# Patient Record
Sex: FEMALE | Race: BLACK OR AFRICAN AMERICAN | NOT HISPANIC OR LATINO | Employment: FULL TIME | URBAN - METROPOLITAN AREA
[De-identification: names, ages, dates, MRNs, and addresses within clinical notes are randomized per-mention and may not be internally consistent; named-entity substitution may affect disease eponyms.]

---

## 2023-11-17 ENCOUNTER — HOSPITAL ENCOUNTER (INPATIENT)
Facility: HOSPITAL | Age: 58
LOS: 38 days | Discharge: HOSPICE | DRG: 356 | End: 2023-12-25
Attending: INTERNAL MEDICINE | Admitting: INTERNAL MEDICINE
Payer: COMMERCIAL

## 2023-11-17 DIAGNOSIS — N18.6 ESRD (END STAGE RENAL DISEASE): ICD-10-CM

## 2023-11-17 DIAGNOSIS — R09.89 RESPIRATORY CRACKLES: ICD-10-CM

## 2023-11-17 DIAGNOSIS — I82.409 DVT (DEEP VENOUS THROMBOSIS): ICD-10-CM

## 2023-11-17 DIAGNOSIS — K66.8 BILOMA FOLLOWING SURGERY, INITIAL ENCOUNTER: ICD-10-CM

## 2023-11-17 DIAGNOSIS — E80.6 HYPERBILIRUBINEMIA: ICD-10-CM

## 2023-11-17 DIAGNOSIS — R00.0 TACHYCARDIA: ICD-10-CM

## 2023-11-17 DIAGNOSIS — N13.30 HYDRONEPHROSIS: ICD-10-CM

## 2023-11-17 DIAGNOSIS — T81.89XA BILOMA FOLLOWING SURGERY, INITIAL ENCOUNTER: ICD-10-CM

## 2023-11-17 DIAGNOSIS — I48.91 A-FIB: ICD-10-CM

## 2023-11-17 DIAGNOSIS — L89.150 PRESSURE ULCER OF SACRAL REGION, UNSTAGEABLE: ICD-10-CM

## 2023-11-17 DIAGNOSIS — R07.9 CHEST PAIN: Primary | ICD-10-CM

## 2023-11-17 DIAGNOSIS — B49 FUNGEMIA: ICD-10-CM

## 2023-11-17 DIAGNOSIS — C16.9 GASTRIC ADENOCARCINOMA: ICD-10-CM

## 2023-11-17 DIAGNOSIS — N13.1 HYDRONEPHROSIS CONCURRENT WITH AND DUE TO URETERAL STRICTURE: ICD-10-CM

## 2023-11-17 PROCEDURE — 11000001 HC ACUTE MED/SURG PRIVATE ROOM

## 2023-11-17 PROCEDURE — BT1F1ZZ FLUOROSCOPY OF LEFT KIDNEY, URETER AND BLADDER USING LOW OSMOLAR CONTRAST: ICD-10-PCS | Performed by: SPECIALIST

## 2023-11-17 PROCEDURE — 0T788DZ DILATION OF BILATERAL URETERS WITH INTRALUMINAL DEVICE, VIA NATURAL OR ARTIFICIAL OPENING ENDOSCOPIC: ICD-10-PCS | Performed by: SPECIALIST

## 2023-11-17 NOTE — Clinical Note
The dilator was inserted into the  right internal jugular vein. A series of dilations performed to upsize the sheath

## 2023-11-17 NOTE — Clinical Note
A catheter was placed Lesion documentation: Glidepath catheter was tunneled in the right chest wall up to the Right IJ sheath site

## 2023-11-17 NOTE — Clinical Note
The right chest and right neck was prepped. The site was prepped with ChloraPrep. The patient was draped.

## 2023-11-17 NOTE — Clinical Note
Catheter tip was inserted into the 15 FR peel away sheath and advanced into proper placement. Catheter tip placement was verified under fluoro. Image was saved.

## 2023-11-17 NOTE — Clinical Note
Exit site marked with skin marker. Lidocaine given along tunneled tract and exit site. Exit site created with small incision.

## 2023-11-17 NOTE — Clinical Note
10 ml of contrast were injected throughout the case. 0 mL of contrast was the total wasted during the case. 10 mL was the total amount used during the case.

## 2023-11-17 NOTE — Clinical Note
vein was performed. A percutaneous stick to the right neck was performed. Ultrasound guidance was used to obtain access.

## 2023-11-17 NOTE — Clinical Note
Dr. Luo prepped right neck and removed sutures from R IJ temporary catheter. Manual pressure held for 10 minutes to catheter site.

## 2023-11-17 NOTE — Clinical Note
Catheter tip was inserted into pull away sheath. Sheath was removed. Tip position was verified under fluoroscopy. Ports aspirated and flushed appropriately. Ports were packed with Heparin 3 ml each.

## 2023-11-18 ENCOUNTER — ANESTHESIA (OUTPATIENT)
Dept: SURGERY | Facility: HOSPITAL | Age: 58
DRG: 356 | End: 2023-11-18
Payer: COMMERCIAL

## 2023-11-18 ENCOUNTER — ANESTHESIA EVENT (OUTPATIENT)
Dept: SURGERY | Facility: HOSPITAL | Age: 58
DRG: 356 | End: 2023-11-18
Payer: COMMERCIAL

## 2023-11-18 LAB
ALBUMIN SERPL-MCNC: 3.1 G/DL (ref 3.4–4.8)
ALBUMIN/GLOB SERPL: 0.9 RATIO (ref 1.1–2)
ALP SERPL-CCNC: 526 UNIT/L (ref 40–150)
ALT SERPL-CCNC: 250 UNIT/L (ref 0–55)
APPEARANCE UR: CLEAR
AST SERPL-CCNC: 178 UNIT/L (ref 5–34)
BACTERIA #/AREA URNS AUTO: ABNORMAL /HPF
BASOPHILS # BLD AUTO: 0.05 X10(3)/MCL
BASOPHILS NFR BLD AUTO: 0.5 %
BILIRUB SERPL-MCNC: 10.8 MG/DL
BILIRUB UR QL STRIP.AUTO: ABNORMAL
BILIRUBIN DIRECT+TOT PNL SERPL-MCNC: 8.1 MG/DL (ref 0–?)
BUN SERPL-MCNC: 14.6 MG/DL (ref 9.8–20.1)
CALCIUM SERPL-MCNC: 9.8 MG/DL (ref 8.4–10.2)
CHLORIDE SERPL-SCNC: 106 MMOL/L (ref 98–107)
CO2 SERPL-SCNC: 27 MMOL/L (ref 23–31)
COLOR UR AUTO: ABNORMAL
CREAT SERPL-MCNC: 0.9 MG/DL (ref 0.55–1.02)
EOSINOPHIL # BLD AUTO: 0.09 X10(3)/MCL (ref 0–0.9)
EOSINOPHIL NFR BLD AUTO: 0.9 %
ERYTHROCYTE [DISTWIDTH] IN BLOOD BY AUTOMATED COUNT: 17.9 % (ref 11.5–17)
GFR SERPLBLD CREATININE-BSD FMLA CKD-EPI: >60 MLS/MIN/1.73/M2
GLOBULIN SER-MCNC: 3.5 GM/DL (ref 2.4–3.5)
GLUCOSE SERPL-MCNC: 79 MG/DL (ref 82–115)
GLUCOSE UR QL STRIP.AUTO: ABNORMAL
HCT VFR BLD AUTO: 38.3 % (ref 37–47)
HGB BLD-MCNC: 12.4 G/DL (ref 12–16)
IMM GRANULOCYTES # BLD AUTO: 0.05 X10(3)/MCL (ref 0–0.04)
IMM GRANULOCYTES NFR BLD AUTO: 0.5 %
INR PPP: 1.1
KETONES UR QL STRIP.AUTO: ABNORMAL
LEUKOCYTE ESTERASE UR QL STRIP.AUTO: NEGATIVE
LIPASE SERPL-CCNC: 15 U/L
LYMPHOCYTES # BLD AUTO: 0.58 X10(3)/MCL (ref 0.6–4.6)
LYMPHOCYTES NFR BLD AUTO: 5.7 %
MAGNESIUM SERPL-MCNC: 2.3 MG/DL (ref 1.6–2.6)
MCH RBC QN AUTO: 30.2 PG (ref 27–31)
MCHC RBC AUTO-ENTMCNC: 32.4 G/DL (ref 33–36)
MCV RBC AUTO: 93.2 FL (ref 80–94)
MONOCYTES # BLD AUTO: 1.06 X10(3)/MCL (ref 0.1–1.3)
MONOCYTES NFR BLD AUTO: 10.4 %
MUCOUS THREADS URNS QL MICRO: ABNORMAL /LPF
NEUTROPHILS # BLD AUTO: 8.37 X10(3)/MCL (ref 2.1–9.2)
NEUTROPHILS NFR BLD AUTO: 82 %
NITRITE UR QL STRIP.AUTO: NEGATIVE
NRBC BLD AUTO-RTO: 0 %
PH UR STRIP.AUTO: 6 [PH]
PHOSPHATE SERPL-MCNC: 3.8 MG/DL (ref 2.3–4.7)
PLATELET # BLD AUTO: 331 X10(3)/MCL (ref 130–400)
PMV BLD AUTO: 11.1 FL (ref 7.4–10.4)
POTASSIUM SERPL-SCNC: 4 MMOL/L (ref 3.5–5.1)
PROT SERPL-MCNC: 6.6 GM/DL (ref 5.8–7.6)
PROT UR QL STRIP.AUTO: ABNORMAL
PROTHROMBIN TIME: 14 SECONDS (ref 12.5–14.5)
RBC # BLD AUTO: 4.11 X10(6)/MCL (ref 4.2–5.4)
RBC #/AREA URNS AUTO: ABNORMAL /HPF
RBC UR QL AUTO: NEGATIVE
SODIUM SERPL-SCNC: 142 MMOL/L (ref 136–145)
SP GR UR STRIP.AUTO: >1.05 (ref 1–1.03)
SQUAMOUS #/AREA URNS LPF: ABNORMAL /HPF
UROBILINOGEN UR STRIP-ACNC: ABNORMAL
WBC # SPEC AUTO: 10.2 X10(3)/MCL (ref 4.5–11.5)
WBC #/AREA URNS AUTO: ABNORMAL /HPF

## 2023-11-18 PROCEDURE — C9113 INJ PANTOPRAZOLE SODIUM, VIA: HCPCS | Performed by: PHYSICIAN ASSISTANT

## 2023-11-18 PROCEDURE — 81001 URINALYSIS AUTO W/SCOPE: CPT | Performed by: INTERNAL MEDICINE

## 2023-11-18 PROCEDURE — 37000008 HC ANESTHESIA 1ST 15 MINUTES: Performed by: INTERNAL MEDICINE

## 2023-11-18 PROCEDURE — 0FJB8ZZ INSPECTION OF HEPATOBILIARY DUCT, VIA NATURAL OR ARTIFICIAL OPENING ENDOSCOPIC: ICD-10-PCS | Performed by: INTERNAL MEDICINE

## 2023-11-18 PROCEDURE — 63600175 PHARM REV CODE 636 W HCPCS: Performed by: INTERNAL MEDICINE

## 2023-11-18 PROCEDURE — 85025 COMPLETE CBC W/AUTO DIFF WBC: CPT | Performed by: INTERNAL MEDICINE

## 2023-11-18 PROCEDURE — C1769 GUIDE WIRE: HCPCS | Performed by: INTERNAL MEDICINE

## 2023-11-18 PROCEDURE — 63600175 PHARM REV CODE 636 W HCPCS: Performed by: PHYSICIAN ASSISTANT

## 2023-11-18 PROCEDURE — 37000009 HC ANESTHESIA EA ADD 15 MINS: Performed by: INTERNAL MEDICINE

## 2023-11-18 PROCEDURE — D9220A PRA ANESTHESIA: Mod: CRNA,,, | Performed by: NURSE ANESTHETIST, CERTIFIED REGISTERED

## 2023-11-18 PROCEDURE — 80074 ACUTE HEPATITIS PANEL: CPT | Performed by: INTERNAL MEDICINE

## 2023-11-18 PROCEDURE — 25000003 PHARM REV CODE 250: Performed by: INTERNAL MEDICINE

## 2023-11-18 PROCEDURE — 43235 EGD DIAGNOSTIC BRUSH WASH: CPT | Performed by: INTERNAL MEDICINE

## 2023-11-18 PROCEDURE — 84100 ASSAY OF PHOSPHORUS: CPT | Performed by: INTERNAL MEDICINE

## 2023-11-18 PROCEDURE — D9220A PRA ANESTHESIA: ICD-10-PCS | Mod: ANES,,, | Performed by: ANESTHESIOLOGY

## 2023-11-18 PROCEDURE — 27201423 OPTIME MED/SURG SUP & DEVICES STERILE SUPPLY: Performed by: INTERNAL MEDICINE

## 2023-11-18 PROCEDURE — 83735 ASSAY OF MAGNESIUM: CPT | Performed by: INTERNAL MEDICINE

## 2023-11-18 PROCEDURE — 85610 PROTHROMBIN TIME: CPT | Performed by: PHYSICIAN ASSISTANT

## 2023-11-18 PROCEDURE — 63600175 PHARM REV CODE 636 W HCPCS: Performed by: NURSE ANESTHETIST, CERTIFIED REGISTERED

## 2023-11-18 PROCEDURE — 25000003 PHARM REV CODE 250: Performed by: NURSE ANESTHETIST, CERTIFIED REGISTERED

## 2023-11-18 PROCEDURE — D9220A PRA ANESTHESIA: ICD-10-PCS | Mod: CRNA,,, | Performed by: NURSE ANESTHETIST, CERTIFIED REGISTERED

## 2023-11-18 PROCEDURE — 83690 ASSAY OF LIPASE: CPT | Performed by: INTERNAL MEDICINE

## 2023-11-18 PROCEDURE — 11000001 HC ACUTE MED/SURG PRIVATE ROOM

## 2023-11-18 PROCEDURE — D9220A PRA ANESTHESIA: Mod: ANES,,, | Performed by: ANESTHESIOLOGY

## 2023-11-18 PROCEDURE — 87040 BLOOD CULTURE FOR BACTERIA: CPT | Performed by: INTERNAL MEDICINE

## 2023-11-18 PROCEDURE — 80053 COMPREHEN METABOLIC PANEL: CPT | Performed by: INTERNAL MEDICINE

## 2023-11-18 PROCEDURE — 82248 BILIRUBIN DIRECT: CPT | Performed by: INTERNAL MEDICINE

## 2023-11-18 RX ORDER — SODIUM CHLORIDE 9 MG/ML
INJECTION, SOLUTION INTRAVENOUS
Status: DISCONTINUED | OUTPATIENT
Start: 2023-11-18 | End: 2023-12-25 | Stop reason: HOSPADM

## 2023-11-18 RX ORDER — OLMESARTAN MEDOXOMIL 20 MG/1
20 TABLET ORAL DAILY
COMMUNITY

## 2023-11-18 RX ORDER — LIDOCAINE HYDROCHLORIDE 10 MG/ML
1 INJECTION, SOLUTION EPIDURAL; INFILTRATION; INTRACAUDAL; PERINEURAL ONCE
Status: CANCELLED | OUTPATIENT
Start: 2023-11-18 | End: 2023-11-18

## 2023-11-18 RX ORDER — SODIUM CHLORIDE, SODIUM GLUCONATE, SODIUM ACETATE, POTASSIUM CHLORIDE AND MAGNESIUM CHLORIDE 30; 37; 368; 526; 502 MG/100ML; MG/100ML; MG/100ML; MG/100ML; MG/100ML
INJECTION, SOLUTION INTRAVENOUS CONTINUOUS
Status: DISCONTINUED | OUTPATIENT
Start: 2023-11-18 | End: 2023-11-22

## 2023-11-18 RX ORDER — SODIUM CITRATE AND CITRIC ACID MONOHYDRATE 334; 500 MG/5ML; MG/5ML
30 SOLUTION ORAL
Status: CANCELLED | OUTPATIENT
Start: 2023-11-18

## 2023-11-18 RX ORDER — DEXAMETHASONE SODIUM PHOSPHATE 4 MG/ML
INJECTION, SOLUTION INTRA-ARTICULAR; INTRALESIONAL; INTRAMUSCULAR; INTRAVENOUS; SOFT TISSUE
Status: DISCONTINUED | OUTPATIENT
Start: 2023-11-18 | End: 2023-11-18

## 2023-11-18 RX ORDER — SODIUM CHLORIDE 9 MG/ML
INJECTION, SOLUTION INTRAVENOUS CONTINUOUS
Status: DISCONTINUED | OUTPATIENT
Start: 2023-11-18 | End: 2023-11-20

## 2023-11-18 RX ORDER — ROCURONIUM BROMIDE 10 MG/ML
INJECTION, SOLUTION INTRAVENOUS
Status: DISCONTINUED | OUTPATIENT
Start: 2023-11-18 | End: 2023-11-18

## 2023-11-18 RX ORDER — OXYCODONE HYDROCHLORIDE 5 MG/1
5 TABLET ORAL EVERY 6 HOURS PRN
Status: DISCONTINUED | OUTPATIENT
Start: 2023-11-18 | End: 2023-12-25 | Stop reason: HOSPADM

## 2023-11-18 RX ORDER — PHENYLEPHRINE HCL IN 0.9% NACL 1 MG/10 ML
SYRINGE (ML) INTRAVENOUS
Status: DISCONTINUED | OUTPATIENT
Start: 2023-11-18 | End: 2023-11-18

## 2023-11-18 RX ORDER — ACETAMINOPHEN 325 MG/1
650 TABLET ORAL EVERY 4 HOURS PRN
Status: DISCONTINUED | OUTPATIENT
Start: 2023-11-18 | End: 2023-12-25 | Stop reason: HOSPADM

## 2023-11-18 RX ORDER — POLYETHYLENE GLYCOL 3350 17 G/17G
17 POWDER, FOR SOLUTION ORAL 2 TIMES DAILY PRN
Status: DISCONTINUED | OUTPATIENT
Start: 2023-11-18 | End: 2023-12-25 | Stop reason: HOSPADM

## 2023-11-18 RX ORDER — ALUMINUM HYDROXIDE, MAGNESIUM HYDROXIDE, AND SIMETHICONE 1200; 120; 1200 MG/30ML; MG/30ML; MG/30ML
30 SUSPENSION ORAL 4 TIMES DAILY PRN
Status: DISCONTINUED | OUTPATIENT
Start: 2023-11-18 | End: 2023-12-25 | Stop reason: HOSPADM

## 2023-11-18 RX ORDER — INDOMETHACIN 50 MG/1
100 SUPPOSITORY RECTAL ONCE
Status: DISCONTINUED | OUTPATIENT
Start: 2023-11-18 | End: 2023-11-18

## 2023-11-18 RX ORDER — PROCHLORPERAZINE EDISYLATE 5 MG/ML
5 INJECTION INTRAMUSCULAR; INTRAVENOUS EVERY 6 HOURS PRN
Status: DISCONTINUED | OUTPATIENT
Start: 2023-11-18 | End: 2023-12-25 | Stop reason: HOSPADM

## 2023-11-18 RX ORDER — SODIUM CHLORIDE 0.9 % (FLUSH) 0.9 %
10 SYRINGE (ML) INJECTION
Status: DISCONTINUED | OUTPATIENT
Start: 2023-11-18 | End: 2023-12-25 | Stop reason: HOSPADM

## 2023-11-18 RX ORDER — ONDANSETRON HYDROCHLORIDE 2 MG/ML
4 INJECTION, SOLUTION INTRAVENOUS ONCE
Status: COMPLETED | OUTPATIENT
Start: 2023-11-18 | End: 2023-11-18

## 2023-11-18 RX ORDER — ONDANSETRON HYDROCHLORIDE 2 MG/ML
4 INJECTION, SOLUTION INTRAVENOUS EVERY 4 HOURS PRN
Status: DISCONTINUED | OUTPATIENT
Start: 2023-11-18 | End: 2023-11-30

## 2023-11-18 RX ORDER — AMOXICILLIN 250 MG
2 CAPSULE ORAL 2 TIMES DAILY PRN
Status: DISCONTINUED | OUTPATIENT
Start: 2023-11-18 | End: 2023-12-25 | Stop reason: HOSPADM

## 2023-11-18 RX ORDER — ONDANSETRON HYDROCHLORIDE 2 MG/ML
INJECTION, SOLUTION INTRAMUSCULAR; INTRAVENOUS
Status: DISCONTINUED | OUTPATIENT
Start: 2023-11-18 | End: 2023-11-18

## 2023-11-18 RX ORDER — MORPHINE SULFATE 4 MG/ML
4 INJECTION, SOLUTION INTRAMUSCULAR; INTRAVENOUS EVERY 4 HOURS PRN
Status: DISCONTINUED | OUTPATIENT
Start: 2023-11-18 | End: 2023-12-25 | Stop reason: HOSPADM

## 2023-11-18 RX ORDER — PROPOFOL 10 MG/ML
VIAL (ML) INTRAVENOUS
Status: DISCONTINUED | OUTPATIENT
Start: 2023-11-18 | End: 2023-11-18

## 2023-11-18 RX ORDER — BISACODYL 10 MG/1
10 SUPPOSITORY RECTAL DAILY PRN
Status: DISCONTINUED | OUTPATIENT
Start: 2023-11-18 | End: 2023-12-25 | Stop reason: HOSPADM

## 2023-11-18 RX ORDER — PANTOPRAZOLE SODIUM 40 MG/10ML
40 INJECTION, POWDER, LYOPHILIZED, FOR SOLUTION INTRAVENOUS
Status: DISCONTINUED | OUTPATIENT
Start: 2023-11-18 | End: 2023-11-23

## 2023-11-18 RX ORDER — BISACODYL 10 MG/1
10 SUPPOSITORY RECTAL ONCE
Status: COMPLETED | OUTPATIENT
Start: 2023-11-18 | End: 2023-11-18

## 2023-11-18 RX ORDER — TALC
6 POWDER (GRAM) TOPICAL NIGHTLY PRN
Status: DISCONTINUED | OUTPATIENT
Start: 2023-11-18 | End: 2023-12-20

## 2023-11-18 RX ORDER — MORPHINE SULFATE 4 MG/ML
4 INJECTION, SOLUTION INTRAMUSCULAR; INTRAVENOUS ONCE
Status: COMPLETED | OUTPATIENT
Start: 2023-11-18 | End: 2023-11-18

## 2023-11-18 RX ORDER — INDOMETHACIN 50 MG/1
100 SUPPOSITORY RECTAL ONCE
Status: COMPLETED | OUTPATIENT
Start: 2023-11-18 | End: 2023-11-18

## 2023-11-18 RX ADMIN — DEXAMETHASONE SODIUM PHOSPHATE 4 MG: 4 INJECTION, SOLUTION INTRA-ARTICULAR; INTRALESIONAL; INTRAMUSCULAR; INTRAVENOUS; SOFT TISSUE at 01:11

## 2023-11-18 RX ADMIN — PROCHLORPERAZINE EDISYLATE 5 MG: 5 INJECTION INTRAMUSCULAR; INTRAVENOUS at 11:11

## 2023-11-18 RX ADMIN — SODIUM CHLORIDE: 9 INJECTION, SOLUTION INTRAVENOUS at 02:11

## 2023-11-18 RX ADMIN — Medication 100 MCG: at 01:11

## 2023-11-18 RX ADMIN — PIPERACILLIN SODIUM AND TAZOBACTAM SODIUM 4.5 G: 4; .5 INJECTION, POWDER, FOR SOLUTION INTRAVENOUS at 06:11

## 2023-11-18 RX ADMIN — PIPERACILLIN SODIUM AND TAZOBACTAM SODIUM 4.5 G: 4; .5 INJECTION, POWDER, FOR SOLUTION INTRAVENOUS at 02:11

## 2023-11-18 RX ADMIN — SODIUM CHLORIDE: 9 INJECTION, SOLUTION INTRAVENOUS at 09:11

## 2023-11-18 RX ADMIN — INDOMETHACIN 100 MG: 50 SUPPOSITORY RECTAL at 01:11

## 2023-11-18 RX ADMIN — SUGAMMADEX 200 MG: 100 INJECTION, SOLUTION INTRAVENOUS at 01:11

## 2023-11-18 RX ADMIN — ROCURONIUM BROMIDE 50 MG: 10 SOLUTION INTRAVENOUS at 01:11

## 2023-11-18 RX ADMIN — SODIUM CHLORIDE, SODIUM GLUCONATE, SODIUM ACETATE, POTASSIUM CHLORIDE AND MAGNESIUM CHLORIDE: 526; 502; 368; 37; 30 INJECTION, SOLUTION INTRAVENOUS at 01:11

## 2023-11-18 RX ADMIN — PANTOPRAZOLE SODIUM 40 MG: 40 INJECTION, POWDER, FOR SOLUTION INTRAVENOUS at 05:11

## 2023-11-18 RX ADMIN — ONDANSETRON 4 MG: 2 INJECTION INTRAMUSCULAR; INTRAVENOUS at 01:11

## 2023-11-18 RX ADMIN — PROPOFOL 150 MG: 10 INJECTION, EMULSION INTRAVENOUS at 01:11

## 2023-11-18 RX ADMIN — PIPERACILLIN SODIUM AND TAZOBACTAM SODIUM 4.5 G: 4; .5 INJECTION, POWDER, FOR SOLUTION INTRAVENOUS at 09:11

## 2023-11-18 RX ADMIN — BISACODYL 10 MG: 10 SUPPOSITORY RECTAL at 01:11

## 2023-11-18 RX ADMIN — MORPHINE SULFATE 4 MG: 4 INJECTION, SOLUTION INTRAMUSCULAR; INTRAVENOUS at 01:11

## 2023-11-18 RX ADMIN — SODIUM CHLORIDE: 9 INJECTION, SOLUTION INTRAVENOUS at 06:11

## 2023-11-18 NOTE — NURSING
Nurses Note -- 4 Eyes      11/18/2023   2:57 AM      Skin assessed during: Admit      [x] No Altered Skin Integrity Present    [x]Prevention Measures Documented      [] Yes- Altered Skin Integrity Present or Discovered   [] LDA Added if Not in Epic (Describe Wound)   [] New Altered Skin Integrity was Present on Admit and Documented in LDA   [] Wound Image Taken    Wound Care Consulted? No    Attending Nurse:  Anastasiya Sainz RN/Staff Member:   Abigail Burleson RN

## 2023-11-18 NOTE — ANESTHESIA PREPROCEDURE EVALUATION
11/18/2023  Karen Briggs is a 58 y.o., female with medical history of hypertension who recently underwent laparoscopic cholecystectomy on 11/10/2023, procedure was incomplete and was unable to completely resect the gallbladder.  Since surgery she continued to have right flank/back pain and followed up with her PCP and CT abdomen and pelvis on 11/17/2023 revealed left-sided hydronephrosis with suspected distal obstruction/no clear stone visualized, postoperative changes of cholecystectomy with fluid in the gallbladder fossa may reflect postoperative seroma but biloma can not be entirely excluded, also noted for marked thickening of the stomach wall diffusely may be related to mesenteric edema/reactive.  She [place] [facility] [place] the same day 11/17/2023 and her labs notable for WBC 9.0, hemoglobin 12.4, platelets 442, creatinine 0.86, total bilirubin 8.7 with direct fraction 6.7, alkaline phosphatase 491, , .  Patient's surgeon was consulted and recommended ERCP which was not available at [facility] for which she was transferred to [facility] and referred to hospital medicine service for further evaluation and management.       Pre-op Assessment    I have reviewed the Patient Summary Reports.     I have reviewed the Nursing Notes. I have reviewed the NPO Status.   I have reviewed the Medications.     Review of Systems  Cardiovascular:  Exercise tolerance: good                                               Physical Exam  General: Well nourished and Cooperative    Airway:  Mallampati: II   Mouth Opening: Normal  TM Distance: Normal  Tongue: Normal  Neck ROM: Normal ROM    Dental:  Dentures    Chest/Lungs:  Clear to auscultation    Heart:  Rhythm: Regular Rhythm        Anesthesia Plan  Type of Anesthesia, risks & benefits discussed:    Anesthesia Type: Gen ETT  Intra-op Monitoring Plan: Standard ASA  "Monitors  Post Op Pain Control Plan: multimodal analgesia  Induction:  IV  Airway Plan: Direct  Informed Consent: Informed consent signed with the Patient and all parties understand the risks and agree with anesthesia plan.  All questions answered.   ASA Score: 2  Day of Surgery Review of History & Physical: H&P Update referred to the surgeon/provider.    Ready For Surgery From Anesthesia Perspective.     .  I explained anesthesia plan to patient/responsbile party if available.  Anesthesia consent done going over the material facts, risks, complications & alternatives, obtained which includes the possibility of altering the anesthesia plan.  I reviewed problem list, prior to admission medication list, appropriate labs, any workup, Xray, EKG etc noted below.  Patients condition is satisfactory to proceed with anesthesia plan unless otherwise noted (see anesthesia chart for details of the anesthesia plan carried out).      Pre-operative evaluation for Procedure(s) (LRB):  ERCP (ENDOSCOPIC RETROGRADE CHOLANGIOPANCREATOGRAPHY) (N/A)    /83   Pulse 88   Temp 36.8 °C (98.2 °F) (Oral)   Resp 18   Ht 5' 6" (1.676 m)   Wt 82.2 kg (181 lb 4 oz)   SpO2 98%   Breastfeeding No   BMI 29.25 kg/m²     There is no problem list on file for this patient.      Review of patient's allergies indicates:  No Known Allergies    Current Outpatient Medications   Medication Instructions    olmesartan (BENICAR) 20 mg, Oral, Daily       Past Surgical History:   Procedure Laterality Date    CARPAL TUNNEL RELEASE Left     HEMORRHOID SURGERY         Social History     Socioeconomic History    Marital status:    Tobacco Use    Smoking status: Never    Smokeless tobacco: Never   Substance and Sexual Activity    Alcohol use: No    Drug use: No    Sexual activity: Never     Birth control/protection: Post-menopausal       Lab Results   Component Value Date    WBC 10.20 11/18/2023    HGB 12.4 11/18/2023    HCT 38.3 11/18/2023    MCV " "93.2 11/18/2023     11/18/2023          BMP  Lab Results   Component Value Date    HCT 38.3 11/18/2023     11/18/2023    K 4.0 11/18/2023    BUN 14.6 11/18/2023    CREATININE 0.90 11/18/2023    CALCIUM 9.8 11/18/2023        INR  No results for input(s): "PT", "INR", "PROTIME", "APTT" in the last 72 hours.        Diagnostic Studies:      EKG:           "

## 2023-11-18 NOTE — ANESTHESIA PROCEDURE NOTES
Intubation    Date/Time: 11/18/2023 1:01 PM    Performed by: Karel Gentile CRNA  Authorized by: Devan Sin MD    Intubation:     Induction:  Intravenous    Intubated:  Postinduction    Mask Ventilation:  Easy mask    Attempts:  1    Attempted By:  CRNA    Method of Intubation:  Direct    Blade:  Gibson 2    Laryngeal View Grade: Grade I - full view of cords      Difficult Airway Encountered?: No      Complications:  None    Airway Device:  Oral endotracheal tube    Airway Device Size:  7.5    Style/Cuff Inflation:  Cuffed    Tube secured:  21    Secured at:  The lips    Placement Verified By:  Capnometry    Complicating Factors:  None    Findings Post-Intubation:  BS equal bilateral

## 2023-11-18 NOTE — CONSULTS
Reason for Urology Consult     LEFT hydronephrosis.     HPI     Karen Briggs is a 58 y.o. female who recently underwent laparoscopic cholecystectomy on November 11th at [facility].  Incomplete resection due to inflammation.  Has had persistent right back pain prompting repeat CT November 17th which revealed left hydronephrosis without stone.  Noted postoperative fluid collection with mesenteric edema.  Creatinine 0.86.  She was transferred here for ERCP.    Patient seen and examined this morning.  Continues to have right back and flank pain.  She denies any left flank pain.  She denies stone history.  No dysuria or gross hematuria.  Urinalysis with trace bacteria.  Nitrite negative.      Past Medical History:   Diagnosis Date    Hypertension     Sciatica      Past Surgical History:   Procedure Laterality Date    CARPAL TUNNEL RELEASE Left     HEMORRHOID SURGERY       Family History   Problem Relation Age of Onset    Breast cancer Mother     Cancer Maternal Aunt         colon cancer     Social History     Tobacco Use    Smoking status: Never    Smokeless tobacco: Never   Substance Use Topics    Alcohol use: No       Review of patient's allergies indicates:  No Known Allergies      Current Facility-Administered Medications:     0.9%  NaCl infusion, , Intravenous, PRN, Hussein Merino MD, Last Rate: 10 mL/hr at 11/18/23 0625, New Bag at 11/18/23 0625    acetaminophen tablet 650 mg, 650 mg, Oral, Q4H PRN, Hussein Merino MD    aluminum-magnesium hydroxide-simethicone 200-200-20 mg/5 mL suspension 30 mL, 30 mL, Oral, QID PRN, Hussein Merino MD    bisacodyL suppository 10 mg, 10 mg, Rectal, Daily PRN, Hussein Merino MD    melatonin tablet 6 mg, 6 mg, Oral, Nightly PRN, Hussein Merino MD    morphine injection 4 mg, 4 mg, Intravenous, Q4H PRN, Hussein Merino MD    ondansetron injection 4 mg, 4 mg, Intravenous, Q4H PRN, Hussein Merino MD    oxyCODONE immediate release tablet 5 mg, 5 mg, Oral, Q6H PRN, Hussein Merino MD     piperacillin-tazobactam (ZOSYN) 4.5 g in dextrose 5 % in water (D5W) 100 mL IVPB (MB+), 4.5 g, Intravenous, Q8H, Hussein Merino MD, Stopped at 11/18/23 1026    polyethylene glycol packet 17 g, 17 g, Oral, BID PRN, Hussein Merino MD    prochlorperazine injection Soln 5 mg, 5 mg, Intravenous, Q6H PRN, Hussein Merino MD    senna-docusate 8.6-50 mg per tablet 2 tablet, 2 tablet, Oral, BID PRN, Hussein Merino MD    sodium chloride 0.9% flush 10 mL, 10 mL, Intravenous, PRN, Hussein Merino MD    Physical exam     Vitals:    11/18/23 0050 11/18/23 0117 11/18/23 0410 11/18/23 0700   BP: 131/80  128/86 (!) 115/97   Pulse: 84  88 85   Resp: 19 18 18   Temp: 98.2 °F (36.8 °C)  97.7 °F (36.5 °C) 98.3 °F (36.8 °C)   TempSrc: Oral  Oral Oral   SpO2: 100%  100% 99%   Weight:       Height:         Appears ill  AAOx3  Skin warm and dry  Affect appropriate  Grossly intact neurologically  NCAT  RRR  Respirations even/unlabored  Abdomen soft, tender in right and left lower quadrant.  No left CVA tenderness  Extremities no clubbing, cyanosis, edema        Lab     Lab Results   Component Value Date    WBC 10.20 11/18/2023    HGB 12.4 11/18/2023    HCT 38.3 11/18/2023     11/18/2023     (H) 11/18/2023     (H) 11/18/2023     11/18/2023    K 4.0 11/18/2023     04/24/2018    BUN 14.6 11/18/2023    CO2 27 11/18/2023    TSH 1.551 04/24/2018     (H) 04/24/2018       Lab Results   Component Value Date    PHUR 5.0 04/24/2018    SPECGRAV 1.015 04/24/2018    LEUKOCYTESUR Negative 11/18/2023    NITRITE Negative 04/24/2018    RBCUA 6-10 (A) 11/18/2023    WBCUA 6-10 (A) 11/18/2023         Assessment     58-year-old female status post complicated laparoscopic cholecystectomy with postoperative fluid collection and incidental left hydronephrosis.    Plan     Reviewed CT from outside facility on November 17th.  CT with delayed nephrogenic phase and hydro nephrosis concerning for obstruction.  No appreciable ureteral  stone.  May be resultant from inflammatory reaction.  She is not having left flank pain at this time.  Creatinine is 0.9.  Urinalysis without evidence of infection.  Discussed indications for stent placement including symptomatic renal colic, concern for UTI, and persistent hydro.  She was understanding.  Planning for ERCP today.  We will continue to monitor.  Recommend renal ultrasound 2-3 days.    AF  #[id number]

## 2023-11-18 NOTE — PROGRESS NOTES
Received from procedure room:    A-clear airway, able to speak in full sentences, able to expectorate secretions  B-Breathing spontaneously in regular intervals  C-Skin warm to touch, CRT less than 2 seconds    Pain-Denies pain, appears comfortable, no grimaced face noted    Vital signs taken and kept monitored  Seen by clinician before going back to [facility].  May have full liquid diet

## 2023-11-18 NOTE — TRANSFER OF CARE
"Anesthesia Transfer of Care Note    Patient: Karen Briggs    Procedure(s) Performed: Procedure(s) (LRB):  ERCP (ENDOSCOPIC RETROGRADE CHOLANGIOPANCREATOGRAPHY) (N/A)  EGD, WITH CLOSED BIOPSY    Patient location: GI    Anesthesia Type: general    Transport from OR: Transported from OR on room air with adequate spontaneous ventilation    Post pain: adequate analgesia    Post assessment: no apparent anesthetic complications    Post vital signs: stable    Level of consciousness: awake    Nausea/Vomiting: no nausea/vomiting    Complications: none    Transfer of care protocol was followed      Last vitals: Visit Vitals  /83   Pulse 88   Temp 36.8 °C (98.2 °F) (Oral)   Resp 18   Ht 5' 6" (1.676 m)   Wt 82.2 kg (181 lb 4 oz)   SpO2 98%   Breastfeeding No   BMI 29.25 kg/m²     "

## 2023-11-18 NOTE — CONSULTS
Consult Note    Reason for Consult:      We were consulted by Dr. Steiner to evaluate this patient for bile leak/obstruction.   As above. Attending is Dr. Steiner.    HPI:   This is a 58-year-old female unknown to our group with a past medical history of HTN.     S/p laparoscopic cholecystectomy 11/10/2023; procedure was incomplete and unable to completely resect the gallbladder.  Right flank/back pain persisted since surgery. Followed up with PCP and CT abdomen and pelvis on 11/17/2023 revealed left-sided hydronephrosis with suspected distal obstruction/no clear stone visualized, postoperative changes of cholecystectomy with fluid in the gallbladder fossa may reflect postoperative seroma but biloma can not be entirely excluded, also noted for marked thickening of the stomach wall diffusely may be related to mesenteric edema/reactive.  Presented to [facility] ED the same day 11/17/2023 and her labs notable for WBC 9.0k, hemoglobin 12.4g/dL, platelets 442, creatinine 0.86, total bilirubin 8.7 direct predominant 6.7, , , .  Surgeon was consulted with recommendations for ERCP (not available at [facility]). Thus, transferred to [facility]. Admitted to hospital medicine. GI consulted for biloma vs bile leak vs obstruction.    Currently, labs reveal normal CBC, , tbili 10.8, direct 8.1, , . Clinically hemodynamically stable and afebrile.     Reports generalized abdominal discomfort. Reports difficulty swallowing, defined as food getting stuck at the lower esophageal area and regurgitating back up. Going on prior to lap grace, now worse. Admits to nausea. Vomiting defined as regurgitation and the inability to keep anything down. Endorses 20lbs unintentional weight loss. Denies anorexia. Constipation predominant stooling currently; small BM today. Otherwise, nothing significant since cholecystectomy.    No history of EGD. Last colonoscopy sometime in September without any significant findings; done for  hemorrhoidal concerns. Denies use of tobacco, etoh, or illicit substances.    Previous records reviewed. Collateral information obtained from family member present at bedside.    PCP:  Roman Matamoros MD    Review of patient's allergies indicates:  No Known Allergies     Current Facility-Administered Medications   Medication Dose Route Frequency Provider Last Rate Last Admin    0.9%  NaCl infusion   Intravenous PRN Hussein Merino MD 10 mL/hr at 11/18/23 0625 New Bag at 11/18/23 0625    acetaminophen tablet 650 mg  650 mg Oral Q4H PRN Hussein Merino MD        aluminum-magnesium hydroxide-simethicone 200-200-20 mg/5 mL suspension 30 mL  30 mL Oral QID PRN Hussein Merino MD        bisacodyL suppository 10 mg  10 mg Rectal Daily PRN Hussein Merino MD        melatonin tablet 6 mg  6 mg Oral Nightly PRN Hussein Merino MD        morphine injection 4 mg  4 mg Intravenous Q4H PRN Hussein Merino MD        ondansetron injection 4 mg  4 mg Intravenous Q4H PRN Hussein Merino MD        oxyCODONE immediate release tablet 5 mg  5 mg Oral Q6H PRN Hussein Merino MD        piperacillin-tazobactam (ZOSYN) 4.5 g in dextrose 5 % in water (D5W) 100 mL IVPB (MB+)  4.5 g Intravenous Q8H Hussein Merino MD 25 mL/hr at 11/18/23 0626 4.5 g at 11/18/23 0626    polyethylene glycol packet 17 g  17 g Oral BID PRN Hussein Merino MD        prochlorperazine injection Soln 5 mg  5 mg Intravenous Q6H PRN Hussein Merino MD        senna-docusate 8.6-50 mg per tablet 2 tablet  2 tablet Oral BID PRN Hussein Merino MD        sodium chloride 0.9% flush 10 mL  10 mL Intravenous PRN Hussein Merino MD         Medications Prior to Admission   Medication Sig Dispense Refill Last Dose    olmesartan (BENICAR) 20 MG tablet Take 20 mg by mouth once daily.   11/17/2023       Past Medical History:  Past Medical History:   Diagnosis Date    Hypertension     Sciatica       Past Surgical History:  Past Surgical History:   Procedure Laterality Date    CARPAL TUNNEL RELEASE Left      HEMORRHOID SURGERY        Family History:  Family History   Problem Relation Age of Onset    Breast cancer Mother     Cancer Maternal Aunt         colon cancer     Social History:  Social History     Tobacco Use    Smoking status: Never    Smokeless tobacco: Never   Substance Use Topics    Alcohol use: No       Review of Systems:     Review of Systems   Constitutional:  Positive for unexpected weight change. Negative for activity change, appetite change, chills, diaphoresis, fatigue and fever.   HENT:  Positive for trouble swallowing. Negative for sore throat.    Eyes:  Negative for visual disturbance.        Scleral icterus   Respiratory:  Negative for cough, choking and shortness of breath.    Cardiovascular:  Negative for chest pain, palpitations and leg swelling.   Gastrointestinal:  Positive for abdominal pain, constipation and nausea. Negative for abdominal distention, anal bleeding, blood in stool, diarrhea, rectal pain and vomiting.   Musculoskeletal:  Negative for arthralgias.   Skin:  Negative for color change, pallor and rash.   Neurological:  Negative for dizziness, syncope, weakness, light-headedness and numbness.   Psychiatric/Behavioral:  Negative for confusion. The patient is not nervous/anxious.      Objective:     VITAL SIGNS: 24 HR MIN & MAX LAST    Temp  Min: 97.7 °F (36.5 °C)  Max: 98.3 °F (36.8 °C)  98.3 °F (36.8 °C)        BP  Min: 115/97  Max: 145/89  (!) 115/97     Pulse  Min: 84  Max: 89  85     Resp  Min: 18  Max: 19  18    SpO2  Min: 96 %  Max: 100 %  99 %      No intake or output data in the 24 hours ending 11/18/23 1003    Physical Exam  Constitutional:       General: She is not in acute distress.     Appearance: Normal appearance.   HENT:      Head: Normocephalic and atraumatic.      Nose: Nose normal.      Mouth/Throat:      Mouth: Mucous membranes are dry.   Eyes:      General: No scleral icterus.     Conjunctiva/sclera: Conjunctivae normal.   Cardiovascular:      Rate and Rhythm:  Normal rate and regular rhythm.   Pulmonary:      Effort: Pulmonary effort is normal. No respiratory distress.   Abdominal:      General: Bowel sounds are normal. There is distension (mild distention, soft).      Palpations: Abdomen is soft.      Tenderness: There is abdominal tenderness (appropriate post-surgical state). There is no guarding or rebound.   Musculoskeletal:         General: No swelling or tenderness.      Cervical back: Neck supple.   Skin:     General: Skin is warm and dry.   Neurological:      Mental Status: She is alert and oriented to person, place, and time.   Psychiatric:         Mood and Affect: Mood normal.         Behavior: Behavior normal.         Thought Content: Thought content normal.         Judgment: Judgment normal.        Recent Results (from the past 48 hour(s))   Comprehensive Metabolic Panel    Collection Time: 11/18/23  7:10 AM   Result Value Ref Range    Sodium Level 142 136 - 145 mmol/L    Potassium Level 4.0 3.5 - 5.1 mmol/L    Chloride 106 98 - 107 mmol/L    Carbon Dioxide 27 23 - 31 mmol/L    Glucose Level 79 (L) 82 - 115 mg/dL    Blood Urea Nitrogen 14.6 9.8 - 20.1 mg/dL    Creatinine 0.90 0.55 - 1.02 mg/dL    Calcium Level Total 9.8 8.4 - 10.2 mg/dL    Protein Total 6.6 5.8 - 7.6 gm/dL    Albumin Level 3.1 (L) 3.4 - 4.8 g/dL    Globulin 3.5 2.4 - 3.5 gm/dL    Albumin/Globulin Ratio 0.9 (L) 1.1 - 2.0 ratio    Bilirubin Total 10.8 (H) <=1.5 mg/dL    Alkaline Phosphatase 526 (H) 40 - 150 unit/L    Alanine Aminotransferase 250 (H) 0 - 55 unit/L    Aspartate Aminotransferase 178 (H) 5 - 34 unit/L    eGFR >60 mls/min/1.73/m2   Magnesium    Collection Time: 11/18/23  7:10 AM   Result Value Ref Range    Magnesium Level 2.30 1.60 - 2.60 mg/dL   Phosphorus    Collection Time: 11/18/23  7:10 AM   Result Value Ref Range    Phosphorus Level 3.8 2.3 - 4.7 mg/dL   Lipase    Collection Time: 11/18/23  7:10 AM   Result Value Ref Range    Lipase Level 15 <=60 U/L   Bilirubin, Direct     Collection Time: 11/18/23  7:10 AM   Result Value Ref Range    Bilirubin Direct 8.1 (H) 0.0 - <0.5 mg/dL   CBC with Differential    Collection Time: 11/18/23  7:10 AM   Result Value Ref Range    WBC 10.20 4.50 - 11.50 x10(3)/mcL    RBC 4.11 (L) 4.20 - 5.40 x10(6)/mcL    Hgb 12.4 12.0 - 16.0 g/dL    Hct 38.3 37.0 - 47.0 %    MCV 93.2 80.0 - 94.0 fL    MCH 30.2 27.0 - 31.0 pg    MCHC 32.4 (L) 33.0 - 36.0 g/dL    RDW 17.9 (H) 11.5 - 17.0 %    Platelet 331 130 - 400 x10(3)/mcL    MPV 11.1 (H) 7.4 - 10.4 fL    Neut % 82.0 %    Lymph % 5.7 %    Mono % 10.4 %    Eos % 0.9 %    Basophil % 0.5 %    Lymph # 0.58 (L) 0.6 - 4.6 x10(3)/mcL    Neut # 8.37 2.1 - 9.2 x10(3)/mcL    Mono # 1.06 0.1 - 1.3 x10(3)/mcL    Eos # 0.09 0 - 0.9 x10(3)/mcL    Baso # 0.05 <=0.2 x10(3)/mcL    IG# 0.05 (H) 0 - 0.04 x10(3)/mcL    IG% 0.5 %    NRBC% 0.0 %     No results found.    Imaging personally reviewed by myself and SP.    Assessment / Plan:   58-year-old female unknown to our group with a past medical history of HTN. [place] [facility] [place] [facility] for concerns of biloma vs bile leak vs obstruction s/p incomplete laparoscopic cholecystectomy 11/10/23  by Dr. Sicard.    S/p laparoscopic cholecystectomy 11/10/2023; procedure was incomplete and unable to completely resect the gallbladder.   CT abd/pelv 11/17/2023: left-sided hydronephrosis with suspected distal obstruction/no clear stone visualized, postoperative changes of cholecystectomy with fluid in the gallbladder fossa may reflect postoperative seroma but biloma can not be entirely excluded, also noted for marked thickening of the stomach wall diffusely may be related to mesenteric edema/reactive.     Generalized abdominal pain  Hyperbilirubinemia  Transaminitis  Abnormal imaging suggesting biloma   Tbili 10.8, direct 8.1  - Continue routine medical management  - NPO  - On Zosyn  - IV PPI BID  - INR now  - Will proceed to ERCP today. Further recommendations to follow from   Ana Paula.      Thank you for this consult and for allowing us to participate in this patient's care.          bAbi Franklin PA-C  [facility]  [phone]

## 2023-11-18 NOTE — H&P
[facility] Hospital Medicine - H&P Note    Patient Name: Karen Briggs  MRN: [id number]  PCP: Roman Matamoros MD  Admitting Physician: Hussein Merino MD  Admission Class: IP- Inpatient   Date of Service: 11/18/2023  Code status: Full    Chief Complaint   Transfer from [facility] for possible bile leak/biloma as well as left hydronephrosis    History of Present Illness   This is a 58-year-old female with medical history of hypertension who recently underwent laparoscopic cholecystectomy on 11/10/2023, procedure was incomplete and was unable to completely resect the gallbladder.  Since surgery she continued to have right flank/back pain and followed up with her PCP and CT abdomen and pelvis on 11/17/2023 revealed left-sided hydronephrosis with suspected distal obstruction/no clear stone visualized, postoperative changes of cholecystectomy with fluid in the gallbladder fossa may reflect postoperative seroma but biloma can not be entirely excluded, also noted for marked thickening of the stomach wall diffusely may be related to mesenteric edema/reactive.  She presented to [facility] ED the same day 11/17/2023 and her labs notable for WBC 9.0, hemoglobin 12.4, platelets 442, creatinine 0.86, total bilirubin 8.7 with direct fraction 6.7, alkaline phosphatase 491, , .  Patient's surgeon was consulted and recommended ERCP which was not available at [facility] for which she was transferred to [facility] and referred to hospital medicine service for further evaluation and management.    On my evaluation she complaints of right flank/back pain as well as suprapubic pain and constipation.  Denies nausea, vomiting, fever or chills.  Her hemodynamics are stable.      ROS   Except as documented, all other systems reviewed and negative     Past Medical History     Past Medical History:   Diagnosis Date    Hypertension     Sciatica        Past Surgical History     Past Surgical History:   Procedure Laterality Date     CARPAL TUNNEL RELEASE Left     HEMORRHOID SURGERY         Social History     Social History     Tobacco Use    Smoking status: Never    Smokeless tobacco: Never   Substance Use Topics    Alcohol use: No        Family History   Reviewed and negative    Allergies   Patient has no known allergies.    Home Medications     Prior to Admission medications    Medication Sig Start Date End Date Taking? Authorizing Provider   olmesartan (BENICAR) 20 MG tablet Take 20 mg by mouth once daily.   Yes Provider, Historical   amLODIPine (NORVASC) 10 MG tablet Take 10 mg by mouth once daily.  11/18/23  Provider, Historical   valsartan (DIOVAN) 320 MG tablet Take 320 mg by mouth once daily.  11/18/23  Provider, Historical        Physical Exam   Vital Signs  Temp:  [97.7 °F (36.5 °C)-98.2 °F (36.8 °C)]   Pulse:  [84-89]   Resp:  [18-19]   BP: (128-145)/(80-89)   SpO2:  [96 %-100 %]    General: Appears comfortable  HEENT: NC/AT  Neck:  No JVD  Chest: CTABL  CVS: Regular rhythm. Normal S1/S2.  Abdomen: nondistended, normoactive BS, right upper quadrant and flank tenderness, suprapubic tenderness  MSK: No obvious deformity or joint swelling  Skin: Warm and dry  Neuro: AAOx3, no focal neurological deficit  Psych: Cooperative    Labs   Reviewed in  scanned transfer record.    Microbiology Results (last 7 days)       Procedure Component Value Units Date/Time    Blood Culture [[id number]]     Order Status: Sent Specimen: Blood     Blood Culture [[id number]]     Order Status: Sent Specimen: Blood            Imaging   CT abdomen and pelvis with and without contrast and CT lumbar spine 11/17/2023- radiology report reviewed, no images available as of yet, requested to be pushed/uploaded into our system.    Assessment   Suspected bile leak with biloma and biliary obstruction  H/O Laparoscopic incomplete cholecystectomy 11/10/2023  Left hydronephrosis    History of hypertension and DDD/sciatica    Plan   Blood cultures x2, urinalysis  Start IV  Zosyn  Keep NPO except medications  Multimodal pain control  Repeat CBC, CMP, lipase  Urology consult  GI consult  Awaiting CT scan images to be uploaded, will defer further imaging such as HIDA scan if needed prior to ERCP to GI team.  VTE Prophylaxis:  Tea Merino MD  Internal Medicine

## 2023-11-18 NOTE — PROGRESS NOTES
[facility] MEDICINE ~ PROGRESS NOTE        CHIEF COMPLAINT   Hospital follow up    HOSPITAL COURSE   58-year-old female with medical history of hypertension who recently underwent laparoscopic cholecystectomy on 11/10/2023, procedure was incomplete and was unable to completely resect the gallbladder.  Since surgery she continued to have right flank/back pain and followed up with her PCP and CT abdomen and pelvis on 11/17/2023 revealed left-sided hydronephrosis with suspected distal obstruction/no clear stone visualized, postoperative changes of cholecystectomy with fluid in the gallbladder fossa may reflect postoperative seroma but biloma can not be entirely excluded, also noted for marked thickening of the stomach wall diffusely may be related to mesenteric edema/reactive.  She presented to [facility] ED the same day 11/17/2023 and her labs notable for WBC 9.0, hemoglobin 12.4, platelets 442, creatinine 0.86, total bilirubin 8.7 with direct fraction 6.7, alkaline phosphatase 491, , .  Patient's surgeon was consulted and recommended ERCP which was not available at [facility] for which she was transferred to [facility] and referred to hospital medicine service for further evaluation and management.     On my evaluation she complaints of right flank/back pain as well as suprapubic pain and constipation.  Denies nausea, vomiting, fever or chills.  Her hemodynamics are stable.    Today  Saw her this morning after our nocturnist team saw her early this morning as well.  No complaint at this time.  She initially actually went in for belching and unable to keep anything down.        OBJECTIVE/PHYSICAL EXAM     VITAL SIGNS (MOST RECENT):  Temp: 98.3 °F (36.8 °C) (11/18/23 0700)  Pulse: 85 (11/18/23 0700)  Resp: 18 (11/18/23 0700)  BP: (!) 115/97 (11/18/23 0700)  SpO2: 99 % (11/18/23 0700) VITAL SIGNS (24 HOUR RANGE):  Temp:  [97.7 °F (36.5 °C)-98.3 °F (36.8 °C)] 98.3 °F (36.8  °C)  Pulse:  [84-89] 85  Resp:  [18-19] 18  SpO2:  [96 %-100 %] 99 %  BP: (115-145)/(80-97) 115/97   GENERAL: In no acute distress, afebrile  HEENT:  CHEST: Clear to auscultation bilaterally  HEART: S1, S2, no appreciable murmur  ABDOMEN: Soft, nontender, BS +  MSK: Warm, no lower extremity edema, no clubbing or cyanosis  NEUROLOGIC: Alert and oriented x4, moving all extremities with good strength   INTEGUMENTARY:  PSYCHIATRY:        ASSESSMENT/PLAN   Suspected bile leak with biloma and biliary obstruction  H/O Laparoscopic incomplete cholecystectomy 11/10/2023  Left hydronephrosis     History of hypertension and DDD/sciatica      GI consulted.  Will likely need ERCP.  Bilirubin trending upwards.    Urology consulted.  Pending evaluation.  Continue Zosyn for now    DVT prophylaxis:     Prolonged care note   Prolonged care time in:  7:00 a.m.  Prolonged care time out:  7:35 a.m.  Time spent:  35 minutes    Anticipated discharge and disposition:   __________________________________________________________________________    LABS/MICRO/MEDS/DIAGNOSTICS       LABS  Recent Labs     11/18/23 0710      K 4.0   CHLORIDE 106   CO2 27   BUN 14.6   CREATININE 0.90   GLUCOSE 79*   CALCIUM 9.8   ALKPHOS 526*   *   *   ALBUMIN 3.1*     Recent Labs     11/18/23 0710   WBC 10.20   RBC 4.11*   HCT 38.3   MCV 93.2          MICROBIOLOGY  Microbiology Results (last 7 days)       Procedure Component Value Units Date/Time    Blood Culture [[id number]] Collected: 11/18/23 0710    Order Status: Resulted Specimen: Blood Updated: 11/18/23 0722    Blood Culture [[id number]] Collected: 11/18/23 0710    Order Status: Resulted Specimen: Blood Updated: 11/18/23 0722               MEDICATIONS   piperacillin-tazobactam (Zosyn) IV (PEDS and ADULTS) (extended infusion is not appropriate)  4.5 g Intravenous Q8H         INFUSIONS         DIAGNOSTIC TESTS  CT Previous   Final Result      CT Previous   Final Result        "    No results found for: "EF"       NUTRITION STATUS  Patient meets ASPEN criteria for   malnutrition of   per RD assessment as evidenced by:                       A minimum of two characteristics is recommended for diagnosis of either severe or non-severe malnutrition.       Case related differential diagnoses have been reviewed; assessment and plan has been documented. I have personally reviewed the labs and test results that are currently available; I have reviewed the patients medication list. I have reviewed the consulting providers recommendations. I have reviewed or attempted to review medical records based upon their availability.  All of the patient's and/or family's questions have been addressed and answered to the best of my ability.  I will continue to monitor closely and make adjustments to medical management as needed.  This document was created using M*Modal Fluency Direct.  Transcription errors may have been made.  Please contact me if any questions may rise regarding documentation to clarify transcription.        Kenney Steiner MD   Internal Medicine  Department of Hospital Medicine  [facility] - 8th Floor Med Surg               "

## 2023-11-19 LAB
ALBUMIN SERPL-MCNC: 2.7 G/DL (ref 3.4–4.8)
ALBUMIN/GLOB SERPL: 0.8 RATIO (ref 1.1–2)
ALP SERPL-CCNC: 455 UNIT/L (ref 40–150)
ALT SERPL-CCNC: 198 UNIT/L (ref 0–55)
AST SERPL-CCNC: 142 UNIT/L (ref 5–34)
BASOPHILS # BLD AUTO: 0.03 X10(3)/MCL
BASOPHILS NFR BLD AUTO: 0.4 %
BILIRUB SERPL-MCNC: 10.4 MG/DL
BUN SERPL-MCNC: 18.2 MG/DL (ref 9.8–20.1)
CALCIUM SERPL-MCNC: 9.1 MG/DL (ref 8.4–10.2)
CHLORIDE SERPL-SCNC: 108 MMOL/L (ref 98–107)
CO2 SERPL-SCNC: 23 MMOL/L (ref 23–31)
CREAT SERPL-MCNC: 1.14 MG/DL (ref 0.55–1.02)
EOSINOPHIL # BLD AUTO: 0.15 X10(3)/MCL (ref 0–0.9)
EOSINOPHIL NFR BLD AUTO: 1.8 %
ERYTHROCYTE [DISTWIDTH] IN BLOOD BY AUTOMATED COUNT: 17.8 % (ref 11.5–17)
GFR SERPLBLD CREATININE-BSD FMLA CKD-EPI: 55 MLS/MIN/1.73/M2
GLOBULIN SER-MCNC: 3.3 GM/DL (ref 2.4–3.5)
GLUCOSE SERPL-MCNC: 107 MG/DL (ref 82–115)
HAV IGM SERPL QL IA: NONREACTIVE
HBV CORE IGM SERPL QL IA: NONREACTIVE
HBV SURFACE AG SERPL QL IA: NONREACTIVE
HCT VFR BLD AUTO: 34.8 % (ref 37–47)
HCV AB SERPL QL IA: NONREACTIVE
HGB BLD-MCNC: 11.8 G/DL (ref 12–16)
IMM GRANULOCYTES # BLD AUTO: 0.06 X10(3)/MCL (ref 0–0.04)
IMM GRANULOCYTES NFR BLD AUTO: 0.7 %
LYMPHOCYTES # BLD AUTO: 0.53 X10(3)/MCL (ref 0.6–4.6)
LYMPHOCYTES NFR BLD AUTO: 6.5 %
MCH RBC QN AUTO: 30.6 PG (ref 27–31)
MCHC RBC AUTO-ENTMCNC: 33.9 G/DL (ref 33–36)
MCV RBC AUTO: 90.2 FL (ref 80–94)
MONOCYTES # BLD AUTO: 0.96 X10(3)/MCL (ref 0.1–1.3)
MONOCYTES NFR BLD AUTO: 11.8 %
NEUTROPHILS # BLD AUTO: 6.41 X10(3)/MCL (ref 2.1–9.2)
NEUTROPHILS NFR BLD AUTO: 78.8 %
NRBC BLD AUTO-RTO: 0 %
PLATELET # BLD AUTO: 437 X10(3)/MCL (ref 130–400)
PMV BLD AUTO: 11.3 FL (ref 7.4–10.4)
POTASSIUM SERPL-SCNC: 3.8 MMOL/L (ref 3.5–5.1)
PROT SERPL-MCNC: 6 GM/DL (ref 5.8–7.6)
RBC # BLD AUTO: 3.86 X10(6)/MCL (ref 4.2–5.4)
SODIUM SERPL-SCNC: 141 MMOL/L (ref 136–145)
WBC # SPEC AUTO: 8.14 X10(3)/MCL (ref 4.5–11.5)

## 2023-11-19 PROCEDURE — 25000003 PHARM REV CODE 250: Performed by: INTERNAL MEDICINE

## 2023-11-19 PROCEDURE — 99223 1ST HOSP IP/OBS HIGH 75: CPT | Mod: ,,, | Performed by: SURGERY

## 2023-11-19 PROCEDURE — 63600175 PHARM REV CODE 636 W HCPCS: Performed by: INTERNAL MEDICINE

## 2023-11-19 PROCEDURE — 80053 COMPREHEN METABOLIC PANEL: CPT | Performed by: INTERNAL MEDICINE

## 2023-11-19 PROCEDURE — 25500020 PHARM REV CODE 255: Performed by: INTERNAL MEDICINE

## 2023-11-19 PROCEDURE — 85025 COMPLETE CBC W/AUTO DIFF WBC: CPT | Performed by: INTERNAL MEDICINE

## 2023-11-19 PROCEDURE — C9113 INJ PANTOPRAZOLE SODIUM, VIA: HCPCS | Performed by: PHYSICIAN ASSISTANT

## 2023-11-19 PROCEDURE — 63600175 PHARM REV CODE 636 W HCPCS: Performed by: PHYSICIAN ASSISTANT

## 2023-11-19 PROCEDURE — 11000001 HC ACUTE MED/SURG PRIVATE ROOM

## 2023-11-19 RX ADMIN — PANTOPRAZOLE SODIUM 40 MG: 40 INJECTION, POWDER, FOR SOLUTION INTRAVENOUS at 03:11

## 2023-11-19 RX ADMIN — PIPERACILLIN SODIUM AND TAZOBACTAM SODIUM 4.5 G: 4; .5 INJECTION, POWDER, FOR SOLUTION INTRAVENOUS at 06:11

## 2023-11-19 RX ADMIN — ONDANSETRON 4 MG: 2 INJECTION INTRAMUSCULAR; INTRAVENOUS at 11:11

## 2023-11-19 RX ADMIN — IOPAMIDOL 100 ML: 755 INJECTION, SOLUTION INTRAVENOUS at 12:11

## 2023-11-19 RX ADMIN — MORPHINE SULFATE 4 MG: 4 INJECTION, SOLUTION INTRAMUSCULAR; INTRAVENOUS at 11:11

## 2023-11-19 RX ADMIN — PANTOPRAZOLE SODIUM 40 MG: 40 INJECTION, POWDER, FOR SOLUTION INTRAVENOUS at 05:11

## 2023-11-19 RX ADMIN — SODIUM CHLORIDE: 9 INJECTION, SOLUTION INTRAVENOUS at 10:11

## 2023-11-19 NOTE — H&P (VIEW-ONLY)
Chief complaint:  Gastric mass     HPI:  58-year-old female apparently underwent attempted laparoscopic cholecystectomy on 11/10/2023 at an outside hospital, this is reported as being incomplete, operative note is not available.  She continued to have right-sided pain and presented there with noted elevated LFTs, transferred here for ERCP.  Attempted ERCP was performed, malignant gastric mass was noted, the ERCP could not be completed due to scarring of the duodenal.  The patient remains with some right-sided abdominal pain, though she states it is better, no fevers or chills.  Bilirubin remains markedly elevated.  Previous CT scan was reviewed, albeit no contrast was used so it is limited in its utility.        Past Medical and Surgical History  Allergies :   Patient has no known allergies.    @[facility]@  Medical :   She has a past medical history of Hypertension and Sciatica.    Surgical :   She has a past surgical history that includes Carpal tunnel release (Left) and Hemorrhoid surgery.     Family History  Her family history includes Breast cancer in her mother; Cancer in her maternal aunt.    Social History  She reports that she has never smoked. She has never used smokeless tobacco. She reports that she does not drink alcohol and does not use drugs.     Review of Systems   Constitutional:  Negative for appetite change, chills, diaphoresis and fever.   HENT:  Negative for congestion, drooling, ear discharge, ear pain and hearing loss.    Eyes:  Negative for discharge.   Respiratory:  Negative for apnea, cough, choking, chest tightness, shortness of breath and stridor.    Cardiovascular:  Negative for chest pain, palpitations and leg swelling.   Endocrine: Negative for cold intolerance and heat intolerance.   Genitourinary:  Negative for difficulty urinating, dyspareunia, dysuria and hematuria.   Musculoskeletal:  Negative for arthralgias, gait problem and joint swelling.   Skin:  Negative for color change and  rash.   Neurological:  Negative for dizziness, tremors, seizures, syncope, facial asymmetry, speech difficulty, light-headedness, numbness and headaches.   Psychiatric/Behavioral:  Negative for agitation and confusion.         Objective   Physical Exam  Vitals and nursing note reviewed.   Constitutional:       General: She is not in acute distress.     Appearance: Normal appearance. She is not ill-appearing, toxic-appearing or diaphoretic.   HENT:      Head: Normocephalic and atraumatic.      Right Ear: External ear normal.      Left Ear: External ear normal.      Nose: Nose normal.      Mouth/Throat:      Mouth: Mucous membranes are moist.      Pharynx: Oropharynx is clear.   Eyes:      General: No scleral icterus.     Extraocular Movements: Extraocular movements intact.      Conjunctiva/sclera: Conjunctivae normal.      Pupils: Pupils are equal, round, and reactive to light.   Cardiovascular:      Rate and Rhythm: Normal rate and regular rhythm.      Pulses: Normal pulses.      Heart sounds: No murmur heard.     No friction rub. No gallop.   Pulmonary:      Effort: Pulmonary effort is normal. No respiratory distress.      Breath sounds: Normal breath sounds. No stridor.   Abdominal:      General: Abdomen is flat. There is no distension.      Palpations: Abdomen is soft. There is no mass.      Tenderness: There is no abdominal tenderness. There is no right CVA tenderness, left CVA tenderness, guarding or rebound.      Hernia: No hernia is present.   Musculoskeletal:         General: No swelling, tenderness, deformity or signs of injury.      Cervical back: Normal range of motion and neck supple. No rigidity or tenderness.      Right lower leg: No edema.      Left lower leg: No edema.   Lymphadenopathy:      Cervical: No cervical adenopathy.   Skin:     General: Skin is warm.      Capillary Refill: Capillary refill takes less than 2 seconds.      Coloration: Skin is not jaundiced or pale.      Findings: No bruising,  "erythema, lesion or rash.   Neurological:      General: No focal deficit present.      Mental Status: She is alert and oriented to person, place, and time. Mental status is at baseline.      Cranial Nerves: No cranial nerve deficit.      Sensory: No sensory deficit.      Motor: No weakness.      Coordination: Coordination normal.      Gait: Gait normal.   Psychiatric:         Mood and Affect: Mood normal.         Behavior: Behavior normal.         Thought Content: Thought content normal.         Judgment: Judgment normal.       VITAL SIGNS: 24 HR MIN & MAX LAST    Temp  Min: 97.3 °F (36.3 °C)  Max: 98.2 °F (36.8 °C)  98.2 °F (36.8 °C)        BP  Min: 126/82  Max: 156/92  132/85     Pulse  Min: 78  Max: 95  86     Resp  Min: 14  Max: 21  20    SpO2  Min: 97 %  Max: 100 %  100 %      HT: 5' 6" (167.6 cm)  WT: 82.2 kg (181 lb 4 oz)  BMI: 29.3       Assessment & Plan     Status post failed attempt at cholecystectomy at an outside hospital, persistently elevated total bilirubin level, unsuccessful attempt at ERCP yesterday but with incidental finding of malignant-appearing gastric mass.    HIDA scan to rule out bile leak    CT scan abdomen pelvis, chest with the appropriate contrast for further evaluation and staging of potential gastric cancer    MRCP for elevated bilirubin, failed ERCP, may need percutaneous approach for biliary access    We will continue to follow    William Morse MD  Surgical Oncology  Complex General, Gastrointestinal and Hepatobiliary Surgery    "

## 2023-11-19 NOTE — ANESTHESIA POSTPROCEDURE EVALUATION
Anesthesia Post Evaluation    Patient: Karen Briggs    Procedure(s) Performed: Procedure(s) (LRB):  ERCP (ENDOSCOPIC RETROGRADE CHOLANGIOPANCREATOGRAPHY) (N/A)  EGD, WITH CLOSED BIOPSY    Final Anesthesia Type: general (/Regional//MAC)      Patient location during evaluation: PACU  Post-procedure mental status: @ basline.  Pain management: adequate    PONV status: See postop meds for drugs used to control n/v if any.  Anesthetic complications: no      Cardiovascular status: blood pressure returned to baseline  Respiratory status: @ baseline.  Hydration status: euvolemic            Vitals Value Taken Time   /85 11/18/23 1415   Temp 36.3 °C (97.3 °F) 11/18/23 1356   Pulse 83 11/18/23 1415   Resp 19 11/18/23 1415   SpO2 99 % 11/18/23 1415         Event Time   Out of Recovery 11/18/2023 14:30:32         Pain/William Score: Pain Rating Prior to Med Admin: 10 (11/18/2023  1:17 AM)  Pain Rating Post Med Admin: 2 (11/18/2023  1:47 AM)  William Score: 9 (11/18/2023  3:08 PM)

## 2023-11-19 NOTE — CONSULTS
Chief complaint:  Gastric mass     HPI:  58-year-old female apparently underwent attempted laparoscopic cholecystectomy on 11/10/2023 at an outside hospital, this is reported as being incomplete, operative note is not available.  She continued to have right-sided pain and presented there with noted elevated LFTs, transferred here for ERCP.  Attempted ERCP was performed, malignant gastric mass was noted, the ERCP could not be completed due to scarring of the duodenal.  The patient remains with some right-sided abdominal pain, though she states it is better, no fevers or chills.  Bilirubin remains markedly elevated.  Previous CT scan was reviewed, albeit no contrast was used so it is limited in its utility.        Past Medical and Surgical History  Allergies :   Patient has no known allergies.    @[facility]@  Medical :   She has a past medical history of Hypertension and Sciatica.    Surgical :   She has a past surgical history that includes Carpal tunnel release (Left) and Hemorrhoid surgery.     Family History  Her family history includes Breast cancer in her mother; Cancer in her maternal aunt.    Social History  She reports that she has never smoked. She has never used smokeless tobacco. She reports that she does not drink alcohol and does not use drugs.     Review of Systems   Constitutional:  Negative for appetite change, chills, diaphoresis and fever.   HENT:  Negative for congestion, drooling, ear discharge, ear pain and hearing loss.    Eyes:  Negative for discharge.   Respiratory:  Negative for apnea, cough, choking, chest tightness, shortness of breath and stridor.    Cardiovascular:  Negative for chest pain, palpitations and leg swelling.   Endocrine: Negative for cold intolerance and heat intolerance.   Genitourinary:  Negative for difficulty urinating, dyspareunia, dysuria and hematuria.   Musculoskeletal:  Negative for arthralgias, gait problem and joint swelling.   Skin:  Negative for color change and  rash.   Neurological:  Negative for dizziness, tremors, seizures, syncope, facial asymmetry, speech difficulty, light-headedness, numbness and headaches.   Psychiatric/Behavioral:  Negative for agitation and confusion.         Objective   Physical Exam  Vitals and nursing note reviewed.   Constitutional:       General: She is not in acute distress.     Appearance: Normal appearance. She is not ill-appearing, toxic-appearing or diaphoretic.   HENT:      Head: Normocephalic and atraumatic.      Right Ear: External ear normal.      Left Ear: External ear normal.      Nose: Nose normal.      Mouth/Throat:      Mouth: Mucous membranes are moist.      Pharynx: Oropharynx is clear.   Eyes:      General: No scleral icterus.     Extraocular Movements: Extraocular movements intact.      Conjunctiva/sclera: Conjunctivae normal.      Pupils: Pupils are equal, round, and reactive to light.   Cardiovascular:      Rate and Rhythm: Normal rate and regular rhythm.      Pulses: Normal pulses.      Heart sounds: No murmur heard.     No friction rub. No gallop.   Pulmonary:      Effort: Pulmonary effort is normal. No respiratory distress.      Breath sounds: Normal breath sounds. No stridor.   Abdominal:      General: Abdomen is flat. There is no distension.      Palpations: Abdomen is soft. There is no mass.      Tenderness: There is no abdominal tenderness. There is no right CVA tenderness, left CVA tenderness, guarding or rebound.      Hernia: No hernia is present.   Musculoskeletal:         General: No swelling, tenderness, deformity or signs of injury.      Cervical back: Normal range of motion and neck supple. No rigidity or tenderness.      Right lower leg: No edema.      Left lower leg: No edema.   Lymphadenopathy:      Cervical: No cervical adenopathy.   Skin:     General: Skin is warm.      Capillary Refill: Capillary refill takes less than 2 seconds.      Coloration: Skin is not jaundiced or pale.      Findings: No bruising,  "erythema, lesion or rash.   Neurological:      General: No focal deficit present.      Mental Status: She is alert and oriented to person, place, and time. Mental status is at baseline.      Cranial Nerves: No cranial nerve deficit.      Sensory: No sensory deficit.      Motor: No weakness.      Coordination: Coordination normal.      Gait: Gait normal.   Psychiatric:         Mood and Affect: Mood normal.         Behavior: Behavior normal.         Thought Content: Thought content normal.         Judgment: Judgment normal.       VITAL SIGNS: 24 HR MIN & MAX LAST    Temp  Min: 97.3 °F (36.3 °C)  Max: 98.2 °F (36.8 °C)  98.2 °F (36.8 °C)        BP  Min: 126/82  Max: 156/92  132/85     Pulse  Min: 78  Max: 95  86     Resp  Min: 14  Max: 21  20    SpO2  Min: 97 %  Max: 100 %  100 %      HT: 5' 6" (167.6 cm)  WT: 82.2 kg (181 lb 4 oz)  BMI: 29.3       Assessment & Plan     Status post failed attempt at cholecystectomy at an outside hospital, persistently elevated total bilirubin level, unsuccessful attempt at ERCP yesterday but with incidental finding of malignant-appearing gastric mass.    HIDA scan to rule out bile leak    CT scan abdomen pelvis, chest with the appropriate contrast for further evaluation and staging of potential gastric cancer    MRCP for elevated bilirubin, failed ERCP, may need percutaneous approach for biliary access    We will continue to follow    William Morse MD  Surgical Oncology  Complex General, Gastrointestinal and Hepatobiliary Surgery    "

## 2023-11-19 NOTE — CONSULTS
Reason for Urology Consult     LEFT hydronephrosis.     HPI     Karen Briggs is a 58 y.o. female who recently underwent laparoscopic cholecystectomy on November 11th at [facility].  Incomplete resection due to inflammation.  Has had persistent right back pain prompting repeat CT November 17th which revealed left hydronephrosis without stone.  Noted postoperative fluid collection with mesenteric edema.  Creatinine 0.86.  She was transferred here for ERCP.    Patient seen and examined today.  She does feel better.  Attempted ERCP yesterday.  Incidentally noted gastric mass.  Pending additional studies now.  Surgical oncology consulted.  Denies any left flank pain or CVA tenderness.  No voiding complaints.    Past Medical History:   Diagnosis Date    Hypertension     Sciatica      Past Surgical History:   Procedure Laterality Date    CARPAL TUNNEL RELEASE Left     HEMORRHOID SURGERY       Family History   Problem Relation Age of Onset    Breast cancer Mother     Cancer Maternal Aunt         colon cancer     Social History     Tobacco Use    Smoking status: Never    Smokeless tobacco: Never   Substance Use Topics    Alcohol use: No       Review of patient's allergies indicates:  No Known Allergies      Current Facility-Administered Medications:     0.9%  NaCl infusion, , Intravenous, PRN, Hussein Merino MD, Stopped at 11/19/23 0600    0.9%  NaCl infusion, , Intravenous, Continuous, Kenney Steiner MD, Last Rate: 50 mL/hr at 11/19/23 1050, New Bag at 11/19/23 1050    acetaminophen tablet 650 mg, 650 mg, Oral, Q4H PRN, Hussein Merino MD    aluminum-magnesium hydroxide-simethicone 200-200-20 mg/5 mL suspension 30 mL, 30 mL, Oral, QID PRN, Hussein Merino MD    bisacodyL suppository 10 mg, 10 mg, Rectal, Daily PRN, Hussein Merino MD    electrolyte-A infusion, , Intravenous, Continuous, Devan Sin MD    iopamidoL (ISOVUE-300) injection 50 mL, 50 mL, Other, Once, Alfred Goss MD    melatonin tablet 6 mg, 6 mg, Oral,  Nightly PRN, Hussein Merino MD    morphine injection 4 mg, 4 mg, Intravenous, Q4H PRN, Hussein Merino MD    ondansetron injection 4 mg, 4 mg, Intravenous, Q4H PRN, Hussein Merino MD    oxyCODONE immediate release tablet 5 mg, 5 mg, Oral, Q6H PRN, Hussein Merino MD    pantoprazole injection 40 mg, 40 mg, Intravenous, BID Rigoberto HO Dena Guidry, PA, 40 mg at 11/19/23 0559    polyethylene glycol packet 17 g, 17 g, Oral, BID PRN, Hussein Merino MD    prochlorperazine injection Soln 5 mg, 5 mg, Intravenous, Q6H PRN, Hussein Merino MD, 5 mg at 11/18/23 1126    senna-docusate 8.6-50 mg per tablet 2 tablet, 2 tablet, Oral, BID PRN, Hussein Merino MD    sodium chloride 0.9% flush 10 mL, 10 mL, Intravenous, PRN, Hussein Merino MD    Physical exam     Vitals:    11/19/23 0018 11/19/23 0310 11/19/23 0759 11/19/23 1156   BP: 137/87 126/82 132/85 133/82   Pulse: 85 88 86 99   Resp: 19 14 20 20   Temp: 98 °F (36.7 °C) 98 °F (36.7 °C) 98.2 °F (36.8 °C) 98.4 °F (36.9 °C)   TempSrc: Oral Oral Oral Oral   SpO2: 97% 98% 100% 99%   Weight:       Height:           AAOx3  Skin warm and dry  Affect appropriate  Grossly intact neurologically  NCAT  RRR  Respirations even/unlabored  Abdomen soft, tender in right and left lower quadrant.  No left CVA tenderness  Extremities no clubbing, cyanosis, edema        Lab     Lab Results   Component Value Date    WBC 8.14 11/19/2023    HGB 11.8 (L) 11/19/2023    HCT 34.8 (L) 11/19/2023     (H) 11/19/2023     (H) 11/19/2023     (H) 11/19/2023     11/19/2023    K 3.8 11/19/2023     04/24/2018    BUN 18.2 11/19/2023    CO2 23 11/19/2023    TSH 1.551 04/24/2018    INR 1.1 11/18/2023     (H) 04/24/2018       Lab Results   Component Value Date    PHUR 5.0 04/24/2018    SPECGRAV 1.015 04/24/2018    LEUKOCYTESUR Negative 11/18/2023    NITRITE Negative 04/24/2018    RBCUA 6-10 (A) 11/18/2023    WBCUA 6-10 (A) 11/18/2023         Assessment     58-year-old female status post  complicated laparoscopic cholecystectomy with postoperative fluid collection and incidental left hydronephrosis.    Plan     Reviewed CT from outside facility on November 17th.  CT with delayed nephrogenic phase and hydro nephrosis concerning for obstruction.  No appreciable ureteral stone.  May be resultant from inflammatory reaction.  She is not having left flank pain at this time.  Creatinine is 0.9.  Urinalysis without evidence of infection.      Discussed indications for stent placement including symptomatic renal colic, concern for UTI, and persistent hydro.  She was understanding.      Incidentally noted gastric mass.  Pending additional studies.  Recommend renal ultrasound tomorrow.  If persistent left hydro, may consider stent placement tuesday.    AF

## 2023-11-19 NOTE — PROGRESS NOTES
[facility] - 8th Floor [facility] Surg  [facility] MEDICINE ~ PROGRESS NOTE        CHIEF COMPLAINT   Hospital follow up    HOSPITAL COURSE   58-year-old female with medical history of hypertension who recently underwent laparoscopic cholecystectomy on 11/10/2023, procedure was incomplete and was unable to completely resect the gallbladder.  Since surgery she continued to have right flank/back pain and followed up with her PCP and CT abdomen and pelvis on 11/17/2023 revealed left-sided hydronephrosis with suspected distal obstruction/no clear stone visualized, postoperative changes of cholecystectomy with fluid in the gallbladder fossa may reflect postoperative seroma but biloma can not be entirely excluded, also noted for marked thickening of the stomach wall diffusely may be related to mesenteric edema/reactive.  She presented to [facility] ED the same day 11/17/2023 and her labs notable for WBC 9.0, hemoglobin 12.4, platelets 442, creatinine 0.86, total bilirubin 8.7 with direct fraction 6.7, alkaline phosphatase 491, , .  Patient's surgeon was consulted and recommended ERCP which was not available at [facility] for which she was transferred to [facility] and referred to hospital medicine service for further evaluation and management.   On my evaluation she complaints of right flank/back pain as well as suprapubic pain and constipation.  Denies nausea, vomiting, fever or chills.  Her hemodynamics are stable.  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  Severe inflammation and oozing of blood noted proximal gastric lumen.  Unable to advance scope into the duodenum.    Today  Tolerating some of her full liquid diet.  Surgical oncology consulted.  Pending pathology.  Discuss with her that at this time we have a concern that this is a cancer but can not say for sure.  Family history of breast cancer in the mother and aunt with colon  cancer.        OBJECTIVE/PHYSICAL EXAM     VITAL SIGNS (MOST RECENT):  Temp: 98.2 °F (36.8 °C) (11/19/23 0759)  Pulse: 86 (11/19/23 0759)  Resp: 20 (11/19/23 0759)  BP: 132/85 (11/19/23 0759)  SpO2: 100 % (11/19/23 0759) VITAL SIGNS (24 HOUR RANGE):  Temp:  [97.3 °F (36.3 °C)-98.2 °F (36.8 °C)] 98.2 °F (36.8 °C)  Pulse:  [78-95] 86  Resp:  [14-21] 20  SpO2:  [97 %-100 %] 100 %  BP: (126-156)/(82-94) 132/85   GENERAL: In no acute distress, afebrile  HEENT:  CHEST: Clear to auscultation bilaterally  HEART: S1, S2, no appreciable murmur  ABDOMEN: Soft, BS +, epigastric fullness and tenderness  MSK: Warm, no lower extremity edema, no clubbing or cyanosis  NEUROLOGIC: Alert and oriented x4, moving all extremities with good strength   INTEGUMENTARY:  PSYCHIATRY:        ASSESSMENT/PLAN   Suspected bile leak with biloma and biliary obstruction  H/O Laparoscopic incomplete cholecystectomy 11/10/2023  Left hydronephrosis  Gastric mass with oozing blood  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  Results from EGD noted.  Monitor hemoglobin, continue Protonix.  Urology following.  Creatinine slightly bumped today.  Maybe consider cystoscopy/ureteroscopy to rule out metastatic disease.  Discontinued Zosyn  Continue IV fluids 50 mL/hour  Pending pathology, surgical oncology consulted    DVT prophylaxis:  Not safe at this time    Anticipated discharge and disposition:   __________________________________________________________________________    LABS/MICRO/MEDS/DIAGNOSTICS       LABS  Recent Labs     11/19/23  0640      K 3.8   CHLORIDE 108*   CO2 23   BUN 18.2   CREATININE 1.14*   GLUCOSE 107   CALCIUM 9.1   ALKPHOS 455*   *   *   ALBUMIN 2.7*       Recent Labs     11/19/23  0640   WBC 8.14   RBC 3.86*   HCT 34.8*   MCV 90.2   *         MICROBIOLOGY  Microbiology Results (last 7 days)       Procedure Component Value Units  "Date/Time    Blood Culture [[id number]] Collected: 11/18/23 0710    Order Status: Resulted Specimen: Blood Updated: 11/18/23 0722    Blood Culture [[id number]] Collected: 11/18/23 0710    Order Status: Resulted Specimen: Blood Updated: 11/18/23 0722               MEDICATIONS   iopamidoL  50 mL Other Once    pantoprazole  40 mg Intravenous BID AC    piperacillin-tazobactam (Zosyn) IV (PEDS and ADULTS) (extended infusion is not appropriate)  4.5 g Intravenous Q8H         INFUSIONS   sodium chloride 0.9% 100 mL/hr at 11/19/23 0610    electrolyte-A            DIAGNOSTIC TESTS  FL ERCP Biliary And Pancreatic By [email or web address] Tech   Final Result      Fluoroscopic assistance provided as above.         Electronically signed by: Wei Wright   Date:    11/18/2023   Time:    14:21      CT Previous   Final Result      CT Previous   Final Result           No results found for: "EF"       NUTRITION STATUS  Patient meets ASPEN criteria for   malnutrition of   per RD assessment as evidenced by:                       A minimum of two characteristics is recommended for diagnosis of either severe or non-severe malnutrition.       Case related differential diagnoses have been reviewed; assessment and plan has been documented. I have personally reviewed the labs and test results that are currently available; I have reviewed the patients medication list. I have reviewed the consulting providers recommendations. I have reviewed or attempted to review medical records based upon their availability.  All of the patient's and/or family's questions have been addressed and answered to the best of my ability.  I will continue to monitor closely and make adjustments to medical management as needed.  This document was created using M*Modal Fluency Direct.  Transcription errors may have been made.  Please contact me if any questions may rise regarding documentation to clarify transcription.        Kenney Steiner MD   Internal Medicine  Department of [facility] " Medicine  [facility] 8[facility] Med S[facility]

## 2023-11-19 NOTE — PLAN OF CARE
Problem: Adult Inpatient Plan of Care  Goal: Plan of Care Review  Outcome: Ongoing, Progressing  Flowsheets (Taken 11/18/2023 2121)  Plan of Care Reviewed With: patient  Goal: Patient-Specific Goal (Individualized)  Outcome: Ongoing, Progressing  Goal: Absence of Hospital-Acquired Illness or Injury  Outcome: Ongoing, Progressing  Goal: Optimal Comfort and Wellbeing  Outcome: Ongoing, Progressing  Goal: Readiness for Transition of Care  Outcome: Ongoing, Progressing     Problem: Pain Acute  Goal: Acceptable Pain Control and Functional Ability  Outcome: Ongoing, Progressing

## 2023-11-19 NOTE — PROGRESS NOTES
Inpatient Nutrition Assessment    Admit Date: 11/17/2023   Total duration of encounter: 2 days     Nutrition Recommendation/Prescription     Advance diet as medically feasible    If unable to use the GI tract, consider starting TPN  First 24 hours give Clinimix 4.25/5 @ 60 mL/hr.  Increase to 110 mL/hr for the next 24 hours.  Then start custom TPN: 105 g amino acids, 175 g dextrose, 250 mL 20% intralipids daily to provide 1515 kcals (92% est needs), 105 g protein (100% est needs).Give TPN in minimum volume, fluid maintenance per physician.    Monitor phosphorus, magnesium, other electrolytes through progression. Replace if needed.      Communication of Recommendations: reviewed with nurse and reviewed with patient    Nutrition Assessment     Malnutrition Assessment/Nutrition-Focused Physical Exam    Malnutrition Context: acute illness or injury (11/18/23 1630)  Malnutrition Level: severe (11/18/23 1630)  Energy Intake (Malnutrition): less than or equal to 50% for greater than or equal to 5 days (11/18/23 1630)  Weight Loss (Malnutrition): greater than 7.5% in 3 months (11/18/23 1630)  Subcutaneous Fat (Malnutrition): moderate depletion (11/18/23 1630)  Orbital Region (Subcutaneous Fat Loss): moderate depletion        Muscle Mass (Malnutrition): moderate depletion (11/18/23 1630)  [place] Region (Muscle Loss): moderate depletion     Clavicle and Acromion Bone Region (Muscle Loss): moderate depletion        Patellar Region (Muscle Loss): moderate depletion                 A minimum of two characteristics is recommended for diagnosis of either severe or non-severe malnutrition.    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       Relevant Medical History:   Past Medical History:   Diagnosis Date    Hypertension     Sciatica      Past Surgical History:   Procedure Laterality Date    CARPAL TUNNEL RELEASE Left     HEMORRHOID SURGERY       Review of patient's allergies indicates:  No Known Allergies     Nutrition-Related Medications:    iopamidoL  50 mL Other Once    pantoprazole  40 mg Intravenous BID AC       sodium chloride 0.9% 50 mL/hr at 11/19/23 1050    electrolyte-A         Calorie Containing IV Medications: no significant kcals from medications at this time    Nutrition-Related Labs:  No results found for: "HGBA1C"  11/18/2023: Magnesium Level 2.30 mg/dL (Ref range: 1.60 - 2.60 mg/dL); Phosphorus Level 3.8 mg/dL (Ref range: 2.3 - 4.7 mg/dL)  11/19/2023: Potassium Level 3.8 mmol/L (Ref range: 3.5 - 5.1 mmol/L); Sodium Level 141 mmol/L (Ref range: 136 - 145 mmol/L)   Recent Labs   Lab 11/18/23  0710 11/19/23  0640   WBC 10.20 8.14   RBC 4.11* 3.86*   HGB 12.4 11.8*   HCT 38.3 34.8*   MCV 93.2 90.2   MCH 30.2 30.6   MCHC 32.4* 33.9     Recent Labs   Lab 11/18/23  0710 11/19/23  0640    141   K 4.0 3.8   CO2 27 23   BUN 14.6 18.2   CREATININE 0.90 1.14*   GLUCOSE 79* 107   CALCIUM 9.8 9.1   MG 2.30  --    ALBUMIN 3.1* 2.7*   ALKPHOS 526* 455*   * 198*   * 142*   BILITOT 10.8* 10.4*       Diet/PN Order: Diet NPO  Oral Supplement Order: none  Tube Feeding Order: none  Appetite/Oral Intake: poor/0-25% of meals  Factors Affecting Nutritional Intake: altered gastrointestinal function, decreased appetite, nausea, and vomiting  Food/Oriental orthodox/Cultural Preferences: none reported  Food Allergies: none reported       Wound(s):   n/a    Comments    11/18/23: Pt reports poor appetite, nauseated, threw up after consuming ~10% of full liquid tray for " "lunch, appetite has been a struggle for 3 months over which time she lost about 30 lbs unintentionally, appetite has been worse since 11/10 incomplete cholecystectomy, chews/swallows well, agrees to vanilla ONS on diet advancement.     Anthropometrics    Height: 5' 6" (167.6 cm) Height Method: Stated  Last Weight: 82.2 kg (181 lb 4 oz) (23 2355) Weight Method: Standard Scale  BMI (Calculated): 29.3  BMI Classification: overweight (BMI 25-29.9)     Ideal Body Weight (IBW), Female: 130 lb     % Ideal Body Weight, Female (lb): 139.42 %                             Usual Weight Provided By: patient and EMR weight history  23: 211 lbs  Wt Readings from Last 5 Encounters:   23 82.2 kg (181 lb 4 oz)   18 88 kg (194 lb)   18 90.4 kg (199 lb 4.7 oz)   17 90 kg (198 lb 6.6 oz)     Weight Change(s) Since Admission:  Admit Weight: 82.2 kg (181 lb 4 oz) (23 2355)    Estimated Needs    Weight Used For Calorie Calculations: 82.2 kg (181 lb 3.5 oz)  Energy Calorie Requirements (kcal): 1644 kcals (20 kcals/kg)  Energy Need Method: Kcal/kg  Weight Used For Protein Calculations: 82.2 kg (181 lb 3.5 oz)  Protein Requirements:  g (1.0-1.3 g/kg)  Fluid Requirements (mL): 1180-9284 mL (1 mL/kg)  Temp (24hrs), Av.2 °F (36.8 °C), Min:98 °F (36.7 °C), Max:98.5 °F (36.9 °C)       Enteral Nutrition    Patient not receiving enteral nutrition at this time.    Parenteral Nutrition    Patient not receiving parenteral nutrition support at this time.    Evaluation of Received Nutrient Intake    Calories: not meeting estimated needs  Protein: not meeting estimated needs    Patient Education    Not applicable.    Nutrition Diagnosis     PES: Malnutrition related to suboptimal protein/energy intake as evidenced by less than or equal to 50% needs met for greater than or equal to 5 days, moderate fat depletion, moderate muscle depletion, and greater than 7.5% weight loss in 3 months. (new) "     Interventions/Goals     Intervention(s): collaboration with other providers  Goal: Meet greater than 75% of nutritional needs by follow-up. (new)    Monitoring & Evaluation     Dietitian will monitor energy intake, weight, electrolyte/renal panel, and gastrointestinal profile.  Nutrition Risk/Follow-Up: high (follow-up in 1-4 days)   Please consult if re-assessment needed sooner.    Brayden Ibarra RD   11/19/2023

## 2023-11-19 NOTE — PROGRESS NOTES
"Gastroenterology Progress Note    Subjective/Interval History:  no abdominal pain this morning.  Seen by Surgical Oncology where furthe imaging scheduled for evaluation of biloma and gastric mass    ROS:  ROS    Vital Signs:  BP (!) 147/90   Pulse 90   Temp 98.5 °F (36.9 °C) (Oral)   Resp 20   Ht 5' 6" (1.676 m)   Wt 82.2 kg (181 lb 4 oz)   SpO2 98%   Breastfeeding No   BMI 29.25 kg/m²   Body mass index is 29.25 kg/m².    Physical Exam:  Physical Exam    Labs:  Recent Results (from the past 48 hour(s))   Comprehensive Metabolic Panel    Collection Time: 11/18/23  7:10 AM   Result Value Ref Range    Sodium Level 142 136 - 145 mmol/L    Potassium Level 4.0 3.5 - 5.1 mmol/L    Chloride 106 98 - 107 mmol/L    Carbon Dioxide 27 23 - 31 mmol/L    Glucose Level 79 (L) 82 - 115 mg/dL    Blood Urea Nitrogen 14.6 9.8 - 20.1 mg/dL    Creatinine 0.90 0.55 - 1.02 mg/dL    Calcium Level Total 9.8 8.4 - 10.2 mg/dL    Protein Total 6.6 5.8 - 7.6 gm/dL    Albumin Level 3.1 (L) 3.4 - 4.8 g/dL    Globulin 3.5 2.4 - 3.5 gm/dL    Albumin/Globulin Ratio 0.9 (L) 1.1 - 2.0 ratio    Bilirubin Total 10.8 (H) <=1.5 mg/dL    Alkaline Phosphatase 526 (H) 40 - 150 unit/L    Alanine Aminotransferase 250 (H) 0 - 55 unit/L    Aspartate Aminotransferase 178 (H) 5 - 34 unit/L    eGFR >60 mls/min/1.73/m2   Magnesium    Collection Time: 11/18/23  7:10 AM   Result Value Ref Range    Magnesium Level 2.30 1.60 - 2.60 mg/dL   Phosphorus    Collection Time: 11/18/23  7:10 AM   Result Value Ref Range    Phosphorus Level 3.8 2.3 - 4.7 mg/dL   Hepatitis Panel, Acute    Collection Time: 11/18/23  7:10 AM   Result Value Ref Range    Hepatitis A IgM Nonreactive Nonreactive    Hepatitis B Core IgM Nonreactive Nonreactive    Hepatitis B Surface Antigen Nonreactive Nonreactive    Hep C Ab Interp Nonreactive Nonreactive   Lipase    Collection Time: 11/18/23  7:10 AM   Result Value Ref Range    Lipase Level 15 <=60 U/L   Bilirubin, Direct    Collection Time: " 11/18/23  7:10 AM   Result Value Ref Range    Bilirubin Direct 8.1 (H) 0.0 - <0.5 mg/dL   CBC with Differential    Collection Time: 11/18/23  7:10 AM   Result Value Ref Range    WBC 10.20 4.50 - 11.50 x10(3)/mcL    RBC 4.11 (L) 4.20 - 5.40 x10(6)/mcL    Hgb 12.4 12.0 - 16.0 g/dL    Hct 38.3 37.0 - 47.0 %    MCV 93.2 80.0 - 94.0 fL    MCH 30.2 27.0 - 31.0 pg    MCHC 32.4 (L) 33.0 - 36.0 g/dL    RDW 17.9 (H) 11.5 - 17.0 %    Platelet 331 130 - 400 x10(3)/mcL    MPV 11.1 (H) 7.4 - 10.4 fL    Neut % 82.0 %    Lymph % 5.7 %    Mono % 10.4 %    Eos % 0.9 %    Basophil % 0.5 %    Lymph # 0.58 (L) 0.6 - 4.6 x10(3)/mcL    Neut # 8.37 2.1 - 9.2 x10(3)/mcL    Mono # 1.06 0.1 - 1.3 x10(3)/mcL    Eos # 0.09 0 - 0.9 x10(3)/mcL    Baso # 0.05 <=0.2 x10(3)/mcL    IG# 0.05 (H) 0 - 0.04 x10(3)/mcL    IG% 0.5 %    NRBC% 0.0 %   Blood Culture    Collection Time: 11/18/23  7:10 AM    Specimen: Blood   Result Value Ref Range    CULTURE, BLOOD ([facility]) No Growth At 24 Hours    Blood Culture    Collection Time: 11/18/23  7:10 AM    Specimen: Blood   Result Value Ref Range    CULTURE, BLOOD ([facility]) No Growth At 24 Hours    Urinalysis, Reflex to Urine Culture    Collection Time: 11/18/23  9:34 AM    Specimen: Urine   Result Value Ref Range    Color, UA Dark-Orange (A) Yellow, Light-Yellow, Straw, Dark-Yellow    Appearance, UA Clear Clear    Specific Gravity, UA >1.050 (H) 1.005 - 1.030    pH, UA 6.0 5.0 - 8.5    Protein, UA Color may interfere with results Negative    Glucose, UA Color may interfere with results Negative, Normal    Ketones, UA Color may interfere with results Negative    Blood, UA Negative Negative    Bilirubin, UA Color may interfere with results Negative    Urobilinogen, UA Color may interfere with results 0.2, 1.0, Normal    Nitrites, UA Negative Negative    Leukocyte Esterase, UA Negative Negative    WBC, UA 6-10 (A) None Seen, 0-2, 3-5, 0-5 /HPF    Bacteria, UA Trace None Seen, Trace /HPF    Squamous Epithelial Cells, UA  Trace None Seen /HPF    Mucous, UA Trace (A) None Seen /LPF    RBC, UA 6-10 (A) None Seen, 0-2, 3-5, 0-5 /HPF   Protime-INR    Collection Time: 11/18/23  3:03 PM   Result Value Ref Range    PT 14.0 12.5 - 14.5 seconds    INR 1.1 <=1.3   Comprehensive Metabolic Panel    Collection Time: 11/19/23  6:40 AM   Result Value Ref Range    Sodium Level 141 136 - 145 mmol/L    Potassium Level 3.8 3.5 - 5.1 mmol/L    Chloride 108 (H) 98 - 107 mmol/L    Carbon Dioxide 23 23 - 31 mmol/L    Glucose Level 107 82 - 115 mg/dL    Blood Urea Nitrogen 18.2 9.8 - 20.1 mg/dL    Creatinine 1.14 (H) 0.55 - 1.02 mg/dL    Calcium Level Total 9.1 8.4 - 10.2 mg/dL    Protein Total 6.0 5.8 - 7.6 gm/dL    Albumin Level 2.7 (L) 3.4 - 4.8 g/dL    Globulin 3.3 2.4 - 3.5 gm/dL    Albumin/Globulin Ratio 0.8 (L) 1.1 - 2.0 ratio    Bilirubin Total 10.4 (H) <=1.5 mg/dL    Alkaline Phosphatase 455 (H) 40 - 150 unit/L    Alanine Aminotransferase 198 (H) 0 - 55 unit/L    Aspartate Aminotransferase 142 (H) 5 - 34 unit/L    eGFR 55 mls/min/1.73/m2   CBC with Differential    Collection Time: 11/19/23  6:40 AM   Result Value Ref Range    WBC 8.14 4.50 - 11.50 x10(3)/mcL    RBC 3.86 (L) 4.20 - 5.40 x10(6)/mcL    Hgb 11.8 (L) 12.0 - 16.0 g/dL    Hct 34.8 (L) 37.0 - 47.0 %    MCV 90.2 80.0 - 94.0 fL    MCH 30.6 27.0 - 31.0 pg    MCHC 33.9 33.0 - 36.0 g/dL    RDW 17.8 (H) 11.5 - 17.0 %    Platelet 437 (H) 130 - 400 x10(3)/mcL    MPV 11.3 (H) 7.4 - 10.4 fL    Neut % 78.8 %    Lymph % 6.5 %    Mono % 11.8 %    Eos % 1.8 %    Basophil % 0.4 %    Lymph # 0.53 (L) 0.6 - 4.6 x10(3)/mcL    Neut # 6.41 2.1 - 9.2 x10(3)/mcL    Mono # 0.96 0.1 - 1.3 x10(3)/mcL    Eos # 0.15 0 - 0.9 x10(3)/mcL    Baso # 0.03 <=0.2 x10(3)/mcL    IG# 0.06 (H) 0 - 0.04 x10(3)/mcL    IG% 0.7 %    NRBC% 0.0 %       Imaging:  CT Abdomen Pelvis With IV Contrast    Result Date: 11/19/2023  EXAMINATION: CT ABDOMEN PELVIS WITH IV CONTRAST CLINICAL HISTORY: Abdominal mass, intra-abdominal neoplasm  suspected; TECHNIQUE: Helical acquisition through the abdomen and pelvis with IV contrast.  Three plane reconstructions were provided for review.  mGycm. Automatic exposure control, adjustment of mA/kV or iterative reconstruction technique was used to reduce radiation. COMPARISON: CT performed yesterday FINDINGS: Chest discussed in separate report. No focal suspicious liver lesion is seen.  The portal vein is narrowed but grossly patent.  Heterogeneous appearance of the gallbladder fossa similar to yesterday.  There is mild intrahepatic biliary ductal dilatation.  The common bile duct is not clearly seen, no grossly dilated. No significant abnormality of the spleen.  Mild peripancreatic inflammation around the head likely secondary to duodenitis.  The main pancreatic duct is not dilated.  No adrenal nodule. There is moderate left hydronephrosis which is similar to yesterday.  An obstructing lesion is not clearly seen.  No significant abnormality of the right kidney. There is prominent wall thickening of the stomach and duodenum with mucosal enhancement.  There is irregular wall thickening of the proximal stomach just past the GE junction around image 34 series 4 likely representing known tumor.  No free air is seen.  There is small volume ascites. Urinary bladder is unremarkable.  The abdominal aorta is normal in caliber. Moderate degenerative changes of the spine. Mild L1 compression deformity likely chronic.     1. Gastritis and duodenitis. 2. Irregular wall thickening of the proximal stomach compatible with known tumor. 3. Similar heterogeneous appearance of the gallbladder fossa with small volume ascites.  Bile leak is possible.  Mild intrahepatic biliary ductal dilatation without significant dilatation of the common bile duct. 4. Similar moderate left hydronephrosis. Electronically signed by: Wei Wright Date:    11/19/2023 Time:    13:17    CT Chest Without Contrast    Result Date:  11/19/2023  EXAMINATION: CT CHEST WITHOUT CONTRAST CLINICAL HISTORY: Gastric mass; TECHNIQUE: Helical acquisition through the chest performed without IV contrast. Three plane reconstructions made available for review.  mGycm. Automatic exposure control, adjustment of mA/kV or iterative reconstruction technique was used to reduce radiation. COMPARISON: No prior chest CT FINDINGS: There is no mediastinal, hilar or axillary lymphadenopathy by size criteria.  Mildly dilated esophagus. The heart is not enlarged.  No pericardial effusion.  There are coronary artery calcifications. There is no pleural effusion.  Mild chronic changes of the lungs with no suspicious pulmonary nodule. Mild degenerative changes of the spine.  No suspicious osseous lesion.  The abdomen is discussed in a separate report.     No convincing CT evidence of metastatic disease in the chest. Electronically signed by: Wei Wright Date:    11/19/2023 Time:    12:58    FL ERCP Biliary And Pancreatic By Rad Tech    Result Date: 11/18/2023  EXAMINATION: FL ERCP BILIARY AND PANCREATIC CLINICAL HISTORY: ercp; FINDINGS: Fluoroscopic assistance provided during ERCP.  Images were independently interpreted by the attending physician performing the procedure. Fluoro time 3 seconds. Reference Air Kerma: 0.8 mGy.     Fluoroscopic assistance provided as above. 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

## 2023-11-20 LAB
ALBUMIN SERPL-MCNC: 2.6 G/DL (ref 3.4–4.8)
ALBUMIN/GLOB SERPL: 0.8 RATIO (ref 1.1–2)
ALP SERPL-CCNC: 419 UNIT/L (ref 40–150)
ALT SERPL-CCNC: 181 UNIT/L (ref 0–55)
AST SERPL-CCNC: 127 UNIT/L (ref 5–34)
BASOPHILS # BLD AUTO: 0.05 X10(3)/MCL
BASOPHILS NFR BLD AUTO: 0.6 %
BILIRUB SERPL-MCNC: 10.4 MG/DL
BUN SERPL-MCNC: 13.8 MG/DL (ref 9.8–20.1)
CALCIUM SERPL-MCNC: 9.2 MG/DL (ref 8.4–10.2)
CHLORIDE SERPL-SCNC: 110 MMOL/L (ref 98–107)
CO2 SERPL-SCNC: 21 MMOL/L (ref 23–31)
CREAT SERPL-MCNC: 0.84 MG/DL (ref 0.55–1.02)
EOSINOPHIL # BLD AUTO: 0.19 X10(3)/MCL (ref 0–0.9)
EOSINOPHIL NFR BLD AUTO: 2.4 %
ERYTHROCYTE [DISTWIDTH] IN BLOOD BY AUTOMATED COUNT: 18.3 % (ref 11.5–17)
GFR SERPLBLD CREATININE-BSD FMLA CKD-EPI: >60 MLS/MIN/1.73/M2
GLOBULIN SER-MCNC: 3.2 GM/DL (ref 2.4–3.5)
GLUCOSE SERPL-MCNC: 82 MG/DL (ref 82–115)
HCT VFR BLD AUTO: 33.5 % (ref 37–47)
HGB BLD-MCNC: 11.3 G/DL (ref 12–16)
IMM GRANULOCYTES # BLD AUTO: 0.06 X10(3)/MCL (ref 0–0.04)
IMM GRANULOCYTES NFR BLD AUTO: 0.7 %
LYMPHOCYTES # BLD AUTO: 0.92 X10(3)/MCL (ref 0.6–4.6)
LYMPHOCYTES NFR BLD AUTO: 11.4 %
MCH RBC QN AUTO: 30.4 PG (ref 27–31)
MCHC RBC AUTO-ENTMCNC: 33.7 G/DL (ref 33–36)
MCV RBC AUTO: 90.1 FL (ref 80–94)
MONOCYTES # BLD AUTO: 0.94 X10(3)/MCL (ref 0.1–1.3)
MONOCYTES NFR BLD AUTO: 11.6 %
NEUTROPHILS # BLD AUTO: 5.91 X10(3)/MCL (ref 2.1–9.2)
NEUTROPHILS NFR BLD AUTO: 73.3 %
NRBC BLD AUTO-RTO: 0 %
PLATELET # BLD AUTO: 335 X10(3)/MCL (ref 130–400)
PMV BLD AUTO: 11 FL (ref 7.4–10.4)
POTASSIUM SERPL-SCNC: 3.7 MMOL/L (ref 3.5–5.1)
PROT SERPL-MCNC: 5.8 GM/DL (ref 5.8–7.6)
RBC # BLD AUTO: 3.72 X10(6)/MCL (ref 4.2–5.4)
SODIUM SERPL-SCNC: 143 MMOL/L (ref 136–145)
WBC # SPEC AUTO: 8.07 X10(3)/MCL (ref 4.5–11.5)

## 2023-11-20 PROCEDURE — 11000001 HC ACUTE MED/SURG PRIVATE ROOM

## 2023-11-20 PROCEDURE — 85025 COMPLETE CBC W/AUTO DIFF WBC: CPT | Performed by: INTERNAL MEDICINE

## 2023-11-20 PROCEDURE — 63600175 PHARM REV CODE 636 W HCPCS: Performed by: PHYSICIAN ASSISTANT

## 2023-11-20 PROCEDURE — 63600175 PHARM REV CODE 636 W HCPCS: Performed by: INTERNAL MEDICINE

## 2023-11-20 PROCEDURE — 99233 SBSQ HOSP IP/OBS HIGH 50: CPT | Mod: ,,, | Performed by: SURGERY

## 2023-11-20 PROCEDURE — C9113 INJ PANTOPRAZOLE SODIUM, VIA: HCPCS | Performed by: PHYSICIAN ASSISTANT

## 2023-11-20 PROCEDURE — 80053 COMPREHEN METABOLIC PANEL: CPT | Performed by: INTERNAL MEDICINE

## 2023-11-20 RX ORDER — MUPIROCIN 20 MG/G
OINTMENT TOPICAL 2 TIMES DAILY
Status: DISPENSED | OUTPATIENT
Start: 2023-11-20 | End: 2023-11-25

## 2023-11-20 RX ORDER — DEXTROSE MONOHYDRATE, SODIUM CHLORIDE, AND POTASSIUM CHLORIDE 50; 1.49; 4.5 G/1000ML; G/1000ML; G/1000ML
INJECTION, SOLUTION INTRAVENOUS CONTINUOUS
Status: DISCONTINUED | OUTPATIENT
Start: 2023-11-20 | End: 2023-11-21

## 2023-11-20 RX ADMIN — MORPHINE SULFATE 4 MG: 4 INJECTION, SOLUTION INTRAMUSCULAR; INTRAVENOUS at 11:11

## 2023-11-20 RX ADMIN — POTASSIUM CHLORIDE, DEXTROSE MONOHYDRATE AND SODIUM CHLORIDE: 150; 5; 450 INJECTION, SOLUTION INTRAVENOUS at 10:11

## 2023-11-20 RX ADMIN — MORPHINE SULFATE 4 MG: 4 INJECTION, SOLUTION INTRAMUSCULAR; INTRAVENOUS at 04:11

## 2023-11-20 RX ADMIN — PANTOPRAZOLE SODIUM 40 MG: 40 INJECTION, POWDER, FOR SOLUTION INTRAVENOUS at 04:11

## 2023-11-20 NOTE — PROGRESS NOTES
"Gastroenterology Progress Note    Subjective/Interval History:  Tbili stable at 10.4. Alk phos and AST/ALT downtrending slightly.    CT chest/abd/pel with IV: gastritis and duodenitis, irregular wall thickening of proximal stomach c/w known tumor, similar heterogenous appearance of GB fossa with small volume ascites. Bile leak is possible. Mild intrahepatic biliary ductal dilation w/o significant dilation of CBD    MRCP completed but not dictated at this time. HIDA pending.    Spoke with nurse regarding patient. NPO for HIDA to be done today.    Patient resting in bed. She states the MRI was done earlier and she is awaiting HIDA. She states she vomits most of what she eats, and experiences nausea with perfumes or strong smells. Admits to RUQ/right flank pain and states her epigastrium is firm and "bubbles" when palpated. She had a light colored stool this a.m.    ROS:  Review of Systems   Constitutional:  Positive for malaise/fatigue.   Respiratory:  Negative for cough and shortness of breath.    Cardiovascular:  Negative for chest pain.   Gastrointestinal:  Positive for abdominal pain, nausea and vomiting. Negative for blood in stool, constipation, diarrhea and melena.   Neurological:  Negative for weakness.       Vital Signs:  /83   Pulse 85   Temp 98.4 °F (36.9 °C) (Oral)   Resp 20   Ht 5' 6" (1.676 m)   Wt 82.2 kg (181 lb 4 oz)   SpO2 97%   Breastfeeding No   BMI 29.25 kg/m²   Body mass index is 29.25 kg/m².    Physical Exam:  Physical Exam  Constitutional:       General: She is not in acute distress.     Appearance: She is obese. She is not ill-appearing.   HENT:      Head: Normocephalic and atraumatic.      Right Ear: External ear normal.      Left Ear: External ear normal.      Nose: Nose normal.      Mouth/Throat:      Pharynx: Oropharynx is clear.   Eyes:      Conjunctiva/sclera: Conjunctivae normal.   Pulmonary:      Effort: Pulmonary effort is normal. No respiratory distress.   Abdominal:    "   General: There is distension.      Tenderness: There is abdominal tenderness in the epigastric area. There is guarding.      Comments: Abd firm in epigastrium   Musculoskeletal:         General: Normal range of motion.      Cervical back: Normal range of motion.   Skin:     General: Skin is warm and dry.   Neurological:      Mental Status: She is alert and oriented to person, place, and time. Mental status is at baseline.   Psychiatric:         Mood and Affect: Mood normal.         Behavior: Behavior normal.         Thought Content: Thought content normal.         Labs:  Recent Results (from the past 24 hour(s))   CBC with Differential    Collection Time: 11/20/23  4:55 AM   Result Value Ref Range    WBC 8.07 4.50 - 11.50 x10(3)/mcL    RBC 3.72 (L) 4.20 - 5.40 x10(6)/mcL    Hgb 11.3 (L) 12.0 - 16.0 g/dL    Hct 33.5 (L) 37.0 - 47.0 %    MCV 90.1 80.0 - 94.0 fL    MCH 30.4 27.0 - 31.0 pg    MCHC 33.7 33.0 - 36.0 g/dL    RDW 18.3 (H) 11.5 - 17.0 %    Platelet 335 130 - 400 x10(3)/mcL    MPV 11.0 (H) 7.4 - 10.4 fL    Neut % 73.3 %    Lymph % 11.4 %    Mono % 11.6 %    Eos % 2.4 %    Basophil % 0.6 %    Lymph # 0.92 0.6 - 4.6 x10(3)/mcL    Neut # 5.91 2.1 - 9.2 x10(3)/mcL    Mono # 0.94 0.1 - 1.3 x10(3)/mcL    Eos # 0.19 0 - 0.9 x10(3)/mcL    Baso # 0.05 <=0.2 x10(3)/mcL    IG# 0.06 (H) 0 - 0.04 x10(3)/mcL    IG% 0.7 %    NRBC% 0.0 %   Comprehensive Metabolic Panel    Collection Time: 11/20/23  6:38 AM   Result Value Ref Range    Sodium Level 143 136 - 145 mmol/L    Potassium Level 3.7 3.5 - 5.1 mmol/L    Chloride 110 (H) 98 - 107 mmol/L    Carbon Dioxide 21 (L) 23 - 31 mmol/L    Glucose Level 82 82 - 115 mg/dL    Blood Urea Nitrogen 13.8 9.8 - 20.1 mg/dL    Creatinine 0.84 0.55 - 1.02 mg/dL    Calcium Level Total 9.2 8.4 - 10.2 mg/dL    Protein Total 5.8 5.8 - 7.6 gm/dL    Albumin Level 2.6 (L) 3.4 - 4.8 g/dL    Globulin 3.2 2.4 - 3.5 gm/dL    Albumin/Globulin Ratio 0.8 (L) 1.1 - 2.0 ratio    Bilirubin Total 10.4 (H)  "<=1.5 mg/dL    Alkaline Phosphatase 419 (H) 40 - 150 unit/L    Alanine Aminotransferase 181 (H) 0 - 55 unit/L    Aspartate Aminotransferase 127 (H) 5 - 34 unit/L    eGFR >60 mls/min/1.73/m2         Assessment/Plan:  58-year-old female unknown to our group with a past medical history of HTN. Transferred from [facility] to [facility] for concerns of biloma vs bile leak vs obstruction s/p incomplete laparoscopic cholecystectomy 11/10/23  by Dr. Sicard.     S/p laparoscopic cholecystectomy 11/10/2023; procedure was incomplete and unable to completely resect the gallbladder.   CT abd/pelv 11/17/2023: left-sided hydronephrosis with suspected distal obstruction/no clear stone visualized, postoperative changes of cholecystectomy with fluid in the gallbladder fossa may reflect postoperative seroma but biloma can not be entirely excluded, also noted for marked thickening of the stomach wall diffusely may be related to mesenteric edema/reactive.      Generalized abdominal pain  Hyperbilirubinemia - stable  Transaminitis - improving  Abnormal imaging suggesting biloma   Gastric mass  - ERCP 11/18/23 Dr. 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 Bile leak is possible. Mild intrahepatic biliary ductal dilation w/o significant dilation of CBD  - MRCP, HIDA pending     Alexandra Fry, PA-C  Gastroenterology  [facility]  "

## 2023-11-20 NOTE — PLAN OF CARE
11/20/23 1506   Discharge Assessment   Assessment Type Discharge Planning Assessment   Confirmed/corrected address, phone number and insurance Yes   Confirmed Demographics Contacted registration to update  (Correct address is [address] [place] [address])   Source of Information patient   When was your last doctors appointment? 11/17/23   Reason For Admission hydronephrosis   People in Home significant other   Do you expect to return to your current living situation? Yes   Do you have help at home or someone to help you manage your care at home? Yes   Who are your caregiver(s) and their phone number(s)? Warrenishmael Herculeskins [phone]   Current cognitive status: Alert/Oriented   Equipment Currently Used at Home none   Do you currently have service(s) that help you manage your care at home? No   Do you take prescription medications? Yes   Do you have prescription coverage? Yes   Coverage [employer]   Do you have any problems affording any of your prescribed medications? No   Is the patient taking medications as prescribed? yes   Who is going to help you get home at discharge? Jenniffer   How do you get to doctors appointments? car, drives self   Are you on dialysis? No   Do you take coumadin? No   Discharge Plan discussed with: Patient;Spouse/sig other   Name(s) and Number(s) Jenniffer Zepeda [phone]   Transition of Care Barriers None   Discharge Plan A Home with family   Discharge Plan B Home with family     Pt states she lives with her significant other in a mobile home with 4 steps to enter. She states she was independent with ADL's and able to drive prior to admission. She does not have DME. She is not active with a home health agency. Will follow for discharge needs.

## 2023-11-20 NOTE — PLAN OF CARE
Problem: Adult Inpatient Plan of Care  Goal: Plan of Care Review  Outcome: Ongoing, Progressing  Flowsheets (Taken 11/19/2023 1352)  Plan of Care Reviewed With: patient  Goal: Patient-Specific Goal (Individualized)  Outcome: Ongoing, Progressing  Goal: Absence of Hospital-Acquired Illness or Injury  Outcome: Ongoing, Progressing  Goal: Optimal Comfort and Wellbeing  Outcome: Ongoing, Progressing  Goal: Readiness for Transition of Care  Outcome: Ongoing, Progressing     Problem: Pain Acute  Goal: Acceptable Pain Control and Functional Ability  Outcome: Ongoing, Progressing     Problem: Fall Injury Risk  Goal: Absence of Fall and Fall-Related Injury  Outcome: Ongoing, Progressing

## 2023-11-20 NOTE — PROGRESS NOTES
.UROLOGY  PROGRESS  NOTE    Karen Briggs 1965  [id number]  11/20/2023    Patient sitting up in bed at time of rounds  Denies left flank pain  NO urine output charted  VSS, afebrile  BUN/creat 13.8 & 0.84      Intake/Output:  No intake/output data recorded.  I/O last 3 completed shifts:  In: 4056 [P.O.:900; I.V.:2946.6; IV Piggyback:209.5]  Out: -        Exam:    NAD  Card RRR  Resp unlabored   clear yellow urine draining to  bag      Recent Results (from the past 24 hour(s))   CBC with Differential    Collection Time: 11/20/23  4:55 AM   Result Value Ref Range    WBC 8.07 4.50 - 11.50 x10(3)/mcL    RBC 3.72 (L) 4.20 - 5.40 x10(6)/mcL    Hgb 11.3 (L) 12.0 - 16.0 g/dL    Hct 33.5 (L) 37.0 - 47.0 %    MCV 90.1 80.0 - 94.0 fL    MCH 30.4 27.0 - 31.0 pg    MCHC 33.7 33.0 - 36.0 g/dL    RDW 18.3 (H) 11.5 - 17.0 %    Platelet 335 130 - 400 x10(3)/mcL    MPV 11.0 (H) 7.4 - 10.4 fL    Neut % 73.3 %    Lymph % 11.4 %    Mono % 11.6 %    Eos % 2.4 %    Basophil % 0.6 %    Lymph # 0.92 0.6 - 4.6 x10(3)/mcL    Neut # 5.91 2.1 - 9.2 x10(3)/mcL    Mono # 0.94 0.1 - 1.3 x10(3)/mcL    Eos # 0.19 0 - 0.9 x10(3)/mcL    Baso # 0.05 <=0.2 x10(3)/mcL    IG# 0.06 (H) 0 - 0.04 x10(3)/mcL    IG% 0.7 %    NRBC% 0.0 %   Comprehensive Metabolic Panel    Collection Time: 11/20/23  6:38 AM   Result Value Ref Range    Sodium Level 143 136 - 145 mmol/L    Potassium Level 3.7 3.5 - 5.1 mmol/L    Chloride 110 (H) 98 - 107 mmol/L    Carbon Dioxide 21 (L) 23 - 31 mmol/L    Glucose Level 82 82 - 115 mg/dL    Blood Urea Nitrogen 13.8 9.8 - 20.1 mg/dL    Creatinine 0.84 0.55 - 1.02 mg/dL    Calcium Level Total 9.2 8.4 - 10.2 mg/dL    Protein Total 5.8 5.8 - 7.6 gm/dL    Albumin Level 2.6 (L) 3.4 - 4.8 g/dL    Globulin 3.2 2.4 - 3.5 gm/dL    Albumin/Globulin Ratio 0.8 (L) 1.1 - 2.0 ratio    Bilirubin Total 10.4 (H) <=1.5 mg/dL    Alkaline Phosphatase 419 (H) 40 - 150 unit/L    Alanine Aminotransferase 181 (H) 0 - 55 unit/L    Aspartate  Aminotransferase 127 (H) 5 - 34 unit/L    eGFR >60 mls/min/1.73/m2         Assessment:  58-year-old female status post complicated laparoscopic cholecystectomy with postoperative fluid collection and incidental left hydronephrosis.       Plan:  Retroperitoneal US pending. Her renal function remains stable. Will likely have her follow up as an outpatient. No plans for surgical intervention from   standpoint at this time.     MARIA A Gaona

## 2023-11-20 NOTE — PROGRESS NOTES
[facility] - 8th Floor [facility] Surg  [facility] MEDICINE ~ PROGRESS NOTE        CHIEF COMPLAINT   Hospital follow up    HOSPITAL COURSE   58-year-old female with medical history of hypertension who recently underwent laparoscopic cholecystectomy on 11/10/2023, procedure was incomplete and was unable to completely resect the gallbladder.  Since surgery she continued to have right flank/back pain and followed up with her PCP and CT abdomen and pelvis on 11/17/2023 revealed left-sided hydronephrosis with suspected distal obstruction/no clear stone visualized, postoperative changes of cholecystectomy with fluid in the gallbladder fossa may reflect postoperative seroma but biloma can not be entirely excluded, also noted for marked thickening of the stomach wall diffusely may be related to mesenteric edema/reactive.  She presented to [facility] [place] the same day 11/17/2023 and her labs notable for WBC 9.0, hemoglobin 12.4, platelets 442, creatinine 0.86, total bilirubin 8.7 with direct fraction 6.7, alkaline phosphatase 491, , .  Patient's surgeon was consulted and recommended ERCP which was not available at [facility] for which she was transferred to [facility] and referred to hospital medicine service for further evaluation and management.   On my evaluation she complaints of right flank/back pain as well as suprapubic pain and constipation.  Denies nausea, vomiting, fever or chills.  Her hemodynamics are stable.  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  Severe inflammation and oozing of blood noted proximal gastric lumen.  Unable to advance scope into the duodenum.    Today  CT does not mentioned any concern about metastatic spread.  Surgical oncology is following.  Waiting for MRCP to be read.  He is having some vomiting, not tolerating much of oral intake.        OBJECTIVE/PHYSICAL EXAM     VITAL SIGNS (MOST RECENT):  Temp: 98.4 °F (36.9 °C) (11/20/23  0339)  Pulse: 85 (11/20/23 0339)  Resp: 20 (11/19/23 2353)  BP: 128/83 (11/20/23 0339)  SpO2: 97 % (11/20/23 0339) VITAL SIGNS (24 HOUR RANGE):  Temp:  [98.2 °F (36.8 °C)-98.5 °F (36.9 °C)] 98.4 °F (36.9 °C)  Pulse:  [85-99] 85  Resp:  [20] 20  SpO2:  [97 %-99 %] 97 %  BP: (114-147)/(80-90) 128/83   GENERAL: In no acute distress, afebrile  HEENT:  CHEST: Clear to auscultation bilaterally  HEART: S1, S2, no appreciable murmur  ABDOMEN: Soft, BS +, epigastric fullness and tenderness  MSK: Warm, no lower extremity edema, no clubbing or cyanosis  NEUROLOGIC: Alert and oriented x4, moving all extremities with good strength   INTEGUMENTARY:  PSYCHIATRY:        ASSESSMENT/PLAN   Suspected bile leak with biloma and biliary obstruction  H/O Laparoscopic incomplete cholecystectomy 11/10/2023  Left hydronephrosis  Gastric mass with oozing blood  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  Results from EGD noted.  Monitor hemoglobin, continue Protonix.  Urology following.    D5 half-normal saline 50 mL/hour.  Not really tolerating much of a oral intake.  Surgical oncology following.  Pending pathology, MRCP, HIDA scan.    DVT prophylaxis:  Not safe at this time    Anticipated discharge and disposition:   __________________________________________________________________________    LABS/MICRO/MEDS/DIAGNOSTICS       LABS  Recent Labs     11/20/23  0638      K 3.7   CHLORIDE 110*   CO2 21*   BUN 13.8   CREATININE 0.84   GLUCOSE 82   CALCIUM 9.2   ALKPHOS 419*   *   *   ALBUMIN 2.6*       Recent Labs     11/20/23  0455   WBC 8.07   RBC 3.72*   HCT 33.5*   MCV 90.1            MICROBIOLOGY  Microbiology Results (last 7 days)       Procedure Component Value Units Date/Time    Blood Culture [[id number]]  (Normal) Collected: 11/18/23 0710    Order Status: Completed Specimen: Blood Updated: 11/19/23 0901     CULTURE, BLOOD ([facility]) No  "Growth At 24 Hours    Blood Culture [[id number]]  (Normal) Collected: 11/18/23 0710    Order Status: Completed Specimen: Blood Updated: 11/19/23 0901     CULTURE, BLOOD ([facility]) No Growth At 24 Hours               MEDICATIONS   iopamidoL  50 mL Other Once    pantoprazole  40 mg Intravenous BID AC         INFUSIONS   sodium chloride 0.9% 50 mL/hr at 11/20/23 0609    electrolyte-A            DIAGNOSTIC TESTS  CT Abdomen Pelvis With IV Contrast   Final Result      1. Gastritis and duodenitis.   2. Irregular wall thickening of the proximal stomach compatible with known tumor.   3. Similar heterogeneous appearance of the gallbladder fossa with small volume ascites.  Bile leak is possible.  Mild intrahepatic biliary ductal dilatation without significant dilatation of the common bile duct.   4. Similar moderate left hydronephrosis.         Electronically signed by: Wei Wright   Date:    11/19/2023   Time:    13:17      CT Chest Without Contrast   Final Result      No convincing CT evidence of metastatic disease in the chest.         Electronically signed by: Wei Wright   Date:    11/19/2023   Time:    12:58      FL ERCP Biliary And Pancreatic By [employer]   Final Result      Fluoroscopic assistance provided as above.         Electronically signed by: Wei Wright   Date:    11/18/2023   Time:    14:21      CT Previous   Final Result      CT Previous   Final Result      NM Hepatobiliary Scan (HIDA)    (Results Pending)   MRI MRCP Without Contrast    (Results Pending)        No results found for: "EF"       NUTRITION STATUS  Patient meets ASPEN criteria for severe malnutrition of acute illness or injury per RD assessment as evidenced by:  Energy Intake (Malnutrition): less than or equal to 50% for greater than or equal to 5 days  Weight Loss (Malnutrition): greater than 7.5% in 3 months  Subcutaneous Fat (Malnutrition): moderate depletion  Muscle Mass (Malnutrition): moderate depletion           A minimum of two " characteristics is recommended for diagnosis of either severe or non-severe malnutrition.       Case related differential diagnoses have been reviewed; assessment and plan has been documented. I have personally reviewed the labs and test results that are currently available; I have reviewed the patients medication list. I have reviewed the consulting providers recommendations. I have reviewed or attempted to review medical records based upon their availability.  All of the patient's and/or family's questions have been addressed and answered to the best of my ability.  I will continue to monitor closely and make adjustments to medical management as needed.  This document was created using M*Modal Fluency Direct.  Transcription errors may have been made.  Please contact me if any questions may rise regarding documentation to clarify transcription.        Kenney Steiner MD   Internal Medicine  Department of Hospital Medicine  [facility] - 8th Floor Med Surg

## 2023-11-21 ENCOUNTER — ANESTHESIA (OUTPATIENT)
Dept: INTERVENTIONAL RADIOLOGY/VASCULAR | Facility: HOSPITAL | Age: 58
DRG: 356 | End: 2023-11-21
Payer: COMMERCIAL

## 2023-11-21 LAB
ALBUMIN SERPL-MCNC: 2.5 G/DL (ref 3.4–4.8)
ALBUMIN/GLOB SERPL: 0.7 RATIO (ref 1.1–2)
ALP SERPL-CCNC: 419 UNIT/L (ref 40–150)
ALT SERPL-CCNC: 163 UNIT/L (ref 0–55)
AST SERPL-CCNC: 123 UNIT/L (ref 5–34)
BASOPHILS # BLD AUTO: 0.06 X10(3)/MCL
BASOPHILS NFR BLD AUTO: 0.9 %
BILIRUB SERPL-MCNC: 10.2 MG/DL
BUN SERPL-MCNC: 12.4 MG/DL (ref 9.8–20.1)
CALCIUM SERPL-MCNC: 8.6 MG/DL (ref 8.4–10.2)
CHLORIDE SERPL-SCNC: 108 MMOL/L (ref 98–107)
CO2 SERPL-SCNC: 21 MMOL/L (ref 23–31)
CREAT SERPL-MCNC: 0.74 MG/DL (ref 0.55–1.02)
EOSINOPHIL # BLD AUTO: 0.15 X10(3)/MCL (ref 0–0.9)
EOSINOPHIL NFR BLD AUTO: 2.2 %
ERYTHROCYTE [DISTWIDTH] IN BLOOD BY AUTOMATED COUNT: 19.3 % (ref 11.5–17)
GFR SERPLBLD CREATININE-BSD FMLA CKD-EPI: >60 MLS/MIN/1.73/M2
GLOBULIN SER-MCNC: 3.7 GM/DL (ref 2.4–3.5)
GLUCOSE SERPL-MCNC: 96 MG/DL (ref 82–115)
HCT VFR BLD AUTO: 36.4 % (ref 37–47)
HGB BLD-MCNC: 11.9 G/DL (ref 12–16)
IMM GRANULOCYTES # BLD AUTO: 0.03 X10(3)/MCL (ref 0–0.04)
IMM GRANULOCYTES NFR BLD AUTO: 0.4 %
LYMPHOCYTES # BLD AUTO: 0.6 X10(3)/MCL (ref 0.6–4.6)
LYMPHOCYTES NFR BLD AUTO: 8.9 %
MCH RBC QN AUTO: 30.2 PG (ref 27–31)
MCHC RBC AUTO-ENTMCNC: 32.7 G/DL (ref 33–36)
MCV RBC AUTO: 92.4 FL (ref 80–94)
MONOCYTES # BLD AUTO: 0.8 X10(3)/MCL (ref 0.1–1.3)
MONOCYTES NFR BLD AUTO: 11.9 %
NEUTROPHILS # BLD AUTO: 5.1 X10(3)/MCL (ref 2.1–9.2)
NEUTROPHILS NFR BLD AUTO: 75.7 %
NRBC BLD AUTO-RTO: 0 %
PLATELET # BLD AUTO: 383 X10(3)/MCL (ref 130–400)
PMV BLD AUTO: 10.3 FL (ref 7.4–10.4)
POTASSIUM SERPL-SCNC: 4.7 MMOL/L (ref 3.5–5.1)
PROT SERPL-MCNC: 6.2 GM/DL (ref 5.8–7.6)
RBC # BLD AUTO: 3.94 X10(6)/MCL (ref 4.2–5.4)
SODIUM SERPL-SCNC: 139 MMOL/L (ref 136–145)
WBC # SPEC AUTO: 6.74 X10(3)/MCL (ref 4.5–11.5)

## 2023-11-21 PROCEDURE — 63600175 PHARM REV CODE 636 W HCPCS: Performed by: INTERNAL MEDICINE

## 2023-11-21 PROCEDURE — 11000001 HC ACUTE MED/SURG PRIVATE ROOM

## 2023-11-21 PROCEDURE — D9220A PRA ANESTHESIA: ICD-10-PCS | Mod: CRNA,,, | Performed by: NURSE ANESTHETIST, CERTIFIED REGISTERED

## 2023-11-21 PROCEDURE — 27000221 HC OXYGEN, UP TO 24 HOURS

## 2023-11-21 PROCEDURE — 25000003 PHARM REV CODE 250: Performed by: NURSE ANESTHETIST, CERTIFIED REGISTERED

## 2023-11-21 PROCEDURE — 63600175 PHARM REV CODE 636 W HCPCS: Performed by: NURSE ANESTHETIST, CERTIFIED REGISTERED

## 2023-11-21 PROCEDURE — D9220A PRA ANESTHESIA: ICD-10-PCS | Mod: ANES,,, | Performed by: ANESTHESIOLOGY

## 2023-11-21 PROCEDURE — D9220A PRA ANESTHESIA: Mod: CRNA,,, | Performed by: NURSE ANESTHETIST, CERTIFIED REGISTERED

## 2023-11-21 PROCEDURE — C9113 INJ PANTOPRAZOLE SODIUM, VIA: HCPCS | Performed by: PHYSICIAN ASSISTANT

## 2023-11-21 PROCEDURE — D9220A PRA ANESTHESIA: Mod: ANES,,, | Performed by: ANESTHESIOLOGY

## 2023-11-21 PROCEDURE — 63600175 PHARM REV CODE 636 W HCPCS: Performed by: PHYSICIAN ASSISTANT

## 2023-11-21 PROCEDURE — 80053 COMPREHEN METABOLIC PANEL: CPT | Performed by: INTERNAL MEDICINE

## 2023-11-21 PROCEDURE — 0F9930Z DRAINAGE OF COMMON BILE DUCT WITH DRAINAGE DEVICE, PERCUTANEOUS APPROACH: ICD-10-PCS | Performed by: SURGERY

## 2023-11-21 PROCEDURE — 63600175 PHARM REV CODE 636 W HCPCS: Performed by: ANESTHESIOLOGY

## 2023-11-21 PROCEDURE — 25000003 PHARM REV CODE 250: Performed by: INTERNAL MEDICINE

## 2023-11-21 PROCEDURE — 25000003 PHARM REV CODE 250: Performed by: RADIOLOGY

## 2023-11-21 PROCEDURE — 25500020 PHARM REV CODE 255: Performed by: RADIOLOGY

## 2023-11-21 PROCEDURE — 85025 COMPLETE CBC W/AUTO DIFF WBC: CPT | Performed by: INTERNAL MEDICINE

## 2023-11-21 RX ORDER — HYDROMORPHONE HYDROCHLORIDE 2 MG/ML
0.2 INJECTION, SOLUTION INTRAMUSCULAR; INTRAVENOUS; SUBCUTANEOUS EVERY 5 MIN PRN
Status: DISCONTINUED | OUTPATIENT
Start: 2023-11-21 | End: 2023-11-24

## 2023-11-21 RX ORDER — ROCURONIUM BROMIDE 10 MG/ML
INJECTION, SOLUTION INTRAVENOUS
Status: DISCONTINUED | OUTPATIENT
Start: 2023-11-21 | End: 2023-11-21

## 2023-11-21 RX ORDER — LIDOCAINE HYDROCHLORIDE 10 MG/ML
1 INJECTION, SOLUTION EPIDURAL; INFILTRATION; INTRACAUDAL; PERINEURAL ONCE
Status: CANCELLED | OUTPATIENT
Start: 2023-11-21 | End: 2023-11-21

## 2023-11-21 RX ORDER — MIDAZOLAM HYDROCHLORIDE 1 MG/ML
INJECTION INTRAMUSCULAR; INTRAVENOUS
Status: DISCONTINUED | OUTPATIENT
Start: 2023-11-21 | End: 2023-11-21

## 2023-11-21 RX ORDER — CEFAZOLIN SODIUM 1 G/3ML
INJECTION, POWDER, FOR SOLUTION INTRAMUSCULAR; INTRAVENOUS
Status: DISCONTINUED | OUTPATIENT
Start: 2023-11-21 | End: 2023-11-21

## 2023-11-21 RX ORDER — HYDRALAZINE HYDROCHLORIDE 20 MG/ML
10 INJECTION INTRAMUSCULAR; INTRAVENOUS EVERY 6 HOURS PRN
Status: DISCONTINUED | OUTPATIENT
Start: 2023-11-21 | End: 2023-12-25 | Stop reason: HOSPADM

## 2023-11-21 RX ORDER — HYDROCODONE BITARTRATE AND ACETAMINOPHEN 5; 325 MG/1; MG/1
1 TABLET ORAL
Status: DISCONTINUED | OUTPATIENT
Start: 2023-11-21 | End: 2023-11-24

## 2023-11-21 RX ORDER — GLYCOPYRROLATE 0.2 MG/ML
INJECTION INTRAMUSCULAR; INTRAVENOUS
Status: DISCONTINUED | OUTPATIENT
Start: 2023-11-21 | End: 2023-11-21

## 2023-11-21 RX ORDER — ONDANSETRON 2 MG/ML
INJECTION INTRAMUSCULAR; INTRAVENOUS
Status: DISCONTINUED | OUTPATIENT
Start: 2023-11-21 | End: 2023-11-21

## 2023-11-21 RX ORDER — LIDOCAINE HYDROCHLORIDE 20 MG/ML
INJECTION, SOLUTION EPIDURAL; INFILTRATION; INTRACAUDAL; PERINEURAL
Status: DISCONTINUED | OUTPATIENT
Start: 2023-11-21 | End: 2023-11-21

## 2023-11-21 RX ORDER — FAMOTIDINE 10 MG/ML
20 INJECTION INTRAVENOUS ONCE
Status: CANCELLED | OUTPATIENT
Start: 2023-11-21

## 2023-11-21 RX ORDER — DEXMEDETOMIDINE HYDROCHLORIDE 100 UG/ML
INJECTION, SOLUTION INTRAVENOUS
Status: DISCONTINUED | OUTPATIENT
Start: 2023-11-21 | End: 2023-11-21

## 2023-11-21 RX ORDER — FENTANYL CITRATE 50 UG/ML
INJECTION, SOLUTION INTRAMUSCULAR; INTRAVENOUS
Status: DISCONTINUED | OUTPATIENT
Start: 2023-11-21 | End: 2023-11-21

## 2023-11-21 RX ORDER — DEXAMETHASONE SODIUM PHOSPHATE 4 MG/ML
INJECTION, SOLUTION INTRA-ARTICULAR; INTRALESIONAL; INTRAMUSCULAR; INTRAVENOUS; SOFT TISSUE
Status: DISCONTINUED | OUTPATIENT
Start: 2023-11-21 | End: 2023-11-21

## 2023-11-21 RX ORDER — PROPOFOL 10 MG/ML
VIAL (ML) INTRAVENOUS
Status: DISCONTINUED | OUTPATIENT
Start: 2023-11-21 | End: 2023-11-21

## 2023-11-21 RX ORDER — SODIUM CHLORIDE 0.9 % (FLUSH) 0.9 %
10 SYRINGE (ML) INJECTION
Status: DISCONTINUED | OUTPATIENT
Start: 2023-11-21 | End: 2023-11-24

## 2023-11-21 RX ORDER — LIDOCAINE HYDROCHLORIDE 20 MG/ML
INJECTION, SOLUTION INFILTRATION; PERINEURAL
Status: COMPLETED | OUTPATIENT
Start: 2023-11-21 | End: 2023-11-21

## 2023-11-21 RX ADMIN — HYDROMORPHONE HYDROCHLORIDE 0.2 MG: 2 INJECTION INTRAMUSCULAR; INTRAVENOUS; SUBCUTANEOUS at 02:11

## 2023-11-21 RX ADMIN — PROCHLORPERAZINE EDISYLATE 5 MG: 5 INJECTION INTRAMUSCULAR; INTRAVENOUS at 10:11

## 2023-11-21 RX ADMIN — HYDROMORPHONE HYDROCHLORIDE 0.2 MG: 2 INJECTION INTRAMUSCULAR; INTRAVENOUS; SUBCUTANEOUS at 03:11

## 2023-11-21 RX ADMIN — ONDANSETRON 4 MG: 2 INJECTION INTRAMUSCULAR; INTRAVENOUS at 02:11

## 2023-11-21 RX ADMIN — ONDANSETRON 4 MG: 2 INJECTION INTRAMUSCULAR; INTRAVENOUS at 09:11

## 2023-11-21 RX ADMIN — DEXMEDETOMIDINE 6 MCG: 200 INJECTION, SOLUTION INTRAVENOUS at 01:11

## 2023-11-21 RX ADMIN — MORPHINE SULFATE 4 MG: 4 INJECTION, SOLUTION INTRAMUSCULAR; INTRAVENOUS at 10:11

## 2023-11-21 RX ADMIN — MUPIROCIN: 20 OINTMENT TOPICAL at 08:11

## 2023-11-21 RX ADMIN — IOPAMIDOL 40 ML: 755 INJECTION, SOLUTION INTRAVENOUS at 02:11

## 2023-11-21 RX ADMIN — SODIUM CHLORIDE, SODIUM GLUCONATE, SODIUM ACETATE, POTASSIUM CHLORIDE AND MAGNESIUM CHLORIDE: 526; 502; 368; 37; 30 INJECTION, SOLUTION INTRAVENOUS at 12:11

## 2023-11-21 RX ADMIN — MIDAZOLAM 2 MG: 1 INJECTION INTRAMUSCULAR; INTRAVENOUS at 12:11

## 2023-11-21 RX ADMIN — CEFAZOLIN 2 G: 330 INJECTION, POWDER, FOR SOLUTION INTRAMUSCULAR; INTRAVENOUS at 01:11

## 2023-11-21 RX ADMIN — ONDANSETRON 4 MG: 2 INJECTION INTRAMUSCULAR; INTRAVENOUS at 07:11

## 2023-11-21 RX ADMIN — PANTOPRAZOLE SODIUM 40 MG: 40 INJECTION, POWDER, FOR SOLUTION INTRAVENOUS at 04:11

## 2023-11-21 RX ADMIN — LIDOCAINE HYDROCHLORIDE 5 ML: 20 INJECTION, SOLUTION INFILTRATION; PERINEURAL at 01:11

## 2023-11-21 RX ADMIN — LIDOCAINE HYDROCHLORIDE 80 MG: 20 INJECTION, SOLUTION INTRAVENOUS at 01:11

## 2023-11-21 RX ADMIN — SUGAMMADEX 200 MG: 100 INJECTION, SOLUTION INTRAVENOUS at 02:11

## 2023-11-21 RX ADMIN — PROPOFOL 100 MG: 10 INJECTION, EMULSION INTRAVENOUS at 01:11

## 2023-11-21 RX ADMIN — GLYCOPYRROLATE 0.2 MG: 0.2 INJECTION INTRAMUSCULAR; INTRAVENOUS at 01:11

## 2023-11-21 RX ADMIN — POTASSIUM CHLORIDE, DEXTROSE MONOHYDRATE AND SODIUM CHLORIDE: 150; 5; 450 INJECTION, SOLUTION INTRAVENOUS at 08:11

## 2023-11-21 RX ADMIN — PANTOPRAZOLE SODIUM 40 MG: 40 INJECTION, POWDER, FOR SOLUTION INTRAVENOUS at 05:11

## 2023-11-21 RX ADMIN — DEXAMETHASONE SODIUM PHOSPHATE 4 MG: 4 INJECTION, SOLUTION INTRA-ARTICULAR; INTRALESIONAL; INTRAMUSCULAR; INTRAVENOUS; SOFT TISSUE at 02:11

## 2023-11-21 RX ADMIN — FENTANYL CITRATE 100 MCG: 50 INJECTION, SOLUTION INTRAMUSCULAR; INTRAVENOUS at 01:11

## 2023-11-21 RX ADMIN — ROCURONIUM BROMIDE 50 MG: 10 SOLUTION INTRAVENOUS at 01:11

## 2023-11-21 NOTE — PROGRESS NOTES
.UROLOGY  PROGRESS  NOTE    Karen Briggs 1965  [id number]  11/21/2023    BUN/creat 12.4 & 0.74  VSS, afebrile  Good urine output      Intake/Output:  No intake/output data recorded.  I/O last 3 completed shifts:  In: 1671.4 [I.V.:1671.4]  Out: 1140 [Urine:1140]       Exam:    NAD  Resp unlabored   no urine for assessment      Recent Results (from the past 24 hour(s))   Comprehensive Metabolic Panel    Collection Time: 11/21/23  4:47 AM   Result Value Ref Range    Sodium Level 139 136 - 145 mmol/L    Potassium Level 4.7 3.5 - 5.1 mmol/L    Chloride 108 (H) 98 - 107 mmol/L    Carbon Dioxide 21 (L) 23 - 31 mmol/L    Glucose Level 96 82 - 115 mg/dL    Blood Urea Nitrogen 12.4 9.8 - 20.1 mg/dL    Creatinine 0.74 0.55 - 1.02 mg/dL    Calcium Level Total 8.6 8.4 - 10.2 mg/dL    Protein Total 6.2 5.8 - 7.6 gm/dL    Albumin Level 2.5 (L) 3.4 - 4.8 g/dL    Globulin 3.7 (H) 2.4 - 3.5 gm/dL    Albumin/Globulin Ratio 0.7 (L) 1.1 - 2.0 ratio    Bilirubin Total 10.2 (H) <=1.5 mg/dL    Alkaline Phosphatase 419 (H) 40 - 150 unit/L    Alanine Aminotransferase 163 (H) 0 - 55 unit/L    Aspartate Aminotransferase 123 (H) 5 - 34 unit/L    eGFR >60 mls/min/1.73/m2   CBC with Differential    Collection Time: 11/21/23  8:22 AM   Result Value Ref Range    WBC 6.74 4.50 - 11.50 x10(3)/mcL    RBC 3.94 (L) 4.20 - 5.40 x10(6)/mcL    Hgb 11.9 (L) 12.0 - 16.0 g/dL    Hct 36.4 (L) 37.0 - 47.0 %    MCV 92.4 80.0 - 94.0 fL    MCH 30.2 27.0 - 31.0 pg    MCHC 32.7 (L) 33.0 - 36.0 g/dL    RDW 19.3 (H) 11.5 - 17.0 %    Platelet 383 130 - 400 x10(3)/mcL    MPV 10.3 7.4 - 10.4 fL    Neut % 75.7 %    Lymph % 8.9 %    Mono % 11.9 %    Eos % 2.2 %    Basophil % 0.9 %    Lymph # 0.60 0.6 - 4.6 x10(3)/mcL    Neut # 5.10 2.1 - 9.2 x10(3)/mcL    Mono # 0.80 0.1 - 1.3 x10(3)/mcL    Eos # 0.15 0 - 0.9 x10(3)/mcL    Baso # 0.06 <=0.2 x10(3)/mcL    IG# 0.03 0 - 0.04 x10(3)/mcL    IG% 0.4 %    NRBC% 0.0 %         Imaging:   EXAMINATION:  US RETROPERITONEAL  COMPLETE    CLINICAL HISTORY:  left hydro improving?;    TECHNIQUE:  Ultrasound evaluation of the kidneys, region of the ureters, and urinary bladder.    COMPARISON:  CT 11/19/2023    FINDINGS:  Mild left-sided hydronephrosis.  Dilatation has mildly improved since prior CT.  No significant collecting system dilatation on the right.    Calculated bladder volume 108 mL.    No significant findings regarding the visualized segments of the abdominal aorta and IVC.    Measurements:    - Right kidney: 11.4 cm in length    - Left kidney: 11.3 cm in length   Impression:       Mild improvement in left-sided hydronephrosis since 11/19/2023.       Assessment:  58-year-old female status post complicated laparoscopic cholecystectomy with postoperative fluid collection and incidental left hydronephrosis       Plan:  Hydro improved and renal function stable, no indication for stent at this time. We would like her to follow up with Dr. Gilbert in 1 month with an ultrasound. Urology signing off.    MARIA A Gaona

## 2023-11-21 NOTE — PROGRESS NOTES
[facility] - 8th Floor [facility] Surg  [facility] MEDICINE ~ PROGRESS NOTE        CHIEF COMPLAINT   Hospital follow up    HOSPITAL COURSE   58-year-old female with medical history of hypertension who recently underwent laparoscopic cholecystectomy on 11/10/2023, procedure was incomplete and was unable to completely resect the gallbladder.  Since surgery she continued to have right flank/back pain and followed up with her PCP and CT abdomen and pelvis on 11/17/2023 revealed left-sided hydronephrosis with suspected distal obstruction/no clear stone visualized, postoperative changes of cholecystectomy with fluid in the gallbladder fossa may reflect postoperative seroma but biloma can not be entirely excluded, also noted for marked thickening of the stomach wall diffusely may be related to mesenteric edema/reactive.  She presented to [facility] ED the same day 11/17/2023 and her labs notable for WBC 9.0, hemoglobin 12.4, platelets 442, creatinine 0.86, total bilirubin 8.7 with direct fraction 6.7, alkaline phosphatase 491, , .  Patient's surgeon was consulted and recommended ERCP which was not available at [facility] for which she was transferred to [facility] and referred to hospital medicine service for further evaluation and management.   On my evaluation she complaints of right flank/back pain as well as suprapubic pain and constipation.  Denies nausea, vomiting, fever or chills.  Her hemodynamics are stable.  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  Severe inflammation and oozing of blood noted proximal gastric lumen.  Unable to advance scope into the duodenum.    Today  Boyfriend was in the room this morning.  Not tolerating much of an oral intake.  Planning for IR guided biliary drain placement today.        OBJECTIVE/PHYSICAL EXAM     VITAL SIGNS (MOST RECENT):  Temp: 98.4 °F (36.9 °C) (11/21/23 0741)  Pulse: 83 (11/21/23 0741)  Resp: 18  (11/21/23 0741)  BP: (!) 153/83 (11/21/23 0741)  SpO2: 99 % (11/21/23 0741) VITAL SIGNS (24 HOUR RANGE):  Temp:  [97.7 °F (36.5 °C)-98.4 °F (36.9 °C)] 98.4 °F (36.9 °C)  Pulse:  [81-96] 83  Resp:  [16-18] 18  SpO2:  [98 %-100 %] 99 %  BP: (148-153)/(83-95) 153/83   GENERAL: In no acute distress, afebrile  HEENT:  CHEST: Clear to auscultation bilaterally  HEART: S1, S2, no appreciable murmur  ABDOMEN: Soft, BS +, epigastric fullness and tenderness  MSK: Warm, no lower extremity edema, no clubbing or cyanosis  NEUROLOGIC: Alert and oriented x4, moving all extremities with good strength   INTEGUMENTARY:  PSYCHIATRY:        ASSESSMENT/PLAN   Suspected bile leak with biloma and biliary obstruction  H/O Laparoscopic incomplete cholecystectomy 11/10/2023  Left hydronephrosis  Gastric mass with oozing blood  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  Results from EGD noted.  Monitor hemoglobin, continue Protonix.  Urology following.  Planning for stent placement today.  D5 half-normal saline 50 mL/hour.  Not really tolerating much of a oral intake.  Surgical oncology following.  Pending pathology.  IR consulted for biliary drain placement.  She may also need J-tube placement for nutrition.    DVT prophylaxis:  Not safe at this time    Anticipated discharge and disposition:   __________________________________________________________________________    LABS/MICRO/MEDS/DIAGNOSTICS       LABS  Recent Labs     11/21/23  0447      K 4.7   CHLORIDE 108*   CO2 21*   BUN 12.4   CREATININE 0.74   GLUCOSE 96   CALCIUM 8.6   ALKPHOS 419*   *   *   ALBUMIN 2.5*       Recent Labs     11/21/23  0822   WBC 6.74   RBC 3.94*   HCT 36.4*   MCV 92.4            MICROBIOLOGY  Microbiology Results (last 7 days)       Procedure Component Value Units Date/Time    Blood Culture [[id number]]  (Normal) Collected: 11/18/23 0710    Order Status:  Completed Specimen: Blood Updated: 11/21/23 0900     CULTURE, BLOOD ([facility]) No Growth At 72 Hours    Blood Culture [[id number]]  (Normal) Collected: 11/18/23 0710    Order Status: Completed Specimen: Blood Updated: 11/21/23 0900     CULTURE, BLOOD ([facility]) No Growth At 72 Hours               MEDICATIONS   iopamidoL  50 mL Other Once    mupirocin   Nasal BID    pantoprazole  40 mg Intravenous BID AC         INFUSIONS   dextrose 5 % and 0.45 % NaCl with KCl 20 mEq 60 mL/hr at 11/21/23 0840    electrolyte-A            DIAGNOSTIC TESTS  NM Hepatobiliary Scan (HIDA)   Final Result      No appreciable excretion into the bile ducts by 4 hours.  This can be seen with bile duct obstruction or hepatic dysfunction.      Unable to assess for biliary leak in the absence of excreted radiotracer within the bile ducts.         Electronically signed by: Sulema Solorzano   Date:    11/20/2023   Time:    16:10      US Retroperitoneal Complete   Final Result      Mild improvement in left-sided hydronephrosis since 11/19/2023.         Electronically signed by: George Dove   Date:    11/20/2023   Time:    14:48      MRI MRCP Without Contrast   Final Result      Mild intrahepatic biliary ductal dilatation.  Tapering of the bile ducts at the confluence of the right and left hepatic ducts with evidence of accompanying ductal wall thickening and enhancement at the confluence and proximal common hepatic and bile ducts on a prior contrast enhanced CT exam.  This enhancement may be inflammatory or neoplastic.      Small volume free intraperitoneal fluid.      There is artifact in the mid abdomen which partially obscures the stomach and pancreas.  Known gastric lesion and perigastric lymph nodes more clearly demonstrated on prior CT exams.      Left hydronephrosis to the ureteropelvic junction.         Electronically signed by: Sulema Solorzano   Date:    11/20/2023   Time:    11:44      CT Abdomen Pelvis With IV Contrast   Final Result      1.  "Gastritis and duodenitis.   2. Irregular wall thickening of the proximal stomach compatible with known tumor.   3. Similar heterogeneous appearance of the gallbladder fossa with small volume ascites.  Bile leak is possible.  Mild intrahepatic biliary ductal dilatation without significant dilatation of the common bile duct.   4. Similar moderate left hydronephrosis.         Electronically signed by: Wei Wright   Date:    11/19/2023   Time:    13:17      CT Chest Without Contrast   Final Result      No convincing CT evidence of metastatic disease in the chest.         Electronically signed by: Wei Wright   Date:    11/19/2023   Time:    12:58      FL ERCP Biliary And Pancreatic By [employer]   Final Result      Fluoroscopic assistance provided as above.         Electronically signed by: Wei Wright   Date:    11/18/2023   Time:    14:21      CT Previous   Final Result      CT Previous   Final Result      IR Biliary Drain Internal/External    (Results Pending)        No results found for: "EF"       NUTRITION STATUS  Patient meets ASPEN criteria for severe malnutrition of acute illness or injury per RD assessment as evidenced by:  Energy Intake (Malnutrition): less than or equal to 50% for greater than or equal to 5 days  Weight Loss (Malnutrition): greater than 7.5% in 3 months  Subcutaneous Fat (Malnutrition): moderate depletion  Muscle Mass (Malnutrition): moderate depletion           A minimum of two characteristics is recommended for diagnosis of either severe or non-severe malnutrition.       Case related differential diagnoses have been reviewed; assessment and plan has been documented. I have personally reviewed the labs and test results that are currently available; I have reviewed the patients medication list. I have reviewed the consulting providers recommendations. I have reviewed or attempted to review medical records based upon their availability.  All of the patient's and/or family's questions have " been addressed and answered to the best of my ability.  I will continue to monitor closely and make adjustments to medical management as needed.  This document was created using M*Modal Fluency Direct.  Transcription errors may have been made.  Please contact me if any questions may rise regarding documentation to clarify transcription.        Kenney Steiner MD   Internal Medicine  Department of Hospital Medicine  [facility] - 8th Floor Med Surg

## 2023-11-21 NOTE — ANESTHESIA PREPROCEDURE EVALUATION
11/21/2023  Karen Briggs is a 58 y.o., female.      Pre-op Assessment    I have reviewed the Patient Summary Reports.     I have reviewed the Nursing Notes. I have reviewed the NPO Status.   I have reviewed the Medications.     Review of Systems  Anesthesia Hx:  No problems with previous Anesthesia                Social:  Non-Smoker       Cardiovascular:     Hypertension   Denies MI.        Denies Angina.  Denies CHF.                           Hypertension         Pulmonary:    Denies COPD.  Denies Asthma.                    Renal/:  Chronic Renal Disease (hydronephrosis) no renal calculi               Hepatic/GI:      Denies GERD. Denies Liver Disease.  Denies Hepatitis. Vomiting swallowed food or drink          Neurological:    Denies CVA. Neuromuscular Disease, (sciatica)   Denies Seizures.                              Neuromuscular Disease   Endocrine:  Denies Diabetes. Denies Hypothyroidism.  Denies Hyperthyroidism.       Denies Obesity / BMI > 30      Physical Exam  General: Well nourished, Cooperative, Alert and Oriented    Airway:  Mallampati: I   Mouth Opening: Normal  TM Distance: Normal  Tongue: Normal  Neck ROM: Normal ROM    Dental:        Anesthesia Plan  Type of Anesthesia, risks & benefits discussed:    Anesthesia Type: Gen ETT  Intra-op Monitoring Plan: Standard ASA Monitors  Induction:  IV  Airway Plan: Direct and Video  Informed Consent: Informed consent signed with the Patient and all parties understand the risks and agree with anesthesia plan.  All questions answered.   ASA Score: 3  Day of Surgery Review of History & Physical: H&P Update referred to the surgeon/provider.    Ready For Surgery From Anesthesia Perspective.     .

## 2023-11-21 NOTE — ANESTHESIA POSTPROCEDURE EVALUATION
Anesthesia Post Evaluation    Patient: Karen Briggs    Procedure(s) Performed: * No procedures listed *    Final Anesthesia Type: general      Patient location during evaluation: PACU  Patient participation: Yes- Able to Participate  Level of consciousness: awake and alert  Post-procedure vital signs: reviewed and stable  Pain management: adequate  Airway patency: patent    PONV status at discharge: No PONV  Anesthetic complications: no      Cardiovascular status: blood pressure returned to baseline  Respiratory status: unassisted and spontaneous ventilation  Hydration status: euvolemic  Follow-up needed           Vitals Value Taken Time   /97 11/21/23 1554   Temp 36.4 °C (97.6 °F) 11/21/23 1529   Pulse 84 11/21/23 1529   Resp 18 11/21/23 1529   SpO2 100 % 11/21/23 1529         Event Time   Out of Recovery 11/21/2023 15:15:00         Pain/William Score: Pain Rating Prior to Med Admin: 4 (11/21/2023  3:00 PM)  Pain Rating Post Med Admin: 0 (11/20/2023 11:57 PM)  William Score: 9 (11/21/2023  3:59 PM)

## 2023-11-21 NOTE — OP NOTE
Radiology Post-Procedure Note    Pre Op Diagnosis: Biliary Obstruction    Post Op Diagnosis:  As Above    Procedure:  PTHD    Procedure performed by: Renny Batres M.D.    Written Informed Consent Obtained: Yes    Specimen Removed: NO    Estimated Blood Loss: Minimal    Findings:   Standard Drain Placement    Patient tolerated procedure well.    Renny Batres MD

## 2023-11-21 NOTE — PROGRESS NOTES
No acute events  Afebrile, VSS  NAD, Anicteric  Abd soft NTND    Reviewed MRCP and HIDA, and d/w radiology. Concerning for bile duct injury or transection s/p cholecystectomy.     Consult IR for PTC and biliary drain placement, discussed case with them for coordination of care.     Await pathology for gastric mass.    William Morse MD  Surgical Oncology  Complex General, Gastrointestinal and Hepatobiliary Surgery

## 2023-11-21 NOTE — PROGRESS NOTES
"Gastroenterology Progress Note    Subjective/Interval History:  MRCP:   Mild intrahepatic biliary ductal dilatation.  Tapering of the bile ducts at the confluence of the right and left hepatic ducts with evidence of accompanying ductal wall thickening and enhancement at the confluence and proximal common hepatic and bile ducts on a prior contrast enhanced CT exam.  This enhancement may be inflammatory or neoplastic.  Small volume free intraperitoneal fluid.  There is artifact in the mid abdomen which partially obscures the stomach and pancreas.  Known gastric lesion and perigastric lymph nodes more clearly demonstrated on prior CT exams.  Left hydronephrosis to the ureteropelvic junction.    HIDA:  No appreciable excretion into the bile ducts by 4 hours.  This can be seen with bile duct obstruction or hepatic dysfunction.  Unable to assess for biliary leak in the absence of excreted radiotracer within the bile ducts.    IR consulted to place external biliary drain per surg onc.    LFTs relatively stable    Spoke with nurse regarding patient. Going for PTC drain placement today. Patient had some nausea, she initially refused meds but ended up taking them with resolution of nausea.    Patient sitting in bed. She has an emesis bag with some clear emesis inside. Denies current nausea. Abd firm in epigastrium with tenderness and gaurding.    ROS:  Review of Systems   Constitutional:  Positive for malaise/fatigue.   Respiratory:  Negative for cough and shortness of breath.    Cardiovascular:  Negative for chest pain.   Gastrointestinal:  Positive for abdominal pain, nausea and vomiting. Negative for blood in stool, constipation, diarrhea and melena.   Neurological:  Negative for weakness.       Vital Signs:  BP (!) 153/83   Pulse 83   Temp 98.4 °F (36.9 °C) (Oral)   Resp 18   Ht 5' 6" (1.676 m)   Wt 82.2 kg (181 lb 4 oz)   SpO2 99%   Breastfeeding No   BMI 29.25 kg/m²   Body mass index is 29.25 kg/m².    Physical " Exam:  Physical Exam  Constitutional:       General: She is not in acute distress.     Appearance: She is obese. She is not ill-appearing.   HENT:      Head: Normocephalic and atraumatic.      Right Ear: External ear normal.      Left Ear: External ear normal.      Nose: Nose normal.      Mouth/Throat:      Pharynx: Oropharynx is clear.   Eyes:      Conjunctiva/sclera: Conjunctivae normal.   Pulmonary:      Effort: Pulmonary effort is normal. No respiratory distress.   Abdominal:      General: There is distension.      Tenderness: There is abdominal tenderness in the epigastric area. There is guarding.      Comments: Abd firm in epigastrium   Musculoskeletal:         General: Normal range of motion.      Cervical back: Normal range of motion.   Skin:     General: Skin is warm and dry.   Neurological:      Mental Status: She is alert and oriented to person, place, and time. Mental status is at baseline.   Psychiatric:         Mood and Affect: Mood normal.         Behavior: Behavior normal.         Thought Content: Thought content normal.         Labs:  Recent Results (from the past 24 hour(s))   Comprehensive Metabolic Panel    Collection Time: 11/21/23  4:47 AM   Result Value Ref Range    Sodium Level 139 136 - 145 mmol/L    Potassium Level 4.7 3.5 - 5.1 mmol/L    Chloride 108 (H) 98 - 107 mmol/L    Carbon Dioxide 21 (L) 23 - 31 mmol/L    Glucose Level 96 82 - 115 mg/dL    Blood Urea Nitrogen 12.4 9.8 - 20.1 mg/dL    Creatinine 0.74 0.55 - 1.02 mg/dL    Calcium Level Total 8.6 8.4 - 10.2 mg/dL    Protein Total 6.2 5.8 - 7.6 gm/dL    Albumin Level 2.5 (L) 3.4 - 4.8 g/dL    Globulin 3.7 (H) 2.4 - 3.5 gm/dL    Albumin/Globulin Ratio 0.7 (L) 1.1 - 2.0 ratio    Bilirubin Total 10.2 (H) <=1.5 mg/dL    Alkaline Phosphatase 419 (H) 40 - 150 unit/L    Alanine Aminotransferase 163 (H) 0 - 55 unit/L    Aspartate Aminotransferase 123 (H) 5 - 34 unit/L    eGFR >60 mls/min/1.73/m2   CBC with Differential    Collection Time:  11/21/23  8:22 AM   Result Value Ref Range    WBC 6.74 4.50 - 11.50 x10(3)/mcL    RBC 3.94 (L) 4.20 - 5.40 x10(6)/mcL    Hgb 11.9 (L) 12.0 - 16.0 g/dL    Hct 36.4 (L) 37.0 - 47.0 %    MCV 92.4 80.0 - 94.0 fL    MCH 30.2 27.0 - 31.0 pg    MCHC 32.7 (L) 33.0 - 36.0 g/dL    RDW 19.3 (H) 11.5 - 17.0 %    Platelet 383 130 - 400 x10(3)/mcL    MPV 10.3 7.4 - 10.4 fL    Neut % 75.7 %    Lymph % 8.9 %    Mono % 11.9 %    Eos % 2.2 %    Basophil % 0.9 %    Lymph # 0.60 0.6 - 4.6 x10(3)/mcL    Neut # 5.10 2.1 - 9.2 x10(3)/mcL    Mono # 0.80 0.1 - 1.3 x10(3)/mcL    Eos # 0.15 0 - 0.9 x10(3)/mcL    Baso # 0.06 <=0.2 x10(3)/mcL    IG# 0.03 0 - 0.04 x10(3)/mcL    IG% 0.4 %    NRBC% 0.0 %         Assessment/Plan:  58-year-old female unknown to our group with a past medical history of HTN. [place] [facility] [place] [facility] for concerns of biloma vs bile leak vs obstruction s/p incomplete laparoscopic cholecystectomy 11/10/23  by Dr. Sicard.     S/p laparoscopic cholecystectomy 11/10/2023; procedure was incomplete and unable to completely resect the gallbladder.   CT abd/pelv 11/17/2023: left-sided hydronephrosis with suspected distal obstruction/no clear stone visualized, postoperative changes of cholecystectomy with fluid in the gallbladder fossa may reflect postoperative seroma but biloma can not be entirely excluded, also noted for marked thickening of the stomach wall diffusely may be related to mesenteric edema/reactive.      Generalized abdominal pain  Hyperbilirubinemia - stable  Transaminitis - stable  Abnormal imaging suggesting biloma   Gastric mass  - ERCP 11/18/23 Dr. Goss: malignant gastric tumor in cardia, inflamed mucosa in gastric body with oozing of blood throughout proximal gastric lumen, gastric antrum scar that would not allow advancement of scope into duodenal ampulla area therefore biliary cannulation was not possible  - path: adenocarcinoma, moderately differentiated, intestinal type  - surg onc consulted,  appreciate reccs  - CT chest/abd/pel with IV: gastritis and duodenitis, irregular wall thickening of proximal stomach c/w known tumor, similar heterogenous appearance of GB fossa with small volume ascites. Bile leak is possible. Mild intrahepatic biliary ductal dilation w/o significant dilation of CBD  - MRCP: mild intrahepatic biliary ductal dilation, tapering of bile ducts at confluence of right and left hepatic ducts - inflammatory vs neoplastic  - HIDA: no appreciable excretion into bile ducts by 4 hours  - having PTC drain placed today with IR  - consult hem/onc for gastric adenocarcinoma     Alexandra Fry, PA-C  Gastroenterology  [facility]

## 2023-11-21 NOTE — ANESTHESIA PROCEDURE NOTES
Intubation    Date/Time: 11/21/2023 1:07 PM    Performed by: Akhil Scott CRNA  Authorized by: Yonathan Alonso DO    Intubation:     Induction:  Intravenous    Intubated:  Postinduction    Mask Ventilation:  Easy mask    Attempts:  1    Attempted By:  CRNA    Method of Intubation:  Direct    Blade:  Blank 3    Laryngeal View Grade: Grade I - full view of cords      Difficult Airway Encountered?: No      Complications:  None    Airway Device:  Oral endotracheal tube    Airway Device Size:  7.5    Style/Cuff Inflation:  Cuffed    Inflation Amount (mL):  5    Tube secured:  20    Secured at:  The lips    Placement Verified By:  Capnometry and Colorimetric ETCO2 device    Complicating Factors:  None    Findings Post-Intubation:  BS equal bilateral and atraumatic/condition of teeth unchanged

## 2023-11-21 NOTE — TRANSFER OF CARE
"Anesthesia Transfer of Care Note    Patient: Karen Briggs    Procedure(s) Performed: * No procedures listed *    Patient location: PACU    Anesthesia Type: general    Transport from OR: Transported from OR on 2-3 L/min O2 by NC with adequate spontaneous ventilation    Post pain: adequate analgesia    Post assessment: no apparent anesthetic complications and tolerated procedure well    Post vital signs: stable    Level of consciousness: sedated and responds to stimulation    Nausea/Vomiting: no nausea/vomiting    Complications: none    Transfer of care protocol was followed    Last vitals: Visit Vitals  /89   Pulse 98   Temp 36.7 °C (98 °F) (Oral)   Resp 17   Ht 5' 6" (1.676 m)   Wt 82.2 kg (181 lb 4 oz)   SpO2 100%   Breastfeeding No   BMI 29.25 kg/m²     "

## 2023-11-22 LAB
ALBUMIN SERPL-MCNC: 2.8 G/DL (ref 3.4–4.8)
ALBUMIN/GLOB SERPL: 0.7 RATIO (ref 1.1–2)
ALP SERPL-CCNC: 430 UNIT/L (ref 40–150)
ALT SERPL-CCNC: 161 UNIT/L (ref 0–55)
AST SERPL-CCNC: 148 UNIT/L (ref 5–34)
BASOPHILS # BLD AUTO: 0.02 X10(3)/MCL
BASOPHILS NFR BLD AUTO: 0.2 %
BILIRUB SERPL-MCNC: 12.1 MG/DL
BUN SERPL-MCNC: 13.6 MG/DL (ref 9.8–20.1)
CALCIUM SERPL-MCNC: 9.5 MG/DL (ref 8.4–10.2)
CHLORIDE SERPL-SCNC: 107 MMOL/L (ref 98–107)
CO2 SERPL-SCNC: 23 MMOL/L (ref 23–31)
CREAT SERPL-MCNC: 0.78 MG/DL (ref 0.55–1.02)
EOSINOPHIL # BLD AUTO: 0 X10(3)/MCL (ref 0–0.9)
EOSINOPHIL NFR BLD AUTO: 0 %
ERYTHROCYTE [DISTWIDTH] IN BLOOD BY AUTOMATED COUNT: 19.7 % (ref 11.5–17)
GFR SERPLBLD CREATININE-BSD FMLA CKD-EPI: >60 MLS/MIN/1.73/M2
GLOBULIN SER-MCNC: 4.3 GM/DL (ref 2.4–3.5)
GLUCOSE SERPL-MCNC: 123 MG/DL (ref 82–115)
HCT VFR BLD AUTO: 37.8 % (ref 37–47)
HGB BLD-MCNC: 12.9 G/DL (ref 12–16)
IMM GRANULOCYTES # BLD AUTO: 0.07 X10(3)/MCL (ref 0–0.04)
IMM GRANULOCYTES NFR BLD AUTO: 0.6 %
LYMPHOCYTES # BLD AUTO: 0.21 X10(3)/MCL (ref 0.6–4.6)
LYMPHOCYTES NFR BLD AUTO: 1.7 %
MCH RBC QN AUTO: 30.4 PG (ref 27–31)
MCHC RBC AUTO-ENTMCNC: 34.1 G/DL (ref 33–36)
MCV RBC AUTO: 89.2 FL (ref 80–94)
MONOCYTES # BLD AUTO: 0.74 X10(3)/MCL (ref 0.1–1.3)
MONOCYTES NFR BLD AUTO: 5.9 %
NEUTROPHILS # BLD AUTO: 11.54 X10(3)/MCL (ref 2.1–9.2)
NEUTROPHILS NFR BLD AUTO: 91.6 %
NRBC BLD AUTO-RTO: 0 %
PLATELET # BLD AUTO: 428 X10(3)/MCL (ref 130–400)
PMV BLD AUTO: 11 FL (ref 7.4–10.4)
POTASSIUM SERPL-SCNC: 4.9 MMOL/L (ref 3.5–5.1)
PROT SERPL-MCNC: 7.1 GM/DL (ref 5.8–7.6)
RBC # BLD AUTO: 4.24 X10(6)/MCL (ref 4.2–5.4)
SODIUM SERPL-SCNC: 140 MMOL/L (ref 136–145)
WBC # SPEC AUTO: 12.58 X10(3)/MCL (ref 4.5–11.5)

## 2023-11-22 PROCEDURE — 85025 COMPLETE CBC W/AUTO DIFF WBC: CPT | Performed by: INTERNAL MEDICINE

## 2023-11-22 PROCEDURE — 63600175 PHARM REV CODE 636 W HCPCS: Performed by: PHYSICIAN ASSISTANT

## 2023-11-22 PROCEDURE — 63600175 PHARM REV CODE 636 W HCPCS: Performed by: INTERNAL MEDICINE

## 2023-11-22 PROCEDURE — 25500020 PHARM REV CODE 255: Performed by: INTERNAL MEDICINE

## 2023-11-22 PROCEDURE — 80053 COMPREHEN METABOLIC PANEL: CPT | Performed by: INTERNAL MEDICINE

## 2023-11-22 PROCEDURE — C9113 INJ PANTOPRAZOLE SODIUM, VIA: HCPCS | Performed by: PHYSICIAN ASSISTANT

## 2023-11-22 PROCEDURE — 11000001 HC ACUTE MED/SURG PRIVATE ROOM

## 2023-11-22 PROCEDURE — 25000003 PHARM REV CODE 250: Performed by: ANESTHESIOLOGY

## 2023-11-22 RX ORDER — HYDROMORPHONE HYDROCHLORIDE 2 MG/ML
0.5 INJECTION, SOLUTION INTRAMUSCULAR; INTRAVENOUS; SUBCUTANEOUS ONCE
Status: DISCONTINUED | OUTPATIENT
Start: 2023-11-22 | End: 2023-11-23

## 2023-11-22 RX ORDER — PROMETHAZINE HYDROCHLORIDE 25 MG/ML
25 INJECTION, SOLUTION INTRAMUSCULAR; INTRAVENOUS EVERY 4 HOURS PRN
Status: DISCONTINUED | OUTPATIENT
Start: 2023-11-22 | End: 2023-12-25 | Stop reason: HOSPADM

## 2023-11-22 RX ADMIN — IOPAMIDOL 92 ML: 755 INJECTION, SOLUTION INTRAVENOUS at 09:11

## 2023-11-22 RX ADMIN — MUPIROCIN: 20 OINTMENT TOPICAL at 08:11

## 2023-11-22 RX ADMIN — MUPIROCIN: 20 OINTMENT TOPICAL at 09:11

## 2023-11-22 RX ADMIN — PROCHLORPERAZINE EDISYLATE 5 MG: 5 INJECTION INTRAMUSCULAR; INTRAVENOUS at 01:11

## 2023-11-22 RX ADMIN — MORPHINE SULFATE 4 MG: 4 INJECTION, SOLUTION INTRAMUSCULAR; INTRAVENOUS at 02:11

## 2023-11-22 RX ADMIN — PANTOPRAZOLE SODIUM 40 MG: 40 INJECTION, POWDER, FOR SOLUTION INTRAVENOUS at 04:11

## 2023-11-22 RX ADMIN — MORPHINE SULFATE 4 MG: 4 INJECTION, SOLUTION INTRAMUSCULAR; INTRAVENOUS at 01:11

## 2023-11-22 RX ADMIN — ONDANSETRON 4 MG: 2 INJECTION INTRAMUSCULAR; INTRAVENOUS at 02:11

## 2023-11-22 RX ADMIN — PANTOPRAZOLE SODIUM 40 MG: 40 INJECTION, POWDER, FOR SOLUTION INTRAVENOUS at 06:11

## 2023-11-22 NOTE — PROGRESS NOTES
Inpatient Nutrition Assessment    Admit Date: 11/17/2023   Total duration of encounter: 5 days     Nutrition Recommendation/Prescription     -Advance diet as medically feasible per MD. Goal Diet: Low Residue Diet. Encourage small, frequent meals for nausea.   -Continue Boost Max TID for additional nourishment; provides 160 kcal and 30 gm protein per container.   -Medical management of nausea/vomiting per MD.   -Monitor wt, labs, and intake.     Communication of Recommendations: reviewed with nurse and reviewed with patient    Nutrition Assessment     Malnutrition Assessment/Nutrition-Focused Physical Exam    Malnutrition Context: acute illness or injury (11/18/23 1630)  Malnutrition Level: severe (11/18/23 1630)  Energy Intake (Malnutrition): less than or equal to 50% for greater than or equal to 5 days (11/18/23 1630)  Weight Loss (Malnutrition): greater than 7.5% in 3 months (11/18/23 1630)  Subcutaneous Fat (Malnutrition): moderate depletion (11/18/23 1630)  Orbital Region (Subcutaneous Fat Loss): moderate depletion        Muscle Mass (Malnutrition): moderate depletion (11/18/23 1630)  [place] Region (Muscle Loss): moderate depletion     Clavicle and Acromion Bone Region (Muscle Loss): moderate depletion        Patellar Region (Muscle Loss): moderate depletion                 A minimum of two characteristics is recommended for diagnosis of either severe or non-severe malnutrition.    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    "    Relevant Medical History:   Past Medical History:   Diagnosis Date    Hypertension     Sciatica      Past Surgical History:   Procedure Laterality Date    CARPAL TUNNEL RELEASE Left     EGD, WITH CLOSED BIOPSY  11/18/2023    Procedure: EGD, WITH CLOSED BIOPSY;  Surgeon: Alfred Goss MD;  Location: [facility] OR;  Service: Gastroenterology;;    ERCP N/A 11/18/2023    Procedure: ERCP (ENDOSCOPIC RETROGRADE CHOLANGIOPANCREATOGRAPHY);  Surgeon: Alfred Goss MD;  Location: [facility] OR;  Service: Gastroenterology;  Laterality: N/A;    HEMORRHOID SURGERY       Review of patient's allergies indicates:  No Known Allergies     Nutrition-Related Medications:    HYDROmorphone  0.5 mg Intravenous Once    iopamidoL  50 mL Other Once    mupirocin   Nasal BID    pantoprazole  40 mg Intravenous BID AC            Calorie Containing IV Medications: no significant kcals from medications at this time    Nutrition-Related Labs:  No results found for: "HGBA1C"  11/18/2023: Magnesium Level 2.30 mg/dL (Ref range: 1.60 - 2.60 mg/dL); Phosphorus Level 3.8 mg/dL (Ref range: 2.3 - 4.7 mg/dL)  11/22/2023: Potassium Level 4.9 mmol/L (Ref range: 3.5 - 5.1 mmol/L); Sodium Level 140 mmol/L (Ref range: 136 - 145 mmol/L)   Recent Labs   Lab 11/20/23  0455 11/21/23  0822 11/22/23  0451   WBC 8.07 6.74 12.58*   RBC 3.72* 3.94* 4.24   HGB 11.3* 11.9* 12.9   HCT 33.5* 36.4* 37.8   MCV 90.1 92.4 89.2   MCH 30.4 30.2 30.4   MCHC 33.7 32.7* 34.1       Recent Labs   Lab 11/18/23  0710 11/19/23  0640 11/20/23  0638 11/21/23  0447 11/22/23  0451      < > 143 139 140   K 4.0   < > 3.7 4.7 4.9   CO2 27   < > 21* 21* 23   BUN 14.6   < > 13.8 12.4 13.6   CREATININE 0.90   < > 0.84 0.74 0.78   GLUCOSE 79*   < > 82 96 123*   CALCIUM 9.8   < > 9.2 8.6 9.5   MG 2.30  --   --   --   --    ALBUMIN 3.1*   < > 2.6* 2.5* 2.8*   ALKPHOS 526*   < > 419* 419* 430*   *   < > 181* 163* 161*   *   < > 127* 123* 148*   BILITOT 10.8*   < > 10.4* " "10.2* 12.1*    < > = values in this interval not displayed.         Diet/PN Order: Diet full liquid  Oral Supplement Order: Boost Max  Tube Feeding Order: none  Appetite/Oral Intake: poor/25-50% of meals  Factors Affecting Nutritional Intake: altered gastrointestinal function, decreased appetite, nausea, and vomiting  Food/Denominational/Cultural Preferences: none reported  Food Allergies: none reported    Skin Integrity: drain/device(s)  Wound(s):   n/a    Comments    11/18/23: Pt reports poor appetite, nauseated, threw up after consuming ~10% of full liquid tray for lunch, appetite has been a struggle for 3 months over which time she lost about 30 lbs unintentionally, appetite has been worse since 11/10 incomplete cholecystectomy, chews/swallows well, agrees to vanilla ONS on diet advancement.     11/22/23: Pt with poor to fair intake of full liquids; states that she is still having some n/v occasionally; reports that she is drinking the Boost that is ordered.     Anthropometrics    Height: 5' 6" (167.6 cm) Height Method: Stated  Last Weight: 82.2 kg (181 lb 4 oz) (11/17/23 0745) Weight Method: Standard Scale  BMI (Calculated): 29.3  BMI Classification: overweight (BMI 25-29.9)     Ideal Body Weight (IBW), Female: 130 lb     % Ideal Body Weight, Female (lb): 139.42 %                             Usual Weight Provided By: patient and EMR weight history  8/14/23: 211 lbs  Wt Readings from Last 5 Encounters:   11/17/23 82.2 kg (181 lb 4 oz)   04/24/18 88 kg (194 lb)   02/21/18 90.4 kg (199 lb 4.7 oz)   11/24/17 90 kg (198 lb 6.6 oz)     Weight Change(s) Since Admission:  Admit Weight: 82.2 kg (181 lb 4 oz) (11/17/23 9111)    Estimated Needs    Weight Used For Calorie Calculations: 82.2 kg (181 lb 3.5 oz)  Energy Calorie Requirements (kcal): 1644 kcals (20 kcals/kg)  Energy Need Method: Kcal/kg  Weight Used For Protein Calculations: 82.2 kg (181 lb 3.5 oz)  Protein Requirements:  g (1.0-1.3 g/kg)  Fluid Requirements " (mL): 2910-9102 mL (1 mL/kg)  Temp (24hrs), Av °F (36.7 °C), Min:97.6 °F (36.4 °C), Max:98.4 °F (36.9 °C)       Enteral Nutrition    Patient not receiving enteral nutrition at this time.    Parenteral Nutrition    Patient not receiving parenteral nutrition support at this time.    Evaluation of Received Nutrient Intake    Calories: not meeting estimated needs  Protein: not meeting estimated needs    Patient Education    Not applicable.    Nutrition Diagnosis     PES: Malnutrition related to suboptimal protein/energy intake as evidenced by less than or equal to 50% needs met for greater than or equal to 5 days, moderate fat depletion, moderate muscle depletion, and greater than 7.5% weight loss in 3 months. (active)     Interventions/Goals     Intervention(s): collaboration with other providers  Goal: Meet greater than 75% of nutritional needs by follow-up. (goal progressing)    Monitoring & Evaluation     Dietitian will monitor energy intake, weight, electrolyte/renal panel, and gastrointestinal profile.  Nutrition Risk/Follow-Up: high (follow-up in 1-4 days)   Please consult if re-assessment needed sooner.    Drea Cleary, ASIA   2023

## 2023-11-22 NOTE — PROGRESS NOTES
Successful placement of internal external biliary stent/drain yesterday by IR.  There was some question of possible extravasation, CT scan was performed today and shows contrast filling the remnant gallbladder with no other evidence of extravasation or significant fluid collection.      Afebrile, vital signs stable  Urine output adequate  No apparent distress  Regular rate and rhythm, clear to auscultation bilaterally  Abdomen is soft, nondistended, NTTP   Extremities without edema, SCDs in place    Labs reviewed, bilirubin elevated as expected after manipulation of the biliary tract yesterday.    PTC cholangiogram yesterday did not suggest bile duct injury and corroborated outside surgeon report that partial cholecystectomy was performed with a small remnant gallbladder seen.  There was some question of possible extravasation from the duodenal but CT scan today shows no evidence of this, small remnant gallbladder fills with contrast as expected.    Patient's gastric biopsy did return as adenocarcinoma.  Suspect that biliary obstruction secondary to pam hepatis lymphadenopathy.  This will be confirmed on outpatient PET-CT scan once the patient is discharge.  Recommend medical oncology consultation as the patient will need neoadjuvant chemotherapy prior to consideration for any surgical intervention.    Once bilirubin begins to trend downward which may take several days, biliary drain can be capped as continued drainage to gravity may cause fluid and electrolyte abnormalities.    I will be out for the holiday, we will check back on Monday.    William Morse MD  Surgical Oncology  Complex General, Gastrointestinal and Hepatobiliary Surgery

## 2023-11-22 NOTE — PROGRESS NOTES
[facility] - 8th [facility]  [facility] MEDICINE ~ PROGRESS NOTE        CHIEF COMPLAINT   Hospital follow up    HOSPITAL COURSE   58-year-old female with medical history of hypertension who recently underwent laparoscopic cholecystectomy on 11/10/2023, procedure was incomplete and was unable to completely resect the gallbladder.  Since surgery she continued to have right flank/back pain and followed up with her PCP and CT abdomen and pelvis on 11/17/2023 revealed left-sided hydronephrosis with suspected distal obstruction/no clear stone visualized, postoperative changes of cholecystectomy with fluid in the gallbladder fossa may reflect postoperative seroma but biloma can not be entirely excluded, also noted for marked thickening of the stomach wall diffusely may be related to mesenteric edema/reactive.  She presented to [facility] ED the same day 11/17/2023 and her labs notable for WBC 9.0, hemoglobin 12.4, platelets 442, creatinine 0.86, total bilirubin 8.7 with direct fraction 6.7, alkaline phosphatase 491, , .  Patient's surgeon was consulted and recommended ERCP which was not available at [facility] for which she was transferred to [facility] and referred to hospital medicine service for further evaluation and management.   On my evaluation she complaints of right flank/back pain as well as suprapubic pain and constipation.  Denies nausea, vomiting, fever or chills.  Her hemodynamics are stable.  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  Severe inflammation and oozing of blood noted proximal gastric lumen.  Unable to advance scope into the duodenum.  Surgical oncology consulted, IR consulted for external drain placement which was done November 21.    Today  External drain placed yesterday but bilirubin still significantly elevated.  Not much of an appetite.        OBJECTIVE/PHYSICAL EXAM     VITAL SIGNS (MOST RECENT):  Temp: 98.3 °F  (36.8 °C) (11/22/23 0750)  Pulse: 109 (11/22/23 0750)  Resp: 18 (11/22/23 0750)  BP: (!) 145/95 (11/22/23 0750)  SpO2: 99 % (11/22/23 0750) VITAL SIGNS (24 HOUR RANGE):  Temp:  [97.6 °F (36.4 °C)-98.4 °F (36.9 °C)] 98.3 °F (36.8 °C)  Pulse:  [] 109  Resp:  [17-23] 18  SpO2:  [98 %-100 %] 99 %  BP: (125-161)/() 145/95   GENERAL: In no acute distress, afebrile  HEENT:  CHEST: Clear to auscultation bilaterally  HEART: S1, S2, no appreciable murmur  ABDOMEN: Soft, BS +, epigastric fullness and tenderness  MSK: Warm, no lower extremity edema, no clubbing or cyanosis  NEUROLOGIC: Alert and oriented x4, moving all extremities with good strength   INTEGUMENTARY:  PSYCHIATRY:        ASSESSMENT/PLAN   Suspected bile leak with biloma and biliary obstruction  H/O Laparoscopic incomplete cholecystectomy 11/10/2023  Left hydronephrosis  Gastric cardia mass-pathology adenocarcinoma intestinal type  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  Results from EGD noted.  Monitor hemoglobin, continue Protonix.  Urology following.    Oncology consulted.  Not really tolerating much of a oral intake.  Surgical oncology following.    She may also need J-tube placement for nutrition.    DVT prophylaxis:  Not safe at this time    Anticipated discharge and disposition:   __________________________________________________________________________    LABS/MICRO/MEDS/DIAGNOSTICS       LABS  Recent Labs     11/22/23  0451      K 4.9   CHLORIDE 107   CO2 23   BUN 13.6   CREATININE 0.78   GLUCOSE 123*   CALCIUM 9.5   ALKPHOS 430*   *   *   ALBUMIN 2.8*       Recent Labs     11/22/23  0451   WBC 12.58*   RBC 4.24   HCT 37.8   MCV 89.2   *         MICROBIOLOGY  Microbiology Results (last 7 days)       Procedure Component Value Units Date/Time    Blood Culture [[id number]]  (Normal) Collected: 11/18/23 0710    Order Status: Completed  Specimen: Blood Updated: 11/21/23 0900     CULTURE, BLOOD ([facility]) No Growth At 72 Hours    Blood Culture [[id number]]  (Normal) Collected: 11/18/23 0710    Order Status: Completed Specimen: Blood Updated: 11/21/23 0900     CULTURE, BLOOD ([facility]) No Growth At 72 Hours               MEDICATIONS   HYDROmorphone  0.5 mg Intravenous Once    iopamidoL  50 mL Other Once    mupirocin   Nasal BID    pantoprazole  40 mg Intravenous BID AC         INFUSIONS   electrolyte-A            DIAGNOSTIC TESTS  IR Biliary Drain Internal/External   Final Result   Addendum (preliminary) 1 of 1      No full cholangiogram required in 24-48 hours.         Electronically signed by: Renny Batres   Date:    11/22/2023   Time:    07:40      Final      Successful placement of biliary drainage catheter: 8 Ethiopian internal external biliary drainage catheter.      Plan:      Patient should return in 24-48 hours for a full cholangiogram.      Renny SELLERS, was present for the entire procedure.         Electronically signed by: Renny Batres   Date:    11/22/2023   Time:    07:37      US Guided Needle Placement   Final Result   Addendum (preliminary) 1 of 1      No full cholangiogram required in 24-48 hours.         Electronically signed by: Renny Batres   Date:    11/22/2023   Time:    07:40      Final      Successful placement of biliary drainage catheter: 8 Ethiopian internal external biliary drainage catheter.      Plan:      Patient should return in 24-48 hours for a full cholangiogram.      Renny SELLERS, was present for the entire procedure.         Electronically signed by: Renny Batres   Date:    11/22/2023   Time:    07:37      NM Hepatobiliary Scan (HIDA)   Final Result      No appreciable excretion into the bile ducts by 4 hours.  This can be seen with bile duct obstruction or hepatic dysfunction.      Unable to assess for biliary leak in the absence of excreted radiotracer within the bile ducts.         Electronically signed  by: Sulema Solorzano   Date:    11/20/2023   Time:    16:10      US Retroperitoneal Complete   Final Result      Mild improvement in left-sided hydronephrosis since 11/19/2023.         Electronically signed by: George Dove   Date:    11/20/2023   Time:    14:48      MRI MRCP Without Contrast   Final Result      Mild intrahepatic biliary ductal dilatation.  Tapering of the bile ducts at the confluence of the right and left hepatic ducts with evidence of accompanying ductal wall thickening and enhancement at the confluence and proximal common hepatic and bile ducts on a prior contrast enhanced CT exam.  This enhancement may be inflammatory or neoplastic.      Small volume free intraperitoneal fluid.      There is artifact in the mid abdomen which partially obscures the stomach and pancreas.  Known gastric lesion and perigastric lymph nodes more clearly demonstrated on prior CT exams.      Left hydronephrosis to the ureteropelvic junction.         Electronically signed by: Sulema Solorzano   Date:    11/20/2023   Time:    11:44      CT Abdomen Pelvis With IV Contrast   Final Result      1. Gastritis and duodenitis.   2. Irregular wall thickening of the proximal stomach compatible with known tumor.   3. Similar heterogeneous appearance of the gallbladder fossa with small volume ascites.  Bile leak is possible.  Mild intrahepatic biliary ductal dilatation without significant dilatation of the common bile duct.   4. Similar moderate left hydronephrosis.         Electronically signed by: Wei Wright   Date:    11/19/2023   Time:    13:17      CT Chest Without Contrast   Final Result      No convincing CT evidence of metastatic disease in the chest.         Electronically signed by: Wei Wright   Date:    11/19/2023   Time:    12:58      FL ERCP Biliary And Pancreatic By Rad Tech   Final Result      Fluoroscopic assistance provided as above.         Electronically signed by: Wei Wright   Date:    11/18/2023  "  Time:    14:21      CT Previous   Final Result      CT Previous   Final Result      CT Abdomen With IV Contrast    (Results Pending)        No results found for: "EF"       NUTRITION STATUS  Patient meets ASPEN criteria for severe malnutrition of acute illness or injury per RD assessment as evidenced by:  Energy Intake (Malnutrition): less than or equal to 50% for greater than or equal to 5 days  Weight Loss (Malnutrition): greater than 7.5% in 3 months  Subcutaneous Fat (Malnutrition): moderate depletion  Muscle Mass (Malnutrition): moderate depletion           A minimum of two characteristics is recommended for diagnosis of either severe or non-severe malnutrition.       Case related differential diagnoses have been reviewed; assessment and plan has been documented. I have personally reviewed the labs and test results that are currently available; I have reviewed the patients medication list. I have reviewed the consulting providers recommendations. I have reviewed or attempted to review medical records based upon their availability.  All of the patient's and/or family's questions have been addressed and answered to the best of my ability.  I will continue to monitor closely and make adjustments to medical management as needed.  This document was created using M*X[employer] Fluency Direct.  Transcription errors may have been made.  Please contact me if any questions may rise regarding documentation to clarify transcription.        Kenney Steiner MD   Internal Medicine  Department of Hospital Medicine  [facility] - 8th Floor Med Surg               "

## 2023-11-22 NOTE — PROGRESS NOTES
"Gastroenterology Progress Note    Subjective/Interval History:  WBC up to 12.58 today    LFTs relatively stable, however Tbili up to 12.1 today    Spoke with nurse regarding patient. Patient does not complain much and refuses any meds including antiemetics. No vomiting. Hem/onc has not seen patient yet.    Patient resting in bed. No complaints. Nausea is intermittent and affected by triggers such as smells. She has abd pain that comes and goes. External biliary drain with brownish output with slight red tinge.    ROS:  Review of Systems   Constitutional:  Positive for malaise/fatigue.   Respiratory:  Negative for cough and shortness of breath.    Cardiovascular:  Negative for chest pain.   Gastrointestinal:  Positive for abdominal pain and nausea. Negative for constipation, diarrhea and vomiting.   Neurological:  Positive for weakness.       Vital Signs:  BP (!) 145/95   Pulse 109   Temp 98.3 °F (36.8 °C) (Oral)   Resp 18   Ht 5' 6" (1.676 m)   Wt 82.2 kg (181 lb 4 oz)   SpO2 99%   Breastfeeding No   BMI 29.25 kg/m²   Body mass index is 29.25 kg/m².    Physical Exam:  Physical Exam  Constitutional:       General: She is not in acute distress.     Appearance: She is obese. She is not ill-appearing.   HENT:      Head: Normocephalic and atraumatic.      Right Ear: External ear normal.      Left Ear: External ear normal.      Nose: Nose normal.      Mouth/Throat:      Pharynx: Oropharynx is clear.   Eyes:      Conjunctiva/sclera: Conjunctivae normal.   Pulmonary:      Effort: Pulmonary effort is normal. No respiratory distress.   Abdominal:      General: There is distension.      Tenderness: There is abdominal tenderness in the epigastric area. There is guarding.      Comments: Abd firm in epigastrium. External biliary drain with brownish output with slight reddish tinge   Musculoskeletal:         General: Normal range of motion.      Cervical back: Normal range of motion.   Skin:     General: Skin is warm and " dry.   Neurological:      Mental Status: She is alert and oriented to person, place, and time. Mental status is at baseline.   Psychiatric:         Mood and Affect: Mood normal.         Behavior: Behavior normal.         Thought Content: Thought content normal.         Labs:  Recent Results (from the past 24 hour(s))   Comprehensive Metabolic Panel    Collection Time: 11/22/23  4:51 AM   Result Value Ref Range    Sodium Level 140 136 - 145 mmol/L    Potassium Level 4.9 3.5 - 5.1 mmol/L    Chloride 107 98 - 107 mmol/L    Carbon Dioxide 23 23 - 31 mmol/L    Glucose Level 123 (H) 82 - 115 mg/dL    Blood Urea Nitrogen 13.6 9.8 - 20.1 mg/dL    Creatinine 0.78 0.55 - 1.02 mg/dL    Calcium Level Total 9.5 8.4 - 10.2 mg/dL    Protein Total 7.1 5.8 - 7.6 gm/dL    Albumin Level 2.8 (L) 3.4 - 4.8 g/dL    Globulin 4.3 (H) 2.4 - 3.5 gm/dL    Albumin/Globulin Ratio 0.7 (L) 1.1 - 2.0 ratio    Bilirubin Total 12.1 (H) <=1.5 mg/dL    Alkaline Phosphatase 430 (H) 40 - 150 unit/L    Alanine Aminotransferase 161 (H) 0 - 55 unit/L    Aspartate Aminotransferase 148 (H) 5 - 34 unit/L    eGFR >60 mls/min/1.73/m2   CBC with Differential    Collection Time: 11/22/23  4:51 AM   Result Value Ref Range    WBC 12.58 (H) 4.50 - 11.50 x10(3)/mcL    RBC 4.24 4.20 - 5.40 x10(6)/mcL    Hgb 12.9 12.0 - 16.0 g/dL    Hct 37.8 37.0 - 47.0 %    MCV 89.2 80.0 - 94.0 fL    MCH 30.4 27.0 - 31.0 pg    MCHC 34.1 33.0 - 36.0 g/dL    RDW 19.7 (H) 11.5 - 17.0 %    Platelet 428 (H) 130 - 400 x10(3)/mcL    MPV 11.0 (H) 7.4 - 10.4 fL    Neut % 91.6 %    Lymph % 1.7 %    Mono % 5.9 %    Eos % 0.0 %    Basophil % 0.2 %    Lymph # 0.21 (L) 0.6 - 4.6 x10(3)/mcL    Neut # 11.54 (H) 2.1 - 9.2 x10(3)/mcL    Mono # 0.74 0.1 - 1.3 x10(3)/mcL    Eos # 0.00 0 - 0.9 x10(3)/mcL    Baso # 0.02 <=0.2 x10(3)/mcL    IG# 0.07 (H) 0 - 0.04 x10(3)/mcL    IG% 0.6 %    NRBC% 0.0 %         Assessment/Plan:  58-year-old female unknown to our group with a past medical history of HTN.  Transferred from [facility] to [facility] for concerns of biloma vs bile leak vs obstruction s/p incomplete laparoscopic cholecystectomy 11/10/23  by Dr. Sicard.     S/p laparoscopic cholecystectomy 11/10/2023; procedure was incomplete and unable to completely resect the gallbladder.   CT abd/pelv 11/17/2023: left-sided hydronephrosis with suspected distal obstruction/no clear stone visualized, postoperative changes of cholecystectomy with fluid in the gallbladder fossa may reflect postoperative seroma but biloma can not be entirely excluded, also noted for marked thickening of the stomach wall diffusely may be related to mesenteric edema/reactive.      Generalized abdominal pain  Hyperbilirubinemia - worsening  Transaminitis - stable  Abnormal imaging suggesting biloma s/p internal/external biliary drain  Gastric adenocarcinoma  - ERCP 11/18/23 Dr. Goss: malignant gastric tumor in cardia, inflamed mucosa in gastric body with oozing of blood throughout proximal gastric lumen, gastric antrum scar that would not allow advancement of scope into duodenal ampulla area therefore biliary cannulation was not possible  - path: adenocarcinoma, moderately differentiated, intestinal type  - surg onc consulted, appreciate reccs  - CT chest/abd/pel with IV: gastritis and duodenitis, irregular wall thickening of proximal stomach c/w known tumor, similar heterogenous appearance of GB fossa with small volume ascites. Bile leak is possible. Mild intrahepatic biliary ductal dilation w/o significant dilation of CBD  - MRCP: mild intrahepatic biliary ductal dilation, tapering of bile ducts at confluence of right and left hepatic ducts - inflammatory vs neoplastic  - HIDA: no appreciable excretion into bile ducts by 4 hours  - s/p internal/external biliary drain placed by IR 11/21/23. Repeat IR exchange for metal stent can be performed in 2-3 months as ERCP not an option given gastric stricturing  - consulted hem/onc for gastric  adenocarcinoma, appreciate reccs  - trend LFTs - if they trend down after 24 hours of having the external drain in place, ok to clamp external drain as the internal drain will continue appropriate drainage     Alexandra Fry PA-C  Gastroenterology  [facility]

## 2023-11-23 LAB
ABS NEUT (OLG): 21.59 X10(3)/MCL (ref 2.1–9.2)
ALBUMIN SERPL-MCNC: 2.8 G/DL (ref 3.4–4.8)
ALBUMIN/GLOB SERPL: 0.6 RATIO (ref 1.1–2)
ALP SERPL-CCNC: 400 UNIT/L (ref 40–150)
ALT SERPL-CCNC: 122 UNIT/L (ref 0–55)
ANISOCYTOSIS BLD QL SMEAR: ABNORMAL
APPEARANCE UR: ABNORMAL
AST SERPL-CCNC: 91 UNIT/L (ref 5–34)
BACTERIA #/AREA URNS AUTO: ABNORMAL /HPF
BACTERIA BLD CULT: NORMAL
BACTERIA BLD CULT: NORMAL
BILIRUB SERPL-MCNC: 8.2 MG/DL
BILIRUB UR QL STRIP.AUTO: ABNORMAL
BUN SERPL-MCNC: 16 MG/DL (ref 9.8–20.1)
CALCIUM SERPL-MCNC: 9.5 MG/DL (ref 8.4–10.2)
CHLORIDE SERPL-SCNC: 106 MMOL/L (ref 98–107)
CO2 SERPL-SCNC: 23 MMOL/L (ref 23–31)
COLOR UR AUTO: ABNORMAL
CREAT SERPL-MCNC: 0.92 MG/DL (ref 0.55–1.02)
ERYTHROCYTE [DISTWIDTH] IN BLOOD BY AUTOMATED COUNT: 19.5 % (ref 11.5–17)
GFR SERPLBLD CREATININE-BSD FMLA CKD-EPI: >60 MLS/MIN/1.73/M2
GLOBULIN SER-MCNC: 4.6 GM/DL (ref 2.4–3.5)
GLUCOSE SERPL-MCNC: 99 MG/DL (ref 82–115)
GLUCOSE UR QL STRIP.AUTO: ABNORMAL
HCT VFR BLD AUTO: 34.3 % (ref 37–47)
HGB BLD-MCNC: 11.7 G/DL (ref 12–16)
INSTRUMENT WBC (OLG): 23.73 X10(3)/MCL
KETONES UR QL STRIP.AUTO: NEGATIVE
LEUKOCYTE ESTERASE UR QL STRIP.AUTO: NEGATIVE
LYMPHOCYTES NFR BLD MANUAL: 0.95 X10(3)/MCL
LYMPHOCYTES NFR BLD MANUAL: 4 %
MACROCYTES BLD QL SMEAR: ABNORMAL
MCH RBC QN AUTO: 29.8 PG (ref 27–31)
MCHC RBC AUTO-ENTMCNC: 34.1 G/DL (ref 33–36)
MCV RBC AUTO: 87.5 FL (ref 80–94)
MONOCYTES NFR BLD MANUAL: 1.19 X10(3)/MCL (ref 0.1–1.3)
MONOCYTES NFR BLD MANUAL: 5 %
MUCOUS THREADS URNS QL MICRO: ABNORMAL /LPF
NEUTROPHILS NFR BLD MANUAL: 91 %
NITRITE UR QL STRIP.AUTO: NEGATIVE
NRBC BLD AUTO-RTO: 0 %
PH UR STRIP.AUTO: 6 [PH]
PLATELET # BLD AUTO: 366 X10(3)/MCL (ref 130–400)
PLATELET # BLD EST: NORMAL 10*3/UL
PMV BLD AUTO: 11 FL (ref 7.4–10.4)
POIKILOCYTOSIS BLD QL SMEAR: ABNORMAL
POTASSIUM SERPL-SCNC: 4.7 MMOL/L (ref 3.5–5.1)
PROT SERPL-MCNC: 7.4 GM/DL (ref 5.8–7.6)
PROT UR QL STRIP.AUTO: ABNORMAL
RBC # BLD AUTO: 3.92 X10(6)/MCL (ref 4.2–5.4)
RBC #/AREA URNS AUTO: ABNORMAL /HPF
RBC MORPH BLD: ABNORMAL
RBC UR QL AUTO: ABNORMAL
SODIUM SERPL-SCNC: 140 MMOL/L (ref 136–145)
SP GR UR STRIP.AUTO: >1.05 (ref 1–1.03)
SQUAMOUS #/AREA URNS LPF: ABNORMAL /HPF
TARGETS BLD QL SMEAR: ABNORMAL
UROBILINOGEN UR STRIP-ACNC: 2
WBC # SPEC AUTO: 23.73 X10(3)/MCL (ref 4.5–11.5)
WBC #/AREA URNS AUTO: ABNORMAL /HPF
WBC VACUOLES (OHS): ABNORMAL

## 2023-11-23 PROCEDURE — 85027 COMPLETE CBC AUTOMATED: CPT | Performed by: INTERNAL MEDICINE

## 2023-11-23 PROCEDURE — 63600175 PHARM REV CODE 636 W HCPCS: Performed by: INTERNAL MEDICINE

## 2023-11-23 PROCEDURE — 25000003 PHARM REV CODE 250: Performed by: INTERNAL MEDICINE

## 2023-11-23 PROCEDURE — 87086 URINE CULTURE/COLONY COUNT: CPT | Performed by: INTERNAL MEDICINE

## 2023-11-23 PROCEDURE — 63600175 PHARM REV CODE 636 W HCPCS: Performed by: SURGERY

## 2023-11-23 PROCEDURE — 81001 URINALYSIS AUTO W/SCOPE: CPT | Performed by: INTERNAL MEDICINE

## 2023-11-23 PROCEDURE — C9113 INJ PANTOPRAZOLE SODIUM, VIA: HCPCS | Performed by: PHYSICIAN ASSISTANT

## 2023-11-23 PROCEDURE — 11000001 HC ACUTE MED/SURG PRIVATE ROOM

## 2023-11-23 PROCEDURE — 63600175 PHARM REV CODE 636 W HCPCS: Performed by: NURSE PRACTITIONER

## 2023-11-23 PROCEDURE — 99223 1ST HOSP IP/OBS HIGH 75: CPT | Mod: ,,, | Performed by: STUDENT IN AN ORGANIZED HEALTH CARE EDUCATION/TRAINING PROGRAM

## 2023-11-23 PROCEDURE — 80053 COMPREHEN METABOLIC PANEL: CPT | Performed by: INTERNAL MEDICINE

## 2023-11-23 PROCEDURE — 63600175 PHARM REV CODE 636 W HCPCS: Performed by: PHYSICIAN ASSISTANT

## 2023-11-23 RX ORDER — METOCLOPRAMIDE HYDROCHLORIDE 5 MG/ML
10 INJECTION INTRAMUSCULAR; INTRAVENOUS EVERY 8 HOURS
Status: DISCONTINUED | OUTPATIENT
Start: 2023-11-23 | End: 2023-11-30

## 2023-11-23 RX ORDER — PANTOPRAZOLE SODIUM 40 MG/1
40 TABLET, DELAYED RELEASE ORAL
Status: DISCONTINUED | OUTPATIENT
Start: 2023-11-23 | End: 2023-11-28

## 2023-11-23 RX ADMIN — PIPERACILLIN SODIUM AND TAZOBACTAM SODIUM 4.5 G: 4; .5 INJECTION, POWDER, FOR SOLUTION INTRAVENOUS at 10:11

## 2023-11-23 RX ADMIN — PANTOPRAZOLE SODIUM 40 MG: 40 INJECTION, POWDER, FOR SOLUTION INTRAVENOUS at 06:11

## 2023-11-23 RX ADMIN — MORPHINE SULFATE 4 MG: 4 INJECTION, SOLUTION INTRAMUSCULAR; INTRAVENOUS at 12:11

## 2023-11-23 RX ADMIN — METOCLOPRAMIDE 10 MG: 5 INJECTION, SOLUTION INTRAMUSCULAR; INTRAVENOUS at 02:11

## 2023-11-23 RX ADMIN — METOCLOPRAMIDE 10 MG: 5 INJECTION, SOLUTION INTRAMUSCULAR; INTRAVENOUS at 09:11

## 2023-11-23 RX ADMIN — PROMETHAZINE HYDROCHLORIDE 25 MG: 25 INJECTION INTRAMUSCULAR; INTRAVENOUS at 04:11

## 2023-11-23 RX ADMIN — PIPERACILLIN SODIUM AND TAZOBACTAM SODIUM 4.5 G: 4; .5 INJECTION, POWDER, FOR SOLUTION INTRAVENOUS at 05:11

## 2023-11-23 RX ADMIN — MUPIROCIN: 20 OINTMENT TOPICAL at 09:11

## 2023-11-23 RX ADMIN — PANTOPRAZOLE SODIUM 40 MG: 40 TABLET, DELAYED RELEASE ORAL at 04:11

## 2023-11-23 RX ADMIN — OXYCODONE HYDROCHLORIDE 5 MG: 5 TABLET ORAL at 12:11

## 2023-11-23 NOTE — PROGRESS NOTES
Surgery coverage fro Dr. Morse,     Successful placement of internal external biliary stent/drainby IR.  There was some question of possible extravasation, CT scan was performed today and shows contrast filling the remnant gallbladder with no other evidence of extravasation or significant fluid collection.     PTC cholangiogram did not suggest bile duct injury and corroborated outside surgeon report that partial cholecystectomy was performed with a small remnant gallbladder seen.  There was some question of possible extravasation from the duodenal but CT scan today shows no evidence of this, small remnant gallbladder fills with contrast as expected.     Patient's gastric biopsy did return as adenocarcinoma.  Suspect that biliary obstruction secondary to pam hepatis lymphadenopathy.  This will be confirmed on outpatient PET-CT scan once the patient is discharge.  Recommend medical oncology consultation as the patient will need neoadjuvant chemotherapy prior to consideration for any surgical intervention.     Once bilirubin begins to trend downward which may take several days, biliary drain can be capped as continued drainage to gravity may cause fluid and electrolyte abnormalities.        History as above    Vital signs stable and afebrile with the exception of episodic hypertension    Marked increase in white blood count, now 24,000 with left shift  Electrolytes all normal  Alkaline phosphatase and transaminases improved  Bilirubin down to 8    Expect patient had transient cholangitis  Adequately covered on Zosyn  Drain appears to be working well    When I entered the room the patient was actively vomiting, clear mucus.  She states that every time she tries to eat it comes back up and she belches and then vomits.  No evidence of gastric outlet obstruction on scans.    Abdomen is soft and benign  Drain site, Clean dry and intact  Drainage is ochre colored medium oxidized bilious    All necessary inpatient  therapies have been instituted    At some point patient's drain can be internalized (capped), this can be done on an outpatient basis and is often done a week or 2 following percutaneous drainage to minimize chances of an internal bile leak    CT scan reviewed, there maybe local extension of the gastric cancer and to adjacent structures, periportal lymphadenopathy and invasion is likely the culprit of the patient's biliary obstruction.  Edematous change around the root of the mesentery likely a reflection of central lymphatic obstruction/invasion.  Clinically/radiographically this could be a T4 N2/3 stage IIIB/C gastric cancer.  Prognosis guarded.  We may be beyond the inflection point at which medical intervention can have a meaningful impact on the course of this disease.    Will initiate Reglan to try and help with nutrition    No acute surgical issues at present    HK

## 2023-11-23 NOTE — PROGRESS NOTES
[facility] - 8th Floor [facility] Surg  \[facility] [place] [facility]\"" MEDICINE ~ PROGRESS NOTE        CHIEF COMPLAINT   Hospital follow up    HOSPITAL COURSE   58-year-old female with medical history of hypertension who recently underwent laparoscopic cholecystectomy on 11/10/2023, procedure was incomplete and was unable to completely resect the gallbladder.  Since surgery she continued to have right flank/back pain and followed up with her PCP and CT abdomen and pelvis on 11/17/2023 revealed left-sided hydronephrosis with suspected distal obstruction/no clear stone visualized, postoperative changes of cholecystectomy with fluid in the gallbladder fossa may reflect postoperative seroma but biloma can not be entirely excluded, also noted for marked thickening of the stomach wall diffusely may be related to mesenteric edema/reactive.  She presented to [facility] ED the same day 11/17/2023 and her labs notable for WBC 9.0, hemoglobin 12.4, platelets 442, creatinine 0.86, total bilirubin 8.7 with direct fraction 6.7, alkaline phosphatase 491, , .  Patient's surgeon was consulted and recommended ERCP which was not available at [facility] for which she was transferred to [facility] and referred to hospital medicine service for further evaluation and management.   On my evaluation she complaints of right flank/back pain as well as suprapubic pain and constipation.  Denies nausea, vomiting, fever or chills.  Her hemodynamics are stable.  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  Severe inflammation and oozing of blood noted proximal gastric lumen.  Unable to advance scope into the duodenum.  Surgical oncology consulted, IR consulted for external drain placement which was done November 21.    Today  No issues but still having some episodes of vomit.  Drinks her boost max.  Pending oncology evaluation.  Discussed results of pathology showing cancer with patient, she was not aware  of this diagnosis prior to me telling her.        OBJECTIVE/PHYSICAL EXAM     VITAL SIGNS (MOST RECENT):  Temp: 97.6 °F (36.4 °C) (11/23/23 0734)  Pulse: 107 (11/23/23 0734)  Resp: 18 (11/22/23 2330)  BP: (!) 146/92 (11/23/23 0734)  SpO2: 98 % (11/23/23 0734) VITAL SIGNS (24 HOUR RANGE):  Temp:  [97.6 °F (36.4 °C)-98.5 °F (36.9 °C)] 97.6 °F (36.4 °C)  Pulse:  [101-111] 107  Resp:  [16-20] 18  SpO2:  [96 %-100 %] 98 %  BP: (123-146)/(80-92) 146/92   GENERAL: In no acute distress, afebrile  HEENT:  CHEST: Clear to auscultation bilaterally  HEART: S1, S2, no appreciable murmur  ABDOMEN: Soft, BS +, epigastric fullness and tenderness  MSK: Warm, no lower extremity edema, no clubbing or cyanosis  NEUROLOGIC: Alert and oriented x4, moving all extremities with good strength   INTEGUMENTARY:  PSYCHIATRY:        ASSESSMENT/PLAN   Suspected bile leak with biloma and biliary obstruction  H/O Laparoscopic incomplete cholecystectomy 11/10/2023  Left hydronephrosis  Gastric cardia mass-pathology adenocarcinoma intestinal type  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  Results from EGD noted.  Monitor hemoglobin, continue Protonix.  Urology signed off.  Outpatient follow-up in 3 to 4 weeks.  No stent at this time, conservative management.  Oncology consulted.  Not really tolerating much of a oral intake.  She may need J-tube placement for nutrition.  Surgical oncology following.    Leukocytosis noted, afebrile, tachycardic.  CT results noted from yesterday.  No new complaints.  Will check a urine as well.  Start Zosyn again.    DVT prophylaxis:  Not safe at this time    Anticipated discharge and disposition:   __________________________________________________________________________    LABS/MICRO/MEDS/DIAGNOSTICS       LABS  Recent Labs     11/23/23  0645      K 4.7   CHLORIDE 106   CO2 23   BUN 16.0   CREATININE 0.92   GLUCOSE  99   CALCIUM 9.5   ALKPHOS 400*   AST 91*   *   ALBUMIN 2.8*       Recent Labs     11/23/23 0723   WBC 23.73*   RBC 3.92*   HCT 34.3*   MCV 87.5            MICROBIOLOGY  Microbiology Results (last 7 days)       Procedure Component Value Units Date/Time    Blood Culture [[id number]]  (Normal) Collected: 11/18/23 0710    Order Status: Completed Specimen: Blood Updated: 11/22/23 0901     CULTURE, BLOOD ([facility]) No Growth At 96 Hours    Blood Culture [[id number]]  (Normal) Collected: 11/18/23 0710    Order Status: Completed Specimen: Blood Updated: 11/22/23 0901     CULTURE, BLOOD ([facility]) No Growth At 96 Hours               MEDICATIONS   HYDROmorphone  0.5 mg Intravenous Once    iopamidoL  50 mL Other Once    mupirocin   Nasal BID    pantoprazole  40 mg Intravenous BID AC         INFUSIONS           DIAGNOSTIC TESTS  CT Abdomen With IV Contrast   Final Result      The small globular area of contrast within the gallbladder fossa new from the prior suggesting biliary leak possibly within a contained cavity possibly indicating an gallbladder remnant or dilated cystic duct.  No evidence of extravasation of contrast into the surrounding region.  Persistent regional gallbladder fossa fluid.      Interval placement of a transhepatic biliary stent satisfactory position.      Diffuse gastric wall thickening consistent with gastritis.      Bilateral hydronephrosis severe on the left and moderate on the right, stable.      Generalized mesenteric inflammatory change, mild ascites, mild pleural fluid.         Electronically signed by: Paul Ocampo MD   Date:    11/22/2023   Time:    09:55      IR Biliary Drain Internal/External   Final Result   Addendum (preliminary) 1 of 1      No full cholangiogram required in 24-48 hours.         Electronically signed by: Renny Batres   Date:    11/22/2023   Time:    07:40      Final      Successful placement of biliary drainage catheter: 8 Kiswahili internal external biliary  drainage catheter.      Plan:      Patient should return in 24-48 hours for a full cholangiogram.      Renny SELLERS, was present for the entire procedure.         Electronically signed by: Renny Batres   Date:    11/22/2023   Time:    07:37      US Guided Needle Placement   Final Result   Addendum (preliminary) 1 of 1      No full cholangiogram required in 24-48 hours.         Electronically signed by: Renny Batres   Date:    11/22/2023   Time:    07:40      Final      Successful placement of biliary drainage catheter: 8 Polish internal external biliary drainage catheter.      Plan:      Patient should return in 24-48 hours for a full cholangiogram.      Renny SELLERS, was present for the entire procedure.         Electronically signed by: Renny Batres   Date:    11/22/2023   Time:    07:37      NM Hepatobiliary Scan (HIDA)   Final Result      No appreciable excretion into the bile ducts by 4 hours.  This can be seen with bile duct obstruction or hepatic dysfunction.      Unable to assess for biliary leak in the absence of excreted radiotracer within the bile ducts.         Electronically signed by: Sulema Solorzano   Date:    11/20/2023   Time:    16:10      US Retroperitoneal Complete   Final Result      Mild improvement in left-sided hydronephrosis since 11/19/2023.         Electronically signed by: George Dove   Date:    11/20/2023   Time:    14:48      MRI MRCP Without Contrast   Final Result      Mild intrahepatic biliary ductal dilatation.  Tapering of the bile ducts at the confluence of the right and left hepatic ducts with evidence of accompanying ductal wall thickening and enhancement at the confluence and proximal common hepatic and bile ducts on a prior contrast enhanced CT exam.  This enhancement may be inflammatory or neoplastic.      Small volume free intraperitoneal fluid.      There is artifact in the mid abdomen which partially obscures the stomach and pancreas.  Known gastric  "lesion and perigastric lymph nodes more clearly demonstrated on prior CT exams.      Left hydronephrosis to the ureteropelvic junction.         Electronically signed by: Sulema Solorzano   Date:    11/20/2023   Time:    11:44      CT Abdomen Pelvis With IV Contrast   Final Result      1. Gastritis and duodenitis.   2. Irregular wall thickening of the proximal stomach compatible with known tumor.   3. Similar heterogeneous appearance of the gallbladder fossa with small volume ascites.  Bile leak is possible.  Mild intrahepatic biliary ductal dilatation without significant dilatation of the common bile duct.   4. Similar moderate left hydronephrosis.         Electronically signed by: Wei Wright   Date:    11/19/2023   Time:    13:17      CT Chest Without Contrast   Final Result      No convincing CT evidence of metastatic disease in the chest.         Electronically signed by: Wei Wright   Date:    11/19/2023   Time:    12:58      FL ERCP Biliary And Pancreatic By Rad Tech   Final Result      Fluoroscopic assistance provided as above.         Electronically signed by: Wei Wright   Date:    11/18/2023   Time:    14:21      CT Previous   Final Result      CT Previous   Final Result           No results found for: "EF"       NUTRITION STATUS  Patient meets ASPEN criteria for severe malnutrition of acute illness or injury per RD assessment as evidenced by:  Energy Intake (Malnutrition): less than or equal to 50% for greater than or equal to 5 days  Weight Loss (Malnutrition): greater than 7.5% in 3 months  Subcutaneous Fat (Malnutrition): moderate depletion  Muscle Mass (Malnutrition): moderate depletion           A minimum of two characteristics is recommended for diagnosis of either severe or non-severe malnutrition.       Case related differential diagnoses have been reviewed; assessment and plan has been documented. I have personally reviewed the labs and test results that are currently available; I have " reviewed the patients medication list. I have reviewed the consulting providers recommendations. I have reviewed or attempted to review medical records based upon their availability.  All of the patient's and/or family's questions have been addressed and answered to the best of my ability.  I will continue to monitor closely and make adjustments to medical management as needed.  This document was created using [employer] Fluency Direct.  Transcription errors may have been made.  Please contact me if any questions may rise regarding documentation to clarify transcription.        Kenney Steiner MD   Internal Medicine  Department of [facility] Medicine  [facility] - 8th Floor Med Surg

## 2023-11-23 NOTE — CONSULTS
[facility] - Oncology Acute  Hematology/Oncology  Consult Note    Patient Name: Karen Briggs  MRN: [id number]  Admission Date: 11/17/2023  Hospital Length of Stay: 6 days  Attending Provider: Kenney Steiner MD  Consulting Provider: Elizabeth K Lejeune, MD  Principal Problem:<principal problem not specified>    Inpatient consult to Oncology  Consult performed by: Lejeune, Elizabeth Kennedy, MD  Consult ordered by: Alexandra Fry PA        Subjective:     HPI:   57yo F w/ PMHx of HTN transferred from [facility] to [facility] due to concern for complications from incomplete cholecystectomy. She initially underwent cholecystectomy on 11/20/23, it was incomplete and they were unable to resect gallbladder. She had persistent R flank and back pain and presented to her PCP on 11/17 with these complaints. CT A/P at that time revealed left-sided hydronephrosis with suspected distal obstruction/no clear stone visualized, postoperative changes of cholecystectomy with fluid in the gallbladder fossa may reflect postoperative seroma but biloma can not be entirely excluded, also noted for marked thickening of the stomach wall diffusely may be related to mesenteric edema/reactive. She then presented to [facility] ER where she was found to have bilirubin of 8.7, , , Alk phos 491. She was transferred to [facility] and underwent ERCP which was limited due to gastroduodenal stricturing, but an incidental gastric mass was found and biopsied. Pathology revealed gastric adenocarcinoma. There was concern for bile duct injury/transection s/p cholecystectomy so she underwent PTC and biliary drain placement with IR on 11/21/23. Surgical oncology has been evaluating patient during this admission, they suspect biliary obstruction secondary to pam hepatis lymphadenopathy. Medical oncology consult was placed on 11/21/23, however oncology was not notified of consult until today.     Patient this morning doing okay. She notes severe  fatigue. Abdominal pain is improving. She notes 30-40lb unintentional weight loss since the summer '23. Her appetite has weaned since that time as well. 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 She lives with her boyfriend in [place]. I offered further oncology care at [facility] locally, however she would like to pursue care with us here in [place].     I discussed her diagnosis and very general prognosis. I explained the role of a PET scan to confirm staging and thereby treatment recommendations. Regardless chemotherapy is appropriate upfront and this was discussed. Also discussed mediport placement while she is inpatient if possible.     Oncology Treatment Plan:       Medications:  Continuous Infusions:    Scheduled Meds:   mupirocin   Nasal BID    pantoprazole  40 mg Oral BID AC    piperacillin-tazobactam (Zosyn) IV (PEDS and ADULTS) (extended infusion is not appropriate)  4.5 g Intravenous Q8H     PRN Meds:sodium chloride 0.9%, acetaminophen, aluminum-magnesium hydroxide-simethicone, bisacodyL, hydrALAZINE, HYDROcodone-acetaminophen, HYDROmorphone, HYDROmorphone, melatonin, morphine, ondansetron, oxyCODONE, polyethylene glycol, prochlorperazine, promethazine, senna-docusate 8.6-50 mg, sodium chloride 0.9%, sodium chloride 0.9%     Review of patient's allergies indicates:  No Known Allergies     Past Medical History:   Diagnosis Date    Hypertension     Sciatica      Past Surgical History:   Procedure Laterality Date    CARPAL TUNNEL RELEASE Left     EGD, WITH CLOSED BIOPSY  11/18/2023    Procedure: EGD, WITH CLOSED BIOPSY;  Surgeon: Alfred Goss MD;  Location: [facility] OR;  Service: Gastroenterology;;    ERCP N/A 11/18/2023    Procedure: ERCP (ENDOSCOPIC RETROGRADE CHOLANGIOPANCREATOGRAPHY);  Surgeon: Alfred Goss MD;  Location: [facility] OR;  Service: Gastroenterology;  Laterality: N/A;    HEMORRHOID SURGERY       Family History       Problem Relation (Age of Onset)    Breast  cancer Mother    Cancer Maternal Aunt          Tobacco Use    Smoking status: Never    Smokeless tobacco: Never   Substance and Sexual Activity    Alcohol use: No    Drug use: No    Sexual activity: Never     Birth control/protection: Post-menopausal       Review of Systems   Constitutional:  Positive for appetite change, fatigue and unexpected weight change. Negative for activity change and fever.   HENT:  Negative for sore throat.    Eyes:  Negative for visual disturbance.   Respiratory:  Negative for cough and shortness of breath.    Cardiovascular:  Negative for chest pain.   Gastrointestinal:  Positive for abdominal distention and abdominal pain. Negative for diarrhea, nausea and vomiting.   Endocrine: Negative for polyuria.   Genitourinary:  Negative for dysuria.   Musculoskeletal:  Positive for back pain.   Neurological:  Negative for headaches.     Objective:     Vital Signs (Most Recent):  Temp: 97.6 °F (36.4 °C) (11/23/23 0734)  Pulse: 107 (11/23/23 0734)  Resp: 18 (11/22/23 2330)  BP: (!) 146/92 (11/23/23 0734)  SpO2: 98 % (11/23/23 0734) Vital Signs (24h Range):  Temp:  [97.6 °F (36.4 °C)-98.5 °F (36.9 °C)] 97.6 °F (36.4 °C)  Pulse:  [101-111] 107  Resp:  [16-20] 18  SpO2:  [96 %-100 %] 98 %  BP: (123-146)/(80-92) 146/92     Weight: 82.2 kg (181 lb 4 oz)  Body mass index is 29.25 kg/m².  Body surface area is 1.96 meters squared.      Intake/Output Summary (Last 24 hours) at 11/23/2023 1100  Last data filed at 11/23/2023 0611  Gross per 24 hour   Intake 780 ml   Output 400 ml   Net 380 ml       Physical Exam  Constitutional:       General: She is not in acute distress.     Appearance: Normal appearance. She is obese.   HENT:      Head: Normocephalic and atraumatic.      Nose: Nose normal.      Mouth/Throat:      Mouth: Mucous membranes are moist.      Pharynx: Oropharynx is clear.   Eyes:      Extraocular Movements: Extraocular movements intact.      Conjunctiva/sclera: Conjunctivae normal.      Pupils:  Pupils are equal, round, and reactive to light.   Cardiovascular:      Rate and Rhythm: Normal rate and regular rhythm.      Pulses: Normal pulses.      Heart sounds: Normal heart sounds. No murmur heard.  Pulmonary:      Effort: Pulmonary effort is normal.      Breath sounds: Normal breath sounds.   Abdominal:      General: There is distension.      Tenderness: There is abdominal tenderness.      Comments: R side biliary drain   Musculoskeletal:         General: Normal range of motion.      Cervical back: Normal range of motion and neck supple.      Right lower leg: No edema.      Left lower leg: No edema.   Skin:     General: Skin is warm and dry.      Coloration: Skin is not jaundiced.   Neurological:      General: No focal deficit present.      Mental Status: She is oriented to person, place, and time.         Significant Labs:   CBC:   Recent Labs   Lab 11/22/23  0451 11/23/23  0723   WBC 12.58* 23.73  23.73*   HGB 12.9 11.7*   HCT 37.8 34.3*   * 366    and CMP:   Recent Labs   Lab 11/22/23  0451 11/23/23  0645    140   K 4.9 4.7   CO2 23 23   BUN 13.6 16.0   CREATININE 0.78 0.92   CALCIUM 9.5 9.5   ALBUMIN 2.8* 2.8*   BILITOT 12.1* 8.2*   ALKPHOS 430* 400*   * 91*   * 122*       Diagnostic Results:  11/19/23 CT A/P Impression:  1. Gastritis and duodenitis.  2. Irregular wall thickening of the proximal stomach compatible with known tumor.  3. Similar heterogeneous appearance of the gallbladder fossa with small volume ascites.  Bile leak is possible.  Mild intrahepatic biliary ductal dilatation without significant dilatation of the common bile duct.  4. Similar moderate left hydronephrosis.    11/19/23 CT Chest Impression  Impression:  No convincing CT evidence of metastatic disease in the chest.    Assessment/Plan:     At least locally advanced gastric adenocarcinoma - will need chemotherapy upfront, exact regimen dependent upon final staging. Will arrange for PET scan outpatient  once discharged and follow up with me to discuss treatment plan.     Allow bilirubin/transaminitis to continue to improve s/p biliary drain placement    Mediport placement with surgical oncology while inpatient if possible, they return on Monday.     Request NGS testing on pathology specimen to further aide treatment recommendations    All discussed with patient. She is agreeable with plan.     Thank you for your consult. I will see her again over the weekend    Elizabeth K Lejeune, MD  Hematology/Oncology  [facility] - Oncology [facility]

## 2023-11-23 NOTE — PLAN OF CARE
Problem: Adult Inpatient Plan of Care  Goal: Plan of Care Review  Outcome: Ongoing, Progressing  Flowsheets (Taken 11/22/2023 2150)  Plan of Care Reviewed With: patient  Goal: Patient-Specific Goal (Individualized)  Outcome: Ongoing, Progressing  Goal: Absence of Hospital-Acquired Illness or Injury  Outcome: Ongoing, Progressing  Goal: Optimal Comfort and Wellbeing  Outcome: Ongoing, Progressing  Goal: Readiness for Transition of Care  Outcome: Ongoing, Progressing     Problem: Pain Acute  Goal: Acceptable Pain Control and Functional Ability  Outcome: Ongoing, Progressing     Problem: Fall Injury Risk  Goal: Absence of Fall and Fall-Related Injury  Outcome: Ongoing, Progressing

## 2023-11-24 LAB
ALBUMIN SERPL-MCNC: 2.3 G/DL (ref 3.4–4.8)
ALBUMIN/GLOB SERPL: 0.5 RATIO (ref 1.1–2)
ALP SERPL-CCNC: 320 UNIT/L (ref 40–150)
ALT SERPL-CCNC: 78 UNIT/L (ref 0–55)
AST SERPL-CCNC: 40 UNIT/L (ref 5–34)
BASOPHILS # BLD AUTO: 0.02 X10(3)/MCL
BASOPHILS NFR BLD AUTO: 0.1 %
BILIRUB SERPL-MCNC: 8.2 MG/DL
BUN SERPL-MCNC: 18.7 MG/DL (ref 9.8–20.1)
CALCIUM SERPL-MCNC: 9.2 MG/DL (ref 8.4–10.2)
CHLORIDE SERPL-SCNC: 106 MMOL/L (ref 98–107)
CO2 SERPL-SCNC: 23 MMOL/L (ref 23–31)
CREAT SERPL-MCNC: 0.87 MG/DL (ref 0.55–1.02)
EOSINOPHIL # BLD AUTO: 0 X10(3)/MCL (ref 0–0.9)
EOSINOPHIL NFR BLD AUTO: 0 %
ERYTHROCYTE [DISTWIDTH] IN BLOOD BY AUTOMATED COUNT: 19.3 % (ref 11.5–17)
GFR SERPLBLD CREATININE-BSD FMLA CKD-EPI: >60 MLS/MIN/1.73/M2
GLOBULIN SER-MCNC: 4.2 GM/DL (ref 2.4–3.5)
GLUCOSE SERPL-MCNC: 96 MG/DL (ref 82–115)
HCT VFR BLD AUTO: 31.1 % (ref 37–47)
HGB BLD-MCNC: 11.3 G/DL (ref 12–16)
IMM GRANULOCYTES # BLD AUTO: 0.2 X10(3)/MCL (ref 0–0.04)
IMM GRANULOCYTES NFR BLD AUTO: 0.9 %
LYMPHOCYTES # BLD AUTO: 0.42 X10(3)/MCL (ref 0.6–4.6)
LYMPHOCYTES NFR BLD AUTO: 1.9 %
MCH RBC QN AUTO: 31.6 PG (ref 27–31)
MCHC RBC AUTO-ENTMCNC: 36.3 G/DL (ref 33–36)
MCV RBC AUTO: 86.9 FL (ref 80–94)
MONOCYTES # BLD AUTO: 1.44 X10(3)/MCL (ref 0.1–1.3)
MONOCYTES NFR BLD AUTO: 6.6 %
NEUTROPHILS # BLD AUTO: 19.72 X10(3)/MCL (ref 2.1–9.2)
NEUTROPHILS NFR BLD AUTO: 90.5 %
NRBC BLD AUTO-RTO: 0 %
PLATELET # BLD AUTO: 316 X10(3)/MCL (ref 130–400)
PMV BLD AUTO: 11.4 FL (ref 7.4–10.4)
POTASSIUM SERPL-SCNC: 3.5 MMOL/L (ref 3.5–5.1)
PROT SERPL-MCNC: 6.5 GM/DL (ref 5.8–7.6)
RBC # BLD AUTO: 3.58 X10(6)/MCL (ref 4.2–5.4)
SODIUM SERPL-SCNC: 138 MMOL/L (ref 136–145)
WBC # SPEC AUTO: 21.8 X10(3)/MCL (ref 4.5–11.5)

## 2023-11-24 PROCEDURE — 80053 COMPREHEN METABOLIC PANEL: CPT | Performed by: INTERNAL MEDICINE

## 2023-11-24 PROCEDURE — 25000003 PHARM REV CODE 250: Performed by: INTERNAL MEDICINE

## 2023-11-24 PROCEDURE — 85025 COMPLETE CBC W/AUTO DIFF WBC: CPT | Performed by: INTERNAL MEDICINE

## 2023-11-24 PROCEDURE — 11000001 HC ACUTE MED/SURG PRIVATE ROOM

## 2023-11-24 PROCEDURE — 63600175 PHARM REV CODE 636 W HCPCS: Performed by: SURGERY

## 2023-11-24 PROCEDURE — 25000003 PHARM REV CODE 250: Performed by: NURSE PRACTITIONER

## 2023-11-24 PROCEDURE — 83735 ASSAY OF MAGNESIUM: CPT | Performed by: SURGERY

## 2023-11-24 PROCEDURE — 63600175 PHARM REV CODE 636 W HCPCS: Performed by: INTERNAL MEDICINE

## 2023-11-24 PROCEDURE — 93010 ELECTROCARDIOGRAM REPORT: CPT | Mod: ,,, | Performed by: INTERNAL MEDICINE

## 2023-11-24 PROCEDURE — 93005 ELECTROCARDIOGRAM TRACING: CPT

## 2023-11-24 RX ORDER — SODIUM CHLORIDE, SODIUM LACTATE, POTASSIUM CHLORIDE, CALCIUM CHLORIDE 600; 310; 30; 20 MG/100ML; MG/100ML; MG/100ML; MG/100ML
INJECTION, SOLUTION INTRAVENOUS CONTINUOUS
Status: DISCONTINUED | OUTPATIENT
Start: 2023-11-24 | End: 2023-11-25

## 2023-11-24 RX ORDER — METOPROLOL TARTRATE 1 MG/ML
5 INJECTION, SOLUTION INTRAVENOUS
Status: COMPLETED | OUTPATIENT
Start: 2023-11-24 | End: 2023-11-24

## 2023-11-24 RX ORDER — METOPROLOL TARTRATE 1 MG/ML
5 INJECTION, SOLUTION INTRAVENOUS
Status: DISCONTINUED | OUTPATIENT
Start: 2023-11-24 | End: 2023-12-02 | Stop reason: SDUPTHER

## 2023-11-24 RX ORDER — METOPROLOL TARTRATE 1 MG/ML
5 INJECTION, SOLUTION INTRAVENOUS EVERY 5 MIN PRN
Status: DISCONTINUED | OUTPATIENT
Start: 2023-11-24 | End: 2023-11-24

## 2023-11-24 RX ORDER — ENOXAPARIN SODIUM 100 MG/ML
1 INJECTION SUBCUTANEOUS EVERY 12 HOURS
Status: DISCONTINUED | OUTPATIENT
Start: 2023-11-24 | End: 2023-11-26

## 2023-11-24 RX ORDER — LACTULOSE 10 G/15ML
20 SOLUTION ORAL ONCE
Status: COMPLETED | OUTPATIENT
Start: 2023-11-24 | End: 2023-11-24

## 2023-11-24 RX ADMIN — LACTULOSE 20 G: 10 SOLUTION ORAL at 09:11

## 2023-11-24 RX ADMIN — METOCLOPRAMIDE 10 MG: 5 INJECTION, SOLUTION INTRAMUSCULAR; INTRAVENOUS at 03:11

## 2023-11-24 RX ADMIN — PIPERACILLIN SODIUM AND TAZOBACTAM SODIUM 4.5 G: 4; .5 INJECTION, POWDER, FOR SOLUTION INTRAVENOUS at 02:11

## 2023-11-24 RX ADMIN — ENOXAPARIN SODIUM 80 MG: 80 INJECTION SUBCUTANEOUS at 08:11

## 2023-11-24 RX ADMIN — PIPERACILLIN SODIUM AND TAZOBACTAM SODIUM 4.5 G: 4; .5 INJECTION, POWDER, FOR SOLUTION INTRAVENOUS at 09:11

## 2023-11-24 RX ADMIN — METOCLOPRAMIDE 10 MG: 5 INJECTION, SOLUTION INTRAMUSCULAR; INTRAVENOUS at 08:11

## 2023-11-24 RX ADMIN — SODIUM CHLORIDE, POTASSIUM CHLORIDE, SODIUM LACTATE AND CALCIUM CHLORIDE: 600; 310; 30; 20 INJECTION, SOLUTION INTRAVENOUS at 12:11

## 2023-11-24 RX ADMIN — METOPROLOL TARTRATE 5 MG: 1 INJECTION, SOLUTION INTRAVENOUS at 12:11

## 2023-11-24 RX ADMIN — METOCLOPRAMIDE 10 MG: 5 INJECTION, SOLUTION INTRAMUSCULAR; INTRAVENOUS at 06:11

## 2023-11-24 RX ADMIN — MUPIROCIN: 20 OINTMENT TOPICAL at 08:11

## 2023-11-24 RX ADMIN — METOPROLOL TARTRATE 5 MG: 1 INJECTION, SOLUTION INTRAVENOUS at 06:11

## 2023-11-24 RX ADMIN — PANTOPRAZOLE SODIUM 40 MG: 40 TABLET, DELAYED RELEASE ORAL at 06:11

## 2023-11-24 RX ADMIN — METOPROLOL TARTRATE 5 MG: 1 INJECTION, SOLUTION INTRAVENOUS at 11:11

## 2023-11-24 RX ADMIN — MUPIROCIN: 20 OINTMENT TOPICAL at 09:11

## 2023-11-24 RX ADMIN — METOPROLOL TARTRATE 5 MG: 1 INJECTION, SOLUTION INTRAVENOUS at 08:11

## 2023-11-24 RX ADMIN — PIPERACILLIN SODIUM AND TAZOBACTAM SODIUM 4.5 G: 4; .5 INJECTION, POWDER, FOR SOLUTION INTRAVENOUS at 06:11

## 2023-11-24 RX ADMIN — METOPROLOL TARTRATE 5 MG: 1 INJECTION, SOLUTION INTRAVENOUS at 09:11

## 2023-11-24 NOTE — PROGRESS NOTES
[facility] - [facility]  [facility] MEDICINE ~ PROGRESS NOTE        CHIEF COMPLAINT   Hospital follow up    HOSPITAL COURSE   58-year-old female with medical history of hypertension who recently underwent laparoscopic cholecystectomy on 11/10/2023, procedure was incomplete and was unable to completely resect the gallbladder.  Since surgery she continued to have right flank/back pain and followed up with her PCP and CT abdomen and pelvis on 11/17/2023 revealed left-sided hydronephrosis with suspected distal obstruction/no clear stone visualized, postoperative changes of cholecystectomy with fluid in the gallbladder fossa may reflect postoperative seroma but biloma can not be entirely excluded, also noted for marked thickening of the stomach wall diffusely may be related to mesenteric edema/reactive.  She presented to [facility] ED the same day 11/17/2023 and her labs notable for WBC 9.0, hemoglobin 12.4, platelets 442, creatinine 0.86, total bilirubin 8.7 with direct fraction 6.7, alkaline phosphatase 491, , .  Patient's surgeon was consulted and recommended ERCP which was not available at [facility] for which she was transferred to [facility] and referred to hospital medicine service for further evaluation and management.   On my evaluation she complaints of right flank/back pain as well as suprapubic pain and constipation.  Denies nausea, vomiting, fever or chills.  Her hemodynamics are stable.  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  Severe inflammation and oozing of blood noted proximal gastric lumen.  Unable to advance scope into the duodenum.  Surgical oncology consulted, IR consulted for external drain placement which was done November 21.    Today  Developed atrial fibrillation with RVR heart rate in the 180s this morning.  I have ordered for some metoprolol IV, no prior history, will consult Cardiology as well.  Hemodynamically  stable and asymptomatic from that standpoint.  She denies vomiting but having to watch what she eats, still nauseated.  Complained of lower abdominal pain today.        OBJECTIVE/PHYSICAL EXAM     VITAL SIGNS (MOST RECENT):  Temp: 98.2 °F (36.8 °C) (11/24/23 0729)  Pulse: (!) 191 (11/24/23 0729)  Resp: 18 (11/24/23 0729)  BP: 115/80 (11/24/23 0807)  SpO2: 96 % (11/24/23 0729) VITAL SIGNS (24 HOUR RANGE):  Temp:  [98.1 °F (36.7 °C)-98.8 °F (37.1 °C)] 98.2 °F (36.8 °C)  Pulse:  [107-191] 191  Resp:  [18-20] 18  SpO2:  [96 %-99 %] 96 %  BP: (115-154)/(80-97) 115/80   GENERAL: In no acute distress, afebrile  HEENT:  CHEST: Clear to auscultation bilaterally  HEART: S1, S2, no appreciable murmur  ABDOMEN: Soft, BS +, epigastric fullness and tenderness  MSK: Warm, no lower extremity edema, no clubbing or cyanosis  NEUROLOGIC: Alert and oriented x4, moving all extremities with good strength   INTEGUMENTARY:  PSYCHIATRY:        ASSESSMENT/PLAN   Suspected bile leak with biloma and biliary obstruction  H/O Laparoscopic incomplete cholecystectomy 11/10/2023  Left hydronephrosis  Gastric cardia mass-pathology adenocarcinoma intestinal type  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  Results from EGD noted.  Monitor hemoglobin, continue Protonix.  Urology signed off.  Outpatient follow-up in 3 to 4 weeks.  No stent at this time, conservative management.  Oncology following.  Not really tolerating much of a oral intake.  She may need J-tube placement for nutrition.  Surgical oncology following.  Started Reglan suspecting possible cholangitis.  Zosyn again November 23.  Follow cultures.  Will ask Urology about the hydronephrosis today as well, consult Cardiology for atrial fibrillation, metoprolol IV push PRN.    DVT prophylaxis:  Not safe at this time    Critical Care  diagnosis: atrial fibrillation with RVR heart rate 180s  Critical care time spent:  35 minutes    Anticipated discharge and disposition:   __________________________________________________________________________    LABS/MICRO/MEDS/DIAGNOSTICS       LABS  Recent Labs     11/24/23  0447      K 3.5   CHLORIDE 106   CO2 23   BUN 18.7   CREATININE 0.87   GLUCOSE 96   CALCIUM 9.2   ALKPHOS 320*   AST 40*   ALT 78*   ALBUMIN 2.3*       Recent Labs     11/24/23 0447   WBC 21.80*   RBC 3.58*   HCT 31.1*   MCV 86.9            MICROBIOLOGY  Microbiology Results (last 7 days)       Procedure Component Value Units Date/Time    Urine culture [[id number]] Collected: 11/23/23 1012    Order Status: Completed Specimen: Urine Updated: 11/24/23 0640     Urine Culture No Growth At 24 Hours    Blood Culture [[id number]]  (Normal) Collected: 11/18/23 0710    Order Status: Completed Specimen: Blood Updated: 11/23/23 0901     CULTURE, BLOOD ([facility]) No Growth at 5 days    Blood Culture [[id number]]  (Normal) Collected: 11/18/23 0710    Order Status: Completed Specimen: Blood Updated: 11/23/23 0901     CULTURE, BLOOD ([facility]) No Growth at 5 days               MEDICATIONS   metoclopramide HCl  10 mg Intravenous Q8H    mupirocin   Nasal BID    pantoprazole  40 mg Oral BID AC    piperacillin-tazobactam (Zosyn) IV (PEDS and ADULTS) (extended infusion is not appropriate)  4.5 g Intravenous Q8H         INFUSIONS           DIAGNOSTIC TESTS  CT Abdomen With IV Contrast   Final Result      The small globular area of contrast within the gallbladder fossa new from the prior suggesting biliary leak possibly within a contained cavity possibly indicating an gallbladder remnant or dilated cystic duct.  No evidence of extravasation of contrast into the surrounding region.  Persistent regional gallbladder fossa fluid.      Interval placement of a transhepatic biliary stent satisfactory position.      Diffuse gastric wall thickening consistent with  gastritis.      Bilateral hydronephrosis severe on the left and moderate on the right, stable.      Generalized mesenteric inflammatory change, mild ascites, mild pleural fluid.         Electronically signed by: Paul Ocampo MD   Date:    11/22/2023   Time:    09:55      IR Biliary Drain Internal/External   Final Result   Addendum (preliminary) 1 of 1      No full cholangiogram required in 24-48 hours.         Electronically signed by: Renny Batres   Date:    11/22/2023   Time:    07:40      Final      Successful placement of biliary drainage catheter: 8 Hungarian internal external biliary drainage catheter.      Plan:      Patient should return in 24-48 hours for a full cholangiogram.      Renny SELLERS, was present for the entire procedure.         Electronically signed by: Renny Batres   Date:    11/22/2023   Time:    07:37      US Guided Needle Placement   Final Result   Addendum (preliminary) 1 of 1      No full cholangiogram required in 24-48 hours.         Electronically signed by: Renny Batres   Date:    11/22/2023   Time:    07:40      Final      Successful placement of biliary drainage catheter: 8 Hungarian internal external biliary drainage catheter.      Plan:      Patient should return in 24-48 hours for a full cholangiogram.      Renny SELLERS, was present for the entire procedure.         Electronically signed by: Renny Batres   Date:    11/22/2023   Time:    07:37      NM Hepatobiliary Scan (HIDA)   Final Result      No appreciable excretion into the bile ducts by 4 hours.  This can be seen with bile duct obstruction or hepatic dysfunction.      Unable to assess for biliary leak in the absence of excreted radiotracer within the bile ducts.         Electronically signed by: Sulema Solorzano   Date:    11/20/2023   Time:    16:10      US Retroperitoneal Complete   Final Result      Mild improvement in left-sided hydronephrosis since 11/19/2023.         Electronically signed by: George  "Derrell   Date:    11/20/2023   Time:    14:48      MRI MRCP Without Contrast   Final Result      Mild intrahepatic biliary ductal dilatation.  Tapering of the bile ducts at the confluence of the right and left hepatic ducts with evidence of accompanying ductal wall thickening and enhancement at the confluence and proximal common hepatic and bile ducts on a prior contrast enhanced CT exam.  This enhancement may be inflammatory or neoplastic.      Small volume free intraperitoneal fluid.      There is artifact in the mid abdomen which partially obscures the stomach and pancreas.  Known gastric lesion and perigastric lymph nodes more clearly demonstrated on prior CT exams.      Left hydronephrosis to the ureteropelvic junction.         Electronically signed by: Sulema Solorzano   Date:    11/20/2023   Time:    11:44      CT Abdomen Pelvis With IV Contrast   Final Result      1. Gastritis and duodenitis.   2. Irregular wall thickening of the proximal stomach compatible with known tumor.   3. Similar heterogeneous appearance of the gallbladder fossa with small volume ascites.  Bile leak is possible.  Mild intrahepatic biliary ductal dilatation without significant dilatation of the common bile duct.   4. Similar moderate left hydronephrosis.         Electronically signed by: Wei Wright   Date:    11/19/2023   Time:    13:17      CT Chest Without Contrast   Final Result      No convincing CT evidence of metastatic disease in the chest.         Electronically signed by: Wei Wright   Date:    11/19/2023   Time:    12:58      FL ERCP Biliary And Pancreatic By [employer]   Final Result      Fluoroscopic assistance provided as above.         Electronically signed by: Wei Wright   Date:    11/18/2023   Time:    14:21      CT Previous   Final Result      CT Previous   Final Result           No results found for: "EF"       NUTRITION STATUS  Patient meets ASPEN criteria for severe malnutrition of acute illness or injury per RD " assessment as evidenced by:  Energy Intake (Malnutrition): less than or equal to 50% for greater than or equal to 5 days  Weight Loss (Malnutrition): greater than 7.5% in 3 months  Subcutaneous Fat (Malnutrition): moderate depletion  Muscle Mass (Malnutrition): moderate depletion           A minimum of two characteristics is recommended for diagnosis of either severe or non-severe malnutrition.       Case related differential diagnoses have been reviewed; assessment and plan has been documented. I have personally reviewed the labs and test results that are currently available; I have reviewed the patients medication list. I have reviewed the consulting providers recommendations. I have reviewed or attempted to review medical records based upon their availability.  All of the patient's and/or family's questions have been addressed and answered to the best of my ability.  I will continue to monitor closely and make adjustments to medical management as needed.  This document was created using M*Modal Fluency Direct.  Transcription errors may have been made.  Please contact me if any questions may rise regarding documentation to clarify transcription.        Kenney Steiner MD   Internal Medicine  Department of Hospital Medicine [facility] - 8th Floor Med Surg

## 2023-11-24 NOTE — PROGRESS NOTES
Surgery coverage fro Dr. Morse,     Successful placement of internal external biliary stent/drainby IR.  There was some question of possible extravasation, CT scan was performed today and shows contrast filling the remnant gallbladder with no other evidence of extravasation or significant fluid collection.     PTC cholangiogram did not suggest bile duct injury and corroborated outside surgeon report that partial cholecystectomy was performed with a small remnant gallbladder seen.  There was some question of possible extravasation from the duodenal but CT scan today shows no evidence of this, small remnant gallbladder fills with contrast as expected.     Patient's gastric biopsy did return as adenocarcinoma.  Suspect that biliary obstruction secondary to pam hepatis lymphadenopathy.  This will be confirmed on outpatient PET-CT scan once the patient is discharge.  Recommend medical oncology consultation as the patient will need neoadjuvant chemotherapy prior to consideration for any surgical intervention.     Once bilirubin begins to trend downward which may take several days, biliary drain can be capped as continued drainage to gravity may cause fluid and electrolyte abnormalities.    At some point patient's drain can be internalized (capped), this can be done on an outpatient basis and is often done a week or 2 following percutaneous drainage to minimize chances of an internal bile leak     CT scan reviewed, there maybe local extension of the gastric cancer and to adjacent structures, periportal lymphadenopathy and invasion is likely the culprit of the patient's biliary obstruction.  Edematous change around the root of the mesentery likely a reflection of central lymphatic obstruction/invasion.  Clinically/radiographically this could be a T4 N2/3 stage IIIB/C gastric cancer.  Prognosis guarded.  We may be beyond the inflection point at which medical intervention can have a meaningful impact on the course of  this disease.     Will initiate Reglan to try and help with nutrition            History as above     Vital signs stable and afebrile with the exception of episodic tachycardia   Marked increase in white blood count, slightly improved at 22,000  Electrolytes all normal  Alkaline phosphatase and transaminases continue to improve  Bilirubin stable at 8.2  H&H down to 11 and 31, no overt signs of active bleeding, no hematochezia, no melena no coffee-ground emesis     Patient feels better today  No nausea and vomiting since instituting Reglan  By her report she is able to take better p.o.     Abdomen is soft and benign  Drain site, Clean dry and intact  Drainage is ochre colored medium oxidized bilious unchanged     All necessary inpatient therapies have been instituted  No acute surgical issues at present    Leukocytosis likely secondary to cholangitis  Adequately covered on Zosyn  Drain appears to be working well    Recommend calorie counts as a percentage of meals to assess nutritional needs moving forward    Maintain biliary drain to gravity drainage until we see improvement in bilirubin and leukocytosis       HK

## 2023-11-25 LAB
ALBUMIN SERPL-MCNC: 2.1 G/DL (ref 3.4–4.8)
ALBUMIN/GLOB SERPL: 0.5 RATIO (ref 1.1–2)
ALP SERPL-CCNC: 258 UNIT/L (ref 40–150)
ALT SERPL-CCNC: 56 UNIT/L (ref 0–55)
AST SERPL-CCNC: 35 UNIT/L (ref 5–34)
BACTERIA UR CULT: NO GROWTH
BASOPHILS # BLD AUTO: 0.03 X10(3)/MCL
BASOPHILS NFR BLD AUTO: 0.2 %
BILIRUB SERPL-MCNC: 9.2 MG/DL
BUN SERPL-MCNC: 25.6 MG/DL (ref 9.8–20.1)
CALCIUM SERPL-MCNC: 9.3 MG/DL (ref 8.4–10.2)
CHLORIDE SERPL-SCNC: 108 MMOL/L (ref 98–107)
CO2 SERPL-SCNC: 17 MMOL/L (ref 23–31)
CREAT SERPL-MCNC: 1.19 MG/DL (ref 0.55–1.02)
EOSINOPHIL # BLD AUTO: 0.01 X10(3)/MCL (ref 0–0.9)
EOSINOPHIL NFR BLD AUTO: 0.1 %
ERYTHROCYTE [DISTWIDTH] IN BLOOD BY AUTOMATED COUNT: 19.2 % (ref 11.5–17)
GFR SERPLBLD CREATININE-BSD FMLA CKD-EPI: 52 MLS/MIN/1.73/M2
GLOBULIN SER-MCNC: 4.2 GM/DL (ref 2.4–3.5)
GLUCOSE SERPL-MCNC: 99 MG/DL (ref 82–115)
HCT VFR BLD AUTO: 33.8 % (ref 37–47)
HGB BLD-MCNC: 12 G/DL (ref 12–16)
IMM GRANULOCYTES # BLD AUTO: 0.18 X10(3)/MCL (ref 0–0.04)
IMM GRANULOCYTES NFR BLD AUTO: 0.9 %
LYMPHOCYTES # BLD AUTO: 0.51 X10(3)/MCL (ref 0.6–4.6)
LYMPHOCYTES NFR BLD AUTO: 2.6 %
MAGNESIUM SERPL-MCNC: 2.4 MG/DL (ref 1.6–2.6)
MCH RBC QN AUTO: 30.5 PG (ref 27–31)
MCHC RBC AUTO-ENTMCNC: 35.5 G/DL (ref 33–36)
MCV RBC AUTO: 86 FL (ref 80–94)
MONOCYTES # BLD AUTO: 1.66 X10(3)/MCL (ref 0.1–1.3)
MONOCYTES NFR BLD AUTO: 8.6 %
NEUTROPHILS # BLD AUTO: 17.01 X10(3)/MCL (ref 2.1–9.2)
NEUTROPHILS NFR BLD AUTO: 87.6 %
NRBC BLD AUTO-RTO: 0 %
PLATELET # BLD AUTO: 313 X10(3)/MCL (ref 130–400)
PMV BLD AUTO: 11.7 FL (ref 7.4–10.4)
POTASSIUM SERPL-SCNC: 4 MMOL/L (ref 3.5–5.1)
PROT SERPL-MCNC: 6.3 GM/DL (ref 5.8–7.6)
RBC # BLD AUTO: 3.93 X10(6)/MCL (ref 4.2–5.4)
SODIUM SERPL-SCNC: 137 MMOL/L (ref 136–145)
WBC # SPEC AUTO: 19.4 X10(3)/MCL (ref 4.5–11.5)

## 2023-11-25 PROCEDURE — 80053 COMPREHEN METABOLIC PANEL: CPT | Performed by: INTERNAL MEDICINE

## 2023-11-25 PROCEDURE — 85025 COMPLETE CBC W/AUTO DIFF WBC: CPT | Performed by: INTERNAL MEDICINE

## 2023-11-25 PROCEDURE — 25000003 PHARM REV CODE 250: Performed by: INTERNAL MEDICINE

## 2023-11-25 PROCEDURE — 63600175 PHARM REV CODE 636 W HCPCS: Performed by: SURGERY

## 2023-11-25 PROCEDURE — 63600175 PHARM REV CODE 636 W HCPCS: Performed by: INTERNAL MEDICINE

## 2023-11-25 PROCEDURE — 21400001 HC TELEMETRY ROOM

## 2023-11-25 PROCEDURE — 25000003 PHARM REV CODE 250

## 2023-11-25 PROCEDURE — 83735 ASSAY OF MAGNESIUM: CPT | Performed by: INTERNAL MEDICINE

## 2023-11-25 PROCEDURE — 25000003 PHARM REV CODE 250: Performed by: SURGERY

## 2023-11-25 PROCEDURE — 25000003 PHARM REV CODE 250: Performed by: NURSE PRACTITIONER

## 2023-11-25 PROCEDURE — 93010 ELECTROCARDIOGRAM REPORT: CPT | Mod: ,,, | Performed by: INTERNAL MEDICINE

## 2023-11-25 PROCEDURE — 63600175 PHARM REV CODE 636 W HCPCS

## 2023-11-25 PROCEDURE — 93005 ELECTROCARDIOGRAM TRACING: CPT

## 2023-11-25 RX ORDER — METOPROLOL TARTRATE 25 MG/1
25 TABLET, FILM COATED ORAL 2 TIMES DAILY
Status: DISCONTINUED | OUTPATIENT
Start: 2023-11-25 | End: 2023-11-26

## 2023-11-25 RX ORDER — METOPROLOL TARTRATE 1 MG/ML
5 INJECTION, SOLUTION INTRAVENOUS ONCE
Status: COMPLETED | OUTPATIENT
Start: 2023-11-25 | End: 2023-11-25

## 2023-11-25 RX ADMIN — METOPROLOL TARTRATE 5 MG: 1 INJECTION, SOLUTION INTRAVENOUS at 06:11

## 2023-11-25 RX ADMIN — METOPROLOL TARTRATE 5 MG: 1 INJECTION, SOLUTION INTRAVENOUS at 03:11

## 2023-11-25 RX ADMIN — ENOXAPARIN SODIUM 80 MG: 80 INJECTION SUBCUTANEOUS at 09:11

## 2023-11-25 RX ADMIN — METOCLOPRAMIDE 10 MG: 5 INJECTION, SOLUTION INTRAMUSCULAR; INTRAVENOUS at 09:11

## 2023-11-25 RX ADMIN — PIPERACILLIN SODIUM AND TAZOBACTAM SODIUM 4.5 G: 4; .5 INJECTION, POWDER, FOR SOLUTION INTRAVENOUS at 09:11

## 2023-11-25 RX ADMIN — METOPROLOL TARTRATE 5 MG: 1 INJECTION, SOLUTION INTRAVENOUS at 12:11

## 2023-11-25 RX ADMIN — PIPERACILLIN SODIUM AND TAZOBACTAM SODIUM 4.5 G: 4; .5 INJECTION, POWDER, FOR SOLUTION INTRAVENOUS at 02:11

## 2023-11-25 RX ADMIN — PANTOPRAZOLE SODIUM 40 MG: 40 TABLET, DELAYED RELEASE ORAL at 04:11

## 2023-11-25 RX ADMIN — ONDANSETRON 4 MG: 2 INJECTION INTRAMUSCULAR; INTRAVENOUS at 09:11

## 2023-11-25 RX ADMIN — METOCLOPRAMIDE 10 MG: 5 INJECTION, SOLUTION INTRAMUSCULAR; INTRAVENOUS at 01:11

## 2023-11-25 RX ADMIN — AMIODARONE HYDROCHLORIDE 150 MG: 1.5 INJECTION, SOLUTION INTRAVENOUS at 02:11

## 2023-11-25 RX ADMIN — AMIODARONE HYDROCHLORIDE 0.5 MG/MIN: 1.8 INJECTION, SOLUTION INTRAVENOUS at 09:11

## 2023-11-25 RX ADMIN — PIPERACILLIN SODIUM AND TAZOBACTAM SODIUM 4.5 G: 4; .5 INJECTION, POWDER, FOR SOLUTION INTRAVENOUS at 05:11

## 2023-11-25 RX ADMIN — METOPROLOL TARTRATE 25 MG: 25 TABLET, FILM COATED ORAL at 09:11

## 2023-11-25 RX ADMIN — SODIUM CHLORIDE 1000 ML: 4.5 INJECTION, SOLUTION INTRAVENOUS at 12:11

## 2023-11-25 RX ADMIN — METOCLOPRAMIDE 10 MG: 5 INJECTION, SOLUTION INTRAMUSCULAR; INTRAVENOUS at 05:11

## 2023-11-25 RX ADMIN — LEUCINE, PHENYLALANINE, LYSINE, METHIONINE, ISOLEUCINE, VALINE, HISTIDINE, THREONINE, TRYPTOPHAN, ALANINE, GLYCINE, ARGININE, PROLINE, SERINE, TYROSINE, DEXTROSE: 311; 238; 247; 170; 255; 247; 204; 179; 77; 880; 438; 489; 289; 213; 17; 5 INJECTION INTRAVENOUS at 01:11

## 2023-11-25 RX ADMIN — PANTOPRAZOLE SODIUM 40 MG: 40 TABLET, DELAYED RELEASE ORAL at 05:11

## 2023-11-25 RX ADMIN — AMIODARONE HYDROCHLORIDE 1 MG/MIN: 1.8 INJECTION, SOLUTION INTRAVENOUS at 03:11

## 2023-11-25 NOTE — PROGRESS NOTES
[facility] - 8th Floor [facility] Surg  [facility] MEDICINE ~ PROGRESS NOTE        CHIEF COMPLAINT   Hospital follow up    HOSPITAL COURSE   58-year-old female with medical history of hypertension who recently underwent laparoscopic cholecystectomy on 11/10/2023, procedure was incomplete and was unable to completely resect the gallbladder.  Since surgery she continued to have right flank/back pain and followed up with her PCP and CT abdomen and pelvis on 11/17/2023 revealed left-sided hydronephrosis with suspected distal obstruction/no clear stone visualized, postoperative changes of cholecystectomy with fluid in the gallbladder fossa may reflect postoperative seroma but biloma can not be entirely excluded, also noted for marked thickening of the stomach wall diffusely may be related to mesenteric edema/reactive.  [place] [facility] [place]2023 and her labs notable for WBC 9.0, hemoglobin 12.4, platelets 442, creatinine 0.86, total bilirubin 8.7 with direct fraction 6.7, alkaline phosphatase 491, , .  Patient's surgeon was consulted and recommended ERCP which was not available [place] [facility] [place] was transferred to [facility] and referred to hospital medicine service for further evaluation and management.   On my evaluation she complaints of right flank/back pain as well as suprapubic pain and constipation.  Denies nausea, vomiting, fever or chills.  Her hemodynamics are stable.  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  Severe inflammation and oozing of blood noted proximal gastric lumen.  Unable to advance scope into the duodenum.  Surgical oncology consulted, IR consulted for external drain placement which was done November 21.  November 24th she developed new onset atrial fibrillation with RVR and Cardiology consulted.    Today  Sitting on the sofa this morning with her significant other.  No abdominal pain but she  is not able to tolerate anything by mouth.  She states as soon as she puts it down it comes back up.  Noted plans for calorie counting, she needs a J-tube, inadequate oral intake.        OBJECTIVE/PHYSICAL EXAM     VITAL SIGNS (MOST RECENT):  Temp: 98.2 °F (36.8 °C) (11/25/23 0734)  Pulse: 97 (11/25/23 0734)  Resp: 16 (11/25/23 0305)  BP: 119/69 (11/25/23 0734)  SpO2: 98 % (11/25/23 0734) VITAL SIGNS (24 HOUR RANGE):  Temp:  [98.1 °F (36.7 °C)-98.9 °F (37.2 °C)] 98.2 °F (36.8 °C)  Pulse:  [] 97  Resp:  [16-20] 16  SpO2:  [94 %-98 %] 98 %  BP: (106-131)/(65-91) 119/69   GENERAL: In no acute distress, afebrile  HEENT:  CHEST: Clear to auscultation bilaterally  HEART: S1, S2, no appreciable murmur  ABDOMEN: Soft, BS +, epigastric fullness and tenderness  MSK: Warm, no lower extremity edema, no clubbing or cyanosis  NEUROLOGIC: Alert and oriented x4, moving all extremities with good strength   INTEGUMENTARY:  PSYCHIATRY:        ASSESSMENT/PLAN   Suspected bile leak with biloma and biliary obstruction  H/O Laparoscopic incomplete cholecystectomy 11/10/2023  Left hydronephrosis  Gastric cardia mass-pathology adenocarcinoma intestinal type  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  Results from EGD noted.  Monitor hemoglobin, continue Protonix.  Urology signed off.  Outpatient follow-up in 3 to 4 weeks.  No stent at this time, conservative management.  Oncology following.  Not really tolerating much of a oral intake.  Needs a J-tube placement for nutrition, will discuss with surgery.  Start PPN for now.  Surgical oncology following.  Started Reglan suspecting possible cholangitis.  Zosyn again November 23.  Follow cultures.  Cardiology following.  Started metoprolol and full-dose Lovenox.    Spoke with Urology nurse practitioner yesterday and still does  not feel indication for cystoscopy or stent placement.  Monitor closely for any signs of bleeding given now on full-dose Lovenox, EGD did note oozing gastric mass on admit.  Reduce boost max to once daily as she has a collection on her table now.    DVT prophylaxis:  Full-dose Lovenox    Anticipated discharge and disposition:   __________________________________________________________________________    LABS/MICRO/MEDS/DIAGNOSTICS       LABS  Recent Labs     11/25/23 0414      K 4.0   CHLORIDE 108*   CO2 17*   BUN 25.6*   CREATININE 1.19*   GLUCOSE 99   CALCIUM 9.3   ALKPHOS 258*   AST 35*   ALT 56*   ALBUMIN 2.1*       Recent Labs     11/25/23 0414   WBC 19.40*   RBC 3.93*   HCT 33.8*   MCV 86.0            MICROBIOLOGY  Microbiology Results (last 7 days)       Procedure Component Value Units Date/Time    Urine culture [[id number]] Collected: 11/23/23 1012    Order Status: Completed Specimen: Urine Updated: 11/25/23 0745     Urine Culture No Growth    Blood Culture [[id number]]  (Normal) Collected: 11/18/23 0710    Order Status: Completed Specimen: Blood Updated: 11/23/23 0901     CULTURE, BLOOD ([facility]) No Growth at 5 days    Blood Culture [[id number]]  (Normal) Collected: 11/18/23 0710    Order Status: Completed Specimen: Blood Updated: 11/23/23 0901     CULTURE, BLOOD ([facility]) No Growth at 5 days               MEDICATIONS   enoxparin  1 mg/kg Subcutaneous Q12H (prophylaxis, 0900/2100)    metoclopramide HCl  10 mg Intravenous Q8H    metoprolol tartrate  25 mg Oral BID    pantoprazole  40 mg Oral BID AC    piperacillin-tazobactam (Zosyn) IV (PEDS and ADULTS) (extended infusion is not appropriate)  4.5 g Intravenous Q8H         INFUSIONS   lactated ringers 100 mL/hr at 11/25/23 0654            DIAGNOSTIC TESTS  CT Abdomen With IV Contrast   Final Result      The small globular area of contrast within the gallbladder fossa new from the prior suggesting biliary leak possibly within a contained cavity  possibly indicating an gallbladder remnant or dilated cystic duct.  No evidence of extravasation of contrast into the surrounding region.  Persistent regional gallbladder fossa fluid.      Interval placement of a transhepatic biliary stent satisfactory position.      Diffuse gastric wall thickening consistent with gastritis.      Bilateral hydronephrosis severe on the left and moderate on the right, stable.      Generalized mesenteric inflammatory change, mild ascites, mild pleural fluid.         Electronically signed by: Paul Ocampo MD   Date:    11/22/2023   Time:    09:55      IR Biliary Drain Internal/External   Final Result   Addendum (preliminary) 1 of 1      No full cholangiogram required in 24-48 hours.         Electronically signed by: Renny Batres   Date:    11/22/2023   Time:    07:40      Final      Successful placement of biliary drainage catheter: 8 [place] internal external biliary drainage catheter.      Plan:      Patient should return in 24-48 hours for a full cholangiogram.      Renny SELLERS, was present for the entire procedure.         Electronically signed by: Renny Batres   Date:    11/22/2023   Time:    07:37      US Guided Needle Placement   Final Result   Addendum (preliminary) 1 of 1      No full cholangiogram required in 24-48 hours.         Electronically signed by: Renny Batres   Date:    11/22/2023   Time:    07:40      Final      Successful placement of biliary drainage catheter: 8 [place] internal external biliary drainage catheter.      Plan:      Patient should return in 24-48 hours for a full cholangiogram.      Renny SELLERS, was present for the entire procedure.         Electronically signed by: Renny Batres   Date:    11/22/2023   Time:    07:37      NM Hepatobiliary Scan (HIDA)   Final Result      No appreciable excretion into the bile ducts by 4 hours.  This can be seen with bile duct obstruction or hepatic dysfunction.      Unable to assess for biliary  leak in the absence of excreted radiotracer within the bile ducts.         Electronically signed by: Sulema Solorzano   Date:    11/20/2023   Time:    16:10      US Retroperitoneal Complete   Final Result      Mild improvement in left-sided hydronephrosis since 11/19/2023.         Electronically signed by: George Dove   Date:    11/20/2023   Time:    14:48      MRI MRCP Without Contrast   Final Result      Mild intrahepatic biliary ductal dilatation.  Tapering of the bile ducts at the confluence of the right and left hepatic ducts with evidence of accompanying ductal wall thickening and enhancement at the confluence and proximal common hepatic and bile ducts on a prior contrast enhanced CT exam.  This enhancement may be inflammatory or neoplastic.      Small volume free intraperitoneal fluid.      There is artifact in the mid abdomen which partially obscures the stomach and pancreas.  Known gastric lesion and perigastric lymph nodes more clearly demonstrated on prior CT exams.      Left hydronephrosis to the ureteropelvic junction.         Electronically signed by: Sulema Solorzano   Date:    11/20/2023   Time:    11:44      CT Abdomen Pelvis With IV Contrast   Final Result      1. Gastritis and duodenitis.   2. Irregular wall thickening of the proximal stomach compatible with known tumor.   3. Similar heterogeneous appearance of the gallbladder fossa with small volume ascites.  Bile leak is possible.  Mild intrahepatic biliary ductal dilatation without significant dilatation of the common bile duct.   4. Similar moderate left hydronephrosis.         Electronically signed by: Wei Wright   Date:    11/19/2023   Time:    13:17      CT Chest Without Contrast   Final Result      No convincing CT evidence of metastatic disease in the chest.         Electronically signed by: Wei Wright   Date:    11/19/2023   Time:    12:58      FL ERCP Biliary And Pancreatic By Rad Tech   Final Result      Fluoroscopic assistance  "provided as above.         Electronically signed by: Wei Wright   Date:    11/18/2023   Time:    14:21      CT Previous   Final Result      CT Previous   Final Result           No results found for: "EF"       NUTRITION STATUS  Patient meets ASPEN criteria for severe malnutrition of acute illness or injury per RD assessment as evidenced by:  Energy Intake (Malnutrition): less than or equal to 50% for greater than or equal to 5 days  Weight Loss (Malnutrition): greater than 7.5% in 3 months  Subcutaneous Fat (Malnutrition): moderate depletion  Muscle Mass (Malnutrition): moderate depletion           A minimum of two characteristics is recommended for diagnosis of either severe or non-severe malnutrition.       Case related differential diagnoses have been reviewed; assessment and plan has been documented. I have personally reviewed the labs and test results that are currently available; I have reviewed the patients medication list. I have reviewed the consulting providers recommendations. I have reviewed or attempted to review medical records based upon their availability.  All of the patient's and/or family's questions have been addressed and answered to the best of my ability.  I will continue to monitor closely and make adjustments to medical management as needed.  This document was created using M*Modal Fluency Direct.  Transcription errors may have been made.  Please contact me if any questions may rise regarding documentation to clarify transcription.        Kenney Steiner MD   Internal Medicine  Department of Hospital Medicine  [facility] - 8th Floor Med Surg               "

## 2023-11-25 NOTE — PROGRESS NOTES
Surgery coverage fro Dr. Morse,     Successful placement of internal external biliary stent/drainby IR.  There was some question of possible extravasation, CT scan was performed today and shows contrast filling the remnant gallbladder with no other evidence of extravasation or significant fluid collection.     PTC cholangiogram did not suggest bile duct injury and corroborated outside surgeon report that partial cholecystectomy was performed with a small remnant gallbladder seen.  There was some question of possible extravasation from the duodenal but CT scan today shows no evidence of this, small remnant gallbladder fills with contrast as expected.     Patient's gastric biopsy did return as adenocarcinoma.  Suspect that biliary obstruction secondary to pam hepatis lymphadenopathy.  This will be confirmed on outpatient PET-CT scan once the patient is discharge.  Recommend medical oncology consultation as the patient will need neoadjuvant chemotherapy prior to consideration for any surgical intervention.     Once bilirubin begins to trend downward which may take several days, biliary drain can be capped as continued drainage to gravity may cause fluid and electrolyte abnormalities.     At some point patient's drain can be internalized (capped), this can be done on an outpatient basis and is often done a week or 2 following percutaneous drainage to minimize chances of an internal bile leak     CT scan reviewed, there maybe local extension of the gastric cancer and to adjacent structures, periportal lymphadenopathy and invasion is likely the culprit of the patient's biliary obstruction.  Edematous change around the root of the mesentery likely a reflection of central lymphatic obstruction/invasion.  Clinically/radiographically this could be a T4 N2/3 stage IIIB/C gastric cancer.  Prognosis guarded.  We may be beyond the inflection point at which medical intervention can have a meaningful impact on the course of this  disease.     Will initiate Reglan to try and help with nutrition            History as above     Vital signs stable and afebrile with the exception of episodic tachycardia   Marked increase in white blood count, slightly improved at 22,000  Electrolytes all normal  Alkaline phosphatase and transaminases continue to improve  Bilirubin stable at 8.2  H&H down to 11 and 31, no overt signs of active bleeding, no hematochezia, no melena no coffee-ground emesis     Patient feels about the same today  Had some nausea early this morning with taking p.o.  Has taken less than 10% of the current tray in front of her  Has not taken her nutritional supplements yet today    Abdomen is soft and benign  Drain site, Clean dry and intact    Bilirubin up to 9.2.  Drain appears to be working fine, AST ALT and alkaline phosphatase continue to improve  Drainage is ochre colored medium oxidized bilious unchanged     Leukocytosis likely secondary to cholangitis, improved 19,000  Adequately covered on Zosyn  Creatinine up significantly this morning to 1.19, BUN up to 26 all consistent with volume contraction     Recommend calorie counts as a percentage of meals to assess nutritional needs moving forward.  Orders placed.     Maintain biliary drain to gravity drainage until we see improvement in bilirubin and leukocytosis     I can not find any record that calorie counts is the percentage of meals have been recorded.  Nursing staff is unable to provide any information at present.  In speaking to the patient she was able to take what sounds like approximately 10% of her meals yesterday and 1-1/2 bottles of nutritional supplement.    The above information is critical in order to make appropriate medical decisions for the patient moving forward regarding the direction of her care.  We may be beyond the point at which medical intervention can have a meaningful impact on the disease process.  Her ability to tolerate p.o. will be critical to this  decision-making in an order to make those decisions accurate information is necessary.    Above discussed with nursing staff and charge nurse on the floor.    Case discussed with primary team    Will order upper GI to assess gastric emptying with liquids  Increase maintenance IV fluids to accommodate inadequate p.o. intake    Given the bulky disease around the pam hepatis and the biliary obstruction there is likely a significant degree of portal obstruction as well.  The primary pathophysiology behind the patient's inability to eat maybe more so related to pam hepatis invasion and encasement the mechanical obstruction of the stomach.  If mechanical obstruction of the stomach is present then patient may benefit from a J-tube.  If the primary pathophysiology is invasion of the apm hepatis with portal obstruction and restriction of blood flow as well as parasympathetic and vagus nerve involvement patient will not benefit from a J-tube.     Given the extent and stage of disease this patient may be beyond the point at which moderate medicine can have a meaningful impact on the progression of her disease.    HK

## 2023-11-25 NOTE — CONSULTS
Inpatient consult to Cardiology  Consult performed by: Melinda Lomeli FNP  Consult ordered by: Kenney Steiner MD  Reason for consult: AFIB        [facility] 8[facility]    Cardiology  Consult Note    Patient Name: Karen Birggs  MRN: [id number]  Admission Date: 11/17/2023  Hospital Length of Stay: 8 days  Code Status: Full Code   Attending Provider: Kenney Steiner MD   Consulting Provider: MARIA A Dockery  Primary Care Physician: Marian White FNP-C  Principal Problem:<principal problem not specified>    Patient information was obtained from patient, past medical records, ER records, and primary team.     Subjective:     Chief Complaint: Reason for Consult: AFIB    HPI: Ms. Briggs is a 59 y/o female with a history of HTN, who was last known to [facility], Dr. Randolph (2020). She presented to the ER on 11.17.23 with complaints of right flank pain. She went to see her PCP and an CT Abdomen and Pelvis was obtained. CT (11.17.23) revealed left-sided hydronephrosis with suspected distal obstruction/no clear stone visualized, postoperative changes of cholecystectomy with fluid in the gallbladder fossa may reflect postoperative seroma but biloma can not be entirely excluded, also noted for marked thickening of the stomach wall diffusely may be related to mesenteric edema/reactive. She does report having a laparoscopic cholecystectomy on 11.10.23, in which the gall bladder was not completely resect the gallbladder. On 11.24.23, she was found to be tachycardiac and an EKG was obtained that showed AFIB RVR. Significant labs include WBC 19.4, Chloride 108, CO2 17, BUN/Creat 25.6/1.19, , Albumin 2.1, AST 35, ALT 56. CT Chest unremarkable. CT Abd Plevis revealed Interval placement of a transhepatic biliary stent satisfactory position, Diffuse gastric wall thickening consistent with gastritis, Bilateral hydronephrosis severe on the left and moderate on the right, stable, and Generalized mesenteric  inflammatory change, mild ascites, mild pleural fluid. ERCP performed November 18: Malignant gastric tumor in the cardia, inflamed mucosa in the gastric body.  Severe inflammation and oozing of blood noted proximal gastric lumen.  Unable to advance scope into the duodenum.  [facility] has been consulted for AFIB management.    PMH: HTN, Sciatica   PSH: Carpal Tunnel Release, Hemorrhoidectomy, EGD, ERCP  Family History: Father - MI, CAD, HTN; Mother - DM II, MI, CAD, HTN, Cancer; Daughter DM II, Rheumatoid Arthritis   Social History: Denies Smoking, illicit drug use, and alcohol use.     Previous Cardiac Diagnostics:   Lower Ext Venous US (1.6.21):  The study quality is average. 2.6 seconds of reflux is noted in the right GSV at the SFJ with a diameter of 6.3mm 1.2 seconds of reflux is noted in the left GSV at the SFJ with a diameter of 8.7mm. Limited study to evaluate SFJ.    MANFRED (11.16.20):  The study quality is good. The resting right ankle brachial index is 1.15 consistent with no significant right peripheral vascular disease.The resting left ankle brachial index is 1.1 consistent with no significant left peripheral vascular disease.    Lower Ext Arterial US (11.16.20):  The study quality is good. Triphasic waveforms and minimal plaque noted throughout the bilateral lower extremity arteries. No evidence of stenosis found. Calcified artery walls noted in some areas of the bilateral tibial arteries.    TTE (3.12.20):  The study quality is average. The left ventricle is normal in size with mild left ventricular hypertrophy and normal global left ventricular systolic function. The left ventricular ejection fraction is 60%. The left ventricle diastolic function is impaired (Grade I) with normal left atrial pressure. Mild calcification of the aortic valve is noted with adequate cuspal excursion. Mild (1+) tricuspid regurgitation.The estimated pulmonary artery systolic pressure is 23.9 mmHg assuming a right atrial pressure of  3 mmHg.     LHC (6.11.20):  Normal Coronaries     MPI (2.18.20):  This is probably a normal perfusion study. There is no evidence of ischemia. This scan is suggestive of low risk for future cardiovascular events. Small reversible perfusion abnormality of mild intensity in the basal inferoseptal segment. The left ventricular cavity is noted to be normal on the stress study. The left ventricular ejection fraction was calculated to be 71% and left ventricular global function is normal. The study quality is excellent.     Calcium Score (2.10.20):  169      Review of patient's allergies indicates:  No Known Allergies    No current facility-administered medications on file prior to encounter.     Current Outpatient Medications on File Prior to Encounter   Medication Sig    olmesartan (BENICAR) 20 MG tablet Take 20 mg by mouth once daily.     Family History       Problem Relation (Age of Onset)    Breast cancer Mother    Cancer Maternal Aunt          Tobacco Use    Smoking status: Never    Smokeless tobacco: Never   Substance and Sexual Activity    Alcohol use: No    Drug use: No    Sexual activity: Never     Birth control/protection: Post-menopausal       Review of Systems   Respiratory:  Negative for chest tightness and shortness of breath.    Cardiovascular:  Negative for chest pain, palpitations and leg swelling.   All other systems reviewed and are negative.      Objective:     Vital Signs (Most Recent):  Temp: 98.1 °F (36.7 °C) (11/25/23 0305)  Pulse: 107 (11/25/23 0305)  Resp: 16 (11/25/23 0305)  BP: 111/80 (11/25/23 0305)  SpO2: (!) 94 % (11/25/23 0305) Vital Signs (24h Range):  Temp:  [98.1 °F (36.7 °C)-98.9 °F (37.2 °C)] 98.1 °F (36.7 °C)  Pulse:  [101-191] 107  Resp:  [16-20] 16  SpO2:  [94 %-98 %] 94 %  BP: (106-131)/(65-91) 111/80     Weight: 82.2 kg (181 lb 4 oz)  Body mass index is 29.25 kg/m².    SpO2: (!) 94 %         Intake/Output Summary (Last 24 hours) at 11/25/2023 0610  Last data filed at 11/24/2023  2031  Gross per 24 hour   Intake 1325.18 ml   Output 1505 ml   Net -179.82 ml       Lines/Drains/Airways       Drain  Duration                  Biliary Tube 11/21/23 1352 Other (Comment) 8 Fr. RUQ 3 days              Peripheral Intravenous Line  Duration                  Peripheral IV - Single Lumen 11/24/23 1914 20 G Anterior;Distal;Right Forearm <1 day                    Significant Labs:  Recent Results (from the past 72 hour(s))   Comprehensive Metabolic Panel    Collection Time: 11/23/23  6:45 AM   Result Value Ref Range    Sodium Level 140 136 - 145 mmol/L    Potassium Level 4.7 3.5 - 5.1 mmol/L    Chloride 106 98 - 107 mmol/L    Carbon Dioxide 23 23 - 31 mmol/L    Glucose Level 99 82 - 115 mg/dL    Blood Urea Nitrogen 16.0 9.8 - 20.1 mg/dL    Creatinine 0.92 0.55 - 1.02 mg/dL    Calcium Level Total 9.5 8.4 - 10.2 mg/dL    Protein Total 7.4 5.8 - 7.6 gm/dL    Albumin Level 2.8 (L) 3.4 - 4.8 g/dL    Globulin 4.6 (H) 2.4 - 3.5 gm/dL    Albumin/Globulin Ratio 0.6 (L) 1.1 - 2.0 ratio    Bilirubin Total 8.2 (H) <=1.5 mg/dL    Alkaline Phosphatase 400 (H) 40 - 150 unit/L    Alanine Aminotransferase 122 (H) 0 - 55 unit/L    Aspartate Aminotransferase 91 (H) 5 - 34 unit/L    eGFR >60 mls/min/1.73/m2   CBC with Differential    Collection Time: 11/23/23  7:23 AM   Result Value Ref Range    WBC 23.73 (H) 4.50 - 11.50 x10(3)/mcL    RBC 3.92 (L) 4.20 - 5.40 x10(6)/mcL    Hgb 11.7 (L) 12.0 - 16.0 g/dL    Hct 34.3 (L) 37.0 - 47.0 %    MCV 87.5 80.0 - 94.0 fL    MCH 29.8 27.0 - 31.0 pg    MCHC 34.1 33.0 - 36.0 g/dL    RDW 19.5 (H) 11.5 - 17.0 %    Platelet 366 130 - 400 x10(3)/mcL    MPV 11.0 (H) 7.4 - 10.4 fL    NRBC% 0.0 %   Manual Differential    Collection Time: 11/23/23  7:23 AM   Result Value Ref Range    WBC 23.73 x10(3)/mcL    Neutrophils % 91 %    Lymphs % 4 %    Monocytes % 5 %    Neutrophils Abs 21.5943 (H) 2.1 - 9.2 x10(3)/mcL    Lymphs Abs 0.9492 0.6 - 4.6 x10(3)/mcL    Monocytes Abs 1.1865 0.1 - 1.3 x10(3)/mcL     Platelets Normal Normal, Adequate    RBC Morph Abnormal (A) Normal    Poikilocytosis 1+ (A) (none)    Anisocytosis 1+ (A) (none)    Macrocytosis 2+ (A) (none)    Target Cells 2+ (A) (none)    Vacuolated Grans 1+ (A) (none)   Urinalysis, Reflex to Urine Culture    Collection Time: 11/23/23 10:12 AM    Specimen: Urine   Result Value Ref Range    Color, UA Dark-Yellow Yellow, Light-Yellow, Straw, Dark-Yellow    Appearance, UA Turbid (A) Clear    Specific Gravity, UA >1.050 (H) 1.005 - 1.030    pH, UA 6.0 5.0 - 8.5    Protein, UA 2+ (A) Negative    Glucose, UA Trace (A) Negative, Normal    Ketones, UA Negative Negative    Blood, UA 1+ (A) Negative    Bilirubin, UA 2+ (A) Negative    Urobilinogen, UA 2.0 (A) 0.2, 1.0, Normal    Nitrites, UA Negative Negative    Leukocyte Esterase, UA Negative Negative    WBC, UA 11-20 (A) None Seen, 0-2, 3-5, 0-5 /HPF    Bacteria, UA Few (A) None Seen, Trace /HPF    Squamous Epithelial Cells, UA Few (A) None Seen /HPF    Mucous, UA Moderate (A) None Seen /LPF    RBC, UA 6-10 (A) None Seen, 0-2, 3-5, 0-5 /HPF   Urine culture    Collection Time: 11/23/23 10:12 AM    Specimen: Urine   Result Value Ref Range    Urine Culture No Growth At 24 Hours    Comprehensive Metabolic Panel    Collection Time: 11/24/23  4:47 AM   Result Value Ref Range    Sodium Level 138 136 - 145 mmol/L    Potassium Level 3.5 3.5 - 5.1 mmol/L    Chloride 106 98 - 107 mmol/L    Carbon Dioxide 23 23 - 31 mmol/L    Glucose Level 96 82 - 115 mg/dL    Blood Urea Nitrogen 18.7 9.8 - 20.1 mg/dL    Creatinine 0.87 0.55 - 1.02 mg/dL    Calcium Level Total 9.2 8.4 - 10.2 mg/dL    Protein Total 6.5 5.8 - 7.6 gm/dL    Albumin Level 2.3 (L) 3.4 - 4.8 g/dL    Globulin 4.2 (H) 2.4 - 3.5 gm/dL    Albumin/Globulin Ratio 0.5 (L) 1.1 - 2.0 ratio    Bilirubin Total 8.2 (H) <=1.5 mg/dL    Alkaline Phosphatase 320 (H) 40 - 150 unit/L    Alanine Aminotransferase 78 (H) 0 - 55 unit/L    Aspartate Aminotransferase 40 (H) 5 - 34 unit/L     eGFR >60 mls/min/1.73/m2   CBC with Differential    Collection Time: 11/24/23  4:47 AM   Result Value Ref Range    WBC 21.80 (H) 4.50 - 11.50 x10(3)/mcL    RBC 3.58 (L) 4.20 - 5.40 x10(6)/mcL    Hgb 11.3 (L) 12.0 - 16.0 g/dL    Hct 31.1 (L) 37.0 - 47.0 %    MCV 86.9 80.0 - 94.0 fL    MCH 31.6 (H) 27.0 - 31.0 pg    MCHC 36.3 (H) 33.0 - 36.0 g/dL    RDW 19.3 (H) 11.5 - 17.0 %    Platelet 316 130 - 400 x10(3)/mcL    MPV 11.4 (H) 7.4 - 10.4 fL    Neut % 90.5 %    Lymph % 1.9 %    Mono % 6.6 %    Eos % 0.0 %    Basophil % 0.1 %    Lymph # 0.42 (L) 0.6 - 4.6 x10(3)/mcL    Neut # 19.72 (H) 2.1 - 9.2 x10(3)/mcL    Mono # 1.44 (H) 0.1 - 1.3 x10(3)/mcL    Eos # 0.00 0 - 0.9 x10(3)/mcL    Baso # 0.02 <=0.2 x10(3)/mcL    IG# 0.20 (H) 0 - 0.04 x10(3)/mcL    IG% 0.9 %    NRBC% 0.0 %   Comprehensive Metabolic Panel    Collection Time: 11/25/23  4:14 AM   Result Value Ref Range    Sodium Level 137 136 - 145 mmol/L    Potassium Level 4.0 3.5 - 5.1 mmol/L    Chloride 108 (H) 98 - 107 mmol/L    Carbon Dioxide 17 (L) 23 - 31 mmol/L    Glucose Level 99 82 - 115 mg/dL    Blood Urea Nitrogen 25.6 (H) 9.8 - 20.1 mg/dL    Creatinine 1.19 (H) 0.55 - 1.02 mg/dL    Calcium Level Total 9.3 8.4 - 10.2 mg/dL    Protein Total 6.3 5.8 - 7.6 gm/dL    Albumin Level 2.1 (L) 3.4 - 4.8 g/dL    Globulin 4.2 (H) 2.4 - 3.5 gm/dL    Albumin/Globulin Ratio 0.5 (L) 1.1 - 2.0 ratio    Bilirubin Total 9.2 (H) <=1.5 mg/dL    Alkaline Phosphatase 258 (H) 40 - 150 unit/L    Alanine Aminotransferase 56 (H) 0 - 55 unit/L    Aspartate Aminotransferase 35 (H) 5 - 34 unit/L    eGFR 52 mls/min/1.73/m2   Magnesium    Collection Time: 11/25/23  4:14 AM   Result Value Ref Range    Magnesium Level 2.40 1.60 - 2.60 mg/dL   CBC with Differential    Collection Time: 11/25/23  4:14 AM   Result Value Ref Range    WBC 19.40 (H) 4.50 - 11.50 x10(3)/mcL    RBC 3.93 (L) 4.20 - 5.40 x10(6)/mcL    Hgb 12.0 12.0 - 16.0 g/dL    Hct 33.8 (L) 37.0 - 47.0 %    MCV 86.0 80.0 - 94.0 fL     MCH 30.5 27.0 - 31.0 pg    MCHC 35.5 33.0 - 36.0 g/dL    RDW 19.2 (H) 11.5 - 17.0 %    Platelet 313 130 - 400 x10(3)/mcL    MPV 11.7 (H) 7.4 - 10.4 fL    Neut % 87.6 %    Lymph % 2.6 %    Mono % 8.6 %    Eos % 0.1 %    Basophil % 0.2 %    Lymph # 0.51 (L) 0.6 - 4.6 x10(3)/mcL    Neut # 17.01 (H) 2.1 - 9.2 x10(3)/mcL    Mono # 1.66 (H) 0.1 - 1.3 x10(3)/mcL    Eos # 0.01 0 - 0.9 x10(3)/mcL    Baso # 0.03 <=0.2 x10(3)/mcL    IG# 0.18 (H) 0 - 0.04 x10(3)/mcL    IG% 0.9 %    NRBC% 0.0 %       Significant Imaging:      EKG:        Telemetry:  AFIB    Physical Exam  Vitals and nursing note reviewed.   HENT:      Head: Normocephalic.      Nose: Nose normal.      Mouth/Throat:      Mouth: Mucous membranes are moist.      Pharynx: Oropharynx is clear.   Eyes:      Pupils: Pupils are equal, round, and reactive to light.   Cardiovascular:      Rate and Rhythm: Tachycardia present. Rhythm irregular.      Pulses: Normal pulses.      Heart sounds: Normal heart sounds.   Pulmonary:      Effort: Pulmonary effort is normal. No respiratory distress.      Breath sounds: Normal breath sounds.   Abdominal:      General: Bowel sounds are normal.      Palpations: Abdomen is soft.      Comments: Biliary drain   Musculoskeletal:         General: Normal range of motion.      Cervical back: Normal range of motion.   Skin:     General: Skin is warm.   Neurological:      Mental Status: She is alert and oriented to person, place, and time.   Psychiatric:         Mood and Affect: Mood normal.         Behavior: Behavior normal.       Home Medications:   No current facility-administered medications on file prior to encounter.     Current Outpatient Medications on File Prior to Encounter   Medication Sig Dispense Refill    olmesartan (BENICAR) 20 MG tablet Take 20 mg by mouth once daily.       Current Inpatient Medications:    Current Facility-Administered Medications:     0.9%  NaCl infusion, , Intravenous, PRN, Hussein Merino MD, Stopped at 11/19/23  1049    acetaminophen tablet 650 mg, 650 mg, Oral, Q4H PRN, Hussein Merino MD    aluminum-magnesium hydroxide-simethicone 200-200-20 mg/5 mL suspension 30 mL, 30 mL, Oral, QID PRN, Hussein Merino MD    bisacodyL suppository 10 mg, 10 mg, Rectal, Daily PRN, Hussein Merino MD    enoxaparin injection 80 mg, 1 mg/kg, Subcutaneous, Q12H (prophylaxis, 0900/2100), Kenney Steiner MD, 80 mg at 11/24/23 2022    hydrALAZINE injection 10 mg, 10 mg, Intravenous, Q6H PRN, Kenney Steiner MD    lactated ringers infusion, , Intravenous, Continuous, Kenney Steiner MD, Last Rate: 100 mL/hr at 11/24/23 1806, Rate Verify at 11/24/23 1806    melatonin tablet 6 mg, 6 mg, Oral, Nightly PRN, Hussein Merino MD    metoclopramide HCl injection 10 mg, 10 mg, Intravenous, Q8H, Michael Rocha IV, MD, 10 mg at 11/25/23 0538    metoprolol injection 5 mg, 5 mg, Intravenous, Q2H PRN, Tyler Lira, KVNG, 5 mg at 11/25/23 0305    morphine injection 4 mg, 4 mg, Intravenous, Q4H PRN, Hussein Merino MD, 4 mg at 11/23/23 1209    mupirocin 2 % ointment, , Nasal, BID, Kenney Steiner MD, Given at 11/24/23 2022    ondansetron injection 4 mg, 4 mg, Intravenous, Q4H PRN, Hussein Merino MD, 4 mg at 11/22/23 0215    oxyCODONE immediate release tablet 5 mg, 5 mg, Oral, Q6H PRN, Hussein Merino MD, 5 mg at 11/23/23 1205    pantoprazole EC tablet 40 mg, 40 mg, Oral, BID AC, Kenney Steiner MD, 40 mg at 11/25/23 0538    piperacillin-tazobactam (ZOSYN) 4.5 g in dextrose 5 % in water (D5W) 100 mL IVPB (MB+), 4.5 g, Intravenous, Q8H, Kenney Steiner MD, Last Rate: 25 mL/hr at 11/25/23 0222, 4.5 g at 11/25/23 0222    polyethylene glycol packet 17 g, 17 g, Oral, BID PRN, Hussein Meirno MD    prochlorperazine injection Soln 5 mg, 5 mg, Intravenous, Q6H PRN, Hussein Merino MD, 5 mg at 11/22/23 1347    promethazine injection 25 mg, 25 mg, Intramuscular, Q4H PRN, Lyssa Car AGAP-BC, 25 mg at 11/23/23 1620    senna-docusate 8.6-50 mg per tablet 2 tablet, 2 tablet, Oral, BID PRN,  Hussein Merino MD    sodium chloride 0.9% flush 10 mL, 10 mL, Intravenous, PRN, Hussein Merino MD         VTE Risk Mitigation (From admission, onward)           Ordered     enoxaparin injection 80 mg  Every 12 hours         11/24/23 1729     IP VTE LOW RISK PATIENT  Once         11/18/23 0447     Place sequential compression device  Until discontinued         11/18/23 0447                    Assessment:   Newly Diagnosed Atrial Fibrillation     - [id number] Score 2 (2.2% Stroke Risk per Year)  Suspected bile leak with biloma and biliary obstruction     - Successful placement of biliary drainage catheter: 8 Pashto internal external biliary drainage catheter on 11.21.23    - Incomplete Laparoscopic cholecystectomy 11.10.23  Elevated LFTs   Leukocytosis (Improvement)  Bilateral Hydronephrosis     - Severe on the Left    - Moderate on the Right   Gastric cardia mass-pathology adenocarcinoma intestinal type   HTN  Sciatica   No known History of GI Bleed     Plan:   More Recommendations to Follow  Continue Metoprolol 25 mg oral BID  Continue FD Lovenox    - Eliquis 5mg oral BID at discharge  Consider GAYLA/DCCV if no conversion to SR  IV Antibiotics per Primary Team  Keep Mag > 2 and Potassium > 4    Thank you for your consult.     MARIA A Dockery  Cardiology  [facility] - 8th Floor Med Surg  11/25/2023 6:10 AM

## 2023-11-25 NOTE — PROGRESS NOTES
Inpatient Nutrition Assessment    Admit Date: 11/17/2023   Total duration of encounter: 7 days     Nutrition Recommendation/Prescription     Recommend J-tube.     If J-tube placed:  Initiate Impact Peptide 1.5 @ 10 mL/hr q 24 hrs, then increase as tolerated per physician (or by 10 mL/hr q 12 hrs) to goal rate 55 mL/hr would provide:  1650 kcals (100% est needs)  103 g protein (100% est needs)  847 mL fluid    Could flush w/ 160 mL q 4 hrs for a total (tube feeding + flush) of 1807 mL fluid per day (100% est needs)    In the meantime, consider starting PPN @ 60 mL/hr. Monitor phosphorus and magnesium.     -Otherwise full liquid vs low fiber diet in the mean time w/ medical management of n/v.  -Continue Boost Max TID for additional nourishment; provides 160 kcal and 30 gm protein per container.   -Monitor wt, labs, and intake.     Communication of Recommendations: reviewed with nurse and reviewed with patient    Nutrition Assessment     Malnutrition Assessment/Nutrition-Focused Physical Exam    Malnutrition Context: acute illness or injury (11/18/23 1630)  Malnutrition Level: severe (11/18/23 1630)  Energy Intake (Malnutrition): less than or equal to 50% for greater than or equal to 5 days (11/18/23 1630)  Weight Loss (Malnutrition): greater than 7.5% in 3 months (11/18/23 1630)  Subcutaneous Fat (Malnutrition): moderate depletion (11/18/23 1630)  Orbital Region (Subcutaneous Fat Loss): moderate depletion        Muscle Mass (Malnutrition): moderate depletion (11/18/23 1630)  [place] (Muscle Loss): moderate depletion     Clavicle and Acromion Bone Region (Muscle Loss): moderate depletion        Patellar Region (Muscle Loss): moderate depletion                 A minimum of two characteristics is recommended for diagnosis of either severe or non-severe malnutrition.    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       Relevant Medical History:   Past Medical History:   Diagnosis Date    Hypertension     Sciatica      Past Surgical History:   Procedure Laterality Date    CARPAL TUNNEL RELEASE Left     EGD, WITH CLOSED BIOPSY  11/18/2023    Procedure: EGD, WITH CLOSED BIOPSY;  Surgeon: Alfred Goss MD;  Location: [facility] OR;  Service: Gastroenterology;;    ERCP N/A 11/18/2023    Procedure: ERCP (ENDOSCOPIC RETROGRADE CHOLANGIOPANCREATOGRAPHY);  Surgeon: Alfred Goss MD;  Location: [facility] OR;  Service: Gastroenterology;  Laterality: N/A;    HEMORRHOID SURGERY       Review of patient's allergies indicates:  No Known Allergies     Nutrition-Related Medications:    enoxparin  1 mg/kg Subcutaneous Q12H (prophylaxis, 0900/2100)    metoclopramide HCl  10 mg Intravenous Q8H    mupirocin   Nasal BID    pantoprazole  40 mg Oral BID AC    piperacillin-tazobactam (Zosyn) IV (PEDS and ADULTS) (extended infusion is not appropriate)  4.5 g Intravenous Q8H       lactated ringers 100 mL/hr at 11/24/23 1806         Calorie Containing IV Medications: no significant kcals from medications at this time    Nutrition-Related Labs:  No results found for: "HGBA1C"  11/18/2023: Magnesium Level 2.30 mg/dL (Ref range: 1.60 - 2.60 mg/dL); Phosphorus Level 3.8 mg/dL (Ref range: 2.3 - 4.7 mg/dL)  11/24/2023: Potassium Level 3.5 mmol/L (Ref range: 3.5 - 5.1 mmol/L); Sodium Level 138 mmol/L (Ref range: 136 - 145 mmol/L)   Recent Labs   Lab 11/22/23  0451 11/23/23  0723 11/24/23  0447   WBC 12.58* 23.73  23.73* 21.80*   RBC 4.24 3.92* 3.58*   HGB 12.9 11.7* 11.3*   HCT " "37.8 34.3* 31.1*   MCV 89.2 87.5 86.9   MCH 30.4 29.8 31.6*   MCHC 34.1 34.1 36.3*       Recent Labs   Lab 11/18/23  0710 11/19/23  0640 11/22/23  0451 11/23/23  0645 11/24/23  0447      < > 140 140 138   K 4.0   < > 4.9 4.7 3.5   CO2 27   < > 23 23 23   BUN 14.6   < > 13.6 16.0 18.7   CREATININE 0.90   < > 0.78 0.92 0.87   GLUCOSE 79*   < > 123* 99 96   CALCIUM 9.8   < > 9.5 9.5 9.2   MG 2.30  --   --   --   --    ALBUMIN 3.1*   < > 2.8* 2.8* 2.3*   ALKPHOS 526*   < > 430* 400* 320*   *   < > 161* 122* 78*   *   < > 148* 91* 40*   BILITOT 10.8*   < > 12.1* 8.2* 8.2*    < > = values in this interval not displayed.         Diet/PN Order: Diet full liquid  Oral Supplement Order: Boost Max  Tube Feeding Order: none  Appetite/Oral Intake: poor/0-25% of meals  Factors Affecting Nutritional Intake: altered gastrointestinal function, decreased appetite, nausea, and vomiting  Food/Jain/Cultural Preferences: none reported  Food Allergies: none reported    Skin Integrity: incision, drain/device(s)  Wound(s):   n/a    Comments    11/18/23: Pt reports poor appetite, nauseated, threw up after consuming ~10% of full liquid tray for lunch, appetite has been a struggle for 3 months over which time she lost about 30 lbs unintentionally, appetite has been worse since 11/10 incomplete cholecystectomy, chews/swallows well, agrees to vanilla ONS on diet advancement.     11/22/23: Pt with poor to fair intake of full liquids; states that she is still having some n/v occasionally; reports that she is drinking the Boost that is ordered.     11/24/23: Pt reports poor appetite, still w/ n/v, basically vomits up everything she consumes. Discussed w/ physician, we agree that she needs a J-tube. Pt reports that she is open to it.     Anthropometrics    Height: 5' 6" (167.6 cm) Height Method: Stated  Last Weight: 82.2 kg (181 lb 4 oz) (11/17/23 3332) Weight Method: Standard Scale  BMI (Calculated): 29.3  BMI " Classification: overweight (BMI 25-29.9)     Ideal Body Weight (IBW), Female: 130 lb     % Ideal Body Weight, Female (lb): 139.42 %                             Usual Weight Provided By: patient and EMR weight history  23: 211 lbs  Wt Readings from Last 5 Encounters:   23 82.2 kg (181 lb 4 oz)   18 88 kg (194 lb)   18 90.4 kg (199 lb 4.7 oz)   17 90 kg (198 lb 6.6 oz)     Weight Change(s) Since Admission:  Admit Weight: 82.2 kg (181 lb 4 oz) (23 2355)    Estimated Needs    Weight Used For Calorie Calculations: 82.2 kg (181 lb 3.5 oz)  Energy Calorie Requirements (kcal): 1644 kcals (20 kcals/kg)  Energy Need Method: Kcal/kg  Weight Used For Protein Calculations: 82.2 kg (181 lb 3.5 oz)  Protein Requirements:  g (1.0-1.3 g/kg)  Fluid Requirements (mL): 9779-4672 mL (1 mL/kg)  Temp (24hrs), Av.6 °F (37 °C), Min:98.2 °F (36.8 °C), Max:98.9 °F (37.2 °C)       Enteral Nutrition    Patient not receiving enteral nutrition at this time.    Parenteral Nutrition    Patient not receiving parenteral nutrition support at this time.    Evaluation of Received Nutrient Intake    Calories: not meeting estimated needs  Protein: not meeting estimated needs    Patient Education    Not applicable.    Nutrition Diagnosis     PES: Malnutrition related to suboptimal protein/energy intake as evidenced by less than or equal to 50% needs met for greater than or equal to 5 days, moderate fat depletion, moderate muscle depletion, and greater than 7.5% weight loss in 3 months. (active)     Interventions/Goals     Intervention(s): collaboration with other providers  Goal: Meet greater than 75% of nutritional needs by follow-up. (goal progressing)    Monitoring & Evaluation     Dietitian will monitor energy intake, weight, electrolyte/renal panel, and gastrointestinal profile.  Nutrition Risk/Follow-Up: high (follow-up in 1-4 days)   Please consult if re-assessment needed sooner.    Brayden Ibarra RD    11/24/2023

## 2023-11-25 NOTE — NURSING
Nurses Note -- 4 Eyes      11/25/2023   3:35 PM      Skin assessed during: Admit      [x] No Altered Skin Integrity Present    []Prevention Measures Documented      [] Yes- Altered Skin Integrity Present or Discovered   [] LDA Added if Not in Epic (Describe Wound)   [] New Altered Skin Integrity was Present on Admit and Documented in LDA   [] Wound Image Taken    Wound Care Consulted? No    Attending Nurse:  Roe Sainz RN/Staff Member:   Sharan

## 2023-11-26 LAB
ALBUMIN SERPL-MCNC: 2 G/DL (ref 3.4–4.8)
ALBUMIN/GLOB SERPL: 0.5 RATIO (ref 1.1–2)
ALP SERPL-CCNC: 234 UNIT/L (ref 40–150)
ALT SERPL-CCNC: 48 UNIT/L (ref 0–55)
AST SERPL-CCNC: 32 UNIT/L (ref 5–34)
AV INDEX (PROSTH): 1.02
AV MEAN GRADIENT: 3 MMHG
AV PEAK GRADIENT: 7 MMHG
AV VALVE AREA BY VELOCITY RATIO: 3 CM²
AV VALVE AREA: 2.89 CM²
AV VELOCITY RATIO: 1.06
BASOPHILS # BLD AUTO: 0.03 X10(3)/MCL
BASOPHILS NFR BLD AUTO: 0.2 %
BILIRUB SERPL-MCNC: 8.7 MG/DL
BSA FOR ECHO PROCEDURE: 1.95 M2
BUN SERPL-MCNC: 37.4 MG/DL (ref 9.8–20.1)
CALCIUM SERPL-MCNC: 8.8 MG/DL (ref 8.4–10.2)
CHLORIDE SERPL-SCNC: 102 MMOL/L (ref 98–107)
CO2 SERPL-SCNC: 20 MMOL/L (ref 23–31)
CREAT SERPL-MCNC: 2.11 MG/DL (ref 0.55–1.02)
CV ECHO LV RWT: 0.47 CM
DOP CALC AO PEAK VEL: 1.34 M/S
DOP CALC AO VTI: 15.2 CM
DOP CALC LVOT AREA: 2.8 CM2
DOP CALC LVOT DIAMETER: 1.9 CM
DOP CALC LVOT PEAK VEL: 1.42 M/S
DOP CALC LVOT STROKE VOLUME: 43.92 CM3
DOP CALC MV VTI: 12.2 CM
DOP CALCLVOT PEAK VEL VTI: 15.5 CM
ECHO LV POSTERIOR WALL: 0.9 CM (ref 0.6–1.1)
EOSINOPHIL # BLD AUTO: 0.03 X10(3)/MCL (ref 0–0.9)
EOSINOPHIL NFR BLD AUTO: 0.2 %
ERYTHROCYTE [DISTWIDTH] IN BLOOD BY AUTOMATED COUNT: 19.9 % (ref 11.5–17)
FRACTIONAL SHORTENING: 37 % (ref 28–44)
GFR SERPLBLD CREATININE-BSD FMLA CKD-EPI: 26 MLS/MIN/1.73/M2
GLOBULIN SER-MCNC: 4.4 GM/DL (ref 2.4–3.5)
GLUCOSE SERPL-MCNC: 125 MG/DL (ref 82–115)
HCT VFR BLD AUTO: 26.1 % (ref 37–47)
HCT VFR BLD AUTO: 29 % (ref 37–47)
HCT VFR BLD AUTO: 36.7 % (ref 37–47)
HGB BLD-MCNC: 10.5 G/DL (ref 12–16)
HGB BLD-MCNC: 12.8 G/DL (ref 12–16)
HGB BLD-MCNC: 9.4 G/DL (ref 12–16)
IMM GRANULOCYTES # BLD AUTO: 0.22 X10(3)/MCL (ref 0–0.04)
IMM GRANULOCYTES NFR BLD AUTO: 1.4 %
INTERVENTRICULAR SEPTUM: 0.8 CM (ref 0.6–1.1)
LEFT ATRIUM SIZE: 4 CM
LEFT ATRIUM VOLUME INDEX MOD: 19.4 ML/M2
LEFT ATRIUM VOLUME MOD: 37.1 CM3
LEFT INTERNAL DIMENSION IN SYSTOLE: 2.4 CM (ref 2.1–4)
LEFT VENTRICLE DIASTOLIC VOLUME INDEX: 32.46 ML/M2
LEFT VENTRICLE DIASTOLIC VOLUME: 62 ML
LEFT VENTRICLE MASS INDEX: 49 G/M2
LEFT VENTRICLE SYSTOLIC VOLUME INDEX: 10.6 ML/M2
LEFT VENTRICLE SYSTOLIC VOLUME: 20.2 ML
LEFT VENTRICULAR INTERNAL DIMENSION IN DIASTOLE: 3.8 CM (ref 3.5–6)
LEFT VENTRICULAR MASS: 93.37 G
LVOT MG: 4 MMHG
LVOT MV: 0.91 CM/S
LYMPHOCYTES # BLD AUTO: 0.92 X10(3)/MCL (ref 0.6–4.6)
LYMPHOCYTES NFR BLD AUTO: 6 %
MAGNESIUM SERPL-MCNC: 2.5 MG/DL (ref 1.6–2.6)
MCH RBC QN AUTO: 30.3 PG (ref 27–31)
MCHC RBC AUTO-ENTMCNC: 34.9 G/DL (ref 33–36)
MCV RBC AUTO: 86.8 FL (ref 80–94)
MONOCYTES # BLD AUTO: 1.66 X10(3)/MCL (ref 0.1–1.3)
MONOCYTES NFR BLD AUTO: 10.8 %
MV MEAN GRADIENT: 2 MMHG
MV PEAK E VEL: 0.56 M/S
MV PEAK GRADIENT: 3 MMHG
MV VALVE AREA BY CONTINUITY EQUATION: 3.6 CM2
NEUTROPHILS # BLD AUTO: 12.52 X10(3)/MCL (ref 2.1–9.2)
NEUTROPHILS NFR BLD AUTO: 81.4 %
NRBC BLD AUTO-RTO: 0 %
OHS LV EJECTION FRACTION SIMPSONS BIPLANE MOD: 60 %
PHOSPHATE SERPL-MCNC: 2.9 MG/DL (ref 2.3–4.7)
PISA TR MAX VEL: 2.55 M/S
PLATELET # BLD AUTO: 446 X10(3)/MCL (ref 130–400)
PMV BLD AUTO: 11.6 FL (ref 7.4–10.4)
POCT GLUCOSE: 119 MG/DL (ref 70–110)
POCT GLUCOSE: 124 MG/DL (ref 70–110)
POTASSIUM SERPL-SCNC: 3.2 MMOL/L (ref 3.5–5.1)
PROT SERPL-MCNC: 6.4 GM/DL (ref 5.8–7.6)
RA PRESSURE ESTIMATED: 3 MMHG
RA WIDTH: 3.5 CM
RBC # BLD AUTO: 4.23 X10(6)/MCL (ref 4.2–5.4)
RIGHT VENTRICULAR END-DIASTOLIC DIMENSION: 3.3 CM
RV TB RVSP: 6 MMHG
SODIUM SERPL-SCNC: 134 MMOL/L (ref 136–145)
TR MAX PG: 26 MMHG
TRICUSPID ANNULAR PLANE SYSTOLIC EXCURSION: 1.28 CM
TV REST PULMONARY ARTERY PRESSURE: 29 MMHG
WBC # SPEC AUTO: 15.38 X10(3)/MCL (ref 4.5–11.5)
Z-SCORE OF LEFT VENTRICULAR DIMENSION IN END DIASTOLE: -3.47
Z-SCORE OF LEFT VENTRICULAR DIMENSION IN END SYSTOLE: -2.54

## 2023-11-26 PROCEDURE — 93005 ELECTROCARDIOGRAM TRACING: CPT

## 2023-11-26 PROCEDURE — 83735 ASSAY OF MAGNESIUM: CPT | Performed by: INTERNAL MEDICINE

## 2023-11-26 PROCEDURE — 36569 INSJ PICC 5 YR+ W/O IMAGING: CPT

## 2023-11-26 PROCEDURE — 25000003 PHARM REV CODE 250

## 2023-11-26 PROCEDURE — 85018 HEMOGLOBIN: CPT | Performed by: INTERNAL MEDICINE

## 2023-11-26 PROCEDURE — A9698 NON-RAD CONTRAST MATERIALNOC: HCPCS | Performed by: INTERNAL MEDICINE

## 2023-11-26 PROCEDURE — 63600175 PHARM REV CODE 636 W HCPCS: Performed by: INTERNAL MEDICINE

## 2023-11-26 PROCEDURE — 84100 ASSAY OF PHOSPHORUS: CPT | Performed by: INTERNAL MEDICINE

## 2023-11-26 PROCEDURE — 02HV33Z INSERTION OF INFUSION DEVICE INTO SUPERIOR VENA CAVA, PERCUTANEOUS APPROACH: ICD-10-PCS | Performed by: INTERNAL MEDICINE

## 2023-11-26 PROCEDURE — 25500020 PHARM REV CODE 255: Performed by: INTERNAL MEDICINE

## 2023-11-26 PROCEDURE — C1751 CATH, INF, PER/CENT/MIDLINE: HCPCS

## 2023-11-26 PROCEDURE — 80053 COMPREHEN METABOLIC PANEL: CPT | Performed by: INTERNAL MEDICINE

## 2023-11-26 PROCEDURE — 93010 ELECTROCARDIOGRAM REPORT: CPT | Mod: ,,, | Performed by: STUDENT IN AN ORGANIZED HEALTH CARE EDUCATION/TRAINING PROGRAM

## 2023-11-26 PROCEDURE — 85025 COMPLETE CBC W/AUTO DIFF WBC: CPT | Performed by: INTERNAL MEDICINE

## 2023-11-26 PROCEDURE — 11000001 HC ACUTE MED/SURG PRIVATE ROOM

## 2023-11-26 PROCEDURE — 25000003 PHARM REV CODE 250: Performed by: INTERNAL MEDICINE

## 2023-11-26 PROCEDURE — 63600175 PHARM REV CODE 636 W HCPCS: Performed by: SURGERY

## 2023-11-26 PROCEDURE — 63600175 PHARM REV CODE 636 W HCPCS

## 2023-11-26 RX ORDER — POTASSIUM CHLORIDE 14.9 MG/ML
40 INJECTION INTRAVENOUS ONCE
Status: COMPLETED | OUTPATIENT
Start: 2023-11-26 | End: 2023-11-26

## 2023-11-26 RX ORDER — SODIUM CHLORIDE 0.9 % (FLUSH) 0.9 %
10 SYRINGE (ML) INJECTION
Status: DISCONTINUED | OUTPATIENT
Start: 2023-11-26 | End: 2023-12-20

## 2023-11-26 RX ORDER — SODIUM CHLORIDE 0.9 % (FLUSH) 0.9 %
10 SYRINGE (ML) INJECTION EVERY 6 HOURS
Status: DISCONTINUED | OUTPATIENT
Start: 2023-11-26 | End: 2023-12-20

## 2023-11-26 RX ORDER — SODIUM CHLORIDE AND POTASSIUM CHLORIDE 150; 900 MG/100ML; MG/100ML
INJECTION, SOLUTION INTRAVENOUS CONTINUOUS
Status: DISCONTINUED | OUTPATIENT
Start: 2023-11-26 | End: 2023-11-27

## 2023-11-26 RX ORDER — METOPROLOL TARTRATE 1 MG/ML
5 INJECTION, SOLUTION INTRAVENOUS EVERY 6 HOURS
Status: DISCONTINUED | OUTPATIENT
Start: 2023-11-26 | End: 2023-12-02

## 2023-11-26 RX ORDER — ENOXAPARIN SODIUM 100 MG/ML
1 INJECTION SUBCUTANEOUS EVERY 24 HOURS
Status: DISCONTINUED | OUTPATIENT
Start: 2023-11-27 | End: 2023-11-28

## 2023-11-26 RX ADMIN — POTASSIUM CHLORIDE AND SODIUM CHLORIDE: 900; 150 INJECTION, SOLUTION INTRAVENOUS at 09:11

## 2023-11-26 RX ADMIN — PIPERACILLIN SODIUM AND TAZOBACTAM SODIUM 4.5 G: 4; .5 INJECTION, POWDER, FOR SOLUTION INTRAVENOUS at 02:11

## 2023-11-26 RX ADMIN — POTASSIUM CHLORIDE 40 MEQ: 14.9 INJECTION, SOLUTION INTRAVENOUS at 07:11

## 2023-11-26 RX ADMIN — LEUCINE, PHENYLALANINE, LYSINE, METHIONINE, ISOLEUCINE, VALINE, HISTIDINE, THREONINE, TRYPTOPHAN, ALANINE, GLYCINE, ARGININE, PROLINE, SERINE, TYROSINE, DEXTROSE: 311; 238; 247; 170; 255; 247; 204; 179; 77; 880; 438; 489; 289; 213; 17; 5 INJECTION INTRAVENOUS at 04:11

## 2023-11-26 RX ADMIN — BARIUM SULFATE 150 ML: 0.6 SUSPENSION ORAL at 10:11

## 2023-11-26 RX ADMIN — ENOXAPARIN SODIUM 80 MG: 80 INJECTION SUBCUTANEOUS at 09:11

## 2023-11-26 RX ADMIN — METOCLOPRAMIDE 10 MG: 5 INJECTION, SOLUTION INTRAMUSCULAR; INTRAVENOUS at 03:11

## 2023-11-26 RX ADMIN — METOCLOPRAMIDE 10 MG: 5 INJECTION, SOLUTION INTRAMUSCULAR; INTRAVENOUS at 05:11

## 2023-11-26 RX ADMIN — PIPERACILLIN SODIUM AND TAZOBACTAM SODIUM 4.5 G: 4; .5 INJECTION, POWDER, FOR SOLUTION INTRAVENOUS at 03:11

## 2023-11-26 RX ADMIN — PANTOPRAZOLE SODIUM 40 MG: 40 TABLET, DELAYED RELEASE ORAL at 05:11

## 2023-11-26 RX ADMIN — AMIODARONE HYDROCHLORIDE 0.5 MG/MIN: 1.8 INJECTION, SOLUTION INTRAVENOUS at 09:11

## 2023-11-26 RX ADMIN — AMIODARONE HYDROCHLORIDE 0.5 MG/MIN: 1.8 INJECTION, SOLUTION INTRAVENOUS at 11:11

## 2023-11-26 RX ADMIN — METOCLOPRAMIDE 10 MG: 5 INJECTION, SOLUTION INTRAMUSCULAR; INTRAVENOUS at 09:11

## 2023-11-26 RX ADMIN — PANTOPRAZOLE SODIUM 40 MG: 40 TABLET, DELAYED RELEASE ORAL at 03:11

## 2023-11-26 RX ADMIN — METOPROLOL TARTRATE 25 MG: 25 TABLET, FILM COATED ORAL at 09:11

## 2023-11-26 NOTE — CONSULTS
Consult received for calorie count, hung envelope on door for ticket collection, discussed with nurse; unit RD to follow-up.

## 2023-11-26 NOTE — PROGRESS NOTES
[facility]  Hospital Medicine Progress Note        Chief Complaint: Inpatient Follow-up for intractable vomiting    HPI: 58-year-old female with medical history of hypertension who recently underwent laparoscopic cholecystectomy on 11/10/2023, procedure was incomplete and was unable to completely resect the gallbladder.  Since surgery she continued to have right flank/back pain and followed up with her PCP and CT abdomen and pelvis on 11/17/2023 revealed left-sided hydronephrosis with suspected distal obstruction/no clear stone visualized, postoperative changes of cholecystectomy with fluid in the gallbladder fossa may reflect postoperative seroma but biloma can not be entirely excluded, also noted for marked thickening of the stomach wall diffusely may be related to mesenteric edema/reactive.  She presented to [facility] [place] the same day 11/17/2023 and her labs notable for WBC 9.0, hemoglobin 12.4, platelets 442, creatinine 0.86, total bilirubin 8.7 with direct fraction 6.7, alkaline phosphatase 491, , .  Patient's surgeon was consulted and recommended ERCP which was not available at [facility] for which she was transferred to [facility] and referred to hospital medicine service for further evaluation and management.   On my evaluation she complaints of right flank/back pain as well as suprapubic pain and constipation.  Denies nausea, vomiting, fever or chills.  Her hemodynamics are stable.  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  Severe inflammation and oozing of blood noted proximal gastric lumen.  Unable to advance scope into the duodenum.  Surgical oncology consulted, IR consulted for external drain placement which was done November 21.  November 24th she developed new onset atrial fibrillation with RVR and Cardiology consulted.    Interval Hx:   Pt is sitting in bed, reported she was unable to tolerate that small pill she  had this morning and threw it right back out  No family is bedside  Pt is aware of gastric cancer diagnosis   Case was discussed with patient's nurse on the floor.    Objective/physical exam:  General: In no acute distress, afebrile  Chest: Clear to auscultation bilaterally anteriorly  Heart:  Tachycardic rate and rhythm, no appreciable murmur  Abdomen:  Slightly distended, nontender, BS +  MSK: Warm, trace pitting edema bilateral lower extremities  Neurologic: Alert and oriented x4, Cranial nerve II-XII intact, Strength 5/5 in all 4 extremities    VITAL SIGNS: 24 HRS MIN & MAX LAST   Temp  Min: 97.6 °F (36.4 °C)  Max: 98.6 °F (37 °C) 98.1 °F (36.7 °C)   BP  Min: 110/82  Max: 136/86 112/78   Pulse  Min: 70  Max: 122  92   Resp  Min: 20  Max: 20 20   SpO2  Min: 94 %  Max: 99 % 97 %     I reviewed the labs below:  Recent Labs   Lab 11/24/23 0447 11/25/23 0414 11/26/23 0425   WBC 21.80* 19.40* 15.38*   RBC 3.58* 3.93* 4.23   HGB 11.3* 12.0 12.8   HCT 31.1* 33.8* 36.7*   MCV 86.9 86.0 86.8   MCH 31.6* 30.5 30.3   MCHC 36.3* 35.5 34.9   RDW 19.3* 19.2* 19.9*    313 446*   MPV 11.4* 11.7* 11.6*     Recent Labs   Lab 11/24/23 0447 11/25/23 0414 11/26/23 0425    137 134*   K 3.5 4.0 3.2*   CO2 23 17* 20*   BUN 18.7 25.6* 37.4*   CREATININE 0.87 1.19* 2.11*   CALCIUM 9.2 9.3 8.8   MG  --  2.40 2.50   ALBUMIN 2.3* 2.1* 2.0*   ALKPHOS 320* 258* 234*   ALT 78* 56* 48   AST 40* 35* 32   BILITOT 8.2* 9.2* 8.7*     Microbiology Results (last 7 days)       Procedure Component Value Units Date/Time    Urine culture [[id number]] Collected: 11/23/23 1012    Order Status: Completed Specimen: Urine Updated: 11/25/23 0745     Urine Culture No Growth    Blood Culture [[id number]]  (Normal) Collected: 11/18/23 0710    Order Status: Completed Specimen: Blood Updated: 11/23/23 0901     CULTURE, BLOOD ([facility]) No Growth at 5 days    Blood Culture [[id number]]  (Normal) Collected: 11/18/23 0710    Order Status: Completed  Specimen: Blood Updated: 11/23/23 0901     CULTURE, BLOOD ([facility]) No Growth at 5 days           See below for Radiology    Scheduled Med:   enoxparin  1 mg/kg Subcutaneous Q12H (prophylaxis, 0900/2100)    metoclopramide HCl  10 mg Intravenous Q8H    metoprolol tartrate  25 mg Oral BID    pantoprazole  40 mg Oral BID AC    piperacillin-tazobactam (Zosyn) IV (PEDS and ADULTS) (extended infusion is not appropriate)  4.5 g Intravenous Q8H    potassium chloride  40 mEq Intravenous Once      Continuous Infusions:   0/9% NACL & POTASSIUM CHLORIDE 20 MEQ/L      amiodarone in dextrose 5% 0.5 mg/min (11/25/23 2109)      PRN Meds:  sodium chloride 0.9%, acetaminophen, aluminum-magnesium hydroxide-simethicone, bisacodyL, hydrALAZINE, melatonin, metoprolol, morphine, ondansetron, oxyCODONE, polyethylene glycol, prochlorperazine, promethazine, senna-docusate 8.6-50 mg, sodium chloride 0.9%     Assessment/Plan:  Suspected bile leak with biloma and biliary obstruction--> H/O Laparoscopic incomplete cholecystectomy 11/10/2023  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  Inflamed mucosa and ooze of blood throughout the proximal gastric lumen.  Gastric antrum scar would not allow advancement of scope into the duodenal ampulla area therefore biliary cannulation was not possible for bile leak evaluation  Pathology shows marked chronic gastritis with intestinal metaplasia, gastric cardia biopsy shows adenocarcinoma, moderately differentiated intestinal type  Full liquid diet, still unable to tolerate anything orally  Dr Rocha on board for pt not really tolerating much of  a oral intake.  Needs a J-tube placement for nutrition  Started on Reglan 10 m iV q 8 per Dr Rocha   Potassium 3.2, receiving KCl 40 mEq IV times once  Creatinine also bumped to 2.1, started on normal saline KCl 20 mEq per infusion at 125 cc/hour per Dr. Rocha, will decrease to 75 cc as pt is getting Climimix, amio, zosyn as well  Continue Clinimix at 60 cc/hour for now  Monitor hemoglobin, continue Protonix 40 mg po bid   Urology signed off.  Outpatient follow-up in 3 to 4 weeks.  No stent at this time, conservative management.  Oncology Dr Lejeune pn board, need out pt f/u for pet scan/ staging   Zosyn started 11/23- day 3  WBC trending down from 23k--> 15 k  Blood cultures negative at 5 days  Urine culture negative  Alk Phos slowly trending down from 400--> 234  Cardiology following.  Started on amiodarone drip, metoprolol and full-dose Lovenox.   Echo ordered  Monitor closely for any signs of bleeding given now on full-dose Lovenox, EGD did note oozing gastric mass on admit.  Reduce boost max to once daily given patient is not consuming orally adequately  Ordered PICC line given pt has clinimix, zosyn, NS KCL infusion, KCL IV, Amio drip running   Morning CBC, BMP, MAG ordered     VTE prophylaxis:  Therapeutic dose Lovenox    Patient condition:  Guarded    Anticipated discharge and Disposition:   TBD    Critical care note:  Critical care diagnosis:  Poor oral intake requiring Clinimix, AFib RVR on amiodarone drip  Critical care interventions: Hands-on evaluation, review of labs/radiographs/records and discussion with patient and family if present  Critical care time spent: 35 minutes      All diagnosis and differential diagnosis have been reviewed; assessment and plan has been documented; I have personally reviewed the labs and test results that are presently available; I have reviewed the patients medication list; I have reviewed the consulting providers response and recommendations. I have reviewed or  attempted to review medical records based upon their availability    All of the patient's questions have been  addressed and answered. Patient's is agreeable to the above stated plan. I will continue to monitor closely and make adjustments to medical management as needed.  _____________________________________________________________________    Nutrition Status:  Patient meets ASPEN criteria for severe malnutrition of acute illness or injury per RD assessment as evidenced by:  Energy Intake (Malnutrition): less than or equal to 50% for greater than or equal to 5 days  Weight Loss (Malnutrition): greater than 7.5% in 3 months  Subcutaneous Fat (Malnutrition): moderate depletion  Muscle Mass (Malnutrition): moderate depletion           A minimum of two characteristics is recommended for diagnosis of either severe or non-severe malnutrition.   Radiology:   I have personally reviewed the images and agree with radiologist report  CT Abdomen With IV Contrast  Narrative: EXAMINATION:  CT ABDOMEN WITH IV CONTRAST    CLINICAL HISTORY:  Abdominal abscess/infection suspected;    TECHNIQUE:  CT abdomen and pelvis performed after intravenous contrast using routine protocol.  Oral contrast given.  .  Automated exposure control used.    COMPARISON:  11/19/2023    FINDINGS:  Liver demonstrates normal enhancement with no focal lesion.  There is a transit [place] biliary ductal stent demonstrating satisfactory position.    A globular area of contrast noted within the gallbladder fossa.  No definite evidence of extravasation of loose contrast into the adjacent region.    Diffuse gastric.  Pancreas, spleen, adrenal glands normal.    Moderate severe left-sided hydronephrosis with delayed renal enhancement.  Stable.  Mild moderate right-sided hydronephrosis, increased in the interval.    Aorta tapers normally.    Moderate severe generalized inflammatory change and stranding and scattered areas of trace fluid throughout the  mesentery.  No lymphadenopathy in the abdomen or pelvis.  Mild fluid in the pelvis.    Bladder contrast noted.  Rectum normal.  Uterus and adnexa normal.    Small and large bowel loops normal with no significant thickening or dilation.  Distal small bowel loops nearly completely decompressed.    Bones intact.    Trace pleural fluid bilaterally.  Impression: The small globular area of contrast within the gallbladder fossa new from the prior suggesting biliary leak possibly within a contained cavity possibly indicating an gallbladder remnant or dilated cystic duct.  No evidence of extravasation of contrast into the surrounding region.  Persistent regional gallbladder fossa fluid.    Interval placement of a transhepatic biliary stent satisfactory position.    Diffuse gastric wall thickening consistent with gastritis.    Bilateral hydronephrosis severe on the left and moderate on the right, stable.    Generalized mesenteric inflammatory change, mild ascites, mild pleural fluid.    Electronically signed by: Paul Ocampo MD  Date:    11/22/2023  Time:    09:55  US Guided Needle Placement  Addendum: No full cholangiogram required in 24-48 hours.     Electronically signed by: Renny Batres   Date:    11/22/2023   Time:    07:40  Narrative: EXAMINATION:  Ultrasound guidance    Fluoroscopic guidance    Percutaneous transhepatic cholangiogram    Internal external biliary drain placement    CLINICAL HISTORY:  58-year-old requiring biliary drainage.    Attending: Renny Batres    Anesthesia: General anesthesia was provided by the anesthesiology department.    TECHNIQUE:  After the risks, benefits and alternatives were discussed, consent was obtained. A time out was performed to verify the patient's identity and procedure.    The patient was placed supine on the angiographic table. The abdomen was cleaned and prepped in normal sterile fashion. A right access site was identified using fluoroscopy. Subcutaneous tissues were  "anesthetized with lidocaine solution. A dermatotomy was performed with an #11 blade scalpel.    Using a 21 gauge Chiba needle an mid axillary approach with dilute contrast was performed into the liver parenchyma. Realtime ultrasound was used for needle guidance and a static image was obtained.  Once access was obtained into an intrahepatic duct an 0.018" wire was advanced into the intrahepatic ducts.    Over the wire, an accustick system was advanced and the inner obturators were removed.    The narrowing of the common bile duct was traversed utilizing a Glidewire and a hockey-stick catheter.  The Glidewire was exchanged for an Amplatz wire through a hockey-stick catheter within the bowel.    A new 8 Senegalese internal external biliary drain was placed over the Amplatz wire without complication.  Cholangiogram confirmed appropriate location.    Catheter was sutured to the skin using 2-0 monofilament. The patient tolerated the procedure well. There were no immediate complications.    All needle, wire and catheter manipulations were done under fluoroscopic guidance.    EBL: < 10mL    Fluoro Time (min): 4.3    Fluoro Dose (mGy): 36  Impression: Successful placement of biliary drainage catheter: 8 Senegalese internal external biliary drainage catheter.    Plan:    Patient should return in 24-48 hours for a full cholangiogram.    I, Renny Batres, was present for the entire procedure.    Electronically signed by: Renny Batres  Date:    11/22/2023  Time:    07:37  IR Biliary Drain Internal/External  Addendum: No full cholangiogram required in 24-48 hours.     Electronically signed by: Renny Batres   Date:    11/22/2023   Time:    07:40  Narrative: EXAMINATION:  Ultrasound guidance    Fluoroscopic guidance    Percutaneous transhepatic cholangiogram    Internal external biliary drain placement    CLINICAL HISTORY:  58-year-old requiring biliary drainage.    Attending: Renny Batres    Anesthesia: General anesthesia was " "provided by the anesthesiology department.    TECHNIQUE:  After the risks, benefits and alternatives were discussed, consent was obtained. A time out was performed to verify the patient's identity and procedure.    The patient was placed supine on the angiographic table. The abdomen was cleaned and prepped in normal sterile fashion. A right access site was identified using fluoroscopy. Subcutaneous tissues were anesthetized with lidocaine solution. A dermatotomy was performed with an #11 blade scalpel.    Using a 21 gauge Chiba needle an mid axillary approach with dilute contrast was performed into the liver parenchyma. Realtime ultrasound was used for needle guidance and a static image was obtained.  Once access was obtained into an intrahepatic duct an 0.018" wire was advanced into the intrahepatic ducts.    Over the wire, an accustick system was advanced and the inner obturators were removed.    The narrowing of the common bile duct was traversed utilizing a Glidewire and a hockey-stick catheter.  The Glidewire was exchanged for an Amplatz wire through a hockey-stick catheter within the bowel.    A new 8 Martiniquais internal external biliary drain was placed over the Amplatz wire without complication.  Cholangiogram confirmed appropriate location.    Catheter was sutured to the skin using 2-0 monofilament. The patient tolerated the procedure well. There were no immediate complications.    All needle, wire and catheter manipulations were done under fluoroscopic guidance.    EBL: < 10mL    Fluoro Time (min): 4.3    Fluoro Dose (mGy): 36  Impression: Successful placement of biliary drainage catheter: 8 Martiniquais internal external biliary drainage catheter.    Plan:    Patient should return in 24-48 hours for a full cholangiogram.    I, Renny Batres, was present for the entire procedure.    Electronically signed by: Renny Batres  Date:    11/22/2023  Time:    07:37      Nick Plummer MD  Department of Hospital Medicine "   [facility]   11/26/2023

## 2023-11-26 NOTE — PROGRESS NOTES
Surgery coverage fro Dr. Morse,     Successful placement of internal external biliary stent/drainby IR.  There was some question of possible extravasation, CT scan was performed today and shows contrast filling the remnant gallbladder with no other evidence of extravasation or significant fluid collection.     PTC cholangiogram did not suggest bile duct injury and corroborated outside surgeon report that partial cholecystectomy was performed with a small remnant gallbladder seen.  There was some question of possible extravasation from the duodenal but CT scan today shows no evidence of this, small remnant gallbladder fills with contrast as expected.     Patient's gastric biopsy did return as adenocarcinoma.  Suspect that biliary obstruction secondary to pam hepatis lymphadenopathy.  This will be confirmed on outpatient PET-CT scan once the patient is discharge.  Recommend medical oncology consultation as the patient will need neoadjuvant chemotherapy prior to consideration for any surgical intervention.     Once bilirubin begins to trend downward which may take several days, biliary drain can be capped as continued drainage to gravity may cause fluid and electrolyte abnormalities.     At some point patient's drain can be internalized (capped), this can be done on an outpatient basis and is often done a week or 2 following percutaneous drainage to minimize chances of an internal bile leak     CT scan reviewed, there maybe local extension of the gastric cancer and to adjacent structures, periportal lymphadenopathy and invasion is likely the culprit of the patient's biliary obstruction.  Edematous change around the root of the mesentery likely a reflection of central lymphatic obstruction/invasion.  Clinically/radiographically this could be a T4 N2/3 stage IIIB/C gastric cancer.  Prognosis guarded.  We may be beyond the inflection point at which medical intervention can have a meaningful impact on the course of this  disease.     Will initiate Reglan to try and help with nutrition            History as above     Vital signs stable and afebrile with the exception of episodic tachycardia   Marked increase in white blood count, slightly improved at 22,000  Electrolytes all normal  Alkaline phosphatase and transaminases continue to improve  Bilirubin stable at 8.2  H&H down to 11 and 31, no overt signs of active bleeding, no hematochezia, no melena no coffee-ground emesis     Patient feels about the same today  Had some nausea early this morning with taking p.o.  Has taken less than 10% of the current tray in front of her  Has not taken her nutritional supplements yet today     Abdomen is soft and benign  Drain site, Clean dry and intact     Bilirubin up to 9.2.  Drain appears to be working fine, AST ALT and alkaline phosphatase continue to improve  Drainage is ochre colored medium oxidized bilious unchanged     Leukocytosis likely secondary to cholangitis, improved 19,000  Adequately covered on Zosyn  Creatinine up significantly this morning to 1.19, BUN up to 26 all consistent with volume contraction     Recommend calorie counts as a percentage of meals to assess nutritional needs moving forward.  Orders placed.     Maintain biliary drain to gravity drainage until we see improvement in bilirubin and leukocytosis     I can not find any record that calorie counts is the percentage of meals have been recorded.  Nursing staff is unable to provide any information at present.  In speaking to the patient she was able to take what sounds like approximately 10% of her meals yesterday and 1-1/2 bottles of nutritional supplement.     The above information is critical in order to make appropriate medical decisions for the patient moving forward regarding the direction of her care.  We may be beyond the point at which medical intervention can have a meaningful impact on the disease process.  Her ability to tolerate p.o. will be critical to  this decision-making in an order to make those decisions accurate information is necessary.     Above discussed with nursing staff and charge nurse on the floor.     Case discussed with primary team     Will order upper GI to assess gastric emptying with liquids  Increase maintenance IV fluids to accommodate inadequate p.o. intake     Given the bulky disease around the pam hepatis and the biliary obstruction there is likely a significant degree of portal obstruction as well.  The primary pathophysiology behind the patient's inability to eat maybe more so related to pam hepatis invasion and encasement the mechanical obstruction of the stomach.  If mechanical obstruction of the stomach is present then patient may benefit from a J-tube.  If the primary pathophysiology is invasion of the pam hepatis with portal obstruction and restriction of blood flow as well as parasympathetic and vagus nerve involvement patient will not benefit from a J-tube.     Given the extent and stage of disease this patient may be beyond the point at which moderate medicine can have a meaningful impact on the progression of her disease.     Surgery progress note 11/26/2023  Came to the hospitalist morning and found the patient had been transferred to [facility]  There is no interval documentation since my last visit in regards to the reason for the transfer  Nursing staff is currently unable to provide me any answers  Patient states that her heart was giving her trouble  Patient awake alert appropriate  Abdomen soft and benign  PTC drain site dressed, clean dry and intact  Ochre bilious drainage in the drain  Patient states that she took some liquid but it keeps coming right back up  Upper GI ordered for this morning  Nursing staff currently unaware of the study, we discussed the study and patient's NPO status for the study as well as the fact that if she was not going to have the study today for whatever reason that her full liquid diet can  be resumed  Night nurse was able to provide me with information that patient was transferred for AFib with RVR and is currently on an amiodarone drip  A bolus the patient 1 L normal saline yesterday in addition to primary team starting IV fluids.  BUN slightly up, creatinine up to 2.1  Sodium 134, potassium 3.2, CO2 20  Bilirubin relative no change past 3 days, slightly decreased at 8.7  AST and ALT have normalized  Alkaline phosphatase 234    Assessment  AFib with RVR, transferred to telemetry unit on amiodarone drip at present, full anticoagulation with Lovenox  Gastric cancer Clinically/radiographically this could be a T4 N2/3 stage IIIB/C gastric cancer.  Prognosis guarded.  We may be beyond the inflection point at which medical intervention can have a meaningful impact on the course of this disease.  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 intake  Inability to tolerate adequate p.o.    Plan  Upper GI pending  Okay for full liquid diet and supplements outside of the upper GI as ordered  Will replace potassium, 40 mEq  Change IV fluids to D5 normal with 20 mEq KCL per L at 125  Patient will need daily electrolytes for the time being  BMP ordered for tomorrow   magnesium added to today's labs    HK

## 2023-11-26 NOTE — PROGRESS NOTES
[facility] - 8th Floor Med Surg    Cardiology  Progress Note    Patient Name: Karen Briggs  MRN: [id number]  Admission Date: 11/17/2023  Hospital Length of Stay: 9 days  Code Status: Full Code   Attending Provider: Kenney Steiner MD   Consulting Provider: MARIA A Laura  Primary Care Physician: Marian White FNP-C  Principal Problem:<principal problem not specified>    Patient information was obtained from patient, past medical records, ER records, and primary team.     Subjective:     Chief Complaint: Reason for Consult: AFIB    HPI: Ms. Briggs is a 57 y/o female with a history of HTN, who was last known to [facility], Dr. Randolph (2020). She presented to the ER on 11.17.23 with complaints of right flank pain. She went to see her PCP and an CT Abdomen and Pelvis was obtained. CT (11.17.23) revealed left-sided hydronephrosis with suspected distal obstruction/no clear stone visualized, postoperative changes of cholecystectomy with fluid in the gallbladder fossa may reflect postoperative seroma but biloma can not be entirely excluded, also noted for marked thickening of the stomach wall diffusely may be related to mesenteric edema/reactive. She does report having a laparoscopic cholecystectomy on 11.10.23, in which the gall bladder was not completely resect the gallbladder. On 11.24.23, she was found to be tachycardiac and an EKG was obtained that showed AFIB RVR. Significant labs include WBC 19.4, Chloride 108, CO2 17, BUN/Creat 25.6/1.19, , Albumin 2.1, AST 35, ALT 56. CT Chest unremarkable. CT Abd Plevis revealed Interval placement of a transhepatic biliary stent satisfactory position, Diffuse gastric wall thickening consistent with gastritis, Bilateral hydronephrosis severe on the left and moderate on the right, stable, and Generalized mesenteric inflammatory change, mild ascites, mild pleural fluid. 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  Severe inflammation and oozing of blood noted proximal gastric lumen.  Unable to advance scope into the duodenum.  [facility] has been consulted for AFIB management.      Hospital Course:  11.26.23: Remains Afib, Rvr. Amiodarone drip in progress. LFTs stable. H/H stable on weight based lovenox BID.         PMH: HTN, Sciatica   PSH: Carpal Tunnel Release, Hemorrhoidectomy, EGD, ERCP  Family History: Father - MI, CAD, HTN; Mother - DM II, MI, CAD, HTN, Cancer; Daughter DM II, Rheumatoid Arthritis   Social History: Denies Smoking, illicit drug use, and alcohol use.     Previous Cardiac Diagnostics:   Lower Ext Venous US (1.6.21):  The study quality is average. 2.6 seconds of reflux is noted in the right GSV at the SFJ with a diameter of 6.3mm 1.2 seconds of reflux is noted in the left GSV at the SFJ with a diameter of 8.7mm. Limited study to evaluate SFJ.    MANFRED (11.16.20):  The study quality is good. The resting right ankle brachial index is 1.15 consistent with no significant right peripheral vascular disease.The resting left ankle brachial index is 1.1 consistent with no significant left peripheral vascular disease.    Lower Ext Arterial US (11.16.20):  The study quality is good. Triphasic waveforms and minimal plaque noted throughout the bilateral lower extremity arteries. No evidence of stenosis found. Calcified artery walls noted in some areas of the bilateral tibial arteries.    TTE (3.12.20):  The study quality is average. The left ventricle is normal in size with mild left ventricular hypertrophy and normal global left ventricular systolic function. The left ventricular ejection fraction is 60%. The left ventricle diastolic function is impaired (Grade I) with normal left atrial pressure. Mild calcification of the aortic valve is noted with adequate cuspal excursion. Mild (1+) tricuspid regurgitation.The estimated pulmonary artery systolic pressure is 23.9 mmHg assuming a right atrial  pressure of 3 mmHg.     LHC (6.11.20):  Normal Coronaries     MPI (2.18.20):  This is probably a normal perfusion study. There is no evidence of ischemia. This scan is suggestive of low risk for future cardiovascular events. Small reversible perfusion abnormality of mild intensity in the basal inferoseptal segment. The left ventricular cavity is noted to be normal on the stress study. The left ventricular ejection fraction was calculated to be 71% and left ventricular global function is normal. The study quality is excellent.     Calcium Score (2.10.20):  169    Review of Systems   Respiratory:  Negative for chest tightness and shortness of breath.    Cardiovascular:  Negative for chest pain, palpitations and leg swelling.   All other systems reviewed and are negative.      Objective:     Vital Signs (Most Recent):  Temp: 97.6 °F (36.4 °C) (11/26/23 0737)  Pulse: (!) 115 (11/26/23 0737)  Resp: 18 (11/26/23 0737)  BP: 106/73 (11/26/23 0737)  SpO2: 97 % (11/26/23 0737) Vital Signs (24h Range):  Temp:  [97.6 °F (36.4 °C)-98.6 °F (37 °C)] 97.6 °F (36.4 °C)  Pulse:  [] 115  Resp:  [18-20] 18  SpO2:  [94 %-99 %] 97 %  BP: (106-136)/(69-98) 106/73     Weight: 82.2 kg (181 lb 4 oz)  Body mass index is 29.25 kg/m².    SpO2: 97 %         Intake/Output Summary (Last 24 hours) at 11/26/2023 0808  Last data filed at 11/26/2023 0436  Gross per 24 hour   Intake --   Output 400 ml   Net -400 ml         Lines/Drains/Airways       Drain  Duration                  Biliary Tube 11/21/23 1352 Other (Comment) 8 Fr. RUQ 4 days              Peripheral Intravenous Line  Duration                  Peripheral IV - Single Lumen 11/24/23 1914 20 G Anterior;Distal;Right Forearm 1 day         Peripheral IV - Single Lumen 11/25/23 1238 18 G Anterior;Left Upper Arm <1 day                    Significant Labs:  Recent Results (from the past 72 hour(s))   Urinalysis, Reflex to Urine Culture    Collection Time: 11/23/23 10:12 AM    Specimen: Urine    Result Value Ref Range    Color, UA Dark-Yellow Yellow, Light-Yellow, Straw, Dark-Yellow    Appearance, UA Turbid (A) Clear    Specific Gravity, UA >1.050 (H) 1.005 - 1.030    pH, UA 6.0 5.0 - 8.5    Protein, UA 2+ (A) Negative    Glucose, UA Trace (A) Negative, Normal    Ketones, UA Negative Negative    Blood, UA 1+ (A) Negative    Bilirubin, UA 2+ (A) Negative    Urobilinogen, UA 2.0 (A) 0.2, 1.0, Normal    Nitrites, UA Negative Negative    Leukocyte Esterase, UA Negative Negative    WBC, UA 11-20 (A) None Seen, 0-2, 3-5, 0-5 /HPF    Bacteria, UA Few (A) None Seen, Trace /HPF    Squamous Epithelial Cells, UA Few (A) None Seen /HPF    Mucous, UA Moderate (A) None Seen /LPF    RBC, UA 6-10 (A) None Seen, 0-2, 3-5, 0-5 /HPF   Urine culture    Collection Time: 11/23/23 10:12 AM    Specimen: Urine   Result Value Ref Range    Urine Culture No Growth    Comprehensive Metabolic Panel    Collection Time: 11/24/23  4:47 AM   Result Value Ref Range    Sodium Level 138 136 - 145 mmol/L    Potassium Level 3.5 3.5 - 5.1 mmol/L    Chloride 106 98 - 107 mmol/L    Carbon Dioxide 23 23 - 31 mmol/L    Glucose Level 96 82 - 115 mg/dL    Blood Urea Nitrogen 18.7 9.8 - 20.1 mg/dL    Creatinine 0.87 0.55 - 1.02 mg/dL    Calcium Level Total 9.2 8.4 - 10.2 mg/dL    Protein Total 6.5 5.8 - 7.6 gm/dL    Albumin Level 2.3 (L) 3.4 - 4.8 g/dL    Globulin 4.2 (H) 2.4 - 3.5 gm/dL    Albumin/Globulin Ratio 0.5 (L) 1.1 - 2.0 ratio    Bilirubin Total 8.2 (H) <=1.5 mg/dL    Alkaline Phosphatase 320 (H) 40 - 150 unit/L    Alanine Aminotransferase 78 (H) 0 - 55 unit/L    Aspartate Aminotransferase 40 (H) 5 - 34 unit/L    eGFR >60 mls/min/1.73/m2   CBC with Differential    Collection Time: 11/24/23  4:47 AM   Result Value Ref Range    WBC 21.80 (H) 4.50 - 11.50 x10(3)/mcL    RBC 3.58 (L) 4.20 - 5.40 x10(6)/mcL    Hgb 11.3 (L) 12.0 - 16.0 g/dL    Hct 31.1 (L) 37.0 - 47.0 %    MCV 86.9 80.0 - 94.0 fL    MCH 31.6 (H) 27.0 - 31.0 pg    MCHC 36.3 (H) 33.0  - 36.0 g/dL    RDW 19.3 (H) 11.5 - 17.0 %    Platelet 316 130 - 400 x10(3)/mcL    MPV 11.4 (H) 7.4 - 10.4 fL    Neut % 90.5 %    Lymph % 1.9 %    Mono % 6.6 %    Eos % 0.0 %    Basophil % 0.1 %    Lymph # 0.42 (L) 0.6 - 4.6 x10(3)/mcL    Neut # 19.72 (H) 2.1 - 9.2 x10(3)/mcL    Mono # 1.44 (H) 0.1 - 1.3 x10(3)/mcL    Eos # 0.00 0 - 0.9 x10(3)/mcL    Baso # 0.02 <=0.2 x10(3)/mcL    IG# 0.20 (H) 0 - 0.04 x10(3)/mcL    IG% 0.9 %    NRBC% 0.0 %   Comprehensive Metabolic Panel    Collection Time: 11/25/23  4:14 AM   Result Value Ref Range    Sodium Level 137 136 - 145 mmol/L    Potassium Level 4.0 3.5 - 5.1 mmol/L    Chloride 108 (H) 98 - 107 mmol/L    Carbon Dioxide 17 (L) 23 - 31 mmol/L    Glucose Level 99 82 - 115 mg/dL    Blood Urea Nitrogen 25.6 (H) 9.8 - 20.1 mg/dL    Creatinine 1.19 (H) 0.55 - 1.02 mg/dL    Calcium Level Total 9.3 8.4 - 10.2 mg/dL    Protein Total 6.3 5.8 - 7.6 gm/dL    Albumin Level 2.1 (L) 3.4 - 4.8 g/dL    Globulin 4.2 (H) 2.4 - 3.5 gm/dL    Albumin/Globulin Ratio 0.5 (L) 1.1 - 2.0 ratio    Bilirubin Total 9.2 (H) <=1.5 mg/dL    Alkaline Phosphatase 258 (H) 40 - 150 unit/L    Alanine Aminotransferase 56 (H) 0 - 55 unit/L    Aspartate Aminotransferase 35 (H) 5 - 34 unit/L    eGFR 52 mls/min/1.73/m2   Magnesium    Collection Time: 11/25/23  4:14 AM   Result Value Ref Range    Magnesium Level 2.40 1.60 - 2.60 mg/dL   CBC with Differential    Collection Time: 11/25/23  4:14 AM   Result Value Ref Range    WBC 19.40 (H) 4.50 - 11.50 x10(3)/mcL    RBC 3.93 (L) 4.20 - 5.40 x10(6)/mcL    Hgb 12.0 12.0 - 16.0 g/dL    Hct 33.8 (L) 37.0 - 47.0 %    MCV 86.0 80.0 - 94.0 fL    MCH 30.5 27.0 - 31.0 pg    MCHC 35.5 33.0 - 36.0 g/dL    RDW 19.2 (H) 11.5 - 17.0 %    Platelet 313 130 - 400 x10(3)/mcL    MPV 11.7 (H) 7.4 - 10.4 fL    Neut % 87.6 %    Lymph % 2.6 %    Mono % 8.6 %    Eos % 0.1 %    Basophil % 0.2 %    Lymph # 0.51 (L) 0.6 - 4.6 x10(3)/mcL    Neut # 17.01 (H) 2.1 - 9.2 x10(3)/mcL    Mono # 1.66 (H)  0.1 - 1.3 x10(3)/mcL    Eos # 0.01 0 - 0.9 x10(3)/mcL    Baso # 0.03 <=0.2 x10(3)/mcL    IG# 0.18 (H) 0 - 0.04 x10(3)/mcL    IG% 0.9 %    NRBC% 0.0 %   Comprehensive Metabolic Panel    Collection Time: 11/26/23  4:25 AM   Result Value Ref Range    Sodium Level 134 (L) 136 - 145 mmol/L    Potassium Level 3.2 (L) 3.5 - 5.1 mmol/L    Chloride 102 98 - 107 mmol/L    Carbon Dioxide 20 (L) 23 - 31 mmol/L    Glucose Level 125 (H) 82 - 115 mg/dL    Blood Urea Nitrogen 37.4 (H) 9.8 - 20.1 mg/dL    Creatinine 2.11 (H) 0.55 - 1.02 mg/dL    Calcium Level Total 8.8 8.4 - 10.2 mg/dL    Protein Total 6.4 5.8 - 7.6 gm/dL    Albumin Level 2.0 (L) 3.4 - 4.8 g/dL    Globulin 4.4 (H) 2.4 - 3.5 gm/dL    Albumin/Globulin Ratio 0.5 (L) 1.1 - 2.0 ratio    Bilirubin Total 8.7 (H) <=1.5 mg/dL    Alkaline Phosphatase 234 (H) 40 - 150 unit/L    Alanine Aminotransferase 48 0 - 55 unit/L    Aspartate Aminotransferase 32 5 - 34 unit/L    eGFR 26 mls/min/1.73/m2   Magnesium    Collection Time: 11/26/23  4:25 AM   Result Value Ref Range    Magnesium Level 2.50 1.60 - 2.60 mg/dL   Phosphorus    Collection Time: 11/26/23  4:25 AM   Result Value Ref Range    Phosphorus Level 2.9 2.3 - 4.7 mg/dL   CBC with Differential    Collection Time: 11/26/23  4:25 AM   Result Value Ref Range    WBC 15.38 (H) 4.50 - 11.50 x10(3)/mcL    RBC 4.23 4.20 - 5.40 x10(6)/mcL    Hgb 12.8 12.0 - 16.0 g/dL    Hct 36.7 (L) 37.0 - 47.0 %    MCV 86.8 80.0 - 94.0 fL    MCH 30.3 27.0 - 31.0 pg    MCHC 34.9 33.0 - 36.0 g/dL    RDW 19.9 (H) 11.5 - 17.0 %    Platelet 446 (H) 130 - 400 x10(3)/mcL    MPV 11.6 (H) 7.4 - 10.4 fL    Neut % 81.4 %    Lymph % 6.0 %    Mono % 10.8 %    Eos % 0.2 %    Basophil % 0.2 %    Lymph # 0.92 0.6 - 4.6 x10(3)/mcL    Neut # 12.52 (H) 2.1 - 9.2 x10(3)/mcL    Mono # 1.66 (H) 0.1 - 1.3 x10(3)/mcL    Eos # 0.03 0 - 0.9 x10(3)/mcL    Baso # 0.03 <=0.2 x10(3)/mcL    IG# 0.22 (H) 0 - 0.04 x10(3)/mcL    IG% 1.4 %    NRBC% 0.0 %   POCT glucose    Collection  Time: 11/26/23  5:53 AM   Result Value Ref Range    POCT Glucose 124 (H) 70 - 110 mg/dL     Telemetry:  Afib RVR       Physical Exam  Vitals and nursing note reviewed.   HENT:      Head: Normocephalic.      Nose: Nose normal.      Mouth/Throat:      Mouth: Mucous membranes are moist.      Pharynx: Oropharynx is clear.   Eyes:      Pupils: Pupils are equal, round, and reactive to light.   Cardiovascular:      Rate and Rhythm: Tachycardia present. Rhythm irregular.      Pulses: Normal pulses.      Heart sounds: Normal heart sounds.   Pulmonary:      Effort: Pulmonary effort is normal. No respiratory distress.      Breath sounds: Normal breath sounds.   Abdominal:      General: Bowel sounds are normal.      Palpations: Abdomen is soft.      Comments: Biliary drain   Musculoskeletal:         General: Normal range of motion.      Cervical back: Normal range of motion.   Skin:     General: Skin is warm.   Neurological:      Mental Status: She is alert and oriented to person, place, and time.   Psychiatric:         Mood and Affect: Mood normal.         Behavior: Behavior normal.     Home Medications:   No current facility-administered medications on file prior to encounter.     Current Outpatient Medications on File Prior to Encounter   Medication Sig Dispense Refill    olmesartan (BENICAR) 20 MG tablet Take 20 mg by mouth once daily.       Current Inpatient Medications:    Current Facility-Administered Medications:     0.9 % NaCl with KCl 20 mEq infusion, , Intravenous, Continuous, Michael Rocha IV, MD    0.9%  NaCl infusion, , Intravenous, PRN, Hussein Merino MD, Stopped at 11/19/23 1049    acetaminophen tablet 650 mg, 650 mg, Oral, Q4H PRN, Hussein Merino MD    aluminum-magnesium hydroxide-simethicone 200-200-20 mg/5 mL suspension 30 mL, 30 mL, Oral, QID PRN, Hussein Merino MD    amino acid 4.25% - dextrose 5% solution, , Intravenous, Continuous, Kenney Steiner MD    amiodarone 360 mg/200 mL (1.8 mg/mL) infusion, 0.5  mg/min, Intravenous, Continuous, Ramiro Lomelia, FNP, Last Rate: 16.7 mL/hr at 11/25/23 2109, 0.5 mg/min at 11/25/23 2109    bisacodyL suppository 10 mg, 10 mg, Rectal, Daily PRN, Hussein Merino MD    enoxaparin injection 80 mg, 1 mg/kg, Subcutaneous, Q12H (prophylaxis, 0900/2100), Kenney Steiner MD, 80 mg at 11/25/23 2104    hydrALAZINE injection 10 mg, 10 mg, Intravenous, Q6H PRN, Kenney Steiner MD    melatonin tablet 6 mg, 6 mg, Oral, Nightly PRN, Hussein Merino MD    metoclopramide HCl injection 10 mg, 10 mg, Intravenous, Q8H, Michael Rocha IV, MD, 10 mg at 11/26/23 0554    metoprolol injection 5 mg, 5 mg, Intravenous, Q2H PRN, Tyler Lira ANP, 5 mg at 11/25/23 0651    metoprolol tartrate (LOPRESSOR) tablet 25 mg, 25 mg, Oral, BID, Melinda Lomeli, FNP, 25 mg at 11/25/23 2104    morphine injection 4 mg, 4 mg, Intravenous, Q4H PRN, Hussein Merino MD, 4 mg at 11/23/23 1209    ondansetron injection 4 mg, 4 mg, Intravenous, Q4H PRN, Hussein Merino MD, 4 mg at 11/25/23 0934    oxyCODONE immediate release tablet 5 mg, 5 mg, Oral, Q6H PRN, Hussein Merino MD, 5 mg at 11/23/23 1205    pantoprazole EC tablet 40 mg, 40 mg, Oral, BID AC, Kenney Steiner MD, 40 mg at 11/26/23 0553    piperacillin-tazobactam (ZOSYN) 4.5 g in dextrose 5 % in water (D5W) 100 mL IVPB (MB+), 4.5 g, Intravenous, Q8H, Kenney Steiner MD, Stopped at 11/26/23 0724    polyethylene glycol packet 17 g, 17 g, Oral, BID PRN, Wilber, Ali M, MD    prochlorperazine injection Soln 5 mg, 5 mg, Intravenous, Q6H PRN, Hussein Merino MD, 5 mg at 11/22/23 1347    promethazine injection 25 mg, 25 mg, Intramuscular, Q4H PRN, Lyssa Car, AGACNP-BC, 25 mg at 11/23/23 1620    senna-docusate 8.6-50 mg per tablet 2 tablet, 2 tablet, Oral, BID PRN, Hussein Merino MD    sodium chloride 0.9% flush 10 mL, 10 mL, Intravenous, PRN, Hussein Merino MD         VTE Risk Mitigation (From admission, onward)           Ordered     enoxaparin injection 80 mg  Every 12 hours         11/24/23  1729     IP VTE LOW RISK PATIENT  Once         11/18/23 0447     Place sequential compression device  Until discontinued         11/18/23 0447                    Assessment:   Newly Diagnosed Atrial Fibrillation     - [id number] Score 2 (2.2% Stroke Risk per Year)  Biliary Obstruction likely s/t portal hepatic lymphadenopathy  --Successful placement of biliary drainage catheter: 8 [place] internal external biliary drainage catheter on 11.21.23   -- Incomplete Laparoscopic cholecystectomy 11.10.23  Elevated LFTs--resolved  Leukocytosis likely s/t cholangitis  --improving  Bilateral Hydronephrosis     - Severe on the Left    - Moderate on the Right   Gastric cardia mass-pathology adenocarcinoma intestinal type   HTN  Sciatica   Hypokalemia  POORNIMA  --likley due to dehydration  No known History of GI Bleed     Plan:   Continue Metoprolol 25 mg oral BID and amiodarone drip per protocol  Continue FD Lovenox. Plan conversion to Eliquis 5mg oral BID at discharge  Will defer GAYLA/DCCV at this time due to comorbid conditions. Dehydration/electrolyte derangement/infection likely contributory to tachycardia.   Plans for upper GI in near future  Poor nutritional intake  Started on IVF due to dehydration.   Keep Mag > 2 and Potassium > 4        MARIA A Laura  Cardiology  [facility] - 8th Floor Med Surg  11/26/2023

## 2023-11-26 NOTE — PROCEDURES
"Karen Briggs is a 58 y.o. female patient.    Temp: 97.9 °F (36.6 °C) (11/26/23 1102)  Pulse: 103 (11/26/23 1102)  Resp: 18 (11/26/23 1102)  BP: 109/80 (11/26/23 1102)  SpO2: 96 % (11/26/23 1102)  Weight: 82.1 kg (181 lb) (11/26/23 0942)  Height: 5' 5.75" (167 cm) (11/26/23 0942)    PICC  Date/Time: 11/26/2023 12:47 PM  Performed by: Kalpana Lazcano RN  Consent Done: Yes  Time out: Immediately prior to procedure a time out was called to verify the correct patient, procedure, equipment, support staff and site/side marked as required  Indications: med administration and vascular access  Anesthesia: local infiltration  Local anesthetic: lidocaine 1% without epinephrine    Preparation: skin prepped with ChloraPrep  Skin prep agent dried: skin prep agent completely dried prior to procedure  Sterile barriers: all five maximum sterile barriers used - cap, mask, sterile gown, sterile gloves, and large sterile sheet  Hand hygiene: hand hygiene performed prior to central venous catheter insertion  Location details: right basilic  Catheter type: triple lumen  Catheter size: 5 Fr  Catheter Length: 36cm    Ultrasound guidance: yes  Vessel Caliber: patentNeedle advanced into vessel with real time Ultrasound guidance.  Guidewire confirmed in vessel.  Sterile sheath used.  Number of attempts: 1  Post-procedure: blood return through all ports, chlorhexidine patch and sterile dressing applied            Name Kalpana Lazcano RN   11/26/2023    "

## 2023-11-27 ENCOUNTER — ANESTHESIA (OUTPATIENT)
Dept: SURGERY | Facility: HOSPITAL | Age: 58
DRG: 356 | End: 2023-11-27
Payer: COMMERCIAL

## 2023-11-27 ENCOUNTER — ANESTHESIA EVENT (OUTPATIENT)
Dept: SURGERY | Facility: HOSPITAL | Age: 58
DRG: 356 | End: 2023-11-27
Payer: COMMERCIAL

## 2023-11-27 DIAGNOSIS — C16.9 GASTRIC ADENOCARCINOMA: Primary | ICD-10-CM

## 2023-11-27 LAB
ANION GAP SERPL CALC-SCNC: 11 MEQ/L
ANION GAP SERPL CALC-SCNC: 13 MEQ/L
BASOPHILS # BLD AUTO: 0.03 X10(3)/MCL
BASOPHILS NFR BLD AUTO: 0.2 %
BUN SERPL-MCNC: 44.9 MG/DL (ref 9.8–20.1)
BUN SERPL-MCNC: 56.2 MG/DL (ref 9.8–20.1)
CALCIUM SERPL-MCNC: 7.5 MG/DL (ref 8.4–10.2)
CALCIUM SERPL-MCNC: 7.6 MG/DL (ref 8.4–10.2)
CHLORIDE SERPL-SCNC: 103 MMOL/L (ref 98–107)
CHLORIDE SERPL-SCNC: 103 MMOL/L (ref 98–107)
CO2 SERPL-SCNC: 16 MMOL/L (ref 23–31)
CO2 SERPL-SCNC: 16 MMOL/L (ref 23–31)
CREAT SERPL-MCNC: 3.16 MG/DL (ref 0.55–1.02)
CREAT SERPL-MCNC: 4.15 MG/DL (ref 0.55–1.02)
CREAT UR-MCNC: 124.1 MG/DL (ref 45–106)
CREAT/UREA NIT SERPL: 14
CREAT/UREA NIT SERPL: 14
EOSINOPHIL # BLD AUTO: 0.01 X10(3)/MCL (ref 0–0.9)
EOSINOPHIL NFR BLD AUTO: 0.1 %
ERYTHROCYTE [DISTWIDTH] IN BLOOD BY AUTOMATED COUNT: 19.4 % (ref 11.5–17)
GFR SERPLBLD CREATININE-BSD FMLA CKD-EPI: 12 MLS/MIN/1.73/M2
GFR SERPLBLD CREATININE-BSD FMLA CKD-EPI: 16 MLS/MIN/1.73/M2
GLUCOSE SERPL-MCNC: 114 MG/DL (ref 82–115)
GLUCOSE SERPL-MCNC: 152 MG/DL (ref 82–115)
HCT VFR BLD AUTO: 26.7 % (ref 37–47)
HGB BLD-MCNC: 9.2 G/DL (ref 12–16)
IMM GRANULOCYTES # BLD AUTO: 0.2 X10(3)/MCL (ref 0–0.04)
IMM GRANULOCYTES NFR BLD AUTO: 1.6 %
LYMPHOCYTES # BLD AUTO: 0.73 X10(3)/MCL (ref 0.6–4.6)
LYMPHOCYTES NFR BLD AUTO: 5.8 %
MAGNESIUM SERPL-MCNC: 2.3 MG/DL (ref 1.6–2.6)
MCH RBC QN AUTO: 30.3 PG (ref 27–31)
MCHC RBC AUTO-ENTMCNC: 34.5 G/DL (ref 33–36)
MCV RBC AUTO: 87.8 FL (ref 80–94)
MONOCYTES # BLD AUTO: 1.4 X10(3)/MCL (ref 0.1–1.3)
MONOCYTES NFR BLD AUTO: 11.1 %
NEUTROPHILS # BLD AUTO: 10.2 X10(3)/MCL (ref 2.1–9.2)
NEUTROPHILS NFR BLD AUTO: 81.2 %
NRBC BLD AUTO-RTO: 0 %
PLATELET # BLD AUTO: 369 X10(3)/MCL (ref 130–400)
PMV BLD AUTO: 11.9 FL (ref 7.4–10.4)
POTASSIUM SERPL-SCNC: 3.6 MMOL/L (ref 3.5–5.1)
POTASSIUM SERPL-SCNC: 4.8 MMOL/L (ref 3.5–5.1)
PROT UR STRIP-MCNC: 542.4 MG/DL
RBC # BLD AUTO: 3.04 X10(6)/MCL (ref 4.2–5.4)
SODIUM SERPL-SCNC: 130 MMOL/L (ref 136–145)
SODIUM SERPL-SCNC: 132 MMOL/L (ref 136–145)
SODIUM UR-SCNC: 35 MMOL/L
URINE PROTEIN/CREATININE RATIO (OLG): 4.4
WBC # SPEC AUTO: 12.57 X10(3)/MCL (ref 4.5–11.5)

## 2023-11-27 PROCEDURE — 63600175 PHARM REV CODE 636 W HCPCS: Performed by: INTERNAL MEDICINE

## 2023-11-27 PROCEDURE — 82570 ASSAY OF URINE CREATININE: CPT | Performed by: NURSE PRACTITIONER

## 2023-11-27 PROCEDURE — 25000003 PHARM REV CODE 250: Performed by: NURSE ANESTHETIST, CERTIFIED REGISTERED

## 2023-11-27 PROCEDURE — C1758 CATHETER, URETERAL: HCPCS | Performed by: SPECIALIST

## 2023-11-27 PROCEDURE — C2617 STENT, NON-COR, TEM W/O DEL: HCPCS | Performed by: SPECIALIST

## 2023-11-27 PROCEDURE — 84300 ASSAY OF URINE SODIUM: CPT | Performed by: NURSE PRACTITIONER

## 2023-11-27 PROCEDURE — 37000009 HC ANESTHESIA EA ADD 15 MINS: Performed by: SPECIALIST

## 2023-11-27 PROCEDURE — 36000706: Performed by: SPECIALIST

## 2023-11-27 PROCEDURE — 11000001 HC ACUTE MED/SURG PRIVATE ROOM

## 2023-11-27 PROCEDURE — 80048 BASIC METABOLIC PNL TOTAL CA: CPT | Performed by: SURGERY

## 2023-11-27 PROCEDURE — 63600175 PHARM REV CODE 636 W HCPCS: Performed by: NURSE ANESTHETIST, CERTIFIED REGISTERED

## 2023-11-27 PROCEDURE — 25000003 PHARM REV CODE 250: Performed by: NURSE PRACTITIONER

## 2023-11-27 PROCEDURE — 80048 BASIC METABOLIC PNL TOTAL CA: CPT | Performed by: SPECIALIST

## 2023-11-27 PROCEDURE — 51701 INSERT BLADDER CATHETER: CPT

## 2023-11-27 PROCEDURE — D9220A PRA ANESTHESIA: Mod: ANES,,, | Performed by: ANESTHESIOLOGY

## 2023-11-27 PROCEDURE — D9220A PRA ANESTHESIA: Mod: CRNA,,, | Performed by: NURSE ANESTHETIST, CERTIFIED REGISTERED

## 2023-11-27 PROCEDURE — 36000707: Performed by: SPECIALIST

## 2023-11-27 PROCEDURE — 71000033 HC RECOVERY, INTIAL HOUR: Performed by: SPECIALIST

## 2023-11-27 PROCEDURE — D9220A PRA ANESTHESIA: ICD-10-PCS | Mod: CRNA,,, | Performed by: NURSE ANESTHETIST, CERTIFIED REGISTERED

## 2023-11-27 PROCEDURE — 37000008 HC ANESTHESIA 1ST 15 MINUTES: Performed by: SPECIALIST

## 2023-11-27 PROCEDURE — 25000003 PHARM REV CODE 250: Performed by: INTERNAL MEDICINE

## 2023-11-27 PROCEDURE — 51798 US URINE CAPACITY MEASURE: CPT

## 2023-11-27 PROCEDURE — 85025 COMPLETE CBC W/AUTO DIFF WBC: CPT | Performed by: INTERNAL MEDICINE

## 2023-11-27 PROCEDURE — 63600175 PHARM REV CODE 636 W HCPCS: Performed by: SURGERY

## 2023-11-27 PROCEDURE — A4216 STERILE WATER/SALINE, 10 ML: HCPCS | Performed by: INTERNAL MEDICINE

## 2023-11-27 PROCEDURE — C1729 CATH, DRAINAGE: HCPCS | Performed by: SPECIALIST

## 2023-11-27 PROCEDURE — C1769 GUIDE WIRE: HCPCS | Performed by: SPECIALIST

## 2023-11-27 PROCEDURE — 97162 PT EVAL MOD COMPLEX 30 MIN: CPT

## 2023-11-27 PROCEDURE — D9220A PRA ANESTHESIA: ICD-10-PCS | Mod: ANES,,, | Performed by: ANESTHESIOLOGY

## 2023-11-27 DEVICE — IMPLANTABLE DEVICE: Type: IMPLANTABLE DEVICE | Site: URETER | Status: FUNCTIONAL

## 2023-11-27 DEVICE — STENT UNIVERSA SFT POS 5F 24CM: Type: IMPLANTABLE DEVICE | Site: URETER | Status: FUNCTIONAL

## 2023-11-27 RX ORDER — DEXAMETHASONE SODIUM PHOSPHATE 4 MG/ML
INJECTION, SOLUTION INTRA-ARTICULAR; INTRALESIONAL; INTRAMUSCULAR; INTRAVENOUS; SOFT TISSUE
Status: DISCONTINUED | OUTPATIENT
Start: 2023-11-27 | End: 2023-11-28

## 2023-11-27 RX ORDER — PHENYLEPHRINE HCL IN 0.9% NACL 1 MG/10 ML
SYRINGE (ML) INTRAVENOUS
Status: DISCONTINUED | OUTPATIENT
Start: 2023-11-27 | End: 2023-11-28

## 2023-11-27 RX ORDER — SODIUM BICARBONATE 650 MG/1
650 TABLET ORAL 2 TIMES DAILY
Status: DISCONTINUED | OUTPATIENT
Start: 2023-11-27 | End: 2023-11-28

## 2023-11-27 RX ORDER — ROCURONIUM BROMIDE 10 MG/ML
INJECTION, SOLUTION INTRAVENOUS
Status: DISCONTINUED | OUTPATIENT
Start: 2023-11-27 | End: 2023-11-28

## 2023-11-27 RX ORDER — PROPOFOL 10 MG/ML
VIAL (ML) INTRAVENOUS
Status: DISCONTINUED | OUTPATIENT
Start: 2023-11-27 | End: 2023-11-28

## 2023-11-27 RX ORDER — HYDROMORPHONE HYDROCHLORIDE 2 MG/ML
0.2 INJECTION, SOLUTION INTRAMUSCULAR; INTRAVENOUS; SUBCUTANEOUS EVERY 5 MIN PRN
Status: CANCELLED | OUTPATIENT
Start: 2023-11-27

## 2023-11-27 RX ORDER — FENTANYL CITRATE 50 UG/ML
INJECTION, SOLUTION INTRAMUSCULAR; INTRAVENOUS
Status: DISCONTINUED | OUTPATIENT
Start: 2023-11-27 | End: 2023-11-28

## 2023-11-27 RX ORDER — CALCIUM CHLORIDE INJECTION 100 MG/ML
INJECTION, SOLUTION INTRAVENOUS
Status: DISCONTINUED | OUTPATIENT
Start: 2023-11-27 | End: 2023-11-28

## 2023-11-27 RX ORDER — SODIUM CHLORIDE 9 MG/ML
INJECTION, SOLUTION INTRAVENOUS ONCE
Status: COMPLETED | OUTPATIENT
Start: 2023-11-27 | End: 2023-11-27

## 2023-11-27 RX ORDER — SODIUM CHLORIDE, SODIUM LACTATE, POTASSIUM CHLORIDE, CALCIUM CHLORIDE 600; 310; 30; 20 MG/100ML; MG/100ML; MG/100ML; MG/100ML
INJECTION, SOLUTION INTRAVENOUS CONTINUOUS
Status: DISCONTINUED | OUTPATIENT
Start: 2023-11-27 | End: 2023-11-28

## 2023-11-27 RX ORDER — LIDOCAINE HYDROCHLORIDE 20 MG/ML
INJECTION, SOLUTION EPIDURAL; INFILTRATION; INTRACAUDAL; PERINEURAL
Status: DISCONTINUED | OUTPATIENT
Start: 2023-11-27 | End: 2023-11-28

## 2023-11-27 RX ORDER — SODIUM CHLORIDE 0.9 % (FLUSH) 0.9 %
10 SYRINGE (ML) INJECTION
Status: CANCELLED | OUTPATIENT
Start: 2023-11-28

## 2023-11-27 RX ORDER — ONDANSETRON HYDROCHLORIDE 2 MG/ML
4 INJECTION, SOLUTION INTRAVENOUS DAILY PRN
Status: CANCELLED | OUTPATIENT
Start: 2023-11-27

## 2023-11-27 RX ORDER — ACETAMINOPHEN 10 MG/ML
INJECTION, SOLUTION INTRAVENOUS
Status: DISCONTINUED | OUTPATIENT
Start: 2023-11-27 | End: 2023-11-28

## 2023-11-27 RX ADMIN — METOCLOPRAMIDE 10 MG: 5 INJECTION, SOLUTION INTRAMUSCULAR; INTRAVENOUS at 06:11

## 2023-11-27 RX ADMIN — POTASSIUM CHLORIDE AND SODIUM CHLORIDE: 900; 150 INJECTION, SOLUTION INTRAVENOUS at 12:11

## 2023-11-27 RX ADMIN — SODIUM BICARBONATE 650 MG TABLET 650 MG: at 10:11

## 2023-11-27 RX ADMIN — SODIUM CHLORIDE, PRESERVATIVE FREE 10 ML: 5 INJECTION INTRAVENOUS at 05:11

## 2023-11-27 RX ADMIN — SUGAMMADEX 164 MG: 100 INJECTION, SOLUTION INTRAVENOUS at 11:11

## 2023-11-27 RX ADMIN — PROPOFOL 80 MG: 10 INJECTION, EMULSION INTRAVENOUS at 11:11

## 2023-11-27 RX ADMIN — SODIUM CHLORIDE, PRESERVATIVE FREE 10 ML: 5 INJECTION INTRAVENOUS at 06:11

## 2023-11-27 RX ADMIN — SODIUM CHLORIDE, SODIUM GLUCONATE, SODIUM ACETATE, POTASSIUM CHLORIDE AND MAGNESIUM CHLORIDE: 526; 502; 368; 37; 30 INJECTION, SOLUTION INTRAVENOUS at 11:11

## 2023-11-27 RX ADMIN — LIDOCAINE HYDROCHLORIDE 4 ML: 20 INJECTION, SOLUTION EPIDURAL; INFILTRATION; INTRACAUDAL; PERINEURAL at 11:11

## 2023-11-27 RX ADMIN — METOCLOPRAMIDE 10 MG: 5 INJECTION, SOLUTION INTRAMUSCULAR; INTRAVENOUS at 10:11

## 2023-11-27 RX ADMIN — Medication 150 MCG: at 11:11

## 2023-11-27 RX ADMIN — FENTANYL CITRATE 25 MCG: 50 INJECTION, SOLUTION INTRAMUSCULAR; INTRAVENOUS at 11:11

## 2023-11-27 RX ADMIN — ENOXAPARIN SODIUM 80 MG: 80 INJECTION SUBCUTANEOUS at 10:11

## 2023-11-27 RX ADMIN — DEXAMETHASONE SODIUM PHOSPHATE 4 MG: 4 INJECTION, SOLUTION INTRA-ARTICULAR; INTRALESIONAL; INTRAMUSCULAR; INTRAVENOUS; SOFT TISSUE at 11:11

## 2023-11-27 RX ADMIN — CALCIUM CHLORIDE INJECTION 0.5 G: 100 INJECTION, SOLUTION INTRAVENOUS at 11:11

## 2023-11-27 RX ADMIN — SODIUM CHLORIDE, POTASSIUM CHLORIDE, SODIUM LACTATE AND CALCIUM CHLORIDE: 600; 310; 30; 20 INJECTION, SOLUTION INTRAVENOUS at 03:11

## 2023-11-27 RX ADMIN — ACETAMINOPHEN 1000 MG: 10 INJECTION, SOLUTION INTRAVENOUS at 11:11

## 2023-11-27 RX ADMIN — PIPERACILLIN SODIUM AND TAZOBACTAM SODIUM 4.5 G: 4; .5 INJECTION, POWDER, FOR SOLUTION INTRAVENOUS at 02:11

## 2023-11-27 RX ADMIN — ASCORBIC ACID, VITAMIN A PALMITATE, CHOLECALCIFEROL, THIAMINE HYDROCHLORIDE, RIBOFLAVIN-5 PHOSPHATE SODIUM, PYRIDOXINE HYDROCHLORIDE, NIACINAMIDE, DEXPANTHENOL, ALPHA-TOCOPHEROL ACETATE, VITAMIN K1, FOLIC ACID, BIOTIN, CYANOCOBALAMIN: 200; 3300; 200; 6; 3.6; 6; 40; 15; 10; 150; 600; 60; 5 INJECTION, SOLUTION INTRAVENOUS at 07:11

## 2023-11-27 RX ADMIN — ROCURONIUM BROMIDE 40 MG: 10 SOLUTION INTRAVENOUS at 11:11

## 2023-11-27 RX ADMIN — PIPERACILLIN SODIUM AND TAZOBACTAM SODIUM 4.5 G: 4; .5 INJECTION, POWDER, FOR SOLUTION INTRAVENOUS at 03:11

## 2023-11-27 RX ADMIN — SODIUM CHLORIDE: 9 INJECTION, SOLUTION INTRAVENOUS at 09:11

## 2023-11-27 RX ADMIN — PROPOFOL 50 MG: 10 INJECTION, EMULSION INTRAVENOUS at 11:11

## 2023-11-27 RX ADMIN — PANTOPRAZOLE SODIUM 40 MG: 40 TABLET, DELAYED RELEASE ORAL at 06:11

## 2023-11-27 RX ADMIN — PANTOPRAZOLE SODIUM 40 MG: 40 TABLET, DELAYED RELEASE ORAL at 03:11

## 2023-11-27 RX ADMIN — SODIUM CHLORIDE, PRESERVATIVE FREE 10 ML: 5 INJECTION INTRAVENOUS at 12:11

## 2023-11-27 RX ADMIN — LEUCINE, PHENYLALANINE, LYSINE, METHIONINE, ISOLEUCINE, VALINE, HISTIDINE, THREONINE, TRYPTOPHAN, ALANINE, GLYCINE, ARGININE, PROLINE, SERINE, TYROSINE, DEXTROSE: 311; 238; 247; 170; 255; 247; 204; 179; 77; 880; 438; 489; 289; 213; 17; 5 INJECTION INTRAVENOUS at 11:11

## 2023-11-27 RX ADMIN — METOPROLOL TARTRATE 5 MG: 1 INJECTION, SOLUTION INTRAVENOUS at 06:11

## 2023-11-27 RX ADMIN — FENTANYL CITRATE 75 MCG: 50 INJECTION, SOLUTION INTRAMUSCULAR; INTRAVENOUS at 11:11

## 2023-11-27 RX ADMIN — ONDANSETRON 4 MG: 2 INJECTION INTRAMUSCULAR; INTRAVENOUS at 11:11

## 2023-11-27 RX ADMIN — METOCLOPRAMIDE 10 MG: 5 INJECTION, SOLUTION INTRAMUSCULAR; INTRAVENOUS at 03:11

## 2023-11-27 NOTE — CONSULTS
[facility] - [address] [facility] Telemetry  Nephrology  Consult Note    Patient Name: Karen Briggs  MRN: [id number]  Admission Date: 11/17/2023  Hospital Length of Stay: 10 days  Attending Provider: Kenney Steiner MD   Primary Care Physician: Marian White FNP-C  Principal Problem:<principal problem not specified>    Consults  Subjective:     HPI: This is a 58-year-old female presented on  7 days post op from laparoscopic cholecystectomy on 11/10. Gallbladder was unable to be resected. Back and flank pain persisted post operatively prompting evaluation at [facility]. After workup her surgeon recommended ERCP for which she was sent to this facility. Left sided hydronephrosis noted on CT scans done on admission. At that time renal function was fairly normal and patient was given option for stenting or to defer as outpatient and she chose the latter.     During ERCP, malignant gastric mass noted and ERCP was unable to be completed due to duodenal scarring. 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 Patient ultimately had biliary drain placed per IR.     She then developed A fib with RVR and is now on amiodarone infusion with cardiology following. IN the past 24 hours she had significant blood loss post removal of long term IV. She then had a fall later ambulating to the bathroom.     Patient renal function was relatively stable until decline starting 11/25. Creatinine was previously 0.87 -> 1.19 ->2.1 and now 3.1 today with GFR 16.  mL yesterday. Intermittent cath this morning yielded 300 mL of dark urine. Nephrology consulted for POORNIMA. Denies renal history. Denies UTI, kidney stones, NSAID. Her most recent BP 87/62. She is awake and alert without complaints unless the abdomen is palpated.     Past Medical History:   Diagnosis Date    Hypertension     Sciatica        Past Surgical History:   Procedure Laterality Date    CARPAL TUNNEL RELEASE Left     EGD, WITH CLOSED BIOPSY   11/18/2023    Procedure: EGD, WITH CLOSED BIOPSY;  Surgeon: Alfred Goss MD;  Location: [facility] OR;  Service: Gastroenterology;;    ERCP N/A 11/18/2023    Procedure: ERCP (ENDOSCOPIC RETROGRADE CHOLANGIOPANCREATOGRAPHY);  Surgeon: Alfred Goss MD;  Location: [facility] OR;  Service: Gastroenterology;  Laterality: N/A;    HEMORRHOID SURGERY         Review of patient's allergies indicates:  No Known Allergies  Current Facility-Administered Medications   Medication Frequency    0.9 % NaCl with KCl 20 mEq infusion Continuous    0.9%  NaCl infusion PRN    acetaminophen tablet 650 mg Q4H PRN    aluminum-magnesium hydroxide-simethicone 200-200-20 mg/5 mL suspension 30 mL QID PRN    amino acid 4.25% - dextrose 5% solution Continuous    amiodarone 360 mg/200 mL (1.8 mg/mL) infusion Continuous    bisacodyL suppository 10 mg Daily PRN    enoxaparin injection 80 mg Q24H (treatment, non-standard time)    hydrALAZINE injection 10 mg Q6H PRN    melatonin tablet 6 mg Nightly PRN    metoclopramide HCl injection 10 mg Q8H    metoprolol injection 5 mg Q2H PRN    metoprolol injection 5 mg Q6H    morphine injection 4 mg Q4H PRN    ondansetron injection 4 mg Q4H PRN    oxyCODONE immediate release tablet 5 mg Q6H PRN    pantoprazole EC tablet 40 mg BID AC    piperacillin-tazobactam (ZOSYN) 4.5 g in dextrose 5 % in water (D5W) 100 mL IVPB (MB+) Q12H    polyethylene glycol packet 17 g BID PRN    prochlorperazine injection Soln 5 mg Q6H PRN    promethazine injection 25 mg Q4H PRN    senna-docusate 8.6-50 mg per tablet 2 tablet BID PRN    sodium bicarbonate tablet 650 mg BID    sodium chloride 0.9% flush 10 mL PRN    sodium chloride 0.9% flush 10 mL Q6H    And    sodium chloride 0.9% flush 10 mL PRN     Family History       Problem Relation (Age of Onset)    Breast cancer Mother    Cancer Maternal Aunt          Tobacco Use    Smoking status: Never    Smokeless tobacco: Never   Substance and Sexual Activity    Alcohol use: No     "Drug use: No    Sexual activity: Never     Birth control/protection: Post-menopausal     Review of Systems  Objective:     Vital Signs (Most Recent):  Temp: 97.7 °F (36.5 °C) (11/27/23 0755)  Pulse: 86 (11/27/23 0800)  Resp: 19 (11/27/23 0755)  BP: (!) 87/62 (11/27/23 0800)  SpO2: 98 % (11/27/23 0800) Vital Signs (24h Range):  Temp:  [97.6 °F (36.4 °C)-98.2 °F (36.8 °C)] 97.7 °F (36.5 °C)  Pulse:  [] 86  Resp:  [18-19] 19  SpO2:  [96 %-100 %] 98 %  BP: ()/(61-82) 87/62     Weight: 82.1 kg (181 lb) (11/26/23 0942)  Body mass index is 29.44 kg/m².  Body surface area is 1.95 meters squared.    I/O last 3 completed shifts:  In: -   Out: 750 [Urine:300; Drains:450]    Physical Exam  Constitutional:       General: She is not in acute distress.     Appearance: She is ill-appearing.   HENT:      Head: Atraumatic.      Mouth/Throat:      Mouth: Mucous membranes are moist.   Eyes:      Extraocular Movements: Extraocular movements intact.      Conjunctiva/sclera: Conjunctivae normal.   Cardiovascular:      Rate and Rhythm: Normal rate. Rhythm irregular.   Pulmonary:      Effort: Pulmonary effort is normal.      Breath sounds: Normal breath sounds.   Abdominal:      Palpations: Abdomen is soft.      Tenderness: There is abdominal tenderness.      Comments: Biliary drain noted    Musculoskeletal:         General: No swelling.   Skin:     General: Skin is warm.   Neurological:      Mental Status: She is alert and oriented to person, place, and time.         Significant Labs:  BMP:   Recent Labs   Lab 11/26/23  0425 11/27/23  0530   * 132*   K 3.2* 4.8   CO2 20* 16*   BUN 37.4* 44.9*   CREATININE 2.11* 3.16*   CALCIUM 8.8 7.5*   MG 2.50  --      Cardiac Markers: No results for input(s): "CKMB", "TROPONINT", "MYOGLOBIN" in the last 168 hours.  CBC:   Recent Labs   Lab 11/27/23  0530   WBC 12.57*   RBC 3.04*   HGB 9.2*   HCT 26.7*      MCV 87.8   MCH 30.3   MCHC 34.5     Coagulation: No results for input(s): " ""PT", "INR", "APTT" in the last 168 hours.  LFTs:   Recent Labs   Lab 11/26/23  0425   ALT 48   AST 32   ALKPHOS 234*   BILITOT 8.7*   ALBUMIN 2.0*     Microbiology Results (last 7 days)       Procedure Component Value Units Date/Time    Urine culture [[id number]] Collected: 11/23/23 1012    Order Status: Completed Specimen: Urine Updated: 11/25/23 0745     Urine Culture No Growth    Blood Culture [[id number]]  (Normal) Collected: 11/18/23 0710    Order Status: Completed Specimen: Blood Updated: 11/23/23 0901     CULTURE, BLOOD ([facility]) No Growth at 5 days    Blood Culture [[id number]]  (Normal) Collected: 11/18/23 0710    Order Status: Completed Specimen: Blood Updated: 11/23/23 0901     CULTURE, BLOOD ([facility]) No Growth at 5 days          Specimen (24h ago, onward)      None          PTH: No results for input(s): "PTH" in the last 168 hours.  TSH: No results for input(s): "TSH" in the last 168 hours.  Recent Labs   Lab 11/23/23  1012   PROTEINUA 2+*   BACTERIA Few*   LEUKOCYTESUR Negative   UROBILINOGEN 2.0*       Significant Imaging:  US Retroperitoneal Complete [[id number]] Resulted: 11/20/23 1448   Order Status: Completed Updated: 11/20/23 1450   Narrative:     EXAMINATION:  US RETROPERITONEAL COMPLETE    CLINICAL HISTORY:  left hydro improving?;    TECHNIQUE:  Ultrasound evaluation of the kidneys, region of the ureters, and urinary bladder.    COMPARISON:  CT 11/19/2023    FINDINGS:  Mild left-sided hydronephrosis.  Dilatation has mildly improved since prior CT.  No significant collecting system dilatation on the right.    Calculated bladder volume 108 mL.    No significant findings regarding the visualized segments of the abdominal aorta and IVC.    Measurements:    - Right kidney: 11.4 cm in length    - Left kidney: 11.3 cm in length   Impression:       Mild improvement in left-sided hydronephrosis since 11/19/2023.      Electronically signed by: George Dove  Date: 11/20/2023  Time: 14:48     CT Abdomen With IV " Contrast [[id number]] Resulted: 11/22/23 0955   Order Status: Completed Updated: 11/22/23 0957   Narrative:     EXAMINATION:  CT ABDOMEN WITH IV CONTRAST    CLINICAL HISTORY:  Abdominal abscess/infection suspected;    TECHNIQUE:  CT abdomen and pelvis performed after intravenous contrast using routine protocol.  Oral contrast given.  .  Automated exposure control used.    COMPARISON:  11/19/2023    FINDINGS:  Liver demonstrates normal enhancement with no focal lesion.  There is a transit Paddock biliary ductal stent demonstrating satisfactory position.    A globular area of contrast noted within the gallbladder fossa.  No definite evidence of extravasation of loose contrast into the adjacent region.    Diffuse gastric.  Pancreas, spleen, adrenal glands normal.    Moderate severe left-sided hydronephrosis with delayed renal enhancement.  Stable.  Mild moderate right-sided hydronephrosis, increased in the interval.    Aorta tapers normally.    Moderate severe generalized inflammatory change and stranding and scattered areas of trace fluid throughout the mesentery.  No lymphadenopathy in the abdomen or pelvis.  Mild fluid in the pelvis.    Bladder contrast noted.  Rectum normal.  Uterus and adnexa normal.    Small and large bowel loops normal with no significant thickening or dilation.  Distal small bowel loops nearly completely decompressed.    Bones intact.    Trace pleural fluid bilaterally.   Impression:       The small globular area of contrast within the gallbladder fossa new from the prior suggesting biliary leak possibly within a contained cavity possibly indicating an gallbladder remnant or dilated cystic duct.  No evidence of extravasation of contrast into the surrounding region.  Persistent regional gallbladder fossa fluid.    Interval placement of a transhepatic biliary stent satisfactory position.    Diffuse gastric wall thickening consistent with gastritis.    Bilateral hydronephrosis severe on the  left and moderate on the right, stable.    Generalized mesenteric inflammatory change, mild ascites, mild pleural fluid.      Electronically signed by: Paul Ocampo MD  Date: 11/22/2023  Time: 09:55     Assessment/Plan:     POORNIMA multifactorial secondary to acute blood loss, contrast exposure, hypotension and Afib with RVR  Newly diagnosed malignant gastric mass. Pathology consistent with adenocarcinoma   Acute blood loss anemia 2/2 bleeding post long term IV removal   Bilateral hydronephrosis noted 11/21 - stent placement initially deferred due to stable renal function   Afib with RVR on amiodarone infusion   S/p biliary drain placement     -Renal function with marked decline over the past few days. There are many possible causes. She is being hydrated and continued on amiodarone to correct the hypoperfusion issue. Bleeding has ceased.   -Renal US pending for repeat evaluated of hydronephrosis issue. Discussed briefly with urology. Placed cheney catheter now.   -Continue IVF  -Continue sodium bicarb tablets   -UA, urine protein, urine creatinine pending as well    FLOR Doyle  Nephrology  [facility] - [address] [facility] Telemetry

## 2023-11-27 NOTE — PT/OT/SLP EVAL
Physical Therapy Evaluation    Patient Name:  Karen Briggs   MRN:  [id number]    Recommendations:     Discharge therapy intensity:  (TBD pending progress)   Discharge Equipment Recommendations: walker, rolling   Barriers to discharge: Ongoing medical needs    Assessment:     Karen Briggs is a 58 y.o. female admitted with a medical diagnosis of acute blood loss anemia with syncope and then fall on 11/26, severe malnutrition, suspected bile leak with biloma & biliary obstruction, gastric cardia adenocarcinoma, bilateral hydronephrosis with worsening renal function, new onset atrial fibrillation with RVR, history of HTN and DDD/sciatica. Per RN, the pt was mobilizing fine and walking without AD prior to syncopal episode on 11/26. She presents with the following impairments/functional limitations: weakness, impaired endurance, impaired self care skills, impaired functional mobility, gait instability, impaired balance . Pt found with HOB elevated and agreeable to initial evaluation this afternoon. The pt required mod to mod x 2 with bed mobility, and min A with RW for sit <>stand. Pt with report of dizziness with standing. Attempted to obtain BP reading, but no value from machine.  SpO2 at 98%. The pt was returned to bed with BP at 102/69 mmHg and with pt reporting resolution of dizziness. Pt left with HOB elevated awake & alert, and reporting comfort in bed.     Rehab Prognosis: Good; patient would benefit from acute skilled PT services to address these deficits and reach maximum level of function.    Recent Surgery: Procedure(s) (LRB):  ERCP (ENDOSCOPIC RETROGRADE CHOLANGIOPANCREATOGRAPHY) (N/A)  EGD, WITH CLOSED BIOPSY 9 Days Post-Op    Plan:     During this hospitalization, patient to be seen 5 x/week to address the identified rehab impairments via gait training, therapeutic activities, therapeutic exercises, neuromuscular re-education and progress toward the following goals:    Plan of Care Expires:   12/04/23    Subjective     Chief Complaint: dizziness  Patient/Family Comments/goals: to get stronger  Pain/Comfort:  Pain Rating 1: 0/10    Patients cultural, spiritual, Restorationist conflicts given the current situation: no    Living Environment:  The pt lives with her boyfriend in a mobile home with 4 steps to enter and a L sided handrail.   Prior to admission, patients level of function was independent without AD.  Equipment used at home: none.  DME owned (not currently used): none.  Upon discharge, patient will have assistance from boyfriend.    Objective:     Communicated with RN prior to session.  Patient found HOB elevated with telemetry, cheney catheter, peripheral IV, PICC line, blood pressure cuff (biliary tube)  upon PT entry to room.    General Precautions: Standard, fall  Orthopedic Precautions:N/A   Braces: N/A  Respiratory Status: Room air  Blood Pressure: 120/74 mmHg - HOB elevated  106/69 mmHg - seated EOB  102/69 mmHg - return to HOB elevated    Exams:  Cognitive Exam:  Patient is oriented to Person, Place, Time, and Situation  RLE ROM: WFL  RLE Strength: WFL  LLE ROM: WFL  LLE Strength: WFL      Functional Mobility:  Bed Mobility:     Supine to Sit: moderate assistance  Sit to Supine: moderate assistance and of 2 persons  Transfers:     Sit to Stand:  minimum assistance with rolling walker      Treatment & Education:  Patient provided with verbal education education regarding PT role/goals/POC, fall prevention, safety awareness, safety with mobility, and proper body mechanics.  Understanding was verbalized.     Patient left HOB elevated with all lines intact, call button in reach, and RN notified.    GOALS:   Multidisciplinary Problems       Physical Therapy Goals          Problem: Physical Therapy    Goal Priority Disciplines Outcome Goal Variances Interventions   Physical Therapy Goal     PT, PT/OT Ongoing, Progressing     Description: Pt will improve functional independence by performing:    Bed  mobility: SBA  Sit to stand: SBA with rolling walker  Bed to chair t/f: Min A with Stand Step  with rolling walker  Ambulation x 15 ft with Min A  with rolling walker                       History:     Past Medical History:   Diagnosis Date    Hypertension     Sciatica        Past Surgical History:   Procedure Laterality Date    CARPAL TUNNEL RELEASE Left     EGD, WITH CLOSED BIOPSY  11/18/2023    Procedure: EGD, WITH CLOSED BIOPSY;  Surgeon: Alfred Goss MD;  Location: [facility];  Service: Gastroenterology;;    ERCP N/A 11/18/2023    Procedure: ERCP (ENDOSCOPIC RETROGRADE CHOLANGIOPANCREATOGRAPHY);  Surgeon: Alfred Goss MD;  Location: [facility] OR;  Service: Gastroenterology;  Laterality: N/A;    HEMORRHOID SURGERY         Time Tracking:     PT Received On:    PT Start Time: 1400     PT Stop Time: 1430  PT Total Time (min): 30 min     Billable Minutes: Evaluation 30 11/27/2023

## 2023-11-27 NOTE — PROGRESS NOTES
[facility] - 8th Floor Med Surg    Cardiology  Progress Note    Patient Name: Karen Briggs  MRN: [id number]  Admission Date: 11/17/2023  Hospital Length of Stay: 10 days  Code Status: Full Code   Attending Provider: Kenney Steiner MD   Consulting Provider: MARIA A Garcia  Primary Care Physician: Marian White FNP-C  Principal Problem:<principal problem not specified>    Patient information was obtained from patient, past medical records, ER records, and primary team.     Subjective:     Chief Complaint: Reason for Consult: AFIB    HPI: Ms. Briggs is a 59 y/o female with a history of HTN, who was last known to [facility], Dr. Randolph (2020). She presented to the ER on 11.17.23 with complaints of right flank pain. She went to see her PCP and an CT Abdomen and Pelvis was obtained. CT (11.17.23) revealed left-sided hydronephrosis with suspected distal obstruction/no clear stone visualized, postoperative changes of cholecystectomy with fluid in the gallbladder fossa may reflect postoperative seroma but biloma can not be entirely excluded, also noted for marked thickening of the stomach wall diffusely may be related to mesenteric edema/reactive. She does report having a laparoscopic cholecystectomy on 11.10.23, in which the gall bladder was not completely resect the gallbladder. On 11.24.23, she was found to be tachycardiac and an EKG was obtained that showed AFIB RVR. Significant labs include WBC 19.4, Chloride 108, CO2 17, BUN/Creat 25.6/1.19, , Albumin 2.1, AST 35, ALT 56. CT Chest unremarkable. CT Abd Plevis revealed Interval placement of a transhepatic biliary stent satisfactory position, Diffuse gastric wall thickening consistent with gastritis, Bilateral hydronephrosis severe on the left and moderate on the right, stable, and Generalized mesenteric inflammatory change, mild ascites, mild pleural fluid. 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  Severe inflammation and oozing of blood noted proximal gastric lumen.  Unable to advance scope into the duodenum.  [facility] has been consulted for AFIB management.    Hospital Course:  11.26.23: Remains Afib, Rvr. Amiodarone drip in progress. LFTs stable. H/H stable on weight based lovenox BID.   11.27.23: NAD. Converted to NSR. Remains on amio gtt per protocol. Renal indices worsening. H&H down trending. Remains with nausea but denies CP, SOB, or palps. Hypotensive this am.      PMH: HTN, Sciatica   PSH: Carpal Tunnel Release, Hemorrhoidectomy, EGD, ERCP  Family History: Father - MI, CAD, HTN; Mother - DM II, MI, CAD, HTN, Cancer; Daughter DM II, Rheumatoid Arthritis   Social History: Denies Smoking, illicit drug use, and alcohol use.     Previous Cardiac Diagnostics:   TTE 11.26.23:  Left Ventricle: Normal wall motion. There is normal systolic function with a visually estimated ejection fraction of 55%. Unable to assess diastolic function.  Right Ventricle: Normal right ventricular cavity size. Systolic function is normal.  Tricuspid Valve: There is mild regurgitation.  IVC/SVC: Normal venous pressure at 3 mmHg.    Lower Ext Venous US (1.6.21):  The study quality is average. 2.6 seconds of reflux is noted in the right GSV at the SFJ with a diameter of 6.3mm 1.2 seconds of reflux is noted in the left GSV at the SFJ with a diameter of 8.7mm. Limited study to evaluate SFJ.    MANFRED (11.16.20):  The study quality is good. The resting right ankle brachial index is 1.15 consistent with no significant right peripheral vascular disease.The resting left ankle brachial index is 1.1 consistent with no significant left peripheral vascular disease.    Lower Ext Arterial US (11.16.20):  The study quality is good. Triphasic waveforms and minimal plaque noted throughout the bilateral lower extremity arteries. No evidence of stenosis found. Calcified artery walls noted in some areas of the bilateral tibial  arteries.    TTE (3.12.20):  The study quality is average. The left ventricle is normal in size with mild left ventricular hypertrophy and normal global left ventricular systolic function. The left ventricular ejection fraction is 60%. The left ventricle diastolic function is impaired (Grade I) with normal left atrial pressure. Mild calcification of the aortic valve is noted with adequate cuspal excursion. Mild (1+) tricuspid regurgitation.The estimated pulmonary artery systolic pressure is 23.9 mmHg assuming a right atrial pressure of 3 mmHg.     LHC (6.11.20):  Normal Coronaries     MPI (2.18.20):  This is probably a normal perfusion study. There is no evidence of ischemia. This scan is suggestive of low risk for future cardiovascular events. Small reversible perfusion abnormality of mild intensity in the basal inferoseptal segment. The left ventricular cavity is noted to be normal on the stress study. The left ventricular ejection fraction was calculated to be 71% and left ventricular global function is normal. The study quality is excellent.     Calcium Score (2.10.20):  169    Review of Systems   Respiratory:  Negative for chest tightness and shortness of breath.    Cardiovascular:  Negative for chest pain, palpitations and leg swelling.   Gastrointestinal:  Positive for nausea.   All other systems reviewed and are negative.      Objective:     Vital Signs (Most Recent):  Temp: 97.7 °F (36.5 °C) (11/27/23 0755)  Pulse: 86 (11/27/23 0800)  Resp: 19 (11/27/23 0755)  BP: (!) 87/62 (11/27/23 0800)  SpO2: 98 % (11/27/23 0800) Vital Signs (24h Range):  Temp:  [97.6 °F (36.4 °C)-98.2 °F (36.8 °C)] 97.7 °F (36.5 °C)  Pulse:  [] 86  Resp:  [18-19] 19  SpO2:  [96 %-100 %] 98 %  BP: ()/(61-82) 87/62     Weight: 82.1 kg (181 lb)  Body mass index is 29.44 kg/m².    SpO2: 98 %         Intake/Output Summary (Last 24 hours) at 11/27/2023 0905  Last data filed at 11/27/2023 0650  Gross per 24 hour   Intake --    Output 500 ml   Net -500 ml         Lines/Drains/Airways       Peripherally Inserted Central Catheter Line  Duration             PICC Triple Lumen 11/26/23 1246 right basilic <1 day              Drain  Duration                  Biliary Tube 11/21/23 1352 Other (Comment) 8 Fr. RUQ 5 days              Peripheral Intravenous Line  Duration                  Peripheral IV - Single Lumen 11/24/23 1914 20 G Anterior;Distal;Right Forearm 2 days                    Significant Labs:  Recent Results (from the past 72 hour(s))   Comprehensive Metabolic Panel    Collection Time: 11/25/23  4:14 AM   Result Value Ref Range    Sodium Level 137 136 - 145 mmol/L    Potassium Level 4.0 3.5 - 5.1 mmol/L    Chloride 108 (H) 98 - 107 mmol/L    Carbon Dioxide 17 (L) 23 - 31 mmol/L    Glucose Level 99 82 - 115 mg/dL    Blood Urea Nitrogen 25.6 (H) 9.8 - 20.1 mg/dL    Creatinine 1.19 (H) 0.55 - 1.02 mg/dL    Calcium Level Total 9.3 8.4 - 10.2 mg/dL    Protein Total 6.3 5.8 - 7.6 gm/dL    Albumin Level 2.1 (L) 3.4 - 4.8 g/dL    Globulin 4.2 (H) 2.4 - 3.5 gm/dL    Albumin/Globulin Ratio 0.5 (L) 1.1 - 2.0 ratio    Bilirubin Total 9.2 (H) <=1.5 mg/dL    Alkaline Phosphatase 258 (H) 40 - 150 unit/L    Alanine Aminotransferase 56 (H) 0 - 55 unit/L    Aspartate Aminotransferase 35 (H) 5 - 34 unit/L    eGFR 52 mls/min/1.73/m2   Magnesium    Collection Time: 11/25/23  4:14 AM   Result Value Ref Range    Magnesium Level 2.40 1.60 - 2.60 mg/dL   CBC with Differential    Collection Time: 11/25/23  4:14 AM   Result Value Ref Range    WBC 19.40 (H) 4.50 - 11.50 x10(3)/mcL    RBC 3.93 (L) 4.20 - 5.40 x10(6)/mcL    Hgb 12.0 12.0 - 16.0 g/dL    Hct 33.8 (L) 37.0 - 47.0 %    MCV 86.0 80.0 - 94.0 fL    MCH 30.5 27.0 - 31.0 pg    MCHC 35.5 33.0 - 36.0 g/dL    RDW 19.2 (H) 11.5 - 17.0 %    Platelet 313 130 - 400 x10(3)/mcL    MPV 11.7 (H) 7.4 - 10.4 fL    Neut % 87.6 %    Lymph % 2.6 %    Mono % 8.6 %    Eos % 0.1 %    Basophil % 0.2 %    Lymph # 0.51 (L) 0.6  - 4.6 x10(3)/mcL    Neut # 17.01 (H) 2.1 - 9.2 x10(3)/mcL    Mono # 1.66 (H) 0.1 - 1.3 x10(3)/mcL    Eos # 0.01 0 - 0.9 x10(3)/mcL    Baso # 0.03 <=0.2 x10(3)/mcL    IG# 0.18 (H) 0 - 0.04 x10(3)/mcL    IG% 0.9 %    NRBC% 0.0 %   Comprehensive Metabolic Panel    Collection Time: 11/26/23  4:25 AM   Result Value Ref Range    Sodium Level 134 (L) 136 - 145 mmol/L    Potassium Level 3.2 (L) 3.5 - 5.1 mmol/L    Chloride 102 98 - 107 mmol/L    Carbon Dioxide 20 (L) 23 - 31 mmol/L    Glucose Level 125 (H) 82 - 115 mg/dL    Blood Urea Nitrogen 37.4 (H) 9.8 - 20.1 mg/dL    Creatinine 2.11 (H) 0.55 - 1.02 mg/dL    Calcium Level Total 8.8 8.4 - 10.2 mg/dL    Protein Total 6.4 5.8 - 7.6 gm/dL    Albumin Level 2.0 (L) 3.4 - 4.8 g/dL    Globulin 4.4 (H) 2.4 - 3.5 gm/dL    Albumin/Globulin Ratio 0.5 (L) 1.1 - 2.0 ratio    Bilirubin Total 8.7 (H) <=1.5 mg/dL    Alkaline Phosphatase 234 (H) 40 - 150 unit/L    Alanine Aminotransferase 48 0 - 55 unit/L    Aspartate Aminotransferase 32 5 - 34 unit/L    eGFR 26 mls/min/1.73/m2   Magnesium    Collection Time: 11/26/23  4:25 AM   Result Value Ref Range    Magnesium Level 2.50 1.60 - 2.60 mg/dL   Phosphorus    Collection Time: 11/26/23  4:25 AM   Result Value Ref Range    Phosphorus Level 2.9 2.3 - 4.7 mg/dL   CBC with Differential    Collection Time: 11/26/23  4:25 AM   Result Value Ref Range    WBC 15.38 (H) 4.50 - 11.50 x10(3)/mcL    RBC 4.23 4.20 - 5.40 x10(6)/mcL    Hgb 12.8 12.0 - 16.0 g/dL    Hct 36.7 (L) 37.0 - 47.0 %    MCV 86.8 80.0 - 94.0 fL    MCH 30.3 27.0 - 31.0 pg    MCHC 34.9 33.0 - 36.0 g/dL    RDW 19.9 (H) 11.5 - 17.0 %    Platelet 446 (H) 130 - 400 x10(3)/mcL    MPV 11.6 (H) 7.4 - 10.4 fL    Neut % 81.4 %    Lymph % 6.0 %    Mono % 10.8 %    Eos % 0.2 %    Basophil % 0.2 %    Lymph # 0.92 0.6 - 4.6 x10(3)/mcL    Neut # 12.52 (H) 2.1 - 9.2 x10(3)/mcL    Mono # 1.66 (H) 0.1 - 1.3 x10(3)/mcL    Eos # 0.03 0 - 0.9 x10(3)/mcL    Baso # 0.03 <=0.2 x10(3)/mcL    IG# 0.22 (H) 0  - 0.04 x10(3)/mcL    IG% 1.4 %    NRBC% 0.0 %   POCT glucose    Collection Time: 11/26/23  5:53 AM   Result Value Ref Range    POCT Glucose 124 (H) 70 - 110 mg/dL   Echo    Collection Time: 11/26/23  9:42 AM   Result Value Ref Range    BSA 1.95 m2    Boggs's Biplane MOD Ejection Fraction 60 %    LVOT stroke volume 43.92 cm3    LVIDd 3.80 3.5 - 6.0 cm    LV Systolic Volume 20.20 mL    LV Systolic Volume Index 10.6 mL/m2    LVIDs 2.40 2.1 - 4.0 cm    LV Diastolic Volume 62.00 mL    LV Diastolic Volume Index 32.46 mL/m2    IVS 0.80 0.6 - 1.1 cm    LVOT diameter 1.90 cm    LVOT area 2.8 cm2    FS 37 28 - 44 %    Left Ventricle Relative Wall Thickness 0.47 cm    Posterior Wall 0.90 0.6 - 1.1 cm    LV mass 93.37 g    LV Mass Index 49 g/m2    MV Peak E Dante 0.56 m/s    TR Max Dante 2.55 m/s    LVOT peak dante 1.42 m/s    Left Ventricular Outflow Tract Mean Velocity 0.91 cm/s    Left Ventricular Outflow Tract Mean Gradient 4.00 mmHg    RVDD 3.30 cm    TAPSE 1.28 cm    LA size 4.00 cm    LA volume (mod) 37.10 cm3    LA Volume Index (Mod) 19.4 mL/m2    RA Width 3.50 cm    AV mean gradient 3 mmHg    AV peak gradient 7 mmHg    Ao peak dante 1.34 m/s    Ao VTI 15.20 cm    LVOT peak VTI 15.50 cm    AV valve area 2.89 cm²    AV Velocity Ratio 1.06     AV index (prosthetic) 1.02     KAT by Velocity Ratio 3.00 cm²    MV mean gradient 2 mmHg    MV peak gradient 3 mmHg    MV valve area by continuity eq 3.60 cm2    MV VTI 12.2 cm    Triscuspid Valve Regurgitation Peak Gradient 26 mmHg    ZLVIDS -2.54     ZLVIDD -3.47     TV resting pulmonary artery pressure 29 mmHg    RV TB RVSP 6 mmHg    Est. RA pres 3 mmHg   POCT glucose    Collection Time: 11/26/23  3:56 PM   Result Value Ref Range    POCT Glucose 119 (H) 70 - 110 mg/dL   Hemoglobin and Hematocrit    Collection Time: 11/26/23  5:41 PM   Result Value Ref Range    Hgb 10.5 (L) 12.0 - 16.0 g/dL    Hct 29.0 (L) 37.0 - 47.0 %   Hemoglobin and Hematocrit    Collection Time: 11/26/23 11:08 PM    Result Value Ref Range    Hgb 9.4 (L) 12.0 - 16.0 g/dL    Hct 26.1 (L) 37.0 - 47.0 %   Basic Metabolic Panel    Collection Time: 11/27/23  5:30 AM   Result Value Ref Range    Sodium Level 132 (L) 136 - 145 mmol/L    Potassium Level 4.8 3.5 - 5.1 mmol/L    Chloride 103 98 - 107 mmol/L    Carbon Dioxide 16 (L) 23 - 31 mmol/L    Glucose Level 114 82 - 115 mg/dL    Blood Urea Nitrogen 44.9 (H) 9.8 - 20.1 mg/dL    Creatinine 3.16 (H) 0.55 - 1.02 mg/dL    BUN/Creatinine Ratio 14     Calcium Level Total 7.5 (L) 8.4 - 10.2 mg/dL    Anion Gap 13.0 mEq/L    eGFR 16 mls/min/1.73/m2   CBC with Differential    Collection Time: 11/27/23  5:30 AM   Result Value Ref Range    WBC 12.57 (H) 4.50 - 11.50 x10(3)/mcL    RBC 3.04 (L) 4.20 - 5.40 x10(6)/mcL    Hgb 9.2 (L) 12.0 - 16.0 g/dL    Hct 26.7 (L) 37.0 - 47.0 %    MCV 87.8 80.0 - 94.0 fL    MCH 30.3 27.0 - 31.0 pg    MCHC 34.5 33.0 - 36.0 g/dL    RDW 19.4 (H) 11.5 - 17.0 %    Platelet 369 130 - 400 x10(3)/mcL    MPV 11.9 (H) 7.4 - 10.4 fL    Neut % 81.2 %    Lymph % 5.8 %    Mono % 11.1 %    Eos % 0.1 %    Basophil % 0.2 %    Lymph # 0.73 0.6 - 4.6 x10(3)/mcL    Neut # 10.20 (H) 2.1 - 9.2 x10(3)/mcL    Mono # 1.40 (H) 0.1 - 1.3 x10(3)/mcL    Eos # 0.01 0 - 0.9 x10(3)/mcL    Baso # 0.03 <=0.2 x10(3)/mcL    IG# 0.20 (H) 0 - 0.04 x10(3)/mcL    IG% 1.6 %    NRBC% 0.0 %     Telemetry:  Afib RVR       Physical Exam  Vitals and nursing note reviewed.   HENT:      Head: Normocephalic.      Nose: Nose normal.      Mouth/Throat:      Mouth: Mucous membranes are moist.      Pharynx: Oropharynx is clear.   Eyes:      Pupils: Pupils are equal, round, and reactive to light.   Cardiovascular:      Rate and Rhythm: Normal rate and regular rhythm.      Pulses: Normal pulses.      Heart sounds: Normal heart sounds.   Pulmonary:      Effort: Pulmonary effort is normal. No respiratory distress.      Breath sounds: Normal breath sounds.   Abdominal:      General: Bowel sounds are normal.       Palpations: Abdomen is soft.      Comments: Biliary drain   Musculoskeletal:         General: Normal range of motion.      Cervical back: Normal range of motion.   Skin:     General: Skin is warm and dry.   Neurological:      Mental Status: She is alert and oriented to person, place, and time.   Psychiatric:         Mood and Affect: Mood normal.         Behavior: Behavior normal.       Home Medications:   No current facility-administered medications on file prior to encounter.     Current Outpatient Medications on File Prior to Encounter   Medication Sig Dispense Refill    olmesartan (BENICAR) 20 MG tablet Take 20 mg by mouth once daily.       Current Inpatient Medications:    Current Facility-Administered Medications:     0.9 % NaCl with KCl 20 mEq infusion, , Intravenous, Continuous, Nick Plummer MD, Last Rate: 75 mL/hr at 11/27/23 0018, New Bag at 11/27/23 0018    0.9%  NaCl infusion, , Intravenous, PRN, Hussein Merino MD, Stopped at 11/19/23 1049    0.9%  NaCl infusion, , Intravenous, Once, Nick Plummer MD    acetaminophen tablet 650 mg, 650 mg, Oral, Q4H PRN, Hussein Merino MD    aluminum-magnesium hydroxide-simethicone 200-200-20 mg/5 mL suspension 30 mL, 30 mL, Oral, QID PRN, Hussein Merino MD    amino acid 4.25% - dextrose 5% solution, , Intravenous, Continuous, Kenney Steiner MD, Last Rate: 60 mL/hr at 11/26/23 1611, New Bag at 11/26/23 1611    amiodarone 360 mg/200 mL (1.8 mg/mL) infusion, 0.5 mg/min, Intravenous, Continuous, Melinda Lomeli FNP, Last Rate: 16.7 mL/hr at 11/26/23 2303, 0.5 mg/min at 11/26/23 2303    bisacodyL suppository 10 mg, 10 mg, Rectal, Daily PRN, Hussein Merino MD    enoxaparin injection 80 mg, 1 mg/kg, Subcutaneous, Q24H (treatment, non-standard time), Kenney Steiner MD    hydrALAZINE injection 10 mg, 10 mg, Intravenous, Q6H PRN, Kenney Steiner MD    melatonin tablet 6 mg, 6 mg, Oral, Nightly PRN, Wilber, Hussein GARCES MD    metoclopramide HCl injection 10 mg, 10 mg, Intravenous, Q8H, Aj,  Michael PRO IV, MD, 10 mg at 11/27/23 0605    metoprolol injection 5 mg, 5 mg, Intravenous, Q2H PRN, Tyler Lira, ANP, 5 mg at 11/25/23 0651    metoprolol injection 5 mg, 5 mg, Intravenous, Q6H, Char Smith, FNP, 5 mg at 11/27/23 0607    morphine injection 4 mg, 4 mg, Intravenous, Q4H PRN, Hussein Merino MD, 4 mg at 11/23/23 1209    ondansetron injection 4 mg, 4 mg, Intravenous, Q4H PRN, Hussein Merino MD, 4 mg at 11/25/23 0934    oxyCODONE immediate release tablet 5 mg, 5 mg, Oral, Q6H PRN, Hussein Merino MD, 5 mg at 11/23/23 1205    pantoprazole EC tablet 40 mg, 40 mg, Oral, BID AC, Kenney Steiner MD, 40 mg at 11/27/23 0606    piperacillin-tazobactam (ZOSYN) 4.5 g in dextrose 5 % in water (D5W) 100 mL IVPB (MB+), 4.5 g, Intravenous, Q12H, Kenney Steiner MD, Last Rate: 25 mL/hr at 11/27/23 0226, 4.5 g at 11/27/23 0226    polyethylene glycol packet 17 g, 17 g, Oral, BID PRN, Hussein Merino MD    prochlorperazine injection Soln 5 mg, 5 mg, Intravenous, Q6H PRN, Hussein Merino MD, 5 mg at 11/22/23 1347    promethazine injection 25 mg, 25 mg, Intramuscular, Q4H PRN, Lyssa Car AGACNP-BC, 25 mg at 11/23/23 1620    senna-docusate 8.6-50 mg per tablet 2 tablet, 2 tablet, Oral, BID PRN, Hussein Merino MD    sodium bicarbonate tablet 650 mg, 650 mg, Oral, BID, Nick Plummer MD    sodium chloride 0.9% flush 10 mL, 10 mL, Intravenous, PRN, Hussein Merino MD    Flushing PICC/Midline Protocol, , , Until Discontinued **AND** sodium chloride 0.9% flush 10 mL, 10 mL, Intravenous, Q6H, 10 mL at 11/27/23 0606 **AND** sodium chloride 0.9% flush 10 mL, 10 mL, Intravenous, PRN, Kenney Steiner MD         VTE Risk Mitigation (From admission, onward)           Ordered     enoxaparin injection 80 mg  Every 24 hours         11/26/23 1026     IP VTE LOW RISK PATIENT  Once         11/18/23 0447     Place sequential compression device  Until discontinued         11/18/23 0447                    Assessment:   Newly Diagnosed Atrial  Fibrillation  - now SR    - [id number] Score 2 (2.2% Stroke Risk per Year)  Biliary Obstruction likely s/t portal hepatic lymphadenopathy    - Successful placement of biliary drainage catheter: 8 Portuguese internal external biliary drainage catheter on 11.21.23     - Incomplete Laparoscopic cholecystectomy 11.10.23  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  Continue Lopressor 5 mg Q 6 scheduled. Transition to oral whenever able to tolerate oral meds.   Continue FD Lovenox. Plan conversion to Eliquis 5mg oral BID at discharge  Will defer GAYLA/DCCV at this time due to comorbid conditions. Dehydration/electrolyte derangement/infection likely contributory to tachycardia.   Poor nutritional intake  Defer other medical management to primary team.   Keep Mag > 2 and Potassium > 4    MARIA A Garcia  Cardiology  [facility] - 8th Floor Med Surg  11/27/2023     Physician addendum:  I have seen and examined this patient as a split-shared visit with the JUAN A d/t complicated medical management of above problems written in assessment and high acuity requiring physician expertise in medical decision-making. I performed the substantive portion of the history and exam. Above medical decision-making is also formulated by me.  Jin placed at the bedside for urinary retention  Currently in normal sinus rhythm    Cardiovascular exam:  S1, S2, no murmurs  Lungs:  Clear to auscultation  Extremities:  No edema    Plan:  Discontinue amiodarone   P.r.n. IV Lopressor  Transition to p.o. medications when able to tolerate   Recommend full-dose anticoagulation given elevated CHADS-VASc score.  Patient with  hemoglobin that has dropped.  If it is suspected there is active bleeding we will need to hold anticoagulation, will defer to primary team    Saulo Duatre MD  Cardiologist

## 2023-11-27 NOTE — PROGRESS NOTES
UROLOGY  PROGRESS  NOTE    Karen Briggs 1965  [id number]  11/27/2023    Patient resting in bed  No complaints at time of rounds  Family at bedside  I&O this morning with about 350ml    Some hypotension this morning   Afebrile  300 mL urine output overnight  WBC 12.57  H&H 9.2/26.7   Sodium 132   BUN and creatinine 44.9/3.16    Urine culture from 11/23/2023 with no growth    Intake/Output:  No intake/output data recorded.  I/O last 3 completed shifts:  In: -   Out: 750 [Urine:300; Drains:450]     Exam:    NAD  Card: RRR  Resp: unlabored  Abd: mild distention  : No urine available for assessment, no CVA tenderness  Extremity: no C/C/E    Recent Results (from the past 24 hour(s))   POCT glucose    Collection Time: 11/26/23  3:56 PM   Result Value Ref Range    POCT Glucose 119 (H) 70 - 110 mg/dL   Hemoglobin and Hematocrit    Collection Time: 11/26/23  5:41 PM   Result Value Ref Range    Hgb 10.5 (L) 12.0 - 16.0 g/dL    Hct 29.0 (L) 37.0 - 47.0 %   Hemoglobin and Hematocrit    Collection Time: 11/26/23 11:08 PM   Result Value Ref Range    Hgb 9.4 (L) 12.0 - 16.0 g/dL    Hct 26.1 (L) 37.0 - 47.0 %   Basic Metabolic Panel    Collection Time: 11/27/23  5:30 AM   Result Value Ref Range    Sodium Level 132 (L) 136 - 145 mmol/L    Potassium Level 4.8 3.5 - 5.1 mmol/L    Chloride 103 98 - 107 mmol/L    Carbon Dioxide 16 (L) 23 - 31 mmol/L    Glucose Level 114 82 - 115 mg/dL    Blood Urea Nitrogen 44.9 (H) 9.8 - 20.1 mg/dL    Creatinine 3.16 (H) 0.55 - 1.02 mg/dL    BUN/Creatinine Ratio 14     Calcium Level Total 7.5 (L) 8.4 - 10.2 mg/dL    Anion Gap 13.0 mEq/L    eGFR 16 mls/min/1.73/m2   CBC with Differential    Collection Time: 11/27/23  5:30 AM   Result Value Ref Range    WBC 12.57 (H) 4.50 - 11.50 x10(3)/mcL    RBC 3.04 (L) 4.20 - 5.40 x10(6)/mcL    Hgb 9.2 (L) 12.0 - 16.0 g/dL    Hct 26.7 (L) 37.0 - 47.0 %    MCV 87.8 80.0 - 94.0 fL    MCH 30.3 27.0 - 31.0 pg    MCHC 34.5 33.0 - 36.0 g/dL    RDW 19.4 (H) 11.5 -  17.0 %    Platelet 369 130 - 400 x10(3)/mcL    MPV 11.9 (H) 7.4 - 10.4 fL    Neut % 81.2 %    Lymph % 5.8 %    Mono % 11.1 %    Eos % 0.1 %    Basophil % 0.2 %    Lymph # 0.73 0.6 - 4.6 x10(3)/mcL    Neut # 10.20 (H) 2.1 - 9.2 x10(3)/mcL    Mono # 1.40 (H) 0.1 - 1.3 x10(3)/mcL    Eos # 0.01 0 - 0.9 x10(3)/mcL    Baso # 0.03 <=0.2 x10(3)/mcL    IG# 0.20 (H) 0 - 0.04 x10(3)/mcL    IG% 1.6 %    NRBC% 0.0 %       Assessment:  -hydronephrosis - improving on US from 11/20; awaiting repeat US  -acute renal failure  -complicated incomplete grace 11/10/2023 with post-op fluid collection/susp bile leak s/p biliary drain with IR; pathology from gastric biopsy with malignant gastric tumor  -a-fib RVR    Plan:  -agree with cheney placement   -awaiting repeat US for eval of hydronephrosis - may need stent placement. Discussed with patient and family      Sara Waldrop NP    Seen and examined; agree with above. BUN/Cr rising; will check labs in am and plan for bilateral ureteral stenting.  Have spoken with Dr. Rocha over the phone, he believes she may have extensive carcinomatosis contributing to her urinary situation.  Bilateral ureteral stenting may be palliative in nature.    Huey Cardenas MD    Evening labs reviewed; will goto OR tonite.  Risks and benefits discussed.    Huey Cardenas MD

## 2023-11-27 NOTE — PROGRESS NOTES
Inpatient Nutrition Assessment    Admit Date: 11/17/2023   Total duration of encounter: 10 days     Nutrition Recommendation/Prescription     -Small and frequent meals encouraged, as tolerated. Goal diet: low-residue/fiber.  -Medical management of nausea and vomiting.     -Change Boost Max (provides 160 kcal, 30 g protein per serving) to Boost Breeze (provides 250 kcal, 9 g protein per serving) to see if better tolerated.     -Changed PPN to TPN with lipids 2x/week. Ordered and discussed with MD.   TPN recommendations:  Clinimix-E 5/15 @ 70ml/hr + 250mL 20% ILE 2x/wk will provide:   1335kcals/d (81% est needs)  84g protein (100% est needs)  252g dextrose, GIR = 2.12mg/kg/min  1680ml fl (102% est needs)  *Electrolytes and additional fluids per pharmacy/MD    -Consult RD for tube feeding recommendations if able to place enteral access device.     Communication of Recommendations: reviewed with provider, reviewed with nurse, reviewed with patient, reviewed with family, and reviewed with pharmacy    Nutrition Assessment     Malnutrition Assessment/Nutrition-Focused Physical Exam    Malnutrition Context: acute illness or injury (11/18/23 1630)  Malnutrition Level: severe (11/18/23 1630)  Energy Intake (Malnutrition): less than or equal to 50% for greater than or equal to 5 days (11/18/23 1630)  Weight Loss (Malnutrition): greater than 7.5% in 3 months (11/18/23 1630)  Subcutaneous Fat (Malnutrition): moderate depletion (11/18/23 1630)  Orbital Region (Subcutaneous Fat Loss): moderate depletion        Muscle Mass (Malnutrition): moderate depletion (11/18/23 1630)  Taoist Region (Muscle Loss): moderate depletion     Clavicle and Acromion Bone Region (Muscle Loss): moderate depletion        Patellar Region (Muscle Loss): moderate depletion                 A minimum of two characteristics is recommended for diagnosis of either severe or non-severe malnutrition.    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  SIRS versus sepsis- improved  Bilateral hydronephrosis with worsening renal function, reconsulted Urology  POORNIMA  New onset atrial fibrillation with RVR   Acute Blood loss anemia with syncope and then fall 11/26  Hypokalemia- resolved with replacement  Metabolic acidosis  Severe malnutrition    Relevant Medical History:   Past Medical History:   Diagnosis Date    Hypertension     Sciatica      Past Surgical History:   Procedure Laterality Date    CARPAL TUNNEL RELEASE Left     EGD, WITH CLOSED BIOPSY  11/18/2023    Procedure: EGD, WITH CLOSED BIOPSY;  Surgeon: Alfred Goss MD;  Location: [facility];  Service: Gastroenterology;;    ERCP N/A 11/18/2023    Procedure: ERCP (ENDOSCOPIC RETROGRADE CHOLANGIOPANCREATOGRAPHY);  Surgeon: Alfred Goss MD;  Location: [facility] OR;  Service: Gastroenterology;  Laterality: N/A;    HEMORRHOID SURGERY       Review of patient's allergies indicates:  No Known Allergies     Nutrition-Related Medications:    enoxparin  1 mg/kg Subcutaneous Q24H (treatment, non-standard time)    [START ON 11/28/2023] fat emulsion 20%  250 mL Intravenous Twice Weekly    metoclopramide HCl  10 mg Intravenous Q8H    metoprolol  5 mg Intravenous Q6H    pantoprazole  40 mg Oral BID AC    piperacillin-tazobactam (Zosyn) IV (PEDS and ADULTS) (extended infusion is not appropriate)  4.5 g Intravenous Q12H    sodium bicarbonate  650 mg Oral BID    sodium chloride 0.9%  1,000 mL Intravenous Once    sodium chloride 0.9%  10 mL Intravenous  "Q6H       amino acid 5% in 15% dextrose (CLINIMIX-E) solution (1L provides 50gm AA, 150gm CHO (510 kcal/L dextrose), Na 35, K 30, Mg 5, Ca 4.5, Acetate 80, Cl 39, Phos 15) (710 total kcal/L)      lactated ringers           Calorie Containing IV Medications: Clinimix    Nutrition-Related Labs:  No results found for: "HGBA1C"  11/26/2023: Magnesium Level 2.50 mg/dL (Ref range: 1.60 - 2.60 mg/dL); Phosphorus Level 2.9 mg/dL (Ref range: 2.3 - 4.7 mg/dL)  11/27/2023: Potassium Level 4.8 mmol/L (Ref range: 3.5 - 5.1 mmol/L); Sodium Level 132 mmol/L (L; Ref range: 136 - 145 mmol/L)   Recent Labs   Lab 11/25/23 0414 11/26/23 0425 11/26/23  1741 11/26/23  2308 11/27/23  0530   WBC 19.40* 15.38*  --   --  12.57*   RBC 3.93* 4.23  --   --  3.04*   HGB 12.0 12.8 10.5* 9.4* 9.2*   HCT 33.8* 36.7* 29.0* 26.1* 26.7*   MCV 86.0 86.8  --   --  87.8   MCH 30.5 30.3  --   --  30.3   MCHC 35.5 34.9  --   --  34.5       Recent Labs   Lab 11/24/23  0447 11/25/23 0414 11/26/23  0425 11/27/23  0530    137 134* 132*   K 3.5 4.0 3.2* 4.8   CO2 23 17* 20* 16*   BUN 18.7 25.6* 37.4* 44.9*   CREATININE 0.87 1.19* 2.11* 3.16*   GLUCOSE 96 99 125* 114   CALCIUM 9.2 9.3 8.8 7.5*   MG 2.30 2.40 2.50  --    ALBUMIN 2.3* 2.1* 2.0*  --    ALKPHOS 320* 258* 234*  --    ALT 78* 56* 48  --    AST 40* 35* 32  --    BILITOT 8.2* 9.2* 8.7*  --          Diet/PN Order: Diet full liquid  Diet NPO Except for: Medication  amino acid 5% in 15% dextrose (CLINIMIX-E) solution (1L provides 50gm AA, 150gm CHO (510 kcal/L dextrose), Na 35, K 30, Mg 5, Ca 4.5, Acetate 80, Cl 39, Phos 15) (710 total kcal/L)  Oral Supplement Order: Boost Max  Tube Feeding Order: none  Appetite/Oral Intake: poor/0-25% of meals  Factors Affecting Nutritional Intake: altered gastrointestinal function, decreased appetite, nausea, and vomiting  Food/Congregation/Cultural Preferences: none reported  Food Allergies: none reported    Skin Integrity: drain/device(s), incision  Wound(s):   " "n/a    Comments    11/18/23: Pt reports poor appetite, nauseated, threw up after consuming ~10% of full liquid tray for lunch, appetite has been a struggle for 3 months over which time she lost about 30 lbs unintentionally, appetite has been worse since 11/10 incomplete cholecystectomy, chews/swallows well, agrees to vanilla ONS on diet advancement.     11/22/23: Pt with poor to fair intake of full liquids; states that she is still having some n/v occasionally; reports that she is drinking the Boost that is ordered.     11/24/23: Pt reports poor appetite, still w/ n/v, basically vomits up everything she consumes. Discussed w/ physician, we agree that she needs a J-tube. Pt reports that she is open to it.     11/26/23: breakfast: 0% tray; lunch- unknown, no tray receipt; dinner- 100% 2 orange sheberts and 1/2 bowl chicken broth. Consuming <25% energy needs.    11/27/23: breakfast: 50% cream of wheat and 100% orange juice; lunch: NPO for stent placements, but sx deferred until tomorrow so will allow liquids for dinner tonight.   Continues with emesis and nausea throughout day; receiving antiemetics. States Boost supplement also "comes back up". Will change to Boost Breeze and monitor tolerance.  Changing peripheral PN to total PN with lipids 2x/week to meet nutritional needs until able to place enteral access device for tube feeds or po intake improves. Discussed with MD and pharmacy.     Anthropometrics    Height: 5' 5.75" (167 cm) Height Method: Stated  Last Weight: 82.1 kg (181 lb) (11/26/23 0942) Weight Method: Standard Scale  BMI (Calculated): 29.4  BMI Classification: overweight (BMI 25-29.9)     Ideal Body Weight (IBW), Female: 128.75 lb     % Ideal Body Weight, Female (lb): 140.58 %                             Usual Weight Provided By: patient and EMR weight history  8/14/23: 211 lbs  Wt Readings from Last 5 Encounters:   11/26/23 82.1 kg (181 lb)   04/24/18 88 kg (194 lb)   02/21/18 90.4 kg (199 lb 4.7 oz) "   17 90 kg (198 lb 6.6 oz)     Weight Change(s) Since Admission:  Admit Weight: 82.2 kg (181 lb 4 oz) (23 2355)    Estimated Needs    Weight Used For Calorie Calculations: 82.2 kg (181 lb 3.5 oz)  Energy Calorie Requirements (kcal): 1644 kcals (20 kcals/kg)  Energy Need Method: Kcal/kg  Weight Used For Protein Calculations: 82.2 kg (181 lb 3.5 oz)  Protein Requirements:  g (1.0-1.3 g/kg)  Fluid Requirements (mL): 0272-4789 mL (1 mL/kg)  Temp (24hrs), Av.7 °F (36.5 °C), Min:97.5 °F (36.4 °C), Max:98.2 °F (36.8 °C)       Enteral Nutrition    Patient not receiving enteral nutrition at this time.    Parenteral Nutrition    Standard Formula: Clinimix 4.25/5  Custom Formula: not applicable  Additives: none  Rate/Volume: 60ml/hr  Lipids: none  Total Nutrition Provided by Parenteral Nutrition:  Calories Provided  490 kcal/d, 30% needs   Protein Provided  61 g/d, 74% needs   Dextrose Provided  72 g/d, GIR 0.61 mg CHO/kg/min   Fluid Provided  1440 ml/d, 88% needs       Evaluation of Received Nutrient Intake    Calories: not meeting estimated needs  Protein: meeting estimated needs    Patient Education    Not applicable.    Nutrition Diagnosis     PES: Malnutrition related to suboptimal protein/energy intake as evidenced by less than or equal to 50% needs met for greater than or equal to 5 days, moderate fat depletion, moderate muscle depletion, and greater than 7.5% weight loss in 3 months. (active)     Interventions/Goals     Intervention(s): modified composition of meals/snacks, modified rate of enteral nutrition, modified composition of parenteral nutrition, prescription medication, and collaboration with other providers  Goal: Meet greater than 75% of nutritional needs by follow-up. (goal not met)    Monitoring & Evaluation     Dietitian will monitor energy intake, weight, electrolyte/renal panel, and gastrointestinal profile.  Nutrition Risk/Follow-Up: high (follow-up in 1-4 days)   Please consult  if re-assessment needed sooner.    Lisa Correa, RD   11/27/2023

## 2023-11-27 NOTE — PROGRESS NOTES
[facility]  Hospital Medicine Progress Note        Chief Complaint: Inpatient Follow-up for intractable vomiting    HPI: 58-year-old female with medical history of hypertension who recently underwent laparoscopic cholecystectomy on 11/10/2023, procedure was incomplete and was unable to completely resect the gallbladder.  Since surgery she continued to have right flank/back pain and followed up with her PCP and CT abdomen and pelvis on 11/17/2023 revealed left-sided hydronephrosis with suspected distal obstruction/no clear stone visualized, postoperative changes of cholecystectomy with fluid in the gallbladder fossa may reflect postoperative seroma but biloma can not be entirely excluded, also noted for marked thickening of the stomach wall diffusely may be related to mesenteric edema/reactive.  She presented to [facility] ED the same day 11/17/2023 and her labs notable for WBC 9.0, hemoglobin 12.4, platelets 442, creatinine 0.86, total bilirubin 8.7 with direct fraction 6.7, alkaline phosphatase 491, , .  Patient's surgeon was consulted and recommended ERCP which was not available at [facility] for which she was transferred to [facility] and referred to hospital medicine service for further evaluation and management.   On my evaluation she complaints of right flank/back pain as well as suprapubic pain and constipation.  Denies nausea, vomiting, fever or chills.  Her hemodynamics are stable.  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  Severe inflammation and oozing of blood noted proximal gastric lumen.  Unable to advance scope into the duodenum.  Surgical oncology consulted, IR consulted for external drain placement which was done November 21.  November 24th she developed new onset atrial fibrillation with RVR and Cardiology consulted.  Started on amiodarone drip  Oral intake has been poor given intractable nausea and vomiting,  she was started on Clinimix as well  Unfortunately patient had acute blood loss due to hemorrhage from the midline site that was removed.  Patient was unaware of the bleed.  Became very weak, passed out.  Code was called but she came around.  Stat H&H done which was slightly lower but stable, MRI of the brain was negative for any acute ischemic changes  Same day in the evening, patient got out of the bed and went to the restroom and fell with hitting her head.  Stat CT of the head and C-spine were done and they are both negative  Bed alarm placed    Interval Hx:   Pt is sitting in bed,  feels so so. Discussed her CT Head, C-Spine, mri brain all negative   Reported she drank some liquid this morning and was able to tolerate   Family at bedside   Pt is aware of gastric cancer diagnosis   Case was discussed with patient's nurse and  on the floor.    Objective/physical exam:  General: In no acute distress, afebrile, ill looking   Chest: Clear to auscultation bilaterally anteriorly  Heart:  Tachycardic rate and rhythm, no appreciable murmur  Abdomen:  extremely tender in epigastrium and RUQ, Slightly distended, BS +  MSK: Warm, trace pitting edema bilateral lower extremities  Neurologic: Alert and oriented x4, Cranial nerve II-XII intact, able to move all 4 extremities    VITAL SIGNS: 24 HRS MIN & MAX LAST   Temp  Min: 97.6 °F (36.4 °C)  Max: 98.2 °F (36.8 °C) 97.7 °F (36.5 °C)   BP  Min: 94/70  Max: 127/82 127/82   Pulse  Min: 98  Max: 116  (!) 116   Resp  Min: 18  Max: 18 18   SpO2  Min: 96 %  Max: 100 % 98 %     I reviewed the labs below:  Recent Labs   Lab 11/25/23  0414 11/26/23  0425 11/26/23  1741 11/26/23  2308 11/27/23  0530   WBC 19.40* 15.38*  --   --  12.57*   RBC 3.93* 4.23  --   --  3.04*   HGB 12.0 12.8 10.5* 9.4* 9.2*   HCT 33.8* 36.7* 29.0* 26.1* 26.7*   MCV 86.0 86.8  --   --  87.8   MCH 30.5 30.3  --   --  30.3   MCHC 35.5 34.9  --   --  34.5   RDW 19.2* 19.9*  --   --  19.4*    446*   --   --  369   MPV 11.7* 11.6*  --   --  11.9*       Recent Labs   Lab 11/24/23  0447 11/25/23  0414 11/26/23  0425 11/27/23  0530    137 134* 132*   K 3.5 4.0 3.2* 4.8   CO2 23 17* 20* 16*   BUN 18.7 25.6* 37.4* 44.9*   CREATININE 0.87 1.19* 2.11* 3.16*   CALCIUM 9.2 9.3 8.8 7.5*   MG 2.30 2.40 2.50  --    ALBUMIN 2.3* 2.1* 2.0*  --    ALKPHOS 320* 258* 234*  --    ALT 78* 56* 48  --    AST 40* 35* 32  --    BILITOT 8.2* 9.2* 8.7*  --        Microbiology Results (last 7 days)       Procedure Component Value Units Date/Time    Urine culture [[id number]] Collected: 11/23/23 1012    Order Status: Completed Specimen: Urine Updated: 11/25/23 0745     Urine Culture No Growth    Blood Culture [[id number]]  (Normal) Collected: 11/18/23 0710    Order Status: Completed Specimen: Blood Updated: 11/23/23 0901     CULTURE, BLOOD ([facility]) No Growth at 5 days    Blood Culture [[id number]]  (Normal) Collected: 11/18/23 0710    Order Status: Completed Specimen: Blood Updated: 11/23/23 0901     CULTURE, BLOOD ([facility]) No Growth at 5 days           See below for Radiology    Scheduled Med:   enoxparin  1 mg/kg Subcutaneous Q24H (treatment, non-standard time)    metoclopramide HCl  10 mg Intravenous Q8H    metoprolol  5 mg Intravenous Q6H    pantoprazole  40 mg Oral BID AC    piperacillin-tazobactam (Zosyn) IV (PEDS and ADULTS) (extended infusion is not appropriate)  4.5 g Intravenous Q12H    sodium chloride 0.9%  10 mL Intravenous Q6H      Continuous Infusions:   0/9% NACL & POTASSIUM CHLORIDE 20 MEQ/L 75 mL/hr at 11/27/23 0018    amino acid 4.25% - dextrose 5% solution 60 mL/hr at 11/26/23 1611    amiodarone in dextrose 5% 0.5 mg/min (11/26/23 2928)      PRN Meds:  sodium chloride 0.9%, acetaminophen, aluminum-magnesium hydroxide-simethicone, bisacodyL, hydrALAZINE, melatonin, metoprolol, morphine, ondansetron, oxyCODONE, polyethylene glycol, prochlorperazine, promethazine, senna-docusate 8.6-50 mg, sodium chloride 0.9%, Flushing  PICC/Midline Protocol **AND** sodium chloride 0.9% **AND** sodium chloride 0.9%     Assessment/Plan:  Suspected bile leak with biloma and biliary obstruction--> H/O Laparoscopic incomplete cholecystectomy 11/10/2023  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  Inflamed mucosa and ooze of blood throughout the proximal gastric lumen.  Gastric antrum scar would not allow advancement of scope into the duodenal ampulla area therefore biliary cannulation was not possible for bile leak evaluation  Pathology shows marked chronic gastritis with intestinal metaplasia, gastric cardia biopsy shows adenocarcinoma, moderately differentiated intestinal type  Full liquid diet, still unable to tolerate anything orally  Dr Rocha (covered for Dr Jacob over the weekend) consulted for need of J-tube placement for nutrition.  Dr. William Morse to review and decide  Continue Reglan 10 m iV q 8 per Dr Rocha   Creatinine also bumped to 2.1--> 3.1, consulted nephrology --> Jin catheter placed, not much urine.  Ordered renal U/S --> bilateral hydronephrosis with left greater than right  Urology reconsulted on the case, previously evaluated and did not recommended ureteral stenting given her creatinine was normal, now that her creatinine has been trending up for the last 3 days, possible urological intervention with stenting tomorrow  NPO after MN  Continue Clinimix at 60 cc/hour for now till her oral intake Pepcid up.   She was able to tolerate some liquid this morning  IVF changed to Ringer lactate at 75 cc/hour  11/26- Series of event that happened including significant acute blood loss due to hemorrhage from the removed midline site which patient did not recognized and noted, syncopal episode, and hypotension  Stat hemoglobin 10.5-->9.4--> 9.2  MRI brain without contrast-negative for acute finding  Later on the same day, patient went to the restroom by herself, her syncopal episode and hit her head, stat CT head and CT C-spine-negative for acute finding  Blood pressure this morning 87/62 with map of 70, ordered 500 cc normal saline bolus.  Improved to 101/66, map 78  Monitor hemoglobin, continue Protonix 40 mg po bid   Oncology Dr Lejeune pn board, need out pt f/u for pet scan/ staging   Zosyn started 11/23- day 4 today  WBC trending down from 23k--> 15 k--> 12k  Blood cultures negative at 5 days  Urine culture negative  Alk Phos slowly trending down from 400--> 234  Potassium normalized with replacement  Metabolic acidosis noted, started on bicarb tablets 650 mg b.i.d.  Cardiology following.  Started on amiodarone drip, metoprolol and full-dose Lovenox.   Echo - normal wall motion, normal systolic function of 50%.  Unable to assess diastolic function.  Mild tricuspid regurg  Monitor closely for any signs of bleeding given now on full-dose Lovenox, EGD did note oozing gastric mass on admit.  Reduce boost max to once daily given patient is not consuming orally adequately  Ordered PICC line given pt has clinimix, zosyn, NS KCL infusion, KCL IV, Amio drip running   Morning CBC, CMP ordered    VTE prophylaxis:  Therapeutic dose Lovenox    Patient condition:  Guarded    Anticipated discharge and Disposition:   TBD    Critical care note:  Critical care diagnosis:  Poor oral intake requiring Clinimix, acute hypotension due to acute blood loss requiring normal saline bolus  Critical care interventions: Hands-on evaluation, review of  labs/radiographs/records and discussion with patient and family if present  Critical care time spent: 35 minutes      All diagnosis and differential diagnosis have been reviewed; assessment and plan has been documented; I have personally reviewed the labs and test results that are presently available; I have reviewed the patients medication list; I have reviewed the consulting providers response and recommendations. I have reviewed or attempted to review medical records based upon their availability    All of the patient's questions have been  addressed and answered. Patient's is agreeable to the above stated plan. I will continue to monitor closely and make adjustments to medical management as needed.  _____________________________________________________________________    Nutrition Status:  Patient meets ASPEN criteria for severe malnutrition of acute illness or injury per RD assessment as evidenced by:  Energy Intake (Malnutrition): less than or equal to 50% for greater than or equal to 5 days  Weight Loss (Malnutrition): greater than 7.5% in 3 months  Subcutaneous Fat (Malnutrition): moderate depletion  Muscle Mass (Malnutrition): moderate depletion           A minimum of two characteristics is recommended for diagnosis of either severe or non-severe malnutrition.   Radiology:   I have personally reviewed the images and agree with radiologist report  CT Cervical Spine Without Contrast  Narrative: EXAMINATION:  CT CERVICAL SPINE WITHOUT CONTRAST    CLINICAL HISTORY:  Unwitnessed fall;    TECHNIQUE:  Noncontrast CT images of the cervical spine. Axial, coronal, and sagittal reformatted images were obtained. Dose length product is 368 mGycm. Automatic exposure control, adjustment of mA/kV or iterative reconstruction technique was used to limit radiation dose.    COMPARISON:  None    FINDINGS:  The cervical spine is visualized through the level of T1.    There is no acute fracture identified.  There are mild  degenerative changes with marginal osteophytes and facet arthropathy.  There is no paraspinal hematoma.  Impression: No acute fracture identified.    No significant change from the [facility] interpretation.    Electronically signed by: Mary Landa  Date:    11/27/2023  Time:    06:34  CT Head Without Contrast  Narrative: EXAMINATION:  CT HEAD WITHOUT CONTRAST    CLINICAL HISTORY:  Head trauma, minor, normal mental status (Age 19-64y);Fall;    TECHNIQUE:  Axial scans were obtained from skull base to the vertex.    Coronal and sagittal reconstructions obtained from the axial data.    Automatic exposure control was utilized to limit radiation dose.    Contrast: None    Radiation Dose:    Total DLP: 10 13 mGy*cm    COMPARISON:  MRI brain dated 11/26/2023    FINDINGS:  There is no acute intracranial hemorrhage or edema. The gray-white matter differentiation is preserved.  Patchy hypodensities in the subcortical and periventricular white matter likely represent chronic microvascular ischemic changes.    There is no mass effect or midline shift.  There is diffuse parenchymal volume loss.  The basal cisterns are patent. There is no abnormal extra-axial fluid collection.  Carotid and vertebral artery calcifications are noted.    The calvarium and skull base are intact. The visualized paranasal sinuses and the mastoid air cells are clear.  Impression: 1. No acute intracranial abnormality.  2. Chronic microvascular ischemic changes.  No significant change from the [facility] interpretation.    Electronically signed by: Mary Landa  Date:    11/27/2023  Time:    06:31      Nick Plummer MD  Department of Hospital Medicine   [facility]   11/27/2023

## 2023-11-27 NOTE — PROGRESS NOTES
SURG ONC    NOTES REVIEWED FROM WEEKEND  NURSE TELLS ME SHE IS GOING FOR URETAL STENTS TODAY, CREAT INCREASED TO 3, ULS SHOWED HYDRO    PT IS ABLE TO TAKE SMALL AMOUNTS IN ORALLY  ON CLINIMIX AT 65CC/HR  REPORTS SOME ABD DISCOMFORT  SHE LOOKS WEAK, FRAIL  REPORTS SOME NAUSEA    UGI FROM YESTERDAY  REPORTS PARTIAL OBSTRUCTION AT THE GASTRIC OUTLET, SMALL VOLUME CONTRAST IS SEEN TO PASS INTO THE DUODENUM    WBC 04655   9/26    PLAN  DR SPRAGUE TO REVIEW AND MAKE RECS

## 2023-11-27 NOTE — NURSING
1525, walked in to give medications, pt found in chair with saturated quick clot and gauze dressing after removal of long term IV. Pt cleaned and was helped up to get back into bed, pt then passed out. Pt picked up and placed into bed, while unresponsive. Vitals and CBG taken (stable), RRT called @1550. Pt family member at bedside. House supervisor notified, attending notified-> labs and imaging ordered. Pt awake and oriented x4. Will continue to monitor for any bleeding or orientation changes.

## 2023-11-28 ENCOUNTER — TELEPHONE (OUTPATIENT)
Dept: HEMATOLOGY/ONCOLOGY | Facility: CLINIC | Age: 58
End: 2023-11-28
Payer: COMMERCIAL

## 2023-11-28 LAB
ALBUMIN SERPL-MCNC: 1.6 G/DL (ref 3.4–4.8)
ALBUMIN/GLOB SERPL: 0.6 RATIO (ref 1.1–2)
ALP SERPL-CCNC: 134 UNIT/L (ref 40–150)
ALT SERPL-CCNC: 32 UNIT/L (ref 0–55)
AST SERPL-CCNC: 29 UNIT/L (ref 5–34)
BASOPHILS # BLD AUTO: 0.02 X10(3)/MCL
BASOPHILS NFR BLD AUTO: 0.2 %
BILIRUB SERPL-MCNC: 5 MG/DL
BUN SERPL-MCNC: 55.5 MG/DL (ref 9.8–20.1)
CALCIUM SERPL-MCNC: 8.5 MG/DL (ref 8.4–10.2)
CHLORIDE SERPL-SCNC: 103 MMOL/L (ref 98–107)
CO2 SERPL-SCNC: 17 MMOL/L (ref 23–31)
CREAT SERPL-MCNC: 4.06 MG/DL (ref 0.55–1.02)
EOSINOPHIL # BLD AUTO: 0 X10(3)/MCL (ref 0–0.9)
EOSINOPHIL NFR BLD AUTO: 0 %
ERYTHROCYTE [DISTWIDTH] IN BLOOD BY AUTOMATED COUNT: 19.9 % (ref 11.5–17)
GFR SERPLBLD CREATININE-BSD FMLA CKD-EPI: 12 MLS/MIN/1.73/M2
GLOBULIN SER-MCNC: 2.9 GM/DL (ref 2.4–3.5)
GLUCOSE SERPL-MCNC: 126 MG/DL (ref 82–115)
HCT VFR BLD AUTO: 23 % (ref 37–47)
HGB BLD-MCNC: 8.4 G/DL (ref 12–16)
IMM GRANULOCYTES # BLD AUTO: 0.31 X10(3)/MCL (ref 0–0.04)
IMM GRANULOCYTES NFR BLD AUTO: 2.6 %
LYMPHOCYTES # BLD AUTO: 0.45 X10(3)/MCL (ref 0.6–4.6)
LYMPHOCYTES NFR BLD AUTO: 3.8 %
MAGNESIUM SERPL-MCNC: 2 MG/DL (ref 1.6–2.6)
MCH RBC QN AUTO: 30.1 PG (ref 27–31)
MCHC RBC AUTO-ENTMCNC: 36.5 G/DL (ref 33–36)
MCV RBC AUTO: 82.4 FL (ref 80–94)
MONOCYTES # BLD AUTO: 0.84 X10(3)/MCL (ref 0.1–1.3)
MONOCYTES NFR BLD AUTO: 7.1 %
NEUTROPHILS # BLD AUTO: 10.2 X10(3)/MCL (ref 2.1–9.2)
NEUTROPHILS NFR BLD AUTO: 86.3 %
NRBC BLD AUTO-RTO: 0 %
PHOSPHATE SERPL-MCNC: 3.4 MG/DL (ref 2.3–4.7)
PLATELET # BLD AUTO: 334 X10(3)/MCL (ref 130–400)
PMV BLD AUTO: 11.6 FL (ref 7.4–10.4)
POTASSIUM SERPL-SCNC: 4 MMOL/L (ref 3.5–5.1)
PROT SERPL-MCNC: 4.5 GM/DL (ref 5.8–7.6)
RBC # BLD AUTO: 2.79 X10(6)/MCL (ref 4.2–5.4)
SODIUM SERPL-SCNC: 131 MMOL/L (ref 136–145)
TRIGL SERPL-MCNC: 226 MG/DL (ref 37–140)
WBC # SPEC AUTO: 11.82 X10(3)/MCL (ref 4.5–11.5)

## 2023-11-28 PROCEDURE — 11000001 HC ACUTE MED/SURG PRIVATE ROOM

## 2023-11-28 PROCEDURE — 63600175 PHARM REV CODE 636 W HCPCS: Performed by: INTERNAL MEDICINE

## 2023-11-28 PROCEDURE — 84100 ASSAY OF PHOSPHORUS: CPT | Performed by: INTERNAL MEDICINE

## 2023-11-28 PROCEDURE — 99223 1ST HOSP IP/OBS HIGH 75: CPT | Mod: ,,, | Performed by: INTERNAL MEDICINE

## 2023-11-28 PROCEDURE — 83735 ASSAY OF MAGNESIUM: CPT | Performed by: INTERNAL MEDICINE

## 2023-11-28 PROCEDURE — 63600175 PHARM REV CODE 636 W HCPCS: Performed by: SURGERY

## 2023-11-28 PROCEDURE — 25000003 PHARM REV CODE 250: Performed by: INTERNAL MEDICINE

## 2023-11-28 PROCEDURE — 25000003 PHARM REV CODE 250: Performed by: NURSE ANESTHETIST, CERTIFIED REGISTERED

## 2023-11-28 PROCEDURE — 21400001 HC TELEMETRY ROOM

## 2023-11-28 PROCEDURE — B4185 PARENTERAL SOL 10 GM LIPIDS: HCPCS | Performed by: INTERNAL MEDICINE

## 2023-11-28 PROCEDURE — C9113 INJ PANTOPRAZOLE SODIUM, VIA: HCPCS | Performed by: INTERNAL MEDICINE

## 2023-11-28 PROCEDURE — 85025 COMPLETE CBC W/AUTO DIFF WBC: CPT | Performed by: NURSE PRACTITIONER

## 2023-11-28 PROCEDURE — 84478 ASSAY OF TRIGLYCERIDES: CPT | Performed by: INTERNAL MEDICINE

## 2023-11-28 PROCEDURE — 99233 SBSQ HOSP IP/OBS HIGH 50: CPT | Mod: ,,, | Performed by: SURGERY

## 2023-11-28 PROCEDURE — 80053 COMPREHEN METABOLIC PANEL: CPT | Performed by: INTERNAL MEDICINE

## 2023-11-28 PROCEDURE — 99497 ADVNCD CARE PLAN 30 MIN: CPT | Mod: 25,,, | Performed by: INTERNAL MEDICINE

## 2023-11-28 PROCEDURE — A4216 STERILE WATER/SALINE, 10 ML: HCPCS | Performed by: INTERNAL MEDICINE

## 2023-11-28 RX ORDER — DICYCLOMINE HYDROCHLORIDE 10 MG/ML
20 INJECTION INTRAMUSCULAR 4 TIMES DAILY PRN
Status: DISCONTINUED | OUTPATIENT
Start: 2023-11-28 | End: 2023-12-25 | Stop reason: HOSPADM

## 2023-11-28 RX ORDER — PANTOPRAZOLE SODIUM 40 MG/10ML
40 INJECTION, POWDER, LYOPHILIZED, FOR SOLUTION INTRAVENOUS 2 TIMES DAILY WITH MEALS
Status: DISCONTINUED | OUTPATIENT
Start: 2023-11-28 | End: 2023-12-25 | Stop reason: HOSPADM

## 2023-11-28 RX ORDER — SODIUM BICARBONATE 650 MG/1
1300 TABLET ORAL 2 TIMES DAILY
Status: DISCONTINUED | OUTPATIENT
Start: 2023-11-28 | End: 2023-12-06

## 2023-11-28 RX ADMIN — Medication 150 MCG: at 12:11

## 2023-11-28 RX ADMIN — PIPERACILLIN SODIUM AND TAZOBACTAM SODIUM 4.5 G: 4; .5 INJECTION, POWDER, FOR SOLUTION INTRAVENOUS at 02:11

## 2023-11-28 RX ADMIN — ONDANSETRON 4 MG: 2 INJECTION INTRAMUSCULAR; INTRAVENOUS at 09:11

## 2023-11-28 RX ADMIN — SODIUM CHLORIDE, PRESERVATIVE FREE 10 ML: 5 INJECTION INTRAVENOUS at 01:11

## 2023-11-28 RX ADMIN — METOCLOPRAMIDE 10 MG: 5 INJECTION, SOLUTION INTRAMUSCULAR; INTRAVENOUS at 09:11

## 2023-11-28 RX ADMIN — I.V. FAT EMULSION 250 ML: 20 EMULSION INTRAVENOUS at 09:11

## 2023-11-28 RX ADMIN — METOCLOPRAMIDE 10 MG: 5 INJECTION, SOLUTION INTRAMUSCULAR; INTRAVENOUS at 01:11

## 2023-11-28 RX ADMIN — METOCLOPRAMIDE 10 MG: 5 INJECTION, SOLUTION INTRAMUSCULAR; INTRAVENOUS at 05:11

## 2023-11-28 RX ADMIN — PIPERACILLIN SODIUM AND TAZOBACTAM SODIUM 4.5 G: 4; .5 INJECTION, POWDER, FOR SOLUTION INTRAVENOUS at 01:11

## 2023-11-28 RX ADMIN — SODIUM BICARBONATE 650 MG TABLET 1300 MG: at 09:11

## 2023-11-28 RX ADMIN — Medication 6 MG: at 09:11

## 2023-11-28 RX ADMIN — SODIUM CHLORIDE, PRESERVATIVE FREE 10 ML: 5 INJECTION INTRAVENOUS at 05:11

## 2023-11-28 RX ADMIN — PANTOPRAZOLE SODIUM 40 MG: 40 INJECTION, POWDER, FOR SOLUTION INTRAVENOUS at 05:11

## 2023-11-28 RX ADMIN — ASCORBIC ACID, VITAMIN A PALMITATE, CHOLECALCIFEROL, THIAMINE HYDROCHLORIDE, RIBOFLAVIN-5 PHOSPHATE SODIUM, PYRIDOXINE HYDROCHLORIDE, NIACINAMIDE, DEXPANTHENOL, ALPHA-TOCOPHEROL ACETATE, VITAMIN K1, FOLIC ACID, BIOTIN, CYANOCOBALAMIN: 200; 3300; 200; 6; 3.6; 6; 40; 15; 10; 150; 600; 60; 5 INJECTION, SOLUTION INTRAVENOUS at 04:11

## 2023-11-28 RX ADMIN — ENOXAPARIN SODIUM 80 MG: 80 INJECTION SUBCUTANEOUS at 09:11

## 2023-11-28 RX ADMIN — SODIUM CHLORIDE, PRESERVATIVE FREE 10 ML: 5 INJECTION INTRAVENOUS at 10:11

## 2023-11-28 RX ADMIN — ACETAMINOPHEN 650 MG: 325 TABLET, FILM COATED ORAL at 09:11

## 2023-11-28 NOTE — ANESTHESIA POSTPROCEDURE EVALUATION
Anesthesia Post Evaluation    Patient: Karen Briggs    Procedure(s) Performed: Procedure(s) (LRB):  CYSTOSCOPY, WITH URETERAL STENT INSERTION (Bilateral)    Final Anesthesia Type: general      Patient location during evaluation: PACU  Patient participation: Yes- Able to Participate  Level of consciousness: awake  Post-procedure vital signs: reviewed and stable  Pain management: adequate  Airway patency: patent  ROSSY mitigation strategies: Multimodal analgesia  PONV status at discharge: No PONV  Anesthetic complications: no      Cardiovascular status: stable  Respiratory status: unassisted  Hydration status: euvolemic  Follow-up not needed.          Vitals Value Taken Time   BP 97/63 11/28/23 1533   Temp 36.8 °C (98.2 °F) 11/28/23 1533   Pulse 103 11/28/23 1533   Resp 18 11/28/23 1533   SpO2 98 % 11/28/23 0757         Event Time   Out of Recovery 11/28/2023 01:15:00         Pain/William Score: William Score: 9 (11/28/2023  1:00 AM)

## 2023-11-28 NOTE — PROGRESS NOTES
[facility]  Hospital Medicine Progress Note        Chief Complaint: Inpatient Follow-up for intractable vomiting     HPI: 58-year-old female with medical history of hypertension who recently underwent laparoscopic cholecystectomy on 11/10/2023, procedure was incomplete and was unable to completely resect the gallbladder.  Since surgery she continued to have right flank/back pain and followed up with her PCP and CT abdomen and pelvis on 11/17/2023 revealed left-sided hydronephrosis with suspected distal obstruction/no clear stone visualized, postoperative changes of cholecystectomy with fluid in the gallbladder fossa may reflect postoperative seroma but biloma can not be entirely excluded, also noted for marked thickening of the stomach wall diffusely may be related to mesenteric edema/reactive.  She presented to [facility] ED the same day 11/17/2023 and her labs notable for WBC 9.0, hemoglobin 12.4, platelets 442, creatinine 0.86, total bilirubin 8.7 with direct fraction 6.7, alkaline phosphatase 491, , .  Patient's surgeon was consulted and recommended ERCP which was not available at [facility] for which she was transferred to [facility] and referred to hospital medicine service for further evaluation and management.   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  Severe inflammation and oozing of blood noted proximal gastric lumen.  Unable to advance scope into the duodenum.  Surgical oncology consulted, IR consulted for external drain placement which was done November 21.  November 24th she developed new onset atrial fibrillation with RVR and Cardiology consulted.  Started on amiodarone drip  Unfortunately patient had acute blood loss due to hemorrhage from the midline site that was removed.  Patient was unaware of the bleed.  Became very weak, passed out.  Code was called but she came around.  Stat H&H done which was slightly  lower but stable, MRI of the brain was negative for any acute ischemic changes. Pt is aware of gastric cancer diagnosis   GI following--> 11/18- EGD shows normal esophagus, malignant gastric tumor in the cardia.  Inflamed mucosa and ooze of blood throughout the proximal gastric lumen.  Gastric antrum scar would not allow advancement of scope into the duodenal ampulla area therefore biliary cannulation was not possible for bile leak evaluation  Pathology shows marked chronic gastritis with intestinal metaplasia, gastric cardia biopsy shows adenocarcinoma, moderately differentiated intestinal type   bilateral hydronephrosis with left greater than right  MRI brain without contrast-negative for acute finding       Patient currently being managed for obstructive uropathy with bilateral hydronephrosis status post bilateral ureteral stent placed on 11/27 by Urology, acute renal failure with Nephrology on board, found to have a newly diagnosed gastric adenocarcinoma with concern for possible gastric/ duodenal outlet obstruction with symptoms of nausea and vomiting can not keep anything down in her stomach complicated by severe malnutrition.      Interval Hx:   Patient seen and examined at bedside, no family member at bedside   Patient complaining of nausea and vomiting and can not keep anything down unwilling to try to eat   She complains of abdominal pain but does not want to receive pain medications because she is already so sleepy   Vitals reviewed and stable on room air   Labs reviewed with hemoglobin levels trickling down at 8.4, white cell count improving slowly at 11.8, sodium 131, CO2 17, creatinine remains elevated at 4.06.    Patient was status post bilateral ureteral stent placement by urology on 11/27, will trend BMP to see if it improves   Gastric oncologist evaluated patient recommends for inpatient PET scan if possible to decide on best management plan her nutrition moving forward   Cis recommends to  discontinue Lovenox given current state of anemia and have signed off   Will continue nutrition with IV Clinimix      Case was discussed with patient's nurse and  on the floor.     Objective/physical exam:  General: In no acute distress, afebrile, ill looking   Chest: Clear to auscultation bilaterally anteriorly  Heart:  Tachycardic rate and rhythm, no appreciable murmur  Abdomen:  extremely tender in epigastrium and RUQ, Slightly distended, BS +  MSK: Warm, trace pitting edema bilateral lower extremities  Neurologic: Alert and oriented x4, Cranial nerve II-XII intact, able to move all 4 extremities      Assessment/Plan:  Possible Gastric/duodenal outlet obstruction  likely d/t gastric cancer   Suspected bile leak with biloma and biliary obstruction--> H/O Laparoscopic incomplete cholecystectomy 11/10/2023  Gastric cardia adenocarcinoma intestinal type on biopsy  Gastric antrum stricture unable to cannulate duodenum  SIRS versus sepsis- improved  Bilateral hydronephrosis with worsening renal function, s/p b/l stents on 11/27  POORNIMA-obstructive uropathy   New onset atrial fibrillation with RVR   Acute Blood loss anemia with syncope and then fall 11/26, now hypotensive  History of hypertension and DDD/sciatica  Hypokalemia- resolved with replacement  Metabolic acidosis  Severe malnutrition        Plan  Continue with IV TPN for nutritional propose   Appreciate GI oncology input for need of inpatient PET scan for further management plan  Patient currently not tolerating p.o. intake will be very challenging to discharge home with no nutrition  Await medical oncology input with regards to inpatient PET scan   Patient was status post bilateral ureteral stent placed on 11/27 by Urology, to trend BMP  Appreciate CIS input to discontinue Lovenox and continue with rate control given anemia   Trend hemoglobin levels and keep hemoglobin greater than 78 grams/dL   Will consult palliative Care for goals of care  discussion, patient's care looks complex and will require this   Continue IV Zosyn day 5  IM Bentyl p.r.n. for abdominal pain and spasm      VTE prophylaxis:  scds given anemia      Patient condition:  Guarded     Anticipated discharge and Disposition:   TBD        All diagnosis and differential diagnosis have been reviewed; assessment and plan has been documented; I have personally reviewed the labs and test results that are presently available; I have reviewed the patients medication list; I have reviewed the consulting providers response and recommendations. I have reviewed or attempted to review medical records based upon their availability     All of the patient's questions have been  addressed and answered. Patient's is agreeable to the above stated plan. I will continue to monitor closely and make adjustments to medical management as needed.  __________________________________________  VITAL SIGNS: 24 HRS MIN & MAX LAST   Temp  Min: 96.9 °F (36.1 °C)  Max: 98.2 °F (36.8 °C) 98.2 °F (36.8 °C)   BP  Min: 97/72  Max: 142/82 99/67   Pulse  Min: 78  Max: 97  97   Resp  Min: 15  Max: 20 18   SpO2  Min: 96 %  Max: 100 % 98 %     I have reviewed the following labs:  Recent Labs   Lab 11/26/23  0425 11/26/23  1741 11/26/23  2308 11/27/23  0530 11/28/23  0437   WBC 15.38*  --   --  12.57* 11.82*   RBC 4.23  --   --  3.04* 2.79*   HGB 12.8   < > 9.4* 9.2* 8.4*   HCT 36.7*   < > 26.1* 26.7* 23.0*   MCV 86.8  --   --  87.8 82.4   MCH 30.3  --   --  30.3 30.1   MCHC 34.9  --   --  34.5 36.5*   RDW 19.9*  --   --  19.4* 19.9*   *  --   --  369 334   MPV 11.6*  --   --  11.9* 11.6*    < > = values in this interval not displayed.     Recent Labs   Lab 11/25/23  0414 11/26/23  0425 11/27/23  0530 11/27/23 2056 11/28/23  0437    134* 132* 130* 131*   K 4.0 3.2* 4.8 3.6 4.0   CO2 17* 20* 16* 16* 17*   BUN 25.6* 37.4* 44.9* 56.2* 55.5*   CREATININE 1.19* 2.11* 3.16* 4.15* 4.06*   CALCIUM 9.3 8.8 7.5* 7.6* 8.5   MG  2.40 2.50  --   --  2.00   ALBUMIN 2.1* 2.0*  --   --  1.6*   ALKPHOS 258* 234*  --   --  134   ALT 56* 48  --   --  32   AST 35* 32  --   --  29   BILITOT 9.2* 8.7*  --   --  5.0*     Microbiology Results (last 7 days)       Procedure Component Value Units Date/Time    Urine culture [[id number]] Collected: 11/23/23 1012    Order Status: Completed Specimen: Urine Updated: 11/25/23 0745     Urine Culture No Growth    Blood Culture [[id number]]  (Normal) Collected: 11/18/23 0710    Order Status: Completed Specimen: Blood Updated: 11/23/23 0901     CULTURE, BLOOD ([facility]) No Growth at 5 days    Blood Culture [[id number]]  (Normal) Collected: 11/18/23 0710    Order Status: Completed Specimen: Blood Updated: 11/23/23 0901     CULTURE, BLOOD ([facility]) No Growth at 5 days             See below for Radiology    Scheduled Med:   fat emulsion 20%  250 mL Intravenous Twice Weekly    metoclopramide HCl  10 mg Intravenous Q8H    metoprolol  5 mg Intravenous Q6H    pantoprazole  40 mg Oral BID AC    piperacillin-tazobactam (Zosyn) IV (PEDS and ADULTS) (extended infusion is not appropriate)  4.5 g Intravenous Q12H    sodium bicarbonate  1,300 mg Oral BID    sodium chloride 0.9%  10 mL Intravenous Q6H      Continuous Infusions:   amino acid 5% in 15% dextrose (CLINIMIX-E) solution (1L provides 50gm AA, 150gm CHO (510 kcal/L dextrose), Na 35, K 30, Mg 5, Ca 4.5, Acetate 80, Cl 39, Phos 15) (710 total kcal/L) 70 mL/hr at 11/27/23 1941    amino acid 5% in 15% dextrose (CLINIMIX-E) solution (1L provides 50gm AA, 150gm CHO (510 kcal/L dextrose), Na 35, K 30, Mg 5, Ca 4.5, Acetate 80, Cl 39, Phos 15) (710 total kcal/L)      lactated ringers 75 mL/hr at 11/27/23 1524      PRN Meds:  sodium chloride 0.9%, acetaminophen, aluminum-magnesium hydroxide-simethicone, bisacodyL, dicyclomine, hydrALAZINE, melatonin, metoprolol, morphine, ondansetron, oxyCODONE, polyethylene glycol, prochlorperazine, promethazine, senna-docusate 8.6-50 mg, sodium chloride  0.9%, Flushing PICC/Midline Protocol **AND** sodium chloride 0.9% **AND** sodium chloride 0.9%     Assessment/Plan:      VTE prophylaxis:     Patient condition:  Stable/Fair/Guarded/ Serious/ Critical    Anticipated discharge and Disposition:         All diagnosis and differential diagnosis have been reviewed; assessment and plan has been documented; I have personally reviewed the labs and test results that are presently available; I have reviewed the patients medication list; I have reviewed the consulting providers response and recommendations. I have reviewed or attempted to review medical records based upon their availability    All of the patient's questions have been  addressed and answered. Patient's is agreeable to the above stated plan. I will continue to monitor closely and make adjustments to medical management as needed.  _____________________________________________________________________    Nutrition Status:    Radiology:  I have personally reviewed the following imaging and agree with the radiologist.     SURG FL Surgery Fluoro Usage  See OP Notes for results.     IMPRESSION: See OP Notes for results.     This procedure was auto-finalized by: Virtual Radiologist      Dalton Palacios MD   11/28/2023

## 2023-11-28 NOTE — CARE UPDATE
Medical Oncology    Spoken with surgical oncology regarding patient. His note from today explains this further. I have spoken with hospital administration who was able to jen us special permission for an inpatient PET scan. Plan for this to be done tomorrow, Wednesday 11/29/23 to help determine next steps in regards to her obstruction and overall care plan.     Remain NPO until after PET scan. Hold clinimix and LR at midnight tonight.    Elizabeth LeJeune, MD  Hematology/Oncology

## 2023-11-28 NOTE — PT/OT/SLP PROGRESS
Physical Therapy      Patient Name:  Karen Briggs   MRN:  [id number]    Attempted to see pt for PT. Pt in bed with HOB elevated. Vitals assessed prior to initiation of mobility: 90/70, 99 bpm. Assessed again after a very short break: 94/64, 103 bpm. Pt denied feeling dizzy/lightheaded, but did report increased feelings of fatigue and weakness. H&H low per labs this AM. PT held today with pt in agreement, requested to reattempt tomorrow as scheduling allows. LOS Spears notified.

## 2023-11-28 NOTE — ANESTHESIA PROCEDURE NOTES
Intubation    Date/Time: 11/27/2023 11:40 PM    Performed by: Dheeraj Gonzales CRNA  Authorized by: Kurtis Gil MD    Intubation:     Induction:  Intravenous    Intubated:  Postinduction    Mask Ventilation:  Easy mask    Attempts:  1    Attempted By:  CRNA    Method of Intubation:  Direct    Blade:  Gibson 2    Laryngeal View Grade: Grade I - full view of cords      Difficult Airway Encountered?: No      Complications:  None    Airway Device:  Oral endotracheal tube    Airway Device Size:  7.5    Style/Cuff Inflation:  Cuffed    Inflation Amount (mL):  7    Tube secured:  2    Secured at:  The lips    Placement Verified By:  Capnometry    Complicating Factors:  None    Findings Post-Intubation:  BS equal bilateral and atraumatic/condition of teeth unchanged

## 2023-11-28 NOTE — PROGRESS NOTES
[facility]  Nephrology  Progress Note    Patient Name: Karen Briggs  MRN: [id number]  Admission Date: 11/17/2023  Hospital Length of Stay: 11 days  Attending Provider: Dalton Palacios MD   Primary Care Physician: Marian White FNP-C  Principal Problem:<principal problem not specified>      Subjective:   HPI: This is a 58-year-old female presented on  7 days post op from laparoscopic cholecystectomy on 11/10. Gallbladder was unable to be resected. Back and flank pain persisted post operatively prompting evaluation at [facility] ER. After workup her surgeon recommended ERCP for which she was sent to this facility. Left sided hydronephrosis noted on CT scans done on admission. At that time renal function was fairly normal and patient was given option for stenting or to defer as outpatient and she chose the latter.      During ERCP, malignant gastric mass noted and ERCP was unable to be completed due to duodenal scarring. 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 Patient ultimately had biliary drain placed per IR.      She then developed A fib with RVR and is now on amiodarone infusion with cardiology following. IN the past 24 hours she had significant blood loss post removal of long term IV. She then had a fall later ambulating to the bathroom.      Patient renal function was relatively stable until decline starting 11/25. Creatinine was previously 0.87 -> 1.19 ->2.1 and now 3.1 today with GFR 16.  mL yesterday. Intermittent cath this morning yielded 300 mL of dark urine. Nephrology consulted for POORNIMA. Denies renal history. Denies UTI, kidney stones, NSAID. Her most recent BP 87/62. She is awake and alert without complaints unless the abdomen is palpated.     Interval History:   11/28 - status post bilateral ureteral stent placement overnight with Dr Cardenas. Gross hematuria noted per  bag with improving UOP. No significant improvement in renal  indices on AM labs though span between stent placement and drawn labs likely small.     Review of patient's allergies indicates:  No Known Allergies  Current Facility-Administered Medications   Medication Frequency    0.9%  NaCl infusion PRN    acetaminophen tablet 650 mg Q4H PRN    aluminum-magnesium hydroxide-simethicone 200-200-20 mg/5 mL suspension 30 mL QID PRN    amino acid 5% in 15% dextrose (CLINIMIX-E) solution (1L provides 50gm AA, 150gm CHO (510 kcal/L dextrose), Na 35, K 30, Mg 5, Ca 4.5, Acetate 80, Cl 39, Phos 15) (710 total kcal/L) Continuous    bisacodyL suppository 10 mg Daily PRN    enoxaparin injection 80 mg Q24H (treatment, non-standard time)    fat emulsion 20% infusion 250 mL Twice Weekly    hydrALAZINE injection 10 mg Q6H PRN    lactated ringers infusion Continuous    melatonin tablet 6 mg Nightly PRN    metoclopramide HCl injection 10 mg Q8H    metoprolol injection 5 mg Q2H PRN    metoprolol injection 5 mg Q6H    morphine injection 4 mg Q4H PRN    ondansetron injection 4 mg Q4H PRN    oxyCODONE immediate release tablet 5 mg Q6H PRN    pantoprazole EC tablet 40 mg BID AC    piperacillin-tazobactam (ZOSYN) 4.5 g in dextrose 5 % in water (D5W) 100 mL IVPB (MB+) Q12H    polyethylene glycol packet 17 g BID PRN    prochlorperazine injection Soln 5 mg Q6H PRN    promethazine injection 25 mg Q4H PRN    senna-docusate 8.6-50 mg per tablet 2 tablet BID PRN    sodium bicarbonate tablet 1,300 mg BID    sodium chloride 0.9% flush 10 mL PRN    sodium chloride 0.9% flush 10 mL Q6H    And    sodium chloride 0.9% flush 10 mL PRN       Objective:     Vital Signs (Most Recent):  Temp: 97.4 °F (36.3 °C) (11/28/23 0351)  Pulse: 86 (11/28/23 0351)  Resp: 18 (11/28/23 0351)  BP: 131/84 (11/28/23 0348)  SpO2: 100 % (11/28/23 0351) Vital Signs (24h Range):  Temp:  [96.9 °F (36.1 °C)-98.2 °F (36.8 °C)] 97.4 °F (36.3 °C)  Pulse:  [78-96] 86  Resp:  [15-20] 18  SpO2:  [96 %-100 %] 100 %  BP: ()/(61-85) 131/84      Weight: 82.1 kg (181 lb) (11/26/23 0942)  Body mass index is 29.44 kg/m².  Body surface area is 1.95 meters squared.    I/O last 3 completed shifts:  In: 603 [IV Piggyback:603]  Out: 2450 [Urine:1850; Drains:600]    Physical Exam  Constitutional:       General: She is not in acute distress.     Appearance: She is ill-appearing.   HENT:      Head: Atraumatic.      Nose: Nose normal.      Mouth/Throat:      Mouth: Mucous membranes are moist.   Eyes:      Extraocular Movements: Extraocular movements intact.      Conjunctiva/sclera: Conjunctivae normal.   Cardiovascular:      Rate and Rhythm: Normal rate.      Pulses: Normal pulses.      Heart sounds: Normal heart sounds.      Comments: 2 + pitting anasarca    Pulmonary:      Effort: Pulmonary effort is normal.      Breath sounds: Normal breath sounds.   Abdominal:      General: There is no distension.      Palpations: Abdomen is soft.   Genitourinary:     Comments: Urinary catheter with gross hematuria noted   Musculoskeletal:         General: Swelling present.   Skin:     General: Skin is warm.   Neurological:      Mental Status: She is alert and oriented to person, place, and time.         Significant Labs:sureBMP:   Recent Labs   Lab 11/28/23 0437   *   K 4.0   CO2 17*   BUN 55.5*   CREATININE 4.06*   CALCIUM 8.5   MG 2.00     CBC:   Recent Labs   Lab 11/28/23 0437   WBC 11.82*   RBC 2.79*   HGB 8.4*   HCT 23.0*      MCV 82.4   MCH 30.1   MCHC 36.5*     Microbiology Results (last 7 days)       Procedure Component Value Units Date/Time    Urine culture [[id number]] Collected: 11/23/23 1012    Order Status: Completed Specimen: Urine Updated: 11/25/23 0745     Urine Culture No Growth    Blood Culture [[id number]]  (Normal) Collected: 11/18/23 0710    Order Status: Completed Specimen: Blood Updated: 11/23/23 0901     CULTURE, BLOOD ([facility]) No Growth at 5 days    Blood Culture [[id number]]  (Normal) Collected: 11/18/23 0710    Order Status: Completed  Specimen: Blood Updated: 11/23/23 0901     CULTURE, BLOOD ([facility]) No Growth at 5 days          Specimen (24h ago, onward)      None          Recent Labs   Lab 11/23/23  1012   PROTEINUA 2+*   BACTERIA Few*   LEUKOCYTESUR Negative   UROBILINOGEN 2.0*       Significant Imaging:  US Retroperitoneal Complete [[id number]] Resulted: 11/27/23 1222   Order Status: Completed Updated: 11/27/23 1225   Narrative:     EXAMINATION:  US RETROPERITONEAL COMPLETE    CLINICAL HISTORY:  Worsening renal functions despite fluid resussitation;    COMPARISON:  CT 22 November 2023    FINDINGS:  Grayscale and color Doppler sonographic evaluation of the kidneys and urinary bladder.    The right kidney measures 13 cm. The left kidney measures 11.5 cm.   There is bilateral mild to moderate hydronephrosis, left greater than right.  This is similar to the prior CT.  Grossly normal renal parenchymal echogenicity otherwise.    Cheney in the bladder.   Impression:       Similar bilateral hydronephrosis.      Electronically signed by: Wei Wright  Date: 11/27/2023  Time: 12:22       Assessment/Plan:   POORNIMA multifactorial secondary to obstructive uropathy, acute blood loss, contrast exposure, hypotension and Afib with RVR  ---Nephrotic range proteinuria noted. 4.4 g  Newly diagnosed malignant gastric mass. Pathology consistent with adenocarcinoma   Acute blood loss anemia 2/2 bleeding post long term IV removal   Bilateral hydronephrosis noted 11/21 - stent placement initially deferred due to stable renal function   Afib with RVR on amiodarone infusion   S/p biliary drain placement      -Continue indwelling cheney catheter and IVF as ordered. Hopeful to see improvement in renal indices over the next 24 to 48 hours.   -Hemodynamically stable over the past 24 hours   -Pending further recs from Dr Mini Moise, FLOR  Nephrology  [facility]

## 2023-11-28 NOTE — CONSULTS
Patient Name: Karen Briggs   MRN: [id number]   Admission Date: 11/17/2023   Hospital Length of Stay: 11   Attending Provider: Dalton Palacios MD   Consulting Provider: Viktor Landa M.D.  Reason for Consult: Goals of Care  Primary Care Physician: Marian White FNP-C     Principal Problem: <principal problem not specified>     Patient information was obtained from patient, spouse/SO, ER records, and primary team.      Final diagnoses:  [N13.30] Hydronephrosis     Assessment/Plan:     We reviewed the patient and family's understanding of the seriousness of the illness and its expected prognosis. We discussed the patient's goals of care and treatment preferences. We discussed the difference between palliative and curative medicine. We explained the differences between home health, palliative and hospice care. We clarified current code status. We identified the surrogate decision maker or health care POA. (She names as surrogate decision maker, Mango ARCE [phone]) We answered all questions and we formulated a plan including recommendations for symptom management and how to best achieve goals of care.     We reviewed the patient's current clinical status with the nurse. We reviewed clinical documentation, labs and imaging.     Symptom management review: She denies pain, dypsnea, dysuria. She has nausea only when she eats/ drinks. She has obstipation.     Advance Care Planning     Date: 11/28/2023    Today a voluntary meeting took place: bedside    Patient Participation: Patient is able to participate     Attendees (Name and  Relationship to patient):  Mango ARCE    Staff attendees (Name and  Role): Dr. Viktor Landa, Dr. Sumi Gonzalez    ACP Conversation (General): Understanding of advance care planning and role of health care agent defined She has 2 daughters that live in [place], [place] and [facility]. She has siblings that live nearby. Her sister, Sirena is a contact. I explained  that legally, her 2 children would be surrogate decision-makers, unless she listed another. She would have her children and her SO together. She does not know her children's numbers to give at present.  Understanding of current condition She has a fair understanding of the condition and the plan. I reviewed the working plan as discussed by me with surgical and medical oncology teams depending on the findings from the PET tomorrow.  She appreciated the review.     As medical oncology is likely to offer treatment regardless of PET findings, I reviewed the option of adding palliative care services in the home upon discharge. She sounded interested in this. I also reviewed hospice, should she elect no aggressive treatment in the future.    Code Status: Full Code. We reviewed the risks and benefits of full resuscitative measures taken in the event of a cardiopulmonary arrest. For now, the patient elects full code status.     ACP Documents: None    Goals of care: The patient and family endorses that what is most important right now is to focus on remaining as independent as possible and extending life as long as possible, even it it means sacrificing quality    Accordingly, we have decided that the best plan to meet the patient's goals includes continuing with treatment     Disposition: To be determined.      History of Present Illness:     59 y/o F h/o recent Lap grace with incomplete removal, subsequent admission with acute biliary obstruction, transaminitis, s/p biliary drain, bilateral ureteral obstruction s/p bilateral ureteral stents and N&V with findings of a GOO, requiring TPN due to NPO status. She had EGD with biopsy and findings of adenocarcinoma of the stomach. She is pending PEG tomorrow for staging and subsequent further surgery and medical treatment options to be discussed. We were consulted to review goals of care.      Active Ambulatory Problems     Diagnosis Date Noted    No Active Ambulatory Problems      Resolved Ambulatory Problems     Diagnosis Date Noted    No Resolved Ambulatory Problems     Past Medical History:   Diagnosis Date    Hypertension     Sciatica         Past Surgical History:   Procedure Laterality Date    CARPAL TUNNEL RELEASE Left     CYSTOSCOPY W/ URETERAL STENT PLACEMENT Bilateral 11/27/2023    Procedure: CYSTOSCOPY, WITH URETERAL STENT INSERTION;  Surgeon: Huey Cardenas MD;  Location: [facility];  Service: Urology;  Laterality: Bilateral;    EGD, WITH CLOSED BIOPSY  11/18/2023    Procedure: EGD, WITH CLOSED BIOPSY;  Surgeon: Alfred Goss MD;  Location: [facility] OR;  Service: Gastroenterology;;    ERCP N/A 11/18/2023    Procedure: ERCP (ENDOSCOPIC RETROGRADE CHOLANGIOPANCREATOGRAPHY);  Surgeon: Alfred Goss MD;  Location: [facility] OR;  Service: Gastroenterology;  Laterality: N/A;    HEMORRHOID SURGERY          Review of patient's allergies indicates:  No Known Allergies       Current Facility-Administered Medications:     0.9%  NaCl infusion, , Intravenous, PRN, Hussein Merino MD, Stopped at 11/19/23 1049    acetaminophen tablet 650 mg, 650 mg, Oral, Q4H PRN, Hussein Merino MD    aluminum-magnesium hydroxide-simethicone 200-200-20 mg/5 mL suspension 30 mL, 30 mL, Oral, QID PRN, Hussein Merino MD    amino acid 5% in 15% dextrose (CLINIMIX-E) solution (1L provides 50gm AA, 150gm CHO (510 kcal/L dextrose), Na 35, K 30, Mg 5, Ca 4.5, Acetate 80, Cl 39, Phos 15) (710 total kcal/L), , Intravenous, Continuous, Nick Plummer MD, Last Rate: 70 mL/hr at 11/27/23 1941, New Bag at 11/27/23 1941    amino acid 5% in 15% dextrose (CLINIMIX-E) solution (1L provides 50gm AA, 150gm CHO (510 kcal/L dextrose), Na 35, K 30, Mg 5, Ca 4.5, Acetate 80, Cl 39, Phos 15) (710 total kcal/L), , Intravenous, Continuous, Dalton Palacios MD    bisacodyL suppository 10 mg, 10 mg, Rectal, Daily PRN, Hussein Merino MD    dicyclomine injection 20 mg, 20 mg, Intramuscular, QID PRN, Dalton Palacios MD    fat  emulsion 20% infusion 250 mL, 250 mL, Intravenous, Twice Weekly, Nick Plummer MD    hydrALAZINE injection 10 mg, 10 mg, Intravenous, Q6H PRN, Kenney Steiner MD    melatonin tablet 6 mg, 6 mg, Oral, Nightly PRN, Hussein Merino MD    metoclopramide HCl injection 10 mg, 10 mg, Intravenous, Q8H, Michael Rocha IV, MD, 10 mg at 11/28/23 1352    metoprolol injection 5 mg, 5 mg, Intravenous, Q2H PRN, Tyler Lira, ANP, 5 mg at 11/25/23 0651    metoprolol injection 5 mg, 5 mg, Intravenous, Q6H, Char Smith FNP, 5 mg at 11/27/23 0607    morphine injection 4 mg, 4 mg, Intravenous, Q4H PRN, Hussein Merino MD, 4 mg at 11/23/23 1209    ondansetron injection 4 mg, 4 mg, Intravenous, Q4H PRN, Hussein Merino MD, 4 mg at 11/27/23 2344    oxyCODONE immediate release tablet 5 mg, 5 mg, Oral, Q6H PRN, Hussein Merino MD, 5 mg at 11/23/23 1205    pantoprazole EC tablet 40 mg, 40 mg, Oral, BID AC, Kenney Steiner MD, 40 mg at 11/27/23 1517    piperacillin-tazobactam (ZOSYN) 4.5 g in dextrose 5 % in water (D5W) 100 mL IVPB (MB+), 4.5 g, Intravenous, Q12H, Kenney Steiner MD, Last Rate: 25 mL/hr at 11/28/23 1351, 4.5 g at 11/28/23 1351    polyethylene glycol packet 17 g, 17 g, Oral, BID PRN, Hussein Merino MD    prochlorperazine injection Soln 5 mg, 5 mg, Intravenous, Q6H PRN, Hussein Merino MD, 5 mg at 11/22/23 1347    promethazine injection 25 mg, 25 mg, Intramuscular, Q4H PRN, Lyssa Car AGACNP-BC, 25 mg at 11/23/23 1620    senna-docusate 8.6-50 mg per tablet 2 tablet, 2 tablet, Oral, BID PRN, Hussein Merino MD    sodium bicarbonate tablet 1,300 mg, 1,300 mg, Oral, BID, [place], Dalton CABALLERO MD, 1,300 mg at 11/28/23 0938    sodium chloride 0.9% flush 10 mL, 10 mL, Intravenous, PRN, Hussein Merino MD    Flushing PICC/Midline Protocol, , , Until Discontinued **AND** sodium chloride 0.9% flush 10 mL, 10 mL, Intravenous, Q6H, 10 mL at 11/28/23 1306 **AND** sodium chloride 0.9% flush 10 mL, 10 mL, Intravenous, PRN, Kneney Steiner MD  "    sodium chloride 0.9%, acetaminophen, aluminum-magnesium hydroxide-simethicone, bisacodyL, dicyclomine, hydrALAZINE, melatonin, metoprolol, morphine, ondansetron, oxyCODONE, polyethylene glycol, prochlorperazine, promethazine, senna-docusate 8.6-50 mg, sodium chloride 0.9%, Flushing PICC/Midline Protocol **AND** sodium chloride 0.9% **AND** sodium chloride 0.9%     Family History   Problem Relation Age of Onset    Breast cancer Mother     Cancer Maternal Aunt         colon cancer        Review of Systems   Constitutional:  Positive for activity change and fatigue.   HENT:  Negative for sore throat and trouble swallowing.    Respiratory:  Negative for shortness of breath.    Cardiovascular:  Negative for leg swelling.   Gastrointestinal:  Positive for abdominal pain and nausea. Negative for constipation.   Genitourinary:  Negative for difficulty urinating.   Musculoskeletal:  Negative for arthralgias and myalgias.   Psychiatric/Behavioral:  Negative for agitation and confusion.             Objective:   BP 97/63   Pulse 103   Temp 98.2 °F (36.8 °C) (Oral)   Resp 18   Ht 5' 5.75" (1.67 m)   Wt 82.1 kg (181 lb)   SpO2 98%   Breastfeeding No   BMI 29.44 kg/m²      Physical Exam  Vitals reviewed.   Constitutional:       General: She is not in acute distress.     Appearance: She is ill-appearing. She is not toxic-appearing.   HENT:      Head: Normocephalic.      Right Ear: External ear normal.      Left Ear: External ear normal.      Nose: Nose normal.      Mouth/Throat:      Mouth: Mucous membranes are moist.      Pharynx: Oropharynx is clear.   Eyes:      Extraocular Movements: Extraocular movements intact.      Conjunctiva/sclera: Conjunctivae normal.      Pupils: Pupils are equal, round, and reactive to light.   Cardiovascular:      Rate and Rhythm: Normal rate and regular rhythm.      Pulses: Normal pulses.      Heart sounds: Normal heart sounds.   Pulmonary:      Effort: Pulmonary effort is normal.      " Breath sounds: Normal breath sounds.   Abdominal:      General: There is no distension.      Tenderness: There is no abdominal tenderness. There is no guarding.   Musculoskeletal:      Right lower leg: No edema.      Left lower leg: Edema present.   Skin:     General: Skin is warm.   Neurological:      General: No focal deficit present.      Mental Status: She is alert and oriented to person, place, and time.   Psychiatric:         Mood and Affect: Mood normal.         Behavior: Behavior normal.         Thought Content: Thought content normal.         Judgment: Judgment normal.            Review of Symptoms  Review of Symptoms      Symptom Assessment (ESAS 0-10 Scale)  Pain:  0  Dyspnea:  0  Anxiety:  0  Nausea:  0  Depression:  0  Anorexia:  0  Fatigue:  0  Insomnia:  0  Restlessness:  0  Agitation:  0     CAM / Delirium:  Negative  Constipation:  Negative  Diarrhea:  Negative    Constipation:  No constipation    Bowel Management Plan (BMP):  Yes      Pain Assessment:  OME in 24 hours:  0  Location(s):      Modified Glen Scale:  0    Performance Status:  40    Living Arrangements:  Lives with spouse and Lives in home    Psychosocial/Cultural:   See Palliative Psychosocial Note: No  See assessement  **Primary  to Follow**  Palliative Care  Consult: No    Spiritual:  F - Sheryl and Belief:  Islam  A - Address in Care:  Pt elects to have  visit     Time-Based Charting:  Yes    Total Time Spent: 0 minutes      Advance Care Planning   Advance Directives:   Living Will: No    LaPOST: No    Do Not Resuscitate Status: No    Medical Power of : No    Agent's Name:  Mango Zepeda   Agent's Contact Number:  [phone]    Decision Making:  Patient answered questions and Family answered questions            Caregiver burden formerly assessed: Yes        > 50% of 70 min of encounter was spent in chart review, face to face discussion of goals of care, symptom assessment, coordination of  care and emotional support.     An additional 25 min of time was spent in Advanced Care Planning discussion    Viktor Landa M.D.  Palliative Medicine  [facility]

## 2023-11-28 NOTE — OP NOTE
[facility]                       [address] [place] [address]    PATIENT NAME:      CRIS MARTIN   YOB: 1965  CSN:               [id number]  MRN:               [id number]  ADMIT DATE:        11/17/2023 23:46:00  PHYSICIAN:         Huey Cardenas MD                          OPERATIVE REPORT      DATE OF SURGERY:        SURGEON:  Huey Cardenas MD    PREOPERATIVE DIAGNOSIS:  Bilateral hydronephrosis; renal failure.    POSTOPERATIVE DIAGNOSIS:  Bilateral hydronephrosis; renal failure.    PROCEDURE:  Bilateral ureteral stent placement.    OPERATION IN DETAIL:  After preop consent, the patient was taken to the   procedure room, placed in the dorsal lithotomy position.  She was prepped and   draped sterilely.  Preprocedure antibiotics were provided.  Rigid 22 Storz   cystoscope was inserted per urethra.  Bladder was then carefully examined.  Both   ureteral orifices appeared they were in their proper and anatomic position.    Both UOs appeared very tight.  The left ureteral orifice was cannulated with a   5-Macanese Flexi-Tip catheter using a Glidewire as an introducer.  Once a 5-Macanese   Flexi-Tip catheter was in position, a retrograde pyelogram was performed.  The   patient had significant hydronephrosis on that side.  We only had one 22-Macanese   stent in the hospital.  I therefore used a 5 x 24 stent.  This was placed over   the Glidewire.  Proper coiling was identified within the left renal pelvis and   within the bladder.  The contralateral ureteral orifice was identified and   again, this was cannulated with a 5-Macanese Flexi-Tip catheter using a Glidewire   within the introducer.  Once in position, a retrograde pyelogram was performed   documenting hydronephrosis on that side.  A wire was placed into the right renal   pelvis and a 5 x 22 stent was placed with proper coiling identified within the   renal  pelvis and bladder.  Once stents were in position, there was efflux of   fluid (more prominent on the left).  The patient tolerated procedure favorably.    She was transferred from the procedure to recovery in stable condition.        ______________________________  MD VERNON Lozada/AQS  DD:  11/28/2023  Time:  12:27AM  DT:  11/28/2023  Time:  01:28AM  Job #:  [id number]      OPERATIVE REPORT

## 2023-11-28 NOTE — PROGRESS NOTES
UROLOGY  PROGRESS  NOTE    Karen Briggs 1965  [id number]  11/28/2023    POD 1 s/p bilateral stents    Patient resting in bed   Denies any pain at time of rounds   No acute events overnight  Some hematuria as expected with stents    VSS, afebrile   1550 mL urine output overnight  WBC 11.8   H&H 8.4/23.0  BUN and creatinine 55.5/4.06    Intake/Output:  No intake/output data recorded.  I/O last 3 completed shifts:  In: 603 [IV Piggyback:603]  Out: 2450 [Urine:1850; Drains:600]     Exam:    NAD  Card: RRR  Resp: unlabored  Abd:  Soft, nondistended  :  Bloody urine draining to  bag, no clots  Extremity: no C/C/E    Recent Results (from the past 24 hour(s))   Sodium, Random Urine    Collection Time: 11/27/23 10:54 AM   Result Value Ref Range    Urine Sodium 35.0 mmol/L   Protein/Creatinine Ratio, Urine    Collection Time: 11/27/23 10:54 AM   Result Value Ref Range    Urine Protein Level 542.4 mg/dL    Urine Creatinine 124.1 (H) 45.0 - 106.0 mg/dL    Urine Protein/Creatinine Ratio 4.4    Basic Metabolic Panel    Collection Time: 11/27/23  8:56 PM   Result Value Ref Range    Sodium Level 130 (L) 136 - 145 mmol/L    Potassium Level 3.6 3.5 - 5.1 mmol/L    Chloride 103 98 - 107 mmol/L    Carbon Dioxide 16 (L) 23 - 31 mmol/L    Glucose Level 152 (H) 82 - 115 mg/dL    Blood Urea Nitrogen 56.2 (H) 9.8 - 20.1 mg/dL    Creatinine 4.15 (H) 0.55 - 1.02 mg/dL    BUN/Creatinine Ratio 14     Calcium Level Total 7.6 (L) 8.4 - 10.2 mg/dL    Anion Gap 11.0 mEq/L    eGFR 12 mls/min/1.73/m2   Triglycerides    Collection Time: 11/28/23  4:37 AM   Result Value Ref Range    Triglyceride 226 (H) 37 - 140 mg/dL   Magnesium    Collection Time: 11/28/23  4:37 AM   Result Value Ref Range    Magnesium Level 2.00 1.60 - 2.60 mg/dL   Phosphorus    Collection Time: 11/28/23  4:37 AM   Result Value Ref Range    Phosphorus Level 3.4 2.3 - 4.7 mg/dL   Comprehensive Metabolic Panel    Collection Time: 11/28/23  4:37 AM   Result Value Ref Range     Sodium Level 131 (L) 136 - 145 mmol/L    Potassium Level 4.0 3.5 - 5.1 mmol/L    Chloride 103 98 - 107 mmol/L    Carbon Dioxide 17 (L) 23 - 31 mmol/L    Glucose Level 126 (H) 82 - 115 mg/dL    Blood Urea Nitrogen 55.5 (H) 9.8 - 20.1 mg/dL    Creatinine 4.06 (H) 0.55 - 1.02 mg/dL    Calcium Level Total 8.5 8.4 - 10.2 mg/dL    Protein Total 4.5 (L) 5.8 - 7.6 gm/dL    Albumin Level 1.6 (L) 3.4 - 4.8 g/dL    Globulin 2.9 2.4 - 3.5 gm/dL    Albumin/Globulin Ratio 0.6 (L) 1.1 - 2.0 ratio    Bilirubin Total 5.0 (H) <=1.5 mg/dL    Alkaline Phosphatase 134 40 - 150 unit/L    Alanine Aminotransferase 32 0 - 55 unit/L    Aspartate Aminotransferase 29 5 - 34 unit/L    eGFR 12 mls/min/1.73/m2   CBC with Differential    Collection Time: 11/28/23  4:37 AM   Result Value Ref Range    WBC 11.82 (H) 4.50 - 11.50 x10(3)/mcL    RBC 2.79 (L) 4.20 - 5.40 x10(6)/mcL    Hgb 8.4 (L) 12.0 - 16.0 g/dL    Hct 23.0 (L) 37.0 - 47.0 %    MCV 82.4 80.0 - 94.0 fL    MCH 30.1 27.0 - 31.0 pg    MCHC 36.5 (H) 33.0 - 36.0 g/dL    RDW 19.9 (H) 11.5 - 17.0 %    Platelet 334 130 - 400 x10(3)/mcL    MPV 11.6 (H) 7.4 - 10.4 fL    Neut % 86.3 %    Lymph % 3.8 %    Mono % 7.1 %    Eos % 0.0 %    Basophil % 0.2 %    Lymph # 0.45 (L) 0.6 - 4.6 x10(3)/mcL    Neut # 10.20 (H) 2.1 - 9.2 x10(3)/mcL    Mono # 0.84 0.1 - 1.3 x10(3)/mcL    Eos # 0.00 0 - 0.9 x10(3)/mcL    Baso # 0.02 <=0.2 x10(3)/mcL    IG# 0.31 (H) 0 - 0.04 x10(3)/mcL    IG% 2.6 %    NRBC% 0.0 %       Assessment:  -bilateral hydronephrosis s/p bilateral double-J stents 11/29/2023  -acute renal failure  -complicated incomplete grace 11/10/2023 with post-op fluid collection/susp bile leak s/p biliary drain with IR; pathology from gastric biopsy with malignant gastric tumor  -a-fib RVR    Plan:  -no additional recommendations from Urology standpoint at this time   -repeat labs in a.m.  -following      Sara Waldrop NP    Agree with above.  Monitoring fluids and electrolytes.  Check bmp in aleta Arzate  VITOR Cardenas MD

## 2023-11-28 NOTE — PROGRESS NOTES
Discussed case in detail with Medical Oncology.  We can not make a treatment plan in this patient without PET-CT scan. There is a high likely bryant of identification of metastatic disease given biliary occlusion and unexplained ureteral obstruction.  If metastatic or unresectable disease is present, a jejunostomy tube is not a good palliative option and she would require gastrojejunal bypass.  If PET shows no distant or metastatic disease we would plan to treat for curative intent, then jejunostomy tube would be considered to bridge her to surgery during neoadjuvant therapy.     Medical oncology will investigate whether special consideration or approval could be given to obtain an inpatient PET-CT scan, if not would recommend discharge patient with short interval follow-up PET-CT scan, intermittent infusion at the cancer center could be provided if oral intake is inadequate.  Once PET-CT scan is performed we can make a definitive treatment plan.     William Morse MD  Surgical Oncology  Complex General, Gastrointestinal and Hepatobiliary Surgery

## 2023-11-28 NOTE — TRANSFER OF CARE
"Anesthesia Transfer of Care Note    Patient: Karen Briggs    Procedure(s) Performed: Procedure(s) (LRB):  CYSTOSCOPY, WITH URETERAL STENT INSERTION (Bilateral)    Patient location: PACU    Anesthesia Type: general    Transport from OR: Transported from OR on room air with adequate spontaneous ventilation    Post pain: adequate analgesia    Post assessment: no apparent anesthetic complications    Post vital signs: stable    Level of consciousness: awake and alert    Nausea/Vomiting: no nausea/vomiting    Complications: none    Transfer of care protocol was followed      Last vitals: Visit Vitals  /75 (BP Location: Left arm, Patient Position: Lying)   Pulse 81   Temp 36.8 °C (98.2 °F) (Skin)   Resp 15   Ht 5' 5.75" (1.67 m)   Wt 82.1 kg (181 lb)   SpO2 99%   Breastfeeding No   BMI 29.44 kg/m²     "

## 2023-11-28 NOTE — ANESTHESIA PREPROCEDURE EVALUATION
11/27/2023  Karen Briggs is a 58 y.o., female.      HPI: This is a 58-year-old female presented on  7 days post op from laparoscopic cholecystectomy on 11/10. Gallbladder was unable to be resected. Back and flank pain persisted post operatively prompting evaluation at [facility]. After workup her surgeon recommended ERCP for which she was sent to this facility. Left sided hydronephrosis noted on CT scans done on admission. At that time renal function was fairly normal and patient was given option for stenting or to defer as outpatient and she chose the latter.      During ERCP, malignant gastric mass noted and ERCP was unable to be completed due to duodenal scarring. 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 Patient ultimately had biliary drain placed per IR.      She then developed A fib with RVR and is now on amiodarone infusion with cardiology following. IN the past 24 hours she had significant blood loss post removal of long term IV. She then had a fall later ambulating to the bathroom.      Patient renal function was relatively stable until decline starting 11/25. Creatinine was previously 0.87 -> 1.19 ->2.1 and now 3.1 today with GFR 16.  mL yesterday. Intermittent cath this morning yielded 300 mL of dark urine. Nephrology consulted for POORNIMA. Denies renal history. Denies UTI, kidney stones, NSAID. Her most recent BP 87/62. She is awake and alert without complaints unless the abdomen is palpated.        Pre-op Assessment    I have reviewed the Patient Summary Reports.     I have reviewed the Nursing Notes. I have reviewed the NPO Status.   I have reviewed the Medications.     Review of Systems  Anesthesia Hx:  No problems with previous Anesthesia                Social:  Non-Smoker       Cardiovascular:     Hypertension   Denies MI.        Denies Angina.  Denies CHF.                            Hypertension         Pulmonary:    Denies COPD.  Denies Asthma.                    Renal/:  Chronic Renal Disease (hydronephrosis) no renal calculi               Hepatic/GI:      Denies GERD. Denies Liver Disease.  Denies Hepatitis. Vomiting swallowed food or drink          Neurological:    Denies CVA. Neuromuscular Disease, (sciatica)   Denies Seizures.                              Neuromuscular Disease   Endocrine:  Denies Diabetes. Denies Hypothyroidism.  Denies Hyperthyroidism.       Denies Obesity / BMI > 30      Physical Exam  General: Well nourished, Cooperative, Alert and Oriented    Airway:  Mallampati: I   Mouth Opening: Normal  TM Distance: Normal  Tongue: Normal  Neck ROM: Normal ROM    Dental:        Anesthesia Plan  Type of Anesthesia, risks & benefits discussed:    Anesthesia Type: Gen ETT  Intra-op Monitoring Plan: Standard ASA Monitors  Induction:  IV  Airway Plan: Direct and Video  Informed Consent: Informed consent signed with the Patient and all parties understand the risks and agree with anesthesia plan.  All questions answered.   ASA Score: 3 Emergent  Day of Surgery Review of History & Physical: H&P Update referred to the surgeon/provider.    Ready For Surgery From Anesthesia Perspective.     .

## 2023-11-29 ENCOUNTER — TELEPHONE (OUTPATIENT)
Dept: HEMATOLOGY/ONCOLOGY | Facility: CLINIC | Age: 58
End: 2023-11-29
Payer: COMMERCIAL

## 2023-11-29 LAB
ALBUMIN SERPL-MCNC: 1.7 G/DL (ref 3.4–4.8)
ALBUMIN/GLOB SERPL: 0.5 RATIO (ref 1.1–2)
ALP SERPL-CCNC: 131 UNIT/L (ref 40–150)
ALT SERPL-CCNC: 31 UNIT/L (ref 0–55)
AST SERPL-CCNC: 27 UNIT/L (ref 5–34)
BASOPHILS # BLD AUTO: 0.04 X10(3)/MCL
BASOPHILS NFR BLD AUTO: 0.3 %
BILIRUB SERPL-MCNC: 4.8 MG/DL
BUN SERPL-MCNC: 66.7 MG/DL (ref 9.8–20.1)
CALCIUM SERPL-MCNC: 8.4 MG/DL (ref 8.4–10.2)
CHLORIDE SERPL-SCNC: 102 MMOL/L (ref 98–107)
CO2 SERPL-SCNC: 18 MMOL/L (ref 23–31)
CREAT SERPL-MCNC: 4.89 MG/DL (ref 0.55–1.02)
EOSINOPHIL # BLD AUTO: 0.06 X10(3)/MCL (ref 0–0.9)
EOSINOPHIL NFR BLD AUTO: 0.4 %
ERYTHROCYTE [DISTWIDTH] IN BLOOD BY AUTOMATED COUNT: 20.9 % (ref 11.5–17)
GFR SERPLBLD CREATININE-BSD FMLA CKD-EPI: 10 MLS/MIN/1.73/M2
GLOBULIN SER-MCNC: 3.3 GM/DL (ref 2.4–3.5)
GLUCOSE SERPL-MCNC: 108 MG/DL (ref 82–115)
HCT VFR BLD AUTO: 22.9 % (ref 37–47)
HGB BLD-MCNC: 8.2 G/DL (ref 12–16)
IMM GRANULOCYTES # BLD AUTO: 0.64 X10(3)/MCL (ref 0–0.04)
IMM GRANULOCYTES NFR BLD AUTO: 4.6 %
LYMPHOCYTES # BLD AUTO: 0.85 X10(3)/MCL (ref 0.6–4.6)
LYMPHOCYTES NFR BLD AUTO: 6.1 %
MAGNESIUM SERPL-MCNC: 1.9 MG/DL (ref 1.6–2.6)
MCH RBC QN AUTO: 30.3 PG (ref 27–31)
MCHC RBC AUTO-ENTMCNC: 35.8 G/DL (ref 33–36)
MCV RBC AUTO: 84.5 FL (ref 80–94)
MONOCYTES # BLD AUTO: 1.22 X10(3)/MCL (ref 0.1–1.3)
MONOCYTES NFR BLD AUTO: 8.8 %
NEUTROPHILS # BLD AUTO: 11.11 X10(3)/MCL (ref 2.1–9.2)
NEUTROPHILS NFR BLD AUTO: 79.8 %
NRBC BLD AUTO-RTO: 0.1 %
PHOSPHATE SERPL-MCNC: 3.9 MG/DL (ref 2.3–4.7)
PLATELET # BLD AUTO: 381 X10(3)/MCL (ref 130–400)
PMV BLD AUTO: 11.6 FL (ref 7.4–10.4)
POTASSIUM SERPL-SCNC: 3.7 MMOL/L (ref 3.5–5.1)
PROT SERPL-MCNC: 5 GM/DL (ref 5.8–7.6)
RBC # BLD AUTO: 2.71 X10(6)/MCL (ref 4.2–5.4)
SODIUM SERPL-SCNC: 131 MMOL/L (ref 136–145)
WBC # SPEC AUTO: 13.92 X10(3)/MCL (ref 4.5–11.5)

## 2023-11-29 PROCEDURE — 5A1D70Z PERFORMANCE OF URINARY FILTRATION, INTERMITTENT, LESS THAN 6 HOURS PER DAY: ICD-10-PCS | Performed by: INTERNAL MEDICINE

## 2023-11-29 PROCEDURE — 84100 ASSAY OF PHOSPHORUS: CPT | Performed by: INTERNAL MEDICINE

## 2023-11-29 PROCEDURE — 90935 HEMODIALYSIS ONE EVALUATION: CPT

## 2023-11-29 PROCEDURE — C1752 CATH,HEMODIALYSIS,SHORT-TERM: HCPCS

## 2023-11-29 PROCEDURE — 11000001 HC ACUTE MED/SURG PRIVATE ROOM

## 2023-11-29 PROCEDURE — 85025 COMPLETE CBC W/AUTO DIFF WBC: CPT | Performed by: INTERNAL MEDICINE

## 2023-11-29 PROCEDURE — 25000003 PHARM REV CODE 250: Performed by: INTERNAL MEDICINE

## 2023-11-29 PROCEDURE — 63600175 PHARM REV CODE 636 W HCPCS: Performed by: INTERNAL MEDICINE

## 2023-11-29 PROCEDURE — 63600175 PHARM REV CODE 636 W HCPCS: Performed by: SURGERY

## 2023-11-29 PROCEDURE — 21400001 HC TELEMETRY ROOM

## 2023-11-29 PROCEDURE — 25000003 PHARM REV CODE 250: Performed by: NURSE PRACTITIONER

## 2023-11-29 PROCEDURE — 83735 ASSAY OF MAGNESIUM: CPT | Performed by: INTERNAL MEDICINE

## 2023-11-29 PROCEDURE — C9113 INJ PANTOPRAZOLE SODIUM, VIA: HCPCS | Performed by: INTERNAL MEDICINE

## 2023-11-29 PROCEDURE — 97116 GAIT TRAINING THERAPY: CPT

## 2023-11-29 PROCEDURE — A4216 STERILE WATER/SALINE, 10 ML: HCPCS | Performed by: INTERNAL MEDICINE

## 2023-11-29 PROCEDURE — 80053 COMPREHEN METABOLIC PANEL: CPT | Performed by: INTERNAL MEDICINE

## 2023-11-29 RX ORDER — SODIUM CHLORIDE 9 MG/ML
INJECTION, SOLUTION INTRAVENOUS
Status: CANCELLED | OUTPATIENT
Start: 2023-11-29

## 2023-11-29 RX ORDER — SODIUM CHLORIDE 9 MG/ML
INJECTION, SOLUTION INTRAVENOUS ONCE
Status: CANCELLED | OUTPATIENT
Start: 2023-11-29 | End: 2023-11-29

## 2023-11-29 RX ORDER — MUPIROCIN 20 MG/G
OINTMENT TOPICAL 2 TIMES DAILY
Status: DISPENSED | OUTPATIENT
Start: 2023-11-29 | End: 2023-12-04

## 2023-11-29 RX ADMIN — MUPIROCIN: 20 OINTMENT TOPICAL at 09:11

## 2023-11-29 RX ADMIN — MUPIROCIN: 20 OINTMENT TOPICAL at 01:11

## 2023-11-29 RX ADMIN — METOPROLOL TARTRATE 5 MG: 1 INJECTION, SOLUTION INTRAVENOUS at 01:11

## 2023-11-29 RX ADMIN — PANTOPRAZOLE SODIUM 40 MG: 40 INJECTION, POWDER, FOR SOLUTION INTRAVENOUS at 09:11

## 2023-11-29 RX ADMIN — PIPERACILLIN SODIUM AND TAZOBACTAM SODIUM 4.5 G: 4; .5 INJECTION, POWDER, FOR SOLUTION INTRAVENOUS at 01:11

## 2023-11-29 RX ADMIN — PIPERACILLIN SODIUM AND TAZOBACTAM SODIUM 4.5 G: 4; .5 INJECTION, POWDER, FOR SOLUTION INTRAVENOUS at 09:11

## 2023-11-29 RX ADMIN — METOCLOPRAMIDE 10 MG: 5 INJECTION, SOLUTION INTRAMUSCULAR; INTRAVENOUS at 04:11

## 2023-11-29 RX ADMIN — SODIUM CHLORIDE, PRESERVATIVE FREE 10 ML: 5 INJECTION INTRAVENOUS at 01:11

## 2023-11-29 RX ADMIN — ASCORBIC ACID, VITAMIN A PALMITATE, CHOLECALCIFEROL, THIAMINE HYDROCHLORIDE, RIBOFLAVIN-5 PHOSPHATE SODIUM, PYRIDOXINE HYDROCHLORIDE, NIACINAMIDE, DEXPANTHENOL, ALPHA-TOCOPHEROL ACETATE, VITAMIN K1, FOLIC ACID, BIOTIN, CYANOCOBALAMIN: 200; 3300; 200; 6; 3.6; 6; 40; 15; 10; 150; 600; 60; 5 INJECTION, SOLUTION INTRAVENOUS at 10:11

## 2023-11-29 RX ADMIN — SODIUM CHLORIDE, PRESERVATIVE FREE 10 ML: 5 INJECTION INTRAVENOUS at 04:11

## 2023-11-29 RX ADMIN — MORPHINE SULFATE 4 MG: 4 INJECTION, SOLUTION INTRAMUSCULAR; INTRAVENOUS at 02:11

## 2023-11-29 RX ADMIN — METOCLOPRAMIDE 10 MG: 5 INJECTION, SOLUTION INTRAMUSCULAR; INTRAVENOUS at 09:11

## 2023-11-29 NOTE — PLAN OF CARE
Problem: Adult Inpatient Plan of Care  Goal: Plan of Care Review  Outcome: Ongoing, Progressing  Goal: Patient-Specific Goal (Individualized)  Outcome: Ongoing, Progressing  Goal: Absence of Hospital-Acquired Illness or Injury  Outcome: Ongoing, Progressing  Goal: Optimal Comfort and Wellbeing  Outcome: Ongoing, Progressing  Goal: Readiness for Transition of Care  Outcome: Ongoing, Progressing     Problem: Pain Acute  Goal: Acceptable Pain Control and Functional Ability  Outcome: Ongoing, Progressing     Problem: Fall Injury Risk  Goal: Absence of Fall and Fall-Related Injury  Outcome: Ongoing, Progressing     Problem: Infection  Goal: Absence of Infection Signs and Symptoms  Outcome: Ongoing, Progressing     Problem: Coping Ineffective  Goal: Effective Coping  Outcome: Ongoing, Progressing     Problem: Skin Injury Risk Increased  Goal: Skin Health and Integrity  Outcome: Ongoing, Progressing

## 2023-11-29 NOTE — PROGRESS NOTES
UROLOGY  PROGRESS  NOTE    Karen Briggs 1965  [id number]  11/29/2023    POD 2 s/p bilateral stents    Patient resting in bed  No complaints at time of rounds  Denies any significant discomfort from stents    Blood pressure soft, afebrile   200 mL urine output overnight  WBC 13.92   H&H 8.2/22.9  BUN and creatinine 66.7/4.89    Intake/Output:  No intake/output data recorded.  I/O last 3 completed shifts:  In: 1083 [P.O.:480; IV Piggyback:603]  Out: 2850 [Urine:2250; Drains:600]     Exam:    NAD  Card: RRR  Resp: unlabored  Abd:  Soft, nondistended  :  minimal dark bloody urine in  bag; cheney irrigated without any clot obtained, urine very light pink after irrigating  Extremity: no C/C/E    Recent Results (from the past 24 hour(s))   Magnesium    Collection Time: 11/29/23  5:41 AM   Result Value Ref Range    Magnesium Level 1.90 1.60 - 2.60 mg/dL   Phosphorus    Collection Time: 11/29/23  5:41 AM   Result Value Ref Range    Phosphorus Level 3.9 2.3 - 4.7 mg/dL   Comprehensive Metabolic Panel    Collection Time: 11/29/23  5:41 AM   Result Value Ref Range    Sodium Level 131 (L) 136 - 145 mmol/L    Potassium Level 3.7 3.5 - 5.1 mmol/L    Chloride 102 98 - 107 mmol/L    Carbon Dioxide 18 (L) 23 - 31 mmol/L    Glucose Level 108 82 - 115 mg/dL    Blood Urea Nitrogen 66.7 (H) 9.8 - 20.1 mg/dL    Creatinine 4.89 (H) 0.55 - 1.02 mg/dL    Calcium Level Total 8.4 8.4 - 10.2 mg/dL    Protein Total 5.0 (L) 5.8 - 7.6 gm/dL    Albumin Level 1.7 (L) 3.4 - 4.8 g/dL    Globulin 3.3 2.4 - 3.5 gm/dL    Albumin/Globulin Ratio 0.5 (L) 1.1 - 2.0 ratio    Bilirubin Total 4.8 (H) <=1.5 mg/dL    Alkaline Phosphatase 131 40 - 150 unit/L    Alanine Aminotransferase 31 0 - 55 unit/L    Aspartate Aminotransferase 27 5 - 34 unit/L    eGFR 10 mls/min/1.73/m2   CBC with Differential    Collection Time: 11/29/23  5:41 AM   Result Value Ref Range    WBC 13.92 (H) 4.50 - 11.50 x10(3)/mcL    RBC 2.71 (L) 4.20 - 5.40 x10(6)/mcL    Hgb 8.2 (L)  12.0 - 16.0 g/dL    Hct 22.9 (L) 37.0 - 47.0 %    MCV 84.5 80.0 - 94.0 fL    MCH 30.3 27.0 - 31.0 pg    MCHC 35.8 33.0 - 36.0 g/dL    RDW 20.9 (H) 11.5 - 17.0 %    Platelet 381 130 - 400 x10(3)/mcL    MPV 11.6 (H) 7.4 - 10.4 fL    Neut % 79.8 %    Lymph % 6.1 %    Mono % 8.8 %    Eos % 0.4 %    Basophil % 0.3 %    Lymph # 0.85 0.6 - 4.6 x10(3)/mcL    Neut # 11.11 (H) 2.1 - 9.2 x10(3)/mcL    Mono # 1.22 0.1 - 1.3 x10(3)/mcL    Eos # 0.06 0 - 0.9 x10(3)/mcL    Baso # 0.04 <=0.2 x10(3)/mcL    IG# 0.64 (H) 0 - 0.04 x10(3)/mcL    IG% 4.6 %    NRBC% 0.1 %       Assessment:  -bilateral hydronephrosis s/p bilateral double-J stents 11/29/2023  -acute renal failure  -complicated incomplete grace 11/10/2023 with post-op fluid collection/susp bile leak s/p biliary drain with IR; pathology from gastric biopsy with malignant gastric tumor  -a-fib RVR    Plan:  -KUB ordered to ensure stents in appropriate position  -Continue cheney, accurate I&O  -Discussed with Nephrology  -Following      Sara Waldrop NP    May need dialysis; agree with above       Huey Cardenas MD

## 2023-11-29 NOTE — PROGRESS NOTES
[facility]  Hospital Medicine Progress Note      Chief Complaint: Inpatient Follow-up for intractable vomiting     HPI: 58-year-old female with medical history of hypertension who recently underwent laparoscopic cholecystectomy on 11/10/2023, procedure was incomplete and was unable to completely resect the gallbladder.  Since surgery she continued to have right flank/back pain and followed up with her PCP and CT abdomen and pelvis on 11/17/2023 revealed left-sided hydronephrosis with suspected distal obstruction/no clear stone visualized, postoperative changes of cholecystectomy with fluid in the gallbladder fossa may reflect postoperative seroma but biloma can not be entirely excluded, also noted for marked thickening of the stomach wall diffusely may be related to mesenteric edema/reactive.  She presented to [facility] ED the same day 11/17/2023 and her labs notable for WBC 9.0, hemoglobin 12.4, platelets 442, creatinine 0.86, total bilirubin 8.7 with direct fraction 6.7, alkaline phosphatase 491, , .  Patient's surgeon was consulted and recommended ERCP which was not available at [facility] for which she was transferred to [facility] and referred to hospital medicine service for further evaluation and management.   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  Severe inflammation and oozing of blood noted proximal gastric lumen.  Unable to advance scope into the duodenum.  Surgical oncology consulted, IR consulted for external drain placement which was done November 21.  November 24th she developed new onset atrial fibrillation with RVR and Cardiology consulted.  Started on amiodarone drip  Unfortunately patient had acute blood loss due to hemorrhage from the midline site that was removed.  Patient was unaware of the bleed.  Became very weak, passed out.  Code was called but she came around.  Stat H&H done which was slightly  lower but stable, MRI of the brain was negative for any acute ischemic changes. Pt is aware of gastric cancer diagnosis   GI following--> 11/18- EGD shows normal esophagus, malignant gastric tumor in the cardia.  Inflamed mucosa and ooze of blood throughout the proximal gastric lumen.  Gastric antrum scar would not allow advancement of scope into the duodenal ampulla area therefore biliary cannulation was not possible for bile leak evaluation  Pathology shows marked chronic gastritis with intestinal metaplasia, gastric cardia biopsy shows adenocarcinoma, moderately differentiated intestinal type   bilateral hydronephrosis with left greater than right  MRI brain without contrast-negative for acute finding        Patient currently being managed for obstructive uropathy with bilateral hydronephrosis status post bilateral ureteral stent placed on 11/27 by Urology- no improvement in renal function, nephrology with plans to begin HD .   found to have a newly diagnosed gastric adenocarcinoma with concern for possible gastric/ duodenal outlet obstruction with symptoms of nausea and vomiting can not keep anything down in her stomach complicated by severe malnutrition. Awaiting pet scan for staging and management planning per medical oncology and surgical oncology . Palliative care on board         Interval Hx:   Vitals reviewed and stable on room air   White cell count trending upwards slowly, no source of infection may be reactive to stress   Hemoglobin levels of 8.7 grams/dL, low but stable  Creatinine levels worsening today at 4.8, CO2 18   Total bilirubin at 4.8  F/up PET Scan result   Nephrology planning to begin hemodialysis given worsening renal function and need for dialysis.      Case was discussed with patient's nurse and  on the floor.     Objective/physical exam:  General: In no acute distress, afebrile, ill looking   Chest: Clear to auscultation bilaterally anteriorly  Heart:  Tachycardic rate and  rhythm, no appreciable murmur  Abdomen:  extremely tender in epigastrium and RUQ, Slightly distended, BS +  MSK: Warm, trace pitting edema bilateral lower extremities  Neurologic: Alert and oriented x4, Cranial nerve II-XII intact, able to move all 4 extremities        Assessment/Plan:  Possible Gastric/duodenal outlet obstruction  likely d/t gastric cancer   Suspected bile leak with biloma and biliary obstruction--> H/O Laparoscopic incomplete cholecystectomy 11/10/2023  Gastric cardia adenocarcinoma intestinal type on biopsy  Gastric antrum stricture unable to cannulate duodenum  SIRS versus sepsis- improved  Bilateral hydronephrosis with worsening renal function, s/p b/l stents on 11/27  POORNIMA-obstructive uropathy , worsening   New onset atrial fibrillation with CVR   Acute Blood loss anemia with syncope and then fall 11/26, now hypotensive  History of hypertension and DDD/sciatica  Hypokalemia- resolved with replacement  Metabolic acidosis  Severe malnutrition        Plan  F/up PET Scan result   F/up GI oncology and medical oncology input   Nephrology planning to begin hemodialysis given worsening renal function and need for dialysis.    Continue with IV TPN for nutritional propose   Patient currently not tolerating p.o. intake will be very challenging to discharge home with no nutrition  Patient is status post bilateral ureteral stent placed on 11/27 by Urology, to trend BMP  Appreciate CIS input to discontinue Lovenox and continue with rate control given anemia   Trend hemoglobin levels and keep hemoglobin greater than 7- 8 grams/dL    palliative Care on board- patient wants everything done   Continue IV Zosyn day 6/7, wbc keeps going up, less likely infectious at this time   IM Bentyl p.r.n. for abdominal pain and spasm      VTE prophylaxis:  scds given anemia      Patient condition:  Guarded     Anticipated discharge and Disposition:   TBD        All diagnosis and differential diagnosis have been reviewed;  assessment and plan has been documented; I have personally reviewed the labs and test results that are presently available; I have reviewed the patients medication list; I have reviewed the consulting providers response and recommendations. I have reviewed or attempted to review medical records based upon their availability     All of the patient's questions have been  addressed and answered. Patient's is agreeable to the above stated plan. I will continue to monitor closely and make adjustments to medical management as needed.    VITAL SIGNS: 24 HRS MIN & MAX LAST   Temp  Min: 97.3 °F (36.3 °C)  Max: 98.2 °F (36.8 °C) 97.9 °F (36.6 °C)   BP  Min: 95/66  Max: 124/85 124/85   Pulse  Min: 90  Max: 125  (!) 125   Resp  Min: 18  Max: 18 18   SpO2  Min: 98 %  Max: 100 % 99 %     I have reviewed the following labs:  Recent Labs   Lab 11/27/23 0530 11/28/23 0437 11/29/23  0541   WBC 12.57* 11.82* 13.92*   RBC 3.04* 2.79* 2.71*   HGB 9.2* 8.4* 8.2*   HCT 26.7* 23.0* 22.9*   MCV 87.8 82.4 84.5   MCH 30.3 30.1 30.3   MCHC 34.5 36.5* 35.8   RDW 19.4* 19.9* 20.9*    334 381   MPV 11.9* 11.6* 11.6*     Recent Labs   Lab 11/26/23 0425 11/27/23 0530 11/27/23 2056 11/28/23 0437 11/29/23  0541   *   < > 130* 131* 131*   K 3.2*   < > 3.6 4.0 3.7   CO2 20*   < > 16* 17* 18*   BUN 37.4*   < > 56.2* 55.5* 66.7*   CREATININE 2.11*   < > 4.15* 4.06* 4.89*   CALCIUM 8.8   < > 7.6* 8.5 8.4   MG 2.50  --   --  2.00 1.90   ALBUMIN 2.0*  --   --  1.6* 1.7*   ALKPHOS 234*  --   --  134 131   ALT 48  --   --  32 31   AST 32  --   --  29 27   BILITOT 8.7*  --   --  5.0* 4.8*    < > = values in this interval not displayed.     Microbiology Results (last 7 days)       Procedure Component Value Units Date/Time    Urine culture [[id number]] Collected: 11/23/23 1012    Order Status: Completed Specimen: Urine Updated: 11/25/23 0745     Urine Culture No Growth    Blood Culture [[id number]]  (Normal) Collected: 11/18/23 0710    Order  Status: Completed Specimen: Blood Updated: 11/23/23 0901     CULTURE, BLOOD ([facility]) No Growth at 5 days    Blood Culture [[id number]]  (Normal) Collected: 11/18/23 0710    Order Status: Completed Specimen: Blood Updated: 11/23/23 0901     CULTURE, BLOOD ([facility]) No Growth at 5 days             See below for Radiology    Scheduled Med:   metoclopramide HCl  10 mg Intravenous Q8H    metoprolol  5 mg Intravenous Q6H    mupirocin   Nasal BID    pantoprazole  40 mg Intravenous BID WM    piperacillin-tazobactam (Zosyn) IV (PEDS and ADULTS) (extended infusion is not appropriate)  4.5 g Intravenous Q12H    sodium bicarbonate  1,300 mg Oral BID    sodium chloride 0.9%  10 mL Intravenous Q6H      Continuous Infusions:   amino acid 5% in 15% dextrose (CLINIMIX-E) solution (1L provides 50gm AA, 150gm CHO (510 kcal/L dextrose), Na 35, K 30, Mg 5, Ca 4.5, Acetate 80, Cl 39, Phos 15) (710 total kcal/L) 70 mL/hr at 11/29/23 1043      PRN Meds:  sodium chloride 0.9%, acetaminophen, aluminum-magnesium hydroxide-simethicone, bisacodyL, dicyclomine, hydrALAZINE, melatonin, metoprolol, morphine, ondansetron, oxyCODONE, polyethylene glycol, prochlorperazine, promethazine, senna-docusate 8.6-50 mg, sodium chloride 0.9%, Flushing PICC/Midline Protocol **AND** sodium chloride 0.9% **AND** sodium chloride 0.9%     Assessment/Plan:      VTE prophylaxis:     Patient condition:  Stable/Fair/Guarded/ Serious/ Critical    Anticipated discharge and Disposition:         All diagnosis and differential diagnosis have been reviewed; assessment and plan has been documented; I have personally reviewed the labs and test results that are presently available; I have reviewed the patients medication list; I have reviewed the consulting providers response and recommendations. I have reviewed or attempted to review medical records based upon their availability    All of the patient's questions have been  addressed and answered. Patient's is agreeable to the above  stated plan. I will continue to monitor closely and make adjustments to medical management as needed.  _____________________________________________________________________    Nutrition Status:    Radiology:  I have personally reviewed the following imaging and agree with the radiologist.     SURG FL Surgery Fluoro Usage  See OP Notes for results.     IMPRESSION: See OP Notes for results.     This procedure was auto-finalized by: Virtual Radiologist      Dalton Palacios MD   11/29/2023

## 2023-11-29 NOTE — PROGRESS NOTES
[facility]  Nephrology  Progress Note    Patient Name: Karen Briggs  MRN: [id number]  Admission Date: 11/17/2023  Hospital Length of Stay: 12 days  Attending Provider: Dalton Palacios MD   Primary Care Physician: Marian White FNP-C  Principal Problem:<principal problem not specified>      Subjective:   HPI: This is a 58-year-old female presented on  7 days post op from laparoscopic cholecystectomy on 11/10. Gallbladder was unable to be resected. Back and flank pain persisted post operatively prompting evaluation at [facility] ER. After workup her surgeon recommended ERCP for which she was sent to this facility. Left sided hydronephrosis noted on CT scans done on admission. At that time renal function was fairly normal and patient was given option for stenting or to defer as outpatient and she chose the latter.      During ERCP, malignant gastric mass noted and ERCP was unable to be completed due to duodenal scarring. 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 Patient ultimately had biliary drain placed per IR.      She then developed A fib with RVR and is now on amiodarone infusion with cardiology following. IN the past 24 hours she had significant blood loss post removal of long term IV. She then had a fall later ambulating to the bathroom.      Patient renal function was relatively stable until decline starting 11/25. Creatinine was previously 0.87 -> 1.19 ->2.1 and now 3.1 today with GFR 16.  mL yesterday. Intermittent cath this morning yielded 300 mL of dark urine. Nephrology consulted for POORNIMA. Denies renal history. Denies UTI, kidney stones, NSAID. Her most recent BP 87/62. She is awake and alert without complaints unless the abdomen is palpated.     Interval History:   11/28 - status post bilateral ureteral stent placement overnight with Dr Cardenas. Gross hematuria noted per  bag with improving UOP. No significant improvement in renal  indices on AM labs though span between stent placement and drawn labs likely small.     11/19 - Renal function slightly worse today. UOP decreased to 700 mL in the past 24 hours. She is awake and alert. Anasarca remains. Nurse at bedside. She did undergo PET this morning with results pending.     Review of patient's allergies indicates:  No Known Allergies  Current Facility-Administered Medications   Medication Frequency    0.9%  NaCl infusion PRN    acetaminophen tablet 650 mg Q4H PRN    aluminum-magnesium hydroxide-simethicone 200-200-20 mg/5 mL suspension 30 mL QID PRN    amino acid 5% in 15% dextrose (CLINIMIX-E) solution (1L provides 50gm AA, 150gm CHO (510 kcal/L dextrose), Na 35, K 30, Mg 5, Ca 4.5, Acetate 80, Cl 39, Phos 15) (710 total kcal/L) Continuous    bisacodyL suppository 10 mg Daily PRN    dicyclomine injection 20 mg QID PRN    hydrALAZINE injection 10 mg Q6H PRN    melatonin tablet 6 mg Nightly PRN    metoclopramide HCl injection 10 mg Q8H    metoprolol injection 5 mg Q2H PRN    metoprolol injection 5 mg Q6H    morphine injection 4 mg Q4H PRN    ondansetron injection 4 mg Q4H PRN    oxyCODONE immediate release tablet 5 mg Q6H PRN    pantoprazole injection 40 mg BID WM    piperacillin-tazobactam (ZOSYN) 4.5 g in dextrose 5 % in water (D5W) 100 mL IVPB (MB+) Q12H    polyethylene glycol packet 17 g BID PRN    prochlorperazine injection Soln 5 mg Q6H PRN    promethazine injection 25 mg Q4H PRN    senna-docusate 8.6-50 mg per tablet 2 tablet BID PRN    sodium bicarbonate tablet 1,300 mg BID    sodium chloride 0.9% flush 10 mL PRN    sodium chloride 0.9% flush 10 mL Q6H    And    sodium chloride 0.9% flush 10 mL PRN       Objective:     Vital Signs (Most Recent):  Temp: 97.9 °F (36.6 °C) (11/29/23 1110)  Pulse: (!) 125 (11/29/23 1110)  Resp: 18 (11/29/23 1110)  BP: 124/85 (11/29/23 1110)  SpO2: 99 % (11/29/23 0400) Vital Signs (24h Range):  Temp:  [97.3 °F (36.3 °C)-98.2 °F (36.8 °C)] 97.9 °F (36.6  °C)  Pulse:  [] 125  Resp:  [18] 18  SpO2:  [98 %-100 %] 99 %  BP: ()/(62-85) 124/85     Weight: 82.1 kg (181 lb) (11/26/23 0942)  Body mass index is 29.44 kg/m².  Body surface area is 1.95 meters squared.    I/O last 3 completed shifts:  In: 1083 [P.O.:480; IV Piggyback:603]  Out: 2850 [Urine:2250; Drains:600]    Physical Exam  Constitutional:       General: She is not in acute distress.     Appearance: She is ill-appearing.   HENT:      Head: Atraumatic.      Nose: Nose normal.      Mouth/Throat:      Mouth: Mucous membranes are moist.   Eyes:      Extraocular Movements: Extraocular movements intact.      Conjunctiva/sclera: Conjunctivae normal.   Cardiovascular:      Rate and Rhythm: Normal rate.      Pulses: Normal pulses.      Heart sounds: Normal heart sounds.      Comments: 2 + pitting anasarca    Pulmonary:      Effort: Pulmonary effort is normal.      Breath sounds: Normal breath sounds.   Abdominal:      General: There is no distension.      Palpations: Abdomen is soft.   Genitourinary:     Comments: Urinary catheter with gross hematuria noted   Musculoskeletal:         General: Swelling present.   Skin:     General: Skin is warm.   Neurological:      Mental Status: She is alert and oriented to person, place, and time.         Significant Labs:sureBMP:   Recent Labs   Lab 11/29/23  0541   *   K 3.7   CO2 18*   BUN 66.7*   CREATININE 4.89*   CALCIUM 8.4   MG 1.90       CBC:   Recent Labs   Lab 11/29/23  0541   WBC 13.92*   RBC 2.71*   HGB 8.2*   HCT 22.9*      MCV 84.5   MCH 30.3   MCHC 35.8       Microbiology Results (last 7 days)       Procedure Component Value Units Date/Time    Urine culture [[id number]] Collected: 11/23/23 1012    Order Status: Completed Specimen: Urine Updated: 11/25/23 0745     Urine Culture No Growth    Blood Culture [[id number]]  (Normal) Collected: 11/18/23 0710    Order Status: Completed Specimen: Blood Updated: 11/23/23 0901     CULTURE, BLOOD ([facility]) No  Growth at 5 days    Blood Culture [[id number]]  (Normal) Collected: 11/18/23 0710    Order Status: Completed Specimen: Blood Updated: 11/23/23 0901     CULTURE, BLOOD ([facility]) No Growth at 5 days          Specimen (24h ago, onward)      None          Recent Labs   Lab 11/23/23  1012   PROTEINUA 2+*   BACTERIA Few*   LEUKOCYTESUR Negative   UROBILINOGEN 2.0*         Significant Imaging:  US Retroperitoneal Complete [[id number]] Resulted: 11/27/23 1222   Order Status: Completed Updated: 11/27/23 1225   Narrative:     EXAMINATION:  US RETROPERITONEAL COMPLETE    CLINICAL HISTORY:  Worsening renal functions despite fluid resussitation;    COMPARISON:  CT 22 November 2023    FINDINGS:  Grayscale and color Doppler sonographic evaluation of the kidneys and urinary bladder.    The right kidney measures 13 cm. The left kidney measures 11.5 cm.   There is bilateral mild to moderate hydronephrosis, left greater than right.  This is similar to the prior CT.  Grossly normal renal parenchymal echogenicity otherwise.    Jin in the bladder.   Impression:       Similar bilateral hydronephrosis.      Electronically signed by: Wei Wright  Date: 11/27/2023  Time: 12:22       Assessment/Plan:   POORNIMA multifactorial secondary to obstructive uropathy, acute blood loss, contrast exposure, hypotension and Afib with RVR  ---Nephrotic range proteinuria noted. 4.4 g  Newly diagnosed malignant gastric mass. Pathology consistent with adenocarcinoma   Acute blood loss anemia 2/2 bleeding post long term IV removal   Bilateral hydronephrosis noted 11/21 - stent placement initially deferred due to stable renal function   Afib with RVR on amiodarone infusion DC for hypotension.  She is on scheduled Lopressor 5 mg q.6  S/p biliary drain placement      -reviewed documentation from consultation with palliative Care yesterday.  Patient understands the gravity of the situation and wishes to continue with aggressive measures despite limitations it may have  "on quality of life.  -today I had another discussion with patient regarding progressive renal failure.  Discussed needs, risks, procedure of hemodialysis.  Discussed risk of cardiac arrhythmia, MI, CVA, bleeding, infection, sudden cardiac death.  Patient understands risk of hemodialysis but also risk of current kidney failure.  Wishes to proceed with hemodialysis "I want to give it a shot."   -I will consult general surgery for placement of temporary dialysis catheter now and give first dialysis treatment after placement.   -she will dialyze again tomorrow and the following day.  We will continue to monitor daily and adjust plan accordingly.    FLOR Doyle  Nephrology  [facility]  "

## 2023-11-29 NOTE — PT/OT/SLP EVAL
Physical Therapy Treatment    Patient Name:  Karen Briggs   MRN:  [id number]    Recommendations:     Discharge therapy intensity: Low Intensity Therapy   Discharge Equipment Recommendations: walker, rolling  Barriers to discharge: None    Assessment:     Karen Briggs is a 58 y.o. female admitted with a medical diagnosis of <principal problem not specified>.  She presents with the following impairments/functional limitations: weakness, impaired endurance, impaired functional mobility, gait instability . Pt did much better today with all fxn'l mobility.    Rehab Prognosis: Good; patient would benefit from acute skilled PT services to address these deficits and reach maximum level of function.    Recent Surgery: Procedure(s) (LRB):  CYSTOSCOPY, WITH URETERAL STENT INSERTION (Bilateral) 2 Days Post-Op    Plan:     During this hospitalization, patient to be seen 5 x/week to address the identified rehab impairments via gait training, therapeutic activities, therapeutic exercises, neuromuscular re-education and progress toward the following goals:    Plan of Care Expires:  12/04/23    Subjective     Chief Complaint:   Patient/Family Comments/goals:   Pain/Comfort:  Pain Rating 1: 0/10      Objective:     Communicated with nurse prior to session.  Patient found supine with peripheral IV, telemetry upon PT entry to room.     General Precautions: Standard, fall  Orthopedic Precautions: N/A  Braces: N/A  Respiratory Status: Room air  Blood Pressure:   Skin Integrity: Visible skin intact      Functional Mobility:  Bed Mobility:     Supine to Sit: stand by assistance  Sit to Supine: stand by assistance  Transfers:     Sit to Stand:  contact guard assistance with rolling walker  Bed to Chair: contact guard assistance with  rolling walker  using  Step Transfer  Gait: pt ambulated 2 times with rw for about 40ft each time with sba/cga    Therapeutic Activities/Exercises:      Education:  Patient and spouse were provided with  verbal education education regarding PT role/goals/POC.  Understanding was verbalized.     Patient left supine with all lines intact and call button in reach..    GOALS:   Multidisciplinary Problems       Physical Therapy Goals          Problem: Physical Therapy    Goal Priority Disciplines Outcome Goal Variances Interventions   Physical Therapy Goal     PT, PT/OT Ongoing, Progressing     Description: Pt will improve functional independence by performing:    Bed mobility: SBA  Sit to stand: SBA with rolling walker  Bed to chair t/f: Min A with Stand Step  with rolling walker  Ambulation x 15 ft with Min A  with rolling walker                       Time Tracking:     PT Received On:    PT Start Time: 1330     PT Stop Time: 1353  PT Total Time (min): 23 min     Billable Minutes: Gait Training 23    Treatment Type: Treatment  PT/PTA: PT     Number of PTA visits since last PT visit: 1 11/29/2023

## 2023-11-29 NOTE — PROGRESS NOTES
SURG ONC    DR SPRAGUE REVIEWS PET SCAN AS WELL AS RESULTS  APPEARS NO EVIDENCE OF METASTATIC DISEASE    THEREFORE WILL SCHEDULE PT FOR LAP JTUBE PLACEMENT ON FRIDAY THIS WEEK    LABS INCLUDING COAGS IN AM    WILL DISCUSS MORE IN DETAIL WITH PT TOMORROW

## 2023-11-29 NOTE — PROCEDURES
HD Temporary Catheter Procedure Note    Patient Name: Karen Briggs  Age: 58 y.o.  Sex: female  YOB: 1965  MRN: [id number]  Author: Tommy Palomares      Date: 11/29/2023    Pre Op Dx: Hydronephrosis with obstructing calculus [N13.2]  Hyperbilirubinemia [E80.6]    Post Op Dx:  Post-Op Diagnosis Codes:     * Hydronephrosis with obstructing calculus [N13.2]     * Hyperbilirubinemia [E80.6]     Procedure performed:  Right internal jugular temporary dialysis catheter insertion    Performed by: Tommy Palomares    Anesthesia Type:  Local with lidocaine     Complications:  None    Estimated Blood Loss:  Minimal      Indications:  Urgent dialysis     Findings:  All three lumens aspirated and flushed easily.     Procedure in detail:    The neck was prepped and draped in the usual sterile technique. Using ultrasound guidance the right internal jugular vein was visualized and viewed as patent; the vein was then accessed using ultrasound visualization of needle entry into the vein. The wire was advanced into the superior vena cava via the needle. A skin neck was made with an 11 blade. The tract was then dilated with the dilator x2. The 11.5 Bahamian,  13 cm Medcomp catheter was advanced over the wire easily.  The catheter was then secured with 2-0 silk sutures.  All three lumens aspirated and flushed easily with normal saline.  A sterile dressing was then applied.    Chest Xray was ordered to confirm adequate placement.     Tommy Palomares

## 2023-11-30 ENCOUNTER — ANESTHESIA EVENT (OUTPATIENT)
Dept: SURGERY | Facility: HOSPITAL | Age: 58
DRG: 356 | End: 2023-11-30
Payer: COMMERCIAL

## 2023-11-30 LAB
ABORH RETYPE: NORMAL
ALBUMIN SERPL-MCNC: 1.5 G/DL (ref 3.4–4.8)
ALBUMIN/GLOB SERPL: 0.5 RATIO (ref 1.1–2)
ALP SERPL-CCNC: 112 UNIT/L (ref 40–150)
ALT SERPL-CCNC: 27 UNIT/L (ref 0–55)
APTT PPP: 45.2 SECONDS (ref 23.2–33.7)
AST SERPL-CCNC: 24 UNIT/L (ref 5–34)
BILIRUB SERPL-MCNC: 3.9 MG/DL
BUN SERPL-MCNC: 46.9 MG/DL (ref 9.8–20.1)
CALCIUM SERPL-MCNC: 8.1 MG/DL (ref 8.4–10.2)
CHLORIDE SERPL-SCNC: 99 MMOL/L (ref 98–107)
CO2 SERPL-SCNC: 21 MMOL/L (ref 23–31)
CREAT SERPL-MCNC: 4.47 MG/DL (ref 0.55–1.02)
GFR SERPLBLD CREATININE-BSD FMLA CKD-EPI: 11 MLS/MIN/1.73/M2
GLOBULIN SER-MCNC: 3.1 GM/DL (ref 2.4–3.5)
GLUCOSE SERPL-MCNC: 547 MG/DL (ref 82–115)
GROUP & RH: NORMAL
INDIRECT COOMBS: NORMAL
INR PPP: 1.2
MAGNESIUM SERPL-MCNC: 2 MG/DL (ref 1.6–2.6)
PHOSPHATE SERPL-MCNC: 4.8 MG/DL (ref 2.3–4.7)
POCT GLUCOSE: 159 MG/DL (ref 70–110)
POTASSIUM SERPL-SCNC: 4.4 MMOL/L (ref 3.5–5.1)
PROT SERPL-MCNC: 4.6 GM/DL (ref 5.8–7.6)
PROTHROMBIN TIME: 15.1 SECONDS (ref 12.5–14.5)
SODIUM SERPL-SCNC: 128 MMOL/L (ref 136–145)
SPECIMEN OUTDATE: NORMAL

## 2023-11-30 PROCEDURE — 25000003 PHARM REV CODE 250: Performed by: NURSE PRACTITIONER

## 2023-11-30 PROCEDURE — 63600175 PHARM REV CODE 636 W HCPCS: Performed by: SURGERY

## 2023-11-30 PROCEDURE — C1752 CATH,HEMODIALYSIS,SHORT-TERM: HCPCS

## 2023-11-30 PROCEDURE — 25000003 PHARM REV CODE 250: Performed by: INTERNAL MEDICINE

## 2023-11-30 PROCEDURE — 99232 SBSQ HOSP IP/OBS MODERATE 35: CPT | Mod: ,,, | Performed by: STUDENT IN AN ORGANIZED HEALTH CARE EDUCATION/TRAINING PROGRAM

## 2023-11-30 PROCEDURE — 86923 COMPATIBILITY TEST ELECTRIC: CPT | Performed by: SURGERY

## 2023-11-30 PROCEDURE — 63600175 PHARM REV CODE 636 W HCPCS: Performed by: INTERNAL MEDICINE

## 2023-11-30 PROCEDURE — 83735 ASSAY OF MAGNESIUM: CPT | Performed by: INTERNAL MEDICINE

## 2023-11-30 PROCEDURE — 85730 THROMBOPLASTIN TIME PARTIAL: CPT | Performed by: NURSE PRACTITIONER

## 2023-11-30 PROCEDURE — 63600175 PHARM REV CODE 636 W HCPCS: Mod: JZ,JG | Performed by: NURSE PRACTITIONER

## 2023-11-30 PROCEDURE — C9113 INJ PANTOPRAZOLE SODIUM, VIA: HCPCS | Performed by: INTERNAL MEDICINE

## 2023-11-30 PROCEDURE — 84100 ASSAY OF PHOSPHORUS: CPT | Performed by: INTERNAL MEDICINE

## 2023-11-30 PROCEDURE — A4216 STERILE WATER/SALINE, 10 ML: HCPCS | Performed by: INTERNAL MEDICINE

## 2023-11-30 PROCEDURE — 85610 PROTHROMBIN TIME: CPT | Performed by: NURSE PRACTITIONER

## 2023-11-30 PROCEDURE — 86901 BLOOD TYPING SEROLOGIC RH(D): CPT | Performed by: SURGERY

## 2023-11-30 PROCEDURE — 90935 HEMODIALYSIS ONE EVALUATION: CPT

## 2023-11-30 PROCEDURE — 21400001 HC TELEMETRY ROOM

## 2023-11-30 PROCEDURE — P9047 ALBUMIN (HUMAN), 25%, 50ML: HCPCS | Mod: JZ,JG | Performed by: NURSE PRACTITIONER

## 2023-11-30 PROCEDURE — 80053 COMPREHEN METABOLIC PANEL: CPT | Performed by: INTERNAL MEDICINE

## 2023-11-30 RX ORDER — ONDANSETRON HYDROCHLORIDE 2 MG/ML
4 INJECTION, SOLUTION INTRAVENOUS EVERY 4 HOURS PRN
Status: DISCONTINUED | OUTPATIENT
Start: 2023-11-30 | End: 2023-12-25 | Stop reason: HOSPADM

## 2023-11-30 RX ORDER — ACETAMINOPHEN 10 MG/ML
1000 INJECTION, SOLUTION INTRAVENOUS ONCE
Status: COMPLETED | OUTPATIENT
Start: 2023-11-30 | End: 2023-11-30

## 2023-11-30 RX ORDER — METOCLOPRAMIDE HYDROCHLORIDE 5 MG/ML
10 INJECTION INTRAMUSCULAR; INTRAVENOUS EVERY 6 HOURS PRN
Status: DISCONTINUED | OUTPATIENT
Start: 2023-11-30 | End: 2023-12-01

## 2023-11-30 RX ORDER — HYOSCYAMINE SULFATE 0.12 MG/1
0.12 TABLET SUBLINGUAL EVERY 4 HOURS PRN
Status: DISCONTINUED | OUTPATIENT
Start: 2023-11-30 | End: 2023-12-25 | Stop reason: HOSPADM

## 2023-11-30 RX ORDER — ALBUMIN HUMAN 250 G/1000ML
12.5 SOLUTION INTRAVENOUS
Status: COMPLETED | OUTPATIENT
Start: 2023-11-30 | End: 2023-11-30

## 2023-11-30 RX ADMIN — PANTOPRAZOLE SODIUM 40 MG: 40 INJECTION, POWDER, FOR SOLUTION INTRAVENOUS at 05:11

## 2023-11-30 RX ADMIN — SODIUM CHLORIDE, PRESERVATIVE FREE 10 ML: 5 INJECTION INTRAVENOUS at 12:11

## 2023-11-30 RX ADMIN — HYOSCYAMINE SULFATE 0.12 MG: 0.12 TABLET ORAL at 05:11

## 2023-11-30 RX ADMIN — PANTOPRAZOLE SODIUM 40 MG: 40 INJECTION, POWDER, FOR SOLUTION INTRAVENOUS at 08:11

## 2023-11-30 RX ADMIN — DICYCLOMINE HYDROCHLORIDE 20 MG: 20 INJECTION, SOLUTION INTRAMUSCULAR at 05:11

## 2023-11-30 RX ADMIN — PIPERACILLIN SODIUM AND TAZOBACTAM SODIUM 4.5 G: 4; .5 INJECTION, POWDER, FOR SOLUTION INTRAVENOUS at 08:11

## 2023-11-30 RX ADMIN — SODIUM CHLORIDE, PRESERVATIVE FREE 10 ML: 5 INJECTION INTRAVENOUS at 01:11

## 2023-11-30 RX ADMIN — PIPERACILLIN SODIUM AND TAZOBACTAM SODIUM 4.5 G: 4; .5 INJECTION, POWDER, FOR SOLUTION INTRAVENOUS at 09:11

## 2023-11-30 RX ADMIN — ALBUMIN (HUMAN) 12.5 G: 12.5 SOLUTION INTRAVENOUS at 01:11

## 2023-11-30 RX ADMIN — MUPIROCIN: 20 OINTMENT TOPICAL at 08:11

## 2023-11-30 RX ADMIN — MORPHINE SULFATE 4 MG: 4 INJECTION, SOLUTION INTRAMUSCULAR; INTRAVENOUS at 09:11

## 2023-11-30 RX ADMIN — ACETAMINOPHEN 1000 MG: 10 INJECTION, SOLUTION INTRAVENOUS at 09:11

## 2023-11-30 RX ADMIN — METOCLOPRAMIDE 10 MG: 5 INJECTION, SOLUTION INTRAMUSCULAR; INTRAVENOUS at 05:11

## 2023-11-30 RX ADMIN — ASCORBIC ACID, VITAMIN A PALMITATE, CHOLECALCIFEROL, THIAMINE HYDROCHLORIDE, RIBOFLAVIN-5 PHOSPHATE SODIUM, PYRIDOXINE HYDROCHLORIDE, NIACINAMIDE, DEXPANTHENOL, ALPHA-TOCOPHEROL ACETATE, VITAMIN K1, FOLIC ACID, BIOTIN, CYANOCOBALAMIN: 200; 3300; 200; 6; 3.6; 6; 40; 15; 10; 150; 600; 60; 5 INJECTION, SOLUTION INTRAVENOUS at 10:11

## 2023-11-30 RX ADMIN — SODIUM CHLORIDE, PRESERVATIVE FREE 10 ML: 5 INJECTION INTRAVENOUS at 05:11

## 2023-11-30 RX ADMIN — METOPROLOL TARTRATE 5 MG: 1 INJECTION, SOLUTION INTRAVENOUS at 05:11

## 2023-11-30 RX ADMIN — ALBUMIN (HUMAN) 12.5 G: 12.5 SOLUTION INTRAVENOUS at 02:11

## 2023-11-30 NOTE — PROGRESS NOTES
[facility] - Oncology Acute  Hematology/Oncology  Consult Note    Patient Name: Karen Briggs  MRN: [id number]  Admission Date: 11/17/2023  Hospital Length of Stay: 13 days  Attending Provider: Dalton Palacios MD  Consulting Provider: Elizabeth K Lejeune, MD  Principal Problem:<principal problem not specified>    Consults  Subjective:     HPI:   59yo F w/ PMHx of HTN transferred from [facility] due to concern for complications from incomplete cholecystectomy. She initially underwent cholecystectomy on 11/20/23, it was incomplete and they were unable to resect gallbladder. She had persistent R flank and back pain and presented to her PCP on 11/17 with these complaints. CT A/P at that time revealed left-sided hydronephrosis with suspected distal obstruction/no clear stone visualized, postoperative changes of cholecystectomy with fluid in the gallbladder fossa may reflect postoperative seroma but biloma can not be entirely excluded, also noted for marked thickening of the stomach wall diffusely may be related to mesenteric edema/reactive. She then presented to [facility] ER where she was found to have bilirubin of 8.7, , , Alk phos 491. She was transferred to [facility] and underwent ERCP which was limited due to gastroduodenal stricturing, but an incidental gastric mass was found and biopsied. Pathology revealed gastric adenocarcinoma. There was concern for bile duct injury/transection s/p cholecystectomy so she underwent PTC and biliary drain placement with IR on 11/21/23. Surgical oncology has been evaluating patient during this admission, they suspect biliary obstruction secondary to pam hepatis lymphadenopathy. Medical oncology consult was placed on 11/21/23, however oncology was not notified of consult until today.     Patient this morning doing okay. She notes severe fatigue. Abdominal pain is improving. She notes 30-40lb unintentional weight loss since the summer '23. Her appetite has weaned  since that time as well. 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 She lives with her boyfriend in [place]. I offered further oncology care at [facility] locally, however she would like to pursue care with us here in [place].     I discussed her diagnosis and very general prognosis. I explained the role of a PET scan to confirm staging and thereby treatment recommendations. Regardless chemotherapy is appropriate upfront and this was discussed. Also discussed mediport placement while she is inpatient if possible.     Interval History  Patient seen and examined. Had just had HD catheter placed for worsening renal function. I discussed the results of her PET scan in detail with her and her boyfriend. I discussed my concern about her acute renal dysfunction and its impact on her potential therapy should it not recover. She understands the objective of therapy but the acute need to recover prior to initiation of systemic treatment in the outpatient setting.     Oncology Treatment Plan:       Medications:  Continuous Infusions:   amino acid 5% in 15% dextrose (CLINIMIX-E) solution (1L provides 50gm AA, 150gm CHO (510 kcal/L dextrose), Na 35, K 30, Mg 5, Ca 4.5, Acetate 80, Cl 39, Phos 15) (710 total kcal/L) 70 mL/hr at 11/29/23 1700     Scheduled Meds:   metoclopramide HCl  10 mg Intravenous Q8H    metoprolol  5 mg Intravenous Q6H    mupirocin   Nasal BID    pantoprazole  40 mg Intravenous BID WM    piperacillin-tazobactam (Zosyn) IV (PEDS and ADULTS) (extended infusion is not appropriate)  4.5 g Intravenous Q12H    sodium bicarbonate  1,300 mg Oral BID    sodium chloride 0.9%  10 mL Intravenous Q6H     PRN Meds:sodium chloride 0.9%, acetaminophen, aluminum-magnesium hydroxide-simethicone, bisacodyL, dicyclomine, hydrALAZINE, melatonin, metoprolol, morphine, ondansetron, oxyCODONE, polyethylene glycol, prochlorperazine, promethazine, senna-docusate 8.6-50 mg, sodium chloride 0.9%, Flushing PICC/Midline  Protocol **AND** sodium chloride 0.9% **AND** sodium chloride 0.9%     Review of patient's allergies indicates:  No Known Allergies     Past Medical History:   Diagnosis Date    Hypertension     Sciatica      Past Surgical History:   Procedure Laterality Date    CARPAL TUNNEL RELEASE Left     CYSTOSCOPY W/ URETERAL STENT PLACEMENT Bilateral 11/27/2023    Procedure: CYSTOSCOPY, WITH URETERAL STENT INSERTION;  Surgeon: Huey Cardenas MD;  Location: [facility];  Service: Urology;  Laterality: Bilateral;    EGD, WITH CLOSED BIOPSY  11/18/2023    Procedure: EGD, WITH CLOSED BIOPSY;  Surgeon: Alfred Goss MD;  Location: [facility] OR;  Service: Gastroenterology;;    ERCP N/A 11/18/2023    Procedure: ERCP (ENDOSCOPIC RETROGRADE CHOLANGIOPANCREATOGRAPHY);  Surgeon: Alfred Goss MD;  Location: [facility] OR;  Service: Gastroenterology;  Laterality: N/A;    HEMORRHOID SURGERY       Family History       Problem Relation (Age of Onset)    Breast cancer Mother    Cancer Maternal Aunt          Tobacco Use    Smoking status: Never    Smokeless tobacco: Never   Substance and Sexual Activity    Alcohol use: No    Drug use: No    Sexual activity: Never     Birth control/protection: Post-menopausal       Review of Systems   Constitutional:  Positive for appetite change, fatigue and unexpected weight change. Negative for activity change and fever.   HENT:  Negative for sore throat.    Eyes:  Negative for visual disturbance.   Respiratory:  Negative for cough and shortness of breath.    Cardiovascular:  Negative for chest pain.   Gastrointestinal:  Positive for abdominal distention and abdominal pain. Negative for diarrhea, nausea and vomiting.   Endocrine: Negative for polyuria.   Genitourinary:  Negative for dysuria.   Musculoskeletal:  Positive for back pain.   Neurological:  Negative for headaches.     Objective:     Vital Signs (Most Recent):  Temp: 98.5 °F (36.9 °C) (11/30/23 0742)  Pulse: (!) 114 (11/30/23 0742)  Resp:  18 (11/30/23 0742)  BP: 97/65 (11/30/23 0742)  SpO2: (!) 94 % (11/30/23 0424) Vital Signs (24h Range):  Temp:  [97.5 °F (36.4 °C)-98.5 °F (36.9 °C)] 98.5 °F (36.9 °C)  Pulse:  [100-114] 114  Resp:  [17-18] 18  SpO2:  [90 %-99 %] 94 %  BP: ()/(52-85) 97/65     Weight: 82.1 kg (181 lb)  Body mass index is 29.44 kg/m².  Body surface area is 1.95 meters squared.      Intake/Output Summary (Last 24 hours) at 11/30/2023 0819  Last data filed at 11/30/2023 0609  Gross per 24 hour   Intake 233 ml   Output 850 ml   Net -617 ml         Physical Exam  Constitutional:       General: She is not in acute distress.     Appearance: Normal appearance. She is obese.   HENT:      Head: Normocephalic and atraumatic.      Nose: Nose normal.      Mouth/Throat:      Mouth: Mucous membranes are moist.      Pharynx: Oropharynx is clear.   Eyes:      Extraocular Movements: Extraocular movements intact.      Conjunctiva/sclera: Conjunctivae normal.      Pupils: Pupils are equal, round, and reactive to light.   Cardiovascular:      Rate and Rhythm: Normal rate and regular rhythm.      Pulses: Normal pulses.      Heart sounds: Normal heart sounds. No murmur heard.  Pulmonary:      Effort: Pulmonary effort is normal.      Breath sounds: Normal breath sounds.   Abdominal:      General: There is distension.      Tenderness: There is abdominal tenderness.      Comments: R side biliary drain   Musculoskeletal:         General: Normal range of motion.      Cervical back: Normal range of motion and neck supple.      Right lower leg: No edema.      Left lower leg: No edema.   Skin:     General: Skin is warm and dry.      Coloration: Skin is not jaundiced.   Neurological:      General: No focal deficit present.      Mental Status: She is oriented to person, place, and time.         Significant Labs:   CBC:   Recent Labs   Lab 11/29/23 0541   WBC 13.92*   HGB 8.2*   HCT 22.9*         and CMP:   Recent Labs   Lab 11/29/23 0541  11/30/23  0505   * 128*   K 3.7 4.4   CO2 18* 21*   BUN 66.7* 46.9*   CREATININE 4.89* 4.47*   CALCIUM 8.4 8.1*   ALBUMIN 1.7* 1.5*   BILITOT 4.8* 3.9*   ALKPHOS 131 112   AST 27 24   ALT 31 27         Diagnostic Results:  11/19/23 CT A/P Impression:  1. Gastritis and duodenitis.  2. Irregular wall thickening of the proximal stomach compatible with known tumor.  3. Similar heterogeneous appearance of the gallbladder fossa with small volume ascites.  Bile leak is possible.  Mild intrahepatic biliary ductal dilatation without significant dilatation of the common bile duct.  4. Similar moderate left hydronephrosis.    11/19/23 CT Chest Impression  Impression:  No convincing CT evidence of metastatic disease in the chest.    Assessment/Plan:     Locally advanced gastric adenocarcinoma - PET from 11/29 with no evidence of metastatic disease. Ideally would receive perioperative systemic chemotherapy.     Acute renal failure - now on intermittent HD with nephrology, unclear etiology? Would definitely make an impact on her ability to undergo systemic therapy if this cannot improve.     Requesting mediport placement tomorrow when she goes for J tube placement with surg onc  Echo ordered to evaluate prior to possible anthracycline therapy outpatient    She understands she has the potential for curative intent regarding her gastric adenocarcinoma, however in the acute setting she needs to improve her functional status and hopefully regain her renal function and improve her nutritional status.       Elizabeth K Lejeune, MD  Hematology/Oncology  [facility] - Oncology Acute

## 2023-11-30 NOTE — PROGRESS NOTES
[facility]  Hospital Medicine Progress Note      Chief Complaint: Inpatient Follow-up for intractable vomiting     HPI: 58-year-old female with medical history of hypertension who recently underwent laparoscopic cholecystectomy on 11/10/2023, procedure was incomplete and was unable to completely resect the gallbladder.  Since surgery she continued to have right flank/back pain and followed up with her PCP and CT abdomen and pelvis on 11/17/2023 revealed left-sided hydronephrosis with suspected distal obstruction/no clear stone visualized, postoperative changes of cholecystectomy with fluid in the gallbladder fossa may reflect postoperative seroma but biloma can not be entirely excluded, also noted for marked thickening of the stomach wall diffusely may be related to mesenteric edema/reactive.  She [place] [facility] [place] the same day 11/17/2023 and her labs notable for WBC 9.0, hemoglobin 12.4, platelets 442, creatinine 0.86, total bilirubin 8.7 with direct fraction 6.7, alkaline phosphatase 491, , .  Patient's surgeon was consulted and recommended ERCP which was not available at [facility] for which she was transferred to [facility] and referred to hospital medicine service for further evaluation and management.   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  Severe inflammation and oozing of blood noted proximal gastric lumen.  Unable to advance scope into the duodenum.  Surgical oncology consulted, IR consulted for external drain placement which was done November 21.  November 24th she developed new onset atrial fibrillation with RVR and Cardiology consulted.  Started on amiodarone drip  Unfortunately patient had acute blood loss due to hemorrhage from the midline site that was removed.  Patient was unaware of the bleed.  Became very weak, passed out.  Code was called but she came around.  Stat H&H done which was slightly  lower but stable, MRI of the brain was negative for any acute ischemic changes. Pt is aware of gastric cancer diagnosis   GI following--> 11/18- EGD shows normal esophagus, malignant gastric tumor in the cardia.  Inflamed mucosa and ooze of blood throughout the proximal gastric lumen.  Gastric antrum scar would not allow advancement of scope into the duodenal ampulla area therefore biliary cannulation was not possible for bile leak evaluation  Pathology shows marked chronic gastritis with intestinal metaplasia, gastric cardia biopsy shows adenocarcinoma, moderately differentiated intestinal type   bilateral hydronephrosis with left greater than right  MRI brain without contrast-negative for acute finding  PET scan reviewed with patient by Oncology reports of locally advanced gastric adenocarcinoma and plans for systemic therapy outpatient if functional, nutritional and renal function improves.        Patient currently being managed for locally advanced gastric adenocarcinoma.  With plans for laparoscopic J-tube placement and MediPort placement by surgical Oncology on 12/1, oncology on board plans for systemic therapy outpatient if functional, nutritional and renal function improves.    obstructive uropathy with bilateral hydronephrosis status post bilateral ureteral stent placed on 11/27 by Urology- no improvement in renal function, patient opted to have hemodialysis and a right-sided hemodialysis catheter has been placed by General surgery on 11/29, 2 continue hemodialysis per nephrology discretion.    Patient with symptoms of nausea and vomiting can not keep anything down in her stomach complicated by severe malnutrition on IV Clinimix and supportive medications for nausea and vomiting. Palliative care on board         Interval Hx:   Patient seen and examined at bedside, no family member at bedside   She complains of right-sided abdominal pain currently receiving IV Tylenol   Vitals significant for mild  tachycardia and low normal blood pressure, on room air   Still having nausea and not wanting to take anything by mouth  Labs reviewed and in keeping with end-stage renal disease  Plans for J-tube and MediPort placement with surgical Oncology tomorrow   Appreciate oncology input plans for systemic therapy outpatient    Case was discussed with patient's nurse and  on the floor.     Objective/physical exam:  General: In no acute distress, afebrile, ill looking   Chest: Clear to auscultation bilaterally anteriorly  Heart:  Tachycardic rate and rhythm, no appreciable murmur  Abdomen:  extremely tender in epigastrium and RUQ, Slightly distended, BS +  MSK: Warm, trace pitting edema bilateral lower extremities  Neurologic: Alert and oriented x4, Cranial nerve II-XII intact, able to move all 4 extremities        Assessment/Plan:  Locally advanced gastric adenocarcinoma.    Obstructive uropathy with bilateral hydronephrosis status post bilateral ureteral stent placed on 11/27  POORNIMA-now on HD - 11/29  Metabolic acidosis  Severe malnutrition   Possible Gastric/duodenal outlet obstruction likely d/t gastric cancer   Suspected bile leak with biloma and biliary obstruction--> H/O Laparoscopic incomplete cholecystectomy 11/10/2023  New onset atrial fibrillation with rVR   Acute Blood loss anemia with syncope and then fall 11/26  Hypokalemia- resolved with replacement     History of hypertension and DDD/sciatica     Plan  Plans for J-tube and MediPort placement with surgical Oncology tomorrow   Appreciate oncology input plans for systemic therapy outpatient, check Echo   Nephrology on board, HD per their discretion   Continue with IV TPN for nutritional propose   Patient currently not tolerating p.o. intake will be very challenging to discharge home with no nutrition  Patient is status post bilateral ureteral stent placed on 11/27 by Urology, to trend BMP  Appreciate CIS input to discontinue Lovenox and continue with  rate control given anemia   Trend hemoglobin levels and keep hemoglobin greater than 7- 8 grams/dL    palliative Care on board- patient wants everything done   Continue IV Zosyn day 7/7, wbc keeps going up, less likely infectious at this time   IM Bentyl p.r.n. for abdominal pain and spasm      VTE prophylaxis:  scds given anemia      Patient condition:  Guarded     Anticipated discharge and Disposition:   TBD        All diagnosis and differential diagnosis have been reviewed; assessment and plan has been documented; I have personally reviewed the labs and test results that are presently available; I have reviewed the patients medication list; I have reviewed the consulting providers response and recommendations. I have reviewed or attempted to review medical records based upon their availability     All of the patient's questions have been  addressed and answered. Patient's is agreeable to the above stated plan. I will continue to monitor closely and make adjustments to medical management as needed.       VITAL SIGNS: 24 HRS MIN & MAX LAST   Temp  Min: 97.5 °F (36.4 °C)  Max: 98.5 °F (36.9 °C) 98.2 °F (36.8 °C)   BP  Min: 71/52  Max: 101/59 92/61   Pulse  Min: 101  Max: 114  110   Resp  Min: 17  Max: 18 18   SpO2  Min: 90 %  Max: 99 % 97 %     I have reviewed the following labs:  Recent Labs   Lab 11/27/23 0530 11/28/23 0437 11/29/23 0541   WBC 12.57* 11.82* 13.92*   RBC 3.04* 2.79* 2.71*   HGB 9.2* 8.4* 8.2*   HCT 26.7* 23.0* 22.9*   MCV 87.8 82.4 84.5   MCH 30.3 30.1 30.3   MCHC 34.5 36.5* 35.8   RDW 19.4* 19.9* 20.9*    334 381   MPV 11.9* 11.6* 11.6*     Recent Labs   Lab 11/28/23 0437 11/29/23 0541 11/30/23  0505   * 131* 128*   K 4.0 3.7 4.4   CO2 17* 18* 21*   BUN 55.5* 66.7* 46.9*   CREATININE 4.06* 4.89* 4.47*   CALCIUM 8.5 8.4 8.1*   MG 2.00 1.90 2.00   ALBUMIN 1.6* 1.7* 1.5*   ALKPHOS 134 131 112   ALT 32 31 27   AST 29 27 24   BILITOT 5.0* 4.8* 3.9*     Microbiology Results (last 7  days)       Procedure Component Value Units Date/Time    Urine culture [[id number]] Collected: 11/23/23 1012    Order Status: Completed Specimen: Urine Updated: 11/25/23 0745     Urine Culture No Growth             See below for Radiology    Scheduled Med:   metoprolol  5 mg Intravenous Q6H    mupirocin   Nasal BID    pantoprazole  40 mg Intravenous BID WM    piperacillin-tazobactam (Zosyn) IV (PEDS and ADULTS) (extended infusion is not appropriate)  4.5 g Intravenous Q12H    sodium bicarbonate  1,300 mg Oral BID    sodium chloride 0.9%  10 mL Intravenous Q6H      Continuous Infusions:   amino acid 5% in 15% dextrose (CLINIMIX-E) solution (1L provides 50gm AA, 150gm CHO (510 kcal/L dextrose), Na 35, K 30, Mg 5, Ca 4.5, Acetate 80, Cl 39, Phos 15) (710 total kcal/L) 50 mL/hr at 11/30/23 1139      PRN Meds:  sodium chloride 0.9%, acetaminophen, aluminum-magnesium hydroxide-simethicone, bisacodyL, dicyclomine, hydrALAZINE, hyoscyamine, melatonin, metoclopramide HCl, metoprolol, morphine, ondansetron, oxyCODONE, polyethylene glycol, prochlorperazine, promethazine, senna-docusate 8.6-50 mg, sodium chloride 0.9%, Flushing PICC/Midline Protocol **AND** sodium chloride 0.9% **AND** sodium chloride 0.9%     Assessment/Plan:      VTE prophylaxis:     Patient condition:  Stable/Fair/Guarded/ Serious/ Critical    Anticipated discharge and Disposition:         All diagnosis and differential diagnosis have been reviewed; assessment and plan has been documented; I have personally reviewed the labs and test results that are presently available; I have reviewed the patients medication list; I have reviewed the consulting providers response and recommendations. I have reviewed or attempted to review medical records based upon their availability    All of the patient's questions have been  addressed and answered. Patient's is agreeable to the above stated plan. I will continue to monitor closely and make adjustments to medical  management as needed.  _____________________________________________________________________    Nutrition Status:    Radiology:  I have personally reviewed the following imaging and agree with the radiologist.     X-Ray Abdomen AP 1 View  Narrative: EXAMINATION:  XR ABDOMEN AP 1 VIEW    CLINICAL HISTORY:  eval position of ureteral stents;    TECHNIQUE:  AP View(s) of the abdomen was performed.    COMPARISON:  None    FINDINGS:  The bowel gas pattern is nonspecific.  No free air is seen.  No free fluid is seen.  There is a percutaneous transhepatic biliary drain with the tip in the  region of the duodenum.  There is some contrast seen in the colon from prior study.  There is a nephroureteral stent seen on the right and left side.  The proximal aspects of both stents appear to be in the region of the renal pelvis.  The distal aspect of the nephroureteral stents appear to be in the region of the bladder.    There is a Jin catheter in place.  Bones and joints show no acute abnormality.  Impression: Nephroureteral stents appear to be in good position    Biliary drain appears to be in the expected position.    Electronically signed by: Teddy Pedro  Date:    11/29/2023  Time:    16:28  X-Ray Chest 1 View  Narrative: EXAMINATION:  XR CHEST 1 VIEW    CLINICAL HISTORY:  central line placement;    TECHNIQUE:  One view    COMPARISON:  November 26, 2011.    FINDINGS:  Right transjugular approach central venous catheter terminates within the distal superior vena cava and is optimal.  Previously present PICC line terminates within the superior vena cava.  Cardiopericardial silhouette is within normal limits.  No acute dense focal or segmental consolidation, congestive process, pleural effusions or pneumothorax.  Impression: Central venous catheter terminates within the superior vena cava.    Electronically signed by: Victoriano Garcia  Date:    11/29/2023  Time:    16:26  NM PET CT FDG Skull Base to Mid Thigh  Narrative:  EXAMINATION:  NM PET CT FDG SKULL BASE TO MID THIGH    CLINICAL HISTORY:  Malignant neoplasm of stomach, unspecifiedGastric adenocarcinoma;    TECHNIQUE:  The patient was given intravenous injection of F-18 FDG.  Images were obtained from the skull base to the upper thighs with additional 3D reformatted and multiplanar images submitted for review. In addition, a noncontrasted low-dose CT was obtained from the skull base through the upper thighs for purposes of attenuation correction and localization. The absence of intravenous contrasts limits the accuracy of the CT for evaluation of the soft tissues and vasculature. The DLP is 1046.  Automatic exposure control, adjustment of mA/kV or iterative reconstruction technique was used to reduce radiation.    Radiopharmaceutical. 11.4 mCi F18-FDG, IV.    COMPARISON:  CT 22 November 2020.    FINDINGS:  Activity within the imaged brain is symmetric.  There are no hypermetabolic cervical lymph nodes.    No hypermetabolic mediastinal, hilar or axillary adenopathy.  No suspicious pulmonary nodule seen.  Right PICC tip right atrium.    There is an approximately 2-3 cm area of increased FDG activity gastric cardia fused image 125 max SUV 10.0.  A few gastrohepatic lymph nodes are seen but these are not significantly hypermetabolic.  For example on image 128 series 3 max SUV is only 2.2.  No hypermetabolic liver lesion is seen.  There is moderate volume ascites. No hypermetabolic peritoneal nodules are seen.    Internal external biliary drain is in place.  There also bilateral ureteral stents.  There is a Jin catheter.    There is no suspicious osseous lesion.  Impression: 1. Hypermetabolic lesion along the gastric cardia compatible with known malignancy.  2. No convincing PET evidence of metastatic spread.  Some gastrohepatic lymph nodes are not significantly hypermetabolic.  3. Moderate ascites.    Electronically signed by: Wei  Adriana  Date:    11/29/2023  Time:    12:13      Dalton Palacios MD   11/30/2023

## 2023-11-30 NOTE — AI DETERIORATION ALERT
Artificial Intelligence Notification  [facility]  [address] [address]  Phone: [phone]    This documentation was triggered by an Artificial Intelligence Notification:    Admit Date: 2023   LOS: 12  Code Status: Full Code  : 1965  Age: 58 y.o.  Weight:   Wt Readings from Last 1 Encounters:   23 82.1 kg (181 lb)        Sex: female  Bed: [address] [place] [address]  MRN: [id number]  Attending Physician: Dalton Palacios MD     Date of Alert: 2023  Time AI Alert Received:             Vitals:    23   BP: (!) 71/52   Pulse: (!) 111   Resp:    Temp: 98.5 °F (36.9 °C)     SpO2: 99 %      Artificial Intelligence alert discussed with Provider:     Name: LOS Del Castillo    Date/Time of Provider Notification:       Patient Condition: Pt admitted after incomplete choleycystectomy, complicated by POORNIMA, A-Fib, new HD. Pt just returned from HD when BP of 71/52 taken. Vital signs taken at  with LOS Mckay - BP 89/52, , O2 100%. Pt awake, alert & orientedx4 with no complaints.

## 2023-11-30 NOTE — PROGRESS NOTES
UROLOGY  PROGRESS  NOTE    Karen Briggs 1965  [id number]  11/30/2023    POD 3 s/p bilateral stents    Initiated on HD yesterday  Plans for J tube and mediport tomorrow  Patient resting in bed, significant other at bedside   Reports some flank pain/cramping intermittently    Blood pressure soft, unchanged   Afebrile  600 mL urine output overnight (? Accuracy per nursing)  BUN and creatinine 46.9/4.47    Intake/Output:  No intake/output data recorded.  I/O last 3 completed shifts:  In: 233 [Other:233]  Out: 1260 [Urine:810; Drains:450]     Exam:    NAD  Card: RRR  Resp: unlabored  Abd:  Soft, nondistended  :  minimal dark bloody urine in  bag  Extremity: no C/C/E    Recent Results (from the past 24 hour(s))   Magnesium    Collection Time: 11/30/23  5:05 AM   Result Value Ref Range    Magnesium Level 2.00 1.60 - 2.60 mg/dL   Phosphorus    Collection Time: 11/30/23  5:05 AM   Result Value Ref Range    Phosphorus Level 4.8 (H) 2.3 - 4.7 mg/dL   Comprehensive Metabolic Panel    Collection Time: 11/30/23  5:05 AM   Result Value Ref Range    Sodium Level 128 (L) 136 - 145 mmol/L    Potassium Level 4.4 3.5 - 5.1 mmol/L    Chloride 99 98 - 107 mmol/L    Carbon Dioxide 21 (L) 23 - 31 mmol/L    Glucose Level 547 (HH) 82 - 115 mg/dL    Blood Urea Nitrogen 46.9 (H) 9.8 - 20.1 mg/dL    Creatinine 4.47 (H) 0.55 - 1.02 mg/dL    Calcium Level Total 8.1 (L) 8.4 - 10.2 mg/dL    Protein Total 4.6 (L) 5.8 - 7.6 gm/dL    Albumin Level 1.5 (L) 3.4 - 4.8 g/dL    Globulin 3.1 2.4 - 3.5 gm/dL    Albumin/Globulin Ratio 0.5 (L) 1.1 - 2.0 ratio    Bilirubin Total 3.9 (H) <=1.5 mg/dL    Alkaline Phosphatase 112 40 - 150 unit/L    Alanine Aminotransferase 27 0 - 55 unit/L    Aspartate Aminotransferase 24 5 - 34 unit/L    eGFR 11 mls/min/1.73/m2   PTT Heparin Monitoring    Collection Time: 11/30/23  5:05 AM   Result Value Ref Range    PTT Heparin Monitor 45.2 (H) 23.2 - 33.7 seconds   Protime-INR    Collection Time: 11/30/23  5:05 AM    Result Value Ref Range    PT 15.1 (H) 12.5 - 14.5 seconds    INR 1.2 <=1.3       Assessment:  -bilateral hydronephrosis s/p bilateral double-J stents 11/29/2023  -acute renal failure  -complicated incomplete grace 11/10/2023 with post-op fluid collection/susp bile leak s/p biliary drain with IR; pathology from gastric biopsy with malignant gastric tumor  -a-fib RVR    Plan:  -Levsin ordered for spasms   -continue monitoring urine output, irrigate Jin as needed to maintain patency   -following      Sara Waldrop NP

## 2023-11-30 NOTE — NURSING
11/30/23 1612        Hemodialysis Catheter 11/29/23 1600 right internal jugular   Placement Date/Time: 11/29/23 1600   Present Prior to Hospital Arrival?: No  Hand Hygiene: Performed  Barrier Precautions: Performed  Skin Antisepsis: ChloraPrep  Hemodialysis Catheter Type: Temporary catheter  Location: right internal jugular  Insert...   Line Necessity Review CRRT/HD   Verification by X-ray Yes   Site Assessment No drainage;No redness;No swelling;No warmth   Line Securement Device Secured with sutures   Dressing Type Central line dressing   Dressing Status Clean;Dry;Intact   Dressing Intervention Sterile dressing change   Date on Dressing 11/30/23   Dressing Due to be Changed 12/06/23   Venous Patency/Care flushed w/o difficulty;blood return present;intermittent infusion cap applied;normal saline locked;deaccessed   Arterial Patency/Care flushed w/o difficulty;blood return present;intermittent infusion cap applied;normal saline locked;deaccessed   Waveform Not being transduced   Post-Hemodialysis Assessment   Blood Volume Processed (Liters) 52.6 L   Dialyzer Clearance Lightly streaked   Duration of Treatment 180 minutes   Total UF (mL) 240 mL   Patient Response to Treatment PT responded to tx ok. BP on the lower in at the beginning of tx. NP Moise ordered to leave UF off until 2nd dose of albumin. When BP came up, tried to pull fluid and bp then started to trend down. NP Moise ordered to stop UF.   Post-Hemodialysis Comments 3hr tx, 240mL net removed. CVC worked well at ordered BFR. Dressing changed. Post tx VS at 1636 (after reinfusion): BP-123/52, HR-121, temp-98.3 (oral), resp-22.

## 2023-11-30 NOTE — PROGRESS NOTES
[facility] - [facility] Medical Telemetry  Nephrology  Progress Note    Patient Name: Karen Briggs  MRN: [id number]  Admission Date: 11/17/2023  Hospital Length of Stay: 13 days  Attending Provider: Dalton Palacios MD   Primary Care Physician: Marian White FNP-C  Principal Problem:<principal problem not specified>      Subjective:   HPI: This is a 58-year-old female presented on  7 days post op from laparoscopic cholecystectomy on 11/10. Gallbladder was unable to be resected. Back and flank pain persisted post operatively prompting [place] [facility] After workup her surgeon recommended ERCP for which she was sent to this facility. Left sided hydronephrosis noted on CT scans done on admission. At that time renal function was fairly normal and patient was given option for stenting or to defer as outpatient and she chose the latter.      During ERCP, malignant gastric mass noted and ERCP was unable to be completed due to duodenal scarring. 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 Patient ultimately had biliary drain placed per IR.      She then developed A fib with RVR and is now on amiodarone infusion with cardiology following. IN the past 24 hours she had significant blood loss post removal of long term IV. She then had a fall later ambulating to the bathroom.      Patient renal function was relatively stable until decline starting 11/25. Creatinine was previously 0.87 -> 1.19 ->2.1 and now 3.1 today with GFR 16.  mL yesterday. Intermittent cath this morning yielded 300 mL of dark urine. Nephrology consulted for POORNIMA. Denies renal history. Denies UTI, kidney stones, NSAID. Her most recent BP 87/62. She is awake and alert without complaints unless the abdomen is palpated.     Interval History:   11/28 - status post bilateral ureteral stent placement overnight with Dr Cardenas. Gross hematuria noted per  bag with improving UOP. No significant improvement in renal  indices on AM labs though span between stent placement and drawn labs likely small.     11/29 - Renal function slightly worse today. UOP decreased to 700 mL in the past 24 hours. She is awake and alert. Anasarca remains. Nurse at bedside. She did undergo PET this morning with results pending.     11/30 - - patient on dialysis for 2nd consecutive treatment via right IJ temporary catheter placed per General surgery.  Urine output minimal over the past 24 hours and is still gross hematuria.  PET scan reviewed per Dr. Morse.  Pending J-tube and MediPort placement tomorrow.    Review of patient's allergies indicates:  No Known Allergies  Current Facility-Administered Medications   Medication Frequency    0.9%  NaCl infusion PRN    acetaminophen tablet 650 mg Q4H PRN    albumin human 25% bottle 12.5 g Q1H    aluminum-magnesium hydroxide-simethicone 200-200-20 mg/5 mL suspension 30 mL QID PRN    amino acid 5% in 15% dextrose (CLINIMIX-E) solution (1L provides 50gm AA, 150gm CHO (510 kcal/L dextrose), Na 35, K 30, Mg 5, Ca 4.5, Acetate 80, Cl 39, Phos 15) (710 total kcal/L) Continuous    bisacodyL suppository 10 mg Daily PRN    dicyclomine injection 20 mg QID PRN    hydrALAZINE injection 10 mg Q6H PRN    hyoscyamine ODT 0.125 mg Q4H PRN    melatonin tablet 6 mg Nightly PRN    metoclopramide HCl injection 10 mg Q6H PRN    metoprolol injection 5 mg Q2H PRN    metoprolol injection 5 mg Q6H    morphine injection 4 mg Q4H PRN    mupirocin 2 % ointment BID    ondansetron injection 4 mg Q4H PRN    oxyCODONE immediate release tablet 5 mg Q6H PRN    pantoprazole injection 40 mg BID WM    piperacillin-tazobactam (ZOSYN) 4.5 g in dextrose 5 % in water (D5W) 100 mL IVPB (MB+) Q12H    polyethylene glycol packet 17 g BID PRN    prochlorperazine injection Soln 5 mg Q6H PRN    promethazine injection 25 mg Q4H PRN    senna-docusate 8.6-50 mg per tablet 2 tablet BID PRN    sodium bicarbonate tablet 1,300 mg BID    sodium chloride 0.9% flush  10 mL PRN    sodium chloride 0.9% flush 10 mL Q6H    And    sodium chloride 0.9% flush 10 mL PRN       Objective:     Vital Signs (Most Recent):  Temp: 98.2 °F (36.8 °C) (11/30/23 1112)  Pulse: 110 (11/30/23 1112)  Resp: 18 (11/30/23 1112)  BP: 92/61 (11/30/23 1112)  SpO2: 97 % (11/30/23 1112) Vital Signs (24h Range):  Temp:  [97.5 °F (36.4 °C)-98.5 °F (36.9 °C)] 98.2 °F (36.8 °C)  Pulse:  [101-114] 110  Resp:  [17-18] 18  SpO2:  [90 %-99 %] 97 %  BP: ()/(52-67) 92/61     Weight: 82.1 kg (181 lb) (11/26/23 0942)  Body mass index is 29.44 kg/m².  Body surface area is 1.95 meters squared.    I/O last 3 completed shifts:  In: 233 [Other:233]  Out: 1260 [Urine:810; Drains:450]    Physical Exam  Constitutional:       General: She is not in acute distress.     Appearance: She is ill-appearing.   HENT:      Head: Atraumatic.      Nose: Nose normal.      Mouth/Throat:      Mouth: Mucous membranes are moist.   Eyes:      Extraocular Movements: Extraocular movements intact.      Conjunctiva/sclera: Conjunctivae normal.   Neck:      Comments: Right IJ temp cath  Cardiovascular:      Rate and Rhythm: Normal rate.      Pulses: Normal pulses.      Heart sounds: Normal heart sounds.      Comments: 2 + pitting anasarca    Pulmonary:      Effort: Pulmonary effort is normal.      Breath sounds: Normal breath sounds.   Abdominal:      General: There is no distension.      Palpations: Abdomen is soft.   Genitourinary:     Comments: Urinary catheter with gross hematuria noted   Musculoskeletal:         General: Swelling present.   Skin:     General: Skin is warm.   Neurological:      Mental Status: She is alert and oriented to person, place, and time.         Significant Labs:sureBMP:   Recent Labs   Lab 11/30/23  0505   *   K 4.4   CO2 21*   BUN 46.9*   CREATININE 4.47*   CALCIUM 8.1*   MG 2.00       CBC:   Recent Labs   Lab 11/29/23  0541   WBC 13.92*   RBC 2.71*   HGB 8.2*   HCT 22.9*      MCV 84.5   MCH 30.3  "  [facility] 35.8       Microbiology Results (last 7 days)       Procedure Component Value Units Date/Time    Urine culture [[id number]] Collected: 11/23/23 1012    Order Status: Completed Specimen: Urine Updated: 11/25/23 0745     Urine Culture No Growth          Specimen (24h ago, onward)      None          No results for input(s): "COLORU", "CLARITYU", "SPECGRAV", "PHUR", "PROTEINUA", "GLUCOSEU", "BILIRUBINCON", "BLOODU", "WBCU", "RBCU", "BACTERIA", "MUCUS", "NITRITE", "LEUKOCYTESUR", "UROBILINOGEN", "HYALINECASTS" in the last 168 hours.      Significant Imaging:  US Retroperitoneal Complete [[id number]] Resulted: 11/27/23 1222   Order Status: Completed Updated: 11/27/23 1225   Narrative:     EXAMINATION:  US RETROPERITONEAL COMPLETE    CLINICAL HISTORY:  Worsening renal functions despite fluid resussitation;    COMPARISON:  CT 22 November 2023    FINDINGS:  Grayscale and color Doppler sonographic evaluation of the kidneys and urinary bladder.    The right kidney measures 13 cm. The left kidney measures 11.5 cm.   There is bilateral mild to moderate hydronephrosis, left greater than right.  This is similar to the prior CT.  Grossly normal renal parenchymal echogenicity otherwise.    Jin in the bladder.   Impression:       Similar bilateral hydronephrosis.      Electronically signed by: Wei Wright  Date: 11/27/2023  Time: 12:22       Assessment/Plan:   POORNIMA multifactorial secondary to obstructive uropathy, acute blood loss, contrast exposure, hypotension and Afib with RVR  ---Nephrotic range proteinuria noted. 4.4 g  Newly diagnosed malignant gastric mass. Pathology consistent with adenocarcinoma   Acute blood loss anemia 2/2 bleeding post long term IV removal   Bilateral hydronephrosis noted 11/21 - stent placement initially deferred due to stable renal function   Afib with RVR on amiodarone infusion DC for hypotension.  She is on scheduled Lopressor 5 mg q.6  S/p biliary drain placement      -reviewed documentation " "from consultation with palliative Care yesterday.  Patient understands the gravity of the situation and wishes to continue with aggressive measures despite limitations it may have on quality of life.  -today I had another discussion with patient regarding progressive renal failure.  Discussed needs, risks, procedure of hemodialysis.  Discussed risk of cardiac arrhythmia, MI, CVA, bleeding, infection, sudden cardiac death.  Patient understands risk of hemodialysis but also risk of current kidney failure.  Wishes to proceed with hemodialysis "I want to give it a shot."   -I will consult general surgery for placement of temporary dialysis catheter now and give first dialysis treatment after placement.   -she will dialyze again tomorrow and the following day.  We will continue to monitor daily and adjust plan accordingly.    FLOR Doyle  Nephrology  [facility] - [facility] Tele[facility]  "

## 2023-11-30 NOTE — PROGRESS NOTES
[facility] Electrolyte Monitoring     Recent Labs   Lab 11/25/23  0414 11/26/23  0425 11/27/23  0530 11/27/23  2056 11/28/23  0437 11/29/23  0541 11/30/23  0505    134* 132* 130* 131* 131* 128*   K 4.0 3.2* 4.8 3.6 4.0 3.7 4.4   CALCIUM 9.3 8.8 7.5* 7.6* 8.5 8.4 8.1*   PHOS  --  2.9  --   --  3.4 3.9 4.8*   MG 2.40 2.50  --   --  2.00 1.90 2.00   CREATININE 1.19* 2.11* 3.16* 4.15* 4.06* 4.89* 4.47*   CO2 17* 20* 16* 16* 17* 18* 21*   BUN 25.6* 37.4* 44.9* 56.2* 55.5* 66.7* 46.9*   GLUCOSE 99 125* 114 152* 126* 108 547*   ALBUMIN 2.1* 2.0*  --   --  1.6* 1.7* 1.5*   BILITOT 9.2* 8.7*  --   --  5.0* 4.8* 3.9*   ALKPHOS 258* 234*  --   --  134 131 112   ALT 56* 48  --   --  32 31 27   AST 35* 32  --   --  29 27 24       TPN rate: 70 mL/hr    Lipids:   Twice weekly (MonThu) - triglyceride = 226 (11/28/23)    Supplemental electrolytes  Sodium Bicarb 1300mg bid.      Notes   CMP, Mag, and Phos ordered and active.  Phos = 4.8, but Pt is going to get HD on 11/30 and 12/01.  PICC line in place.

## 2023-11-30 NOTE — PROGRESS NOTES
Inpatient Nutrition Assessment    Admit Date: 11/17/2023   Total duration of encounter: 13 days     Nutrition Recommendation/Prescription     -Oral diet per MD, currently NPO.     -Decrease rate of TPN to 50ml/hr today.  *Electrolytes and additional fluids per pharmacy/MD    -Need to order custom TPN with minimum volume for tomorrow until able to start tube feeds.   357mL D70% (250g, 850kcals, GIR= 2.11mg/kg/min)  600mL AA15% (90g, 360kcals)  Custom TPN + 250mL 20% ILE will provide: 1352kcals (82% est needs), 90g protein (100% est needs)    -Tube feeding recommendations once able to place J-tube.   Novasource @ 45ml/hr (goal rate) will provide: 1800kcals, 81g protein, 648ml free water.     Communication of Recommendations: reviewed with provider and reviewed with nurse    Nutrition Assessment     Malnutrition Assessment/Nutrition-Focused Physical Exam    Malnutrition Context: acute illness or injury (11/18/23 1630)  Malnutrition Level: severe (11/18/23 1630)  Energy Intake (Malnutrition): less than or equal to 50% for greater than or equal to 5 days (11/18/23 1630)  Weight Loss (Malnutrition): greater than 7.5% in 3 months (11/18/23 1630)  Subcutaneous Fat (Malnutrition): moderate depletion (11/18/23 1630)  Orbital Region (Subcutaneous Fat Loss): moderate depletion        Muscle Mass (Malnutrition): moderate depletion (11/18/23 1630)  Zoroastrianism Region (Muscle Loss): moderate depletion     Clavicle and Acromion Bone Region (Muscle Loss): moderate depletion        Patellar Region (Muscle Loss): moderate depletion                 A minimum of two characteristics is recommended for diagnosis of either severe or non-severe malnutrition.    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  SIRS versus sepsis- improved  Bilateral hydronephrosis with worsening renal function, reconsulted Urology  POORNIMA  New onset atrial fibrillation with RVR   Acute Blood loss anemia with syncope and then fall 11/26  Hypokalemia- resolved with replacement  Metabolic acidosis  Severe malnutrition    Relevant Medical History:   Past Medical History:   Diagnosis Date    Hypertension     Sciatica      Past Surgical History:   Procedure Laterality Date    CARPAL TUNNEL RELEASE Left     CYSTOSCOPY W/ URETERAL STENT PLACEMENT Bilateral 11/27/2023    Procedure: CYSTOSCOPY, WITH URETERAL STENT INSERTION;  Surgeon: Huey Cardenas MD;  Location: [facility] OR;  Service: Urology;  Laterality: Bilateral;    EGD, WITH CLOSED BIOPSY  11/18/2023    Procedure: EGD, WITH CLOSED BIOPSY;  Surgeon: Alfred Goss MD;  Location: [facility] OR;  Service: Gastroenterology;;    ERCP N/A 11/18/2023    Procedure: ERCP (ENDOSCOPIC RETROGRADE CHOLANGIOPANCREATOGRAPHY);  Surgeon: Alfred Goss MD;  Location: [facility] OR;  Service: Gastroenterology;  Laterality: N/A;    HEMORRHOID SURGERY       Review of patient's allergies indicates:  No Known Allergies     Nutrition-Related Medications:    metoprolol  5 mg Intravenous Q6H    mupirocin   Nasal BID    pantoprazole  40 mg Intravenous BID WM    piperacillin-tazobactam (Zosyn) IV (PEDS and ADULTS) (extended infusion is not appropriate)  4.5 g Intravenous Q12H    sodium bicarbonate  1,300 mg Oral BID    sodium chloride 0.9%  10 mL Intravenous Q6H       amino acid 5% in 15% dextrose (CLINIMIX-E) solution (1L provides 50gm AA, 150gm CHO (510 kcal/L dextrose), Na 35, K 30, Mg 5, Ca 4.5, Acetate 80, Cl 39, Phos 15) (710 total kcal/L) 70 mL/hr at 11/30/23 1033  "        Calorie Containing IV Medications: Clinimix    Nutrition-Related Labs:  No results found for: "HGBA1C"  11/30/2023: Magnesium Level 2.00 mg/dL (Ref range: 1.60 - 2.60 mg/dL); Phosphorus Level 4.8 mg/dL (H; Ref range: 2.3 - 4.7 mg/dL); Potassium Level 4.4 mmol/L (Ref range: 3.5 - 5.1 mmol/L); Sodium Level 128 mmol/L (L; Ref range: 136 - 145 mmol/L)   Recent Labs   Lab 11/27/23  0530 11/28/23 0437 11/29/23  0541   WBC 12.57* 11.82* 13.92*   RBC 3.04* 2.79* 2.71*   HGB 9.2* 8.4* 8.2*   HCT 26.7* 23.0* 22.9*   MCV 87.8 82.4 84.5   MCH 30.3 30.1 30.3   MCHC 34.5 36.5* 35.8     Recent Labs   Lab 11/28/23 0437 11/29/23  0541 11/30/23  0505   * 131* 128*   K 4.0 3.7 4.4   CO2 17* 18* 21*   BUN 55.5* 66.7* 46.9*   CREATININE 4.06* 4.89* 4.47*   GLUCOSE 126* 108 547*   CALCIUM 8.5 8.4 8.1*   MG 2.00 1.90 2.00   ALBUMIN 1.6* 1.7* 1.5*   ALKPHOS 134 131 112   ALT 32 31 27   AST 29 27 24   BILITOT 5.0* 4.8* 3.9*       Diet/PN Order: Diet NPO  amino acid 5% in 15% dextrose (CLINIMIX-E) solution (1L provides 50gm AA, 150gm CHO (510 kcal/L dextrose), Na 35, K 30, Mg 5, Ca 4.5, Acetate 80, Cl 39, Phos 15) (710 total kcal/L)  Oral Supplement Order: none  Tube Feeding Order: none  Appetite/Oral Intake: NPO/NPO  Factors Affecting Nutritional Intake: altered gastrointestinal function, decreased appetite, and nausea  Food/Presybeterian/Cultural Preferences: none reported  Food Allergies: none reported    Skin Integrity: drain/device(s)  Wound(s):   n/a    Comments    11/18/23: Pt reports poor appetite, nauseated, threw up after consuming ~10% of full liquid tray for lunch, appetite has been a struggle for 3 months over which time she lost about 30 lbs unintentionally, appetite has been worse since 11/10 incomplete cholecystectomy, chews/swallows well, agrees to vanilla ONS on diet advancement.     11/22/23: Pt with poor to fair intake of full liquids; states that she is still having some n/v occasionally; reports that she is " "drinking the Boost that is ordered.     11/24/23: Pt reports poor appetite, still w/ n/v, basically vomits up everything she consumes. Discussed w/ physician, we agree that she needs a J-tube. Pt reports that she is open to it.     11/26/23: breakfast: 0% tray; lunch- unknown, no tray receipt; dinner- 100% 2 orange sheberts and 1/2 bowl chicken broth. Consuming <25% energy needs.    11/27/23: breakfast: 50% cream of wheat and 100% orange juice; lunch: NPO for stent placements, but sx deferred until tomorrow so will allow liquids for dinner tonight.   Continues with emesis and nausea throughout day; receiving antiemetics. States Boost supplement also "comes back up". Will change to Boost Breeze and monitor tolerance.  Changing peripheral PN to total PN with lipids 2x/week to meet nutritional needs until able to place enteral access device for tube feeds or po intake improves. Discussed with MD and pharmacy.     11/30/23 TPN continues. Decreased rate to 50ml/hr until tomorrow and will order custom TPN for minimum volume until able to start on enteral nutrition. Started on dialysis s/t worsening renal indices and decreased urine output (<600mL x24 hr per RN). Glucose lab of 547 this morning error, 159 on recheck.     Anthropometrics    Height: 5' 5.75" (167 cm) Height Method: Stated  Last Weight: 82.1 kg (181 lb) (11/26/23 0942) Weight Method: Standard Scale  BMI (Calculated): 29.4  BMI Classification: overweight (BMI 25-29.9)     Ideal Body Weight (IBW), Female: 128.75 lb     % Ideal Body Weight, Female (lb): 140.58 %                             Usual Weight Provided By: patient and EMR weight history  8/14/23: 211 lbs  Wt Readings from Last 5 Encounters:   11/26/23 82.1 kg (181 lb)   04/24/18 88 kg (194 lb)   02/21/18 90.4 kg (199 lb 4.7 oz)   11/24/17 90 kg (198 lb 6.6 oz)     Weight Change(s) Since Admission:  Admit Weight: 82.2 kg (181 lb 4 oz) (11/17/23 2835)    Estimated Needs    Weight Used For Calorie " Calculations: 82.2 kg (181 lb 3.5 oz)  Energy Calorie Requirements (kcal): 1644 kcals (20 kcals/kg)  Energy Need Method: Kcal/kg  Weight Used For Protein Calculations: 82.2 kg (181 lb 3.5 oz)  Protein Requirements:  g (1.0-1.3 g/kg)  Fluid Requirements (mL): 1000mL + UOP (started on HD)  Temp (24hrs), Av.2 °F (36.8 °C), Min:97.5 °F (36.4 °C), Max:98.5 °F (36.9 °C)       Enteral Nutrition    Patient not receiving enteral nutrition at this time.    Parenteral Nutrition    Standard Formula: Clinimix E 5/15  Custom Formula: not applicable  Additives: none  Rate/Volume: 50ml/hr  Lipids: none  Total Nutrition Provided by Parenteral Nutrition:  Calories Provided  995 kcal/d, 60% needs   Protein Provided  60 g/d, 73% needs   Dextrose Provided  240 g/d, GIR 1.51 mg CHO/kg/min   Fluid Provided  1200 ml/d, % needs     Evaluation of Received Nutrient Intake    Calories: not meeting estimated needs  Protein: not meeting estimated needs    Patient Education    Not applicable.    Nutrition Diagnosis     PES: Malnutrition related to suboptimal protein/energy intake as evidenced by less than or equal to 50% needs met for greater than or equal to 5 days, moderate fat depletion, moderate muscle depletion, and greater than 7.5% weight loss in 3 months. (active)     Interventions/Goals     Intervention(s): modified composition of enteral nutrition, modified rate of enteral nutrition, modified composition of parenteral nutrition, modified rate of parenteral nutrition, prescription medication, and collaboration with other providers  Goal: Meet greater than 75% of nutritional needs by follow-up. (goal not met)    Monitoring & Evaluation     Dietitian will monitor energy intake, weight, electrolyte/renal panel, and gastrointestinal profile.  Nutrition Risk/Follow-Up: high (follow-up in 1-4 days)   Please consult if re-assessment needed sooner.    Lisa Correa RD   2023

## 2023-11-30 NOTE — AI DETERIORATION ALERT
Artificial Intelligence Notification  [facility]  [address]  [place] [address]  Phone: [phone]    This documentation was triggered by an Artificial Intelligence Notification:    Admit Date: 2023   LOS: 13  Code Status: Full Code  : 1965  Age: 58 y.o.  Weight:   Wt Readings from Last 1 Encounters:   23 82.1 kg (181 lb)        Sex: female  Bed: [address] [place] [address]  MRN: [id number]  Attending Physician: Dalton Palacios MD     Date of Alert: 2023  Time AI Alert Received: 17:04            Vitals:    23 1736   BP: 98/66   Pulse:    Resp:    Temp:      SpO2: 100 %      Artificial Intelligence alert discussed with Provider:     Name: LOS Tsang   Date/Time of Provider Notification: 17:47      Patient Condition: Pt in sinus tach 117; BP 98/66; recently got back from HD. No complaints from nurse; no ICU intervention at this time.

## 2023-11-30 NOTE — AI DETERIORATION ALERT
Artificial Intelligence Notification  [facility]  [address]  [place] [address]  Phone: [phone]    This documentation was triggered by an Artificial Intelligence Notification:    Admit Date: 2023   LOS: 13  Code Status: Full Code  : 1965  Age: 58 y.o.  Weight:   Wt Readings from Last 1 Encounters:   23 82.1 kg (181 lb)        Sex: female  Bed: [address] [place] [address] A  MRN: [id number]  Attending Physician: Dalton Palacios MD     Date of Alert: 2023  Time AI Alert Received: 634            Vitals:    23 0545   BP: 91/65   Pulse:    Resp:    Temp:      SpO2: (!) 94 %      Artificial Intelligence alert discussed with Provider:     Name: LOS Del Castillo    Date/Time of Provider Notification: 0650      Patient Condition: Pt condition unchanged from AI alert done earlier this shift. Vital signs stable, only critical lab value was CBG >500. Magdalene RN checked CBG with POC test resulting a CBG of 146.

## 2023-11-30 NOTE — NURSING
11/29/23 1946   Post-Hemodialysis Assessment   Additional Fluid Intake (mL) 233 mL   Post-Hemodialysis Comments pt bp borderline 90's midway.UF off causing postitive fluid balance. One uf off pt bp stable. no other issues noted. cvc fx well and able to meet ordered bfr.

## 2023-12-01 ENCOUNTER — ANESTHESIA (OUTPATIENT)
Dept: SURGERY | Facility: HOSPITAL | Age: 58
DRG: 356 | End: 2023-12-01
Payer: COMMERCIAL

## 2023-12-01 LAB
ABO + RH BLD: NORMAL
ABO + RH BLD: NORMAL
ALBUMIN SERPL-MCNC: 1.8 G/DL (ref 3.4–4.8)
ALBUMIN/GLOB SERPL: 0.6 RATIO (ref 1.1–2)
ALP SERPL-CCNC: 102 UNIT/L (ref 40–150)
ALT SERPL-CCNC: 23 UNIT/L (ref 0–55)
AST SERPL-CCNC: 25 UNIT/L (ref 5–34)
BASOPHILS # BLD AUTO: 0.01 X10(3)/MCL
BASOPHILS # BLD AUTO: 0.13 X10(3)/MCL
BASOPHILS NFR BLD AUTO: 0.1 %
BASOPHILS NFR BLD AUTO: 0.6 %
BILIRUB SERPL-MCNC: 4 MG/DL
BLD PROD TYP BPU: NORMAL
BLD PROD TYP BPU: NORMAL
BLOOD UNIT EXPIRATION DATE: NORMAL
BLOOD UNIT EXPIRATION DATE: NORMAL
BLOOD UNIT TYPE CODE: 5100
BLOOD UNIT TYPE CODE: 5100
BUN SERPL-MCNC: 35 MG/DL (ref 9.8–20.1)
CALCIUM SERPL-MCNC: 8.3 MG/DL (ref 8.4–10.2)
CHLORIDE SERPL-SCNC: 98 MMOL/L (ref 98–107)
CO2 SERPL-SCNC: 25 MMOL/L (ref 23–31)
CREAT SERPL-MCNC: 3.58 MG/DL (ref 0.55–1.02)
CROSSMATCH INTERPRETATION: NORMAL
CROSSMATCH INTERPRETATION: NORMAL
DISPENSE STATUS: NORMAL
DISPENSE STATUS: NORMAL
EOSINOPHIL # BLD AUTO: 0.02 X10(3)/MCL (ref 0–0.9)
EOSINOPHIL # BLD AUTO: 0.04 X10(3)/MCL (ref 0–0.9)
EOSINOPHIL NFR BLD AUTO: 0.1 %
EOSINOPHIL NFR BLD AUTO: 0.2 %
ERYTHROCYTE [DISTWIDTH] IN BLOOD BY AUTOMATED COUNT: 18.6 % (ref 11.5–17)
ERYTHROCYTE [DISTWIDTH] IN BLOOD BY AUTOMATED COUNT: 21.2 % (ref 11.5–17)
GFR SERPLBLD CREATININE-BSD FMLA CKD-EPI: 14 MLS/MIN/1.73/M2
GLOBULIN SER-MCNC: 3.1 GM/DL (ref 2.4–3.5)
GLUCOSE SERPL-MCNC: 420 MG/DL (ref 82–115)
HCT VFR BLD AUTO: 19.1 % (ref 37–47)
HCT VFR BLD AUTO: 36.5 % (ref 37–47)
HGB BLD-MCNC: 12.6 G/DL (ref 12–16)
HGB BLD-MCNC: 6.7 G/DL (ref 12–16)
IMM GRANULOCYTES # BLD AUTO: 0.55 X10(3)/MCL (ref 0–0.04)
IMM GRANULOCYTES # BLD AUTO: 1.04 X10(3)/MCL (ref 0–0.04)
IMM GRANULOCYTES NFR BLD AUTO: 3.4 %
IMM GRANULOCYTES NFR BLD AUTO: 4.7 %
INR PPP: 1.3
LYMPHOCYTES # BLD AUTO: 0.56 X10(3)/MCL (ref 0.6–4.6)
LYMPHOCYTES # BLD AUTO: 0.63 X10(3)/MCL (ref 0.6–4.6)
LYMPHOCYTES NFR BLD AUTO: 2.8 %
LYMPHOCYTES NFR BLD AUTO: 3.5 %
MAGNESIUM SERPL-MCNC: 2 MG/DL (ref 1.6–2.6)
MCH RBC QN AUTO: 29.9 PG (ref 27–31)
MCH RBC QN AUTO: 30.1 PG (ref 27–31)
MCHC RBC AUTO-ENTMCNC: 34.5 G/DL (ref 33–36)
MCHC RBC AUTO-ENTMCNC: 35.1 G/DL (ref 33–36)
MCV RBC AUTO: 85.3 FL (ref 80–94)
MCV RBC AUTO: 87.1 FL (ref 80–94)
MONOCYTES # BLD AUTO: 1 X10(3)/MCL (ref 0.1–1.3)
MONOCYTES # BLD AUTO: 1.25 X10(3)/MCL (ref 0.1–1.3)
MONOCYTES NFR BLD AUTO: 5.6 %
MONOCYTES NFR BLD AUTO: 6.2 %
NEUTROPHILS # BLD AUTO: 13.91 X10(3)/MCL (ref 2.1–9.2)
NEUTROPHILS # BLD AUTO: 19.09 X10(3)/MCL (ref 2.1–9.2)
NEUTROPHILS NFR BLD AUTO: 86.1 %
NEUTROPHILS NFR BLD AUTO: 86.7 %
NRBC BLD AUTO-RTO: 0.1 %
NRBC BLD AUTO-RTO: 0.1 %
PHOSPHATE SERPL-MCNC: 4.3 MG/DL (ref 2.3–4.7)
PLATELET # BLD AUTO: 355 X10(3)/MCL (ref 130–400)
PLATELET # BLD AUTO: 359 X10(3)/MCL (ref 130–400)
PMV BLD AUTO: 10.7 FL (ref 7.4–10.4)
PMV BLD AUTO: 11.3 FL (ref 7.4–10.4)
POCT GLUCOSE: 138 MG/DL (ref 70–110)
POCT GLUCOSE: 164 MG/DL (ref 70–110)
POTASSIUM SERPL-SCNC: 4.5 MMOL/L (ref 3.5–5.1)
PROT SERPL-MCNC: 4.9 GM/DL (ref 5.8–7.6)
PROTHROMBIN TIME: 15.7 SECONDS (ref 12.5–14.5)
RBC # BLD AUTO: 2.24 X10(6)/MCL (ref 4.2–5.4)
RBC # BLD AUTO: 4.19 X10(6)/MCL (ref 4.2–5.4)
SODIUM SERPL-SCNC: 132 MMOL/L (ref 136–145)
UNIT NUMBER: NORMAL
UNIT NUMBER: NORMAL
WBC # SPEC AUTO: 16.05 X10(3)/MCL (ref 4.5–11.5)
WBC # SPEC AUTO: 22.18 X10(3)/MCL (ref 4.5–11.5)

## 2023-12-01 PROCEDURE — 85025 COMPLETE CBC W/AUTO DIFF WBC: CPT | Performed by: NURSE PRACTITIONER

## 2023-12-01 PROCEDURE — 63600175 PHARM REV CODE 636 W HCPCS: Performed by: NURSE ANESTHETIST, CERTIFIED REGISTERED

## 2023-12-01 PROCEDURE — 37000009 HC ANESTHESIA EA ADD 15 MINS: Performed by: SURGERY

## 2023-12-01 PROCEDURE — 80053 COMPREHEN METABOLIC PANEL: CPT | Performed by: INTERNAL MEDICINE

## 2023-12-01 PROCEDURE — 25000003 PHARM REV CODE 250: Performed by: NURSE ANESTHETIST, CERTIFIED REGISTERED

## 2023-12-01 PROCEDURE — 71000033 HC RECOVERY, INTIAL HOUR: Performed by: SURGERY

## 2023-12-01 PROCEDURE — C9113 INJ PANTOPRAZOLE SODIUM, VIA: HCPCS | Performed by: INTERNAL MEDICINE

## 2023-12-01 PROCEDURE — 84100 ASSAY OF PHOSPHORUS: CPT | Performed by: INTERNAL MEDICINE

## 2023-12-01 PROCEDURE — 63600175 PHARM REV CODE 636 W HCPCS: Performed by: INTERNAL MEDICINE

## 2023-12-01 PROCEDURE — 30233N1 TRANSFUSION OF NONAUTOLOGOUS RED BLOOD CELLS INTO PERIPHERAL VEIN, PERCUTANEOUS APPROACH: ICD-10-PCS | Performed by: INTERNAL MEDICINE

## 2023-12-01 PROCEDURE — D9220A PRA ANESTHESIA: Mod: CRNA,,, | Performed by: NURSE ANESTHETIST, CERTIFIED REGISTERED

## 2023-12-01 PROCEDURE — C1788 PORT, INDWELLING, IMP: HCPCS | Performed by: SURGERY

## 2023-12-01 PROCEDURE — 99232 SBSQ HOSP IP/OBS MODERATE 35: CPT | Mod: ,,, | Performed by: INTERNAL MEDICINE

## 2023-12-01 PROCEDURE — 85610 PROTHROMBIN TIME: CPT | Performed by: INTERNAL MEDICINE

## 2023-12-01 PROCEDURE — 77001 FLUOROGUIDE FOR VEIN DEVICE: CPT | Mod: 26,,, | Performed by: SURGERY

## 2023-12-01 PROCEDURE — 27201423 OPTIME MED/SURG SUP & DEVICES STERILE SUPPLY: Performed by: SURGERY

## 2023-12-01 PROCEDURE — D9220A PRA ANESTHESIA: ICD-10-PCS | Mod: CRNA,,, | Performed by: NURSE ANESTHETIST, CERTIFIED REGISTERED

## 2023-12-01 PROCEDURE — 21400001 HC TELEMETRY ROOM

## 2023-12-01 PROCEDURE — 99223 1ST HOSP IP/OBS HIGH 75: CPT | Mod: FS,,, | Performed by: GENERAL PRACTICE

## 2023-12-01 PROCEDURE — 85025 COMPLETE CBC W/AUTO DIFF WBC: CPT | Performed by: INTERNAL MEDICINE

## 2023-12-01 PROCEDURE — 44186 LAP JEJUNOSTOMY: CPT | Mod: 59,,, | Performed by: SURGERY

## 2023-12-01 PROCEDURE — D9220A PRA ANESTHESIA: ICD-10-PCS | Mod: ANES,,, | Performed by: ANESTHESIOLOGY

## 2023-12-01 PROCEDURE — 25500020 PHARM REV CODE 255: Performed by: INTERNAL MEDICINE

## 2023-12-01 PROCEDURE — D9220A PRA ANESTHESIA: Mod: ANES,,, | Performed by: ANESTHESIOLOGY

## 2023-12-01 PROCEDURE — 37000008 HC ANESTHESIA 1ST 15 MINUTES: Performed by: SURGERY

## 2023-12-01 PROCEDURE — 44186 LAP JEJUNOSTOMY: CPT | Mod: AS,,, | Performed by: NURSE PRACTITIONER

## 2023-12-01 PROCEDURE — 25000003 PHARM REV CODE 250: Performed by: INTERNAL MEDICINE

## 2023-12-01 PROCEDURE — 25000003 PHARM REV CODE 250: Performed by: NURSE PRACTITIONER

## 2023-12-01 PROCEDURE — 71000039 HC RECOVERY, EACH ADD'L HOUR: Performed by: SURGERY

## 2023-12-01 PROCEDURE — A4216 STERILE WATER/SALINE, 10 ML: HCPCS | Performed by: INTERNAL MEDICINE

## 2023-12-01 PROCEDURE — 0JH60WZ INSERTION OF TOTALLY IMPLANTABLE VASCULAR ACCESS DEVICE INTO CHEST SUBCUTANEOUS TISSUE AND FASCIA, OPEN APPROACH: ICD-10-PCS | Performed by: SURGERY

## 2023-12-01 PROCEDURE — 02HV33Z INSERTION OF INFUSION DEVICE INTO SUPERIOR VENA CAVA, PERCUTANEOUS APPROACH: ICD-10-PCS | Performed by: SURGERY

## 2023-12-01 PROCEDURE — 86923 COMPATIBILITY TEST ELECTRIC: CPT | Mod: 91 | Performed by: NURSE PRACTITIONER

## 2023-12-01 PROCEDURE — 83735 ASSAY OF MAGNESIUM: CPT | Performed by: INTERNAL MEDICINE

## 2023-12-01 PROCEDURE — P9016 RBC LEUKOCYTES REDUCED: HCPCS | Performed by: SURGERY

## 2023-12-01 PROCEDURE — P9016 RBC LEUKOCYTES REDUCED: HCPCS | Performed by: NURSE PRACTITIONER

## 2023-12-01 PROCEDURE — 0DHA3UZ INSERTION OF FEEDING DEVICE INTO JEJUNUM, PERCUTANEOUS APPROACH: ICD-10-PCS | Performed by: SURGERY

## 2023-12-01 PROCEDURE — 90935 HEMODIALYSIS ONE EVALUATION: CPT

## 2023-12-01 PROCEDURE — 36561 INSERT TUNNELED CV CATH: CPT | Mod: LT,,, | Performed by: SURGERY

## 2023-12-01 PROCEDURE — 36000709 HC OR TIME LEV III EA ADD 15 MIN: Performed by: SURGERY

## 2023-12-01 PROCEDURE — 36000708 HC OR TIME LEV III 1ST 15 MIN: Performed by: SURGERY

## 2023-12-01 DEVICE — PORT POWER CLEAR VIEW: Type: IMPLANTABLE DEVICE | Site: CHEST | Status: FUNCTIONAL

## 2023-12-01 RX ORDER — METOCLOPRAMIDE HYDROCHLORIDE 5 MG/ML
5 INJECTION INTRAMUSCULAR; INTRAVENOUS EVERY 6 HOURS PRN
Status: DISCONTINUED | OUTPATIENT
Start: 2023-12-01 | End: 2023-12-25 | Stop reason: HOSPADM

## 2023-12-01 RX ORDER — LIDOCAINE HYDROCHLORIDE 20 MG/ML
INJECTION, SOLUTION EPIDURAL; INFILTRATION; INTRACAUDAL; PERINEURAL
Status: DISCONTINUED | OUTPATIENT
Start: 2023-12-01 | End: 2023-12-01

## 2023-12-01 RX ORDER — ETOMIDATE 2 MG/ML
INJECTION INTRAVENOUS
Status: DISCONTINUED | OUTPATIENT
Start: 2023-12-01 | End: 2023-12-01

## 2023-12-01 RX ORDER — CALCIUM CHLORIDE INJECTION 100 MG/ML
1 INJECTION, SOLUTION INTRAVENOUS ONCE
Status: DISCONTINUED | OUTPATIENT
Start: 2023-12-01 | End: 2023-12-04

## 2023-12-01 RX ORDER — VASOPRESSIN 20 [USP'U]/ML
INJECTION, SOLUTION INTRAMUSCULAR; SUBCUTANEOUS
Status: DISCONTINUED | OUTPATIENT
Start: 2023-12-01 | End: 2023-12-01

## 2023-12-01 RX ORDER — ROCURONIUM BROMIDE 10 MG/ML
INJECTION, SOLUTION INTRAVENOUS
Status: DISCONTINUED | OUTPATIENT
Start: 2023-12-01 | End: 2023-12-01

## 2023-12-01 RX ORDER — ONDANSETRON 2 MG/ML
INJECTION INTRAMUSCULAR; INTRAVENOUS
Status: DISCONTINUED | OUTPATIENT
Start: 2023-12-01 | End: 2023-12-01

## 2023-12-01 RX ORDER — FENTANYL CITRATE 50 UG/ML
INJECTION, SOLUTION INTRAMUSCULAR; INTRAVENOUS
Status: DISCONTINUED | OUTPATIENT
Start: 2023-12-01 | End: 2023-12-01

## 2023-12-01 RX ORDER — CALCIUM CHLORIDE INJECTION 100 MG/ML
1 INJECTION, SOLUTION INTRAVENOUS ONCE
Status: DISCONTINUED | OUTPATIENT
Start: 2023-12-01 | End: 2023-12-01

## 2023-12-01 RX ORDER — SUCCINYLCHOLINE CHLORIDE 20 MG/ML
INJECTION INTRAMUSCULAR; INTRAVENOUS
Status: DISCONTINUED | OUTPATIENT
Start: 2023-12-01 | End: 2023-12-01

## 2023-12-01 RX ORDER — HYDROCODONE BITARTRATE AND ACETAMINOPHEN 500; 5 MG/1; MG/1
TABLET ORAL
Status: DISCONTINUED | OUTPATIENT
Start: 2023-12-01 | End: 2023-12-25 | Stop reason: HOSPADM

## 2023-12-01 RX ORDER — ESMOLOL HYDROCHLORIDE 10 MG/ML
INJECTION INTRAVENOUS
Status: DISCONTINUED | OUTPATIENT
Start: 2023-12-01 | End: 2023-12-01

## 2023-12-01 RX ORDER — DEXAMETHASONE SODIUM PHOSPHATE 4 MG/ML
INJECTION, SOLUTION INTRA-ARTICULAR; INTRALESIONAL; INTRAMUSCULAR; INTRAVENOUS; SOFT TISSUE
Status: DISCONTINUED | OUTPATIENT
Start: 2023-12-01 | End: 2023-12-01

## 2023-12-01 RX ORDER — PHENYLEPHRINE HYDROCHLORIDE 10 MG/ML
INJECTION INTRAVENOUS
Status: DISCONTINUED | OUTPATIENT
Start: 2023-12-01 | End: 2023-12-01

## 2023-12-01 RX ADMIN — METOCLOPRAMIDE 5 MG: 5 INJECTION, SOLUTION INTRAMUSCULAR; INTRAVENOUS at 06:12

## 2023-12-01 RX ADMIN — ESMOLOL HYDROCHLORIDE 10 MG: 100 INJECTION, SOLUTION INTRAVENOUS at 07:12

## 2023-12-01 RX ADMIN — SODIUM CHLORIDE, PRESERVATIVE FREE 10 ML: 5 INJECTION INTRAVENOUS at 01:12

## 2023-12-01 RX ADMIN — VASOPRESSIN 1 UNITS: 20 INJECTION INTRAVENOUS at 08:12

## 2023-12-01 RX ADMIN — VASOPRESSIN 1 UNITS: 20 INJECTION INTRAVENOUS at 07:12

## 2023-12-01 RX ADMIN — ROCURONIUM BROMIDE 5 MG: 10 SOLUTION INTRAVENOUS at 07:12

## 2023-12-01 RX ADMIN — MUPIROCIN: 20 OINTMENT TOPICAL at 08:12

## 2023-12-01 RX ADMIN — ASCORBIC ACID, VITAMIN A PALMITATE, CHOLECALCIFEROL, THIAMINE HYDROCHLORIDE, RIBOFLAVIN-5 PHOSPHATE SODIUM, PYRIDOXINE HYDROCHLORIDE, NIACINAMIDE, DEXPANTHENOL, ALPHA-TOCOPHEROL ACETATE, VITAMIN K1, FOLIC ACID, BIOTIN, CYANOCOBALAMIN: 200; 3300; 200; 6; 3.6; 6; 40; 15; 10; 150; 600; 60; 5 INJECTION, SOLUTION INTRAVENOUS at 10:12

## 2023-12-01 RX ADMIN — SODIUM CHLORIDE, PRESERVATIVE FREE 10 ML: 5 INJECTION INTRAVENOUS at 11:12

## 2023-12-01 RX ADMIN — ROCURONIUM BROMIDE 45 MG: 10 SOLUTION INTRAVENOUS at 07:12

## 2023-12-01 RX ADMIN — SUCCINYLCHOLINE CHLORIDE 120 MG: 20 INJECTION, SOLUTION INTRAMUSCULAR; INTRAVENOUS at 07:12

## 2023-12-01 RX ADMIN — ONDANSETRON 4 MG: 2 INJECTION INTRAMUSCULAR; INTRAVENOUS at 08:12

## 2023-12-01 RX ADMIN — VASOPRESSIN 2 UNITS: 20 INJECTION INTRAVENOUS at 07:12

## 2023-12-01 RX ADMIN — ROCURONIUM BROMIDE 20 MG: 10 SOLUTION INTRAVENOUS at 08:12

## 2023-12-01 RX ADMIN — FENTANYL CITRATE 50 MCG: 50 INJECTION, SOLUTION INTRAMUSCULAR; INTRAVENOUS at 07:12

## 2023-12-01 RX ADMIN — DIATRIZOATE MEGLUMINE 300 ML: 180 INJECTION, SOLUTION INTRAVESICAL at 05:12

## 2023-12-01 RX ADMIN — ETOMIDATE 14 MG: 2 INJECTION INTRAVENOUS at 07:12

## 2023-12-01 RX ADMIN — PHENYLEPHRINE HYDROCHLORIDE 100 MCG: 10 INJECTION INTRAVENOUS at 07:12

## 2023-12-01 RX ADMIN — PANTOPRAZOLE SODIUM 40 MG: 40 INJECTION, POWDER, FOR SOLUTION INTRAVENOUS at 06:12

## 2023-12-01 RX ADMIN — FENTANYL CITRATE 50 MCG: 50 INJECTION, SOLUTION INTRAMUSCULAR; INTRAVENOUS at 08:12

## 2023-12-01 RX ADMIN — METOPROLOL TARTRATE 5 MG: 1 INJECTION, SOLUTION INTRAVENOUS at 11:12

## 2023-12-01 RX ADMIN — SODIUM CHLORIDE: 0.9 INJECTION, SOLUTION INTRAVENOUS at 07:12

## 2023-12-01 RX ADMIN — METOPROLOL TARTRATE 5 MG: 1 INJECTION, SOLUTION INTRAVENOUS at 09:12

## 2023-12-01 RX ADMIN — PHENYLEPHRINE HYDROCHLORIDE 200 MCG: 10 INJECTION INTRAVENOUS at 07:12

## 2023-12-01 RX ADMIN — DEXAMETHASONE SODIUM PHOSPHATE 4 MG: 4 INJECTION, SOLUTION INTRA-ARTICULAR; INTRALESIONAL; INTRAMUSCULAR; INTRAVENOUS; SOFT TISSUE at 07:12

## 2023-12-01 RX ADMIN — SODIUM CHLORIDE, PRESERVATIVE FREE 10 ML: 5 INJECTION INTRAVENOUS at 06:12

## 2023-12-01 RX ADMIN — LIDOCAINE HYDROCHLORIDE 4 ML: 20 INJECTION, SOLUTION EPIDURAL; INFILTRATION; INTRACAUDAL; PERINEURAL at 07:12

## 2023-12-01 RX ADMIN — SUGAMMADEX 200 MG: 100 INJECTION, SOLUTION INTRAVENOUS at 08:12

## 2023-12-01 NOTE — ANESTHESIA PREPROCEDURE EVALUATION
12/01/2023  Karen Briggs is a 58 y.o., female.   with medical history of hypertension who recently underwent laparoscopic cholecystectomy on 11/10/2023, procedure was incomplete and was unable to completely resect the gallbladder.  Since surgery she continued to have right flank/back pain and followed up with her PCP and CT abdomen and pelvis on 11/17/2023 revealed left-sided hydronephrosis with suspected distal obstruction/no clear stone visualized, postoperative changes of cholecystectomy with fluid in the gallbladder fossa may reflect postoperative seroma but biloma can not be entirely excluded, also noted for marked thickening of the stomach wall diffusely may be related to mesenteric edema/reactive.  She presented to [facility] [place] the same day 11/17/2023 and her labs notable for WBC 9.0, hemoglobin 12.4, platelets 442, creatinine 0.86, total bilirubin 8.7 with direct fraction 6.7, alkaline phosphatase 491, , .  Patient's surgeon was consulted and recommended ERCP which was not available at [facility] for which she was transferred to [facility] and referred to hospital medicine service for further evaluation and management.   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  Severe inflammation and oozing of blood noted proximal gastric lumen.  Unable to advance scope into the duodenum.  Surgical oncology consulted, IR consulted for external drain placement which was done November 21.  November 24th she developed new onset atrial fibrillation with RVR and Cardiology consulted.  Started on amiodarone drip  Unfortunately patient had acute blood loss due to hemorrhage from the midline site that was removed.  Patient was unaware of the bleed.  Became very weak, passed out.  Code was called but she came around.  Stat H&H done which was slightly lower but  stable, MRI of the brain was negative for any acute ischemic changes. Pt is aware of gastric cancer diagnosis   GI following--> 11/18- EGD shows normal esophagus, malignant gastric tumor in the cardia.  Inflamed mucosa and ooze of blood throughout the proximal gastric lumen.  Gastric antrum scar would not allow advancement of scope into the duodenal ampulla area therefore biliary cannulation was not possible for bile leak evaluation  Pathology shows marked chronic gastritis with intestinal metaplasia, gastric cardia biopsy shows adenocarcinoma, moderately differentiated intestinal type   bilateral hydronephrosis with left greater than right  MRI brain without contrast-negative for acute finding  PET scan reviewed with patient by Oncology reports of locally advanced gastric adenocarcinoma and plans for systemic therapy outpatient if functional, nutritional and renal function improves.        Patient currently being managed for locally advanced gastric adenocarcinoma.  With plans for laparoscopic J-tube placement and MediPort placement by surgical Oncology on 12/1, oncology on board plans for systemic therapy outpatient if functional, nutritional and renal function improves.     No hemoglobin decreased to 6.7 this morning, transfusing currently.  Pressure is soft overnight, we will aggressively transfused to improve hemodynamics in anticipation of surgery this morning.    Patient remains in atrial fibrillation.  Rate controlled until overnight some tachycardia likely related to hematocrit.  K 4.5    Pre-op Assessment    I have reviewed the Patient Summary Reports.     I have reviewed the Nursing Notes. I have reviewed the NPO Status.   I have reviewed the Medications.     Review of Systems  Anesthesia Hx:  No problems with previous Anesthesia                Hematology/Oncology:                      Current/Recent Cancer.                Cardiovascular:     Hypertension                                  Hypertension          Neurological:    Neuromuscular Disease,                                 Neuromuscular Disease       Physical Exam  General: Well nourished, Cooperative, Alert and Oriented    Airway:  Mallampati: II   Mouth Opening: Normal  TM Distance: Normal  Tongue: Normal  Neck ROM: Normal ROM    Dental:  Edentulous upper, intact lower      Anesthesia Plan  Type of Anesthesia, risks & benefits discussed:    Anesthesia Type: Gen ETT  Intra-op Monitoring Plan: Standard ASA Monitors  Post Op Pain Control Plan: multimodal analgesia  Induction:  IV  Airway Plan: Direct, Post-Induction  Informed Consent: Informed consent signed with the Patient and all parties understand the risks and agree with anesthesia plan.  All questions answered. Patient consented to blood products? Yes  ASA Score: 4  Day of Surgery Review of History & Physical: H&P Update referred to the surgeon/provider.  Anesthesia Plan Notes: Giving 2 u PRBC preoperatively along with 1 g CACl  Will give 3 rd unit intraoperatively    Ready For Surgery From Anesthesia Perspective.     .

## 2023-12-01 NOTE — PROGRESS NOTES
[facility]  Hospital Medicine Progress Note        Chief Complaint: Inpatient Follow-up for intractable vomiting     HPI: 58-year-old female with medical history of hypertension who recently underwent laparoscopic cholecystectomy on 11/10/2023, procedure was incomplete and was unable to completely resect the gallbladder.  Since surgery she continued to have right flank/back pain and followed up with her PCP and CT abdomen and pelvis on 11/17/2023 revealed left-sided hydronephrosis with suspected distal obstruction/no clear stone visualized, postoperative changes of cholecystectomy with fluid in the gallbladder fossa may reflect postoperative seroma but biloma can not be entirely excluded, also noted for marked thickening of the stomach wall diffusely may be related to mesenteric edema/reactive.  She presented to [facility] ED the same day 11/17/2023 and her labs notable for WBC 9.0, hemoglobin 12.4, platelets 442, creatinine 0.86, total bilirubin 8.7 with direct fraction 6.7, alkaline phosphatase 491, , .  Patient's surgeon was consulted and recommended ERCP which was not available at [facility] for which she was transferred to [facility] and referred to hospital medicine service for further evaluation and management.   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  Severe inflammation and oozing of blood noted proximal gastric lumen.  Unable to advance scope into the duodenum.  Surgical oncology consulted, IR consulted for external drain placement which was done November 21.  November 24th she developed new onset atrial fibrillation with RVR and Cardiology consulted.  Started on amiodarone drip  Unfortunately patient had acute blood loss due to hemorrhage from the midline site that was removed.  Patient was unaware of the bleed.  Became very weak, passed out.  Code was called but she came around.  Stat H&H done which was slightly  lower but stable, MRI of the brain was negative for any acute ischemic changes. Pt is aware of gastric cancer diagnosis   GI following--> 11/18- EGD shows normal esophagus, malignant gastric tumor in the cardia.  Inflamed mucosa and ooze of blood throughout the proximal gastric lumen.  Gastric antrum scar would not allow advancement of scope into the duodenal ampulla area therefore biliary cannulation was not possible for bile leak evaluation  Pathology shows marked chronic gastritis with intestinal metaplasia, gastric cardia biopsy shows adenocarcinoma, moderately differentiated intestinal type   bilateral hydronephrosis with left greater than right  MRI brain without contrast-negative for acute finding  PET scan reviewed with patient by Oncology reports of locally advanced gastric adenocarcinoma and plans for systemic therapy outpatient if functional, nutritional and renal function improves.        Patient currently being managed for locally advanced gastric adenocarcinoma.  With plans for laparoscopic J-tube placement and MediPort placement by surgical Oncology on 12/1, oncology on board plans for systemic therapy outpatient if functional, nutritional and renal function improves.     obstructive uropathy with bilateral hydronephrosis status post bilateral ureteral stent placed on 11/27 by Urology- no improvement in renal function, patient opted to have hemodialysis and a right-sided hemodialysis catheter has been placed by General surgery on 11/29, 2 continue hemodialysis per nephrology discretion.     Patient with symptoms of nausea and vomiting can not keep anything down in her stomach complicated by severe malnutrition on IV Clinimix and supportive medications for nausea and vomiting. Palliative care on board         Interval Hx:   Mild tachycardia   Hemoglobin dropped to 6.7   BMP in keeping with end-stage renal disease   Patient has gone for MediPort/J-tube placement   Receiving 2-3 units of PRBCs today    Consult ID for leukocytosis       Case was discussed with patient's nurse and  on the floor.     Objective/physical exam:  General: In no acute distress, afebrile, ill looking   Chest: Clear to auscultation bilaterally anteriorly  Heart:  Tachycardic rate and rhythm, no appreciable murmur  Abdomen:  extremely tender in epigastrium and RUQ, Slightly distended, BS +  MSK: Warm, trace pitting edema bilateral lower extremities  Neurologic: Alert and oriented x4, Cranial nerve II-XII intact, able to move all 4 extremities        Assessment/Plan:  Acute on chronic anemia   Locally advanced gastric adenocarcinoma.    Obstructive uropathy with bilateral hydronephrosis status post bilateral ureteral stent placed on 11/27  [facility]IMA-now on HD - 11/29  Metabolic acidosis  Severe malnutrition   Persistent leukocytosis  Possible Gastric/duodenal outlet obstruction likely d/t gastric cancer   Suspected bile leak with biloma and biliary obstruction--> H/O Laparoscopic incomplete cholecystectomy 11/10/2023  New onset atrial fibrillation with rVR   Acute Blood loss anemia with syncope and then fall 11/26  Hypokalemia- resolved with replacement      History of hypertension and DDD/sciatica     Plan  Transfuse with 2-3 units of PRBCs   Follow-up with Surgical Oncology recs post procedures for J-tube and MediPort placement today   Consult infectious disease for persistent leukocytosis  Appreciate oncology input plans for systemic therapy outpatient,   Nephrology on board, HD per their discretion   Hold off on IV TPN given fluid overloaded state  Patient currently not tolerating p.o. intake will be very challenging to discharge home with no nutrition  Patient is status post bilateral ureteral stent placed on 11/27 by Urology, to trend BMP  Appreciate CIS input to discontinue Lovenox and continue with rate control given anemia   Trend hemoglobin levels and keep hemoglobin greater than 7- 8 grams/dL    palliative Care on board-  patient wants everything done   Continue IV Zosyn day 8 wbc keeps going up, less likely infectious at this time , defer to ID   IM Bentyl p.r.n. for abdominal pain and spasm      VTE prophylaxis:  scds given anemia      Patient condition:  Guarded     Anticipated discharge and Disposition:   TBD        All diagnosis and differential diagnosis have been reviewed; assessment and plan has been documented; I have personally reviewed the labs and test results that are presently available; I have reviewed the patients medication list; I have reviewed the consulting providers response and recommendations. I have reviewed or attempted to review medical records based upon their availability     All of the patient's questions have been  addressed and answered. Patient's is agreeable to the above stated plan. I will continue to monitor closely and make adjustments to medical management as needed.      Critical Care diagnoses acute on chronic anemia requiring transfusion  Critical care time greater than 35 minutes     VITAL SIGNS: 24 HRS MIN & MAX LAST   Temp  Min: 97.2 °F (36.2 °C)  Max: 98.3 °F (36.8 °C) 98.3 °F (36.8 °C)   BP  Min: 85/47  Max: 140/76 109/75   Pulse  Min: 78  Max: 127  105   Resp  Min: 12  Max: 27 (!) 25   SpO2  Min: 95 %  Max: 100 % 95 %     I have reviewed the following labs:  Recent Labs   Lab 11/29/23  0541 12/01/23 0407 12/01/23  1002   WBC 13.92* 16.05* 22.18*   RBC 2.71* 2.24* 4.19*   HGB 8.2* 6.7* 12.6   HCT 22.9* 19.1* 36.5*   MCV 84.5 85.3 87.1   MCH 30.3 29.9 30.1   MCHC 35.8 35.1 34.5   RDW 20.9* 21.2* 18.6*    359 355   MPV 11.6* 11.3* 10.7*     Recent Labs   Lab 11/29/23  0541 11/30/23  0505 12/01/23 0407   * 128* 132*   K 3.7 4.4 4.5   CO2 18* 21* 25   BUN 66.7* 46.9* 35.0*   CREATININE 4.89* 4.47* 3.58*   CALCIUM 8.4 8.1* 8.3*   MG 1.90 2.00 2.00   ALBUMIN 1.7* 1.5* 1.8*   ALKPHOS 131 112 102   ALT 31 27 23   AST 27 24 25   BILITOT 4.8* 3.9* 4.0*     Microbiology Results (last 7  days)       Procedure Component Value Units Date/Time    Urine culture [[id number]] Collected: 11/23/23 1012    Order Status: Completed Specimen: Urine Updated: 11/25/23 0745     Urine Culture No Growth             See below for Radiology    Scheduled Med:   calcium chloride  1 g Intravenous Once    metoprolol  5 mg Intravenous Q6H    mupirocin   Nasal BID    pantoprazole  40 mg Intravenous BID WM    piperacillin-tazobactam (Zosyn) IV (PEDS and ADULTS) (extended infusion is not appropriate)  4.5 g Intravenous Q12H    sodium bicarbonate  1,300 mg Oral BID    sodium chloride 0.9%  10 mL Intravenous Q6H      Continuous Infusions:     PRN Meds:  0.9%  NaCl infusion (for blood administration), sodium chloride 0.9%, acetaminophen, aluminum-magnesium hydroxide-simethicone, bisacodyL, dicyclomine, hydrALAZINE, hyoscyamine, melatonin, metoclopramide HCl, metoprolol, morphine, ondansetron, oxyCODONE, polyethylene glycol, prochlorperazine, promethazine, senna-docusate 8.6-50 mg, sodium chloride 0.9%, Flushing PICC/Midline Protocol **AND** sodium chloride 0.9% **AND** sodium chloride 0.9%     Assessment/Plan:      VTE prophylaxis:     Patient condition:  Stable/Fair/Guarded/ Serious/ Critical    Anticipated discharge and Disposition:         All diagnosis and differential diagnosis have been reviewed; assessment and plan has been documented; I have personally reviewed the labs and test results that are presently available; I have reviewed the patients medication list; I have reviewed the consulting providers response and recommendations. I have reviewed or attempted to review medical records based upon their availability    All of the patient's questions have been  addressed and answered. Patient's is agreeable to the above stated plan. I will continue to monitor closely and make adjustments to medical management as needed.  _____________________________________________________________________    Nutrition  Status:    Radiology:  I have personally reviewed the following imaging and agree with the radiologist.     X-Ray Chest 1 View  Narrative: EXAMINATION:  XR CHEST 1 VIEW    CLINICAL HISTORY:  Other specified symptoms and signs involving the circulatory and respiratory systems    TECHNIQUE:  Single frontal view of the chest was performed.    COMPARISON:  11/29/2023    FINDINGS:  LINES AND TUBES: Left subclavian pam catheter tip terminates at the SVC.  Right IJ sheath tip projects over the SVC.  Right upper extremity PICC tip projects over the superior cavoatrial junction.  EKG/telemetry leads overlie the chest. .    MEDIASTINUM AND SARA: Cardiac silhouette is at the upper limits of normal, likely exaggerated by portable technique.    LUNGS: Lung volumes are low with associated atelectatic change.    PLEURA:No pleural effusion. No pneumothorax.    OTHER: No acute osseous abnormality.  Impression: Mild retrocardiac atelectasis.    Electronically signed by: Sulema Solorzano  Date:    12/01/2023  Time:    10:43  SURG FL Surgery Fluoro Usage  See OP Notes for results.     IMPRESSION: See OP Notes for results.     This procedure was auto-finalized by: Virtual Radiologist      Dalton Palacios MD   12/01/2023

## 2023-12-01 NOTE — CONSULTS
Inpatient Nutrition Assessment    Admit Date: 11/17/2023   Total duration of encounter: 14 days     Nutrition Recommendation/Prescription     -Oral diet per MD, currently NPO.     -Tube feeding recommendations once ok to start per MD.   Tomasz @ 45ml/hr (goal rate) will provide:   1800kcals (109% est needs), 81g protein (98% est needs), 648ml free water.   Free water flush recommendations: 30ml every hr (~600ml/d) or as recommended by MD.     -Continue TPN until tube feeds are tolerated at 25ml/hr; then can begin to taper down to discontinue as tube feeding rate is increased and tolerated.     -Electrolytes and additional fluids per pharmacy/MD      Communication of Recommendations: reviewed with provider and reviewed with nurse    Nutrition Assessment     Malnutrition Assessment/Nutrition-Focused Physical Exam    Malnutrition Context: acute illness or injury (11/18/23 1630)  Malnutrition Level: severe (11/18/23 1630)  Energy Intake (Malnutrition): less than or equal to 50% for greater than or equal to 5 days (11/18/23 1630)  Weight Loss (Malnutrition): greater than 7.5% in 3 months (11/18/23 1630)  Subcutaneous Fat (Malnutrition): moderate depletion (11/18/23 1630)  Orbital Region (Subcutaneous Fat Loss): moderate depletion        Muscle Mass (Malnutrition): moderate depletion (11/18/23 1630)  Chandler Region (Muscle Loss): moderate depletion     Clavicle and Acromion Bone Region (Muscle Loss): moderate depletion        Patellar Region (Muscle Loss): moderate depletion                 A minimum of two characteristics is recommended for diagnosis of either severe or non-severe malnutrition.    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  SIRS versus sepsis- improved  Bilateral hydronephrosis with worsening renal function, reconsulted Urology  POORNIMA  New onset atrial fibrillation with RVR   Acute Blood loss anemia with syncope and then fall 11/26  Hypokalemia- resolved with replacement  Metabolic acidosis  Severe malnutrition    Relevant Medical History:   Past Medical History:   Diagnosis Date    Hypertension     Sciatica      Past Surgical History:   Procedure Laterality Date    CARPAL TUNNEL RELEASE Left     CYSTOSCOPY W/ URETERAL STENT PLACEMENT Bilateral 11/27/2023    Procedure: CYSTOSCOPY, WITH URETERAL STENT INSERTION;  Surgeon: Huey Cardenas MD;  Location: [facility] OR;  Service: Urology;  Laterality: Bilateral;    EGD, WITH CLOSED BIOPSY  11/18/2023    Procedure: EGD, WITH CLOSED BIOPSY;  Surgeon: Alfred Goss MD;  Location: [facility] OR;  Service: Gastroenterology;;    ERCP N/A 11/18/2023    Procedure: ERCP (ENDOSCOPIC RETROGRADE CHOLANGIOPANCREATOGRAPHY);  Surgeon: Alfred Goss MD;  Location: [facility] OR;  Service: Gastroenterology;  Laterality: N/A;    HEMORRHOID SURGERY       Review of patient's allergies indicates:  No Known Allergies     Nutrition-Related Medications:    calcium chloride  1 g Intravenous Once    metoprolol  5 mg Intravenous Q6H    mupirocin   Nasal BID    pantoprazole  40 mg Intravenous BID WM    piperacillin-tazobactam (Zosyn) IV (PEDS and ADULTS) (extended infusion is not appropriate)  4.5 g Intravenous Q12H    sodium bicarbonate  1,300 mg Oral BID    sodium chloride 0.9%  10 mL Intravenous Q6H              Calorie Containing IV Medications: Clinimix    Nutrition-Related Labs:  No results found for: "HGBA1C"  12/1/2023: Magnesium Level 2.00 mg/dL (Ref range: 1.60 - 2.60 mg/dL); Phosphorus Level 4.3 " mg/dL (Ref range: 2.3 - 4.7 mg/dL); Potassium Level 4.5 mmol/L (Ref range: 3.5 - 5.1 mmol/L); Sodium Level 132 mmol/L (L; Ref range: 136 - 145 mmol/L)   Recent Labs   Lab 11/29/23  0541 12/01/23  0407 12/01/23  1002   WBC 13.92* 16.05* 22.18*   RBC 2.71* 2.24* 4.19*   HGB 8.2* 6.7* 12.6   HCT 22.9* 19.1* 36.5*   MCV 84.5 85.3 87.1   MCH 30.3 29.9 30.1   MCHC 35.8 35.1 34.5       Recent Labs   Lab 11/29/23  0541 11/30/23  0505 12/01/23  0407   * 128* 132*   K 3.7 4.4 4.5   CO2 18* 21* 25   BUN 66.7* 46.9* 35.0*   CREATININE 4.89* 4.47* 3.58*   GLUCOSE 108 547* 420*   CALCIUM 8.4 8.1* 8.3*   MG 1.90 2.00 2.00   ALBUMIN 1.7* 1.5* 1.8*   ALKPHOS 131 112 102   ALT 31 27 23   AST 27 24 25   BILITOT 4.8* 3.9* 4.0*         Diet/PN Order: Diet NPO  Oral Supplement Order: none  Tube Feeding Order: none  Appetite/Oral Intake: NPO/NPO  Factors Affecting Nutritional Intake: altered gastrointestinal function, decreased appetite, and nausea  Food/Restoration/Cultural Preferences: none reported  Food Allergies: none reported    Skin Integrity: drain/device(s), incision (L chest and L abdomen)  Wound(s):   n/a    Comments    11/18/23: Pt reports poor appetite, nauseated, threw up after consuming ~10% of full liquid tray for lunch, appetite has been a struggle for 3 months over which time she lost about 30 lbs unintentionally, appetite has been worse since 11/10 incomplete cholecystectomy, chews/swallows well, agrees to vanilla CONSTANTIN on diet advancement.     11/22/23: Pt with poor to fair intake of full liquids; states that she is still having some n/v occasionally; reports that she is drinking the Boost that is ordered.     11/24/23: Pt reports poor appetite, still w/ n/v, basically vomits up everything she consumes. Discussed w/ physician, we agree that she needs a J-tube. Pt reports that she is open to it.     11/26/23: breakfast: 0% tray; lunch- unknown, no tray receipt; dinner- 100% 2 orange sheberts and 1/2 bowl chicken  "broth. Consuming <25% energy needs.    11/27/23: breakfast: 50% cream of wheat and 100% orange juice; lunch: NPO for stent placements, but sx deferred until tomorrow so will allow liquids for dinner tonight.   Continues with emesis and nausea throughout day; receiving antiemetics. States Boost supplement also "comes back up". Will change to Boost Breeze and monitor tolerance.  Changing peripheral PN to total PN with lipids 2x/week to meet nutritional needs until able to place enteral access device for tube feeds or po intake improves. Discussed with MD and pharmacy.     11/30/23 TPN continues. Decreased rate to 50ml/hr until tomorrow and will order custom TPN for minimum volume until able to start on enteral nutrition. Started on dialysis s/t worsening renal indices and decreased urine output (<600mL x24 hr per RN). Glucose lab of 547 this morning error, 159 on recheck.     12/1/23 Out of room for Jtube and mediport placement. Consult received for Jtube feeding recs. Continue in-house TPN at 50ml/hr, no need for custom at this time. Can wean TPN as tolerating tube feeds at 1/2 goal rate. Will use renal formula for tube feedings while pt continues on dilaysis.  Recommendations discussed with RN and pharmacy.     Anthropometrics    Height: 5' 5.75" (167 cm) Height Method: Stated  Last Weight: 82.1 kg (181 lb) (11/26/23 0942) Weight Method: Standard Scale  BMI (Calculated): 29.4  BMI Classification: overweight (BMI 25-29.9)     Ideal Body Weight (IBW), Female: 128.75 lb     % Ideal Body Weight, Female (lb): 140.58 %                             Usual Weight Provided By: patient and EMR weight history  8/14/23: 211 lbs  Wt Readings from Last 5 Encounters:   11/26/23 82.1 kg (181 lb)   04/24/18 88 kg (194 lb)   02/21/18 90.4 kg (199 lb 4.7 oz)   11/24/17 90 kg (198 lb 6.6 oz)     Weight Change(s) Since Admission:  Admit Weight: 82.2 kg (181 lb 4 oz) (11/17/23 8935)    Estimated Needs    Weight Used For Calorie " Calculations: 82.2 kg (181 lb 3.5 oz)  Energy Calorie Requirements (kcal): 1644 kcals (20 kcals/kg)  Energy Need Method: Kcal/kg  Weight Used For Protein Calculations: 82.2 kg (181 lb 3.5 oz)  Protein Requirements:  g (1.0-1.3 g/kg)  Fluid Requirements (mL): 1000mL + UOP (started on HD)  Temp (24hrs), Av.9 °F (36.6 °C), Min:97.2 °F (36.2 °C), Max:98.3 °F (36.8 °C)       Enteral Nutrition    Patient not receiving enteral nutrition at this time.    Parenteral Nutrition    Standard Formula: Clinimix E 5/15  Custom Formula: not applicable  Additives: none  Rate/Volume: 50ml/hr  Lipids: none  Total Nutrition Provided by Parenteral Nutrition:  Calories Provided  995 kcal/d, 60% needs   Protein Provided  60 g/d, 73% needs   Dextrose Provided  240 g/d, GIR 1.51 mg CHO/kg/min   Fluid Provided  1200 ml/d, % needs     Evaluation of Received Nutrient Intake    Calories: not meeting estimated needs  Protein: not meeting estimated needs    Patient Education    Not applicable.    Nutrition Diagnosis     PES: Malnutrition related to suboptimal protein/energy intake as evidenced by less than or equal to 50% needs met for greater than or equal to 5 days, moderate fat depletion, moderate muscle depletion, and greater than 7.5% weight loss in 3 months. (active)     Interventions/Goals     Intervention(s): modified composition of enteral nutrition, modified rate of enteral nutrition, modified rate of parenteral nutrition, and collaboration with other providers  Goal: Meet greater than 75% of nutritional needs by follow-up. (goal not met)    Monitoring & Evaluation     Dietitian will monitor energy intake, weight, electrolyte/renal panel, and gastrointestinal profile.  Nutrition Risk/Follow-Up: high (follow-up in 1-4 days)   Please consult if re-assessment needed sooner.    Lisa Correa RD   2023

## 2023-12-01 NOTE — OP NOTE
Date of Surgery:  12/1/23    Surgeon:  William Morse MD            Assistant: MARIA A Garcia                              Pre-op Diagnosis:  Gastric cancer, gastric outlet obstruction with malnutrition    Post-op Diagnosis:  Same    Procedure:    Left subclavian Mediport insertion with fluoroscopic guidance  Laparoscopic placement of feeding jejunostomy tube  Diagnostic laparoscopy      Anesthesia:  Local/MAC    EBL:  Less than 15 cc    Specimens:  None    Findings:  Initial diagnostic laparoscopy revealed no evidence of occult metastatic disease or peritoneal metastasis, therefore we proceeded with jejunostomy tube placement rather than gastric bypass.  14 [place] mary jejunostomy tube was placed without difficulty, tacked to the anterior abdominal wall with silk suture. 8FR  PowerPort placed without difficulty, final fluoroscopy revealed the tip of the catheter within the superior vena cava, no evidence of pneumothorax or other complication.  The port flushed and aspirated freely.    Procedure in detail: After informed consent was obtained the patient was brought to the operating room and placed in the supine position.  Sedation was administered by anesthesia.  The abdomen upper chest and neck were prepped and draped in sterile fashion.  After infiltration with local anesthesia right-sided abdominal incision was made optical trocar used into peritoneal cavity under direct vision.  Pneumoperitoneum was achieved without difficulty.  Additional ports were placed under direct vision.  The abdomen was explored laparoscopically visualizing all visceral and parietal peritoneal surfaces and carefully examining the liver and omentum, there was no evidence of metastatic disease therefore we proceeded with treatment plan for curative intent.   The ligament of Treitz was identified and the first mobile loop of jejunum was tacked up to the abdominal wall using 2-0 silk trans-fascial sutures. The needle was used to puncture  the bowel and entry into the lumen was confirmed with air insuflation with a syringe. The wire was passed through the needle and noted to pass distally. The introducer/dilator was passed over the guidewire and a 14Fr LESA jejunostomy tube was inserted using the peel-away introducer. The balloon was inflated with 2cc of saline and snugged up to the abdominal wall. The suspension sutures were  tied down securing the insertion site to the posterior abdominal wall.      After infiltration with local anesthesia, the left subclavian vein was cannulated with a large-bore needle and a guidewire was placed under fluoroscopic guidance into the superior vena cava.  Incision was made below the wire insertion site and a pocket was created for the port of the pectoralis fascia.  A PowerPort was assembled and flushed it was soaked in antimicrobial solution, placed in the pocket and the catheter was tunneled to the wire insertion site without difficulty.  The catheter was cut to size using fluoroscopic guidance.  An introducer dilator was placed over the guidewire and the catheter was threaded through the peel-away introducer without difficulty.  Final fluoroscopy revealed the tip of the catheter within the superior vena cava, the port flushed and aspirated freely, final heparin solution was instilled.  There was no evidence of pneumothorax or other complication on final fluoroscopy.  The port pocket was irrigated with antimicrobial solution, meticulous hemostasis was achieved, it was closed in multiple layers with absorbable suture and sterile dressings were applied.  The patient tolerated the procedure well.      William Morse MD  Surgical Oncology  Complex General, Gastrointestinal and Hepatobiliary Surgery

## 2023-12-01 NOTE — PROGRESS NOTES
[facility] - [facility] Medical Telemetry  Nephrology  Progress Note    Patient Name: Karen Briggs  MRN: [id number]  Admission Date: 11/17/2023  Hospital Length of Stay: 14 days  Attending Provider: Dalton Palacios MD   Primary Care Physician: Marian White FNP-C  Principal Problem:<principal problem not specified>      Subjective:   HPI: This is a 58-year-old female presented on  7 days post op from laparoscopic cholecystectomy on 11/10. Gallbladder was unable to be resected. Back and flank pain persisted post operatively prompting evaluation at [facility] ER. After workup her surgeon recommended ERCP for which she was sent to this facility. Left sided hydronephrosis noted on CT scans done on admission. At that time renal function was fairly normal and patient was given option for stenting or to defer as outpatient and she chose the latter.      During ERCP, malignant gastric mass noted and ERCP was unable to be completed due to duodenal scarring. 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 Patient ultimately had biliary drain placed per IR.      She developed A fib with RVR requiring amiodarone infusion. In the past 24 hours she had significant blood loss post removal of long term IV. She then had a fall later ambulating to the bathroom.      Patient renal function was relatively stable until decline starting 11/25. Patient was ultimately initiated on HD 11/28 post bilateral ureteral stent placement with Dr Cardenas same day.     S/p J tube and Mediport placement this morning. She was also transfused 3 unit PRBC vipul operatively for Hgb 6.7. She is now on dialysis for third consecutive treatment. She is awake and alert on room air without distress.       Review of patient's allergies indicates:  No Known Allergies  Current Facility-Administered Medications   Medication Frequency    0.9%  NaCl infusion (for blood administration) Q24H PRN    0.9%  NaCl infusion PRN    acetaminophen  tablet 650 mg Q4H PRN    aluminum-magnesium hydroxide-simethicone 200-200-20 mg/5 mL suspension 30 mL QID PRN    bisacodyL suppository 10 mg Daily PRN    calcium chloride 100 mg/mL (10 %) injection 1 g Once    dicyclomine injection 20 mg QID PRN    hydrALAZINE injection 10 mg Q6H PRN    hyoscyamine ODT 0.125 mg Q4H PRN    melatonin tablet 6 mg Nightly PRN    metoclopramide HCl injection 10 mg Q6H PRN    metoprolol injection 5 mg Q2H PRN    metoprolol injection 5 mg Q6H    morphine injection 4 mg Q4H PRN    mupirocin 2 % ointment BID    ondansetron injection 4 mg Q4H PRN    oxyCODONE immediate release tablet 5 mg Q6H PRN    pantoprazole injection 40 mg BID WM    piperacillin-tazobactam (ZOSYN) 4.5 g in dextrose 5 % in water (D5W) 100 mL IVPB (MB+) Q12H    polyethylene glycol packet 17 g BID PRN    prochlorperazine injection Soln 5 mg Q6H PRN    promethazine injection 25 mg Q4H PRN    senna-docusate 8.6-50 mg per tablet 2 tablet BID PRN    sodium bicarbonate tablet 1,300 mg BID    sodium chloride 0.9% flush 10 mL PRN    sodium chloride 0.9% flush 10 mL Q6H    And    sodium chloride 0.9% flush 10 mL PRN       Objective:     Vital Signs (Most Recent):  Temp: 98.3 °F (36.8 °C) (12/01/23 1109)  Pulse: 105 (12/01/23 1109)  Resp: (!) 25 (12/01/23 1100)  BP: 109/75 (12/01/23 1109)  SpO2: 95 % (12/01/23 1109) Vital Signs (24h Range):  Temp:  [97.2 °F (36.2 °C)-98.3 °F (36.8 °C)] 98.3 °F (36.8 °C)  Pulse:  [] 105  Resp:  [12-27] 25  SpO2:  [95 %-100 %] 95 %  BP: ()/(47-92) 109/75     Weight: 82.1 kg (181 lb) (11/26/23 0942)  Body mass index is 29.44 kg/m².  Body surface area is 1.95 meters squared.    I/O last 3 completed shifts:  In: 233 [Other:233]  Out: 1240 [Urine:600; Drains:400; Other:240]    Physical Exam  Constitutional:       General: She is not in acute distress.     Appearance: She is ill-appearing.   HENT:      Head: Atraumatic.      Nose: Nose normal.      Mouth/Throat:      Mouth: Mucous membranes are  "moist.   Eyes:      Extraocular Movements: Extraocular movements intact.      Conjunctiva/sclera: Conjunctivae normal.   Neck:      Comments: Right IJ temp cath  Cardiovascular:      Rate and Rhythm: Normal rate.      Pulses: Normal pulses.      Heart sounds: Normal heart sounds.      Comments: 2 + pitting anasarca    Pulmonary:      Effort: Pulmonary effort is normal.      Breath sounds: Normal breath sounds.   Abdominal:      General: There is no distension.      Palpations: Abdomen is soft.   Genitourinary:     Comments: Urinary catheter with gross hematuria noted   Musculoskeletal:         General: Swelling present.   Skin:     General: Skin is warm.   Neurological:      Mental Status: She is alert and oriented to person, place, and time.         Significant Labs:sureBMP:   Recent Labs   Lab 12/01/23  0407   *   K 4.5   CO2 25   BUN 35.0*   CREATININE 3.58*   CALCIUM 8.3*   MG 2.00       CBC:   Recent Labs   Lab 12/01/23  1002   WBC 22.18*   RBC 4.19*   HGB 12.6   HCT 36.5*      MCV 87.1   MCH 30.1   MCHC 34.5       Microbiology Results (last 7 days)       Procedure Component Value Units Date/Time    Urine culture [[id number]] Collected: 11/23/23 1012    Order Status: Completed Specimen: Urine Updated: 11/25/23 0745     Urine Culture No Growth          Specimen (24h ago, onward)      None          No results for input(s): "COLORU", "CLARITYU", "SPECGRAV", "PHUR", "PROTEINUA", "GLUCOSEU", "BILIRUBINCON", "BLOODU", "WBCU", "RBCU", "BACTERIA", "MUCUS", "NITRITE", "LEUKOCYTESUR", "UROBILINOGEN", "HYALINECASTS" in the last 168 hours.      Significant Imaging:  US Retroperitoneal Complete [[id number]] Resulted: 11/27/23 1222   Order Status: Completed Updated: 11/27/23 1225   Narrative:     EXAMINATION:  US RETROPERITONEAL COMPLETE    CLINICAL HISTORY:  Worsening renal functions despite fluid resussitation;    COMPARISON:  CT 22 November 2023    FINDINGS:  Grayscale and color Doppler sonographic evaluation " of the kidneys and urinary bladder.    The right kidney measures 13 cm. The left kidney measures 11.5 cm.   There is bilateral mild to moderate hydronephrosis, left greater than right.  This is similar to the prior CT.  Grossly normal renal parenchymal echogenicity otherwise.    Jin in the bladder.   Impression:       Similar bilateral hydronephrosis.      Electronically signed by: Wei Wright  Date: 11/27/2023  Time: 12:22       Assessment/Plan:   POORNIMA multifactorial secondary to obstructive uropathy, acute blood loss, contrast exposure, hypotension and Afib with RVR  ---Nephrotic range proteinuria noted. 4.4 g  Newly diagnosed malignant gastric mass. Pathology consistent with adenocarcinoma   -s/p J tube and mediport placement 12/1  Acute blood loss anemia 2/2 bleeding post long term IV removal   Bilateral hydronephrosis noted 11/21 - stent placement initially deferred due to stable renal function   Afib with RVR on amiodarone infusion DC for hypotension.  She is on scheduled Lopressor 5 mg q.6  S/p biliary drain placement      -Patient on dialysis now to complete third consecutive treatment  Hold treatment tomorrow and monitor for renal recovery   Trend H&H. She is status post 3 units of PRBC this morning    FLOR Doyle  Nephrology  [facility] - [facility] Tele[facility]

## 2023-12-01 NOTE — ANESTHESIA PROCEDURE NOTES
Intubation    Date/Time: 12/1/2023 7:32 AM    Performed by: Rebecca Gonzales  Authorized by: Gladys Rodriges MD    Intubation:     Induction:  Intravenous    Intubated:  Postinduction    Mask Ventilation:  Easy mask    Attempts:  1    Attempted By:  Student    Method of Intubation:  Direct    Blade:  Gibson 2    Laryngeal View Grade: Grade IIA - cords partially seen      Difficult Airway Encountered?: No      Complications:  None    Airway Device:  Oral endotracheal tube    Airway Device Size:  7.5    Style/Cuff Inflation:  Cuffed (inflated to minimal occlusive pressure)    Tube secured:  21    Secured at:  The lips    Placement Verified By:  Capnometry    Complicating Factors:  None    Findings Post-Intubation:  BS equal bilateral and atraumatic/condition of teeth unchanged

## 2023-12-01 NOTE — AI DETERIORATION ALERT
Artificial Intelligence Notification  [facility]  [address]  [place] [address]  Phone: [phone]    This documentation was triggered by an Artificial Intelligence Notification:    Admit Date: 2023   LOS: 14  Code Status: Full Code  : 1965  Age: 58 y.o.  Weight:   Wt Readings from Last 1 Encounters:   23 82.1 kg (181 lb)        Sex: female  Bed: [address] [place] [address]  MRN: [id number]  Attending Physician: Dalton Palacios MD     Date of Alert: 2023  Time AI Alert Received: 010            Vitals:    23 2353   BP: (!) 85/55   Pulse: (!) 114   Resp: 18   Temp: 98.2 °F (36.8 °C)     SpO2: 97 %      Artificial Intelligence alert discussed with Provider:     Name: LOS Del Castillo   Date/Time of Provider Notification: 0126      Patient Condition: Spoke with pt's nurse, Magdalene; pt condition stable & unchanged from previous AI alert done this morning . Pt blood pressure borderline low, but pt remains asymptomatic.

## 2023-12-01 NOTE — TRANSFER OF CARE
"Anesthesia Transfer of Care Note    Patient: Karen Briggs    Procedure(s) Performed: Procedure(s) (LRB):  INSERTION, JEJUNOSTOMY TUBE, LAPAROSCOPIC (N/A)  INSERTION, PORT-A-CATH (N/A)    Patient location: PACU    Anesthesia Type: general    Transport from OR: Transported from OR on room air with adequate spontaneous ventilation    Post pain: adequate analgesia    Post assessment: no apparent anesthetic complications and tolerated procedure well    Post vital signs: stable    Level of consciousness: responds to stimulation    Nausea/Vomiting: no nausea/vomiting    Complications: none    Transfer of care protocol was followed      Last vitals: Visit Vitals  /69 (BP Location: Left forearm, Patient Position: Lying)   Pulse 78   Temp 36.5 °C (97.7 °F) (Skin)   Resp 18   Ht 5' 5.75" (1.67 m)   Wt 82.1 kg (181 lb)   SpO2 100%   Breastfeeding No   BMI 29.44 kg/m²     "

## 2023-12-01 NOTE — PROGRESS NOTES
Patient Name: Karen Briggs   MRN: [id number]   Admission Date: 11/17/2023   Hospital Length of Stay: 14   Attending Provider: Dalton Palacios MD   Consulting Provider: Viktor Landa M.D.  Reason for Consult: Goals of Care  Primary Care Physician: Marian White FNP-C     Principal Problem: <principal problem not specified>     Patient information was obtained from patient, spouse/SO, ER records, and primary team.      Final diagnoses:  [N13.30] Hydronephrosis     Assessment/Plan:     We reviewed the patient's current clinical status with the nurse. We reviewed clinical documentation, labs and imaging.     Symptom management review: She denies pain, dypsnea, dysuria. She has nausea only when she eats/ drinks. She has obstipation.   We met with the patient, who was in dialysis. She denied any complaints at this time. She is happy about PET findings revealing no metastatic disease. She is s/p jejeunostomy placement today. She had hemorrhaging around port sites and required 3 units of PRBC transfused today. She had had hypotension making dialysis ultrafiltration difficult. She is on day # 3 of dialysis. We acknowledged her hopefulness over PET findings and the possibility for possible cure of her cancer. However, we reviewed the risk of a future inability to tolerate chemo should she should not have renal recovery, or has persistent weakness. She states that she would elect outpatient hemodialysis if needed. We reviewed would this would be like. She will likely require SNF placement before going home. I explained that she would need improved performance status for further aggressive treatments. For maximum comfort, she is in favor of setting up palliative care outpatient services upon discharge. We will consult CM to assist with this.    History of Present Illness:     59 y/o F h/o recent Lap grace with incomplete removal, subsequent admission with acute biliary obstruction, transaminitis, s/p biliary  drain, bilateral ureteral obstruction s/p bilateral ureteral stents and N&V with findings of a GOO, requiring TPN due to NPO status. She had EGD with biopsy and findings of adenocarcinoma of the stomach. She is pending PEG tomorrow for staging and subsequent further surgery and medical treatment options to be discussed. We were consulted to review goals of care.      Active Ambulatory Problems     Diagnosis Date Noted    No Active Ambulatory Problems     Resolved Ambulatory Problems     Diagnosis Date Noted    No Resolved Ambulatory Problems     Past Medical History:   Diagnosis Date    Hypertension     Sciatica         Past Surgical History:   Procedure Laterality Date    CARPAL TUNNEL RELEASE Left     CYSTOSCOPY W/ URETERAL STENT PLACEMENT Bilateral 11/27/2023    Procedure: CYSTOSCOPY, WITH URETERAL STENT INSERTION;  Surgeon: Huey Cardenas MD;  Location: [facility] OR;  Service: Urology;  Laterality: Bilateral;    EGD, WITH CLOSED BIOPSY  11/18/2023    Procedure: EGD, WITH CLOSED BIOPSY;  Surgeon: Alfred Goss MD;  Location: [facility] OR;  Service: Gastroenterology;;    ERCP N/A 11/18/2023    Procedure: ERCP (ENDOSCOPIC RETROGRADE CHOLANGIOPANCREATOGRAPHY);  Surgeon: Alfred Goss MD;  Location: [facility] OR;  Service: Gastroenterology;  Laterality: N/A;    HEMORRHOID SURGERY          Review of patient's allergies indicates:  No Known Allergies       Current Facility-Administered Medications:     0.9%  NaCl infusion (for blood administration), , Intravenous, Q24H PRN, Almaz Cheney, FNP    0.9%  NaCl infusion, , Intravenous, PRN, Hussein Merino MD, Stopped at 11/19/23 1049    acetaminophen tablet 650 mg, 650 mg, Oral, Q4H PRN, Hussein Merino MD, 650 mg at 11/28/23 2128    aluminum-magnesium hydroxide-simethicone 200-200-20 mg/5 mL suspension 30 mL, 30 mL, Oral, QID PRN, Hussein Merino MD    bisacodyL suppository 10 mg, 10 mg, Rectal, Daily PRN, Hussein Merino MD    calcium chloride 100 mg/mL (10 %)  injection 1 g, 1 g, Intravenous, Once, Dalton Palacios MD    dicyclomine injection 20 mg, 20 mg, Intramuscular, QID PRN, Dalton Palacios MD, 20 mg at 11/30/23 1754    hydrALAZINE injection 10 mg, 10 mg, Intravenous, Q6H PRN, Kenney Steiner MD    hyoscyamine ODT 0.125 mg, 0.125 mg, Sublingual, Q4H PRN, Sara Waldrop, AGACNP-BC, 0.125 mg at 11/30/23 1735    melatonin tablet 6 mg, 6 mg, Oral, Nightly PRN, Hussein Merino MD, 6 mg at 11/28/23 2128    metoclopramide HCl injection 5 mg, 5 mg, Intravenous, Q6H PRN, Dalton Palacios MD    metoprolol injection 5 mg, 5 mg, Intravenous, Q2H PRN, Tyler Lira, ANP, 5 mg at 12/01/23 0945    metoprolol injection 5 mg, 5 mg, Intravenous, Q6H, Char Smith, FNP, 5 mg at 11/30/23 1736    morphine injection 4 mg, 4 mg, Intravenous, Q4H PRN, Hussein Merino MD, 4 mg at 11/30/23 2101    mupirocin 2 % ointment, , Nasal, BID, Maryanne Moise, AGNP, Given at 11/30/23 2059    ondansetron injection 4 mg, 4 mg, Intravenous, Q4H PRN, Dalton Palacios MD    oxyCODONE immediate release tablet 5 mg, 5 mg, Oral, Q6H PRN, Hussein Merino MD, 5 mg at 11/23/23 1205    pantoprazole injection 40 mg, 40 mg, Intravenous, BID WM, Dalton Palacios MD, 40 mg at 11/30/23 1735    polyethylene glycol packet 17 g, 17 g, Oral, BID PRN, Hussein Merino MD    prochlorperazine injection Soln 5 mg, 5 mg, Intravenous, Q6H PRN, Hussein Merino MD, 5 mg at 11/22/23 1347    promethazine injection 25 mg, 25 mg, Intramuscular, Q4H PRN, Lyssa Car, AGACNP-BC, 25 mg at 11/23/23 1620    senna-docusate 8.6-50 mg per tablet 2 tablet, 2 tablet, Oral, BID PRN, Hussein Merino MD    sodium bicarbonate tablet 1,300 mg, 1,300 mg, Oral, BID, AmusaDalton MD, 1,300 mg at 11/28/23 2128    sodium chloride 0.9% flush 10 mL, 10 mL, Intravenous, PRN, WilberHussein borja MD    Flushing PICC/Midline Protocol, , , Until Discontinued **AND** sodium chloride 0.9% flush 10 mL, 10 mL, Intravenous, Q6H, 10 mL at 12/01/23 0600 **AND**  "sodium chloride 0.9% flush 10 mL, 10 mL, Intravenous, PRN, Kenney Steiner MD     0.9%  NaCl infusion (for blood administration), sodium chloride 0.9%, acetaminophen, aluminum-magnesium hydroxide-simethicone, bisacodyL, dicyclomine, hydrALAZINE, hyoscyamine, melatonin, metoclopramide HCl, metoprolol, morphine, ondansetron, oxyCODONE, polyethylene glycol, prochlorperazine, promethazine, senna-docusate 8.6-50 mg, sodium chloride 0.9%, Flushing PICC/Midline Protocol **AND** sodium chloride 0.9% **AND** sodium chloride 0.9%     Family History   Problem Relation Age of Onset    Breast cancer Mother     Cancer Maternal Aunt         colon cancer        Review of Systems   Constitutional:  Positive for activity change and fatigue.   HENT:  Negative for sore throat and trouble swallowing.    Respiratory:  Negative for shortness of breath.    Cardiovascular:  Negative for leg swelling.   Gastrointestinal:  Negative for abdominal pain, constipation and nausea.   Genitourinary:  Negative for difficulty urinating.   Musculoskeletal:  Negative for arthralgias and myalgias.   Psychiatric/Behavioral:  Negative for agitation and confusion.             Objective:   /75   Pulse 105   Temp 98.3 °F (36.8 °C) (Oral)   Resp (!) 25   Ht 5' 5.75" (1.67 m)   Wt 82.1 kg (181 lb)   SpO2 95%   Breastfeeding No   BMI 29.44 kg/m²      Physical Exam  Vitals reviewed.   Constitutional:       General: She is not in acute distress.     Appearance: She is ill-appearing. She is not toxic-appearing.   HENT:      Head: Normocephalic.      Right Ear: External ear normal.      Left Ear: External ear normal.      Nose: Nose normal.      Mouth/Throat:      Mouth: Mucous membranes are moist.      Pharynx: Oropharynx is clear.   Eyes:      Extraocular Movements: Extraocular movements intact.      Conjunctiva/sclera: Conjunctivae normal.      Pupils: Pupils are equal, round, and reactive to light.   Cardiovascular:      Rate and Rhythm: Normal rate " and regular rhythm.      Pulses: Normal pulses.      Heart sounds: Normal heart sounds.   Pulmonary:      Effort: Pulmonary effort is normal.      Breath sounds: Normal breath sounds.   Abdominal:      General: There is no distension.      Tenderness: There is no abdominal tenderness. There is no guarding.   Musculoskeletal:      Right lower leg: Edema present.      Left lower leg: Edema present.   Skin:     General: Skin is warm.   Neurological:      General: No focal deficit present.      Mental Status: She is alert and oriented to person, place, and time.   Psychiatric:         Mood and Affect: Mood normal.         Behavior: Behavior normal.         Thought Content: Thought content normal.         Judgment: Judgment normal.            Review of Symptoms  Review of Symptoms      Symptom Assessment (ESAS 0-10 Scale)  Pain:  0  Dyspnea:  0  Anxiety:  0  Nausea:  0  Depression:  0  Anorexia:  0  Fatigue:  0  Insomnia:  0  Restlessness:  0  Agitation:  0     CAM / Delirium:  Negative  Constipation:  Negative  Diarrhea:  Negative    Constipation:  No constipation    Bowel Management Plan (BMP):  Yes      Pain Assessment:  OME in 24 hours:  0  Location(s):      Modified Glen Scale:  0    Performance Status:  40    Living Arrangements:  Lives with spouse and Lives in home    Psychosocial/Cultural:   See Palliative Psychosocial Note: No  See assessement  **Primary  to Follow**  Palliative Care  Consult: No    Spiritual:  F - Sheryl and Belief:  Hinduism  A - Address in Care:  Pt elects to have  visit     Time-Based Charting:  Yes    Total Time Spent: 0 minutes      Advance Care Planning   Advance Directives:   Living Will: No    LaPOST: No    Do Not Resuscitate Status: No    Medical Power of : No    Agent's Name:  Mango Zepeda   Agent's Contact Number:  [phone]    Decision Making:  Patient answered questions and Family answered questions  Goals of Care: What is most important  right now is to focus on remaining as independent as possible, extending life as long as possible, even it it means sacrificing quality. Accordingly, we have decided that the best plan to meet the patient's goals includes continuing with treatment.            Caregiver burden formerly assessed: Yes        > 50% of 28 min of encounter was spent in chart review, face to face discussion of goals of care, symptom assessment, coordination of care and emotional support.     Viktor Landa M.D.  Palliative Medicine  [facility]

## 2023-12-01 NOTE — PLAN OF CARE
Problem: Adult Inpatient Plan of Care  Goal: Plan of Care Review  Outcome: Ongoing, Progressing  Goal: Patient-Specific Goal (Individualized)  Outcome: Ongoing, Progressing  Goal: Absence of Hospital-Acquired Illness or Injury  Outcome: Ongoing, Progressing  Goal: Optimal Comfort and Wellbeing  Outcome: Ongoing, Progressing  Goal: Readiness for Transition of Care  Outcome: Ongoing, Progressing     Problem: Pain Acute  Goal: Acceptable Pain Control and Functional Ability  Outcome: Ongoing, Progressing     Problem: Fall Injury Risk  Goal: Absence of Fall and Fall-Related Injury  Outcome: Ongoing, Progressing     Problem: Infection  Goal: Absence of Infection Signs and Symptoms  Outcome: Ongoing, Progressing     Problem: Coping Ineffective  Goal: Effective Coping  Outcome: Ongoing, Progressing     Problem: Skin Injury Risk Increased  Goal: Skin Health and Integrity  Outcome: Ongoing, Progressing     Problem: Device-Related Complication Risk (Hemodialysis)  Goal: Safe, Effective Therapy Delivery  Outcome: Ongoing, Progressing     Problem: Hemodynamic Instability (Hemodialysis)  Goal: Effective Tissue Perfusion  Outcome: Ongoing, Progressing     Problem: Infection (Hemodialysis)  Goal: Absence of Infection Signs and Symptoms  Outcome: Ongoing, Progressing

## 2023-12-01 NOTE — PROGRESS NOTES
UROLOGY  PROGRESS  NOTE    Karen Briggs 1965  [id number]  12/1/2023    POD 4 s/p bilateral stents    Patient resting in bed   She reports she is feeling better today prior to yesterday   Continues with some intermittent flank pain mostly in the right    VSS, afebrile  No urine output overnight documented  WBC 22.18   H&H 12.6/36.5   BUN and creatinine 35.0/3.58    Intake/Output:  I/O this shift:  In: 1100 [Blood:600; IV Piggyback:500]  Out: -   I/O last 3 completed shifts:  In: 233 [Other:233]  Out: 1240 [Urine:600; Drains:400; Other:240]     Exam:    NAD  Card: RRR  Resp: unlabored  Abd:  Soft, nondistended  :  minimal dark, old bloody urine in  bag  Extremity: no C/C/E    Recent Results (from the past 24 hour(s))   Magnesium    Collection Time: 12/01/23  4:07 AM   Result Value Ref Range    Magnesium Level 2.00 1.60 - 2.60 mg/dL   Phosphorus    Collection Time: 12/01/23  4:07 AM   Result Value Ref Range    Phosphorus Level 4.3 2.3 - 4.7 mg/dL   Comprehensive Metabolic Panel    Collection Time: 12/01/23  4:07 AM   Result Value Ref Range    Sodium Level 132 (L) 136 - 145 mmol/L    Potassium Level 4.5 3.5 - 5.1 mmol/L    Chloride 98 98 - 107 mmol/L    Carbon Dioxide 25 23 - 31 mmol/L    Glucose Level 420 (H) 82 - 115 mg/dL    Blood Urea Nitrogen 35.0 (H) 9.8 - 20.1 mg/dL    Creatinine 3.58 (H) 0.55 - 1.02 mg/dL    Calcium Level Total 8.3 (L) 8.4 - 10.2 mg/dL    Protein Total 4.9 (L) 5.8 - 7.6 gm/dL    Albumin Level 1.8 (L) 3.4 - 4.8 g/dL    Globulin 3.1 2.4 - 3.5 gm/dL    Albumin/Globulin Ratio 0.6 (L) 1.1 - 2.0 ratio    Bilirubin Total 4.0 (H) <=1.5 mg/dL    Alkaline Phosphatase 102 40 - 150 unit/L    Alanine Aminotransferase 23 0 - 55 unit/L    Aspartate Aminotransferase 25 5 - 34 unit/L    eGFR 14 mls/min/1.73/m2   Protime-INR    Collection Time: 12/01/23  4:07 AM   Result Value Ref Range    PT 15.7 (H) 12.5 - 14.5 seconds    INR 1.3 <=1.3   CBC with Differential    Collection Time: 12/01/23  4:07 AM    Result Value Ref Range    WBC 16.05 (H) 4.50 - 11.50 x10(3)/mcL    RBC 2.24 (L) 4.20 - 5.40 x10(6)/mcL    Hgb 6.7 (L) 12.0 - 16.0 g/dL    Hct 19.1 (LL) 37.0 - 47.0 %    MCV 85.3 80.0 - 94.0 fL    MCH 29.9 27.0 - 31.0 pg    MCHC 35.1 33.0 - 36.0 g/dL    RDW 21.2 (H) 11.5 - 17.0 %    Platelet 359 130 - 400 x10(3)/mcL    MPV 11.3 (H) 7.4 - 10.4 fL    Neut % 86.7 %    Lymph % 3.5 %    Mono % 6.2 %    Eos % 0.1 %    Basophil % 0.1 %    Lymph # 0.56 (L) 0.6 - 4.6 x10(3)/mcL    Neut # 13.91 (H) 2.1 - 9.2 x10(3)/mcL    Mono # 1.00 0.1 - 1.3 x10(3)/mcL    Eos # 0.02 0 - 0.9 x10(3)/mcL    Baso # 0.01 <=0.2 x10(3)/mcL    IG# 0.55 (H) 0 - 0.04 x10(3)/mcL    IG% 3.4 %    NRBC% 0.1 %   POCT glucose    Collection Time: 12/01/23  5:21 AM   Result Value Ref Range    POCT Glucose 164 (H) 70 - 110 mg/dL   POCT glucose    Collection Time: 12/01/23  7:18 AM   Result Value Ref Range    POCT Glucose 138 (H) 70 - 110 mg/dL   CBC with Differential    Collection Time: 12/01/23 10:02 AM   Result Value Ref Range    WBC 22.18 (H) 4.50 - 11.50 x10(3)/mcL    RBC 4.19 (L) 4.20 - 5.40 x10(6)/mcL    Hgb 12.6 12.0 - 16.0 g/dL    Hct 36.5 (L) 37.0 - 47.0 %    MCV 87.1 80.0 - 94.0 fL    MCH 30.1 27.0 - 31.0 pg    MCHC 34.5 33.0 - 36.0 g/dL    RDW 18.6 (H) 11.5 - 17.0 %    Platelet 355 130 - 400 x10(3)/mcL    MPV 10.7 (H) 7.4 - 10.4 fL    Neut % 86.1 %    Lymph % 2.8 %    Mono % 5.6 %    Eos % 0.2 %    Basophil % 0.6 %    Lymph # 0.63 0.6 - 4.6 x10(3)/mcL    Neut # 19.09 (H) 2.1 - 9.2 x10(3)/mcL    Mono # 1.25 0.1 - 1.3 x10(3)/mcL    Eos # 0.04 0 - 0.9 x10(3)/mcL    Baso # 0.13 <=0.2 x10(3)/mcL    IG# 1.04 (H) 0 - 0.04 x10(3)/mcL    IG% 4.7 %    NRBC% 0.1 %       Assessment:  -bilateral hydronephrosis s/p bilateral double-J stents 11/29/2023  -acute renal failure  -complicated incomplete grace 11/10/2023 with post-op fluid collection/susp bile leak s/p biliary drain with IR; pathology from gastric biopsy with malignant gastric tumor  -a-fib  RVR    Plan:  -recommend cystogram for evaluation of stent reflux.  If no reflux on cystogram, can consider PCN with IR.  Discussed with Nephrology NP  -Please call as needed over the weekend with any issues.       Sara Waldrop NP

## 2023-12-01 NOTE — NURSING
Artificial Intelligence Notification  [facility]  [address]  [place] [address]  Phone: [phone]     This documentation was triggered by an Artificial Intelligence Notification.     Admit Date: 2023   LOS: 14  Code Status: Full Code  : 1965  Age: 58 y.o.  Weight:   Wt Readings from Last 1 Encounters:   23 82.1 kg (181 lb)        Sex: female  Bed: [address] [place] [address] A  MRN: [id number]  Attending Physician: Dalton Palacios MD     Date of Alert: 2023  Time AI Alert Received: 1150             Vitals:    23 1109   BP: 109/75   Pulse: 105   Resp:    Temp: 98.3 °F (36.8 °C)       Artificial Intelligence alert discussed with Provider:     Name: Mattie   Date/Time of Provider Notification: 1220      Patient Condition: Pt admitted for ERCP. Performed on 23. Malignant gastric tumor in the cardia with inflamed mucosa in gastric body noted. Surgical oncology consulted. IR placed external drain . Currently being seen by oncology to manage advaced gastric adenocarcinoma. Had a ureteral stent placed on  for obstructive uropathy causing bilateral hydronephrosis. Pt currently having a j tube and mediport placed. Also receiving HD via new temporary HD cath.pts vital signs currently stable. AI alert was during surgery.Pt is now currently in HD. Spoke with nurse vitals were stable after j tube placement. No need for ICU at this time.

## 2023-12-01 NOTE — NURSING
12/01/23 1558   Post-Hemodialysis Assessment   Rinseback Volume (mL) 500 mL   Blood Volume Processed (Liters) 54.2 L   Dialyzer Clearance Lightly streaked   Duration of Treatment 180 minutes   Total UF (mL) 2500 mL   Net Fluid Removal 2000   Patient Response to Treatment Tolerated well   Post-Hemodialysis Comments Tx completed, pt reinfused. CVC deaccessed per P&P, lines flushed and locked with NS and new Clear Guard caps applied. Pt ran 3 hours, NET removed= 2000ml

## 2023-12-02 LAB
ALBUMIN SERPL-MCNC: 1.7 G/DL (ref 3.4–4.8)
ALBUMIN/GLOB SERPL: 0.5 RATIO (ref 1.1–2)
ALP SERPL-CCNC: 122 UNIT/L (ref 40–150)
ALT SERPL-CCNC: 23 UNIT/L (ref 0–55)
AST SERPL-CCNC: 27 UNIT/L (ref 5–34)
BASOPHILS # BLD AUTO: 0.06 X10(3)/MCL
BASOPHILS NFR BLD AUTO: 0.3 %
BILIRUB SERPL-MCNC: 4.7 MG/DL
BUN SERPL-MCNC: 28.1 MG/DL (ref 9.8–20.1)
CALCIUM SERPL-MCNC: 8.5 MG/DL (ref 8.4–10.2)
CHLORIDE SERPL-SCNC: 99 MMOL/L (ref 98–107)
CO2 SERPL-SCNC: 23 MMOL/L (ref 23–31)
CREAT SERPL-MCNC: 3.31 MG/DL (ref 0.55–1.02)
EOSINOPHIL # BLD AUTO: 0.06 X10(3)/MCL (ref 0–0.9)
EOSINOPHIL NFR BLD AUTO: 0.3 %
ERYTHROCYTE [DISTWIDTH] IN BLOOD BY AUTOMATED COUNT: 18.6 % (ref 11.5–17)
GFR SERPLBLD CREATININE-BSD FMLA CKD-EPI: 15 MLS/MIN/1.73/M2
GLOBULIN SER-MCNC: 3.5 GM/DL (ref 2.4–3.5)
GLUCOSE SERPL-MCNC: 155 MG/DL (ref 82–115)
HCT VFR BLD AUTO: 31.4 % (ref 37–47)
HGB BLD-MCNC: 11 G/DL (ref 12–16)
IMM GRANULOCYTES # BLD AUTO: 0.81 X10(3)/MCL (ref 0–0.04)
IMM GRANULOCYTES NFR BLD AUTO: 4.2 %
LYMPHOCYTES # BLD AUTO: 0.66 X10(3)/MCL (ref 0.6–4.6)
LYMPHOCYTES NFR BLD AUTO: 3.5 %
MAGNESIUM SERPL-MCNC: 1.9 MG/DL (ref 1.6–2.6)
MCH RBC QN AUTO: 29.7 PG (ref 27–31)
MCHC RBC AUTO-ENTMCNC: 35 G/DL (ref 33–36)
MCV RBC AUTO: 84.9 FL (ref 80–94)
MONOCYTES # BLD AUTO: 1.09 X10(3)/MCL (ref 0.1–1.3)
MONOCYTES NFR BLD AUTO: 5.7 %
NEUTROPHILS # BLD AUTO: 16.4 X10(3)/MCL (ref 2.1–9.2)
NEUTROPHILS NFR BLD AUTO: 86 %
NRBC BLD AUTO-RTO: 0.1 %
PHOSPHATE SERPL-MCNC: 3.9 MG/DL (ref 2.3–4.7)
PLATELET # BLD AUTO: 422 X10(3)/MCL (ref 130–400)
PMV BLD AUTO: 11 FL (ref 7.4–10.4)
POTASSIUM SERPL-SCNC: 4.1 MMOL/L (ref 3.5–5.1)
PROT SERPL-MCNC: 5.2 GM/DL (ref 5.8–7.6)
RBC # BLD AUTO: 3.7 X10(6)/MCL (ref 4.2–5.4)
SODIUM SERPL-SCNC: 133 MMOL/L (ref 136–145)
WBC # SPEC AUTO: 19.08 X10(3)/MCL (ref 4.5–11.5)

## 2023-12-02 PROCEDURE — 87040 BLOOD CULTURE FOR BACTERIA: CPT | Performed by: GENERAL PRACTICE

## 2023-12-02 PROCEDURE — 80053 COMPREHEN METABOLIC PANEL: CPT | Performed by: INTERNAL MEDICINE

## 2023-12-02 PROCEDURE — 85025 COMPLETE CBC W/AUTO DIFF WBC: CPT | Performed by: NURSE PRACTITIONER

## 2023-12-02 PROCEDURE — 25000003 PHARM REV CODE 250: Performed by: INTERNAL MEDICINE

## 2023-12-02 PROCEDURE — 84100 ASSAY OF PHOSPHORUS: CPT | Performed by: INTERNAL MEDICINE

## 2023-12-02 PROCEDURE — 25000003 PHARM REV CODE 250: Performed by: NURSE PRACTITIONER

## 2023-12-02 PROCEDURE — A4216 STERILE WATER/SALINE, 10 ML: HCPCS | Performed by: INTERNAL MEDICINE

## 2023-12-02 PROCEDURE — 21400001 HC TELEMETRY ROOM

## 2023-12-02 PROCEDURE — 97116 GAIT TRAINING THERAPY: CPT | Mod: CQ

## 2023-12-02 PROCEDURE — 25000003 PHARM REV CODE 250: Performed by: GENERAL PRACTICE

## 2023-12-02 PROCEDURE — C9113 INJ PANTOPRAZOLE SODIUM, VIA: HCPCS | Performed by: INTERNAL MEDICINE

## 2023-12-02 PROCEDURE — 97110 THERAPEUTIC EXERCISES: CPT | Mod: CQ

## 2023-12-02 PROCEDURE — 63600175 PHARM REV CODE 636 W HCPCS: Performed by: INTERNAL MEDICINE

## 2023-12-02 PROCEDURE — 63600175 PHARM REV CODE 636 W HCPCS: Performed by: GENERAL PRACTICE

## 2023-12-02 PROCEDURE — 83735 ASSAY OF MAGNESIUM: CPT | Performed by: INTERNAL MEDICINE

## 2023-12-02 RX ORDER — MIDODRINE HYDROCHLORIDE 5 MG/1
10 TABLET ORAL
Status: DISCONTINUED | OUTPATIENT
Start: 2023-12-02 | End: 2023-12-02

## 2023-12-02 RX ORDER — MIDODRINE HYDROCHLORIDE 5 MG/1
10 TABLET ORAL
Status: DISCONTINUED | OUTPATIENT
Start: 2023-12-02 | End: 2023-12-21

## 2023-12-02 RX ORDER — METOPROLOL TARTRATE 1 MG/ML
5 INJECTION, SOLUTION INTRAVENOUS EVERY 4 HOURS PRN
Status: DISCONTINUED | OUTPATIENT
Start: 2023-12-02 | End: 2023-12-25 | Stop reason: HOSPADM

## 2023-12-02 RX ADMIN — PANTOPRAZOLE SODIUM 40 MG: 40 INJECTION, POWDER, FOR SOLUTION INTRAVENOUS at 04:12

## 2023-12-02 RX ADMIN — PIPERACILLIN AND TAZOBACTAM 4.5 G: 4; .5 INJECTION, POWDER, FOR SOLUTION INTRAVENOUS at 08:12

## 2023-12-02 RX ADMIN — ASCORBIC ACID, VITAMIN A PALMITATE, CHOLECALCIFEROL, THIAMINE HYDROCHLORIDE, RIBOFLAVIN-5 PHOSPHATE SODIUM, PYRIDOXINE HYDROCHLORIDE, NIACINAMIDE, DEXPANTHENOL, ALPHA-TOCOPHEROL ACETATE, VITAMIN K1, FOLIC ACID, BIOTIN, CYANOCOBALAMIN: 200; 3300; 200; 6; 3.6; 6; 40; 15; 10; 150; 600; 60; 5 INJECTION, SOLUTION INTRAVENOUS at 08:12

## 2023-12-02 RX ADMIN — SODIUM CHLORIDE, PRESERVATIVE FREE 10 ML: 5 INJECTION INTRAVENOUS at 05:12

## 2023-12-02 RX ADMIN — METOPROLOL TARTRATE 5 MG: 1 INJECTION, SOLUTION INTRAVENOUS at 05:12

## 2023-12-02 RX ADMIN — MIDODRINE HYDROCHLORIDE 10 MG: 5 TABLET ORAL at 02:12

## 2023-12-02 RX ADMIN — MUPIROCIN: 20 OINTMENT TOPICAL at 08:12

## 2023-12-02 RX ADMIN — MORPHINE SULFATE 4 MG: 4 INJECTION, SOLUTION INTRAMUSCULAR; INTRAVENOUS at 02:12

## 2023-12-02 RX ADMIN — SODIUM BICARBONATE 650 MG TABLET 1300 MG: at 08:12

## 2023-12-02 RX ADMIN — PANTOPRAZOLE SODIUM 40 MG: 40 INJECTION, POWDER, FOR SOLUTION INTRAVENOUS at 09:12

## 2023-12-02 NOTE — PROGRESS NOTES
[facility]  Hospital Medicine Progress Note        Chief Complaint: Inpatient Follow-up for intractable vomiting     HPI: 58-year-old female with medical history of hypertension who recently underwent laparoscopic cholecystectomy on 11/10/2023, procedure was incomplete and was unable to completely resect the gallbladder.  Since surgery she continued to have right flank/back pain and followed up with her PCP and CT abdomen and pelvis on 11/17/2023 revealed left-sided hydronephrosis with suspected distal obstruction/no clear stone visualized, postoperative changes of cholecystectomy with fluid in the gallbladder fossa may reflect postoperative seroma but biloma can not be entirely excluded, also noted for marked thickening of the stomach wall diffusely may be related to mesenteric edema/reactive.  She [place] [facility] [place] the same day 11/17/2023 and her labs notable for WBC 9.0, hemoglobin 12.4, platelets 442, creatinine 0.86, total bilirubin 8.7 with direct fraction 6.7, alkaline phosphatase 491, , .  Patient's surgeon was consulted and recommended ERCP which was not available at [facility] for which she was transferred to [facility] and referred to hospital medicine service for further evaluation and management.   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  Severe inflammation and oozing of blood noted proximal gastric lumen.  Unable to advance scope into the duodenum.  Surgical oncology consulted, IR consulted for external drain placement which was done November 21.  November 24th she developed new onset atrial fibrillation with RVR and Cardiology consulted.  Started on amiodarone drip  Unfortunately patient had acute blood loss due to hemorrhage from the midline site that was removed.  Patient was unaware of the bleed.  Became very weak, passed out.  Code was called but she came around.  Stat H&H done which was slightly  lower but stable, MRI of the brain was negative for any acute ischemic changes. Pt is aware of gastric cancer diagnosis   GI following--> 11/18- EGD shows normal esophagus, malignant gastric tumor in the cardia.  Inflamed mucosa and ooze of blood throughout the proximal gastric lumen.  Gastric antrum scar would not allow advancement of scope into the duodenal ampulla area therefore biliary cannulation was not possible for bile leak evaluation  Pathology shows marked chronic gastritis with intestinal metaplasia, gastric cardia biopsy shows adenocarcinoma, moderately differentiated intestinal type   bilateral hydronephrosis with left greater than right  MRI brain without contrast-negative for acute finding  PET scan reviewed with patient by Oncology reports of locally advanced gastric adenocarcinoma and plans for systemic therapy outpatient if functional, nutritional and renal function improves.        Patient currently being managed for locally advanced gastric adenocarcinoma.  With plans for laparoscopic J-tube placement and MediPort placement by surgical Oncology on 12/1, oncology on board plans for systemic therapy outpatient if functional, nutritional and renal function improves.     obstructive uropathy with bilateral hydronephrosis status post bilateral ureteral stent placed on 11/27 by Urology- no improvement in renal function, patient opted to have hemodialysis and a right-sided hemodialysis catheter has been placed by General surgery on 11/29, 2 continue hemodialysis per nephrology discretion.     Patient with symptoms of nausea and vomiting can not keep anything down in her stomach complicated by severe malnutrition on IV Clinimix and supportive medications for nausea and vomiting. Palliative care on board           Interval Hx:   Patient got a MediPort and J-tube placed on 12/01  Hemoglobin levels have trended upwards to 11 after receiving 3 units on 12/01   Will begin tpn today- she got 2L of fluid taken  out at HD yesterday  Id evaluated patient recommends to discontinue antibiotics at this time leukocytosis could be multifactorial   Blood pressure dropped requiring 1 L of normal saline bolus  Kidney function remains the same still has not recovered   Bilirubin total at 4.7   Cystogram reviewed by Urology with patent stents and recommends outpatient follow-up with Urology  Appreciate Nephrology, Oncology, Urology, ID inputs       Case was discussed with patient's nurse and  on the floor.     Objective/physical exam:  General: In no acute distress, afebrile, ill looking   Chest: Clear to auscultation bilaterally anteriorly  Heart:  Tachycardic rate and rhythm, no appreciable murmur  Abdomen:  extremely tender in epigastrium and RUQ, Slightly distended, BS +  MSK: Warm, trace pitting edema bilateral lower extremities  Neurologic: Alert and oriented x4, Cranial nerve II-XII intact, able to move all 4 extremities        Assessment/Plan:  Acute hypotension   Acute on chronic anemia   Locally advanced gastric adenocarcinoma.    Obstructive uropathy with bilateral hydronephrosis status post bilateral ureteral stent placed on 11/27  POORNIMA-now on HD - 11/29  Metabolic acidosis  Severe malnutrition   Persistent leukocytosis  Possible Gastric/duodenal outlet obstruction likely d/t gastric cancer   Suspected bile leak with biloma and biliary obstruction--> H/O Laparoscopic incomplete cholecystectomy 11/10/2023  New onset atrial fibrillation with rVR   Acute Blood loss anemia with syncope and then fall 11/26  Hypokalemia- resolved with replacement      History of hypertension and DDD/sciatica     Plan  Patient got a MediPort and J-tube placed on 12/01  Hemoglobin levels have trended upwards to 11 after receiving 3 units on 12/01   Id evaluated patient recommends to discontinue antibiotics at this time leukocytosis could be multifactorial   Blood pressure dropped requiring 1 L of normal saline bolus  Cystogram reviewed  by Urology with patent stents and recommends outpatient follow-up with Urology  Appreciate Nephrology, Oncology, Urology, ID inputs  Appreciate oncology input plans for systemic therapy outpatient,   Nephrology on board, HD per their discretion   Will begin tpn today- she got 2L of fluid taken out at HD yesterday  Patient currently not tolerating p.o. intake will be very challenging to discharge home with no nutrition  Appreciate CIS input to discontinue Lovenox and continue with rate control given anemia   Trend hemoglobin levels and keep hemoglobin greater than 7- 8 grams/dL    palliative Care on board- patient wants everything done   IM Bentyl p.r.n. for abdominal pain and spasm      VTE prophylaxis:  scds given anemia      Patient condition:  Guarded     Anticipated discharge and Disposition:   TBD        All diagnosis and differential diagnosis have been reviewed; assessment and plan has been documented; I have personally reviewed the labs and test results that are presently available; I have reviewed the patients medication list; I have reviewed the consulting providers response and recommendations. I have reviewed or attempted to review medical records based upon their availability     All of the patient's questions have been  addressed and answered. Patient's is agreeable to the above stated plan. I will continue to monitor closely and make adjustments to medical management as needed.        Critical Care diagnoses hypotension requiring fluid bolus  Critical care time greater than 35 minutes     VITAL SIGNS: 24 HRS MIN & MAX LAST   Temp  Min: 97.4 °F (36.3 °C)  Max: 98.4 °F (36.9 °C) 97.4 °F (36.3 °C)   BP  Min: 70/47  Max: 107/70 93/62   Pulse  Min: 110  Max: 117  (!) 111   Resp  Min: 18  Max: 18 18   SpO2  Min: 94 %  Max: 99 % 99 %     I have reviewed the following labs:  Recent Labs   Lab 12/01/23  0407 12/01/23  1002 12/02/23  0443   WBC 16.05* 22.18* 19.08*   RBC 2.24* 4.19* 3.70*   HGB 6.7* 12.6 11.0*    HCT 19.1* 36.5* 31.4*   MCV 85.3 87.1 84.9   MCH 29.9 30.1 29.7   MCHC 35.1 34.5 35.0   RDW 21.2* 18.6* 18.6*    355 422*   MPV 11.3* 10.7* 11.0*     Recent Labs   Lab 11/30/23  0505 12/01/23  0407 12/02/23  0443   * 132* 133*   K 4.4 4.5 4.1   CO2 21* 25 23   BUN 46.9* 35.0* 28.1*   CREATININE 4.47* 3.58* 3.31*   CALCIUM 8.1* 8.3* 8.5   MG 2.00 2.00 1.90   ALBUMIN 1.5* 1.8* 1.7*   ALKPHOS 112 102 122   ALT 27 23 23   AST 24 25 27   BILITOT 3.9* 4.0* 4.7*     Microbiology Results (last 7 days)       ** No results found for the last 168 hours. **             See below for Radiology    Scheduled Med:   calcium chloride  1 g Intravenous Once    metoprolol  5 mg Intravenous Q6H    mupirocin   Nasal BID    pantoprazole  40 mg Intravenous BID WM    sodium bicarbonate  1,300 mg Oral BID    sodium chloride 0.9%  10 mL Intravenous Q6H      Continuous Infusions:   amino acid 5% in 15% dextrose (CLINIMIX-E) solution (1L provides 50gm AA, 150gm CHO (510 kcal/L dextrose), Na 35, K 30, Mg 5, Ca 4.5, Acetate 80, Cl 39, Phos 15) (710 total kcal/L) 50 mL/hr at 12/01/23 2237    amino acid 5% in 15% dextrose (CLINIMIX-E) solution (1L provides 50gm AA, 150gm CHO (510 kcal/L dextrose), Na 35, K 30, Mg 5, Ca 4.5, Acetate 80, Cl 39, Phos 15) (710 total kcal/L)        PRN Meds:  0.9%  NaCl infusion (for blood administration), sodium chloride 0.9%, acetaminophen, aluminum-magnesium hydroxide-simethicone, bisacodyL, dicyclomine, hydrALAZINE, hyoscyamine, melatonin, metoclopramide HCl, metoprolol, morphine, ondansetron, oxyCODONE, polyethylene glycol, prochlorperazine, promethazine, senna-docusate 8.6-50 mg, sodium chloride 0.9%, Flushing PICC/Midline Protocol **AND** sodium chloride 0.9% **AND** sodium chloride 0.9%     Assessment/Plan:      VTE prophylaxis:     Patient condition:  Stable/Fair/Guarded/ Serious/ Critical    Anticipated discharge and Disposition:         All diagnosis and differential diagnosis have been  reviewed; assessment and plan has been documented; I have personally reviewed the labs and test results that are presently available; I have reviewed the patients medication list; I have reviewed the consulting providers response and recommendations. I have reviewed or attempted to review medical records based upon their availability    All of the patient's questions have been  addressed and answered. Patient's is agreeable to the above stated plan. I will continue to monitor closely and make adjustments to medical management as needed.  _____________________________________________________________________    Nutrition Status:    Radiology:  I have personally reviewed the following imaging and agree with the radiologist.     FL Cystogram Retrograde (xpd)  EXAMINATION  FL Cystogram Retrograde    CLINICAL HISTORY  gravity cystogram to eval for bilateral stent reflux; please use at least 300ml contrast;    TECHNIQUE  Water-soluble contrast administered into the urinary bladder via Jin catheter under live fluoroscopy.    CONTRAST  Cystografin, 300 mL    COMPARISON  29 November 2023    FINDINGS  : Right upper quadrant internal/external biliary drain remains in similar position.  There is also similar appearance of bilateral ureteral stents.  A new curvilinear tube suggestive of gastrostomy projects over the left mid abdomen.  There are no findings to suggest high-grade bowel obstruction or new/worsened intra-abdominal mass effect.  Residual oral contrast media opacifies the colon, similar to the prior study.  Regional osseous structures are unchanged.    CYSTOGRAPHY: The urinary bladder demonstrates no evidence of abnormal filling defects. The wall contour is smooth. There is no ureteral reflux bilaterally. No extraluminal contrast spillage is appreciated. There is gross contrast reflux and retrograde progression to the level of the renal central collecting system at the point of approximately 200 mL contrast  "filling of the urinary bladder.  Additional contrast administration resulted in opacification of the dilated right renal pelvis.  At the point of maximal filling, there was no convincing contrast or grossly visualized dilatation of the left central collecting system.    IMPRESSION  1. Widely patent bilateral ureters/stents, with contrast reflux to the central collecting systems.  2. Contrast filling of moderate dilated right renal pelvis.  No significant contrast visualized within the left renal pelvis.  3. No evidence of acute or focal abnormality of the urinary bladder.    RADIATION DOSE  Lowest-rate pulsed fluoroscopy and "last image capture" technique were utilized in order to minimize radiation exposure.    *Fluoro: 30 seconds  *DAP: 1874.7 uGy x m^2  *Dose: 27.1 mGy  *Number of images: 16    Electronically signed by: Tay Lynch  Date:    12/01/2023  Time:    19:08  X-Ray Chest 1 View  Narrative: EXAMINATION:  XR CHEST 1 VIEW    CLINICAL HISTORY:  Other specified symptoms and signs involving the circulatory and respiratory systems    TECHNIQUE:  Single frontal view of the chest was performed.    COMPARISON:  11/29/2023    FINDINGS:  LINES AND TUBES: Left subclavian pam catheter tip terminates at the SVC.  Right IJ sheath tip projects over the SVC.  Right upper extremity PICC tip projects over the superior cavoatrial junction.  EKG/telemetry leads overlie the chest. .    MEDIASTINUM AND SARA: Cardiac silhouette is at the upper limits of normal, likely exaggerated by portable technique.    LUNGS: Lung volumes are low with associated atelectatic change.    PLEURA:No pleural effusion. No pneumothorax.    OTHER: No acute osseous abnormality.  Impression: Mild retrocardiac atelectasis.    Electronically signed by: Sulema Solorzano  Date:    12/01/2023  Time:    10:43  SURG FL Surgery Fluoro Usage  See OP Notes for results.     IMPRESSION: See OP Notes for results.     This procedure was auto-finalized by: Erendira " Radiologist      Dalton Palacios MD   12/02/2023

## 2023-12-02 NOTE — CONSULTS
[facility]  Infectious Diseases Consult        ASSESSMENT & PLAN:   She was a 58-year-old female with a past medical history of hypertension who underwent an unsuccessful laparoscopic cholecystectomy on 11/10/2023 as gallbladder was unable to be completely resected.  Since procedure, she continued to have right flank and back pain.  She followed up with her PCP, and CT of the abdomen and pelvis on 11/17 revealed left-sided hydronephrosis with suspected distal obstruction.  Additionally noted was markedly thickened stomach wall.  Underwent ERCP on 11/18-subsequently found to have a malignant gastric tumor.  IR placed a biliary drain on 11/21.  On 11/24, she developed atrial fibrillation with RVR and was initiated on amiodarone drip.   She then had bilateral ureteral stents placed on 11/27 given persistent hydronephrosis.  She was developed an POORNIMA since admit secondary to obstructive uropathy, now requiring hemodialysis.  Temporary hemodialysis catheter was placed earlier today along with MediPort.  Patient was significant oozing from sites postoperatively.  She received 3 units of blood intraoperatively as well as around 1 L of IV fluids per report.  Patient reports developing cough after procedure today, nonproductive.  Denies chest pain and is oxygenating well on room air.  Patient has been receiving empiric Zosyn essentially since admit.  Leukocytosis had trended up around 11/22, however has since trended downward although with some worsening this morning.  Blood cultures from 11/18 are negative.  Urine culture from 11/23 is also negative.  She is been afebrile and hemodynamically stable.  Chest x-ray earlier today showed low lung volumes with mild retrocardiac atelectasis.  We have been consulted for input regarding worsening leukocytosis.      Leukocytosis, likely multifactorial  POORNIMA, now requiring HD  Obstructive uropathy, s/p bilateral ureteral stents on 11/27  Persistent hyperbilirubinemia, s/p unsuccessful  cholecystectomy on 11/10 and biliary drain on 11/21  Gastric cancer, newly diagnosed    PLAN:  DC abx and monitor off.   Will req records from unsuccessful cholecystectomy.  Remainder of care per primary / consultants.   Discussed with patient and nursing.         HISTORY OF PRESENT ILLNESS:   She was a 58-year-old female with a past medical history of hypertension who underwent an unsuccessful laparoscopic cholecystectomy on 11/10/2023 as gallbladder was unable to be completely resected.  Since procedure, she continued to have right flank and back pain.  She followed up with her PCP, and CT of the abdomen and pelvis on 11/17 revealed left-sided hydronephrosis with suspected distal obstruction.  Additionally noted was markedly thickened stomach wall.  Underwent ERCP on 11/18-subsequently found to have a malignant gastric tumor.  IR placed a biliary drain on 11/21.  On 11/24, she developed atrial fibrillation with RVR and was initiated on amiodarone drip.   She then had bilateral ureteral stents placed on 11/27 given persistent hydronephrosis.  She was developed an POORNIMA since admit secondary to obstructive uropathy, now requiring hemodialysis.  Temporary hemodialysis catheter was placed earlier today along with MediPort.  Patient was significant oozing from sites postoperatively.  She received 3 units of blood intraoperatively as well as around 1 L of IV fluids per report.  Patient reports developing cough after procedure today, nonproductive.  Denies chest pain and is oxygenating well on room air.  Patient has been receiving empiric Zosyn essentially since admit.  Leukocytosis had trended up around 11/22, however has since trended downward although with some worsening this morning.  Blood cultures from 11/18 are negative.  Urine culture from 11/23 is also negative.  She is been afebrile and hemodynamically stable.  Chest x-ray earlier today showed low lung volumes with mild retrocardiac atelectasis.  We have been  consulted for input regarding worsening leukocytosis.      PAST MEDICAL HISTORY:     Past Medical History:   Diagnosis Date    Hypertension     Sciatica        PAST SURGICAL HISTORY:     Past Surgical History:   Procedure Laterality Date    CARPAL TUNNEL RELEASE Left     CYSTOSCOPY W/ URETERAL STENT PLACEMENT Bilateral 11/27/2023    Procedure: CYSTOSCOPY, WITH URETERAL STENT INSERTION;  Surgeon: Huey Cardenas MD;  Location: [facility];  Service: Urology;  Laterality: Bilateral;    EGD, WITH CLOSED BIOPSY  11/18/2023    Procedure: EGD, WITH CLOSED BIOPSY;  Surgeon: Alfred Goss MD;  Location: [facility] OR;  Service: Gastroenterology;;    ERCP N/A 11/18/2023    Procedure: ERCP (ENDOSCOPIC RETROGRADE CHOLANGIOPANCREATOGRAPHY);  Surgeon: Alfred Goss MD;  Location: [facility];  Service: Gastroenterology;  Laterality: N/A;    HEMORRHOID SURGERY         ALLERGIES:   Patient has no known allergies.    FAMILY HISTORY:   Reviewed and non-contributory     SOCIAL HISTORY:     Social History     Tobacco Use    Smoking status: Never    Smokeless tobacco: Never   Substance Use Topics    Alcohol use: No        MEDICATIONS:   Reviewed in EMR    REVIEW OF SYSTEMS:   Except as documented, all other systems reviewed and negative     PHYSICAL EXAM:   T 98.3 °F (36.8 °C)   /75   P 105   RR (!) 25   O2 95 %  GENERAL: NAD; does not appear toxic  SKIN: no rash  HEENT: sclera icteric; PERRL   NECK: supple; no LAD  CHEST: Rhonchi throughout; nonlabored, equal expansion; nonproductive cough  CARDIOVASCULAR: RRR, S1S2; no murmur   ABDOMEN:  active bowel sounds; abdomen soft, nondistended, nontender to palpation  GENITOURINARY: no suprapubic tenderness   EXTREMITIES: no cyanosis or clubbing  NEURO: AAO x4; CN II-XII grossly intact  PSYCH: Mentation and affect appropriate     LABS AND IMAGING:     Recent Labs     12/01/23  0407 12/01/23  1002   WBC 16.05* 22.18*   RBC 2.24* 4.19*   HGB 6.7* 12.6   HCT 19.1* 36.5*   MCV 85.3  "87.1   MCH 29.9 30.1   MCHC 35.1 34.5   RDW 21.2* 18.6*    355     No results for input(s): "LACTIC" in the last 72 hours.  Recent Labs     12/01/23  0407   INR 1.3     No results for input(s): "HGBA1C", "CHOL", "TRIG", "LDL", "VLDL", "HDL" in the last 72 hours.   Recent Labs     11/30/23  0505 12/01/23  0407   * 132*   K 4.4 4.5   CHLORIDE 99 98   CO2 21* 25   BUN 46.9* 35.0*   CREATININE 4.47* 3.58*   GLUCOSE 547* 420*   CALCIUM 8.1* 8.3*   MG 2.00 2.00   PHOS 4.8* 4.3   ALBUMIN 1.5* 1.8*   GLOBULIN 3.1 3.1   ALKPHOS 112 102   ALT 27 23   AST 24 25   BILITOT 3.9* 4.0*     No results for input(s): "BNP", "CPK", "TROPONINI" in the last 72 hours.       X-Ray Chest 1 View  Narrative: EXAMINATION:  XR CHEST 1 VIEW    CLINICAL HISTORY:  Other specified symptoms and signs involving the circulatory and respiratory systems    TECHNIQUE:  Single frontal view of the chest was performed.    COMPARISON:  11/29/2023    FINDINGS:  LINES AND TUBES: Left subclavian pam catheter tip terminates at the SVC.  Right IJ sheath tip projects over the SVC.  Right upper extremity PICC tip projects over the superior cavoatrial junction.  EKG/telemetry leads overlie the chest. .    MEDIASTINUM AND SARA: Cardiac silhouette is at the upper limits of normal, likely exaggerated by portable technique.    LUNGS: Lung volumes are low with associated atelectatic change.    PLEURA:No pleural effusion. No pneumothorax.    OTHER: No acute osseous abnormality.  Impression: Mild retrocardiac atelectasis.    Electronically signed by: Sulema Solorzano  Date:    12/01/2023  Time:    10:43  SURG FL Surgery Fluoro Usage  See OP Notes for results.     IMPRESSION: See OP Notes for results.     This procedure was auto-finalized by: Virtual Radiologist         Cristina Phillips, MARIA A  Infectious Diseases    "

## 2023-12-02 NOTE — PLAN OF CARE
Problem: Adult Inpatient Plan of Care  Goal: Plan of Care Review  Outcome: Ongoing, Progressing  Flowsheets (Taken 12/2/2023 0330)  Plan of Care Reviewed With: patient  Goal: Patient-Specific Goal (Individualized)  Outcome: Ongoing, Progressing  Goal: Absence of Hospital-Acquired Illness or Injury  Outcome: Ongoing, Progressing  Goal: Optimal Comfort and Wellbeing  Outcome: Ongoing, Progressing  Goal: Readiness for Transition of Care  Outcome: Ongoing, Progressing     Problem: Pain Acute  Goal: Acceptable Pain Control and Functional Ability  Outcome: Ongoing, Progressing     Problem: Fall Injury Risk  Goal: Absence of Fall and Fall-Related Injury  Outcome: Ongoing, Progressing     Problem: Infection  Goal: Absence of Infection Signs and Symptoms  Outcome: Ongoing, Progressing     Problem: Coping Ineffective  Goal: Effective Coping  Outcome: Ongoing, Progressing     Problem: Skin Injury Risk Increased  Goal: Skin Health and Integrity  Outcome: Ongoing, Progressing     Problem: Device-Related Complication Risk (Hemodialysis)  Goal: Safe, Effective Therapy Delivery  Outcome: Ongoing, Progressing     Problem: Hemodynamic Instability (Hemodialysis)  Goal: Effective Tissue Perfusion  Outcome: Ongoing, Progressing     Problem: Infection (Hemodialysis)  Goal: Absence of Infection Signs and Symptoms  Outcome: Ongoing, Progressing

## 2023-12-02 NOTE — PROGRESS NOTES
.UROLOGY  PROGRESS  NOTE    Karen Briggs 1965  [id number]  12/2/2023      Intake/Output:  No intake/output data recorded.  I/O last 3 completed shifts:  In: 1340 [P.O.:240; Blood:600; IV Piggyback:500]  Out: 3325 [Urine:325; Drains:500; Other:2500]         Recent Results (from the past 24 hour(s))   Magnesium    Collection Time: 12/02/23  4:43 AM   Result Value Ref Range    Magnesium Level 1.90 1.60 - 2.60 mg/dL   Phosphorus    Collection Time: 12/02/23  4:43 AM   Result Value Ref Range    Phosphorus Level 3.9 2.3 - 4.7 mg/dL   Comprehensive Metabolic Panel    Collection Time: 12/02/23  4:43 AM   Result Value Ref Range    Sodium Level 133 (L) 136 - 145 mmol/L    Potassium Level 4.1 3.5 - 5.1 mmol/L    Chloride 99 98 - 107 mmol/L    Carbon Dioxide 23 23 - 31 mmol/L    Glucose Level 155 (H) 82 - 115 mg/dL    Blood Urea Nitrogen 28.1 (H) 9.8 - 20.1 mg/dL    Creatinine 3.31 (H) 0.55 - 1.02 mg/dL    Calcium Level Total 8.5 8.4 - 10.2 mg/dL    Protein Total 5.2 (L) 5.8 - 7.6 gm/dL    Albumin Level 1.7 (L) 3.4 - 4.8 g/dL    Globulin 3.5 2.4 - 3.5 gm/dL    Albumin/Globulin Ratio 0.5 (L) 1.1 - 2.0 ratio    Bilirubin Total 4.7 (H) <=1.5 mg/dL    Alkaline Phosphatase 122 40 - 150 unit/L    Alanine Aminotransferase 23 0 - 55 unit/L    Aspartate Aminotransferase 27 5 - 34 unit/L    eGFR 15 mls/min/1.73/m2   CBC with Differential    Collection Time: 12/02/23  4:43 AM   Result Value Ref Range    WBC 19.08 (H) 4.50 - 11.50 x10(3)/mcL    RBC 3.70 (L) 4.20 - 5.40 x10(6)/mcL    Hgb 11.0 (L) 12.0 - 16.0 g/dL    Hct 31.4 (L) 37.0 - 47.0 %    MCV 84.9 80.0 - 94.0 fL    MCH 29.7 27.0 - 31.0 pg    MCHC 35.0 33.0 - 36.0 g/dL    RDW 18.6 (H) 11.5 - 17.0 %    Platelet 422 (H) 130 - 400 x10(3)/mcL    MPV 11.0 (H) 7.4 - 10.4 fL    Neut % 86.0 %    Lymph % 3.5 %    Mono % 5.7 %    Eos % 0.3 %    Basophil % 0.3 %    Lymph # 0.66 0.6 - 4.6 x10(3)/mcL    Neut # 16.40 (H) 2.1 - 9.2 x10(3)/mcL    Mono # 1.09 0.1 - 1.3 x10(3)/mcL    Eos # 0.06 0  - 0.9 x10(3)/mcL    Baso # 0.06 <=0.2 x10(3)/mcL    IG# 0.81 (H) 0 - 0.04 x10(3)/mcL    IG% 4.2 %    NRBC% 0.1 %         Imaging:    arrative:     EXAMINATION  FL Cystogram Retrograde    CLINICAL HISTORY  gravity cystogram to eval for bilateral stent reflux; please use at least 300ml contrast;    TECHNIQUE  Water-soluble contrast administered into the urinary bladder via Jin catheter under live fluoroscopy.    CONTRAST  Cystografin, 300 mL    COMPARISON  29 November 2023    FINDINGS  : Right upper quadrant internal/external biliary drain remains in similar position.  There is also similar appearance of bilateral ureteral stents.  A new curvilinear tube suggestive of gastrostomy projects over the left mid abdomen.  There are no findings to suggest high-grade bowel obstruction or new/worsened intra-abdominal mass effect.  Residual oral contrast media opacifies the colon, similar to the prior study.  Regional osseous structures are unchanged.    CYSTOGRAPHY: The urinary bladder demonstrates no evidence of abnormal filling defects. The wall contour is smooth. There is no ureteral reflux bilaterally. No extraluminal contrast spillage is appreciated. There is gross contrast reflux and retrograde progression to the level of the renal central collecting system at the point of approximately 200 mL contrast filling of the urinary bladder.  Additional contrast administration resulted in opacification of the dilated right renal pelvis.  At the point of maximal filling, there was no convincing contrast or grossly visualized dilatation of the left central collecting system.    IMPRESSION  1. Widely patent bilateral ureters/stents, with contrast reflux to the central collecting systems.  2. Contrast filling of moderate dilated right renal pelvis.  No significant contrast visualized within the left renal pelvis.  3. No evidence of acute or focal abnormality of the urinary bladder.    RADIATION DOSE  Lowest-rate pulsed  "fluoroscopy and "last image capture" technique were utilized in order to minimize radiation exposure.       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  Renal indices likely related to renal or prerenal issues.  Will need follow-up with Dr. Cardenas for stent management as an outpatient.    MARIA A Gaona   "

## 2023-12-02 NOTE — PROGRESS NOTES
NEPHROLOGY PROGRESS NOTE      Patient Demographics:  Name:  Karen Briggs  Age: 58 y.o.  MRN:  [id number]  Admission Date: 11/17/2023      Subjective:      Doing ok  HD #3 done yesterday    Output is very poor    Current Facility-Administered Medications   Medication Dose Route Frequency Provider Last Rate Last Admin    0.9%  NaCl infusion (for blood administration)   Intravenous Q24H PRN Almaz Cheney, FNP        0.9%  NaCl infusion   Intravenous PRN Hussein Merino MD   Stopped at 11/19/23 1049    acetaminophen tablet 650 mg  650 mg Oral Q4H PRN Hussein Merino MD   650 mg at 11/28/23 2128    aluminum-magnesium hydroxide-simethicone 200-200-20 mg/5 mL suspension 30 mL  30 mL Oral QID PRN Hussein Merino MD        amino acid 5% in 15% dextrose (CLINIMIX-E) solution (1L provides 50gm AA, 150gm CHO (510 kcal/L dextrose), Na 35, K 30, Mg 5, Ca 4.5, Acetate 80, Cl 39, Phos 15) (710 total kcal/L)   Intravenous Continuous Dalton Palacios MD 50 mL/hr at 12/01/23 2237 New Bag at 12/01/23 2237    amino acid 5% in 15% dextrose (CLINIMIX-E) solution (1L provides 50gm AA, 150gm CHO (510 kcal/L dextrose), Na 35, K 30, Mg 5, Ca 4.5, Acetate 80, Cl 39, Phos 15) (710 total kcal/L)   Intravenous Continuous Dalton Palacios MD        bisacodyL suppository 10 mg  10 mg Rectal Daily PRN Hussein Merino MD        calcium chloride 100 mg/mL (10 %) injection 1 g  1 g Intravenous Once Dalton Palacios MD        dicyclomine injection 20 mg  20 mg Intramuscular QID PRN Dalton Palacios MD   20 mg at 11/30/23 1754    hydrALAZINE injection 10 mg  10 mg Intravenous Q6H PRN Kenney Steiner MD        hyoscyamine ODT 0.125 mg  0.125 mg Sublingual Q4H PRN Sara Waldrop AGACNP-BC   0.125 mg at 11/30/23 1735    melatonin tablet 6 mg  6 mg Oral Nightly PRN Hussein Merino MD   6 mg at 11/28/23 2128    metoclopramide HCl injection 5 mg  5 mg Intravenous Q6H PRN Dalton Palacios MD   5 mg at 12/01/23 1802    metoprolol injection 5 mg  5 mg Intravenous Q2H  "PRN Tyler Lira, ANP   5 mg at 12/01/23 0945    metoprolol injection 5 mg  5 mg Intravenous Q6H Sarah Char BALDEMAR FNP   5 mg at 12/02/23 0510    morphine injection 4 mg  4 mg Intravenous Q4H PRN Hussein Merino MD   4 mg at 12/02/23 0217    mupirocin 2 % ointment   Nasal BID Maryanne Moise, AGNP   Given at 12/01/23 2056    ondansetron injection 4 mg  4 mg Intravenous Q4H PRN Dalton Palacios MD        oxyCODONE immediate release tablet 5 mg  5 mg Oral Q6H PRN Hussein Merino MD   5 mg at 11/23/23 1205    pantoprazole injection 40 mg  40 mg Intravenous BID WM Dalton Palacios MD   40 mg at 12/02/23 0918    polyethylene glycol packet 17 g  17 g Oral BID PRN Hussein Merino MD        prochlorperazine injection Soln 5 mg  5 mg Intravenous Q6H PRN Hussein Merino MD   5 mg at 11/22/23 1347    promethazine injection 25 mg  25 mg Intramuscular Q4H PRN Lyssa Car AGACNP-BC   25 mg at 11/23/23 1620    senna-docusate 8.6-50 mg per tablet 2 tablet  2 tablet Oral BID PRN Hussein Merino MD        sodium bicarbonate tablet 1,300 mg  1,300 mg Oral BID Dalton Palacios MD   1,300 mg at 11/28/23 2128    sodium chloride 0.9% flush 10 mL  10 mL Intravenous PRN Hussein Merino MD        sodium chloride 0.9% flush 10 mL  10 mL Intravenous Q6H Kenney Steiner MD   10 mL at 12/02/23 0510    And    sodium chloride 0.9% flush 10 mL  10 mL Intravenous PRN Kenney Steiner MD               Review of Systems   Constitutional:  Positive for malaise/fatigue.   HENT: Negative.     Eyes: Negative.    Respiratory: Negative.     Cardiovascular:  Positive for leg swelling.   Gastrointestinal: Negative.    Genitourinary: Negative.    Musculoskeletal: Negative.    Skin: Negative.    Neurological:  Positive for weakness.         Objective:    BP (!) 70/47   Pulse (!) 117   Temp 97.4 °F (36.3 °C) (Oral)   Resp 18   Ht 5' 5.75" (1.67 m)   Wt 82.1 kg (181 lb)   SpO2 99%   Breastfeeding No   BMI 29.44 kg/m²       Intake/Output Summary (Last 24 hours) " at 12/2/2023 1131  Last data filed at 12/2/2023 0600  Gross per 24 hour   Intake 240 ml   Output 3175 ml   Net -2935 ml             Physical Exam  Vitals reviewed.   Constitutional:       Appearance: Normal appearance.   HENT:      Head: Normocephalic and atraumatic.      Nose: Nose normal.   Eyes:      Extraocular Movements: Extraocular movements intact.   Cardiovascular:      Rate and Rhythm: Normal rate and regular rhythm.      Pulses: Normal pulses.      Heart sounds: Normal heart sounds.   Pulmonary:      Effort: Pulmonary effort is normal.      Breath sounds: Normal breath sounds.   Abdominal:      General: Bowel sounds are normal.      Palpations: Abdomen is soft.   Musculoskeletal:         General: Normal range of motion.      Cervical back: Normal range of motion.      Right lower leg: Edema present.      Left lower leg: Edema present.      Comments: Some deconditioning   Skin:     General: Skin is warm and dry.   Neurological:      General: No focal deficit present.      Mental Status: She is alert and oriented to person, place, and time. Mental status is at baseline.   Psychiatric:         Mood and Affect: Mood normal.            Inpatient Diagnostics:  Recent Results (from the past 24 hour(s))   Magnesium    Collection Time: 12/02/23  4:43 AM   Result Value Ref Range    Magnesium Level 1.90 1.60 - 2.60 mg/dL   Phosphorus    Collection Time: 12/02/23  4:43 AM   Result Value Ref Range    Phosphorus Level 3.9 2.3 - 4.7 mg/dL   Comprehensive Metabolic Panel    Collection Time: 12/02/23  4:43 AM   Result Value Ref Range    Sodium Level 133 (L) 136 - 145 mmol/L    Potassium Level 4.1 3.5 - 5.1 mmol/L    Chloride 99 98 - 107 mmol/L    Carbon Dioxide 23 23 - 31 mmol/L    Glucose Level 155 (H) 82 - 115 mg/dL    Blood Urea Nitrogen 28.1 (H) 9.8 - 20.1 mg/dL    Creatinine 3.31 (H) 0.55 - 1.02 mg/dL    Calcium Level Total 8.5 8.4 - 10.2 mg/dL    Protein Total 5.2 (L) 5.8 - 7.6 gm/dL    Albumin Level 1.7 (L) 3.4 -  4.8 g/dL    Globulin 3.5 2.4 - 3.5 gm/dL    Albumin/Globulin Ratio 0.5 (L) 1.1 - 2.0 ratio    Bilirubin Total 4.7 (H) <=1.5 mg/dL    Alkaline Phosphatase 122 40 - 150 unit/L    Alanine Aminotransferase 23 0 - 55 unit/L    Aspartate Aminotransferase 27 5 - 34 unit/L    eGFR 15 mls/min/1.73/m2   CBC with Differential    Collection Time: 12/02/23  4:43 AM   Result Value Ref Range    WBC 19.08 (H) 4.50 - 11.50 x10(3)/mcL    RBC 3.70 (L) 4.20 - 5.40 x10(6)/mcL    Hgb 11.0 (L) 12.0 - 16.0 g/dL    Hct 31.4 (L) 37.0 - 47.0 %    MCV 84.9 80.0 - 94.0 fL    MCH 29.7 27.0 - 31.0 pg    MCHC 35.0 33.0 - 36.0 g/dL    RDW 18.6 (H) 11.5 - 17.0 %    Platelet 422 (H) 130 - 400 x10(3)/mcL    MPV 11.0 (H) 7.4 - 10.4 fL    Neut % 86.0 %    Lymph % 3.5 %    Mono % 5.7 %    Eos % 0.3 %    Basophil % 0.3 %    Lymph # 0.66 0.6 - 4.6 x10(3)/mcL    Neut # 16.40 (H) 2.1 - 9.2 x10(3)/mcL    Mono # 1.09 0.1 - 1.3 x10(3)/mcL    Eos # 0.06 0 - 0.9 x10(3)/mcL    Baso # 0.06 <=0.2 x10(3)/mcL    IG# 0.81 (H) 0 - 0.04 x10(3)/mcL    IG% 4.2 %    NRBC% 0.1 %       A/P--NE 12/02    1---[facility] req HD---HD #3 done 12/1--Hold HD today--Output is very poor  2---11/29 s/p Bilat JJ Ureteral Stent Placement/ Obstructive Uropathy--per Urology  3---Malignant Gastric Tumor---per Oncology  4---Anemia secondary to Chronic Disease---Follow H&H  5---s/p Cholecystostomy Tube---Stable    IV--TPN at 50cc/hr        NS Bolus in progress    ORDERS:  CBC/CMP in am          Erika Vazquez DNP, ANP-C    12/2/2023

## 2023-12-02 NOTE — PT/OT/SLP PROGRESS
Physical Therapy Treatment    Patient Name:  Karen Briggs   MRN:  [id number]    Recommendations:     Discharge therapy intensity: Low Intensity Therapy   Discharge Equipment Recommendations: walker, rolling  Barriers to discharge: Ongoing medical needs    Assessment:     Karen Briggs is a 58 y.o. female admitted with a medical diagnosis of <principal problem not specified>.  She presents with the following impairments/functional limitations: weakness, impaired endurance, impaired functional mobility, gait instability .    Rehab Prognosis: Good; patient would benefit from acute skilled PT services to address these deficits and reach maximum level of function.    Recent Surgery: Procedure(s) (LRB):  INSERTION, JEJUNOSTOMY TUBE, LAPAROSCOPIC (N/A)  INSERTION, PORT-A-CATH (N/A) 1 Day Post-Op    Plan:     During this hospitalization, patient to be seen 5 x/week to address the identified rehab impairments via gait training, therapeutic activities, therapeutic exercises, neuromuscular re-education and progress toward the following goals:    Plan of Care Expires:  12/04/23    Subjective     Chief Complaint: no major complaints  Patient/Family Comments/goals:   Pain/Comfort:  Pain Rating 1: 0/10      Objective:     Communicated with RN prior to session.  Patient found HOB elevated with peripheral IV upon PT entry to room.     General Precautions: Standard, fall  Orthopedic Precautions: N/A  Braces: N/A  Respiratory Status: Room air  Blood Pressure:   Skin Integrity: Visible skin intact      Functional Mobility:  Bed Mobility:     Supine to Sit: contact guard assistance  Transfers:     Sit to Stand:  contact guard assistance and minimum assistance with rolling walker  Gait: Pt amb 300ft with RW, CGA. Demo'd slow pace with shortened step length.      Therapeutic Activities/Exercises:  Performed BLE AROM while sitting up in chair, x10 reps.     Education:  Patient provided with verbal education education regarding PT  role/goals/POC, fall prevention, and safety awareness.  Understanding was verbalized.     Patient left up in chair with all lines intact, call button in reach, and RN notified..    GOALS:   Multidisciplinary Problems       Physical Therapy Goals          Problem: Physical Therapy    Goal Priority Disciplines Outcome Goal Variances Interventions   Physical Therapy Goal     PT, PT/OT Ongoing, Progressing     Description: Pt will improve functional independence by performing:    Bed mobility: SBA  Sit to stand: SBA with rolling walker  Bed to chair t/f: Min A with Stand Step  with rolling walker  Ambulation x 15 ft with Min A  with rolling walker                       Time Tracking:     PT Received On:    PT Start Time: 0934     PT Stop Time: 1002  PT Total Time (min): 28 min     Billable Minutes: Gait Training 14 and Therapeutic Exercise 14    Treatment Type: Treatment  PT/PTA: PTA     Number of PTA visits since last PT visit: 2     12/02/2023

## 2023-12-02 NOTE — NURSING
Artificial Intelligence Notification  [facility]  [address]  [place] [address]  Phone: [phone]     This documentation was triggered by an Artificial Intelligence Notification.     Admit Date: 2023   LOS: 15  Code Status: Full Code  : 1965  Age: 58 y.o.  Weight:   Wt Readings from Last 1 Encounters:   23 82.1 kg (181 lb)        Sex: female  Bed: [address] [place] [address]  MRN: [id number]  Attending Physician: Dalton Palacios MD     Date of Alert: 2023  Time AI Alert Received: 1150             Vitals:    23 1146   BP: 93/62   Pulse: (!) 111   Resp:    Temp:        Artificial Intelligence alert discussed with Provider:     Name: LOS Au   Date/Time of Provider Notification: 2023 1206      Patient Condition: Primary Nurse indicated that patient is now hypotensive and dizzy with a BP 93-60 and will be receiving a 1L IVF bolus. BP will be reassessed after IVF bolus.     Rechecked /65. Patient symptoms improving. No need for ICU at this time.

## 2023-12-02 NOTE — PROGRESS NOTES
Patient without complaint this morning    VS ok, heart rate 70s to 120s  A+ox3, nad  Left chest wall port in place without complication  Abd soft, mild distension, J tube in place without complication    Labs -   WBC 19.08, HGB 11, Plt 422  Na 133, K 4.1  BUN/CR 28/3.3  Tbili 4.7    58F s/p placement mediport and J tube - overall ok.  Plan for:  1) Cont supportive care for now

## 2023-12-02 NOTE — NURSING
Nurse spoke with surgical oncology, no feeding or medications down J tube today 12/2/23. Possible tomorrow, but sx onc will need to clear before use.

## 2023-12-03 LAB
ABO + RH BLD: NORMAL
ABO + RH BLD: NORMAL
ALBUMIN SERPL-MCNC: 1.5 G/DL (ref 3.4–4.8)
ALBUMIN/GLOB SERPL: 0.4 RATIO (ref 1.1–2)
ALP SERPL-CCNC: 109 UNIT/L (ref 40–150)
ALT SERPL-CCNC: 19 UNIT/L (ref 0–55)
AST SERPL-CCNC: 19 UNIT/L (ref 5–34)
BASOPHILS # BLD AUTO: 0.06 X10(3)/MCL
BASOPHILS NFR BLD AUTO: 0.3 %
BILIRUB SERPL-MCNC: 4.6 MG/DL
BLD PROD TYP BPU: NORMAL
BLD PROD TYP BPU: NORMAL
BLOOD UNIT EXPIRATION DATE: NORMAL
BLOOD UNIT EXPIRATION DATE: NORMAL
BLOOD UNIT TYPE CODE: 5100
BLOOD UNIT TYPE CODE: 5100
BUN SERPL-MCNC: 48.7 MG/DL (ref 9.8–20.1)
CALCIUM SERPL-MCNC: 8.4 MG/DL (ref 8.4–10.2)
CHLORIDE SERPL-SCNC: 98 MMOL/L (ref 98–107)
CO2 SERPL-SCNC: 25 MMOL/L (ref 23–31)
CREAT SERPL-MCNC: 4.64 MG/DL (ref 0.55–1.02)
CROSSMATCH INTERPRETATION: NORMAL
CROSSMATCH INTERPRETATION: NORMAL
DISPENSE STATUS: NORMAL
DISPENSE STATUS: NORMAL
EOSINOPHIL # BLD AUTO: 0.22 X10(3)/MCL (ref 0–0.9)
EOSINOPHIL NFR BLD AUTO: 1.1 %
ERYTHROCYTE [DISTWIDTH] IN BLOOD BY AUTOMATED COUNT: 19.1 % (ref 11.5–17)
GFR SERPLBLD CREATININE-BSD FMLA CKD-EPI: 10 MLS/MIN/1.73/M2
GLOBULIN SER-MCNC: 3.6 GM/DL (ref 2.4–3.5)
GLUCOSE SERPL-MCNC: 118 MG/DL (ref 82–115)
HCT VFR BLD AUTO: 29.1 % (ref 37–47)
HGB BLD-MCNC: 10.1 G/DL (ref 12–16)
IMM GRANULOCYTES # BLD AUTO: 0.91 X10(3)/MCL (ref 0–0.04)
IMM GRANULOCYTES NFR BLD AUTO: 4.5 %
LYMPHOCYTES # BLD AUTO: 0.72 X10(3)/MCL (ref 0.6–4.6)
LYMPHOCYTES NFR BLD AUTO: 3.5 %
MAGNESIUM SERPL-MCNC: 2.1 MG/DL (ref 1.6–2.6)
MCH RBC QN AUTO: 30.3 PG (ref 27–31)
MCHC RBC AUTO-ENTMCNC: 34.7 G/DL (ref 33–36)
MCV RBC AUTO: 87.4 FL (ref 80–94)
MONOCYTES # BLD AUTO: 1.1 X10(3)/MCL (ref 0.1–1.3)
MONOCYTES NFR BLD AUTO: 5.4 %
NEUTROPHILS # BLD AUTO: 17.4 X10(3)/MCL (ref 2.1–9.2)
NEUTROPHILS NFR BLD AUTO: 85.2 %
NRBC BLD AUTO-RTO: 0 %
PHOSPHATE SERPL-MCNC: 4.3 MG/DL (ref 2.3–4.7)
PLATELET # BLD AUTO: 411 X10(3)/MCL (ref 130–400)
PMV BLD AUTO: 10.2 FL (ref 7.4–10.4)
POTASSIUM SERPL-SCNC: 4 MMOL/L (ref 3.5–5.1)
PROT SERPL-MCNC: 5.1 GM/DL (ref 5.8–7.6)
RBC # BLD AUTO: 3.33 X10(6)/MCL (ref 4.2–5.4)
SODIUM SERPL-SCNC: 132 MMOL/L (ref 136–145)
UNIT NUMBER: NORMAL
UNIT NUMBER: NORMAL
WBC # SPEC AUTO: 20.41 X10(3)/MCL (ref 4.5–11.5)

## 2023-12-03 PROCEDURE — 83735 ASSAY OF MAGNESIUM: CPT | Performed by: INTERNAL MEDICINE

## 2023-12-03 PROCEDURE — 25000003 PHARM REV CODE 250: Performed by: NURSE PRACTITIONER

## 2023-12-03 PROCEDURE — A4216 STERILE WATER/SALINE, 10 ML: HCPCS | Performed by: INTERNAL MEDICINE

## 2023-12-03 PROCEDURE — C9113 INJ PANTOPRAZOLE SODIUM, VIA: HCPCS | Performed by: INTERNAL MEDICINE

## 2023-12-03 PROCEDURE — 85025 COMPLETE CBC W/AUTO DIFF WBC: CPT | Performed by: NURSE PRACTITIONER

## 2023-12-03 PROCEDURE — 63600175 PHARM REV CODE 636 W HCPCS: Performed by: INTERNAL MEDICINE

## 2023-12-03 PROCEDURE — 25000003 PHARM REV CODE 250: Performed by: INTERNAL MEDICINE

## 2023-12-03 PROCEDURE — 25000003 PHARM REV CODE 250: Performed by: GENERAL PRACTICE

## 2023-12-03 PROCEDURE — 63600175 PHARM REV CODE 636 W HCPCS: Performed by: GENERAL PRACTICE

## 2023-12-03 PROCEDURE — 84100 ASSAY OF PHOSPHORUS: CPT | Performed by: INTERNAL MEDICINE

## 2023-12-03 PROCEDURE — 21400001 HC TELEMETRY ROOM

## 2023-12-03 PROCEDURE — 80053 COMPREHEN METABOLIC PANEL: CPT | Performed by: INTERNAL MEDICINE

## 2023-12-03 RX ORDER — METOPROLOL TARTRATE 25 MG/1
12.5 TABLET ORAL 2 TIMES DAILY
Status: DISCONTINUED | OUTPATIENT
Start: 2023-12-03 | End: 2023-12-16

## 2023-12-03 RX ADMIN — PANTOPRAZOLE SODIUM 40 MG: 40 INJECTION, POWDER, FOR SOLUTION INTRAVENOUS at 08:12

## 2023-12-03 RX ADMIN — MIDODRINE HYDROCHLORIDE 10 MG: 5 TABLET ORAL at 01:12

## 2023-12-03 RX ADMIN — SODIUM CHLORIDE, PRESERVATIVE FREE 10 ML: 5 INJECTION INTRAVENOUS at 05:12

## 2023-12-03 RX ADMIN — ASCORBIC ACID, VITAMIN A PALMITATE, CHOLECALCIFEROL, THIAMINE HYDROCHLORIDE, RIBOFLAVIN-5 PHOSPHATE SODIUM, PYRIDOXINE HYDROCHLORIDE, NIACINAMIDE, DEXPANTHENOL, ALPHA-TOCOPHEROL ACETATE, VITAMIN K1, FOLIC ACID, BIOTIN, CYANOCOBALAMIN: 200; 3300; 200; 6; 3.6; 6; 40; 15; 10; 150; 600; 60; 5 INJECTION, SOLUTION INTRAVENOUS at 09:12

## 2023-12-03 RX ADMIN — MUPIROCIN: 20 OINTMENT TOPICAL at 09:12

## 2023-12-03 RX ADMIN — SODIUM BICARBONATE 650 MG TABLET 1300 MG: at 08:12

## 2023-12-03 RX ADMIN — SODIUM CHLORIDE, PRESERVATIVE FREE 10 ML: 5 INJECTION INTRAVENOUS at 12:12

## 2023-12-03 RX ADMIN — METOPROLOL TARTRATE 12.5 MG: 25 TABLET, FILM COATED ORAL at 08:12

## 2023-12-03 RX ADMIN — METOPROLOL TARTRATE 12.5 MG: 25 TABLET, FILM COATED ORAL at 10:12

## 2023-12-03 RX ADMIN — PIPERACILLIN AND TAZOBACTAM 4.5 G: 4; .5 INJECTION, POWDER, FOR SOLUTION INTRAVENOUS at 08:12

## 2023-12-03 RX ADMIN — PANTOPRAZOLE SODIUM 40 MG: 40 INJECTION, POWDER, FOR SOLUTION INTRAVENOUS at 05:12

## 2023-12-03 RX ADMIN — MIDODRINE HYDROCHLORIDE 10 MG: 5 TABLET ORAL at 05:12

## 2023-12-03 RX ADMIN — MIDODRINE HYDROCHLORIDE 10 MG: 5 TABLET ORAL at 08:12

## 2023-12-03 RX ADMIN — APIXABAN 5 MG: 5 TABLET, FILM COATED ORAL at 08:12

## 2023-12-03 NOTE — PROGRESS NOTES
Feeling better  Passing small amount of flatus  No pain    Vs ok, hr 90s-110s, sbp 90s-100s  A+ox3, nad  Resting, watching Star Wars on television comfortably  Abd soft, less distended compared to yesterday and softer  Inc c/d/I  J tube in place without complication  Port site healthy    Labs -   WBC 20.41, HGB 10.1, Plt 411  Na 132, K 4  BUN?CR 48/4.6  Tbili 4.6    58F s/p J tube insertion and port placement - overall well and better.  Some persistence of elevation WBC should be monitored - consider multiple potential sources if infection is thought to be at play.  She does not appear to have any issue regarding recent surgery.  Plan for:  1) Initiate tube feeds at 10/hr - do not increase (Novasource is recommended per Nutrition for now)  2) Cont clinimex for now  3) Cont supportive care

## 2023-12-03 NOTE — PROGRESS NOTES
NEPHROLOGY PROGRESS NOTE      Patient Demographics:  Name:  Karen Briggs  Age: 58 y.o.  MRN:  [id number]  Admission Date: 11/17/2023      Subjective:      Doing ok    No renal recovery seen    Will need HD in am      Current Facility-Administered Medications   Medication Dose Route Frequency Provider Last Rate Last Admin    0.9%  NaCl infusion (for blood administration)   Intravenous Q24H PRN Almaz Cheney FNP        0.9%  NaCl infusion   Intravenous PRN Hussein Merino MD   Stopped at 11/19/23 1049    acetaminophen tablet 650 mg  650 mg Oral Q4H PRN Hussein Merino MD   650 mg at 11/28/23 2128    aluminum-magnesium hydroxide-simethicone 200-200-20 mg/5 mL suspension 30 mL  30 mL Oral QID PRN Hussein Merino MD        amino acid 5% in 15% dextrose (CLINIMIX-E) solution (1L provides 50gm AA, 150gm CHO (510 kcal/L dextrose), Na 35, K 30, Mg 5, Ca 4.5, Acetate 80, Cl 39, Phos 15) (710 total kcal/L)   Intravenous Continuous Dalton Palacios MD 50 mL/hr at 12/02/23 2039 New Bag at 12/02/23 2039    bisacodyL suppository 10 mg  10 mg Rectal Daily PRN Hussein Merino MD        calcium chloride 100 mg/mL (10 %) injection 1 g  1 g Intravenous Once Dalton Palacios MD        dicyclomine injection 20 mg  20 mg Intramuscular QID PRN Dalton Palacios MD   20 mg at 11/30/23 1754    hydrALAZINE injection 10 mg  10 mg Intravenous Q6H PRN Kenney Steiner MD        hyoscyamine ODT 0.125 mg  0.125 mg Sublingual Q4H PRN Sara Waldrop AGACNP-BC   0.125 mg at 11/30/23 1735    melatonin tablet 6 mg  6 mg Oral Nightly PRN Hussein Merino MD   6 mg at 11/28/23 2128    metoclopramide HCl injection 5 mg  5 mg Intravenous Q6H PRN Dalton Palacios MD   5 mg at 12/01/23 1802    metoprolol injection 5 mg  5 mg Intravenous Q4H PRN Genaro Crawford FNP        metoprolol tartrate (LOPRESSOR) split tablet 12.5 mg  12.5 mg Oral BID Genaro Crawford FNP   12.5 mg at 12/03/23 1023    midodrine tablet 10 mg  10 mg Oral TID  Dalton Palacios MD   10 mg at  "12/03/23 0854    morphine injection 4 mg  4 mg Intravenous Q4H PRN Hussein Merino MD   4 mg at 12/02/23 0217    mupirocin 2 % ointment   Nasal BID Maryanne Moise, AGNP   Given at 12/03/23 0903    ondansetron injection 4 mg  4 mg Intravenous Q4H PRN Dalton Palacios MD        oxyCODONE immediate release tablet 5 mg  5 mg Oral Q6H PRN Hussein Merino MD   5 mg at 11/23/23 1205    pantoprazole injection 40 mg  40 mg Intravenous BID WM Dalton Palacios MD   40 mg at 12/03/23 0854    piperacillin-tazobactam (ZOSYN) 4.5 g in dextrose 5 % in water (D5W) 100 mL IVPB (MB+)  4.5 g Intravenous Q12H Nathan Carlisle MD 25 mL/hr at 12/03/23 0856 4.5 g at 12/03/23 0856    polyethylene glycol packet 17 g  17 g Oral BID PRN Hussein Merino MD        prochlorperazine injection Soln 5 mg  5 mg Intravenous Q6H PRN Hussein Merino MD   5 mg at 11/22/23 1347    promethazine injection 25 mg  25 mg Intramuscular Q4H PRN Lyssa Car AGACNP-BC   25 mg at 11/23/23 1620    senna-docusate 8.6-50 mg per tablet 2 tablet  2 tablet Oral BID PRN Hussein Merino MD        sodium bicarbonate tablet 1,300 mg  1,300 mg Oral BID Dalton Palacios MD   1,300 mg at 12/03/23 0854    sodium chloride 0.9% flush 10 mL  10 mL Intravenous PRN Hussein Merino MD        sodium chloride 0.9% flush 10 mL  10 mL Intravenous Q6H Kenney Steiner MD   10 mL at 12/03/23 0513    And    sodium chloride 0.9% flush 10 mL  10 mL Intravenous PRN Kenney Steiner MD               Review of Systems   Constitutional:  Positive for malaise/fatigue.   HENT: Negative.     Eyes:  Positive for photophobia.   Respiratory: Negative.     Cardiovascular: Negative.    Gastrointestinal:  Positive for abdominal pain.   Genitourinary: Negative.    Musculoskeletal: Negative.    Skin: Negative.    Neurological:  Positive for weakness.         Objective:    BP 95/64   Pulse 99   Temp 98.1 °F (36.7 °C) (Oral)   Resp 18   Ht 5' 5.75" (1.67 m)   Wt 82.1 kg (181 lb)   SpO2 97%   Breastfeeding No   BMI " 29.44 kg/m²       Intake/Output Summary (Last 24 hours) at 12/3/2023 1049  Last data filed at 12/3/2023 0038  Gross per 24 hour   Intake --   Output 265 ml   Net -265 ml             Physical Exam  Vitals reviewed.   Constitutional:       Appearance: Normal appearance.   HENT:      Head: Normocephalic and atraumatic.      Nose: Nose normal.   Eyes:      Extraocular Movements: Extraocular movements intact.      Pupils: Pupils are equal, round, and reactive to light.   Cardiovascular:      Rate and Rhythm: Normal rate and regular rhythm.      Pulses: Normal pulses.      Heart sounds: Normal heart sounds.   Pulmonary:      Effort: Pulmonary effort is normal.      Breath sounds: Normal breath sounds.   Abdominal:      General: Bowel sounds are normal. There is distension.      Palpations: Abdomen is soft.      Comments: Cholecystostomy tube   Musculoskeletal:         General: Normal range of motion.      Cervical back: Normal range of motion.   Skin:     General: Skin is warm and dry.   Neurological:      General: No focal deficit present.      Mental Status: She is alert and oriented to person, place, and time. Mental status is at baseline.   Psychiatric:         Mood and Affect: Mood normal.            Inpatient Diagnostics:  Recent Results (from the past 24 hour(s))   Magnesium    Collection Time: 12/03/23  5:31 AM   Result Value Ref Range    Magnesium Level 2.10 1.60 - 2.60 mg/dL   Phosphorus    Collection Time: 12/03/23  5:31 AM   Result Value Ref Range    Phosphorus Level 4.3 2.3 - 4.7 mg/dL   Comprehensive Metabolic Panel    Collection Time: 12/03/23  5:31 AM   Result Value Ref Range    Sodium Level 132 (L) 136 - 145 mmol/L    Potassium Level 4.0 3.5 - 5.1 mmol/L    Chloride 98 98 - 107 mmol/L    Carbon Dioxide 25 23 - 31 mmol/L    Glucose Level 118 (H) 82 - 115 mg/dL    Blood Urea Nitrogen 48.7 (H) 9.8 - 20.1 mg/dL    Creatinine 4.64 (H) 0.55 - 1.02 mg/dL    Calcium Level Total 8.4 8.4 - 10.2 mg/dL    Protein  Total 5.1 (L) 5.8 - 7.6 gm/dL    Albumin Level 1.5 (L) 3.4 - 4.8 g/dL    Globulin 3.6 (H) 2.4 - 3.5 gm/dL    Albumin/Globulin Ratio 0.4 (L) 1.1 - 2.0 ratio    Bilirubin Total 4.6 (H) <=1.5 mg/dL    Alkaline Phosphatase 109 40 - 150 unit/L    Alanine Aminotransferase 19 0 - 55 unit/L    Aspartate Aminotransferase 19 5 - 34 unit/L    eGFR 10 mls/min/1.73/m2   CBC with Differential    Collection Time: 12/03/23  5:31 AM   Result Value Ref Range    WBC 20.41 (H) 4.50 - 11.50 x10(3)/mcL    RBC 3.33 (L) 4.20 - 5.40 x10(6)/mcL    Hgb 10.1 (L) 12.0 - 16.0 g/dL    Hct 29.1 (L) 37.0 - 47.0 %    MCV 87.4 80.0 - 94.0 fL    MCH 30.3 27.0 - 31.0 pg    MCHC 34.7 33.0 - 36.0 g/dL    RDW 19.1 (H) 11.5 - 17.0 %    Platelet 411 (H) 130 - 400 x10(3)/mcL    MPV 10.2 7.4 - 10.4 fL    Neut % 85.2 %    Lymph % 3.5 %    Mono % 5.4 %    Eos % 1.1 %    Basophil % 0.3 %    Lymph # 0.72 0.6 - 4.6 x10(3)/mcL    Neut # 17.40 (H) 2.1 - 9.2 x10(3)/mcL    Mono # 1.10 0.1 - 1.3 x10(3)/mcL    Eos # 0.22 0 - 0.9 x10(3)/mcL    Baso # 0.06 <=0.2 x10(3)/mcL    IG# 0.91 (H) 0 - 0.04 x10(3)/mcL    IG% 4.5 %    NRBC% 0.0 %     A/P--NE 12/03    1---[facility] req HD---HD #3 done 12/1--Hold HD today--Output is very poor  2---11/29 s/p Bilat JJ Ureteral Stent Placement/ Obstructive Uropathy--per Urology  3---Malignant Gastric Tumor---per Oncology  4---Anemia secondary to Chronic Disease---Follow H&H  5---s/p Cholecystostomy Tube---Stable     IV--TPN at 50cc/hr        NS Bolus in progress     ORDERS:  CBC/CMP in am  HD in         Erika Vazquez DNP, ANP-C    12/3/2023

## 2023-12-03 NOTE — PROGRESS NOTES
[facility] - 8th Floor Med Surg    Cardiology  Progress Note    Patient Name: Karen Briggs  MRN: [id number]  Admission Date: 11/17/2023  Hospital Length of Stay: 16 days  Code Status: Full Code   Attending Provider: Dalton Palacios MD   Consulting Provider: MARIA A Mark  Primary Care Physician: Marian White FNP-C  Principal Problem:<principal problem not specified>    Patient information was obtained from patient, past medical records, ER records, and primary team.     Subjective:   Chief Complaint: Reason for Consult: AFIB    HPI: Ms. Briggs is a 57 y/o female with a history of HTN, who was last known to [facility], Dr. Randolph (2020). She presented to the ER on 11.17.23 with complaints of right flank pain. She went to see her PCP and an CT Abdomen and Pelvis was obtained. CT (11.17.23) revealed left-sided hydronephrosis with suspected distal obstruction/no clear stone visualized, postoperative changes of cholecystectomy with fluid in the gallbladder fossa may reflect postoperative seroma but biloma can not be entirely excluded, also noted for marked thickening of the stomach wall diffusely may be related to mesenteric edema/reactive. She does report having a laparoscopic cholecystectomy on 11.10.23, in which the gall bladder was not completely resect the gallbladder. On 11.24.23, she was found to be tachycardiac and an EKG was obtained that showed AFIB RVR. Significant labs include WBC 19.4, Chloride 108, CO2 17, BUN/Creat 25.6/1.19, , Albumin 2.1, AST 35, ALT 56. CT Chest unremarkable. CT Abd Plevis revealed Interval placement of a transhepatic biliary stent satisfactory position, Diffuse gastric wall thickening consistent with gastritis, Bilateral hydronephrosis severe on the left and moderate on the right, stable, and Generalized mesenteric inflammatory change, mild ascites, mild pleural fluid. ERCP performed November 18: Malignant gastric tumor in the cardia, inflamed mucosa  "in the gastric body.  Severe inflammation and oozing of blood noted proximal gastric lumen.  Unable to advance scope into the duodenum.  [facility] has been consulted for AFIB management.    Hospital Course:  11.26.23: Remains Afib, Rvr. Amiodarone drip in progress. LFTs stable. H/H stable on weight based lovenox BID.   11.27.23: NAD. Converted to NSR. Remains on amio gtt per protocol. Renal indices worsening. H&H down trending. Remains with nausea but denies CP, SOB, or palps. Hypotensive this am.    11.28.23: NAD. Remains NSR on tele. Renal indices and H&H continue to worsen. Denies CP, SOB, or palps. Remains with nausea. "I am feeling better."   12.3.23: NAD Noted. Awake. SR on Tele. BP Low Normal. Cleared for PO Intake.     PMH: HTN, Sciatica   PSH: Carpal Tunnel Release, Hemorrhoidectomy, EGD, ERCP  Family History: Father - MI, CAD, HTN; Mother - DM II, MI, CAD, HTN, Cancer; Daughter DM II, Rheumatoid Arthritis   Social History: Denies Smoking, illicit drug use, and alcohol use.     Previous Cardiac Diagnostics:   TTE 11.26.23:  Left Ventricle: Normal wall motion. There is normal systolic function with a visually estimated ejection fraction of 55%. Unable to assess diastolic function.  Right Ventricle: Normal right ventricular cavity size. Systolic function is normal.  Tricuspid Valve: There is mild regurgitation.  IVC/SVC: Normal venous pressure at 3 mmHg.    Lower Ext Venous US (1.6.21):  The study quality is average. 2.6 seconds of reflux is noted in the right GSV at the SFJ with a diameter of 6.3mm 1.2 seconds of reflux is noted in the left GSV at the SFJ with a diameter of 8.7mm. Limited study to evaluate SFJ.    MANFRED (11.16.20):  The study quality is good. The resting right ankle brachial index is 1.15 consistent with no significant right peripheral vascular disease.The resting left ankle brachial index is 1.1 consistent with no significant left peripheral vascular disease.    Lower Ext Arterial US " (11.16.20):  The study quality is good. Triphasic waveforms and minimal plaque noted throughout the bilateral lower extremity arteries. No evidence of stenosis found. Calcified artery walls noted in some areas of the bilateral tibial arteries.    TTE (3.12.20):  The study quality is average. The left ventricle is normal in size with mild left ventricular hypertrophy and normal global left ventricular systolic function. The left ventricular ejection fraction is 60%. The left ventricle diastolic function is impaired (Grade I) with normal left atrial pressure. Mild calcification of the aortic valve is noted with adequate cuspal excursion. Mild (1+) tricuspid regurgitation.The estimated pulmonary artery systolic pressure is 23.9 mmHg assuming a right atrial pressure of 3 mmHg.     LHC (6.11.20):  Normal Coronaries     MPI (2.18.20):  This is probably a normal perfusion study. There is no evidence of ischemia. This scan is suggestive of low risk for future cardiovascular events. Small reversible perfusion abnormality of mild intensity in the basal inferoseptal segment. The left ventricular cavity is noted to be normal on the stress study. The left ventricular ejection fraction was calculated to be 71% and left ventricular global function is normal. The study quality is excellent.     Calcium Score (2.10.20):  169    Review of Systems   Respiratory:  Negative for chest tightness and shortness of breath.    Cardiovascular:  Negative for chest pain.   All other systems reviewed and are negative.    Objective:     Vital Signs (Most Recent):  Temp: 98.1 °F (36.7 °C) (12/03/23 0754)  Pulse: 99 (12/03/23 0754)  Resp: 18 (12/03/23 0754)  BP: 95/64 (12/03/23 0754)  SpO2: 97 % (12/03/23 0754) Vital Signs (24h Range):  Temp:  [97.4 °F (36.3 °C)-98.6 °F (37 °C)] 98.1 °F (36.7 °C)  Pulse:  [] 99  Resp:  [18] 18  SpO2:  [92 %-99 %] 97 %  BP: ()/(47-68) 95/64     Weight: 82.1 kg (181 lb)  Body mass index is 29.44  kg/m².    SpO2: 97 %         Intake/Output Summary (Last 24 hours) at 12/3/2023 0908  Last data filed at 12/3/2023 0038  Gross per 24 hour   Intake --   Output 265 ml   Net -265 ml         Lines/Drains/Airways       Peripherally Inserted Central Catheter Line  Duration             PICC Triple Lumen 11/26/23 1246 right basilic 6 days              Central Venous Catheter Line  Duration                  Hemodialysis Catheter 11/29/23 1600 right internal jugular 3 days              Drain  Duration                  Biliary Tube 11/21/23 1352 Other (Comment) 8 Fr. RUQ 11 days         Urethral Catheter 11/28/23 0027 Straight-tip 18 Fr. 5 days         Gastrostomy/Enterostomy 12/01/23 0845 LUQ 2 days              Peripheral Intravenous Line  Duration                  Peripheral IV - Single Lumen 11/24/23 1914 20 G Anterior;Distal;Right Forearm 8 days                  Significant Labs:  Recent Results (from the past 72 hour(s))   Prepare RBC 2 Units; Sx    Collection Time: 11/30/23 11:22 AM   Result Value Ref Range    UNIT NUMBER [id number]     UNIT ABO/RH O POS     DISPENSE STATUS Transfused     Unit Expiration [id number]     Product Code P9576O84     Unit Blood Type Code 5100     CROSSMATCH INTERPRETATION Compatible     UNIT NUMBER [id number]     UNIT ABO/RH O POS     DISPENSE STATUS Transfused     Unit Expiration [id number]     Product Code U0513S92     Unit Blood Type Code 5100     CROSSMATCH INTERPRETATION Compatible    Type & Screen    Collection Time: 11/30/23 11:22 AM   Result Value Ref Range    Group & Rh O POS     Indirect Yasir GEL NEG     Specimen Outdate 12/03/2023 23:59    Prepare RBC 2 Units; Hgb < 6    Collection Time: 11/30/23 11:22 AM   Result Value Ref Range    UNIT NUMBER [id number]     UNIT ABO/RH O POS     DISPENSE STATUS Released     Unit Expiration [id number]     Product Code D8088T11     Unit Blood Type Code 5100     CROSSMATCH INTERPRETATION Compatible     UNIT NUMBER [id number]      UNIT ABO/RH O POS     DISPENSE STATUS Transfused     Unit Expiration [id number]     Product Code C4191V15     Unit Blood Type Code 5100     CROSSMATCH INTERPRETATION Compatible    ABORH RETYPE    Collection Time: 11/30/23 12:17 PM   Result Value Ref Range    ABORH Retype O POS    Magnesium    Collection Time: 12/01/23  4:07 AM   Result Value Ref Range    Magnesium Level 2.00 1.60 - 2.60 mg/dL   Phosphorus    Collection Time: 12/01/23  4:07 AM   Result Value Ref Range    Phosphorus Level 4.3 2.3 - 4.7 mg/dL   Comprehensive Metabolic Panel    Collection Time: 12/01/23  4:07 AM   Result Value Ref Range    Sodium Level 132 (L) 136 - 145 mmol/L    Potassium Level 4.5 3.5 - 5.1 mmol/L    Chloride 98 98 - 107 mmol/L    Carbon Dioxide 25 23 - 31 mmol/L    Glucose Level 420 (H) 82 - 115 mg/dL    Blood Urea Nitrogen 35.0 (H) 9.8 - 20.1 mg/dL    Creatinine 3.58 (H) 0.55 - 1.02 mg/dL    Calcium Level Total 8.3 (L) 8.4 - 10.2 mg/dL    Protein Total 4.9 (L) 5.8 - 7.6 gm/dL    Albumin Level 1.8 (L) 3.4 - 4.8 g/dL    Globulin 3.1 2.4 - 3.5 gm/dL    Albumin/Globulin Ratio 0.6 (L) 1.1 - 2.0 ratio    Bilirubin Total 4.0 (H) <=1.5 mg/dL    Alkaline Phosphatase 102 40 - 150 unit/L    Alanine Aminotransferase 23 0 - 55 unit/L    Aspartate Aminotransferase 25 5 - 34 unit/L    eGFR 14 mls/min/1.73/m2   Protime-INR    Collection Time: 12/01/23  4:07 AM   Result Value Ref Range    PT 15.7 (H) 12.5 - 14.5 seconds    INR 1.3 <=1.3   CBC with Differential    Collection Time: 12/01/23  4:07 AM   Result Value Ref Range    WBC 16.05 (H) 4.50 - 11.50 x10(3)/mcL    RBC 2.24 (L) 4.20 - 5.40 x10(6)/mcL    Hgb 6.7 (L) 12.0 - 16.0 g/dL    Hct 19.1 (LL) 37.0 - 47.0 %    MCV 85.3 80.0 - 94.0 fL    MCH 29.9 27.0 - 31.0 pg    MCHC 35.1 33.0 - 36.0 g/dL    RDW 21.2 (H) 11.5 - 17.0 %    Platelet 359 130 - 400 x10(3)/mcL    MPV 11.3 (H) 7.4 - 10.4 fL    Neut % 86.7 %    Lymph % 3.5 %    Mono % 6.2 %    Eos % 0.1 %    Basophil % 0.1 %    Lymph # 0.56 (L)  0.6 - 4.6 x10(3)/mcL    Neut # 13.91 (H) 2.1 - 9.2 x10(3)/mcL    Mono # 1.00 0.1 - 1.3 x10(3)/mcL    Eos # 0.02 0 - 0.9 x10(3)/mcL    Baso # 0.01 <=0.2 x10(3)/mcL    IG# 0.55 (H) 0 - 0.04 x10(3)/mcL    IG% 3.4 %    NRBC% 0.1 %   POCT glucose    Collection Time: 12/01/23  5:21 AM   Result Value Ref Range    POCT Glucose 164 (H) 70 - 110 mg/dL   POCT glucose    Collection Time: 12/01/23  7:18 AM   Result Value Ref Range    POCT Glucose 138 (H) 70 - 110 mg/dL   CBC with Differential    Collection Time: 12/01/23 10:02 AM   Result Value Ref Range    WBC 22.18 (H) 4.50 - 11.50 x10(3)/mcL    RBC 4.19 (L) 4.20 - 5.40 x10(6)/mcL    Hgb 12.6 12.0 - 16.0 g/dL    Hct 36.5 (L) 37.0 - 47.0 %    MCV 87.1 80.0 - 94.0 fL    MCH 30.1 27.0 - 31.0 pg    MCHC 34.5 33.0 - 36.0 g/dL    RDW 18.6 (H) 11.5 - 17.0 %    Platelet 355 130 - 400 x10(3)/mcL    MPV 10.7 (H) 7.4 - 10.4 fL    Neut % 86.1 %    Lymph % 2.8 %    Mono % 5.6 %    Eos % 0.2 %    Basophil % 0.6 %    Lymph # 0.63 0.6 - 4.6 x10(3)/mcL    Neut # 19.09 (H) 2.1 - 9.2 x10(3)/mcL    Mono # 1.25 0.1 - 1.3 x10(3)/mcL    Eos # 0.04 0 - 0.9 x10(3)/mcL    Baso # 0.13 <=0.2 x10(3)/mcL    IG# 1.04 (H) 0 - 0.04 x10(3)/mcL    IG% 4.7 %    NRBC% 0.1 %   Magnesium    Collection Time: 12/02/23  4:43 AM   Result Value Ref Range    Magnesium Level 1.90 1.60 - 2.60 mg/dL   Phosphorus    Collection Time: 12/02/23  4:43 AM   Result Value Ref Range    Phosphorus Level 3.9 2.3 - 4.7 mg/dL   Comprehensive Metabolic Panel    Collection Time: 12/02/23  4:43 AM   Result Value Ref Range    Sodium Level 133 (L) 136 - 145 mmol/L    Potassium Level 4.1 3.5 - 5.1 mmol/L    Chloride 99 98 - 107 mmol/L    Carbon Dioxide 23 23 - 31 mmol/L    Glucose Level 155 (H) 82 - 115 mg/dL    Blood Urea Nitrogen 28.1 (H) 9.8 - 20.1 mg/dL    Creatinine 3.31 (H) 0.55 - 1.02 mg/dL    Calcium Level Total 8.5 8.4 - 10.2 mg/dL    Protein Total 5.2 (L) 5.8 - 7.6 gm/dL    Albumin Level 1.7 (L) 3.4 - 4.8 g/dL    Globulin 3.5 2.4 -  3.5 gm/dL    Albumin/Globulin Ratio 0.5 (L) 1.1 - 2.0 ratio    Bilirubin Total 4.7 (H) <=1.5 mg/dL    Alkaline Phosphatase 122 40 - 150 unit/L    Alanine Aminotransferase 23 0 - 55 unit/L    Aspartate Aminotransferase 27 5 - 34 unit/L    eGFR 15 mls/min/1.73/m2   CBC with Differential    Collection Time: 12/02/23  4:43 AM   Result Value Ref Range    WBC 19.08 (H) 4.50 - 11.50 x10(3)/mcL    RBC 3.70 (L) 4.20 - 5.40 x10(6)/mcL    Hgb 11.0 (L) 12.0 - 16.0 g/dL    Hct 31.4 (L) 37.0 - 47.0 %    MCV 84.9 80.0 - 94.0 fL    MCH 29.7 27.0 - 31.0 pg    MCHC 35.0 33.0 - 36.0 g/dL    RDW 18.6 (H) 11.5 - 17.0 %    Platelet 422 (H) 130 - 400 x10(3)/mcL    MPV 11.0 (H) 7.4 - 10.4 fL    Neut % 86.0 %    Lymph % 3.5 %    Mono % 5.7 %    Eos % 0.3 %    Basophil % 0.3 %    Lymph # 0.66 0.6 - 4.6 x10(3)/mcL    Neut # 16.40 (H) 2.1 - 9.2 x10(3)/mcL    Mono # 1.09 0.1 - 1.3 x10(3)/mcL    Eos # 0.06 0 - 0.9 x10(3)/mcL    Baso # 0.06 <=0.2 x10(3)/mcL    IG# 0.81 (H) 0 - 0.04 x10(3)/mcL    IG% 4.2 %    NRBC% 0.1 %   Magnesium    Collection Time: 12/03/23  5:31 AM   Result Value Ref Range    Magnesium Level 2.10 1.60 - 2.60 mg/dL   Phosphorus    Collection Time: 12/03/23  5:31 AM   Result Value Ref Range    Phosphorus Level 4.3 2.3 - 4.7 mg/dL   Comprehensive Metabolic Panel    Collection Time: 12/03/23  5:31 AM   Result Value Ref Range    Sodium Level 132 (L) 136 - 145 mmol/L    Potassium Level 4.0 3.5 - 5.1 mmol/L    Chloride 98 98 - 107 mmol/L    Carbon Dioxide 25 23 - 31 mmol/L    Glucose Level 118 (H) 82 - 115 mg/dL    Blood Urea Nitrogen 48.7 (H) 9.8 - 20.1 mg/dL    Creatinine 4.64 (H) 0.55 - 1.02 mg/dL    Calcium Level Total 8.4 8.4 - 10.2 mg/dL    Protein Total 5.1 (L) 5.8 - 7.6 gm/dL    Albumin Level 1.5 (L) 3.4 - 4.8 g/dL    Globulin 3.6 (H) 2.4 - 3.5 gm/dL    Albumin/Globulin Ratio 0.4 (L) 1.1 - 2.0 ratio    Bilirubin Total 4.6 (H) <=1.5 mg/dL    Alkaline Phosphatase 109 40 - 150 unit/L    Alanine Aminotransferase 19 0 - 55 unit/L     Aspartate Aminotransferase 19 5 - 34 unit/L    eGFR 10 mls/min/1.73/m2   CBC with Differential    Collection Time: 12/03/23  5:31 AM   Result Value Ref Range    WBC 20.41 (H) 4.50 - 11.50 x10(3)/mcL    RBC 3.33 (L) 4.20 - 5.40 x10(6)/mcL    Hgb 10.1 (L) 12.0 - 16.0 g/dL    Hct 29.1 (L) 37.0 - 47.0 %    MCV 87.4 80.0 - 94.0 fL    MCH 30.3 27.0 - 31.0 pg    MCHC 34.7 33.0 - 36.0 g/dL    RDW 19.1 (H) 11.5 - 17.0 %    Platelet 411 (H) 130 - 400 x10(3)/mcL    MPV 10.2 7.4 - 10.4 fL    Neut % 85.2 %    Lymph % 3.5 %    Mono % 5.4 %    Eos % 1.1 %    Basophil % 0.3 %    Lymph # 0.72 0.6 - 4.6 x10(3)/mcL    Neut # 17.40 (H) 2.1 - 9.2 x10(3)/mcL    Mono # 1.10 0.1 - 1.3 x10(3)/mcL    Eos # 0.22 0 - 0.9 x10(3)/mcL    Baso # 0.06 <=0.2 x10(3)/mcL    IG# 0.91 (H) 0 - 0.04 x10(3)/mcL    IG% 4.5 %    NRBC% 0.0 %     Telemetry: Sinus Rhythm    Physical Exam  Vitals and nursing note reviewed.   Constitutional:       General: She is not in acute distress.     Appearance: Normal appearance.   HENT:      Mouth/Throat:      Pharynx: Oropharynx is clear.   Neck:      Comments: Right IJ Venous Cath  Cardiovascular:      Rate and Rhythm: Normal rate and regular rhythm.   Pulmonary:      Effort: Pulmonary effort is normal. No respiratory distress.   Abdominal:      Palpations: Abdomen is soft.   Skin:     General: Skin is warm.   Neurological:      Mental Status: She is alert. Mental status is at baseline.   Psychiatric:         Behavior: Behavior normal.       Home Medications:   No current facility-administered medications on file prior to encounter.     Current Outpatient Medications on File Prior to Encounter   Medication Sig Dispense Refill    olmesartan (BENICAR) 20 MG tablet Take 20 mg by mouth once daily.       Current Inpatient Medications:    Current Facility-Administered Medications:     0.9%  NaCl infusion (for blood administration), , Intravenous, Q24H PRN, Almaz Cheney, MARIA A    0.9%  NaCl infusion, , Intravenous, PRN,  Hussein Merino MD, Stopped at 11/19/23 1049    acetaminophen tablet 650 mg, 650 mg, Oral, Q4H PRN, Hussein Merino MD, 650 mg at 11/28/23 2128    aluminum-magnesium hydroxide-simethicone 200-200-20 mg/5 mL suspension 30 mL, 30 mL, Oral, QID PRN, Hussein Merino MD    amino acid 5% in 15% dextrose (CLINIMIX-E) solution (1L provides 50gm AA, 150gm CHO (510 kcal/L dextrose), Na 35, K 30, Mg 5, Ca 4.5, Acetate 80, Cl 39, Phos 15) (710 total kcal/L), , Intravenous, Continuous, Dalton Palacios MD, Last Rate: 50 mL/hr at 12/02/23 2039, New Bag at 12/02/23 2039    bisacodyL suppository 10 mg, 10 mg, Rectal, Daily PRN, Hussein Merino MD    calcium chloride 100 mg/mL (10 %) injection 1 g, 1 g, Intravenous, Once, Dalton Palacios MD    dicyclomine injection 20 mg, 20 mg, Intramuscular, QID PRN, Dalton Palacios MD, 20 mg at 11/30/23 1754    hydrALAZINE injection 10 mg, 10 mg, Intravenous, Q6H PRN, Kenney Steiner MD    hyoscyamine ODT 0.125 mg, 0.125 mg, Sublingual, Q4H PRN, Sara Waldrop AGACNP-BC, 0.125 mg at 11/30/23 1735    melatonin tablet 6 mg, 6 mg, Oral, Nightly PRN, Hussein Merino MD, 6 mg at 11/28/23 2128    metoclopramide HCl injection 5 mg, 5 mg, Intravenous, Q6H PRN, Dalton Palacios MD, 5 mg at 12/01/23 1802    metoprolol injection 5 mg, 5 mg, Intravenous, Q4H PRN, Genaro Crawford, TENAP    midodrine tablet 10 mg, 10 mg, Oral, TID WM, Dalton Palacios MD, 10 mg at 12/03/23 0854    morphine injection 4 mg, 4 mg, Intravenous, Q4H PRN, Hussein Merino MD, 4 mg at 12/02/23 0217    mupirocin 2 % ointment, , Nasal, BID, Maryanne Moise, [facility], Given at 12/03/23 0903    ondansetron injection 4 mg, 4 mg, Intravenous, Q4H PRN, Dalton Palacios MD    oxyCODONE immediate release tablet 5 mg, 5 mg, Oral, Q6H PRN, Hussein Merino MD, 5 mg at 11/23/23 1205    pantoprazole injection 40 mg, 40 mg, Intravenous, BID WM, Dalton Palacios MD, 40 mg at 12/03/23 0854    piperacillin-tazobactam (ZOSYN) 4.5 g in dextrose 5 % in water (D5W) 100 mL  IVPB (MB+), 4.5 g, Intravenous, Q12H, Nathan Carlisle MD, Last Rate: 25 mL/hr at 12/03/23 0856, 4.5 g at 12/03/23 0856    polyethylene glycol packet 17 g, 17 g, Oral, BID PRN, Hussein Merino MD    prochlorperazine injection Soln 5 mg, 5 mg, Intravenous, Q6H PRN, Hussein Merino MD, 5 mg at 11/22/23 1347    promethazine injection 25 mg, 25 mg, Intramuscular, Q4H PRN, Lyssa Car AGACNP-BC, 25 mg at 11/23/23 1620    senna-docusate 8.6-50 mg per tablet 2 tablet, 2 tablet, Oral, BID PRN, Hussein Merino MD    sodium bicarbonate tablet 1,300 mg, 1,300 mg, Oral, BID, Dalton Palacios MD, 1,300 mg at 12/03/23 0854    sodium chloride 0.9% flush 10 mL, 10 mL, Intravenous, PRN, Hussein Merino MD    Flushing PICC/Midline Protocol, , , Until Discontinued **AND** sodium chloride 0.9% flush 10 mL, 10 mL, Intravenous, Q6H, 10 mL at 12/03/23 0513 **AND** sodium chloride 0.9% flush 10 mL, 10 mL, Intravenous, PRN, Kenney Steiner MD         VTE Risk Mitigation (From admission, onward)           Ordered     IP VTE LOW RISK PATIENT  Once         11/18/23 0447     Place sequential compression device  Until discontinued         11/18/23 0447                  Assessment:   Newly Diagnosed Atrial Fibrillation- Now SR    - [id number] Score 2 (2.2% Stroke Risk per Year)    - Not on Anticoagulation due to Anemia (Lovenox Previously Discontinued)  Biliary Obstruction likely s/t portal hepatic lymphadenopathy    - Successful placement of biliary drainage catheter: 8 Upper sorbian internal external biliary drainage catheter on 11.21.23     - Incomplete Laparoscopic cholecystectomy 11.10.23  Hypotension (BP Now Low Normal)    - On Midodrine    - History of Hypertension  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 Recommend starting Eliquis 5 Mg PO BID when safe from Primary Perspective - Re: AF/Stroke Risk Reduction. If unable to tolerate long-term OAC, Consider JER Occlusion evaluation in clinic.  Follow up with [facility]  Will be available. Call if needed.    MARIA A Mark  Cardiology  [facility] - 8th Floor Med Surg  12/03/2023

## 2023-12-03 NOTE — PROGRESS NOTES
[facility]  Hospital Medicine Progress Note        Chief Complaint: Inpatient Follow-up for intractable vomiting     HPI: 58-year-old female with medical history of hypertension who recently underwent laparoscopic cholecystectomy on 11/10/2023, procedure was incomplete and was unable to completely resect the gallbladder.  Since surgery she continued to have right flank/back pain and followed up with her PCP and CT abdomen and pelvis on 11/17/2023 revealed left-sided hydronephrosis with suspected distal obstruction/no clear stone visualized, postoperative changes of cholecystectomy with fluid in the gallbladder fossa may reflect postoperative seroma but biloma can not be entirely excluded, also noted for marked thickening of the stomach wall diffusely may be related to mesenteric edema/reactive.  She presented to [facility] [place] the same day 11/17/2023 and her labs notable for WBC 9.0, hemoglobin 12.4, platelets 442, creatinine 0.86, total bilirubin 8.7 with direct fraction 6.7, alkaline phosphatase 491, , .  Patient's surgeon was consulted and recommended ERCP which was not available at [facility] for which she was transferred to [facility] and referred to hospital medicine service for further evaluation and management.   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  Severe inflammation and oozing of blood noted proximal gastric lumen.  Unable to advance scope into the duodenum.  Surgical oncology consulted, IR consulted for external drain placement which was done November 21.  November 24th she developed new onset atrial fibrillation with RVR and Cardiology consulted.  Started on amiodarone drip  Unfortunately patient had acute blood loss due to hemorrhage from the midline site that was removed.  Patient was unaware of the bleed.  Became very weak, passed out.  Code was called but she came around.  Stat H&H done which was slightly  lower but stable, MRI of the brain was negative for any acute ischemic changes. Pt is aware of gastric cancer diagnosis   GI following--> 11/18- EGD shows normal esophagus, malignant gastric tumor in the cardia.  Inflamed mucosa and ooze of blood throughout the proximal gastric lumen.  Gastric antrum scar would not allow advancement of scope into the duodenal ampulla area therefore biliary cannulation was not possible for bile leak evaluation  Pathology shows marked chronic gastritis with intestinal metaplasia, gastric cardia biopsy shows adenocarcinoma, moderately differentiated intestinal type   bilateral hydronephrosis with left greater than right  MRI brain without contrast-negative for acute finding  PET scan reviewed with patient by Oncology reports of locally advanced gastric adenocarcinoma and plans for systemic therapy outpatient if functional, nutritional and renal function improves.        Patient currently being managed for locally advanced gastric adenocarcinoma.  With plans for laparoscopic J-tube placement and MediPort placement by surgical Oncology on 12/1, oncology on board plans for systemic therapy outpatient if functional, nutritional and renal function improves.     obstructive uropathy with bilateral hydronephrosis status post bilateral ureteral stent placed on 11/27 by Urology- no improvement in renal function, patient opted to have hemodialysis and a right-sided hemodialysis catheter has been placed by General surgery on 11/29, 2 continue hemodialysis per nephrology discretion.     Patient with symptoms of nausea and vomiting can not keep anything down in her stomach complicated by severe malnutrition on IV Clinimix and supportive medications for nausea and vomiting. Palliative care on board       Patient got a MediPort and J-tube placed on 12/01  Cystogram reviewed by Urology with patent stents and recommends outpatient follow-up with Urology       Interval Hx:   Patient seen and examined at  bedside   Patient has no new complaints today   Vitals reviewed with blood pressure low normal, heart rate better controlled, on room air   White cell count elevated at 20.4, Infectious Disease had started on IV Zosyn given concern for possible sepsis with low blood pressure on 12/02   Hemoglobin levels beginning to trip down slowly again at 10.1   BMP in keeping with end-stage renal disease   CIS evaluated patient to begin low-dose metoprolol tartrate at 12.5 mg b.i.d. and resume Eliquis for stroke prevention          Case was discussed with patient's nurse and  on the floor.     Objective/physical exam:  General: In no acute distress, afebrile, ill looking   Chest: Clear to auscultation bilaterally anteriorly  Heart:  Tachycardic rate and rhythm, no appreciable murmur  Abdomen:  extremely tender in epigastrium and RUQ, Slightly distended, BS +  MSK: Warm, trace pitting edema bilateral lower extremities  Neurologic: Alert and oriented x4, Cranial nerve II-XII intact, able to move all 4 extremities        Assessment/Plan:  Acute hypotension - sepsis  Acute on chronic anemia , trickling down ? Dilutional   Locally advanced gastric adenocarcinoma.    Obstructive uropathy with bilateral hydronephrosis status post bilateral ureteral stent placed on 11/27  POORNIMA-now on HD - 11/29  Metabolic acidosis  Severe malnutrition   Persistent leukocytosis  Possible Gastric/duodenal outlet obstruction likely d/t gastric cancer   Suspected bile leak with biloma and biliary obstruction--> H/O Laparoscopic incomplete cholecystectomy 11/10/2023  New onset atrial fibrillation with RVR , improving   Acute Blood loss anemia with syncope and then fall 11/26  Hypokalemia- resolved with replacement      History of hypertension and DDD/sciatica     Plan  Vitals reviewed with blood pressure low normal, heart rate better controlled, on room air   White cell count elevated at 20.4, Infectious Disease had started on IV Zosyn given  concern for possible sepsis with low blood pressure on 12/02   Hemoglobin levels beginning to trip down slowly again at 10.1 , trend   Trend hemoglobin levels and keep hemoglobin greater than 7- 8 grams/dL   BMP in keeping with end-stage renal disease   Nephrology on board, HD per their discretion   CIS evaluated patient to begin low-dose metoprolol tartrate at 12.5 mg b.i.d. and resume Eliquis for stroke prevention  Patient got a MediPort and J-tube placed on 12/01  S/p   3 units of prbcs  on 12/01   Cystogram reviewed by Urology with patent stents and recommends outpatient follow-up with Urology  Appreciate oncology input plans for systemic therapy outpatient,   Continue TPN   Patient currently not tolerating p.o. intake will be very challenging to discharge home with no nutrition   palliative Care on board- patient wants everything done   IM Bentyl p.r.n. for abdominal pain and spasm      VTE prophylaxis:  eliquis      Patient condition:  Guarded     Anticipated discharge and Disposition:   TBD        All diagnosis and differential diagnosis have been reviewed; assessment and plan has been documented; I have personally reviewed the labs and test results that are presently available; I have reviewed the patients medication list; I have reviewed the consulting providers response and recommendations. I have reviewed or attempted to review medical records based upon their availability     All of the patient's questions have been  addressed and answered. Patient's is agreeable to the above stated plan. I will continue to monitor closely and make adjustments to medical management as needed.           VITAL SIGNS: 24 HRS MIN & MAX LAST   Temp  Min: 98 °F (36.7 °C)  Max: 98.6 °F (37 °C) 98 °F (36.7 °C)   BP  Min: 85/56  Max: 102/68 95/64   Pulse  Min: 95  Max: 115  95   Resp  Min: 18  Max: 18 18   SpO2  Min: 92 %  Max: 99 % 98 %     I have reviewed the following labs:  Recent Labs   Lab 12/01/23  1002 12/02/23  0444  12/03/23  0531   WBC 22.18* 19.08* 20.41*   RBC 4.19* 3.70* 3.33*   HGB 12.6 11.0* 10.1*   HCT 36.5* 31.4* 29.1*   MCV 87.1 84.9 87.4   MCH 30.1 29.7 30.3   MCHC 34.5 35.0 34.7   RDW 18.6* 18.6* 19.1*    422* 411*   MPV 10.7* 11.0* 10.2     Recent Labs   Lab 12/01/23  0407 12/02/23  0443 12/03/23  0531   * 133* 132*   K 4.5 4.1 4.0   CO2 25 23 25   BUN 35.0* 28.1* 48.7*   CREATININE 3.58* 3.31* 4.64*   CALCIUM 8.3* 8.5 8.4   MG 2.00 1.90 2.10   ALBUMIN 1.8* 1.7* 1.5*   ALKPHOS 102 122 109   ALT 23 23 19   AST 25 27 19   BILITOT 4.0* 4.7* 4.6*     Microbiology Results (last 7 days)       Procedure Component Value Units Date/Time    Blood Culture [[id number]] Collected: 12/02/23 2053    Order Status: Resulted Specimen: Blood Updated: 12/02/23 2131    Blood Culture [[id number]] Collected: 12/02/23 2053    Order Status: Resulted Specimen: Blood Updated: 12/02/23 2131             See below for Radiology    Scheduled Med:   calcium chloride  1 g Intravenous Once    metoprolol tartrate  12.5 mg Oral BID    midodrine  10 mg Oral TID WM    mupirocin   Nasal BID    pantoprazole  40 mg Intravenous BID WM    piperacillin-tazobactam (Zosyn) IV (PEDS and ADULTS) (extended infusion is not appropriate)  4.5 g Intravenous Q12H    sodium bicarbonate  1,300 mg Oral BID    sodium chloride 0.9%  10 mL Intravenous Q6H      Continuous Infusions:   amino acid 5% in 15% dextrose (CLINIMIX-E) solution (1L provides 50gm AA, 150gm CHO (510 kcal/L dextrose), Na 35, K 30, Mg 5, Ca 4.5, Acetate 80, Cl 39, Phos 15) (710 total kcal/L) 50 mL/hr at 12/02/23 2039    amino acid 5% in 15% dextrose (CLINIMIX-E) solution (1L provides 50gm AA, 150gm CHO (510 kcal/L dextrose), Na 35, K 30, Mg 5, Ca 4.5, Acetate 80, Cl 39, Phos 15) (710 total kcal/L)        PRN Meds:  0.9%  NaCl infusion (for blood administration), sodium chloride 0.9%, acetaminophen, aluminum-magnesium hydroxide-simethicone, bisacodyL, dicyclomine, hydrALAZINE, hyoscyamine,  melatonin, metoclopramide HCl, metoprolol, morphine, ondansetron, oxyCODONE, polyethylene glycol, prochlorperazine, promethazine, senna-docusate 8.6-50 mg, sodium chloride 0.9%, Flushing PICC/Midline Protocol **AND** sodium chloride 0.9% **AND** sodium chloride 0.9%     Assessment/Plan:      VTE prophylaxis:     Patient condition:  Stable/Fair/Guarded/ Serious/ Critical    Anticipated discharge and Disposition:         All diagnosis and differential diagnosis have been reviewed; assessment and plan has been documented; I have personally reviewed the labs and test results that are presently available; I have reviewed the patients medication list; I have reviewed the consulting providers response and recommendations. I have reviewed or attempted to review medical records based upon their availability    All of the patient's questions have been  addressed and answered. Patient's is agreeable to the above stated plan. I will continue to monitor closely and make adjustments to medical management as needed.  _____________________________________________________________________    Nutrition Status:    Radiology:  I have personally reviewed the following imaging and agree with the radiologist.     FL Cystogram Retrograde (xpd)  EXAMINATION  FL Cystogram Retrograde    CLINICAL HISTORY  gravity cystogram to eval for bilateral stent reflux; please use at least 300ml contrast;    TECHNIQUE  Water-soluble contrast administered into the urinary bladder via Jin catheter under live fluoroscopy.    CONTRAST  Cystografin, 300 mL    COMPARISON  29 November 2023    FINDINGS  : Right upper quadrant internal/external biliary drain remains in similar position.  There is also similar appearance of bilateral ureteral stents.  A new curvilinear tube suggestive of gastrostomy projects over the left mid abdomen.  There are no findings to suggest high-grade bowel obstruction or new/worsened intra-abdominal mass effect.  Residual oral  "contrast media opacifies the colon, similar to the prior study.  Regional osseous structures are unchanged.    CYSTOGRAPHY: The urinary bladder demonstrates no evidence of abnormal filling defects. The wall contour is smooth. There is no ureteral reflux bilaterally. No extraluminal contrast spillage is appreciated. There is gross contrast reflux and retrograde progression to the level of the renal central collecting system at the point of approximately 200 mL contrast filling of the urinary bladder.  Additional contrast administration resulted in opacification of the dilated right renal pelvis.  At the point of maximal filling, there was no convincing contrast or grossly visualized dilatation of the left central collecting system.    IMPRESSION  1. Widely patent bilateral ureters/stents, with contrast reflux to the central collecting systems.  2. Contrast filling of moderate dilated right renal pelvis.  No significant contrast visualized within the left renal pelvis.  3. No evidence of acute or focal abnormality of the urinary bladder.    RADIATION DOSE  Lowest-rate pulsed fluoroscopy and "last image capture" technique were utilized in order to minimize radiation exposure.    *Fluoro: 30 seconds  *DAP: 1874.7 uGy x m^2  *Dose: 27.1 mGy  *Number of images: 16    Electronically signed by: Tay Lynch  Date:    12/01/2023  Time:    19:08  X-Ray Chest 1 View  Narrative: EXAMINATION:  XR CHEST 1 VIEW    CLINICAL HISTORY:  Other specified symptoms and signs involving the circulatory and respiratory systems    TECHNIQUE:  Single frontal view of the chest was performed.    COMPARISON:  11/29/2023    FINDINGS:  LINES AND TUBES: Left subclavian pam catheter tip terminates at the SVC.  Right IJ sheath tip projects over the SVC.  Right upper extremity PICC tip projects over the superior cavoatrial junction.  EKG/telemetry leads overlie the chest. .    MEDIASTINUM AND SARA: Cardiac silhouette is at the upper limits of normal, " likely exaggerated by portable technique.    LUNGS: Lung volumes are low with associated atelectatic change.    PLEURA:No pleural effusion. No pneumothorax.    OTHER: No acute osseous abnormality.  Impression: Mild retrocardiac atelectasis.    Electronically signed by: Sulema Solorzano  Date:    12/01/2023  Time:    10:43  SURG FL Surgery Fluoro Usage  See OP Notes for results.     IMPRESSION: See OP Notes for results.     This procedure was auto-finalized by: Virtual Radiologist      Dalton Palacios MD   12/03/2023

## 2023-12-03 NOTE — PLAN OF CARE
Problem: Adult Inpatient Plan of Care  Goal: Plan of Care Review  Outcome: Ongoing, Progressing  Goal: Patient-Specific Goal (Individualized)  Outcome: Ongoing, Progressing  Goal: Absence of Hospital-Acquired Illness or Injury  Outcome: Ongoing, Progressing  Goal: Optimal Comfort and Wellbeing  Outcome: Ongoing, Progressing  Goal: Readiness for Transition of Care  Outcome: Ongoing, Progressing     Problem: Pain Acute  Goal: Acceptable Pain Control and Functional Ability  Outcome: Ongoing, Progressing     Problem: Fall Injury Risk  Goal: Absence of Fall and Fall-Related Injury  Outcome: Ongoing, Progressing     Problem: Infection  Goal: Absence of Infection Signs and Symptoms  Outcome: Ongoing, Progressing     Problem: Coping Ineffective  Goal: Effective Coping  Outcome: Ongoing, Progressing     Problem: Skin Injury Risk Increased  Goal: Skin Health and Integrity  Outcome: Ongoing, Progressing     Problem: Device-Related Complication Risk (Hemodialysis)  Goal: Safe, Effective Therapy Delivery  Outcome: Ongoing, Progressing     Problem: Hemodynamic Instability (Hemodialysis)  Goal: Effective Tissue Perfusion  Outcome: Ongoing, Progressing     Problem: Infection (Hemodialysis)  Goal: Absence of Infection Signs and Symptoms  Outcome: Ongoing, Progressing     Problem: Infection (Hemodialysis)  Goal: Absence of Infection Signs and Symptoms  Outcome: Ongoing, Progressing

## 2023-12-04 LAB
ALBUMIN SERPL-MCNC: 1.5 G/DL (ref 3.4–4.8)
ALBUMIN/GLOB SERPL: 0.4 RATIO (ref 1.1–2)
ALP SERPL-CCNC: 126 UNIT/L (ref 40–150)
ALT SERPL-CCNC: 23 UNIT/L (ref 0–55)
AST SERPL-CCNC: 26 UNIT/L (ref 5–34)
BASOPHILS # BLD AUTO: 0.15 X10(3)/MCL
BASOPHILS NFR BLD AUTO: 0.7 %
BILIRUB SERPL-MCNC: 5.6 MG/DL
BUN SERPL-MCNC: 62 MG/DL (ref 9.8–20.1)
CALCIUM SERPL-MCNC: 8.6 MG/DL (ref 8.4–10.2)
CHLORIDE SERPL-SCNC: 94 MMOL/L (ref 98–107)
CO2 SERPL-SCNC: 23 MMOL/L (ref 23–31)
CREAT SERPL-MCNC: 5.81 MG/DL (ref 0.55–1.02)
EOSINOPHIL # BLD AUTO: 0.28 X10(3)/MCL (ref 0–0.9)
EOSINOPHIL NFR BLD AUTO: 1.3 %
ERYTHROCYTE [DISTWIDTH] IN BLOOD BY AUTOMATED COUNT: 19.8 % (ref 11.5–17)
GFR SERPLBLD CREATININE-BSD FMLA CKD-EPI: 8 MLS/MIN/1.73/M2
GLOBULIN SER-MCNC: 3.9 GM/DL (ref 2.4–3.5)
GLUCOSE SERPL-MCNC: 103 MG/DL (ref 82–115)
HCT VFR BLD AUTO: 32 % (ref 37–47)
HGB BLD-MCNC: 10.9 G/DL (ref 12–16)
IMM GRANULOCYTES # BLD AUTO: 1.06 X10(3)/MCL (ref 0–0.04)
IMM GRANULOCYTES NFR BLD AUTO: 4.9 %
LYMPHOCYTES # BLD AUTO: 0.73 X10(3)/MCL (ref 0.6–4.6)
LYMPHOCYTES NFR BLD AUTO: 3.3 %
MAGNESIUM SERPL-MCNC: 2.3 MG/DL (ref 1.6–2.6)
MCH RBC QN AUTO: 29.8 PG (ref 27–31)
MCHC RBC AUTO-ENTMCNC: 34.1 G/DL (ref 33–36)
MCV RBC AUTO: 87.4 FL (ref 80–94)
MONOCYTES # BLD AUTO: 1.26 X10(3)/MCL (ref 0.1–1.3)
MONOCYTES NFR BLD AUTO: 5.8 %
NEUTROPHILS # BLD AUTO: 18.35 X10(3)/MCL (ref 2.1–9.2)
NEUTROPHILS NFR BLD AUTO: 84 %
NRBC BLD AUTO-RTO: 0 %
PHOSPHATE SERPL-MCNC: 5.2 MG/DL (ref 2.3–4.7)
PLATELET # BLD AUTO: 510 X10(3)/MCL (ref 130–400)
PMV BLD AUTO: 10.6 FL (ref 7.4–10.4)
POTASSIUM SERPL-SCNC: 4.7 MMOL/L (ref 3.5–5.1)
PROT SERPL-MCNC: 5.4 GM/DL (ref 5.8–7.6)
RBC # BLD AUTO: 3.66 X10(6)/MCL (ref 4.2–5.4)
SODIUM SERPL-SCNC: 132 MMOL/L (ref 136–145)
WBC # SPEC AUTO: 21.83 X10(3)/MCL (ref 4.5–11.5)

## 2023-12-04 PROCEDURE — 84100 ASSAY OF PHOSPHORUS: CPT | Performed by: INTERNAL MEDICINE

## 2023-12-04 PROCEDURE — 25000003 PHARM REV CODE 250: Performed by: GENERAL PRACTICE

## 2023-12-04 PROCEDURE — 80100016 HC MAINTENANCE HEMODIALYSIS

## 2023-12-04 PROCEDURE — C9113 INJ PANTOPRAZOLE SODIUM, VIA: HCPCS | Performed by: INTERNAL MEDICINE

## 2023-12-04 PROCEDURE — 63600175 PHARM REV CODE 636 W HCPCS: Mod: JZ,JG | Performed by: NURSE PRACTITIONER

## 2023-12-04 PROCEDURE — 63600175 PHARM REV CODE 636 W HCPCS: Performed by: INTERNAL MEDICINE

## 2023-12-04 PROCEDURE — 25000003 PHARM REV CODE 250: Performed by: NURSE PRACTITIONER

## 2023-12-04 PROCEDURE — A4216 STERILE WATER/SALINE, 10 ML: HCPCS | Performed by: INTERNAL MEDICINE

## 2023-12-04 PROCEDURE — 63600175 PHARM REV CODE 636 W HCPCS: Performed by: GENERAL PRACTICE

## 2023-12-04 PROCEDURE — 21400001 HC TELEMETRY ROOM

## 2023-12-04 PROCEDURE — 85025 COMPLETE CBC W/AUTO DIFF WBC: CPT | Performed by: NURSE PRACTITIONER

## 2023-12-04 PROCEDURE — 25000003 PHARM REV CODE 250: Performed by: INTERNAL MEDICINE

## 2023-12-04 PROCEDURE — 97116 GAIT TRAINING THERAPY: CPT | Mod: CQ

## 2023-12-04 PROCEDURE — 80053 COMPREHEN METABOLIC PANEL: CPT | Performed by: INTERNAL MEDICINE

## 2023-12-04 PROCEDURE — 97530 THERAPEUTIC ACTIVITIES: CPT | Mod: CQ

## 2023-12-04 PROCEDURE — P9047 ALBUMIN (HUMAN), 25%, 50ML: HCPCS | Mod: JZ,JG | Performed by: NURSE PRACTITIONER

## 2023-12-04 PROCEDURE — 99232 SBSQ HOSP IP/OBS MODERATE 35: CPT | Mod: ,,, | Performed by: STUDENT IN AN ORGANIZED HEALTH CARE EDUCATION/TRAINING PROGRAM

## 2023-12-04 PROCEDURE — 83735 ASSAY OF MAGNESIUM: CPT | Performed by: INTERNAL MEDICINE

## 2023-12-04 PROCEDURE — 99232 SBSQ HOSP IP/OBS MODERATE 35: CPT | Mod: FS,,, | Performed by: GENERAL PRACTICE

## 2023-12-04 RX ORDER — CHLORPROMAZINE HYDROCHLORIDE 25 MG/ML
25 INJECTION INTRAMUSCULAR 4 TIMES DAILY PRN
Status: DISCONTINUED | OUTPATIENT
Start: 2023-12-04 | End: 2023-12-25 | Stop reason: HOSPADM

## 2023-12-04 RX ORDER — ALBUMIN HUMAN 250 G/1000ML
12.5 SOLUTION INTRAVENOUS
Status: COMPLETED | OUTPATIENT
Start: 2023-12-04 | End: 2023-12-04

## 2023-12-04 RX ADMIN — ALBUMIN (HUMAN) 12.5 G: 12.5 SOLUTION INTRAVENOUS at 03:12

## 2023-12-04 RX ADMIN — SODIUM BICARBONATE 650 MG TABLET 1300 MG: at 08:12

## 2023-12-04 RX ADMIN — MIDODRINE HYDROCHLORIDE 10 MG: 5 TABLET ORAL at 06:12

## 2023-12-04 RX ADMIN — ALBUMIN (HUMAN) 12.5 G: 12.5 SOLUTION INTRAVENOUS at 04:12

## 2023-12-04 RX ADMIN — PANTOPRAZOLE SODIUM 40 MG: 40 INJECTION, POWDER, FOR SOLUTION INTRAVENOUS at 09:12

## 2023-12-04 RX ADMIN — PIPERACILLIN AND TAZOBACTAM 4.5 G: 4; .5 INJECTION, POWDER, FOR SOLUTION INTRAVENOUS at 08:12

## 2023-12-04 RX ADMIN — APIXABAN 5 MG: 5 TABLET, FILM COATED ORAL at 09:12

## 2023-12-04 RX ADMIN — MIDODRINE HYDROCHLORIDE 10 MG: 5 TABLET ORAL at 09:12

## 2023-12-04 RX ADMIN — METOPROLOL TARTRATE 12.5 MG: 25 TABLET, FILM COATED ORAL at 08:12

## 2023-12-04 RX ADMIN — OXYCODONE HYDROCHLORIDE 5 MG: 5 TABLET ORAL at 08:12

## 2023-12-04 RX ADMIN — SODIUM CHLORIDE, PRESERVATIVE FREE 10 ML: 5 INJECTION INTRAVENOUS at 12:12

## 2023-12-04 RX ADMIN — MIDODRINE HYDROCHLORIDE 10 MG: 5 TABLET ORAL at 12:12

## 2023-12-04 RX ADMIN — APIXABAN 5 MG: 5 TABLET, FILM COATED ORAL at 08:12

## 2023-12-04 RX ADMIN — PIPERACILLIN AND TAZOBACTAM 4.5 G: 4; .5 INJECTION, POWDER, FOR SOLUTION INTRAVENOUS at 09:12

## 2023-12-04 RX ADMIN — SODIUM CHLORIDE, PRESERVATIVE FREE 10 ML: 5 INJECTION INTRAVENOUS at 05:12

## 2023-12-04 NOTE — CONSULTS
Inpatient Nutrition Assessment    Admit Date: 11/17/2023   Total duration of encounter: 17 days     Nutrition Recommendation/Prescription     -Oral diet per MD, currently NPO.     -Start tube feedings at 15ml/hr and advance per MD recommendations.   Goal tube feeding recommendations:  Novasource @ 45ml/hr (goal rate) will provide:   1800kcals (109% est needs), 81g protein (98% est needs), 648ml free water.   Free water flush recommendations: 30ml q4hr for tube patency while receiving TPN.    -Continue TPN until tube feeds are tolerated at 25ml/hr then can begin to taper down to discontinue.    -Electrolytes and additional fluids per pharmacy/MD      Communication of Recommendations: reviewed with provider and reviewed with nurse    Nutrition Assessment     Malnutrition Assessment/Nutrition-Focused Physical Exam    Malnutrition Context: acute illness or injury (11/18/23 1630)  Malnutrition Level: severe (11/18/23 1630)  Energy Intake (Malnutrition): less than or equal to 50% for greater than or equal to 5 days (11/18/23 1630)  Weight Loss (Malnutrition): greater than 7.5% in 3 months (11/18/23 1630)  Subcutaneous Fat (Malnutrition): moderate depletion (11/18/23 1630)  Orbital Region (Subcutaneous Fat Loss): moderate depletion        Muscle Mass (Malnutrition): moderate depletion (11/18/23 1630)  Restorationism Region (Muscle Loss): moderate depletion     Clavicle and Acromion Bone Region (Muscle Loss): moderate depletion        Patellar Region (Muscle Loss): moderate depletion                 A minimum of two characteristics is recommended for diagnosis of either severe or non-severe malnutrition.    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  SIRS versus sepsis- improved  Bilateral hydronephrosis with worsening renal function, reconsulted Urology  POORNIMA  New onset atrial fibrillation with RVR   Acute Blood loss anemia with syncope and then fall 11/26  Hypokalemia- resolved with replacement  Metabolic acidosis  Severe malnutrition    Relevant Medical History:   Past Medical History:   Diagnosis Date    Hypertension     Sciatica      Past Surgical History:   Procedure Laterality Date    CARPAL TUNNEL RELEASE Left     CYSTOSCOPY W/ URETERAL STENT PLACEMENT Bilateral 11/27/2023    Procedure: CYSTOSCOPY, WITH URETERAL STENT INSERTION;  Surgeon: Huey Cardenas MD;  Location: [facility] OR;  Service: Urology;  Laterality: Bilateral;    EGD, WITH CLOSED BIOPSY  11/18/2023    Procedure: EGD, WITH CLOSED BIOPSY;  Surgeon: Alfred Goss MD;  Location: [facility] OR;  Service: Gastroenterology;;    ERCP N/A 11/18/2023    Procedure: ERCP (ENDOSCOPIC RETROGRADE CHOLANGIOPANCREATOGRAPHY);  Surgeon: Alfred Goss MD;  Location: [facility] OR;  Service: Gastroenterology;  Laterality: N/A;    HEMORRHOID SURGERY       Review of patient's allergies indicates:  No Known Allergies     Nutrition-Related Medications:    apixaban  5 mg Oral BID    metoprolol tartrate  12.5 mg Oral BID    midodrine  10 mg Oral TID WM    pantoprazole  40 mg Intravenous BID WM    piperacillin-tazobactam (Zosyn) IV (PEDS and ADULTS) (extended infusion is not appropriate)  4.5 g Intravenous Q12H    sodium bicarbonate  1,300 mg Oral BID    sodium chloride 0.9%  10 mL Intravenous Q6H       amino acid 5% in 15% dextrose (CLINIMIX-E) solution (1L provides 50gm AA, 150gm CHO (510 kcal/L dextrose), Na 35, K 30, Mg 5, Ca 4.5, Acetate 80, Cl 39, Phos 15) (710 total kcal/L) 50 mL/hr at 12/03/23 215  "          Calorie Containing IV Medications: Clinimix    Nutrition-Related Labs:  No results found for: "HGBA1C"  12/4/2023: Magnesium Level 2.30 mg/dL (Ref range: 1.60 - 2.60 mg/dL); Phosphorus Level 5.2 mg/dL (H; Ref range: 2.3 - 4.7 mg/dL); Potassium Level 4.7 mmol/L (Ref range: 3.5 - 5.1 mmol/L); Sodium Level 132 mmol/L (L; Ref range: 136 - 145 mmol/L)   Recent Labs   Lab 12/02/23 0443 12/03/23  0531 12/04/23  0546   WBC 19.08* 20.41* 21.83*   RBC 3.70* 3.33* 3.66*   HGB 11.0* 10.1* 10.9*   HCT 31.4* 29.1* 32.0*   MCV 84.9 87.4 87.4   MCH 29.7 30.3 29.8   MCHC 35.0 34.7 34.1       Recent Labs   Lab 12/02/23 0443 12/03/23  0531 12/04/23  0546   * 132* 132*   K 4.1 4.0 4.7   CO2 23 25 23   BUN 28.1* 48.7* 62.0*   CREATININE 3.31* 4.64* 5.81*   GLUCOSE 155* 118* 103   CALCIUM 8.5 8.4 8.6   MG 1.90 2.10 2.30   ALBUMIN 1.7* 1.5* 1.5*   ALKPHOS 122 109 126   ALT 23 19 23   AST 27 19 26   BILITOT 4.7* 4.6* 5.6*         Diet/PN Order: Diet NPO  amino acid 5% in 15% dextrose (CLINIMIX-E) solution (1L provides 50gm AA, 150gm CHO (510 kcal/L dextrose), Na 35, K 30, Mg 5, Ca 4.5, Acetate 80, Cl 39, Phos 15) (710 total kcal/L)  Oral Supplement Order: none  Tube Feeding Order: none  Appetite/Oral Intake: NPO/NPO  Factors Affecting Nutritional Intake: altered gastrointestinal function, decreased appetite, and nausea  Food/Episcopalian/Cultural Preferences: none reported  Food Allergies: none reported    Skin Integrity: drain/device(s)  Wound(s):   n/a    Comments    11/18/23: Pt reports poor appetite, nauseated, threw up after consuming ~10% of full liquid tray for lunch, appetite has been a struggle for 3 months over which time she lost about 30 lbs unintentionally, appetite has been worse since 11/10 incomplete cholecystectomy, chews/swallows well, agrees to vanilla ONS on diet advancement.     11/22/23: Pt with poor to fair intake of full liquids; states that she is still having some n/v occasionally; reports that she " "is drinking the Boost that is ordered.     11/24/23: Pt reports poor appetite, still w/ n/v, basically vomits up everything she consumes. Discussed w/ physician, we agree that she needs a J-tube. Pt reports that she is open to it.     11/26/23: breakfast: 0% tray; lunch- unknown, no tray receipt; dinner- 100% 2 orange sheberts and 1/2 bowl chicken broth. Consuming <25% energy needs.    11/27/23: breakfast: 50% cream of wheat and 100% orange juice; lunch: NPO for stent placements, but sx deferred until tomorrow so will allow liquids for dinner tonight.   Continues with emesis and nausea throughout day; receiving antiemetics. States Boost supplement also "comes back up". Will change to Boost Breeze and monitor tolerance.  Changing peripheral PN to total PN with lipids 2x/week to meet nutritional needs until able to place enteral access device for tube feeds or po intake improves. Discussed with MD and pharmacy.     11/30/23 TPN continues. Decreased rate to 50ml/hr until tomorrow and will order custom TPN for minimum volume until able to start on enteral nutrition. Started on dialysis s/t worsening renal indices and decreased urine output (<600mL x24 hr per RN). Glucose lab of 547 this morning error, 159 on recheck.     12/1/23 Out of room for Jtube and mediport placement. Consult received for Jtube feeding recs. Continue in-house TPN at 50ml/hr, no need for custom at this time. Can wean TPN as tolerating tube feeds at 1/2 goal rate. Will use renal formula for tube feedings while pt continues on dilaysis.  Recommendations discussed with RN and pharmacy.     12/4/23 Consult received to start tube feedings. Order placed and discussed with RN. Start at 15ml/hr and advance per MD. Continue with renal formula and monitor electrolytes for ability to change to a standard formula. TPN infusing and NPO status continues.      Anthropometrics    Height: 5' 5.75" (167 cm) Height Method: Stated  Last Weight: 82.1 kg (181 lb) " (23 0942) Weight Method: Standard Scale  BMI (Calculated): 29.4  BMI Classification: overweight (BMI 25-29.9)     Ideal Body Weight (IBW), Female: 128.75 lb     % Ideal Body Weight, Female (lb): 140.58 %                             Usual Weight Provided By: patient and EMR weight history  23: 211 lbs  Wt Readings from Last 5 Encounters:   23 82.1 kg (181 lb)   18 88 kg (194 lb)   18 90.4 kg (199 lb 4.7 oz)   17 90 kg (198 lb 6.6 oz)     Weight Change(s) Since Admission: 23 no updated wt   Admit Weight: 82.2 kg (181 lb 4 oz) (23 8195)    Estimated Needs    Weight Used For Calorie Calculations: 82.2 kg (181 lb 3.5 oz)  Energy Calorie Requirements (kcal): 1644 kcals (20 kcals/kg)  Energy Need Method: Kcal/kg  Weight Used For Protein Calculations: 82.2 kg (181 lb 3.5 oz)  Protein Requirements:  g (1.0-1.3 g/kg)  Fluid Requirements (mL): 1000mL + UOP (started on HD)  Temp (24hrs), Av.2 °F (36.8 °C), Min:97.8 °F (36.6 °C), Max:98.6 °F (37 °C)       Enteral Nutrition    Patient not receiving enteral nutrition at this time.    Parenteral Nutrition    Standard Formula: Clinimix E 5/15  Custom Formula: not applicable  Additives: none  Rate/Volume: 50ml/hr  Lipids: none  Total Nutrition Provided by Parenteral Nutrition:  Calories Provided  995 kcal/d, 60% needs   Protein Provided  60 g/d, 73% needs   Dextrose Provided  240 g/d, GIR 1.51 mg CHO/kg/min   Fluid Provided  1200 ml/d, % needs     Evaluation of Received Nutrient Intake    Calories: not meeting estimated needs  Protein: not meeting estimated needs    Patient Education    Not applicable.    Nutrition Diagnosis     PES: Malnutrition related to suboptimal protein/energy intake as evidenced by less than or equal to 50% needs met for greater than or equal to 5 days, moderate fat depletion, moderate muscle depletion, and greater than 7.5% weight loss in 3 months. (active)     Interventions/Goals     Intervention(s):  modified composition of enteral nutrition, modified rate of enteral nutrition, modified rate of parenteral nutrition, and collaboration with other providers  Goal: Meet greater than 75% of nutritional needs by follow-up. (goal not met)    Monitoring & Evaluation     Dietitian will monitor energy intake, weight, electrolyte/renal panel, and gastrointestinal profile.  Nutrition Risk/Follow-Up: high (follow-up in 1-4 days)   Please consult if re-assessment needed sooner.    Lisa Correa RD   12/04/2023

## 2023-12-04 NOTE — ANESTHESIA POSTPROCEDURE EVALUATION
Anesthesia Post Evaluation    Patient: Karen Briggs    Procedure(s) Performed: Procedure(s) (LRB):  INSERTION, JEJUNOSTOMY TUBE, LAPAROSCOPIC (N/A)  INSERTION, PORT-A-CATH (N/A)    Final Anesthesia Type: general      Patient location during evaluation: PACU  Patient participation: Yes- Able to Participate  Level of consciousness: awake and alert  Post-procedure vital signs: reviewed and stable  Pain management: adequate  Airway patency: patent      Anesthetic complications: no      Cardiovascular status: hemodynamically stable  Respiratory status: unassisted  Hydration status: euvolemic  Follow-up not needed.              Vitals Value Taken Time   BP 93/68 12/04/23 0807   Temp 36.8 °C (98.2 °F) 12/04/23 0807   Pulse 90 12/04/23 0807   Resp 20 12/04/23 0807   SpO2 98 % 12/04/23 0807         Event Time   Out of Recovery 12/01/2023 10:57:00         Pain/William Score: No data recorded

## 2023-12-04 NOTE — PROGRESS NOTES
[facility]  Hospital Medicine Progress Note      Chief Complaint: Inpatient Follow-up for intractable vomiting     HPI: 58-year-old female with medical history of hypertension who recently underwent laparoscopic cholecystectomy on 11/10/2023, procedure was incomplete and was unable to completely resect the gallbladder.  Since surgery she continued to have right flank/back pain and followed up with her PCP and CT abdomen and pelvis on 11/17/2023 revealed left-sided hydronephrosis with suspected distal obstruction/no clear stone visualized, postoperative changes of cholecystectomy with fluid in the gallbladder fossa may reflect postoperative seroma but biloma can not be entirely excluded, also noted for marked thickening of the stomach wall diffusely may be related to mesenteric edema/reactive.  She presented to [facility] ED the same day 11/17/2023 and her labs notable for WBC 9.0, hemoglobin 12.4, platelets 442, creatinine 0.86, total bilirubin 8.7 with direct fraction 6.7, alkaline phosphatase 491, , .  Patient's surgeon was consulted and recommended ERCP which was not available at [facility] for which she was transferred to [facility] and referred to hospital medicine service for further evaluation and management.   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  Severe inflammation and oozing of blood noted proximal gastric lumen.  Unable to advance scope into the duodenum.  Surgical oncology consulted, IR consulted for external drain placement which was done November 21.  November 24th she developed new onset atrial fibrillation with RVR and Cardiology consulted.  Started on amiodarone drip  Unfortunately patient had acute blood loss due to hemorrhage from the midline site that was removed.  Patient was unaware of the bleed.  Became very weak, passed out.  Code was called but she came around.  Stat H&H done which was slightly  lower but stable, MRI of the brain was negative for any acute ischemic changes. Pt is aware of gastric cancer diagnosis   GI following--> 11/18- EGD shows normal esophagus, malignant gastric tumor in the cardia.  Inflamed mucosa and ooze of blood throughout the proximal gastric lumen.  Gastric antrum scar would not allow advancement of scope into the duodenal ampulla area therefore biliary cannulation was not possible for bile leak evaluation  Pathology shows marked chronic gastritis with intestinal metaplasia, gastric cardia biopsy shows adenocarcinoma, moderately differentiated intestinal type   bilateral hydronephrosis with left greater than right  MRI brain without contrast-negative for acute finding  PET scan reviewed with patient by Oncology reports of locally advanced gastric adenocarcinoma and plans for systemic therapy outpatient if functional, nutritional and renal function improves.        Patient currently being managed for locally advanced gastric adenocarcinoma.  With plans for laparoscopic J-tube placement and MediPort placement by surgical Oncology on 12/1, oncology on board plans for systemic therapy outpatient if functional, nutritional and renal function improves.     obstructive uropathy with bilateral hydronephrosis status post bilateral ureteral stent placed on 11/27 by Urology- no improvement in renal function, patient opted to have hemodialysis and a right-sided hemodialysis catheter has been placed by General surgery on 11/29, 2 continue hemodialysis per nephrology discretion.    Patient with symptoms of nausea and vomiting can not keep anything down in her stomach complicated by severe malnutrition on IV Clinimix and supportive medications for nausea and vomiting. Palliative care on board      Patient got a MediPort and J-tube placed on 12/01  Cystogram reviewed by Urology with patent stents and recommends outpatient follow-up with Urology   White cell count elevated at 20.4, Infectious  Disease had started on IV Zosyn given concern for possible sepsis with low blood pressure on 12/02   CIS evaluated patient to begin low-dose metoprolol tartrate at 12.5 mg b.i.d. and resume Eliquis for stroke prevention        Interval Hx:   Patient seen and examined at bedside   Patient has no new complaints today  but having hiccups at bedside   Vitals reviewed and stable on room air   White cell count remains persistently elevated at 21.8, hemoglobin levels of 10.9, platelet levels 510, BMP in keeping with end-stage renal disease   Most recent blood cultures negative at 24 hours   Will begin J-tube feeds today pain surgical Oncology recommendations to go slowly  Continue IV Clinimix      Case was discussed with patient's nurse and  on the floor.     Objective/physical exam:  General: In no acute distress, afebrile, ill looking   Chest: Clear to auscultation bilaterally anteriorly  Heart:  Tachycardic rate and rhythm, no appreciable murmur  Abdomen:  extremely tender in epigastrium and RUQ, Slightly distended, BS +  MSK: Warm, trace pitting edema bilateral lower extremities  Neurologic: Alert and oriented x4, Cranial nerve II-XII intact, able to move all 4 extremities   right sided temp HD line- right neck   Biliary drain on right uppuer quadrant   Jin indwelling- dark scanty urine      Assessment/Plan:  Acute hypotension, improving  - concern for sepsis  Acute on chronic anemia , s/p prbcs 3 units   Locally advanced gastric adenocarcinoma.    Obstructive uropathy with bilateral hydronephrosis status post bilateral ureteral stent placed on 11/27  POORNIMA-now on HD - 11/29  Metabolic acidosis  Severe malnutrition   Persistent leukocytosis, concern for sepsis unclear source   Possible Gastric/duodenal outlet obstruction likely d/t gastric cancer   Suspected bile leak with biloma and biliary obstruction--> H/O Laparoscopic incomplete cholecystectomy 11/10/2023  New onset atrial fibrillation with RVR ,  improving   Acute Blood loss anemia with syncope and then fall 11/26  Hypokalemia- resolved with replacement      History of hypertension and DDD/sciatica     Plan  Begin j tube feed with Novasource per Surg oncology recs   Infectious Disease had started on IV Zosyn given concern for possible sepsis with low blood pressure on 12/02 , continue   Trend hemoglobin levels and keep hemoglobin greater than 7- 8 grams/dL   BMP in keeping with end-stage renal disease   Nephrology on board, HD per their discretion   CIS evaluated patient to begin low-dose metoprolol tartrate at 12.5 mg b.i.d. and resume Eliquis for stroke prevention  Patient got a MediPort and J-tube placed on 12/01  S/p 3 units of prbcs  on 12/01   Cystogram reviewed by Urology with patent stents and recommends outpatient follow-up with Urology  Appreciate oncology input plans for systemic therapy outpatient if renal function improves   Continue TPN   palliative Care on board- patient wants everything done   IM Bentyl p.r.n. for abdominal pain and spasm   IM thorazine prn hiccups      VTE prophylaxis:  eliquis      Patient condition:  Guarded     Anticipated discharge and Disposition:   TBD        All diagnosis and differential diagnosis have been reviewed; assessment and plan has been documented; I have personally reviewed the labs and test results that are presently available; I have reviewed the patients medication list; I have reviewed the consulting providers response and recommendations. I have reviewed or attempted to review medical records based upon their availability     All of the patient's questions have been  addressed and answered. Patient's is agreeable to the above stated plan. I will continue to monitor closely and make adjustments to medical management as needed.      VITAL SIGNS: 24 HRS MIN & MAX LAST   Temp  Min: 97.8 °F (36.6 °C)  Max: 98.6 °F (37 °C) 98.4 °F (36.9 °C)   BP  Min: 92/64  Max: 114/80 100/70   Pulse  Min: 84  Max: 99  91    Resp  Min: 18  Max: 20 18   SpO2  Min: 98 %  Max: 98 % 98 %     I have reviewed the following labs:  Recent Labs   Lab 12/02/23 0443 12/03/23 0531 12/04/23  0546   WBC 19.08* 20.41* 21.83*   RBC 3.70* 3.33* 3.66*   HGB 11.0* 10.1* 10.9*   HCT 31.4* 29.1* 32.0*   MCV 84.9 87.4 87.4   MCH 29.7 30.3 29.8   MCHC 35.0 34.7 34.1   RDW 18.6* 19.1* 19.8*   * 411* 510*   MPV 11.0* 10.2 10.6*     Recent Labs   Lab 12/02/23 0443 12/03/23 0531 12/04/23  0546   * 132* 132*   K 4.1 4.0 4.7   CO2 23 25 23   BUN 28.1* 48.7* 62.0*   CREATININE 3.31* 4.64* 5.81*   CALCIUM 8.5 8.4 8.6   MG 1.90 2.10 2.30   ALBUMIN 1.7* 1.5* 1.5*   ALKPHOS 122 109 126   ALT 23 19 23   AST 27 19 26   BILITOT 4.7* 4.6* 5.6*     Microbiology Results (last 7 days)       Procedure Component Value Units Date/Time    Blood Culture [[id number]]  (Normal) Collected: 12/02/23 2053    Order Status: Completed Specimen: Blood Updated: 12/03/23 2200     CULTURE, BLOOD ([facility]) No Growth At 24 Hours    Blood Culture [[id number]]  (Normal) Collected: 12/02/23 2053    Order Status: Completed Specimen: Blood Updated: 12/03/23 2200     CULTURE, BLOOD ([facility]) No Growth At 24 Hours             See below for Radiology    Scheduled Med:   apixaban  5 mg Oral BID    metoprolol tartrate  12.5 mg Oral BID    midodrine  10 mg Oral TID WM    pantoprazole  40 mg Intravenous BID WM    piperacillin-tazobactam (Zosyn) IV (PEDS and ADULTS) (extended infusion is not appropriate)  4.5 g Intravenous Q12H    sodium bicarbonate  1,300 mg Oral BID    sodium chloride 0.9%  10 mL Intravenous Q6H      Continuous Infusions:   amino acid 5% in 15% dextrose (CLINIMIX-E) solution (1L provides 50gm AA, 150gm CHO (510 kcal/L dextrose), Na 35, K 30, Mg 5, Ca 4.5, Acetate 80, Cl 39, Phos 15) (710 total kcal/L) 50 mL/hr at 12/03/23 2152    amino acid 5% in 15% dextrose (CLINIMIX-E) solution (1L provides 50gm AA, 150gm CHO (510 kcal/L dextrose), Na 35, K 30, Mg 5, Ca 4.5, Acetate 80, Cl  39, Phos 15) (710 total kcal/L)        PRN Meds:  0.9%  NaCl infusion (for blood administration), sodium chloride 0.9%, acetaminophen, aluminum-magnesium hydroxide-simethicone, bisacodyL, dicyclomine, hydrALAZINE, hyoscyamine, melatonin, metoclopramide HCl, metoprolol, morphine, ondansetron, oxyCODONE, polyethylene glycol, prochlorperazine, promethazine, senna-docusate 8.6-50 mg, sodium chloride 0.9%, Flushing PICC/Midline Protocol **AND** sodium chloride 0.9% **AND** sodium chloride 0.9%     Assessment/Plan:      VTE prophylaxis:     Patient condition:  Stable/Fair/Guarded/ Serious/ Critical    Anticipated discharge and Disposition:         All diagnosis and differential diagnosis have been reviewed; assessment and plan has been documented; I have personally reviewed the labs and test results that are presently available; I have reviewed the patients medication list; I have reviewed the consulting providers response and recommendations. I have reviewed or attempted to review medical records based upon their availability    All of the patient's questions have been  addressed and answered. Patient's is agreeable to the above stated plan. I will continue to monitor closely and make adjustments to medical management as needed.  _____________________________________________________________________    Nutrition Status:    Radiology:  I have personally reviewed the following imaging and agree with the radiologist.     FL Cystogram Retrograde (xpd)  EXAMINATION  FL Cystogram Retrograde    CLINICAL HISTORY  gravity cystogram to eval for bilateral stent reflux; please use at least 300ml contrast;    TECHNIQUE  Water-soluble contrast administered into the urinary bladder via Jin catheter under live fluoroscopy.    CONTRAST  Cystografin, 300 mL    COMPARISON  29 November 2023    FINDINGS  : Right upper quadrant internal/external biliary drain remains in similar position.  There is also similar appearance of bilateral  "ureteral stents.  A new curvilinear tube suggestive of gastrostomy projects over the left mid abdomen.  There are no findings to suggest high-grade bowel obstruction or new/worsened intra-abdominal mass effect.  Residual oral contrast media opacifies the colon, similar to the prior study.  Regional osseous structures are unchanged.    CYSTOGRAPHY: The urinary bladder demonstrates no evidence of abnormal filling defects. The wall contour is smooth. There is no ureteral reflux bilaterally. No extraluminal contrast spillage is appreciated. There is gross contrast reflux and retrograde progression to the level of the renal central collecting system at the point of approximately 200 mL contrast filling of the urinary bladder.  Additional contrast administration resulted in opacification of the dilated right renal pelvis.  At the point of maximal filling, there was no convincing contrast or grossly visualized dilatation of the left central collecting system.    IMPRESSION  1. Widely patent bilateral ureters/stents, with contrast reflux to the central collecting systems.  2. Contrast filling of moderate dilated right renal pelvis.  No significant contrast visualized within the left renal pelvis.  3. No evidence of acute or focal abnormality of the urinary bladder.    RADIATION DOSE  Lowest-rate pulsed fluoroscopy and "last image capture" technique were utilized in order to minimize radiation exposure.    *Fluoro: 30 seconds  *DAP: 1874.7 uGy x m^2  *Dose: 27.1 mGy  *Number of images: 16    Electronically signed by: Tay Lynch  Date:    12/01/2023  Time:    19:08  X-Ray Chest 1 View  Narrative: EXAMINATION:  XR CHEST 1 VIEW    CLINICAL HISTORY:  Other specified symptoms and signs involving the circulatory and respiratory systems    TECHNIQUE:  Single frontal view of the chest was performed.    COMPARISON:  11/29/2023    FINDINGS:  LINES AND TUBES: Left subclavian pam catheter tip terminates at the SVC.  Right IJ sheath " tip projects over the SVC.  Right upper extremity PICC tip projects over the superior cavoatrial junction.  EKG/telemetry leads overlie the chest. .    MEDIASTINUM AND SARA: Cardiac silhouette is at the upper limits of normal, likely exaggerated by portable technique.    LUNGS: Lung volumes are low with associated atelectatic change.    PLEURA:No pleural effusion. No pneumothorax.    OTHER: No acute osseous abnormality.  Impression: Mild retrocardiac atelectasis.    Electronically signed by: Sulema Solorzano  Date:    12/01/2023  Time:    10:43  SURG FL Surgery Fluoro Usage  See OP Notes for results.     IMPRESSION: See OP Notes for results.     This procedure was auto-finalized by: Virtual Radiologist      Dalton Palacios MD   12/04/2023

## 2023-12-04 NOTE — PT/OT/SLP PROGRESS
Physical Therapy Treatment    Patient Name:  Karen Briggs   MRN:  [id number]    Recommendations:     Discharge Recommendations: Moderate Intensity Therapy  Discharge Equipment Recommendations: walker, rolling  Barriers to discharge: Decreased caregiver support    Assessment:     Karen Briggs is a 58 y.o. female admitted with a medical diagnosis of <principal problem not specified>.  She presents with the following impairments/functional limitations: weakness, impaired endurance, impaired self care skills, impaired functional mobility, gait instability, visual deficits, decreased lower extremity function, decreased coordination, decreased ROM, impaired coordination, impaired fine motor, edema, impaired skin, impaired cardiopulmonary response to activity, decreased safety awareness .    Rehab Prognosis: Good; patient would benefit from acute skilled PT services to address these deficits and reach maximum level of function.    Recent Surgery: Procedure(s) (LRB):  INSERTION, JEJUNOSTOMY TUBE, LAPAROSCOPIC (N/A)  INSERTION, PORT-A-CATH (N/A) 3 Days Post-Op    Plan:     During this hospitalization, patient to be seen 5 x/week to address the identified rehab impairments via gait training, therapeutic activities, therapeutic exercises, neuromuscular re-education and progress toward the following goals:    Plan of Care Expires:  12/04/23    Subjective     Chief Complaint: awaiting transport to dialysis  Patient/Family Comments/goals: to move better  Pain/Comfort:  Pain Rating 1: 0/10      Objective:     Communicated with nursing  prior to session.  Patient found HOB elevated with cheney catheter, NENO drain, PEG Tube, peripheral IV, telemetry, pulse ox (continuous), Other (comments) (pt in bed) upon PT entry to room.     General Precautions: Standard, fall  Orthopedic Precautions: N/A  Braces: N/A  Respiratory Status: Room air     Functional Mobility:  Bed Mobility:     Supine to Sit: moderate assistance and upper body HOB  elevated   Sit to Supine: moderate assistance and assist with one LE at at time for self assisting with HOB elevated  Transfers:     Sit to Stand:  contact guard assistance with rolling walker  Gait: pt ambulated use of rw cga slow jenn 150ft increased time with multiple iv and lines in tow     Patient left HOB elevated with call button in reach and pt chuy tx well required increased time with transition secondary to quick fatigue rest breaks pt sat EOB 5min then able to ambulate . Pt requiring use of rw for fall prevention at this time and assist of one person for safety . pt would benefit from cont. Aggressive therapies to improve functional independence  ..    GOALS:   Multidisciplinary Problems       Physical Therapy Goals          Problem: Physical Therapy    Goal Priority Disciplines Outcome Goal Variances Interventions   Physical Therapy Goal     PT, PT/OT Ongoing, Progressing     Description: Pt will improve functional independence by performing:    Bed mobility: SBA  Sit to stand: SBA with rolling walker  Bed to chair t/f: Min A with Stand Step  with rolling walker  Ambulation x 15 ft with Min A  with rolling walker                       Time Tracking:     PT Received On: 12/04/23  PT Start Time: 1315     PT Stop Time: 1345  PT Total Time (min): 30 min     Billable Minutes: Gait Training 15min and Therapeutic Activity 15min    Treatment Type: Treatment  PT/PTA: PTA     Number of PTA visits since last PT visit: 2     12/04/2023

## 2023-12-04 NOTE — PROGRESS NOTES
[facility] - Oncology Acute  Hematology/Oncology  Consult Note    Patient Name: Karen Briggs  MRN: [id number]  Admission Date: 11/17/2023  Hospital Length of Stay: 17 days  Attending Provider: Dalton Palacios MD  Consulting Provider: Elizabeth K Lejeune, MD  Principal Problem:<principal problem not specified>    Consults  Subjective:     HPI:   59yo F w/ PMHx of HTN transferred from [facility] to [facility] due to concern for complications from incomplete cholecystectomy. She initially underwent cholecystectomy on 11/20/23, it was incomplete and they were unable to resect gallbladder. She had persistent R flank and back pain and presented to her PCP on 11/17 with these complaints. CT A/P at that time revealed left-sided hydronephrosis with suspected distal obstruction/no clear stone visualized, postoperative changes of cholecystectomy with fluid in the gallbladder fossa may reflect postoperative seroma but biloma can not be entirely excluded, also noted for marked thickening of the stomach wall diffusely may be related to mesenteric edema/reactive. She then presented to [facility] ER where she was found to have bilirubin of 8.7, , , Alk phos 491. She was transferred to [facility] and underwent ERCP which was limited due to gastroduodenal stricturing, but an incidental gastric mass was found and biopsied. Pathology revealed gastric adenocarcinoma. There was concern for bile duct injury/transection s/p cholecystectomy so she underwent PTC and biliary drain placement with IR on 11/21/23. Surgical oncology has been evaluating patient during this admission, they suspect biliary obstruction secondary to pam hepatis lymphadenopathy. Medical oncology consult was placed on 11/21/23, however oncology was not notified of consult until today.     Patient this morning doing okay. She notes severe fatigue. Abdominal pain is improving. She notes 30-40lb unintentional weight loss since the summer '23. Her appetite has weaned  since that time as well. 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 She lives with her boyfriend in [place]. I offered further oncology care at [facility] locally, however she would like to pursue care with us here in [place].     I discussed her diagnosis and very general prognosis. I explained the role of a PET scan to confirm staging and thereby treatment recommendations. Regardless chemotherapy is appropriate upfront and this was discussed. Also discussed mediport placement while she is inpatient if possible.     PET scan on 11/29/23 with no evidence of metastatic disease. Under went J tube and mediport placement on 12/1/23 with Dr. Morse.     Interval History  12/4/23 Patient seen and examined this morning. Underwent J tube and mediport placement on Friday. Has not started feeds yet over the weekend. Working with PT to ambulate around halls but otherwise is not moving around enough. She denies any pain. Tolerating HD okay    Oncology Treatment Plan:       Medications:  Continuous Infusions:   amino acid 5% in 15% dextrose (CLINIMIX-E) solution (1L provides 50gm AA, 150gm CHO (510 kcal/L dextrose), Na 35, K 30, Mg 5, Ca 4.5, Acetate 80, Cl 39, Phos 15) (710 total kcal/L) 50 mL/hr at 12/03/23 2152     Scheduled Meds:   apixaban  5 mg Oral BID    calcium chloride  1 g Intravenous Once    metoprolol tartrate  12.5 mg Oral BID    midodrine  10 mg Oral TID WM    mupirocin   Nasal BID    pantoprazole  40 mg Intravenous BID WM    piperacillin-tazobactam (Zosyn) IV (PEDS and ADULTS) (extended infusion is not appropriate)  4.5 g Intravenous Q12H    sodium bicarbonate  1,300 mg Oral BID    sodium chloride 0.9%  10 mL Intravenous Q6H     PRN Meds:0.9%  NaCl infusion (for blood administration), sodium chloride 0.9%, acetaminophen, aluminum-magnesium hydroxide-simethicone, bisacodyL, dicyclomine, hydrALAZINE, hyoscyamine, melatonin, metoclopramide HCl, metoprolol, morphine, ondansetron, oxyCODONE,  polyethylene glycol, prochlorperazine, promethazine, senna-docusate 8.6-50 mg, sodium chloride 0.9%, Flushing PICC/Midline Protocol **AND** sodium chloride 0.9% **AND** sodium chloride 0.9%     Review of patient's allergies indicates:  No Known Allergies     Past Medical History:   Diagnosis Date    Hypertension     Sciatica      Past Surgical History:   Procedure Laterality Date    CARPAL TUNNEL RELEASE Left     CYSTOSCOPY W/ URETERAL STENT PLACEMENT Bilateral 11/27/2023    Procedure: CYSTOSCOPY, WITH URETERAL STENT INSERTION;  Surgeon: Huey Cardenas MD;  Location: [facility] OR;  Service: Urology;  Laterality: Bilateral;    EGD, WITH CLOSED BIOPSY  11/18/2023    Procedure: EGD, WITH CLOSED BIOPSY;  Surgeon: Alfred Goss MD;  Location: [facility] OR;  Service: Gastroenterology;;    ERCP N/A 11/18/2023    Procedure: ERCP (ENDOSCOPIC RETROGRADE CHOLANGIOPANCREATOGRAPHY);  Surgeon: Alfred Goss MD;  Location: [facility] OR;  Service: Gastroenterology;  Laterality: N/A;    HEMORRHOID SURGERY       Family History       Problem Relation (Age of Onset)    Breast cancer Mother    Cancer Maternal Aunt          Tobacco Use    Smoking status: Never    Smokeless tobacco: Never   Substance and Sexual Activity    Alcohol use: No    Drug use: No    Sexual activity: Never     Birth control/protection: Post-menopausal       Review of Systems   Constitutional:  Positive for appetite change, fatigue and unexpected weight change. Negative for activity change and fever.   HENT:  Negative for sore throat.    Eyes:  Negative for visual disturbance.   Respiratory:  Negative for cough and shortness of breath.    Cardiovascular:  Negative for chest pain.   Gastrointestinal:  Positive for abdominal distention and abdominal pain. Negative for diarrhea, nausea and vomiting.   Endocrine: Negative for polyuria.   Genitourinary:  Negative for dysuria.   Musculoskeletal:  Positive for back pain.   Neurological:  Negative for headaches.      Objective:     Vital Signs (Most Recent):  Temp: 98.2 °F (36.8 °C) (12/04/23 0807)  Pulse: 90 (12/04/23 0807)  Resp: 20 (12/04/23 0807)  BP: 93/68 (12/04/23 0807)  SpO2: 98 % (12/04/23 0807) Vital Signs (24h Range):  Temp:  [97.8 °F (36.6 °C)-98.6 °F (37 °C)] 98.2 °F (36.8 °C)  Pulse:  [84-99] 90  Resp:  [18-20] 20  SpO2:  [98 %] 98 %  BP: ()/(64-80) 93/68     Weight: 82.1 kg (181 lb)  Body mass index is 29.44 kg/m².  Body surface area is 1.95 meters squared.      Intake/Output Summary (Last 24 hours) at 12/4/2023 0820  Last data filed at 12/3/2023 2000  Gross per 24 hour   Intake 0 ml   Output 200 ml   Net -200 ml         Physical Exam  Constitutional:       General: She is not in acute distress.     Appearance: Normal appearance. She is obese.   HENT:      Head: Normocephalic and atraumatic.      Nose: Nose normal.      Mouth/Throat:      Mouth: Mucous membranes are moist.      Pharynx: Oropharynx is clear.   Eyes:      Extraocular Movements: Extraocular movements intact.      Conjunctiva/sclera: Conjunctivae normal.      Pupils: Pupils are equal, round, and reactive to light.   Cardiovascular:      Rate and Rhythm: Normal rate and regular rhythm.      Pulses: Normal pulses.      Heart sounds: Normal heart sounds. No murmur heard.  Pulmonary:      Effort: Pulmonary effort is normal.      Breath sounds: Normal breath sounds.   Abdominal:      General: There is distension.      Tenderness: There is abdominal tenderness.      Comments: R side biliary drain   Musculoskeletal:         General: Normal range of motion.      Cervical back: Normal range of motion and neck supple.      Right lower leg: No edema.      Left lower leg: No edema.   Skin:     General: Skin is warm and dry.      Coloration: Skin is not jaundiced.   Neurological:      General: No focal deficit present.      Mental Status: She is oriented to person, place, and time.         Significant Labs:   CBC:   Recent Labs   Lab 12/03/23  3288  12/04/23  0546   WBC 20.41* 21.83*   HGB 10.1* 10.9*   HCT 29.1* 32.0*   * 510*      and CMP:   Recent Labs   Lab 12/03/23  0531 12/04/23  0546   * 132*   K 4.0 4.7   CO2 25 23   BUN 48.7* 62.0*   CREATININE 4.64* 5.81*   CALCIUM 8.4 8.6   ALBUMIN 1.5* 1.5*   BILITOT 4.6* 5.6*   ALKPHOS 109 126   AST 19 26   ALT 19 23         Diagnostic Results:  11/19/23 CT A/P Impression:  1. Gastritis and duodenitis.  2. Irregular wall thickening of the proximal stomach compatible with known tumor.  3. Similar heterogeneous appearance of the gallbladder fossa with small volume ascites.  Bile leak is possible.  Mild intrahepatic biliary ductal dilatation without significant dilatation of the common bile duct.  4. Similar moderate left hydronephrosis.    11/19/23 CT Chest Impression  Impression:  No convincing CT evidence of metastatic disease in the chest.    Assessment/Plan:     Locally advanced gastric adenocarcinoma - PET from 11/29 with no evidence of metastatic disease. Ideally would receive perioperative systemic chemotherapy.     Acute renal failure - now on intermittent HD with nephrology, multifactorial etiology.Would definitely make an impact on her ability to undergo systemic therapy if this cannot improve.     She understands she has the potential for curative intent regarding her gastric adenocarcinoma, however in the acute setting she needs to improve her functional status and hopefully regain her renal function and improve her nutritional status.       Elizabeth K Lejeune, MD  Hematology/Oncology  [facility] - Oncology [facility]

## 2023-12-04 NOTE — PHYSICIAN QUERY
PT Name: Karen Briggs  MR #: [id number]     DOCUMENTATION CLARIFICATION     CDS: Louisa ESPINO,RN        Contact information:[email or web address]  This form is a permanent document in the medical record.     Query Date: December 4, 2023    By submitting this query, we are merely seeking further clarification of documentation.  Please utilize your independent clinical judgment when addressing the question(s) below.  The Medical Record contains the following:  Indicators Supporting Clinical Findings Location in Medical Record   x HR         RR          BP        Temp Vital Signs: 24 HRS MIN & MAX  Temp: 97.8 F------->98.6 F  HR: 84---->99  Resp: 18----->20  SBP: 92---->114  DBP: 64---->80     12/4   PN    Lactic Acid          Procalcitonin     x WBC           Bands          CRP  WBC: 10.20--->6.74--->23.73--->11.82--->13.92, 16.05---->22.18--->21.83 11/18--->11/29,12/1--->12/4 Labs   x Culture(s) Blood Culture: No Growth at 5 days  Blood Culture: No Growth At 24 Hours    Urine Culture: No Growth  11/18 Lab  12/2 Lab    11/23 Lab    AMS, Confusion, LOC, etc.      Organ Dysfunction/Failure     x Bacteremia or Sepsis / Septic SIRS versus sepsis     White cell count elevated at 20.4, Infectious Disease had started on IV Zosyn given concern for possible sepsis with low blood pressure on 12/02  Acute hypotension -sepsis 11/23   PN        12/3  PN    Known or Suspected Source of Infection documented      (Failed) Outpatient Treatment     x Medication Sodium chloride 0.45% bolus 1,000 ml  Intravenous Once  250 ml  over 4 hours 1 dose given   Piperacillin-tazobactam 4.5 g in dextrose 5%  in water 100 ml IVPB Intravenous Every 8 hours x 2    Piperacillin-tazobactam 4.5 g  in dextrose 5% in water 100 ml IVPB Intravenous  Every 12 hours x 2  Albumin human 25% Intravenous Every hour 2 doses given x 2 11/25 MAR    11/18---->11/19, 11/23---->11/26  MAR    11/26---->12/1, 12/2---->present  MAR  11/30, 12/4  MAR     "Treatment      Other          Provider,  due to the conflicting documentation regarding "infectious process",please  clarify the diagnosis associated with above clinical findings.  [ xxxx  ] Sepsis due to unknown organism   [   ] Sepsis due to (suspected) organism (please specify): __________   [   ] SIRS without infectious etiology   [   ] SIRS with infection but without Sepsis   [   ] Other Infectious Disease (please specify): __________   [   ] Sepsis Ruled Out     Please document in your progress notes daily for the duration of treatment until resolved and include in your discharge summary.     "

## 2023-12-05 LAB
ABS NEUT (OLG): 21.61 X10(3)/MCL (ref 2.1–9.2)
ALBUMIN SERPL-MCNC: 1.8 G/DL (ref 3.4–4.8)
ALBUMIN/GLOB SERPL: 0.5 RATIO (ref 1.1–2)
ALP SERPL-CCNC: 125 UNIT/L (ref 40–150)
ALT SERPL-CCNC: 23 UNIT/L (ref 0–55)
ANISOCYTOSIS BLD QL SMEAR: ABNORMAL
AST SERPL-CCNC: 30 UNIT/L (ref 5–34)
BILIRUB SERPL-MCNC: 5.1 MG/DL
BUN SERPL-MCNC: 51.3 MG/DL (ref 9.8–20.1)
CALCIUM SERPL-MCNC: 8.2 MG/DL (ref 8.4–10.2)
CHLORIDE SERPL-SCNC: 94 MMOL/L (ref 98–107)
CO2 SERPL-SCNC: 24 MMOL/L (ref 23–31)
CREAT SERPL-MCNC: 4.55 MG/DL (ref 0.55–1.02)
EOSINOPHIL NFR BLD MANUAL: 0.24 X10(3)/MCL (ref 0–0.9)
EOSINOPHIL NFR BLD MANUAL: 1 %
ERYTHROCYTE [DISTWIDTH] IN BLOOD BY AUTOMATED COUNT: 18.9 % (ref 11.5–17)
GFR SERPLBLD CREATININE-BSD FMLA CKD-EPI: 10 MLS/MIN/1.73/M2
GLOBULIN SER-MCNC: 3.7 GM/DL (ref 2.4–3.5)
GLUCOSE SERPL-MCNC: 326 MG/DL (ref 82–115)
HCT VFR BLD AUTO: 27.6 % (ref 37–47)
HGB BLD-MCNC: 9.4 G/DL (ref 12–16)
INSTRUMENT WBC (OLG): 24.01 X10(3)/MCL
LYMPHOCYTES NFR BLD MANUAL: 0.48 X10(3)/MCL
LYMPHOCYTES NFR BLD MANUAL: 2 %
MACROCYTES BLD QL SMEAR: ABNORMAL
MAGNESIUM SERPL-MCNC: 2.2 MG/DL (ref 1.6–2.6)
MCH RBC QN AUTO: 29.9 PG (ref 27–31)
MCHC RBC AUTO-ENTMCNC: 34.1 G/DL (ref 33–36)
MCV RBC AUTO: 87.9 FL (ref 80–94)
MONOCYTES NFR BLD MANUAL: 1.92 X10(3)/MCL (ref 0.1–1.3)
MONOCYTES NFR BLD MANUAL: 8 %
NEUTROPHILS NFR BLD MANUAL: 90 %
NRBC BLD AUTO-RTO: 0 %
PHOSPHATE SERPL-MCNC: 4.4 MG/DL (ref 2.3–4.7)
PLATELET # BLD AUTO: 447 X10(3)/MCL (ref 130–400)
PLATELET # BLD EST: ABNORMAL 10*3/UL
PMV BLD AUTO: 9.9 FL (ref 7.4–10.4)
POIKILOCYTOSIS BLD QL SMEAR: ABNORMAL
POTASSIUM SERPL-SCNC: 4.4 MMOL/L (ref 3.5–5.1)
PROT SERPL-MCNC: 5.5 GM/DL (ref 5.8–7.6)
RBC # BLD AUTO: 3.14 X10(6)/MCL (ref 4.2–5.4)
RBC MORPH BLD: ABNORMAL
SODIUM SERPL-SCNC: 129 MMOL/L (ref 136–145)
TARGETS BLD QL SMEAR: ABNORMAL
TRIGL SERPL-MCNC: 122 MG/DL (ref 37–140)
WBC # SPEC AUTO: 24.01 X10(3)/MCL (ref 4.5–11.5)

## 2023-12-05 PROCEDURE — 97116 GAIT TRAINING THERAPY: CPT | Mod: CQ

## 2023-12-05 PROCEDURE — 99231 SBSQ HOSP IP/OBS SF/LOW 25: CPT | Mod: ,,, | Performed by: INTERNAL MEDICINE

## 2023-12-05 PROCEDURE — 25000003 PHARM REV CODE 250: Performed by: NURSE PRACTITIONER

## 2023-12-05 PROCEDURE — 84478 ASSAY OF TRIGLYCERIDES: CPT | Performed by: INTERNAL MEDICINE

## 2023-12-05 PROCEDURE — 80053 COMPREHEN METABOLIC PANEL: CPT | Performed by: INTERNAL MEDICINE

## 2023-12-05 PROCEDURE — 3E0336Z INTRODUCTION OF NUTRITIONAL SUBSTANCE INTO PERIPHERAL VEIN, PERCUTANEOUS APPROACH: ICD-10-PCS | Performed by: INTERNAL MEDICINE

## 2023-12-05 PROCEDURE — C9113 INJ PANTOPRAZOLE SODIUM, VIA: HCPCS | Performed by: INTERNAL MEDICINE

## 2023-12-05 PROCEDURE — 25000003 PHARM REV CODE 250: Performed by: GENERAL PRACTICE

## 2023-12-05 PROCEDURE — 84100 ASSAY OF PHOSPHORUS: CPT | Performed by: INTERNAL MEDICINE

## 2023-12-05 PROCEDURE — A4216 STERILE WATER/SALINE, 10 ML: HCPCS | Performed by: INTERNAL MEDICINE

## 2023-12-05 PROCEDURE — 25000003 PHARM REV CODE 250: Performed by: INTERNAL MEDICINE

## 2023-12-05 PROCEDURE — 63600175 PHARM REV CODE 636 W HCPCS: Performed by: INTERNAL MEDICINE

## 2023-12-05 PROCEDURE — 63600175 PHARM REV CODE 636 W HCPCS: Performed by: GENERAL PRACTICE

## 2023-12-05 PROCEDURE — 21400001 HC TELEMETRY ROOM

## 2023-12-05 PROCEDURE — 99233 SBSQ HOSP IP/OBS HIGH 50: CPT | Mod: FS,,, | Performed by: GENERAL PRACTICE

## 2023-12-05 PROCEDURE — 85027 COMPLETE CBC AUTOMATED: CPT | Performed by: INTERNAL MEDICINE

## 2023-12-05 PROCEDURE — 99222 1ST HOSP IP/OBS MODERATE 55: CPT | Mod: ,,, | Performed by: STUDENT IN AN ORGANIZED HEALTH CARE EDUCATION/TRAINING PROGRAM

## 2023-12-05 PROCEDURE — 83735 ASSAY OF MAGNESIUM: CPT | Performed by: INTERNAL MEDICINE

## 2023-12-05 RX ADMIN — METOPROLOL TARTRATE 12.5 MG: 25 TABLET, FILM COATED ORAL at 08:12

## 2023-12-05 RX ADMIN — SODIUM BICARBONATE 650 MG TABLET 1300 MG: at 08:12

## 2023-12-05 RX ADMIN — SODIUM CHLORIDE, PRESERVATIVE FREE 10 ML: 5 INJECTION INTRAVENOUS at 06:12

## 2023-12-05 RX ADMIN — PIPERACILLIN AND TAZOBACTAM 4.5 G: 4; .5 INJECTION, POWDER, FOR SOLUTION INTRAVENOUS at 09:12

## 2023-12-05 RX ADMIN — CHLORPROMAZINE HYDROCHLORIDE 25 MG: 25 INJECTION INTRAMUSCULAR at 11:12

## 2023-12-05 RX ADMIN — MIDODRINE HYDROCHLORIDE 10 MG: 5 TABLET ORAL at 04:12

## 2023-12-05 RX ADMIN — METOPROLOL TARTRATE 12.5 MG: 25 TABLET, FILM COATED ORAL at 09:12

## 2023-12-05 RX ADMIN — APIXABAN 5 MG: 5 TABLET, FILM COATED ORAL at 08:12

## 2023-12-05 RX ADMIN — MIDODRINE HYDROCHLORIDE 10 MG: 5 TABLET ORAL at 01:12

## 2023-12-05 RX ADMIN — PANTOPRAZOLE SODIUM 40 MG: 40 INJECTION, POWDER, FOR SOLUTION INTRAVENOUS at 04:12

## 2023-12-05 RX ADMIN — PANTOPRAZOLE SODIUM 40 MG: 40 INJECTION, POWDER, FOR SOLUTION INTRAVENOUS at 09:12

## 2023-12-05 RX ADMIN — SODIUM CHLORIDE, PRESERVATIVE FREE 10 ML: 5 INJECTION INTRAVENOUS at 12:12

## 2023-12-05 RX ADMIN — MIDODRINE HYDROCHLORIDE 10 MG: 5 TABLET ORAL at 09:12

## 2023-12-05 RX ADMIN — SODIUM CHLORIDE, PRESERVATIVE FREE 10 ML: 5 INJECTION INTRAVENOUS at 04:12

## 2023-12-05 RX ADMIN — SODIUM CHLORIDE, PRESERVATIVE FREE 10 ML: 5 INJECTION INTRAVENOUS at 11:12

## 2023-12-05 RX ADMIN — APIXABAN 5 MG: 5 TABLET, FILM COATED ORAL at 09:12

## 2023-12-05 NOTE — PROGRESS NOTES
[facility]  Infectious Disease Progress Note            ASSESSMENT & PLAN:     She was a 58-year-old female with a past medical history of hypertension who underwent an unsuccessful laparoscopic cholecystectomy on 11/10/2023 as gallbladder was unable to be completely resected.  Since procedure, she continued to have right flank and back pain.  She followed up with her PCP, and CT of the abdomen and pelvis on 11/17 revealed left-sided hydronephrosis with suspected distal obstruction.  Additionally noted was markedly thickened stomach wall.  Underwent ERCP on 11/18-subsequently found to have a malignant gastric tumor.  IR placed a biliary drain on 11/21.  On 11/24, she developed atrial fibrillation with RVR and was initiated on amiodarone drip.   She then had bilateral ureteral stents placed on 11/27 given persistent hydronephrosis.  She was developed an POORNIMA since admit secondary to obstructive uropathy, now requiring hemodialysis.  Temporary hemodialysis catheter was placed earlier today along with MediPort.  Patient was significant oozing from sites postoperatively.  She received 3 units of blood intraoperatively as well as around 1 L of IV fluids per report.  Patient reports developing cough after procedure today, nonproductive.  Denies chest pain and is oxygenating well on room air.  Patient has been receiving empiric Zosyn essentially since admit.  Leukocytosis had trended up around 11/22, however has since trended downward although with some worsening this morning.  Blood cultures from 11/18 are negative.  Urine culture from 11/23 is also negative.  She is been afebrile and hemodynamically stable.  Chest x-ray earlier today showed low lung volumes with mild retrocardiac atelectasis.  We have been consulted for input regarding worsening leukocytosis.        Leukocytosis, likely multifactorial  POORNIMA, now requiring HD  Obstructive uropathy, s/p bilateral ureteral stents on 11/27  Persistent hyperbilirubinemia, s/p  "unsuccessful cholecystectomy on 11/10 and biliary drain on 11/21  Gastric cancer, newly diagnosed     PLAN:  BCx NGTD - if remain negative tomorrow, can DC abx.   Spoke with Cara, surgical oncology NP -- they will monitor biliary drain and DC when appropriate.   No family present at time of exam.   Discussed with patient.      SUBJECTIVE:     AF, VSS now.    Restarted on abx on 12/2 given worsening leukocytosis and hypotension.      MEDICATIONS:   Reviewed in EMR    REVIEW OF SYSTEMS:   Except as documented, all other systems reviewed and negative     PHYSICAL EXAM:   T 98.4 °F (36.9 °C)   /70   P 91   RR 18   O2 98 %  GENERAL: NAD; does not appear toxic  SKIN: no rash  HEENT: sclera icteric; PERRL   NECK: supple; no LAD  CHEST: CTA; nonlabored, equal expansion   CARDIOVASCULAR: RRR, S1S2; no murmur   ABDOMEN:  active bowel sounds; abdomen soft, nondistended, nontender to palpation  GENITOURINARY: no suprapubic tenderness   EXTREMITIES: no cyanosis or clubbing  NEURO: AAO x4; CN II-XII grossly intact  PSYCH: Mentation and affect appropriate     LABS AND IMAGING:     Recent Labs     12/03/23 0531 12/04/23  0546   WBC 20.41* 21.83*   RBC 3.33* 3.66*   HGB 10.1* 10.9*   HCT 29.1* 32.0*   MCV 87.4 87.4   MCH 30.3 29.8   MCHC 34.7 34.1   RDW 19.1* 19.8*   * 510*     No results for input(s): "LACTIC" in the last 72 hours.  No results for input(s): "INR", "APTT", "D-DIMER" in the last 72 hours.  No results for input(s): "HGBA1C", "CHOL", "TRIG", "LDL", "VLDL", "HDL" in the last 72 hours.   Recent Labs     12/03/23 0531 12/04/23  0546   * 132*   K 4.0 4.7   CHLORIDE 98 94*   CO2 25 23   BUN 48.7* 62.0*   CREATININE 4.64* 5.81*   GLUCOSE 118* 103   CALCIUM 8.4 8.6   MG 2.10 2.30   PHOS 4.3 5.2*   ALBUMIN 1.5* 1.5*   GLOBULIN 3.6* 3.9*   ALKPHOS 109 126   ALT 19 23   AST 19 26   BILITOT 4.6* 5.6*     No results for input(s): "BNP", "CPK", "TROPONINI" in the last 72 hours.       FL Cystogram Retrograde " "(xpd)  EXAMINATION  FL Cystogram Retrograde    CLINICAL HISTORY  gravity cystogram to eval for bilateral stent reflux; please use at least 300ml contrast;    TECHNIQUE  Water-soluble contrast administered into the urinary bladder via Jin catheter under live fluoroscopy.    CONTRAST  Cystografin, 300 mL    COMPARISON  29 November 2023    FINDINGS  : Right upper quadrant internal/external biliary drain remains in similar position.  There is also similar appearance of bilateral ureteral stents.  A new curvilinear tube suggestive of gastrostomy projects over the left mid abdomen.  There are no findings to suggest high-grade bowel obstruction or new/worsened intra-abdominal mass effect.  Residual oral contrast media opacifies the colon, similar to the prior study.  Regional osseous structures are unchanged.    CYSTOGRAPHY: The urinary bladder demonstrates no evidence of abnormal filling defects. The wall contour is smooth. There is no ureteral reflux bilaterally. No extraluminal contrast spillage is appreciated. There is gross contrast reflux and retrograde progression to the level of the renal central collecting system at the point of approximately 200 mL contrast filling of the urinary bladder.  Additional contrast administration resulted in opacification of the dilated right renal pelvis.  At the point of maximal filling, there was no convincing contrast or grossly visualized dilatation of the left central collecting system.    IMPRESSION  1. Widely patent bilateral ureters/stents, with contrast reflux to the central collecting systems.  2. Contrast filling of moderate dilated right renal pelvis.  No significant contrast visualized within the left renal pelvis.  3. No evidence of acute or focal abnormality of the urinary bladder.    RADIATION DOSE  Lowest-rate pulsed fluoroscopy and "last image capture" technique were utilized in order to minimize radiation exposure.    *Fluoro: 30 seconds  *DAP: 1874.7 uGy x " m^2  *Dose: 27.1 mGy  *Number of images: 16    Electronically signed by: Tay Lynch  Date:    12/01/2023  Time:    19:08  X-Ray Chest 1 View  Narrative: EXAMINATION:  XR CHEST 1 VIEW    CLINICAL HISTORY:  Other specified symptoms and signs involving the circulatory and respiratory systems    TECHNIQUE:  Single frontal view of the chest was performed.    COMPARISON:  11/29/2023    FINDINGS:  LINES AND TUBES: Left subclavian pam catheter tip terminates at the SVC.  Right IJ sheath tip projects over the SVC.  Right upper extremity PICC tip projects over the superior cavoatrial junction.  EKG/telemetry leads overlie the chest. .    MEDIASTINUM AND SARA: Cardiac silhouette is at the upper limits of normal, likely exaggerated by portable technique.    LUNGS: Lung volumes are low with associated atelectatic change.    PLEURA:No pleural effusion. No pneumothorax.    OTHER: No acute osseous abnormality.  Impression: Mild retrocardiac atelectasis.    Electronically signed by: Sulema Solorzano  Date:    12/01/2023  Time:    10:43  SURG FL Surgery Fluoro Usage  See OP Notes for results.     IMPRESSION: See OP Notes for results.     This procedure was auto-finalized by: Virtual Radiologist          MARIA A Small  Infectious Disease

## 2023-12-05 NOTE — CONSULTS
INTERVENTIONAL NEPHROLOGY INITIAL CONSULTATION NOTE       Patient Name: Karen Briggs  DIOMEDES 1965    Patient Seen Date: 2023  Patient Seen Time: 12:52 PM     Consult requested by: Dalton Palacios MD     Reason for consult: Need for tunneled catheter insertion.       HPI: 58-year-old female with HTN and newly discovered gastric cancer developed POORNIMA 2/2 obstructive uropathy now dialysis dependent all since being admitted on 23. The pt was found to have worsening kidney function on 23 and and was initiated on dialysis via a temporary RIJ HD catheter on 23. Nephrology team suspects the need of continued dialysis support. Hence, interventional nephrology was consulted for placement of tunneled catheter.    Pt seen and examined at bedside this PM. No family present. Risks and benefits of tunneled dialysis catheter insertion and intravenous conscious sedation was reviewed with the patient. The patient agrees to proceed with the intended procedure. Consents for both intravenous conscious sedation and procedure were signed and placed within the chart.         Review of Systems:  General:  No fatigue  Skin: No rashes  HEENT: No vision changes  CVS: No CP  RS: No SOB  GIT: No abdominal pain  Extremities: No swelling  Neurological:  No focal weakness  Psych: No depression    Past Medical History:   Diagnosis Date    Hypertension     Sciatica       Past Surgical History:   Procedure Laterality Date    CARPAL TUNNEL RELEASE Left     CYSTOSCOPY W/ URETERAL STENT PLACEMENT Bilateral 2023    Procedure: CYSTOSCOPY, WITH URETERAL STENT INSERTION;  Surgeon: Huey Cardenas MD;  Location: [facility];  Service: Urology;  Laterality: Bilateral;    EGD, WITH CLOSED BIOPSY  2023    Procedure: EGD, WITH CLOSED BIOPSY;  Surgeon: Alfred Goss MD;  Location: [facility];  Service: Gastroenterology;;    ERCP N/A 2023    Procedure: ERCP (ENDOSCOPIC RETROGRADE  CHOLANGIOPANCREATOGRAPHY);  Surgeon: Alfred Goss MD;  Location: [facility] OR;  Service: Gastroenterology;  Laterality: N/A;    HEMORRHOID SURGERY      INSERTION OF TUNNELED CENTRAL VENOUS CATHETER (CVC) WITH SUBCUTANEOUS PORT N/A 12/1/2023    Procedure: INSERTION, PORT-A-CATH;  Surgeon: William Morse MD;  Location: [facility] OR;  Service: General;  Laterality: N/A;    LAPAROSCOPIC INSERTION OF JEJUNOSTOMY TUBE N/A 12/1/2023    Procedure: INSERTION, JEJUNOSTOMY TUBE, LAPAROSCOPIC;  Surgeon: William Morse MD;  Location: [facility] OR;  Service: General;  Laterality: N/A;  PLUS MEDIPORT      Review of patient's allergies indicates:  No Known Allergies   Social History     Tobacco Use    Smoking status: Never    Smokeless tobacco: Never   Substance Use Topics    Alcohol use: No    Drug use: No      Family History   Problem Relation Age of Onset    Breast cancer Mother     Cancer Maternal Aunt         colon cancer         Current Facility-Administered Medications:     0.9%  NaCl infusion (for blood administration), , Intravenous, Q24H PRN, Almaz Cheney, FNP    0.9%  NaCl infusion, , Intravenous, PRN, Hussein Merino MD, Stopped at 11/19/23 1049    acetaminophen tablet 650 mg, 650 mg, Oral, Q4H PRN, Hussein Merino MD, 650 mg at 11/28/23 2128    aluminum-magnesium hydroxide-simethicone 200-200-20 mg/5 mL suspension 30 mL, 30 mL, Oral, QID PRN, Hussein Merino MD    amino acid 5% in 15% dextrose (CLINIMIX-E) solution (1L provides 50gm AA, 150gm CHO (510 kcal/L dextrose), Na 35, K 30, Mg 5, Ca 4.5, Acetate 80, Cl 39, Phos 15) (710 total kcal/L), , Intravenous, Continuous, Dalton Palacios MD    apixaban tablet 5 mg, 5 mg, Oral, BID, Dalton Palacios MD, 5 mg at 12/05/23 0916    bisacodyL suppository 10 mg, 10 mg, Rectal, Daily PRN, Hussein Merino MD    chlorproMAZINE injection 25 mg, 25 mg, Intramuscular, QID PRN, Dalton Palacios MD, 25 mg at 12/05/23 1102    dicyclomine injection 20 mg, 20 mg, Intramuscular, QID PRN,  Dalton Palacios MD, 20 mg at 11/30/23 1754    hydrALAZINE injection 10 mg, 10 mg, Intravenous, Q6H PRN, Kenney Steiner MD    hyoscyamine ODT 0.125 mg, 0.125 mg, Sublingual, Q4H PRN, Sara Waldrop AGACNP-BC, 0.125 mg at 11/30/23 1735    melatonin tablet 6 mg, 6 mg, Oral, Nightly PRN, Hussein Merino MD, 6 mg at 11/28/23 2128    metoclopramide HCl injection 5 mg, 5 mg, Intravenous, Q6H PRN, Dalton Palacios MD, 5 mg at 12/01/23 1802    metoprolol injection 5 mg, 5 mg, Intravenous, Q4H PRN, Genaro Crawford, FNP    metoprolol tartrate (LOPRESSOR) split tablet 12.5 mg, 12.5 mg, Oral, BID, Genaro Crawford, FNP, 12.5 mg at 12/05/23 0916    midodrine tablet 10 mg, 10 mg, Oral, TID WM, Dalton Palacios MD, 10 mg at 12/05/23 0916    morphine injection 4 mg, 4 mg, Intravenous, Q4H PRN, Hussein Merino MD, 4 mg at 12/02/23 0217    ondansetron injection 4 mg, 4 mg, Intravenous, Q4H PRN, Dalton Palacios MD    oxyCODONE immediate release tablet 5 mg, 5 mg, Oral, Q6H PRN, Hussein Merino MD, 5 mg at 12/04/23 2023    pantoprazole injection 40 mg, 40 mg, Intravenous, BID WM, Dalton Palacios MD, 40 mg at 12/05/23 0916    polyethylene glycol packet 17 g, 17 g, Oral, BID PRN, Hussein Merino MD    prochlorperazine injection Soln 5 mg, 5 mg, Intravenous, Q6H PRN, Hussein Merino MD, 5 mg at 11/22/23 1347    promethazine injection 25 mg, 25 mg, Intramuscular, Q4H PRN, Lyssa Car AGACNP-BC, 25 mg at 11/23/23 1620    senna-docusate 8.6-50 mg per tablet 2 tablet, 2 tablet, Oral, BID PRN, Hussein Merino MD    sodium bicarbonate tablet 1,300 mg, 1,300 mg, Oral, BID, AmusaDalton MD, 1,300 mg at 12/04/23 2022    sodium chloride 0.9% flush 10 mL, 10 mL, Intravenous, PRN, Hussein Merino MD    Flushing PICC/Midline Protocol, , , Until Discontinued **AND** sodium chloride 0.9% flush 10 mL, 10 mL, Intravenous, Q6H, 10 mL at 12/05/23 1103 **AND** sodium chloride 0.9% flush 10 mL, 10 mL, Intravenous, PRN, Kenney Steiner MD    Vital Signs (24  h):  Temp:  [98.4 °F (36.9 °C)-99.5 °F (37.5 °C)] 98.5 °F (36.9 °C)  Pulse:  [108-124] 108  Resp:  [17-20] 18  SpO2:  [97 %-99 %] 97 %  BP: ()/(62-73) 103/70   I/O last 3 completed shifts:  In: 530 [Other:500; NG/GT:30]  Out: 1590 [Drains:90; Other:1500]  I/O this shift:  In: -   Out: 95 [Drains:95]        Physical Exam:  General: NAD  HEENT: NC/AT, EOMI  CVS: S1/S2 present and normal.      RS: breathing easily.  Abdominal: Soft, NT/ND.  Extremities: No edema b/l LE  Skin: No rash, no lesions.  Neurological: No focal deficits.  Psych: Normal affect  Dialysis Access: right internal jugular (RIJ) temporary, non-tunneled HD catheter with high cannulation/venotomy. No signs of infection/bleeding.    Results:    Lab Results   Component Value Date     (L) 12/05/2023     04/24/2018    K 4.4 12/05/2023    K 3.2 (L) 04/24/2018     04/24/2018    CO2 24 12/05/2023    CO2 20 (L) 04/24/2018    BUN 51.3 (H) 12/05/2023    BUN 10 04/24/2018    CREATININE 4.55 (H) 12/05/2023    CREATININE 0.7 04/24/2018     (H) 04/24/2018     Lab Results   Component Value Date    WBC 24.01 (H) 12/05/2023    WBC 24.01 12/05/2023    WBC 12.03 04/24/2018    HGB 9.4 (L) 12/05/2023    HGB 11.3 (L) 04/24/2018     (H) 12/05/2023     04/24/2018    MCV 87.9 12/05/2023    MCV 93 04/24/2018       Assessment and Plan:      POORNIMA 2/2 obstructive uropathy requiring HD.  Leukocytosis, apparently non-infectious.  Pt with POORNIMA 2/2 obstructive uropathy requiring HD initiation on 11/28/23. Pt is expected to require long-term dialysis per nephrology team. Pt with right IJ vein temporary HD catheter with high cannulation/venotomy in th neck, unsuitable for direct conversion to tunneled catheter. Pt also with leukocytosis with negative blood cultures, being followed by ID team who has determined that if cultures remain negative tomorrow then antibiotics can be stopped. Interventional nephrology was consulted for placement of a  tunneled catheter.  - Consents obtained and placed in chart.  - Plan for procedure in cath lab setting tomorrow AM if cultures remain negative.  - NPO after midnight.    Thank you for your consult. Please feel free to reach me with any questions.  Plan for follow-up tomorrow.    Satinder Luo DO  Interventional Nephrology  Cell: [phone]

## 2023-12-05 NOTE — PROGRESS NOTES
Nephrology consult follow up note    HPI:      Karen Briggs is a 58 y.o. female presented on  7 days post op from laparoscopic cholecystectomy on 11/10. Gallbladder was unable to be resected. Back and flank pain persisted post operatively prompting evaluation at [facility] ER. After workup her surgeon recommended ERCP for which she was sent to this facility. Left sided hydronephrosis noted on CT scans done on admission. At that time renal function was fairly normal and patient was given option for stenting or to defer as outpatient and she chose the latter.      During ERCP, malignant gastric mass noted and ERCP was unable to be completed due to duodenal scarring. 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 Patient ultimately had biliary drain placed per IR.      She developed A fib with RVR requiring amiodarone infusion. In the past 24 hours she had significant blood loss post removal of long term IV. She then had a fall later ambulating to the bathroom.      Patient renal function was relatively stable until decline starting 11/25. Patient was ultimately initiated on HD 11/28 post bilateral ureteral stent placement with Dr Cardenas same day.     Patient developed dialysis dependent acute kidney injury, and was started on hemodialysis 11/29/2023.    Interval history:     No acute events overnight.  Patient remains anuric.  No sign of renal.  No chest pain, shortness of breath, nausea, vomiting, or lower extremity edema.     Review of Systems:     Comprehensive 10pt ROS negative except as noted per history.    Past medical, family, surgical, and social history reviewed and unchanged from initial consult note.     Objective:       VITAL SIGNS: 24 HR MIN & MAX LAST    Temp  Min: 98.4 °F (36.9 °C)  Max: 99.5 °F (37.5 °C)  99.1 °F (37.3 °C)        BP  Min: 97/66  Max: 109/73  97/66     Pulse  Min: 110  Max: 124  (!) 112     Resp  Min: 17  Max: 20  20    SpO2  Min: 97 %  Max: 99 %  97 %      GEN:  Well-appearing  AAF  CV: RRR +S1,S2 without murmur  PULM: CTAB, unlabored  ABD: Soft, NT/ND abdomen with NABS  EXT:  Trace lower extremity edema  SKIN: Warm and dry  PSYCH: Awake, alert and appropriately conversant.   Dialysis access:  Right IJ Vas-Cath            Component Value Date/Time     (L) 12/05/2023 0455     (L) 12/04/2023 0546     04/24/2018 0340    K 4.4 12/05/2023 0455    K 4.7 12/04/2023 0546    K 3.2 (L) 04/24/2018 0340    CHLORIDE 94 (L) 12/05/2023 0455    CHLORIDE 94 (L) 12/04/2023 0546    CO2 24 12/05/2023 0455    CO2 23 12/04/2023 0546    CO2 20 (L) 04/24/2018 0340    BUN 51.3 (H) 12/05/2023 0455    BUN 62.0 (H) 12/04/2023 0546    BUN 10 04/24/2018 0340    CREATININE 4.55 (H) 12/05/2023 0455    CREATININE 5.81 (H) 12/04/2023 0546    CREATININE 0.7 04/24/2018 0340    CALCIUM 8.2 (L) 12/05/2023 0455    CALCIUM 8.6 12/04/2023 0546    CALCIUM 9.2 04/24/2018 0340    PHOS 4.4 12/05/2023 0455            Component Value Date/Time    WBC 24.01 (H) 12/05/2023 0455    WBC 24.01 12/05/2023 0455    WBC 21.83 (H) 12/04/2023 0546    WBC 23.73 11/23/2023 0723    WBC 12.03 04/24/2018 0340    HGB 9.4 (L) 12/05/2023 0455    HGB 10.9 (L) 12/04/2023 0546    HGB 11.3 (L) 04/24/2018 0340    HCT 27.6 (L) 12/05/2023 0455    HCT 32.0 (L) 12/04/2023 0546    HCT 35.3 (L) 04/24/2018 0340     (H) 12/05/2023 0455     (H) 12/04/2023 0546     04/24/2018 0340         Imaging reviewed      Assessment / Plan:       There are no hospital problems to display for this patient.      POORNIMA multifactorial secondary to obstructive uropathy, acute blood loss, contrast exposure, hypotension and Afib with RVR  ---Nephrotic range proteinuria noted. 4.4 g  Newly diagnosed malignant gastric mass. Pathology consistent with adenocarcinoma   -s/p J tube and mediport placement 12/1  Acute blood loss anemia 2/2 bleeding post long term IV removal   Bilateral hydronephrosis noted 11/21 - stent placement initially deferred due to  stable renal function   Afib with RVR on amiodarone infusion DC for hypotension.  She is on scheduled Lopressor 5 mg q.6  S/p biliary drain placement      Plan:  No acute indication for hemodialysis today.  No sign of renal recovery yet.  We will ask vascular surgery to plan for tunneled dialysis catheter placement.  Plan for hemodialysis tomorrow on MWF schedule.  We will follow.      Yahir Black DO  Nephrology  [facility]  Clinic number: [phone]

## 2023-12-05 NOTE — PROGRESS NOTES
12/04/23 1811        Hemodialysis Catheter 11/29/23 1600 right internal jugular   Placement Date/Time: 11/29/23 1600   Present Prior to Hospital Arrival?: No  Hand Hygiene: Performed  Barrier Precautions: Performed  Skin Antisepsis: ChloraPrep  Hemodialysis Catheter Type: Temporary catheter  Location: right internal jugular  Insert...   Site Assessment No drainage;No redness;No swelling;No warmth   Line Securement Device Secured with sutures   Dressing Type CHG impregnated dressing/sponge;Transparent (Tegaderm)   Dressing Status Clean;Dry;Intact   Dressing Intervention Sterile dressing change   Date on Dressing 12/04/23   Dressing Due to be Changed 12/06/23   Post-Hemodialysis Assessment   Blood Volume Processed (Liters) 63 L   Dialyzer Clearance Lightly streaked   Duration of Treatment 180 minutes   Additional Fluid Intake (mL) 500 mL   Total UF (mL) 1500 mL   Net Fluid Removal 1000   Patient Response to Treatment tolerated with addition of 25g Albumin   Post-Hemodialysis Comments Tx complete, pt reinfused, cvc deaccessed per p&p, new sterile caps applied

## 2023-12-05 NOTE — PLAN OF CARE
12/05/23 1156   Discharge Reassessment   Assessment Type Discharge Planning Reassessment   Did the patient's condition or plan change since previous assessment? Yes   Discharge Plan discussed with: Patient   Communicated BRADEN with patient/caregiver Date not available/Unable to determine   Discharge Plan A Home Health;Hospice/home  (Palliative care only)   Discharge Plan B Home Health;Hospice/home  (Palliative care only)   DME Needed Upon Discharge  nutrition supplies   Transition of Care Barriers None   Why the patient remains in the hospital Requires continued medical care   Post-Acute Status   Post-Acute Authorization Home Health;Hospice   Home Health Status Referrals Sent   Hospice Status Referrals Sent   Patient choice form signed by patient/caregiver List with quality metrics by geographic area provided   Discharge Delays None known at this time     Reassessment completed at bedside. No projected dc date at this time. Contacted dialysis coordinator, Maria R Choudhary, to inform her of patient's need for outpatient dialysis. Patient choice list presented to patient. FOC obtained and placed in chart. Home health referral sent to [facility] via [facility]. Palliative referral sent to [facility] and enteral feeding referral sent to [facility].

## 2023-12-05 NOTE — PROGRESS NOTES
[facility]  Infectious Disease Progress Note            ASSESSMENT & PLAN:     She was a 58-year-old female with a past medical history of hypertension who underwent an unsuccessful laparoscopic cholecystectomy on 11/10/2023 as gallbladder was unable to be completely resected.  Since procedure, she continued to have right flank and back pain.  She followed up with her PCP, and CT of the abdomen and pelvis on 11/17 revealed left-sided hydronephrosis with suspected distal obstruction.  Additionally noted was markedly thickened stomach wall.  Underwent ERCP on 11/18-subsequently found to have a malignant gastric tumor.  IR placed a biliary drain on 11/21.  On 11/24, she developed atrial fibrillation with RVR and was initiated on amiodarone drip.   She then had bilateral ureteral stents placed on 11/27 given persistent hydronephrosis.  She was developed an POORNIMA since admit secondary to obstructive uropathy, now requiring hemodialysis.  Temporary hemodialysis catheter was placed earlier today along with MediPort.  Patient was significant oozing from sites postoperatively.  She received 3 units of blood intraoperatively as well as around 1 L of IV fluids per report.  Patient reports developing cough after procedure today, nonproductive.  Denies chest pain and is oxygenating well on room air.  Patient has been receiving empiric Zosyn essentially since admit.  Leukocytosis had trended up around 11/22, however has since trended downward although with some worsening this morning.  Blood cultures from 11/18 are negative.  Urine culture from 11/23 is also negative.  She is been afebrile and hemodynamically stable.  Chest x-ray earlier today showed low lung volumes with mild retrocardiac atelectasis.  We have been consulted for input regarding worsening leukocytosis.        Leukocytosis, likely multifactorial  POORNIMA, now requiring HD  Obstructive uropathy, s/p bilateral ureteral stents on 11/27  Persistent hyperbilirubinemia, s/p  "unsuccessful cholecystectomy on 11/10 and biliary drain on 11/21  Gastric cancer, newly diagnosed     PLAN:  BCx NGTD.  Clinically stable and AF.  DC abx and monitor off.    Surgical oncology to monitor biliary drain and DC when appropriate.   Discussed with patient.      SUBJECTIVE:     AF, VSS.  No issues overnight.   No complaints.      MEDICATIONS:   Reviewed in EMR    REVIEW OF SYSTEMS:   Except as documented, all other systems reviewed and negative     PHYSICAL EXAM:   T 98.3 °F (36.8 °C)   BP 98/62   P (!) 121   RR 18   O2 97 %  GENERAL: NAD; does not appear toxic  SKIN: no rash  HEENT: sclera icteric; PERRL   NECK: supple; no LAD  CHEST: CTA; nonlabored, equal expansion   CARDIOVASCULAR: RRR, S1S2; no murmur   ABDOMEN:  active bowel sounds; abdomen soft, nondistended, nontender to palpation  GENITOURINARY: no suprapubic tenderness   EXTREMITIES: no cyanosis or clubbing  NEURO: AAO x4; CN II-XII grossly intact  PSYCH: Mentation and affect appropriate     LABS AND IMAGING:     Recent Labs     12/04/23 0546 12/05/23 0455   WBC 21.83* 24.01  24.01*   RBC 3.66* 3.14*   HGB 10.9* 9.4*   HCT 32.0* 27.6*   MCV 87.4 87.9   MCH 29.8 29.9   MCHC 34.1 34.1   RDW 19.8* 18.9*   * 447*       No results for input(s): "LACTIC" in the last 72 hours.  No results for input(s): "INR", "APTT", "D-DIMER" in the last 72 hours.  Recent Labs     12/05/23  0455   TRIG 122      Recent Labs     12/04/23 0546 12/05/23  0455   * 129*   K 4.7 4.4   CHLORIDE 94* 94*   CO2 23 24   BUN 62.0* 51.3*   CREATININE 5.81* 4.55*   GLUCOSE 103 326*   CALCIUM 8.6 8.2*   MG 2.30 2.20   PHOS 5.2* 4.4   ALBUMIN 1.5* 1.8*   GLOBULIN 3.9* 3.7*   ALKPHOS 126 125   ALT 23 23   AST 26 30   BILITOT 5.6* 5.1*       No results for input(s): "BNP", "CPK", "TROPONINI" in the last 72 hours.       FL Cystogram Retrograde (xpd)  EXAMINATION  FL Cystogram Retrograde    CLINICAL HISTORY  gravity cystogram to eval for bilateral stent reflux; please use " "at least 300ml contrast;    TECHNIQUE  Water-soluble contrast administered into the urinary bladder via Jin catheter under live fluoroscopy.    CONTRAST  Cystografin, 300 mL    COMPARISON  29 November 2023    FINDINGS  : Right upper quadrant internal/external biliary drain remains in similar position.  There is also similar appearance of bilateral ureteral stents.  A new curvilinear tube suggestive of gastrostomy projects over the left mid abdomen.  There are no findings to suggest high-grade bowel obstruction or new/worsened intra-abdominal mass effect.  Residual oral contrast media opacifies the colon, similar to the prior study.  Regional osseous structures are unchanged.    CYSTOGRAPHY: The urinary bladder demonstrates no evidence of abnormal filling defects. The wall contour is smooth. There is no ureteral reflux bilaterally. No extraluminal contrast spillage is appreciated. There is gross contrast reflux and retrograde progression to the level of the renal central collecting system at the point of approximately 200 mL contrast filling of the urinary bladder.  Additional contrast administration resulted in opacification of the dilated right renal pelvis.  At the point of maximal filling, there was no convincing contrast or grossly visualized dilatation of the left central collecting system.    IMPRESSION  1. Widely patent bilateral ureters/stents, with contrast reflux to the central collecting systems.  2. Contrast filling of moderate dilated right renal pelvis.  No significant contrast visualized within the left renal pelvis.  3. No evidence of acute or focal abnormality of the urinary bladder.    RADIATION DOSE  Lowest-rate pulsed fluoroscopy and "last image capture" technique were utilized in order to minimize radiation exposure.    *Fluoro: 30 seconds  *DAP: 1874.7 uGy x m^2  *Dose: 27.1 mGy  *Number of images: 16    Electronically signed by: Tay Lynch  Date:    12/01/2023  Time:    19:08  X-Ray " Chest 1 View  Narrative: EXAMINATION:  XR CHEST 1 VIEW    CLINICAL HISTORY:  Other specified symptoms and signs involving the circulatory and respiratory systems    TECHNIQUE:  Single frontal view of the chest was performed.    COMPARISON:  11/29/2023    FINDINGS:  LINES AND TUBES: Left subclavian pam catheter tip terminates at the SVC.  Right IJ sheath tip projects over the SVC.  Right upper extremity PICC tip projects over the superior cavoatrial junction.  EKG/telemetry leads overlie the chest. .    MEDIASTINUM AND SARA: Cardiac silhouette is at the upper limits of normal, likely exaggerated by portable technique.    LUNGS: Lung volumes are low with associated atelectatic change.    PLEURA:No pleural effusion. No pneumothorax.    OTHER: No acute osseous abnormality.  Impression: Mild retrocardiac atelectasis.    Electronically signed by: Sulema Solorzano  Date:    12/01/2023  Time:    10:43  SURG FL Surgery Fluoro Usage  See OP Notes for results.     IMPRESSION: See OP Notes for results.     This procedure was auto-finalized by: Virtual Radiologist          MARIA A Small  Infectious Disease

## 2023-12-05 NOTE — PT/OT/SLP PROGRESS
Physical Therapy Treatment    Patient Name:  Karen Briggs   MRN:  [id number]    Recommendations:     Discharge Recommendations: Moderate Intensity Therapy  Discharge Equipment Recommendations: walker, rolling  Barriers to discharge: Decreased caregiver support    Assessment:     Karen Briggs is a 58 y.o. female admitted with a medical diagnosis of <principal problem not specified>.  She presents with the following impairments/functional limitations: weakness, impaired endurance, impaired self care skills, impaired functional mobility, gait instability, impaired balance, decreased lower extremity function, decreased coordination, decreased ROM, impaired coordination, impaired fine motor, impaired skin, edema, impaired cardiopulmonary response to activity .    Rehab Prognosis: Fair; patient would benefit from acute skilled PT services to address these deficits and reach maximum level of function.    Recent Surgery: Procedure(s) (LRB):  INSERTION, JEJUNOSTOMY TUBE, LAPAROSCOPIC (N/A)  INSERTION, PORT-A-CATH (N/A) 4 Days Post-Op    Plan:     During this hospitalization, patient to be seen 5 x/week to address the identified rehab impairments via gait training, therapeutic activities, therapeutic exercises, neuromuscular re-education and progress toward the following goals:    Plan of Care Expires:  12/04/23    Subjective     Chief Complaint: fatigue   Patient/Family Comments/goals: to go home   Pain/Comfort:  Pain Rating 1: 0/10      Objective:     Communicated with nursing  prior to session.  Patient found HOB elevated with cheney catheter, PEG Tube, peripheral IV, pulse ox (continuous), telemetry, nephrostomy, Other (comments) (in bed) upon PT entry to room.     General Precautions: Standard, fall, NPO  Orthopedic Precautions: N/A  Braces: N/A  Respiratory Status: Room air     Functional Mobility:  Bed Mobility:     Supine to Sit: minimum assistance and use of bed rail   Sit to Supine: stand by assistance and use of  bed rail   Transfers:     Sit to Stand:  contact guard assistance with rolling walker  Gait: pt ambulated use of rw cga with multiple lines in tow slow jenn 200ft then 130ft increased time required for increased distance pt requesting remain in room seated rest break b/t trials    Treatment & Education:  Static sitting balance sitting EOB for rest breaks ~8min supervision     Patient left HOB elevated with call button in reach, family  present, and pt chuy tx required encouragement to cont ambulating pt fatigues quickly and requires use of rw for fall prevention pt and pts family stated has rw at home for then pt dc home.  ..    GOALS:   Multidisciplinary Problems       Physical Therapy Goals          Problem: Physical Therapy    Goal Priority Disciplines Outcome Goal Variances Interventions   Physical Therapy Goal     PT, PT/OT Ongoing, Progressing     Description: Pt will improve functional independence by performing:    Bed mobility: SBA  Sit to stand: SBA with rolling walker  Bed to chair t/f: Min A with Stand Step  with rolling walker  Ambulation x 15 ft with Min A  with rolling walker                       Time Tracking:     PT Received On: 12/05/23  PT Start Time: 1505     PT Stop Time: 1530  PT Total Time (min): 25 min     Billable Minutes: Gait Training 25min    Treatment Type: Treatment  PT/PTA: PTA     Number of PTA visits since last PT visit: 2     12/05/2023

## 2023-12-05 NOTE — CONSULTS
Inpatient Nutrition Assessment    Admit Date: 11/17/2023   Total duration of encounter: 18 days     Nutrition Recommendation/Prescription     -Oral diet per MD, currently NPO.     -Advance tube feeds 10ml q6-8hr as tolerated to goal:  Goal tube feeding recommendations:  Novasource @ 45ml/hr (goal rate) will provide:   1800kcals (109% est needs), 81g protein (98% est needs), 648ml free water.   Free water flush recommendations: 30ml q4hr for tube patency while receiving TPN.    -Wean to discontinue TPN as tube feeds are increased.     -Electrolytes and additional fluids per pharmacy/MD    Communication of Recommendations: reviewed with provider and reviewed with nurse    Nutrition Assessment     Malnutrition Assessment/Nutrition-Focused Physical Exam    Malnutrition Context: acute illness or injury (11/18/23 1630)  Malnutrition Level: severe (11/18/23 1630)  Energy Intake (Malnutrition): less than or equal to 50% for greater than or equal to 5 days (11/18/23 1630)  Weight Loss (Malnutrition): greater than 7.5% in 3 months (11/18/23 1630)  Subcutaneous Fat (Malnutrition): moderate depletion (11/18/23 1630)  Orbital Region (Subcutaneous Fat Loss): moderate depletion        Muscle Mass (Malnutrition): moderate depletion (11/18/23 1630)  Confucianist Region (Muscle Loss): moderate depletion     Clavicle and Acromion Bone Region (Muscle Loss): moderate depletion        Patellar Region (Muscle Loss): moderate depletion                 A minimum of two characteristics is recommended for diagnosis of either severe or non-severe malnutrition.    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  SIRS versus sepsis- improved  Bilateral hydronephrosis with worsening renal function, reconsulted Urology  POORNIMA  New onset atrial fibrillation with RVR   Acute Blood loss anemia with syncope and then fall 11/26  Hypokalemia- resolved with replacement  Metabolic acidosis  Severe malnutrition    Relevant Medical History:   Past Medical History:   Diagnosis Date    Hypertension     Sciatica      Past Surgical History:   Procedure Laterality Date    CARPAL TUNNEL RELEASE Left     CYSTOSCOPY W/ URETERAL STENT PLACEMENT Bilateral 11/27/2023    Procedure: CYSTOSCOPY, WITH URETERAL STENT INSERTION;  Surgeon: Huey Cardenas MD;  Location: [facility] OR;  Service: Urology;  Laterality: Bilateral;    EGD, WITH CLOSED BIOPSY  11/18/2023    Procedure: EGD, WITH CLOSED BIOPSY;  Surgeon: Alfred Goss MD;  Location: [facility] OR;  Service: Gastroenterology;;    ERCP N/A 11/18/2023    Procedure: ERCP (ENDOSCOPIC RETROGRADE CHOLANGIOPANCREATOGRAPHY);  Surgeon: Alfred Goss MD;  Location: [facility] OR;  Service: Gastroenterology;  Laterality: N/A;    HEMORRHOID SURGERY      INSERTION OF TUNNELED CENTRAL VENOUS CATHETER (CVC) WITH SUBCUTANEOUS PORT N/A 12/1/2023    Procedure: INSERTION, PORT-A-CATH;  Surgeon: William Morse MD;  Location: [facility] OR;  Service: General;  Laterality: N/A;    LAPAROSCOPIC INSERTION OF JEJUNOSTOMY TUBE N/A 12/1/2023    Procedure: INSERTION, JEJUNOSTOMY TUBE, LAPAROSCOPIC;  Surgeon: William Morse MD;  Location: [facility] OR;  Service: General;  Laterality: N/A;  [facility]     Review of patient's allergies indicates:  No Known Allergies     Nutrition-Related Medications:    apixaban  5 mg Oral BID    metoprolol tartrate  12.5 mg Oral BID    midodrine  10 mg Oral TID WM    pantoprazole  40 mg Intravenous BID WM    piperacillin-tazobactam (Zosyn) IV (PEDS and  "ADULTS) (extended infusion is not appropriate)  4.5 g Intravenous Q12H    sodium bicarbonate  1,300 mg Oral BID    sodium chloride 0.9%  10 mL Intravenous Q6H       amino acid 5% in 15% dextrose (CLINIMIX-E) solution (1L provides 50gm AA, 150gm CHO (510 kcal/L dextrose), Na 35, K 30, Mg 5, Ca 4.5, Acetate 80, Cl 39, Phos 15) (710 total kcal/L)             Calorie Containing IV Medications: Clinimix    Nutrition-Related Labs:  No results found for: "HGBA1C"  12/5/2023: Magnesium Level 2.20 mg/dL (Ref range: 1.60 - 2.60 mg/dL); Phosphorus Level 4.4 mg/dL (Ref range: 2.3 - 4.7 mg/dL); Potassium Level 4.4 mmol/L (Ref range: 3.5 - 5.1 mmol/L); Sodium Level 129 mmol/L (L; Ref range: 136 - 145 mmol/L)   Recent Labs   Lab 12/03/23  0531 12/04/23  0546 12/05/23  0455   WBC 20.41* 21.83* 24.01  24.01*   RBC 3.33* 3.66* 3.14*   HGB 10.1* 10.9* 9.4*   HCT 29.1* 32.0* 27.6*   MCV 87.4 87.4 87.9   MCH 30.3 29.8 29.9   MCHC 34.7 34.1 34.1       Recent Labs   Lab 12/03/23  0531 12/04/23  0546 12/05/23  0455   * 132* 129*   K 4.0 4.7 4.4   CO2 25 23 24   BUN 48.7* 62.0* 51.3*   CREATININE 4.64* 5.81* 4.55*   GLUCOSE 118* 103 326*   CALCIUM 8.4 8.6 8.2*   MG 2.10 2.30 2.20   ALBUMIN 1.5* 1.5* 1.8*   ALKPHOS 109 126 125   ALT 19 23 23   AST 19 26 30   BILITOT 4.6* 5.6* 5.1*         Diet/PN Order: Diet NPO  amino acid 5% in 15% dextrose (CLINIMIX-E) solution (1L provides 50gm AA, 150gm CHO (510 kcal/L dextrose), Na 35, K 30, Mg 5, Ca 4.5, Acetate 80, Cl 39, Phos 15) (710 total kcal/L)  Oral Supplement Order: none  Tube Feeding Order: none  Appetite/Oral Intake: NPO/NPO  Factors Affecting Nutritional Intake: altered gastrointestinal function, decreased appetite, and nausea  Food/Scientologist/Cultural Preferences: none reported  Food Allergies: none reported    Skin Integrity: drain/device(s)  Wound(s):   n/a    Comments    11/18/23: Pt reports poor appetite, nauseated, threw up after consuming ~10% of full liquid tray for lunch, " "appetite has been a struggle for 3 months over which time she lost about 30 lbs unintentionally, appetite has been worse since 11/10 incomplete cholecystectomy, chews/swallows well, agrees to vanilla ONS on diet advancement.     11/22/23: Pt with poor to fair intake of full liquids; states that she is still having some n/v occasionally; reports that she is drinking the Boost that is ordered.     11/24/23: Pt reports poor appetite, still w/ n/v, basically vomits up everything she consumes. Discussed w/ physician, we agree that she needs a J-tube. Pt reports that she is open to it.     11/26/23: breakfast: 0% tray; lunch- unknown, no tray receipt; dinner- 100% 2 orange sheberts and 1/2 bowl chicken broth. Consuming <25% energy needs.    11/27/23: breakfast: 50% cream of wheat and 100% orange juice; lunch: NPO for stent placements, but sx deferred until tomorrow so will allow liquids for dinner tonight.   Continues with emesis and nausea throughout day; receiving antiemetics. States Boost supplement also "comes back up". Will change to Boost Breeze and monitor tolerance.  Changing peripheral PN to total PN with lipids 2x/week to meet nutritional needs until able to place enteral access device for tube feeds or po intake improves. Discussed with MD and pharmacy.     11/30/23 TPN continues. Decreased rate to 50ml/hr until tomorrow and will order custom TPN for minimum volume until able to start on enteral nutrition. Started on dialysis s/t worsening renal indices and decreased urine output (<600mL x24 hr per RN). Glucose lab of 547 this morning error, 159 on recheck.     12/1/23 Out of room for Jtube and mediport placement. Consult received for Jtube feeding recs. Continue in-house TPN at 50ml/hr, no need for custom at this time. Can wean TPN as tolerating tube feeds at 1/2 goal rate. Will use renal formula for tube feedings while pt continues on dilaysis.  Recommendations discussed with RN and pharmacy.     12/4/23 " "Consult received to start tube feedings. Order placed and discussed with RN. Start at 15ml/hr and advance per MD. Continue with renal formula and monitor electrolytes for ability to change to a standard formula. TPN infusing and NPO status continues.      23 Tolerating tube feeds at 25ml/hr. TPN decreased to 30ml/hr last night. Pt reports tolerating tube feeds fine. TPN bag should be complete in the next few hours. Can discontinue as long as tolerating tube feeds.    Anthropometrics    Height: 5' 5.75" (167 cm) Height Method: Stated  Last Weight: 82.1 kg (181 lb) (23 0942) Weight Method: Standard Scale  BMI (Calculated): 29.4  BMI Classification: overweight (BMI 25-29.9)     Ideal Body Weight (IBW), Female: 128.75 lb     % Ideal Body Weight, Female (lb): 140.58 %                             Usual Weight Provided By: patient and EMR weight history  23: 211 lbs  Wt Readings from Last 5 Encounters:   23 82.1 kg (181 lb)   18 88 kg (194 lb)   18 90.4 kg (199 lb 4.7 oz)   17 90 kg (198 lb 6.6 oz)     Weight Change(s) Since Admission: 23 no updated wt   Admit Weight: 82.2 kg (181 lb 4 oz) (23 3175)    Estimated Needs    Weight Used For Calorie Calculations: 82.2 kg (181 lb 3.5 oz)  Energy Calorie Requirements (kcal): 1644 kcals (20 kcals/kg)  Energy Need Method: Kcal/kg  Weight Used For Protein Calculations: 82.2 kg (181 lb 3.5 oz)  Protein Requirements:  g (1.0-1.3 g/kg)  Fluid Requirements (mL): 1000mL + UOP (started on HD)  Temp (24hrs), Av.9 °F (37.2 °C), Min:98.4 °F (36.9 °C), Max:99.5 °F (37.5 °C)       Enteral Nutrition    Formula: Novasource Renal  Rate/Volume: 25ml/hr  Water Flushes: 30ml q4hr (150ml free water)  Additives/Modulars: none at this time  Route: jejunostomy tube  Method: continuous  Total Nutrition Provided by Tube Feeding, Additives, and Flushes:  Calories Provided  1000 kcal/d, 60% needs   Protein Provided  45 g/d, 54% needs   Fluid " Provided  510 ml/d, -% needs   Continuous feeding calculations based on estimated 20 hr/d run time unless otherwise stated.    Parenteral Nutrition    Standard Formula: Clinimix E 5/15  Custom Formula: not applicable  Additives: none  Rate/Volume: 30ml/hr  Lipids: none  Total Nutrition Provided by Parenteral Nutrition:  Calories Provided  511 kcal/d, 31% needs   Protein Provided  36 g/d, 44% needs   Dextrose Provided  108 g/d, GIR 0.9 mg CHO/kg/min   Fluid Provided  720 ml/d, -% needs     Evaluation of Received Nutrient Intake    Calories: meeting estimated needs  Protein: meeting estimated needs    Patient Education    Not applicable.    Nutrition Diagnosis     PES: Malnutrition related to suboptimal protein/energy intake as evidenced by less than or equal to 50% needs met for greater than or equal to 5 days, moderate fat depletion, moderate muscle depletion, and greater than 7.5% weight loss in 3 months. (active)     Interventions/Goals     Intervention(s): modified composition of enteral nutrition, modified rate of enteral nutrition, modified rate of parenteral nutrition, and collaboration with other providers  Goal: Meet greater than 75% of nutritional needs by follow-up. (goal not met)    Monitoring & Evaluation     Dietitian will monitor energy intake, weight, electrolyte/renal panel, and gastrointestinal profile.  Nutrition Risk/Follow-Up: high (follow-up in 1-4 days)   Please consult if re-assessment needed sooner.    Lisa Correa RD   12/05/2023

## 2023-12-05 NOTE — PROGRESS NOTES
[facility]  Hospital Medicine Progress Note        Chief Complaint: Inpatient Follow-up for intractable nausea vomiting due to gastric adenocarcinoma    HPI: 58-year-old female with medical history of hypertension who recently underwent laparoscopic cholecystectomy on 11/10/2023, procedure was incomplete and was unable to completely resect the gallbladder.  Since surgery she continued to have right flank/back pain and followed up with her PCP and CT abdomen and pelvis on 11/17/2023 revealed left-sided hydronephrosis with suspected distal obstruction/no clear stone visualized, postoperative changes of cholecystectomy with fluid in the gallbladder fossa may reflect postoperative seroma but biloma can not be entirely excluded, also noted for marked thickening of the stomach wall diffusely may be related to mesenteric edema/reactive.  She [place] [facility] [place] the same day 11/17/2023 and her labs notable for WBC 9.0, hemoglobin 12.4, platelets 442, creatinine 0.86, total bilirubin 8.7 with direct fraction 6.7, alkaline phosphatase 491, , .  Patient's surgeon was consulted and recommended ERCP which was not available at [facility] for which she was transferred to [facility] and referred to hospital medicine service for further evaluation and management.   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  Severe inflammation and oozing of blood noted proximal gastric lumen.  Unable to advance scope into the duodenum.  Surgical oncology consulted, IR consulted for external drain placement which was done November 21.  November 24th she developed new onset atrial fibrillation with RVR and Cardiology consulted.  Started on amiodarone drip  Unfortunately patient had acute blood loss due to hemorrhage from the midline site that was removed.  Patient was unaware of the bleed.  Became very weak, passed out.  Code was called but she came around.   Stat H&H done which was slightly lower but stable, MRI of the brain was negative for any acute ischemic changes. Pt is aware of gastric cancer diagnosis   GI following--> 11/18- EGD shows normal esophagus, malignant gastric tumor in the cardia.  Inflamed mucosa and ooze of blood throughout the proximal gastric lumen.  Gastric antrum scar would not allow advancement of scope into the duodenal ampulla area therefore biliary cannulation was not possible for bile leak evaluation  Pathology shows marked chronic gastritis with intestinal metaplasia, gastric cardia biopsy shows adenocarcinoma, moderately differentiated intestinal type   bilateral hydronephrosis with left greater than right  MRI brain without contrast-negative for acute finding  PET scan reviewed with patient by Oncology reports of locally advanced gastric adenocarcinoma and plans for systemic therapy outpatient if functional, nutritional and renal function improves. \  MediPort placement by surgical Oncology on 12/1, oncology on board plans for systemic therapy outpatient if functional, nutritional and renal function improves.  obstructive uropathy with bilateral hydronephrosis status post bilateral ureteral stent placed on 11/27 by Urology- no improvement in renal function, patient opted to have hemodialysis and a right-sided hemodialysis catheter has been placed by General surgery on 11/29, 2 continue hemodialysis per nephrology discretion.  Patient with symptoms of nausea and vomiting can not keep anything down in her stomach complicated by severe malnutrition on IV Clinimix and supportive medications for nausea and vomiting. Palliative care on board    Patient got a MediPort and J-tube placed on 12/01  Cystogram reviewed by Urology with patent stents and recommends outpatient follow-up with Urology  White cell count elevated at 20.4, Infectious Disease had started on IV Zosyn given concern for possible sepsis with low blood pressure on 12/02   CIS  evaluated patient to begin low-dose metoprolol tartrate at 12.5 mg b.i.d. and resume Eliquis for stroke prevention       Interval Hx:   Patient is sitting in bed comfortable.  Reports feels much better.  Noted has G-tube and feeds on  Discussed need for dialysis chair time given now she is on dialysis.   Reportedly lives at home with her boyfriend  States she plans to meet with the oncologist regarding treatment plan wants discharge  Currently no family at bedside  Case was discussed with patient's nurse and  on the floor.    Objective/physical exam:  General: In no acute distress, afebrile  Chest: Clear to auscultation bilaterally  Heart: RRR, +S1, S2, no appreciable murmur  Abdomen: Soft, nontender, BS +  MSK: Warm, no lower extremity edema, no clubbing or cyanosis  Neurologic: Alert and oriented x4, Cranial nerve II-XII intact, Strength 5/5 in all 4 extremities    VITAL SIGNS: 24 HRS MIN & MAX LAST   Temp  Min: 98.4 °F (36.9 °C)  Max: 99.5 °F (37.5 °C) 99.1 °F (37.3 °C)   BP  Min: 97/66  Max: 109/73 97/66   Pulse  Min: 91  Max: 124  (!) 112   Resp  Min: 17  Max: 20 20   SpO2  Min: 97 %  Max: 99 % 97 %     I reviewed the labs below:  Recent Labs   Lab 12/03/23 0531 12/04/23  0546 12/05/23  0455   WBC 20.41* 21.83* 24.01  24.01*   RBC 3.33* 3.66* 3.14*   HGB 10.1* 10.9* 9.4*   HCT 29.1* 32.0* 27.6*   MCV 87.4 87.4 87.9   MCH 30.3 29.8 29.9   MCHC 34.7 34.1 34.1   RDW 19.1* 19.8* 18.9*   * 510* 447*   MPV 10.2 10.6* 9.9     Recent Labs   Lab 12/03/23  0531 12/04/23  0546 12/05/23  0455   * 132* 129*   K 4.0 4.7 4.4   CO2 25 23 24   BUN 48.7* 62.0* 51.3*   CREATININE 4.64* 5.81* 4.55*   CALCIUM 8.4 8.6 8.2*   MG 2.10 2.30 2.20   ALBUMIN 1.5* 1.5* 1.8*   ALKPHOS 109 126 125   ALT 19 23 23   AST 19 26 30   BILITOT 4.6* 5.6* 5.1*     Microbiology Results (last 7 days)       Procedure Component Value Units Date/Time    Blood Culture [[id number]]  (Normal) Collected: 12/02/23 2053    Order  Status: Completed Specimen: Blood Updated: 12/04/23 2200     CULTURE, BLOOD ([facility]) No Growth At 48 Hours    Blood Culture [[id number]]  (Normal) Collected: 12/02/23 2053    Order Status: Completed Specimen: Blood Updated: 12/04/23 2200     CULTURE, BLOOD ([facility]) No Growth At 48 Hours           See below for Radiology    Scheduled Med:   apixaban  5 mg Oral BID    metoprolol tartrate  12.5 mg Oral BID    midodrine  10 mg Oral TID WM    pantoprazole  40 mg Intravenous BID WM    piperacillin-tazobactam (Zosyn) IV (PEDS and ADULTS) (extended infusion is not appropriate)  4.5 g Intravenous Q12H    sodium bicarbonate  1,300 mg Oral BID    sodium chloride 0.9%  10 mL Intravenous Q6H      Continuous Infusions:   amino acid 5% in 15% dextrose (CLINIMIX-E) solution (1L provides 50gm AA, 150gm CHO (510 kcal/L dextrose), Na 35, K 30, Mg 5, Ca 4.5, Acetate 80, Cl 39, Phos 15) (710 total kcal/L)        PRN Meds:  0.9%  NaCl infusion (for blood administration), sodium chloride 0.9%, acetaminophen, aluminum-magnesium hydroxide-simethicone, bisacodyL, chlorproMAZINE, dicyclomine, hydrALAZINE, hyoscyamine, melatonin, metoclopramide HCl, metoprolol, morphine, ondansetron, oxyCODONE, polyethylene glycol, prochlorperazine, promethazine, senna-docusate 8.6-50 mg, sodium chloride 0.9%, Flushing PICC/Midline Protocol **AND** sodium chloride 0.9% **AND** sodium chloride 0.9%     Assessment/Plan:  Hypotension, improving  Persistent leukocytosis- source unclear  Acute on chronic anemia, s/p 2 units prbcs transfusion 11/30  Hx of Acute Blood loss anemia with syncope and then fall 11/26-   Locally advanced gastric adenocarcinoma with intractable nausea and vomiting possible Gastric/duodenal outlet obstruction  -now status post J Tube placement  Obstructive uropathy with bilateral hydronephrosis- S/P bilateral ureteral stent placed on 11/27  POORNIMA-now on HD - 11/29  Metabolic acidosis- resolved  Suspected bile leak with biloma and biliary  obstruction--> H/O Laparoscopic incomplete cholecystectomy 11/10/2023  New onset atrial fibrillation with RVR- fluctuating  Hypokalemia- resolved with replacement  Hyponatremia  History of hypertension and DDD/sciatica  Severe malnutrition      Begin j tube feed with Novasource per Surg oncology recs   Noted WBC continued to trend up- now 24 K.   Infectious disease Dr Carlisle on board, given all cultures has been negative, recommended to discontinue IV antibiotics and monitor her off of antibiotics for now  Trend hemoglobin levels and keep hemoglobin greater than 7- 8 grams/dL --> noted slowly trending down again  S/p 2 units on 11/26, then 2 units 11/30-- will verify with blood bank tomorrow   Nephrology on board, HD per their discretion as patient renal functions did not recover despite bilateral ureteral stenting  Nephrology requested vascular to place tunneled catheter for continued hemodialysis need on Monday Wednesday and Friday  CIS evaluated patient to begin low-dose metoprolol tartrate at 12.5 mg b.i.d. and resume Eliquis for stroke prevention  Patient got a MediPort and J-tube placed on 12/01  Started on tube feed on NovaSource at 45 mL/hour goal rate, wean TPN once tube feeds at goal  Cystogram reviewed by Urology with patent stents and recommends outpatient follow-up with Urology  Appreciate oncology input plans for systemic therapy outpatient if renal function improves   Palliative Care on board- patient wants everything done, plan to go to her oncology appointment and discussed options of treatment  IM Bentyl p.r.n. for abdominal pain and spasm   IM thorazine prn hiccups   Morning CBC, renal functions ordered     VTE prophylaxis:  eliquis      Patient condition:  Guarded     Anticipated discharge and Disposition:   TBD    Critical care note:  Critical care diagnosis:  Prolonged NPO on TPN awaiting goals tube feeds  Critical care interventions: Hands-on evaluation, review of labs/radiographs/records and  discussion with patient and family if present  Critical care time spent: 35 minutes        All diagnosis and differential diagnosis have been reviewed; assessment and plan has been documented; I have personally reviewed the labs and test results that are presently available; I have reviewed the patients medication list; I have reviewed the consulting providers response and recommendations. I have reviewed or attempted to review medical records based upon their availability    All of the patient's questions have been  addressed and answered. Patient's is agreeable to the above stated plan. I will continue to monitor closely and make adjustments to medical management as needed.  _____________________________________________________________________    Nutrition Status:  Patient meets ASPEN criteria for severe malnutrition of acute illness or injury per RD assessment as evidenced by:  Energy Intake (Malnutrition): less than or equal to 50% for greater than or equal to 5 days  Weight Loss (Malnutrition): greater than 7.5% in 3 months  Subcutaneous Fat (Malnutrition): moderate depletion  Muscle Mass (Malnutrition): moderate depletion           A minimum of two characteristics is recommended for diagnosis of either severe or non-severe malnutrition.   Radiology:   I have personally reviewed the images and agree with radiologist report  FL Cystogram Retrograde (xpd)  EXAMINATION  FL Cystogram Retrograde    CLINICAL HISTORY  gravity cystogram to eval for bilateral stent reflux; please use at least 300ml contrast;    TECHNIQUE  Water-soluble contrast administered into the urinary bladder via Jin catheter under live fluoroscopy.    CONTRAST  Cystografin, 300 mL    COMPARISON  29 November 2023    FINDINGS  : Right upper quadrant internal/external biliary drain remains in similar position.  There is also similar appearance of bilateral ureteral stents.  A new curvilinear tube suggestive of gastrostomy projects over the  "left mid abdomen.  There are no findings to suggest high-grade bowel obstruction or new/worsened intra-abdominal mass effect.  Residual oral contrast media opacifies the colon, similar to the prior study.  Regional osseous structures are unchanged.    CYSTOGRAPHY: The urinary bladder demonstrates no evidence of abnormal filling defects. The wall contour is smooth. There is no ureteral reflux bilaterally. No extraluminal contrast spillage is appreciated. There is gross contrast reflux and retrograde progression to the level of the renal central collecting system at the point of approximately 200 mL contrast filling of the urinary bladder.  Additional contrast administration resulted in opacification of the dilated right renal pelvis.  At the point of maximal filling, there was no convincing contrast or grossly visualized dilatation of the left central collecting system.    IMPRESSION  1. Widely patent bilateral ureters/stents, with contrast reflux to the central collecting systems.  2. Contrast filling of moderate dilated right renal pelvis.  No significant contrast visualized within the left renal pelvis.  3. No evidence of acute or focal abnormality of the urinary bladder.    RADIATION DOSE  Lowest-rate pulsed fluoroscopy and "last image capture" technique were utilized in order to minimize radiation exposure.    *Fluoro: 30 seconds  *DAP: 1874.7 uGy x m^2  *Dose: 27.1 mGy  *Number of images: 16    Electronically signed by: Tay Lynch  Date:    12/01/2023  Time:    19:08  X-Ray Chest 1 View  Narrative: EXAMINATION:  XR CHEST 1 VIEW    CLINICAL HISTORY:  Other specified symptoms and signs involving the circulatory and respiratory systems    TECHNIQUE:  Single frontal view of the chest was performed.    COMPARISON:  11/29/2023    FINDINGS:  LINES AND TUBES: Left subclavian pam catheter tip terminates at the SVC.  Right IJ sheath tip projects over the SVC.  Right upper extremity PICC tip projects over the superior " cavoatrial junction.  EKG/telemetry leads overlie the chest. .    MEDIASTINUM AND SARA: Cardiac silhouette is at the upper limits of normal, likely exaggerated by portable technique.    LUNGS: Lung volumes are low with associated atelectatic change.    PLEURA:No pleural effusion. No pneumothorax.    OTHER: No acute osseous abnormality.  Impression: Mild retrocardiac atelectasis.    Electronically signed by: Sulema Solorzano  Date:    12/01/2023  Time:    10:43  SURG FL Surgery Fluoro Usage  See OP Notes for results.     IMPRESSION: See OP Notes for results.     This procedure was auto-finalized by: Virtual Radiologist      Nick Plummer MD  Department of Hospital Medicine   [facility]   12/05/2023

## 2023-12-05 NOTE — PROGRESS NOTES
Patient Name: Karen Briggs   MRN: [id number]   Admission Date: 11/17/2023   Hospital Length of Stay: 18   Attending Provider: Dalton Palacios MD   Consulting Provider: Viktor Landa M.D.  Resident: Baylee Gonzalez MD   Reason for Consult: Goals of Care  Primary Care Physician: Marian White FNP-C     Principal Problem: <principal problem not specified>     Patient information was obtained from patient, spouse/SO, ER records, and primary team.      Final diagnoses:  [N13.30] Hydronephrosis     Assessment/Plan:     We reviewed the patient's current clinical status with the nurse. We reviewed clinical documentation, labs and imaging.     Symptom management review: She denies pain, dypsnea, dysuria. She has intermittent nausea independent from tube feeds, anti-emetics help with this if dissolved enough to swallow. Tolerating tube feedings well. We met with the patient, who was resting in bed. She denied any new complaints at this time. She is s/p jejeunostomy, mediport, and PEG tube placements. No recent hemorrhaging. Patient is tolerating HD with schedule of MWF and last session was yesterday, 12/4/23. BP has improved with help of HD. Outpatient hemodialysis placement pending. Per nephrology, plan to consult vascular surgery for tunnel cath line placement in progress. Patient states she would like to return home with support rather than be sent to skilled nursing facility. Of note, patient does not have legal significant other as patient has  but not  to current domestic partner.     History of Present Illness:     59 y/o F h/o recent Lap grace with incomplete removal, subsequent admission with acute biliary obstruction, transaminitis, s/p biliary drain, bilateral ureteral obstruction s/p bilateral ureteral stents and N&V with findings of a GOO, requiring TPN due to NPO status. She had EGD with biopsy and findings of adenocarcinoma of the stomach. She is tolerating PEG tube feedings  and HD schedule. We were consulted to review goals of care.      Active Ambulatory Problems     Diagnosis Date Noted    No Active Ambulatory Problems     Resolved Ambulatory Problems     Diagnosis Date Noted    No Resolved Ambulatory Problems     Past Medical History:   Diagnosis Date    Hypertension     Sciatica         Past Surgical History:   Procedure Laterality Date    CARPAL TUNNEL RELEASE Left     CYSTOSCOPY W/ URETERAL STENT PLACEMENT Bilateral 11/27/2023    Procedure: CYSTOSCOPY, WITH URETERAL STENT INSERTION;  Surgeon: Huey Cardenas MD;  Location: [facility] OR;  Service: Urology;  Laterality: Bilateral;    EGD, WITH CLOSED BIOPSY  11/18/2023    Procedure: EGD, WITH CLOSED BIOPSY;  Surgeon: Alfred Goss MD;  Location: [facility] OR;  Service: Gastroenterology;;    ERCP N/A 11/18/2023    Procedure: ERCP (ENDOSCOPIC RETROGRADE CHOLANGIOPANCREATOGRAPHY);  Surgeon: Alfred Goss MD;  Location: [facility] OR;  Service: Gastroenterology;  Laterality: N/A;    HEMORRHOID SURGERY      INSERTION OF TUNNELED CENTRAL VENOUS CATHETER (CVC) WITH SUBCUTANEOUS PORT N/A 12/1/2023    Procedure: INSERTION, PORT-A-CATH;  Surgeon: William Morse MD;  Location: [facility] OR;  Service: General;  Laterality: N/A;    LAPAROSCOPIC INSERTION OF JEJUNOSTOMY TUBE N/A 12/1/2023    Procedure: INSERTION, JEJUNOSTOMY TUBE, LAPAROSCOPIC;  Surgeon: William Morse MD;  Location: [facility] OR;  Service: General;  Laterality: N/A;  PLUS [facility]        Review of patient's allergies indicates:  No Known Allergies       Current Facility-Administered Medications:     0.9%  NaCl infusion (for blood administration), , Intravenous, Q24H PRN, Almaz Cheney, FNP    0.9%  NaCl infusion, , Intravenous, PRN, Hussein Merino MD, Stopped at 11/19/23 1049    acetaminophen tablet 650 mg, 650 mg, Oral, Q4H PRN, Hussein Merino MD, 650 mg at 11/28/23 2128    aluminum-magnesium hydroxide-simethicone 200-200-20 mg/5 mL suspension 30 mL, 30 mL, Oral, QID PRN,  Hussein Merino MD    amino acid 5% in 15% dextrose (CLINIMIX-E) solution (1L provides 50gm AA, 150gm CHO (510 kcal/L dextrose), Na 35, K 30, Mg 5, Ca 4.5, Acetate 80, Cl 39, Phos 15) (710 total kcal/L), , Intravenous, Continuous, Dalton Palacios MD    apixaban tablet 5 mg, 5 mg, Oral, BID, Dalton Palacios MD, 5 mg at 12/05/23 0916    bisacodyL suppository 10 mg, 10 mg, Rectal, Daily PRN, Hussein Merino MD    chlorproMAZINE injection 25 mg, 25 mg, Intramuscular, QID PRN, Dalton Palacios MD, 25 mg at 12/05/23 1102    dicyclomine injection 20 mg, 20 mg, Intramuscular, QID PRN, Dalton Palacios MD, 20 mg at 11/30/23 1754    hydrALAZINE injection 10 mg, 10 mg, Intravenous, Q6H PRN, Kenney Steiner MD    hyoscyamine ODT 0.125 mg, 0.125 mg, Sublingual, Q4H PRN, Sara Waldrop AGACNP-BC, 0.125 mg at 11/30/23 1735    melatonin tablet 6 mg, 6 mg, Oral, Nightly PRN, Hussein Merino MD, 6 mg at 11/28/23 2128    metoclopramide HCl injection 5 mg, 5 mg, Intravenous, Q6H PRN, Dalton Palacios MD, 5 mg at 12/01/23 1802    metoprolol injection 5 mg, 5 mg, Intravenous, Q4H PRN, Genaro Crawford, FNP    metoprolol tartrate (LOPRESSOR) split tablet 12.5 mg, 12.5 mg, Oral, BID, Genaro Crawford, TENAP, 12.5 mg at 12/05/23 0916    midodrine tablet 10 mg, 10 mg, Oral, TID WM, Dalton Palacios MD, 10 mg at 12/05/23 1305    morphine injection 4 mg, 4 mg, Intravenous, Q4H PRN, Hussein Merino MD, 4 mg at 12/02/23 0217    ondansetron injection 4 mg, 4 mg, Intravenous, Q4H PRN, Dalton Palacios MD    oxyCODONE immediate release tablet 5 mg, 5 mg, Oral, Q6H PRN, Hussein Merino MD, 5 mg at 12/04/23 2023    pantoprazole injection 40 mg, 40 mg, Intravenous, BID WM, Dalton Palacios MD, 40 mg at 12/05/23 0916    polyethylene glycol packet 17 g, 17 g, Oral, BID PRN, Hussein Merino MD    prochlorperazine injection Soln 5 mg, 5 mg, Intravenous, Q6H PRN, Hussein Merino MD, 5 mg at 11/22/23 1347    promethazine injection 25 mg, 25 mg, Intramuscular, Q4H PRN,  "Josep Lyssa G, AGACNP-BC, 25 mg at 11/23/23 1620    senna-docusate 8.6-50 mg per tablet 2 tablet, 2 tablet, Oral, BID PRN, Hussein Merino MD    sodium bicarbonate tablet 1,300 mg, 1,300 mg, Oral, BID, Dalton Palacios MD, 1,300 mg at 12/04/23 2022    sodium chloride 0.9% flush 10 mL, 10 mL, Intravenous, PRN, Hussein Merino MD    Flushing PICC/Midline Protocol, , , Until Discontinued **AND** sodium chloride 0.9% flush 10 mL, 10 mL, Intravenous, Q6H, 10 mL at 12/05/23 1103 **AND** sodium chloride 0.9% flush 10 mL, 10 mL, Intravenous, PRN, Kenney Steiner MD     0.9%  NaCl infusion (for blood administration), sodium chloride 0.9%, acetaminophen, aluminum-magnesium hydroxide-simethicone, bisacodyL, chlorproMAZINE, dicyclomine, hydrALAZINE, hyoscyamine, melatonin, metoclopramide HCl, metoprolol, morphine, ondansetron, oxyCODONE, polyethylene glycol, prochlorperazine, promethazine, senna-docusate 8.6-50 mg, sodium chloride 0.9%, Flushing PICC/Midline Protocol **AND** sodium chloride 0.9% **AND** sodium chloride 0.9%     Family History   Problem Relation Age of Onset    Breast cancer Mother     Cancer Maternal Aunt         colon cancer        Review of Systems   Constitutional:  Positive for activity change and fatigue.   HENT:  Negative for sore throat and trouble swallowing.    Respiratory:  Negative for shortness of breath.    Cardiovascular:  Negative for leg swelling.   Gastrointestinal:  Negative for abdominal pain, constipation and nausea.   Genitourinary:  Negative for difficulty urinating.   Musculoskeletal:  Negative for arthralgias and myalgias.   Psychiatric/Behavioral:  Negative for agitation and confusion.             Objective:   /70   Pulse 108   Temp 98.5 °F (36.9 °C) (Oral)   Resp 18   Ht 5' 5.75" (1.67 m)   Wt 82.1 kg (181 lb)   SpO2 97%   Breastfeeding No   BMI 29.44 kg/m²      Physical Exam  Vitals reviewed.   Constitutional:       General: She is not in acute distress.     Appearance: She " is ill-appearing. She is not toxic-appearing.   HENT:      Head: Normocephalic.      Right Ear: External ear normal.      Left Ear: External ear normal.      Nose: Nose normal.      Mouth/Throat:      Mouth: Mucous membranes are moist.      Pharynx: Oropharynx is clear.   Eyes:      Extraocular Movements: Extraocular movements intact.      Conjunctiva/sclera: Conjunctivae normal.      Pupils: Pupils are equal, round, and reactive to light.   Cardiovascular:      Rate and Rhythm: Normal rate and regular rhythm.      Pulses: Normal pulses.      Heart sounds: Normal heart sounds.   Pulmonary:      Effort: Pulmonary effort is normal.      Breath sounds: Normal breath sounds.   Abdominal:      General: There is no distension.      Tenderness: There is no abdominal tenderness. There is no guarding.   Musculoskeletal:      Right lower leg: Edema present.      Left lower leg: Edema present.   Skin:     General: Skin is warm.   Neurological:      General: No focal deficit present.      Mental Status: She is alert and oriented to person, place, and time.   Psychiatric:         Mood and Affect: Mood normal.         Behavior: Behavior normal.         Thought Content: Thought content normal.         Judgment: Judgment normal.            Review of Symptoms  Review of Symptoms      Symptom Assessment (ESAS 0-10 Scale)  Pain:  0  Dyspnea:  0  Anxiety:  0  Nausea:  0  Depression:  0  Anorexia:  0  Fatigue:  0  Insomnia:  0  Restlessness:  0  Agitation:  0     CAM / Delirium:  Negative  Constipation:  Negative  Diarrhea:  Negative    Constipation:  No constipation    Bowel Management Plan (BMP):  Yes      Pain Assessment:  OME in 24 hours:  0  Location(s):      Modified [place] Scale:  0    Performance Status:  40    Living Arrangements:  Lives with spouse and Lives in home    Psychosocial/Cultural:   See Palliative Psychosocial Note: No  See assessement  **Primary  to Follow**  Palliative Care  Consult:  No    Spiritual:  F - Sheryl and Belief:  [place]  A - Address in Care:  Pt elects to have  visit     Time-Based Charting:  Yes    Total Time Spent: 0 minutes      Advance Care Planning   Advance Directives:   Living Will: No    LaPOST: No    Do Not Resuscitate Status: No    Medical Power of : No    Agent's Name:  Mango Zepeda   Agent's Contact Number:  [phone]    Decision Making:  Patient answered questions and Family answered questions  Goals of Care: What is most important right now is to focus on remaining as independent as possible, extending life as long as possible, even it it means sacrificing quality. Accordingly, we have decided that the best plan to meet the patient's goals includes continuing with treatment.            Caregiver burden formerly assessed: Yes        > 50% of 28 min of encounter was spent in chart review, face to face discussion of goals of care, symptom assessment, coordination of care and emotional support.     Baylee Gonzalez M.D.  Palliative Medicine  [facility] - [address] [facility] Tele[facility]

## 2023-12-06 LAB
ALBUMIN SERPL-MCNC: 1.6 G/DL (ref 3.4–4.8)
BASOPHILS # BLD AUTO: 0.11 X10(3)/MCL
BASOPHILS NFR BLD AUTO: 0.5 %
BUN SERPL-MCNC: 71.7 MG/DL (ref 9.8–20.1)
CALCIUM SERPL-MCNC: 8.3 MG/DL (ref 8.4–10.2)
CHLORIDE SERPL-SCNC: 95 MMOL/L (ref 98–107)
CO2 SERPL-SCNC: 24 MMOL/L (ref 23–31)
CREAT SERPL-MCNC: 5.79 MG/DL (ref 0.55–1.02)
EOSINOPHIL # BLD AUTO: 0.29 X10(3)/MCL (ref 0–0.9)
EOSINOPHIL NFR BLD AUTO: 1.2 %
ERYTHROCYTE [DISTWIDTH] IN BLOOD BY AUTOMATED COUNT: 18.6 % (ref 11.5–17)
GFR SERPLBLD CREATININE-BSD FMLA CKD-EPI: 8 MLS/MIN/1.73/M2
GLUCOSE SERPL-MCNC: 116 MG/DL (ref 82–115)
HCT VFR BLD AUTO: 25.6 % (ref 37–47)
HGB BLD-MCNC: 9.1 G/DL (ref 12–16)
IMM GRANULOCYTES # BLD AUTO: 0.66 X10(3)/MCL (ref 0–0.04)
IMM GRANULOCYTES NFR BLD AUTO: 2.8 %
LYMPHOCYTES # BLD AUTO: 0.54 X10(3)/MCL (ref 0.6–4.6)
LYMPHOCYTES NFR BLD AUTO: 2.3 %
MAGNESIUM SERPL-MCNC: 2.4 MG/DL (ref 1.6–2.6)
MCH RBC QN AUTO: 30.5 PG (ref 27–31)
MCHC RBC AUTO-ENTMCNC: 35.5 G/DL (ref 33–36)
MCV RBC AUTO: 85.9 FL (ref 80–94)
MONOCYTES # BLD AUTO: 1.57 X10(3)/MCL (ref 0.1–1.3)
MONOCYTES NFR BLD AUTO: 6.6 %
NEUTROPHILS # BLD AUTO: 20.73 X10(3)/MCL (ref 2.1–9.2)
NEUTROPHILS NFR BLD AUTO: 86.6 %
NRBC BLD AUTO-RTO: 0 %
PHOSPHATE SERPL-MCNC: 4.1 MG/DL (ref 2.3–4.7)
PLATELET # BLD AUTO: 434 X10(3)/MCL (ref 130–400)
PMV BLD AUTO: 9.6 FL (ref 7.4–10.4)
POTASSIUM SERPL-SCNC: 4.3 MMOL/L (ref 3.5–5.1)
RBC # BLD AUTO: 2.98 X10(6)/MCL (ref 4.2–5.4)
SODIUM SERPL-SCNC: 132 MMOL/L (ref 136–145)
WBC # SPEC AUTO: 23.9 X10(3)/MCL (ref 4.5–11.5)

## 2023-12-06 PROCEDURE — 80069 RENAL FUNCTION PANEL: CPT | Performed by: INTERNAL MEDICINE

## 2023-12-06 PROCEDURE — 99233 SBSQ HOSP IP/OBS HIGH 50: CPT | Mod: FS,,, | Performed by: GENERAL PRACTICE

## 2023-12-06 PROCEDURE — 25000003 PHARM REV CODE 250: Performed by: NURSE PRACTITIONER

## 2023-12-06 PROCEDURE — 02HV33Z INSERTION OF INFUSION DEVICE INTO SUPERIOR VENA CAVA, PERCUTANEOUS APPROACH: ICD-10-PCS | Performed by: STUDENT IN AN ORGANIZED HEALTH CARE EDUCATION/TRAINING PROGRAM

## 2023-12-06 PROCEDURE — 25000003 PHARM REV CODE 250: Performed by: STUDENT IN AN ORGANIZED HEALTH CARE EDUCATION/TRAINING PROGRAM

## 2023-12-06 PROCEDURE — 77001 FLUOROGUIDE FOR VEIN DEVICE: CPT | Mod: 26,,, | Performed by: STUDENT IN AN ORGANIZED HEALTH CARE EDUCATION/TRAINING PROGRAM

## 2023-12-06 PROCEDURE — 36558 INSERT TUNNELED CV CATH: CPT | Mod: RT | Performed by: STUDENT IN AN ORGANIZED HEALTH CARE EDUCATION/TRAINING PROGRAM

## 2023-12-06 PROCEDURE — 0JH63XZ INSERTION OF TUNNELED VASCULAR ACCESS DEVICE INTO CHEST SUBCUTANEOUS TISSUE AND FASCIA, PERCUTANEOUS APPROACH: ICD-10-PCS | Performed by: STUDENT IN AN ORGANIZED HEALTH CARE EDUCATION/TRAINING PROGRAM

## 2023-12-06 PROCEDURE — 25000003 PHARM REV CODE 250: Performed by: INTERNAL MEDICINE

## 2023-12-06 PROCEDURE — 36558 INSERT TUNNELED CV CATH: CPT | Mod: RT,,, | Performed by: STUDENT IN AN ORGANIZED HEALTH CARE EDUCATION/TRAINING PROGRAM

## 2023-12-06 PROCEDURE — 63600175 PHARM REV CODE 636 W HCPCS: Performed by: STUDENT IN AN ORGANIZED HEALTH CARE EDUCATION/TRAINING PROGRAM

## 2023-12-06 PROCEDURE — C1750 CATH, HEMODIALYSIS,LONG-TERM: HCPCS | Performed by: STUDENT IN AN ORGANIZED HEALTH CARE EDUCATION/TRAINING PROGRAM

## 2023-12-06 PROCEDURE — 99152 MOD SED SAME PHYS/QHP 5/>YRS: CPT | Mod: ,,, | Performed by: STUDENT IN AN ORGANIZED HEALTH CARE EDUCATION/TRAINING PROGRAM

## 2023-12-06 PROCEDURE — 85025 COMPLETE CBC W/AUTO DIFF WBC: CPT | Performed by: INTERNAL MEDICINE

## 2023-12-06 PROCEDURE — 90935 HEMODIALYSIS ONE EVALUATION: CPT

## 2023-12-06 PROCEDURE — 21400001 HC TELEMETRY ROOM

## 2023-12-06 PROCEDURE — 77001 FLUOROGUIDE FOR VEIN DEVICE: CPT | Performed by: STUDENT IN AN ORGANIZED HEALTH CARE EDUCATION/TRAINING PROGRAM

## 2023-12-06 PROCEDURE — C9113 INJ PANTOPRAZOLE SODIUM, VIA: HCPCS | Performed by: INTERNAL MEDICINE

## 2023-12-06 PROCEDURE — 27000221 HC OXYGEN, UP TO 24 HOURS

## 2023-12-06 PROCEDURE — 99152 MOD SED SAME PHYS/QHP 5/>YRS: CPT | Performed by: STUDENT IN AN ORGANIZED HEALTH CARE EDUCATION/TRAINING PROGRAM

## 2023-12-06 PROCEDURE — 83735 ASSAY OF MAGNESIUM: CPT | Performed by: INTERNAL MEDICINE

## 2023-12-06 PROCEDURE — 63600175 PHARM REV CODE 636 W HCPCS: Performed by: INTERNAL MEDICINE

## 2023-12-06 PROCEDURE — A4216 STERILE WATER/SALINE, 10 ML: HCPCS | Performed by: INTERNAL MEDICINE

## 2023-12-06 DEVICE — GLIDEPATH HEMODIALYSIS CATH, ST, DL 14.5 FR. 19CM
Type: IMPLANTABLE DEVICE | Site: CHEST  WALL | Status: FUNCTIONAL
Brand: GLIDEPATH LONG-TERM HEMODIALYSIS CATHETER WITH PRELOADED STYLET

## 2023-12-06 RX ORDER — LIDOCAINE HYDROCHLORIDE 10 MG/ML
INJECTION INFILTRATION; PERINEURAL
Status: DISCONTINUED | OUTPATIENT
Start: 2023-12-06 | End: 2023-12-06 | Stop reason: HOSPADM

## 2023-12-06 RX ORDER — MIDAZOLAM HYDROCHLORIDE 1 MG/ML
INJECTION INTRAMUSCULAR; INTRAVENOUS
Status: DISCONTINUED | OUTPATIENT
Start: 2023-12-06 | End: 2023-12-06 | Stop reason: HOSPADM

## 2023-12-06 RX ORDER — FENTANYL CITRATE 50 UG/ML
INJECTION, SOLUTION INTRAMUSCULAR; INTRAVENOUS
Status: DISCONTINUED | OUTPATIENT
Start: 2023-12-06 | End: 2023-12-06 | Stop reason: HOSPADM

## 2023-12-06 RX ORDER — SODIUM BICARBONATE 650 MG/1
1300 TABLET ORAL DAILY
Status: DISCONTINUED | OUTPATIENT
Start: 2023-12-07 | End: 2023-12-24

## 2023-12-06 RX ADMIN — METOPROLOL TARTRATE 12.5 MG: 25 TABLET, FILM COATED ORAL at 08:12

## 2023-12-06 RX ADMIN — MIDODRINE HYDROCHLORIDE 10 MG: 5 TABLET ORAL at 08:12

## 2023-12-06 RX ADMIN — SODIUM CHLORIDE, PRESERVATIVE FREE 10 ML: 5 INJECTION INTRAVENOUS at 11:12

## 2023-12-06 RX ADMIN — SODIUM BICARBONATE 650 MG TABLET 1300 MG: at 08:12

## 2023-12-06 RX ADMIN — MIDODRINE HYDROCHLORIDE 10 MG: 5 TABLET ORAL at 04:12

## 2023-12-06 RX ADMIN — APIXABAN 5 MG: 5 TABLET, FILM COATED ORAL at 08:12

## 2023-12-06 RX ADMIN — SODIUM CHLORIDE, PRESERVATIVE FREE 10 ML: 5 INJECTION INTRAVENOUS at 12:12

## 2023-12-06 RX ADMIN — PANTOPRAZOLE SODIUM 40 MG: 40 INJECTION, POWDER, FOR SOLUTION INTRAVENOUS at 08:12

## 2023-12-06 RX ADMIN — PANTOPRAZOLE SODIUM 40 MG: 40 INJECTION, POWDER, FOR SOLUTION INTRAVENOUS at 04:12

## 2023-12-06 RX ADMIN — MIDODRINE HYDROCHLORIDE 10 MG: 5 TABLET ORAL at 11:12

## 2023-12-06 RX ADMIN — SODIUM CHLORIDE, PRESERVATIVE FREE 10 ML: 5 INJECTION INTRAVENOUS at 05:12

## 2023-12-06 NOTE — PROGRESS NOTES
Nephrology consult follow up note    HPI:      Karen Briggs is a 58 y.o. female presented on  7 days post op from laparoscopic cholecystectomy on 11/10. Gallbladder was unable to be resected. Back and flank pain persisted post operatively [place] [facility] [place] After workup her surgeon recommended ERCP for which she was sent to this facility. Left sided hydronephrosis noted on CT scans done on admission. At that time renal function was fairly normal and patient was given option for stenting or to defer as outpatient and she chose the latter.      During ERCP, malignant gastric mass noted and ERCP was unable to be completed due to duodenal scarring. 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 Patient ultimately had biliary drain placed per IR.      She developed A fib with RVR requiring amiodarone infusion. In the past 24 hours she had significant blood loss post removal of long term IV. She then had a fall later ambulating to the bathroom.      Patient renal function was relatively stable until decline starting 11/25. Patient was ultimately initiated on HD 11/28 post bilateral ureteral stent placement with Dr Cardenas same day.     Patient developed dialysis dependent acute kidney injury, and was started on hemodialysis 11/29/2023.    Interval history:   S/p tunneled dialysis catheter placed this morning per Dr Luo. Now on dialysis without complaints.     Objective:       VITAL SIGNS: 24 HR MIN & MAX LAST    Temp  Min: 98.3 °F (36.8 °C)  Max: 99.5 °F (37.5 °C)  99.3 °F (37.4 °C)        BP  Min: 90/63  Max: 114/76  92/62     Pulse  Min: 99  Max: 121  99     Resp  Min: 18  Max: 20  18    SpO2  Min: 96 %  Max: 99 %  99 %      GEN:  Well-appearing AAF  CV: RRR +S1,S2 without murmur  PULM: CTAB, unlabored  ABD: Soft, NT/ND abdomen with NABS  EXT:  2+ pitting edema to bilateral hips and thighs, much softer since initial exam  SKIN: Warm and dry  PSYCH: Awake, alert and appropriately conversant.    Dialysis access:  Right IJ Vas-Cath            Component Value Date/Time     (L) 12/06/2023 0505     (L) 12/05/2023 0455     04/24/2018 0340    K 4.3 12/06/2023 0505    K 4.4 12/05/2023 0455    K 3.2 (L) 04/24/2018 0340    CHLORIDE 95 (L) 12/06/2023 0505    CHLORIDE 94 (L) 12/05/2023 0455    CO2 24 12/06/2023 0505    CO2 24 12/05/2023 0455    CO2 20 (L) 04/24/2018 0340    BUN 71.7 (H) 12/06/2023 0505    BUN 51.3 (H) 12/05/2023 0455    BUN 10 04/24/2018 0340    CREATININE 5.79 (H) 12/06/2023 0505    CREATININE 4.55 (H) 12/05/2023 0455    CREATININE 0.7 04/24/2018 0340    CALCIUM 8.3 (L) 12/06/2023 0505    CALCIUM 8.2 (L) 12/05/2023 0455    CALCIUM 9.2 04/24/2018 0340    PHOS 4.1 12/06/2023 0505            Component Value Date/Time    WBC 23.90 (H) 12/06/2023 0505    WBC 24.01 (H) 12/05/2023 0455    WBC 24.01 12/05/2023 0455    WBC 23.73 11/23/2023 0723    WBC 12.03 04/24/2018 0340    HGB 9.1 (L) 12/06/2023 0505    HGB 9.4 (L) 12/05/2023 0455    HGB 11.3 (L) 04/24/2018 0340    HCT 25.6 (L) 12/06/2023 0505    HCT 27.6 (L) 12/05/2023 0455    HCT 35.3 (L) 04/24/2018 0340     (H) 12/06/2023 0505     (H) 12/05/2023 0455     04/24/2018 0340         Imaging reviewed      Assessment / Plan:     POORNIMA multifactorial secondary to obstructive uropathy, acute blood loss, contrast exposure, hypotension and Afib with RVR  ---Nephrotic range proteinuria noted. 4.4 g        --dialysis initiation 11/29 now s/p tunneled dialysis catheter placed 12/6  Newly diagnosed malignant gastric mass. Pathology consistent with adenocarcinoma   -s/p J tube and mediport placement 12/1  Acute blood loss anemia 2/2 bleeding post long term IV removal   Bilateral hydronephrosis noted 11/21 - stent placement initially deferred due to stable renal function   Afib with RVR on amiodarone infusion DC for hypotension.    S/p biliary drain placement      Plan:  Plan to continue dialysis on MWF schedule for now.   Patient  will need outpatient dialysis setup. I discussed with Maria R Choudhary, dialysis coordinator.   Ok to remove cheney catheter.

## 2023-12-06 NOTE — PROGRESS NOTES
12/06/23 1444   Tunneled Central Line Insertion/Assessment - Double Lumen  12/06/23 0925 Internal Jugular Right   Placement Date/Time: 12/06/23 0925   Present Prior to Hospital Arrival?: No  Inserted by: MD  Hand Hygiene: Performed  Barrier Precautions: Performed  Skin Antisepsis: ChloraPrep  Location: Internal Jugular Right  Catheter Secured At (cm): 19  Cathete...   Dressing Type CHG impregnated dressing/sponge   Dressing Intervention Integrity maintained   Date on Dressing 12/06/23   Dressing Due to be Changed 12/13/23   Distal Patency/Care flushed w/o difficulty;normal saline locked   Proximal 1 Patency/Care flushed w/o difficulty;normal saline locked   Post-Hemodialysis Assessment   Rinseback Volume (mL) 250 mL   Blood Volume Processed (Liters) 63 L   Dialyzer Clearance Clear   Duration of Treatment 180 minutes   Additional Fluid Intake (mL) 250 mL   Total UF (mL) 1000 mL   Net Fluid Removal 500   Patient Response to Treatment tolerated ok. Decreased UF goal due to hypotension sbp 80s   Post-Hemodialysis Comments completed HD tx. Removed 500 cc

## 2023-12-06 NOTE — PT/OT/SLP PROGRESS
Physical Therapy      Patient Name:  Karen Briggs   MRN:  [id number]    Patient not seen today secondary to pt in procedure then to dialysis  Will follow-up if pt returns or in am .

## 2023-12-06 NOTE — PROGRESS NOTES
[facility]  Infectious Disease Progress Note            ASSESSMENT & PLAN:     She was a 58-year-old female with a past medical history of hypertension who underwent an unsuccessful laparoscopic cholecystectomy on 11/10/2023 as gallbladder was unable to be completely resected.  Since procedure, she continued to have right flank and back pain.  She followed up with her PCP, and CT of the abdomen and pelvis on 11/17 revealed left-sided hydronephrosis with suspected distal obstruction.  Additionally noted was markedly thickened stomach wall.  Underwent ERCP on 11/18-subsequently found to have a malignant gastric tumor.  IR placed a biliary drain on 11/21.  On 11/24, she developed atrial fibrillation with RVR and was initiated on amiodarone drip.   She then had bilateral ureteral stents placed on 11/27 given persistent hydronephrosis.  She was developed an POORNIMA since admit secondary to obstructive uropathy, now requiring hemodialysis.  Temporary hemodialysis catheter was placed earlier today along with MediPort.  Patient was significant oozing from sites postoperatively.  She received 3 units of blood intraoperatively as well as around 1 L of IV fluids per report.  Patient reports developing cough after procedure today, nonproductive.  Denies chest pain and is oxygenating well on room air.  Patient has been receiving empiric Zosyn essentially since admit.  Leukocytosis had trended up around 11/22, however has since trended downward although with some worsening this morning.  Blood cultures from 11/18 are negative.  Urine culture from 11/23 is also negative.  She is been afebrile and hemodynamically stable.  Chest x-ray earlier today showed low lung volumes with mild retrocardiac atelectasis.  We have been consulted for input regarding worsening leukocytosis.        Leukocytosis, likely multifactorial  POORNIMA, now requiring HD  Obstructive uropathy, s/p bilateral ureteral stents on 11/27  Persistent hyperbilirubinemia, s/p  "unsuccessful cholecystectomy on 11/10 and biliary drain on 11/21  Gastric cancer, newly diagnosed     PLAN:  Stable off abx.   Leukocytosis trending down, cultures remain negative.  Monitor labs and clinically.   Surgical oncology to monitor biliary drain and DC when appropriate.   Discussed with patient.      SUBJECTIVE:     AF, VSS.  No events overnight.  No complaints. Had tunneled catheter placed earlier today.     MEDICATIONS:   Reviewed in EMR    REVIEW OF SYSTEMS:   Except as documented, all other systems reviewed and negative     PHYSICAL EXAM:   T 98.3 °F (36.8 °C)   BP 98/67   P 106   RR 16   O2 99 %  GENERAL: NAD; does not appear toxic  SKIN: no rash  HEENT: sclera icteric; PERRL   NECK: supple; no LAD  CHEST: CTA; nonlabored, equal expansion   CARDIOVASCULAR: RRR, S1S2; no murmur   ABDOMEN:  active bowel sounds; abdomen soft, nondistended, nontender to palpation  GENITOURINARY: no suprapubic tenderness   EXTREMITIES: no cyanosis or clubbing  NEURO: AAO x4; CN II-XII grossly intact  PSYCH: Mentation and affect appropriate     LABS AND IMAGING:     Recent Labs     12/05/23 0455 12/06/23  0505   WBC 24.01  24.01* 23.90*   RBC 3.14* 2.98*   HGB 9.4* 9.1*   HCT 27.6* 25.6*   MCV 87.9 85.9   MCH 29.9 30.5   MCHC 34.1 35.5   RDW 18.9* 18.6*   * 434*       No results for input(s): "LACTIC" in the last 72 hours.  No results for input(s): "INR", "APTT", "D-DIMER" in the last 72 hours.  Recent Labs     12/05/23  0455   TRIG 122        Recent Labs     12/04/23  0546 12/05/23  0455 12/06/23  0505   * 129* 132*   K 4.7 4.4 4.3   CHLORIDE 94* 94* 95*   CO2 23 24 24   BUN 62.0* 51.3* 71.7*   CREATININE 5.81* 4.55* 5.79*   GLUCOSE 103 326* 116*   CALCIUM 8.6 8.2* 8.3*   MG 2.30 2.20 2.40   PHOS 5.2* 4.4 4.1   ALBUMIN 1.5* 1.8* 1.6*   GLOBULIN 3.9* 3.7*  --    ALKPHOS 126 125  --    ALT 23 23  --    AST 26 30  --    BILITOT 5.6* 5.1*  --        No results for input(s): "BNP", "CPK", "TROPONINI" in the last " 72 hours.       Cardiac catheterization  Procedure performed in the Invasive Lab    - See Procedure Log link below for nursing documentation    - See OpNote on Surgeries Tab for physician findings    - See Imaging Tab for radiologist dictation          MARIA A Small  Infectious Disease

## 2023-12-06 NOTE — PROCEDURES
INTERVENTIONAL NEPHROLOGY PROCEDURE NOTE:   TUNNELED DIALYSIS CATHETER (TDC) INSERTION         Performing Physician:   Dr. Luo    Access History: Pt is with POORNIMA requiring HD.    Pre-Op Diagnosis: N17.9 POORNIMA.  Post-Op Diagnosis: Same    Procedure: Tunneled dialysis catheter insertion.    Indication: POORNIMA requiring HD.    Anatomical Site: right internal jugular (RIJ)/right chest wall (RCW)    Informed Consent:  The patient was evaluated in the pre-operative area with assessment including the American Society of Anesthesia risk classification. The procedure is discussed in detail including risks, benefits alternatives and options and the patient agrees to proceed. Informed consent was obtained from the patient.     Maximum sterile barrier technique: The patient was prepped and draped using chlorhexidine prep and maximum sterile barrier technique.    Sedation Note:  Risks and benefits of sedation were reviewed with the patient or surrogate, including bleeding, infection, nausea, vomiting, dizziness, instability, damage to a nerve, damage to a blood vessel, cellulitis, reaction to medications, amnesia, loss of consciousness, respiratory arrest, cardiac arrest.     The patient received the following medications: Versed 0.5 mg IV and Fentanyl 25 mcg IV; patient did remain alert, responsive, and conversational throughout the procedure. I was personally responsible for supervising the administration of moderate sedation services during the procedure performed and I affirm all the guidelines and requirements described in the CPT 2023 section on moderate sedation were followed, including the use of an independent trained observer who had no other duties during the procedure. The total face-to-face time was 15 minutes.    Procedure Steps:   The patient was prepped and draped in sterile fashion. Procedure ultrasound revealed patent right internal jugular vein. Local anesthesia was administered by injecting 1% lidocaine at  "the intended site of cannulation. With use of live ultrasonographic visualization, the vessel was cannulated with a  21 g mini-stick needle. The 0.018" mini-stick guide wire was introduced being careful not to bend the tip. Then by using Seldinger technique, the needle was exchanged for the mini-stick sheath. The inner cannula and mini-stick wire were removed and a J wire was inserted through the sheath. The wire was guided with fluoroscopic guidance, with the tip parked in the inferior vena cava. At this time a cut down on the vessel was made extending the venotomy laterally to the expected tunnel.    The catheter exit site and tunnel was approximated and infiltrated with lidocaine. A stab incision was made at the exit site. The 19 cm (cuff-to-tip) tunneled dialysis catheter assembly with tunneler was then tunneled up through the exit site to the lateral aspect of the venotomy and the catheter pulled through the tunnel. The cuff was placed approximately 1.5 centimeters inside the tunnel. The 12 Fr dilator was passed freely over the wire; it was removed and replaced sequentially with 14 Fr and 16 Fr dilators. The permacath was then inserted via pull-away sheath method. The placement of the catheter tip (at the junction of the SVC and right atrium) and the top of the permacath was confirmed with fluoroscopy.     Catheter appeared to function well with various tests utilizing a 10 cc syringe. The catheter limbs were then flushed with saline, followed by instillation of appropriate amounts of heparin as marked by the catheter hub. Venotomy site and tunnel exit site was closed with monoderm suture, being careful to not gonsalez the catheter. Catheter hub was fixed to the chest wall using silk suture.      ASSESSMENT/PLAN:  - Successful placement of right internal jugular (RIJ)/right chest wall (RCW) 19 cm (cuff-to-tip) TDC (BD Gl[employer]).    EBL: <10 cc    Complications: None    Orders to the dialysis unit: OK to use " tunneled dialysis catheter for HD.    Thank you for allowing me the opportunity in taking care of this patient. Please reach me with any questions.    Satinder Luo DO  Interventional Nephrology  Cell: [phone]

## 2023-12-06 NOTE — PROGRESS NOTES
[facility]  Hospital Medicine Progress Note        Chief Complaint: Inpatient Follow-up for intractable nausea vomiting due to gastric adenocarcinoma    HPI: 58-year-old female with medical history of hypertension who recently underwent laparoscopic cholecystectomy on 11/10/2023, procedure was incomplete and was unable to completely resect the gallbladder.  Since surgery she continued to have right flank/back pain and followed up with her PCP and CT abdomen and pelvis on 11/17/2023 revealed left-sided hydronephrosis with suspected distal obstruction/no clear stone visualized, postoperative changes of cholecystectomy with fluid in the gallbladder fossa may reflect postoperative seroma but biloma can not be entirely excluded, also noted for marked thickening of the stomach wall diffusely may be related to mesenteric edema/reactive.  She presented to [facility] ED the same day 11/17/2023 and her labs notable for WBC 9.0, hemoglobin 12.4, platelets 442, creatinine 0.86, total bilirubin 8.7 with direct fraction 6.7, alkaline phosphatase 491, , .  Patient's surgeon was consulted and recommended ERCP which was not available at [facility] for which she was transferred to [facility] and referred to hospital medicine service for further evaluation and management.   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  Severe inflammation and oozing of blood noted proximal gastric lumen.  Unable to advance scope into the duodenum.  Surgical oncology consulted, IR consulted for external drain placement which was done November 21.  November 24th she developed new onset atrial fibrillation with RVR and Cardiology consulted.  Started on amiodarone drip  Unfortunately patient had acute blood loss due to hemorrhage from the midline site that was removed.  Patient was unaware of the bleed.  Became very weak, passed out.  Code was called but she came around.   Stat H&H done which was slightly lower but stable, MRI of the brain was negative for any acute ischemic changes. Pt is aware of gastric cancer diagnosis   GI following--> 11/18- EGD shows normal esophagus, malignant gastric tumor in the cardia.  Inflamed mucosa and ooze of blood throughout the proximal gastric lumen.  Gastric antrum scar would not allow advancement of scope into the duodenal ampulla area therefore biliary cannulation was not possible for bile leak evaluation  Pathology shows marked chronic gastritis with intestinal metaplasia, gastric cardia biopsy shows adenocarcinoma, moderately differentiated intestinal type   bilateral hydronephrosis with left greater than right  MRI brain without contrast-negative for acute finding  PET scan reviewed with patient by Oncology reports of locally advanced gastric adenocarcinoma and plans for systemic therapy outpatient if functional, nutritional and renal function improves. \  MediPort placement by surgical Oncology on 12/1, oncology on board plans for systemic therapy outpatient if functional, nutritional and renal function improves.  obstructive uropathy with bilateral hydronephrosis status post bilateral ureteral stent placed on 11/27 by Urology- no improvement in renal function, patient opted to have hemodialysis and a right-sided hemodialysis catheter has been placed by General surgery on 11/29, 2 continue hemodialysis per nephrology discretion.  Patient with symptoms of nausea and vomiting can not keep anything down in her stomach complicated by severe malnutrition on IV Clinimix and supportive medications for nausea and vomiting. Palliative care on board    Patient got a MediPort and J-tube placed on 12/01  Cystogram reviewed by Urology with patent stents and recommends outpatient follow-up with Urology  White cell count elevated at 20.4, Infectious Disease had started on IV Zosyn given concern for possible sepsis with low blood pressure on 12/02   CIS  evaluated patient to begin low-dose metoprolol tartrate at 12.5 mg b.i.d. and resume Eliquis for stroke prevention       Interval Hx:   Patient is sitting in bed comfortable.  Seen briefly this morning given she was rolling out to go for cath lab for tunneled catheter placement.  No complaints   Currently no family at bedside  Case was discussed with patient's nurse and  on the floor.    Objective/physical exam:  General: In no acute distress, afebrile  Chest: Clear to auscultation bilaterally  Heart: RRR, +S1, S2, no appreciable murmur  Abdomen: Soft, nontender, BS +  MSK: Warm, no lower extremity edema, no clubbing or cyanosis  Neurologic: Alert and oriented x4, Cranial nerve II-XII intact, Strength 5/5 in all 4 extremities    VITAL SIGNS: 24 HRS MIN & MAX LAST   Temp  Min: 98.3 °F (36.8 °C)  Max: 99.5 °F (37.5 °C) 99.3 °F (37.4 °C)   BP  Min: 95/63  Max: 114/76 96/66   Pulse  Min: 106  Max: 121  107   Resp  Min: 18  Max: 20 18   SpO2  Min: 96 %  Max: 98 % 98 %     I reviewed the labs below:  Recent Labs   Lab 12/04/23  0546 12/05/23  0455 12/06/23  0505   WBC 21.83* 24.01  24.01* 23.90*   RBC 3.66* 3.14* 2.98*   HGB 10.9* 9.4* 9.1*   HCT 32.0* 27.6* 25.6*   MCV 87.4 87.9 85.9   MCH 29.8 29.9 30.5   MCHC 34.1 34.1 35.5   RDW 19.8* 18.9* 18.6*   * 447* 434*   MPV 10.6* 9.9 9.6       Recent Labs   Lab 12/03/23  0531 12/04/23  0546 12/05/23  0455 12/06/23  0505   * 132* 129* 132*   K 4.0 4.7 4.4 4.3   CO2 25 23 24 24   BUN 48.7* 62.0* 51.3* 71.7*   CREATININE 4.64* 5.81* 4.55* 5.79*   CALCIUM 8.4 8.6 8.2* 8.3*   MG 2.10 2.30 2.20 2.40   ALBUMIN 1.5* 1.5* 1.8* 1.6*   ALKPHOS 109 126 125  --    ALT 19 23 23  --    AST 19 26 30  --    BILITOT 4.6* 5.6* 5.1*  --        Microbiology Results (last 7 days)       Procedure Component Value Units Date/Time    Blood Culture [[id number]]  (Normal) Collected: 12/02/23 2053    Order Status: Completed Specimen: Blood Updated: 12/05/23 2200     CULTURE,  BLOOD ([facility]) No Growth At 72 Hours    Blood Culture [[id number]]  (Normal) Collected: 12/02/23 2053    Order Status: Completed Specimen: Blood Updated: 12/05/23 2200     CULTURE, BLOOD ([facility]) No Growth At 72 Hours           See below for Radiology    Scheduled Med:   apixaban  5 mg Oral BID    metoprolol tartrate  12.5 mg Oral BID    midodrine  10 mg Oral TID WM    pantoprazole  40 mg Intravenous BID WM    sodium bicarbonate  1,300 mg Oral BID    sodium chloride 0.9%  10 mL Intravenous Q6H      Continuous Infusions:       PRN Meds:  0.9%  NaCl infusion (for blood administration), sodium chloride 0.9%, acetaminophen, aluminum-magnesium hydroxide-simethicone, bisacodyL, chlorproMAZINE, dicyclomine, hydrALAZINE, hyoscyamine, melatonin, metoclopramide HCl, metoprolol, morphine, ondansetron, oxyCODONE, polyethylene glycol, prochlorperazine, promethazine, senna-docusate 8.6-50 mg, sodium chloride 0.9%, Flushing PICC/Midline Protocol **AND** sodium chloride 0.9% **AND** sodium chloride 0.9%     Assessment/Plan:  Hypotension, improving  Persistent leukocytosis- source unclear  Acute on chronic anemia, s/p 2 units prbcs transfusion 11/30  Hx of Acute Blood loss anemia with syncope and then fall 11/26-   Locally advanced gastric adenocarcinoma with intractable nausea and vomiting possible Gastric/duodenal outlet obstruction  -now status post J Tube placement  Obstructive uropathy with bilateral hydronephrosis- S/P bilateral ureteral stent placed on 11/27  POORNIMA-now on HD - 11/29  Metabolic acidosis- resolved  Suspected bile leak with biloma and biliary obstruction--> H/O Laparoscopic incomplete cholecystectomy 11/10/2023  New onset atrial fibrillation with RVR- fluctuating  Hypokalemia- resolved with replacement  Hyponatremia  History of hypertension and DDD/sciatica  Severe malnutrition      Begin j tube feed with Novasource per Surg oncology recs   WBC 23 K   Infectious disease Dr Carlisle on board, given all cultures has been  negative, recommended to discontinue IV antibiotics and monitor her off of antibiotics for now  Trend hemoglobin levels and keep hemoglobin greater than 7- 8 grams/dL --> noted slowly trending down again  Total received 3units of packed red blood cell this admission  Nephrology on board for hemodialysis needs 12/6- received tunneled catheter given renal functions did not recover despite bilateral ureteral stenting 11/27 for hydronephrosis, continued hemodialysis need on Monday Wednesday and Friday  CIS evaluated patient to begin low-dose metoprolol tartrate at 12.5 mg b.i.d. and resume Eliquis for stroke prevention  Patient got a MediPort and J-tube placed on 12/01  Started on tube feed on NovaSource at 45 mL/hour goal rate, wean TPN once tube feeds at goal  Cystogram reviewed by Urology with patent stents and recommends outpatient follow-up with Urology  Appreciate oncology input plans for systemic therapy outpatient if renal function improves, patient is planning to pursue treatment   Palliative Care on board- patient wants everything done, plan to go to her oncology appointment and discussed options of treatment  IM Bentyl p.r.n. for abdominal pain and spasm   IM thorazine prn hiccups   Morning CBC, renal functions ordered     VTE prophylaxis:  eliquis      Patient condition:  Guarded     Anticipated discharge and Disposition:   TBD          All diagnosis and differential diagnosis have been reviewed; assessment and plan has been documented; I have personally reviewed the labs and test results that are presently available; I have reviewed the patients medication list; I have reviewed the consulting providers response and recommendations. I have reviewed or attempted to review medical records based upon their availability    All of the patient's questions have been  addressed and answered. Patient's is agreeable to the above stated plan. I will continue to monitor closely and make adjustments to medical management  as needed.  _____________________________________________________________________    Nutrition Status:  Patient meets ASPEN criteria for severe malnutrition of acute illness or injury per RD assessment as evidenced by:  Energy Intake (Malnutrition): less than or equal to 50% for greater than or equal to 5 days  Weight Loss (Malnutrition): greater than 7.5% in 3 months  Subcutaneous Fat (Malnutrition): moderate depletion  Muscle Mass (Malnutrition): moderate depletion           A minimum of two characteristics is recommended for diagnosis of either severe or non-severe malnutrition.   Radiology:   I have personally reviewed the images and agree with radiologist report  FL Cystogram Retrograde (xpd)  EXAMINATION  FL Cystogram Retrograde    CLINICAL HISTORY  gravity cystogram to eval for bilateral stent reflux; please use at least 300ml contrast;    TECHNIQUE  Water-soluble contrast administered into the urinary bladder via Jin catheter under live fluoroscopy.    CONTRAST  Cystografin, 300 mL    COMPARISON  29 November 2023    FINDINGS  : Right upper quadrant internal/external biliary drain remains in similar position.  There is also similar appearance of bilateral ureteral stents.  A new curvilinear tube suggestive of gastrostomy projects over the left mid abdomen.  There are no findings to suggest high-grade bowel obstruction or new/worsened intra-abdominal mass effect.  Residual oral contrast media opacifies the colon, similar to the prior study.  Regional osseous structures are unchanged.    CYSTOGRAPHY: The urinary bladder demonstrates no evidence of abnormal filling defects. The wall contour is smooth. There is no ureteral reflux bilaterally. No extraluminal contrast spillage is appreciated. There is gross contrast reflux and retrograde progression to the level of the renal central collecting system at the point of approximately 200 mL contrast filling of the urinary bladder.  Additional contrast  "administration resulted in opacification of the dilated right renal pelvis.  At the point of maximal filling, there was no convincing contrast or grossly visualized dilatation of the left central collecting system.    IMPRESSION  1. Widely patent bilateral ureters/stents, with contrast reflux to the central collecting systems.  2. Contrast filling of moderate dilated right renal pelvis.  No significant contrast visualized within the left renal pelvis.  3. No evidence of acute or focal abnormality of the urinary bladder.    RADIATION DOSE  Lowest-rate pulsed fluoroscopy and "last image capture" technique were utilized in order to minimize radiation exposure.    *Fluoro: 30 seconds  *DAP: 1874.7 uGy x m^2  *Dose: 27.1 mGy  *Number of images: 16    Electronically signed by: Tay Lynch  Date:    12/01/2023  Time:    19:08  X-Ray Chest 1 View  Narrative: EXAMINATION:  XR CHEST 1 VIEW    CLINICAL HISTORY:  Other specified symptoms and signs involving the circulatory and respiratory systems    TECHNIQUE:  Single frontal view of the chest was performed.    COMPARISON:  11/29/2023    FINDINGS:  LINES AND TUBES: Left subclavian pam catheter tip terminates at the SVC.  Right IJ sheath tip projects over the SVC.  Right upper extremity PICC tip projects over the superior cavoatrial junction.  EKG/telemetry leads overlie the chest. .    MEDIASTINUM AND SARA: Cardiac silhouette is at the upper limits of normal, likely exaggerated by portable technique.    LUNGS: Lung volumes are low with associated atelectatic change.    PLEURA:No pleural effusion. No pneumothorax.    OTHER: No acute osseous abnormality.  Impression: Mild retrocardiac atelectasis.    Electronically signed by: Sulema Solorzano  Date:    12/01/2023  Time:    10:43  SURG FL Surgery Fluoro Usage  See OP Notes for results.     IMPRESSION: See OP Notes for results.     This procedure was auto-finalized by: Virtual Radiologist      Nick Plummer MD  Department of " [facility]   [facility]   12/06/2023

## 2023-12-06 NOTE — PLAN OF CARE
Problem: Adult Inpatient Plan of Care  Goal: Plan of Care Review  Outcome: Ongoing, Progressing  Goal: Patient-Specific Goal (Individualized)  Outcome: Ongoing, Progressing  Goal: Absence of Hospital-Acquired Illness or Injury  Outcome: Ongoing, Progressing  Goal: Optimal Comfort and Wellbeing  Outcome: Ongoing, Progressing  Goal: Readiness for Transition of Care  Outcome: Ongoing, Progressing     Problem: Pain Acute  Goal: Acceptable Pain Control and Functional Ability  Outcome: Ongoing, Progressing     Problem: Fall Injury Risk  Goal: Absence of Fall and Fall-Related Injury  Outcome: Ongoing, Progressing     Problem: Infection  Goal: Absence of Infection Signs and Symptoms  Outcome: Ongoing, Progressing     Problem: Coping Ineffective  Goal: Effective Coping  Outcome: Ongoing, Progressing     Problem: Skin Injury Risk Increased  Goal: Skin Health and Integrity  Outcome: Ongoing, Progressing     Problem: Device-Related Complication Risk (Hemodialysis)  Goal: Safe, Effective Therapy Delivery  Outcome: Ongoing, Progressing

## 2023-12-07 LAB
ALBUMIN SERPL-MCNC: 1.5 G/DL (ref 3.4–4.8)
BACTERIA BLD CULT: NORMAL
BACTERIA BLD CULT: NORMAL
BUN SERPL-MCNC: 50.1 MG/DL (ref 9.8–20.1)
CALCIUM SERPL-MCNC: 8 MG/DL (ref 8.4–10.2)
CHLORIDE SERPL-SCNC: 96 MMOL/L (ref 98–107)
CO2 SERPL-SCNC: 26 MMOL/L (ref 23–31)
CREAT SERPL-MCNC: 4.61 MG/DL (ref 0.55–1.02)
ERYTHROCYTE [DISTWIDTH] IN BLOOD BY AUTOMATED COUNT: 18.9 % (ref 11.5–17)
GFR SERPLBLD CREATININE-BSD FMLA CKD-EPI: 10 MLS/MIN/1.73/M2
GLUCOSE SERPL-MCNC: 129 MG/DL (ref 82–115)
HCT VFR BLD AUTO: 24.6 % (ref 37–47)
HGB BLD-MCNC: 8.5 G/DL (ref 12–16)
MCH RBC QN AUTO: 30.5 PG (ref 27–31)
MCHC RBC AUTO-ENTMCNC: 34.6 G/DL (ref 33–36)
MCV RBC AUTO: 88.2 FL (ref 80–94)
NRBC BLD AUTO-RTO: 0 %
PHOSPHATE SERPL-MCNC: 3.5 MG/DL (ref 2.3–4.7)
PLATELET # BLD AUTO: 431 X10(3)/MCL (ref 130–400)
PMV BLD AUTO: 9.7 FL (ref 7.4–10.4)
POTASSIUM SERPL-SCNC: 4.1 MMOL/L (ref 3.5–5.1)
RBC # BLD AUTO: 2.79 X10(6)/MCL (ref 4.2–5.4)
SODIUM SERPL-SCNC: 133 MMOL/L (ref 136–145)
WBC # SPEC AUTO: 20.49 X10(3)/MCL (ref 4.5–11.5)

## 2023-12-07 PROCEDURE — 25000003 PHARM REV CODE 250: Performed by: INTERNAL MEDICINE

## 2023-12-07 PROCEDURE — A4216 STERILE WATER/SALINE, 10 ML: HCPCS | Performed by: INTERNAL MEDICINE

## 2023-12-07 PROCEDURE — 97116 GAIT TRAINING THERAPY: CPT | Mod: CQ

## 2023-12-07 PROCEDURE — 25000003 PHARM REV CODE 250: Performed by: NURSE PRACTITIONER

## 2023-12-07 PROCEDURE — 80069 RENAL FUNCTION PANEL: CPT | Performed by: INTERNAL MEDICINE

## 2023-12-07 PROCEDURE — C9113 INJ PANTOPRAZOLE SODIUM, VIA: HCPCS | Performed by: INTERNAL MEDICINE

## 2023-12-07 PROCEDURE — 99233 SBSQ HOSP IP/OBS HIGH 50: CPT | Mod: FS,,, | Performed by: GENERAL PRACTICE

## 2023-12-07 PROCEDURE — 85027 COMPLETE CBC AUTOMATED: CPT | Performed by: INTERNAL MEDICINE

## 2023-12-07 PROCEDURE — 63600175 PHARM REV CODE 636 W HCPCS: Performed by: INTERNAL MEDICINE

## 2023-12-07 PROCEDURE — 21400001 HC TELEMETRY ROOM

## 2023-12-07 RX ADMIN — METOPROLOL TARTRATE 12.5 MG: 25 TABLET, FILM COATED ORAL at 08:12

## 2023-12-07 RX ADMIN — MIDODRINE HYDROCHLORIDE 10 MG: 5 TABLET ORAL at 05:12

## 2023-12-07 RX ADMIN — SODIUM CHLORIDE, PRESERVATIVE FREE 10 ML: 5 INJECTION INTRAVENOUS at 12:12

## 2023-12-07 RX ADMIN — SODIUM CHLORIDE, PRESERVATIVE FREE 10 ML: 5 INJECTION INTRAVENOUS at 05:12

## 2023-12-07 RX ADMIN — MIDODRINE HYDROCHLORIDE 10 MG: 5 TABLET ORAL at 12:12

## 2023-12-07 RX ADMIN — SODIUM BICARBONATE 650 MG TABLET 1300 MG: at 08:12

## 2023-12-07 RX ADMIN — MIDODRINE HYDROCHLORIDE 10 MG: 5 TABLET ORAL at 08:12

## 2023-12-07 RX ADMIN — SODIUM CHLORIDE, PRESERVATIVE FREE 10 ML: 5 INJECTION INTRAVENOUS at 11:12

## 2023-12-07 RX ADMIN — PANTOPRAZOLE SODIUM 40 MG: 40 INJECTION, POWDER, FOR SOLUTION INTRAVENOUS at 05:12

## 2023-12-07 RX ADMIN — APIXABAN 5 MG: 5 TABLET, FILM COATED ORAL at 08:12

## 2023-12-07 RX ADMIN — PANTOPRAZOLE SODIUM 40 MG: 40 INJECTION, POWDER, FOR SOLUTION INTRAVENOUS at 08:12

## 2023-12-07 NOTE — PROGRESS NOTES
Post Acute Facility Update     *The information contained in this note was received during a weekly care coordination call with the post acute facility*    This patient was discharged from [facility] to [facility] ([facility])   on 5/20/21. Hospital Discharge Diagnosis per Dr. Martha Garcia:    Acute compression fracture of T12 with 20% vertebral body height loss as  discussed.   Hx of Sigmoid Cancer  Anxiety  HTN  Severe Spinal Stenosis     Current Facility: [address] [place] [address] ([facility])  Anticipated Discharge Date: 6/11/21     Facility Nursing Update: no new orders  Facility Social Work Update: Returning to [facility] today , 6/11/21  Upper Extremity Assistance: Stand by Assist - Presence and Cueing  Lower Extremity Assistance: Stand by Assist - Presence and Cueing  Bed Mobility: Independent  Transfers: Stand by Assist - Presence and Cueing  Ambulation: Stand by Assist - Presence and Cueing  How far (in feet) is the patient ambulating? 150 feet  Device Used: walker  Barriers to Discharge: to return to [facility]  Other:        [employer] will sign off at this time. Medications were not reconciled and general patient assessment was not completed during this skilled nursing facility outreach. UROLOGY  PROGRESS  NOTE    Kaern Briggs 1965  [id number]  12/7/2023    Status post bilateral ureteral stents on 11/29/2023    Called to see patient due to suspected urinary retention   Jin catheter removed yesterday for voiding trial  Patient unable to void spontaneously, bladder scan showed almost 600 mL urine, in and out catheterization with only about 50 or 60 cc obtained.  Retroperitoneal ultrasound obtained revealing minimal urine in bladder with some ascites    Patient is currently resting in bed.    She denies any urge to urinate   Denies any suprapubic discomfort    VSS, afebrile  WBC 20.49   H&H 8.5/24.6   BUN and creatinine 50.1/4.61    Intake/Output:  I/O this shift:  In: 30 [NG/GT:30]  Out: -   I/O last 3 completed shifts:  In: 250 [Other:250]  Out: 1450 [Urine:50; Drains:400; Other:1000]     Exam:    NAD  Card: RRR  Resp: unlabored  Abd:  Soft, nondistended  :  No urine available for assessment; no suprapubic fullness or tenderness    Recent Results (from the past 24 hour(s))   CBC Without Differential    Collection Time: 12/07/23  4:45 AM   Result Value Ref Range    WBC 20.49 (H) 4.50 - 11.50 x10(3)/mcL    RBC 2.79 (L) 4.20 - 5.40 x10(6)/mcL    Hgb 8.5 (L) 12.0 - 16.0 g/dL    Hct 24.6 (L) 37.0 - 47.0 %    MCV 88.2 80.0 - 94.0 fL    MCH 30.5 27.0 - 31.0 pg    MCHC 34.6 33.0 - 36.0 g/dL    RDW 18.9 (H) 11.5 - 17.0 %    Platelet 431 (H) 130 - 400 x10(3)/mcL    MPV 9.7 7.4 - 10.4 fL    NRBC% 0.0 %   Renal Function Panel    Collection Time: 12/07/23  4:45 AM   Result Value Ref Range    Sodium Level 133 (L) 136 - 145 mmol/L    Potassium Level 4.1 3.5 - 5.1 mmol/L    Chloride 96 (L) 98 - 107 mmol/L    Carbon Dioxide 26 23 - 31 mmol/L    Glucose Level 129 (H) 82 - 115 mg/dL    Blood Urea Nitrogen 50.1 (H) 9.8 - 20.1 mg/dL    Creatinine 4.61 (H) 0.55 - 1.02 mg/dL    Calcium Level Total 8.0 (L) 8.4 - 10.2 mg/dL    Albumin Level 1.5 (L) 3.4 - 4.8 g/dL    Phosphorus Level 3.5 2.3 - 4.7 mg/dL    eGFR 10  mls/min/1.73/m2       Assessment:  -bilateral hydronephrosis s/p bilateral double-J stents 11/29/2023  -acute renal failure - nephrology following on hemodialysis  -complicated incomplete grace 11/10/2023 with post-op fluid collection/susp bile leak s/p biliary drain with IR; pathology from gastric biopsy with malignant gastric tumor  -a-fib RVR    Plan:  -bladder scans inaccurate secondary to ascites   -patient making minimal urine, remains on dialysis   -no indication for Jin catheter at this time.  If patient develops symptoms of retention and unable to spontaneously void, recommend in and out catheterization and placing indwelling Jin if significant retention versus retroperitoneal ultrasound for accurate assessment of bladder volume.  -please call as needed with any urologic issues.    -she will need to follow up with Dr. Cardenas outpatient for bilateral stents.      Sara Waldrop NP

## 2023-12-07 NOTE — PLAN OF CARE
Maria R Choudhary updated me pt has a dialysis chair time for this Saturday. I updated Dr Plummer with this info. She stated she will f/u with nephro, and pt's clinical status.

## 2023-12-07 NOTE — PROGRESS NOTES
[facility]  Hospital Medicine Progress Note        Chief Complaint: Inpatient Follow-up for intractable nausea vomiting due to gastric adenocarcinoma    HPI: 58-year-old female with medical history of hypertension who recently underwent laparoscopic cholecystectomy on 11/10/2023, procedure was incomplete and was unable to completely resect the gallbladder.  Since surgery she continued to have right flank/back pain and followed up with her PCP and CT abdomen and pelvis on 11/17/2023 revealed left-sided hydronephrosis with suspected distal obstruction/no clear stone visualized, postoperative changes of cholecystectomy with fluid in the gallbladder fossa may reflect postoperative seroma but biloma can not be entirely excluded, also noted for marked thickening of the stomach wall diffusely may be related to mesenteric edema/reactive.  She presented to [facility] ED the same day 11/17/2023 and her labs notable for WBC 9.0, hemoglobin 12.4, platelets 442, creatinine 0.86, total bilirubin 8.7 with direct fraction 6.7, alkaline phosphatase 491, , .  Patient's surgeon was consulted and recommended ERCP which was not available at [facility] for which she was transferred to [facility] and referred to hospital medicine service for further evaluation and management.   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  Severe inflammation and oozing of blood noted proximal gastric lumen.  Unable to advance scope into the duodenum.  Surgical oncology consulted, IR consulted for external drain placement which was done November 21.  November 24th she developed new onset atrial fibrillation with RVR and Cardiology consulted.  Started on amiodarone drip  Unfortunately patient had acute blood loss due to hemorrhage from the midline site that was removed.  Patient was unaware of the bleed.  Became very weak, passed out.  Code was called but she came around.   Stat H&H done which was slightly lower but stable, MRI of the brain was negative for any acute ischemic changes. Pt is aware of gastric cancer diagnosis   GI following--> 11/18- EGD shows normal esophagus, malignant gastric tumor in the cardia.  Inflamed mucosa and ooze of blood throughout the proximal gastric lumen.  Gastric antrum scar would not allow advancement of scope into the duodenal ampulla area therefore biliary cannulation was not possible for bile leak evaluation  Pathology shows marked chronic gastritis with intestinal metaplasia, gastric cardia biopsy shows adenocarcinoma, moderately differentiated intestinal type   bilateral hydronephrosis with left greater than right  MRI brain without contrast-negative for acute finding  PET scan reviewed with patient by Oncology reports of locally advanced gastric adenocarcinoma and plans for systemic therapy outpatient if functional, nutritional and renal function improves. \  MediPort placement by surgical Oncology on 12/1, oncology on board plans for systemic therapy outpatient if functional, nutritional and renal function improves.  obstructive uropathy with bilateral hydronephrosis status post bilateral ureteral stent placed on 11/27 by Urology- no improvement in renal function, patient opted to have hemodialysis and a right-sided hemodialysis catheter has been placed by General surgery on 11/29, 2 continue hemodialysis per nephrology discretion.  Patient with symptoms of nausea and vomiting can not keep anything down in her stomach complicated by severe malnutrition on IV Clinimix and supportive medications for nausea and vomiting. Palliative care on board    Patient got a MediPort and J-tube placed on 12/01  Cystogram reviewed by Urology with patent stents and recommends outpatient follow-up with Urology  White cell count elevated at 20.4, Infectious Disease had started on IV Zosyn given concern for possible sepsis with low blood pressure on 12/02   CIS  evaluated patient to begin low-dose metoprolol tartrate at 12.5 mg b.i.d. and resume Eliquis for stroke prevention       Interval Hx:   Patient is sitting in bed and sleeping.  Did not wake up to my calling.    Nurse informed me that Jin catheter has been removed per Urology recommendation.  Bladder scan was done but was not significant.  Monitoring closely  Tunneled catheter has been placed and awaiting dialysis chair time  Case was discussed with patient's nurse and  on the floor.    Objective/physical exam:  General: In no acute distress, afebrile  Chest: Clear to auscultation bilaterally  Heart: RRR, +S1, S2, no appreciable murmur  Abdomen: Soft, nontender, BS +  MSK: Warm, no lower extremity edema, no clubbing or cyanosis  Neurologic: Alert and oriented x4, Cranial nerve II-XII intact, Strength 5/5 in all 4 extremities    VITAL SIGNS: 24 HRS MIN & MAX LAST   Temp  Min: 98.3 °F (36.8 °C)  Max: 99.5 °F (37.5 °C) 98.6 °F (37 °C)   BP  Min: 82/58  Max: 98/66 95/63   Pulse  Min: 99  Max: 117  109   Resp  Min: 16  Max: 20 18   SpO2  Min: 96 %  Max: 99 % 99 %     I reviewed the labs below:  Recent Labs   Lab 12/05/23  0455 12/06/23  0505 12/07/23  0445   WBC 24.01  24.01* 23.90* 20.49*   RBC 3.14* 2.98* 2.79*   HGB 9.4* 9.1* 8.5*   HCT 27.6* 25.6* 24.6*   MCV 87.9 85.9 88.2   MCH 29.9 30.5 30.5   MCHC 34.1 35.5 34.6   RDW 18.9* 18.6* 18.9*   * 434* 431*   MPV 9.9 9.6 9.7       Recent Labs   Lab 12/03/23  0531 12/04/23  0546 12/05/23  0455 12/06/23  0505 12/07/23  0445   * 132* 129* 132* 133*   K 4.0 4.7 4.4 4.3 4.1   CO2 25 23 24 24 26   BUN 48.7* 62.0* 51.3* 71.7* 50.1*   CREATININE 4.64* 5.81* 4.55* 5.79* 4.61*   CALCIUM 8.4 8.6 8.2* 8.3* 8.0*   MG 2.10 2.30 2.20 2.40  --    ALBUMIN 1.5* 1.5* 1.8* 1.6* 1.5*   ALKPHOS 109 126 125  --   --    ALT 19 23 23  --   --    AST 19 26 30  --   --    BILITOT 4.6* 5.6* 5.1*  --   --        Microbiology Results (last 7 days)       Procedure Component  Value Units Date/Time    Blood Culture [[id number]]  (Normal) Collected: 12/02/23 2053    Order Status: Completed Specimen: Blood Updated: 12/06/23 2200     CULTURE, BLOOD ([facility]) No Growth At 96 Hours    Blood Culture [[id number]]  (Normal) Collected: 12/02/23 2053    Order Status: Completed Specimen: Blood Updated: 12/06/23 2200     CULTURE, BLOOD ([facility]) No Growth At 96 Hours           See below for Radiology    Scheduled Med:   apixaban  5 mg Oral BID    metoprolol tartrate  12.5 mg Oral BID    midodrine  10 mg Oral TID WM    pantoprazole  40 mg Intravenous BID WM    sodium bicarbonate  1,300 mg Oral Daily    sodium chloride 0.9%  10 mL Intravenous Q6H      Continuous Infusions:       PRN Meds:  0.9%  NaCl infusion (for blood administration), sodium chloride 0.9%, acetaminophen, aluminum-magnesium hydroxide-simethicone, bisacodyL, chlorproMAZINE, dicyclomine, hydrALAZINE, hyoscyamine, melatonin, metoclopramide HCl, metoprolol, morphine, ondansetron, oxyCODONE, polyethylene glycol, prochlorperazine, promethazine, senna-docusate 8.6-50 mg, sodium chloride 0.9%, Flushing PICC/Midline Protocol **AND** sodium chloride 0.9% **AND** sodium chloride 0.9%     Assessment/Plan:  Hypotension, improved  Persistent leukocytosis- source unclear  Acute on chronic anemia, s/p 2 units prbcs transfusion 11/30  Hx of Acute Blood loss anemia with syncope and then fall 11/26-   Locally advanced gastric adenocarcinoma with intractable nausea and vomiting possible Gastric/duodenal outlet obstruction  -now status post J Tube placement  Obstructive uropathy with bilateral hydronephrosis- S/P bilateral ureteral stent placed on 11/27  POORNIMA-now on HD - 11/29  Metabolic acidosis- resolved  Suspected bile leak with biloma and biliary obstruction--> H/O Laparoscopic incomplete cholecystectomy 11/10/2023  New onset atrial fibrillation with RVR- fluctuating  Hypokalemia- resolved with replacement  Hyponatremia  History of hypertension and  DDD/sciatica  Severe malnutrition      Begin j tube feed with Novasource per Surg oncology recs, home feeding pump has been arranged already  WBC 20 K   Infectious disease Dr Carlisle on board, given all cultures has been negative, recommended to discontinue IV antibiotics and monitor her off of antibiotics for now, patient has been stable and Infectious Disease has signed off  Trend hemoglobin levels and keep hemoglobin greater than 7- 8 grams/dL --> noted slowly trending down again, now 8.5  Total received 3units of packed red blood cell this admission, verified with blood bank  Nephrology on board for hemodialysis needs 12/6- received tunneled catheter given renal functions did not recover despite bilateral ureteral stenting 11/27 for hydronephrosis, continued hemodialysis need on Monday Wednesday and Friday while in-house  Dialysis chair time has been secured, patient to start on Saturday with Tuesday Thursday Saturday schedule.  Case personally discussed with Dr. Sinclair, patient can be discharged tomorrow to start on her new schedule on Saturday outpatient  CIS evaluated patient to begin low-dose metoprolol tartrate at 12.5 mg b.i.d. and resume Eliquis for stroke prevention  Patient got a MediPort and J-tube placed on 12/01  Started on tube feed on NovaSource at 45 mL/hour goal rate, wean TPN once tube feeds at goal  Cystogram reviewed by Urology with patent stents and recommends outpatient follow-up with Urology  Appreciate oncology input plans for systemic therapy outpatient if renal function improves, patient is planning to pursue treatment   Palliative Care on board- patient wants everything done, plan to go to her oncology appointment and discussed options of treatment, patient will still be under palliative care service upon discharge  IM thorazine prn hiccups   Morning CBC, renal functions ordered     VTE prophylaxis:  eliquis      Patient condition:  stable     Anticipated discharge and Disposition:   Probably home with palliative Care tomorrow if hemoglobin remains stable.  Patient to resume her hemodialysis on Saturday at her dialysis center per Nephrology          All diagnosis and differential diagnosis have been reviewed; assessment and plan has been documented; I have personally reviewed the labs and test results that are presently available; I have reviewed the patients medication list; I have reviewed the consulting providers response and recommendations. I have reviewed or attempted to review medical records based upon their availability    All of the patient's questions have been  addressed and answered. Patient's is agreeable to the above stated plan. I will continue to monitor closely and make adjustments to medical management as needed.  _____________________________________________________________________    Nutrition Status:  Patient meets ASPEN criteria for severe malnutrition of acute illness or injury per RD assessment as evidenced by:  Energy Intake (Malnutrition): less than or equal to 50% for greater than or equal to 5 days  Weight Loss (Malnutrition): greater than 7.5% in 3 months  Subcutaneous Fat (Malnutrition): moderate depletion  Muscle Mass (Malnutrition): moderate depletion           A minimum of two characteristics is recommended for diagnosis of either severe or non-severe malnutrition.   Radiology:   I have personally reviewed the images and agree with radiologist report  Cardiac catheterization  Procedure performed in the Invasive Lab    - See Procedure Log link below for nursing documentation    - See OpNote on Surgeries Tab for physician findings    - See Imaging Tab for radiologist dictation      iNck Plummer MD  Department of Hospital Medicine   [facility]   12/07/2023

## 2023-12-07 NOTE — PROGRESS NOTES
[facility]  Infectious Disease Progress Note            ASSESSMENT & PLAN:     She was a 58-year-old female with a past medical history of hypertension who underwent an unsuccessful laparoscopic cholecystectomy on 11/10/2023 as gallbladder was unable to be completely resected.  Since procedure, she continued to have right flank and back pain.  She followed up with her PCP, and CT of the abdomen and pelvis on 11/17 revealed left-sided hydronephrosis with suspected distal obstruction.  Additionally noted was markedly thickened stomach wall.  Underwent ERCP on 11/18-subsequently found to have a malignant gastric tumor.  IR placed a biliary drain on 11/21.  On 11/24, she developed atrial fibrillation with RVR and was initiated on amiodarone drip.   She then had bilateral ureteral stents placed on 11/27 given persistent hydronephrosis.  She was developed an POORNIMA since admit secondary to obstructive uropathy, now requiring hemodialysis.  Temporary hemodialysis catheter was placed earlier today along with MediPort.  Patient was significant oozing from sites postoperatively.  She received 3 units of blood intraoperatively as well as around 1 L of IV fluids per report.  Patient reports developing cough after procedure today, nonproductive.  Denies chest pain and is oxygenating well on room air.  Patient has been receiving empiric Zosyn essentially since admit.  Leukocytosis had trended up around 11/22, however has since trended downward although with some worsening this morning.  Blood cultures from 11/18 are negative.  Urine culture from 11/23 is also negative.  She is been afebrile and hemodynamically stable.  Chest x-ray earlier today showed low lung volumes with mild retrocardiac atelectasis.  We have been consulted for input regarding worsening leukocytosis.        Leukocytosis, likely multifactorial  POORNIMA, now requiring HD  Obstructive uropathy, s/p bilateral ureteral stents on 11/27  Persistent hyperbilirubinemia, s/p  "unsuccessful cholecystectomy on 11/10 and biliary drain on 11/21  Gastric cancer, newly diagnosed     PLAN:  Stable off abx.   Surgical oncology to monitor biliary drain and DC when appropriate.   Will sign off.  Please call if need arises.   Discussed with patient and nursing.      SUBJECTIVE:     AF, VSS. No complaints or events overnight.     MEDICATIONS:   Reviewed in EMR    REVIEW OF SYSTEMS:   Except as documented, all other systems reviewed and negative     PHYSICAL EXAM:   T 98.7 °F (37.1 °C)   /67   P 106   RR 18   O2 99 %  GENERAL: NAD; does not appear toxic  SKIN: no rash  HEENT: sclera icteric; PERRL   NECK: supple; no LAD  CHEST: CTA; nonlabored, equal expansion   CARDIOVASCULAR: RRR, S1S2; no murmur   ABDOMEN:  active bowel sounds; abdomen soft, nondistended, nontender to palpation  GENITOURINARY: no suprapubic tenderness   EXTREMITIES: no cyanosis or clubbing  NEURO: AAO x4; CN II-XII grossly intact  PSYCH: Mentation and affect appropriate     LABS AND IMAGING:     Recent Labs     12/06/23 0505 12/07/23 0445   WBC 23.90* 20.49*   RBC 2.98* 2.79*   HGB 9.1* 8.5*   HCT 25.6* 24.6*   MCV 85.9 88.2   MCH 30.5 30.5   MCHC 35.5 34.6   RDW 18.6* 18.9*   * 431*       No results for input(s): "LACTIC" in the last 72 hours.  No results for input(s): "INR", "APTT", "D-DIMER" in the last 72 hours.  Recent Labs     12/05/23  0455   TRIG 122        Recent Labs     12/05/23  0455 12/06/23  0505 12/07/23  0445   * 132* 133*   K 4.4 4.3 4.1   CHLORIDE 94* 95* 96*   CO2 24 24 26   BUN 51.3* 71.7* 50.1*   CREATININE 4.55* 5.79* 4.61*   GLUCOSE 326* 116* 129*   CALCIUM 8.2* 8.3* 8.0*   MG 2.20 2.40  --    PHOS 4.4 4.1 3.5   ALBUMIN 1.8* 1.6* 1.5*   GLOBULIN 3.7*  --   --    ALKPHOS 125  --   --    ALT 23  --   --    AST 30  --   --    BILITOT 5.1*  --   --        No results for input(s): "BNP", "CPK", "TROPONINI" in the last 72 hours.       Cardiac catheterization  Procedure performed in the Invasive " Lab    - See Procedure Log link below for nursing documentation    - See OpNote on Surgeries Tab for physician findings    - See Imaging Tab for radiologist dictation  US Bladder Post Void Residual  Narrative: EXAMINATION:  US BLADDER POST VOID RESIDUAL    CLINICAL HISTORY:  urinary retention? need accurate amount of urine in bladder, bladder scan states 586cc;    COMPARISON:  None    FINDINGS:  Limited exam demonstrates the bladder to measure 4 cm x 4.4 cm x 7.2 cm with volume of 86.7 mL.    There is evidence of ascites  Impression: Bladder measurements as above.    Ascites    Electronically signed by: Elvin Bass  Date:    12/07/2023  Time:    10:26          MARIA A Small  Infectious Disease

## 2023-12-07 NOTE — NURSING
Spoke with MD Troy regarding 16f cheney in place but with little to none output. Stated to keep in and consult them for AM

## 2023-12-07 NOTE — PT/OT/SLP PROGRESS
Physical Therapy Treatment    Patient Name:  Karen Briggs   MRN:  [id number]    Recommendations:     Discharge Recommendations: Moderate Intensity Therapy  Discharge Equipment Recommendations: walker, rolling  Barriers to discharge: Decreased caregiver support and medical     Assessment:     Karen Briggs is a 58 y.o. female admitted with a medical diagnosis of <principal problem not specified>.  She presents with the following impairments/functional limitations: weakness, impaired endurance, impaired self care skills, impaired functional mobility, gait instability, impaired balance, decreased lower extremity function, decreased coordination, impaired cognition, decreased safety awareness, decreased ROM, impaired coordination, impaired fine motor, edema, impaired cardiopulmonary response to activity .    Rehab Prognosis: Good; patient would benefit from acute skilled PT services to address these deficits and reach maximum level of function.    Recent Surgery: Procedure(s) (LRB):  Insertion, Catheter, Central Venous, Hemodialysis (N/A) 1 Day Post-Op    Plan:     During this hospitalization, patient to be seen 5 x/week to address the identified rehab impairments via gait training, therapeutic activities, therapeutic exercises, neuromuscular re-education and progress toward the following goals:    Plan of Care Expires:  12/04/23    Subjective     Chief Complaint: bleeding rt port for dialysis   Patient/Family Comments/goals: to move per    Pain/Comfort:  Pain Rating 1: 0/10      Objective:     Communicated with nursing  prior to session.  Patient found HOB elevated with central line, PEG Tube, PICC line, pulse ox (continuous), telemetry, Other (comments) (drain rt side) upon PT entry to room.     General Precautions: Standard, fall, NPO  Orthopedic Precautions: N/A  Braces: N/A  Respiratory Status: Room air     Functional Mobility:  Bed Mobility:     Supine to Sit: minimum assistance and use of bed rail HOB  elevated   Sit to Supine: stand by assistance and use of bed rail HOB elevated   Transfers:     Sit to Stand:  minimum assistance with rolling walker  Bed to Chair: contact guard assistance with  rolling walker  using  Step Transfer and slow transition   Gait: pt ambulated 215ft then 150ft slow jenn increased time vc for standing posture sba     Treatment & Education:  Nursing in redressed bleeding lines     Patient left up in chair with all lines intact, call button in reach,   present, and pt chuy tx required rest breaks quickly fatigues pt anxious at times . Educated  on allowing pt to attempt activity prior to offering assist secondary to limited to him assisting in home environment stated is unable to give physical assistance in home pt would benefit from cont therapies to improve functional independence  ..    GOALS:   Multidisciplinary Problems       Physical Therapy Goals          Problem: Physical Therapy    Goal Priority Disciplines Outcome Goal Variances Interventions   Physical Therapy Goal     PT, PT/OT Ongoing, Progressing     Description: Pt will improve functional independence by performing:    Bed mobility: SBA  Sit to stand: SBA with rolling walker  Bed to chair t/f: Min A with Stand Step  with rolling walker  Ambulation x 15 ft with Min A  with rolling walker                       Time Tracking:     PT Received On: 12/07/23  PT Start Time: 1250     PT Stop Time: 1320  PT Total Time (min): 30 min     Billable Minutes: Gait Training 30min    Treatment Type: Treatment  PT/PTA: PTA     Number of PTA visits since last PT visit: 3     12/07/2023

## 2023-12-07 NOTE — PROGRESS NOTES
Nephrology consult follow up note    HPI:      Karen Briggs is a 58 y.o. female presented on  7 days post op from laparoscopic cholecystectomy on 11/10. Gallbladder was unable to be resected. Back and flank pain persisted post operatively prompting [place] [facility] After workup her surgeon recommended ERCP for which she was sent to this facility. Left sided hydronephrosis noted on CT scans done on admission. At that time renal function was fairly normal and patient was given option for stenting or to defer as outpatient and she chose the latter.      During ERCP, malignant gastric mass noted and ERCP was unable to be completed due to duodenal scarring. 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 Patient ultimately had biliary drain placed per IR.      She developed A fib with RVR requiring amiodarone infusion. In the past 24 hours she had significant blood loss post removal of long term IV. She then had a fall later ambulating to the bathroom.      Patient renal function was relatively stable until decline starting 11/25. Patient was ultimately initiated on HD 11/28 post bilateral ureteral stent placement with Dr Cardenas same day.     Patient developed dialysis dependent acute kidney injury, and was started on hemodialysis 11/29/2023.    Interval history:     No acute events overnight. No new complaints. Still not making much urine output.  No chest pain, shortness of breath, abdominal pain, nausea, vomiting, or lower extremity edema.  PermCath placed yesterday.     Review of Systems:     Comprehensive 10pt ROS negative except as noted per history.    Past medical, family, surgical, and social history reviewed and unchanged from initial consult note.     Objective:       VITAL SIGNS: 24 HR MIN & MAX LAST    Temp  Min: 98.3 °F (36.8 °C)  Max: 99.5 °F (37.5 °C)  98.7 °F (37.1 °C)        BP  Min: 82/58  Max: 101/67  101/67     Pulse  Min: 102  Max: 117  102     Resp  Min: 16  Max: 20  18    SpO2   Min: 96 %  Max: 99 %  99 %      GEN:  Well-appearing AAF  CV: RRR +S1,S2 without murmur  PULM: CTAB, unlabored  ABD: Soft, NT/ND abdomen with NABS  EXT:  1+ lower extremity edema  SKIN: Warm and dry  PSYCH: Awake, alert and appropriately conversant.   Dialysis access:  Right IJ tunneled dialysis catheter            Component Value Date/Time     (L) 12/07/2023 0445     (L) 12/06/2023 0505     04/24/2018 0340    K 4.1 12/07/2023 0445    K 4.3 12/06/2023 0505    K 3.2 (L) 04/24/2018 0340    CHLORIDE 96 (L) 12/07/2023 0445    CHLORIDE 95 (L) 12/06/2023 0505    CO2 26 12/07/2023 0445    CO2 24 12/06/2023 0505    CO2 20 (L) 04/24/2018 0340    BUN 50.1 (H) 12/07/2023 0445    BUN 71.7 (H) 12/06/2023 0505    BUN 10 04/24/2018 0340    CREATININE 4.61 (H) 12/07/2023 0445    CREATININE 5.79 (H) 12/06/2023 0505    CREATININE 0.7 04/24/2018 0340    CALCIUM 8.0 (L) 12/07/2023 0445    CALCIUM 8.3 (L) 12/06/2023 0505    CALCIUM 9.2 04/24/2018 0340    PHOS 3.5 12/07/2023 0445            Component Value Date/Time    WBC 20.49 (H) 12/07/2023 0445    WBC 23.90 (H) 12/06/2023 0505    WBC 24.01 12/05/2023 0455    WBC 23.73 11/23/2023 0723    WBC 12.03 04/24/2018 0340    HGB 8.5 (L) 12/07/2023 0445    HGB 9.1 (L) 12/06/2023 0505    HGB 11.3 (L) 04/24/2018 0340    HCT 24.6 (L) 12/07/2023 0445    HCT 25.6 (L) 12/06/2023 0505    HCT 35.3 (L) 04/24/2018 0340     (H) 12/07/2023 0445     (H) 12/06/2023 0505     04/24/2018 0340         Imaging reviewed      Assessment / Plan:       There are no hospital problems to display for this patient.      POORNIMA multifactorial secondary to obstructive uropathy, acute blood loss, contrast exposure, hypotension and Afib with RVR  ---Nephrotic range proteinuria noted. 4.4 g  Newly diagnosed malignant gastric mass. Pathology consistent with adenocarcinoma   -s/p J tube and mediport placement 12/1  Acute blood loss anemia 2/2 bleeding post long term IV removal   Bilateral  hydronephrosis noted 11/21 - stent placement initially deferred due to stable renal function   Afib with RVR on amiodarone infusion DC for hypotension.  She is on scheduled Lopressor 5 mg q.6  S/p biliary drain placement      Plan:  No acute indication for hemodialysis today.  No significant sign of renal recovery yet.  Patient remains with very little urine output.  Tunneled dialysis catheter has been placed.  Okay with discharging from Nephrology standpoint once outpatient dialysis chair has been obtained.  Plan for hemodialysis tomorrow on MWF schedule she remains inpatient.      Yahir Black DO  Nephrology  [facility]  Clinic number: [phone]

## 2023-12-07 NOTE — PLAN OF CARE
Case discussed with NP Maryanne Moise and confirmed that pt will require continued HD for POORNIMA upon DC. Met with pt during HD. She has selected [facility] from the [facility]. Pt states she was driving herself prior to hospitalization. Referral sent to [facility] for chair @ [facility]; will follow up.

## 2023-12-07 NOTE — PLAN OF CARE
[facility] has been [facility] @ 6:30 AM. She can start Saturday, December 9th @ 6:15 AM. NEHA hartman.

## 2023-12-08 LAB
ALBUMIN SERPL-MCNC: 1.5 G/DL (ref 3.4–4.8)
BASOPHILS # BLD AUTO: 0.08 X10(3)/MCL
BASOPHILS NFR BLD AUTO: 0.4 %
BUN SERPL-MCNC: 73.8 MG/DL (ref 9.8–20.1)
CALCIUM SERPL-MCNC: 8.2 MG/DL (ref 8.4–10.2)
CHLORIDE SERPL-SCNC: 96 MMOL/L (ref 98–107)
CO2 SERPL-SCNC: 25 MMOL/L (ref 23–31)
COLOR STL: YELLOW
CONSISTENCY STL: ABNORMAL
CREAT SERPL-MCNC: 5.93 MG/DL (ref 0.55–1.02)
EOSINOPHIL # BLD AUTO: 0.1 X10(3)/MCL (ref 0–0.9)
EOSINOPHIL NFR BLD AUTO: 0.5 %
ERYTHROCYTE [DISTWIDTH] IN BLOOD BY AUTOMATED COUNT: 18.6 % (ref 11.5–17)
GFR SERPLBLD CREATININE-BSD FMLA CKD-EPI: 8 MLS/MIN/1.73/M2
GLUCOSE SERPL-MCNC: 142 MG/DL (ref 82–115)
HCT VFR BLD AUTO: 23.5 % (ref 37–47)
HEMOCCULT SP1 STL QL: POSITIVE
HGB BLD-MCNC: 8.1 G/DL (ref 12–16)
IMM GRANULOCYTES # BLD AUTO: 0.36 X10(3)/MCL (ref 0–0.04)
IMM GRANULOCYTES NFR BLD AUTO: 1.7 %
LYMPHOCYTES # BLD AUTO: 0.63 X10(3)/MCL (ref 0.6–4.6)
LYMPHOCYTES NFR BLD AUTO: 2.9 %
MCH RBC QN AUTO: 30 PG (ref 27–31)
MCHC RBC AUTO-ENTMCNC: 34.5 G/DL (ref 33–36)
MCV RBC AUTO: 87 FL (ref 80–94)
MONOCYTES # BLD AUTO: 1.34 X10(3)/MCL (ref 0.1–1.3)
MONOCYTES NFR BLD AUTO: 6.1 %
NEUTROPHILS # BLD AUTO: 19.28 X10(3)/MCL (ref 2.1–9.2)
NEUTROPHILS NFR BLD AUTO: 88.4 %
NRBC BLD AUTO-RTO: 0 %
PHOSPHATE SERPL-MCNC: 4.3 MG/DL (ref 2.3–4.7)
PLATELET # BLD AUTO: 474 X10(3)/MCL (ref 130–400)
PMV BLD AUTO: 10.1 FL (ref 7.4–10.4)
POTASSIUM SERPL-SCNC: 4.3 MMOL/L (ref 3.5–5.1)
RBC # BLD AUTO: 2.7 X10(6)/MCL (ref 4.2–5.4)
SODIUM SERPL-SCNC: 131 MMOL/L (ref 136–145)
WBC # SPEC AUTO: 21.79 X10(3)/MCL (ref 4.5–11.5)

## 2023-12-08 PROCEDURE — 25000003 PHARM REV CODE 250: Performed by: INTERNAL MEDICINE

## 2023-12-08 PROCEDURE — 80069 RENAL FUNCTION PANEL: CPT | Performed by: STUDENT IN AN ORGANIZED HEALTH CARE EDUCATION/TRAINING PROGRAM

## 2023-12-08 PROCEDURE — 85025 COMPLETE CBC W/AUTO DIFF WBC: CPT | Performed by: STUDENT IN AN ORGANIZED HEALTH CARE EDUCATION/TRAINING PROGRAM

## 2023-12-08 PROCEDURE — 82272 OCCULT BLD FECES 1-3 TESTS: CPT | Performed by: INTERNAL MEDICINE

## 2023-12-08 PROCEDURE — 25000003 PHARM REV CODE 250: Performed by: NURSE PRACTITIONER

## 2023-12-08 PROCEDURE — 21400001 HC TELEMETRY ROOM

## 2023-12-08 PROCEDURE — A4216 STERILE WATER/SALINE, 10 ML: HCPCS | Performed by: INTERNAL MEDICINE

## 2023-12-08 PROCEDURE — 97530 THERAPEUTIC ACTIVITIES: CPT

## 2023-12-08 PROCEDURE — C9113 INJ PANTOPRAZOLE SODIUM, VIA: HCPCS | Performed by: INTERNAL MEDICINE

## 2023-12-08 PROCEDURE — 63600175 PHARM REV CODE 636 W HCPCS: Performed by: INTERNAL MEDICINE

## 2023-12-08 RX ORDER — BISACODYL 10 MG/1
10 SUPPOSITORY RECTAL ONCE
Status: COMPLETED | OUTPATIENT
Start: 2023-12-08 | End: 2023-12-08

## 2023-12-08 RX ADMIN — METOPROLOL TARTRATE 5 MG: 1 INJECTION, SOLUTION INTRAVENOUS at 03:12

## 2023-12-08 RX ADMIN — MIDODRINE HYDROCHLORIDE 10 MG: 5 TABLET ORAL at 06:12

## 2023-12-08 RX ADMIN — SODIUM CHLORIDE, PRESERVATIVE FREE 10 ML: 5 INJECTION INTRAVENOUS at 05:12

## 2023-12-08 RX ADMIN — MIDODRINE HYDROCHLORIDE 10 MG: 5 TABLET ORAL at 01:12

## 2023-12-08 RX ADMIN — Medication 6 MG: at 03:12

## 2023-12-08 RX ADMIN — APIXABAN 5 MG: 5 TABLET, FILM COATED ORAL at 08:12

## 2023-12-08 RX ADMIN — METOPROLOL TARTRATE 12.5 MG: 25 TABLET, FILM COATED ORAL at 10:12

## 2023-12-08 RX ADMIN — SODIUM CHLORIDE, PRESERVATIVE FREE 10 ML: 5 INJECTION INTRAVENOUS at 06:12

## 2023-12-08 RX ADMIN — METOCLOPRAMIDE 5 MG: 5 INJECTION, SOLUTION INTRAMUSCULAR; INTRAVENOUS at 05:12

## 2023-12-08 RX ADMIN — SODIUM CHLORIDE, PRESERVATIVE FREE 10 ML: 5 INJECTION INTRAVENOUS at 01:12

## 2023-12-08 RX ADMIN — MIDODRINE HYDROCHLORIDE 10 MG: 5 TABLET ORAL at 07:12

## 2023-12-08 RX ADMIN — PANTOPRAZOLE SODIUM 40 MG: 40 INJECTION, POWDER, FOR SOLUTION INTRAVENOUS at 07:12

## 2023-12-08 RX ADMIN — BISACODYL 10 MG: 10 SUPPOSITORY RECTAL at 04:12

## 2023-12-08 RX ADMIN — SODIUM BICARBONATE 650 MG TABLET 1300 MG: at 08:12

## 2023-12-08 RX ADMIN — APIXABAN 5 MG: 5 TABLET, FILM COATED ORAL at 10:12

## 2023-12-08 RX ADMIN — ACETAMINOPHEN 650 MG: 325 TABLET, FILM COATED ORAL at 05:12

## 2023-12-08 RX ADMIN — PANTOPRAZOLE SODIUM 40 MG: 40 INJECTION, POWDER, FOR SOLUTION INTRAVENOUS at 06:12

## 2023-12-08 NOTE — PROGRESS NOTES
[facility]  Hospital Medicine Progress Note        Chief Complaint: Inpatient Follow-up for intractable nausea vomiting due to gastric adenocarcinoma    HPI: 58-year-old female with medical history of hypertension who recently underwent laparoscopic cholecystectomy on 11/10/2023, procedure was incomplete and was unable to completely resect the gallbladder.  Since surgery she continued to have right flank/back pain and followed up with her PCP and CT abdomen and pelvis on 11/17/2023 revealed left-sided hydronephrosis with suspected distal obstruction/no clear stone visualized, postoperative changes of cholecystectomy with fluid in the gallbladder fossa may reflect postoperative seroma but biloma can not be entirely excluded, also noted for marked thickening of the stomach wall diffusely may be related to mesenteric edema/reactive.  She presented to [facility] ED the same day 11/17/2023 and her labs notable for WBC 9.0, hemoglobin 12.4, platelets 442, creatinine 0.86, total bilirubin 8.7 with direct fraction 6.7, alkaline phosphatase 491, , .  Patient's surgeon was consulted and recommended ERCP which was not available at [facility] for which she was transferred to [facility] and referred to hospital medicine service for further evaluation and management.   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  Severe inflammation and oozing of blood noted proximal gastric lumen.  Unable to advance scope into the duodenum.  Surgical oncology consulted, IR consulted for external drain placement which was done November 21.  November 24th she developed new onset atrial fibrillation with RVR and Cardiology consulted.  Started on amiodarone drip  Unfortunately patient had acute blood loss due to hemorrhage from the midline site that was removed.  Patient was unaware of the bleed.  Became very weak, passed out.  Code was called but she came around.   Stat H&H done which was slightly lower but stable, MRI of the brain was negative for any acute ischemic changes. Pt is aware of gastric cancer diagnosis   GI following--> 11/18- EGD shows normal esophagus, malignant gastric tumor in the cardia.  Inflamed mucosa and ooze of blood throughout the proximal gastric lumen.  Gastric antrum scar would not allow advancement of scope into the duodenal ampulla area therefore biliary cannulation was not possible for bile leak evaluation  Pathology shows marked chronic gastritis with intestinal metaplasia, gastric cardia biopsy shows adenocarcinoma, moderately differentiated intestinal type   bilateral hydronephrosis with left greater than right  MRI brain without contrast-negative for acute finding  PET scan reviewed with patient by Oncology reports of locally advanced gastric adenocarcinoma and plans for systemic therapy outpatient if functional, nutritional and renal function improves. \  MediPort placement by surgical Oncology on 12/1, oncology on board plans for systemic therapy outpatient if functional, nutritional and renal function improves.  obstructive uropathy with bilateral hydronephrosis status post bilateral ureteral stent placed on 11/27 by Urology- no improvement in renal function, patient opted to have hemodialysis and a right-sided hemodialysis catheter has been placed by General surgery on 11/29, 2 continue hemodialysis per nephrology discretion.  Patient with symptoms of nausea and vomiting can not keep anything down in her stomach complicated by severe malnutrition on IV Clinimix and supportive medications for nausea and vomiting. Palliative care on board    Patient got a MediPort and J-tube placed on 12/01  Cystogram reviewed by Urology with patent stents and recommends outpatient follow-up with Urology  White cell count elevated at 20.4, Infectious Disease had started on IV Zosyn given concern for possible sepsis with low blood pressure on 12/02   CIS  evaluated patient to begin low-dose metoprolol tartrate at 12.5 mg b.i.d. and resume Eliquis for stroke prevention       Interval Hx:   Patient is sitting in chair.  Reports feels good.  Discuss that she has gotten dialysis chair time and plan was to discharge her but unfortunately her hemoglobin continued to drip down.  And we need stool sample to rule out GI bleed.  Patient then reported that she has not had a bowel movement this week.  Discuss will start with Dulcolax suppository x1.  Verbalized understanding  I spoke to patient's nurse and also requested stool sample for checking blood in the stools  Case was discussed with patient's nurse and  on the floor.    Objective/physical exam:  General: In no acute distress, afebrile  Chest: Clear to auscultation bilaterally, bleeding noted around tunneled catheter  Heart: RRR, +S1, S2, no appreciable murmur  Abdomen: Soft, nontender, BS +  MSK: Warm, no lower extremity edema, no clubbing or cyanosis  Neurologic: Alert and oriented x4, Cranial nerve II-XII intact, Strength 5/5 in all 4 extremities    VITAL SIGNS: 24 HRS MIN & MAX LAST   Temp  Min: 98.1 °F (36.7 °C)  Max: 100.4 °F (38 °C) 98.1 °F (36.7 °C)   BP  Min: 82/55  Max: 108/71 97/67   Pulse  Min: 100  Max: 125  (!) 113   Resp  Min: 16  Max: 19 18   SpO2  Min: 97 %  Max: 100 % 100 %     I reviewed the labs below:  Recent Labs   Lab 12/06/23  0505 12/07/23  0445 12/08/23  0718   WBC 23.90* 20.49* 21.79*   RBC 2.98* 2.79* 2.70*   HGB 9.1* 8.5* 8.1*   HCT 25.6* 24.6* 23.5*   MCV 85.9 88.2 87.0   MCH 30.5 30.5 30.0   MCHC 35.5 34.6 34.5   RDW 18.6* 18.9* 18.6*   * 431* 474*   MPV 9.6 9.7 10.1       Recent Labs   Lab 12/03/23  0531 12/04/23  0546 12/05/23  0455 12/06/23  0505 12/07/23  0445 12/08/23  0718   * 132* 129* 132* 133* 131*   K 4.0 4.7 4.4 4.3 4.1 4.3   CO2 25 23 24 24 26 25   BUN 48.7* 62.0* 51.3* 71.7* 50.1* 73.8*   CREATININE 4.64* 5.81* 4.55* 5.79* 4.61* 5.93*   CALCIUM 8.4 8.6  8.2* 8.3* 8.0* 8.2*   MG 2.10 2.30 2.20 2.40  --   --    ALBUMIN 1.5* 1.5* 1.8* 1.6* 1.5* 1.5*   ALKPHOS 109 126 125  --   --   --    ALT 19 23 23  --   --   --    AST 19 26 30  --   --   --    BILITOT 4.6* 5.6* 5.1*  --   --   --        Microbiology Results (last 7 days)       Procedure Component Value Units Date/Time    Blood Culture [[id number]]  (Normal) Collected: 12/02/23 2053    Order Status: Completed Specimen: Blood Updated: 12/07/23 2200     CULTURE, BLOOD ([facility]) No Growth at 5 days    Blood Culture [[id number]]  (Normal) Collected: 12/02/23 2053    Order Status: Completed Specimen: Blood Updated: 12/07/23 2200     CULTURE, BLOOD ([facility]) No Growth at 5 days           See below for Radiology    Scheduled Med:   apixaban  5 mg Oral BID    metoprolol tartrate  12.5 mg Oral BID    midodrine  10 mg Oral TID WM    pantoprazole  40 mg Intravenous BID WM    sodium bicarbonate  1,300 mg Oral Daily    sodium chloride 0.9%  10 mL Intravenous Q6H      Continuous Infusions:       PRN Meds:  0.9%  NaCl infusion (for blood administration), sodium chloride 0.9%, acetaminophen, aluminum-magnesium hydroxide-simethicone, bisacodyL, chlorproMAZINE, dicyclomine, hydrALAZINE, hyoscyamine, melatonin, metoclopramide HCl, metoprolol, morphine, ondansetron, oxyCODONE, polyethylene glycol, prochlorperazine, promethazine, senna-docusate 8.6-50 mg, sodium chloride 0.9%, Flushing PICC/Midline Protocol **AND** sodium chloride 0.9% **AND** sodium chloride 0.9%     Assessment/Plan:  Hypotension, improved with midodrine  Persistent leukocytosis- source unclear  Acute on chronic anemia, s/p 2 units prbcs transfusion 11/30  Hx of Acute Blood loss anemia with syncope and then fall 11/26-   Locally advanced gastric adenocarcinoma with intractable nausea and vomiting possible Gastric/duodenal outlet obstruction  -now status post J Tube placement  Obstructive uropathy with bilateral hydronephrosis- S/P bilateral ureteral stent placed on  11/27  POORNIMA-now on HD - 11/29  Metabolic acidosis- resolved  Suspected bile leak with biloma and biliary obstruction--> H/O Laparoscopic incomplete cholecystectomy 11/10/2023  New onset atrial fibrillation with RVR- fluctuating  Hypokalemia- resolved with replacement  Hyponatremia  History of hypertension and DDD/sciatica  Severe malnutrition      Begin j tube feed with [employer] at 45 mL/hour goal rates, home feeding pump has been arranged already  WBC 21 K   Infectious disease Dr Carlisle on board, given all cultures has been negative, recommended to discontinue IV antibiotics and monitor her off of antibiotics for now, patient has been stable and Infectious Disease has signed off  Trend hemoglobin levels and keep hemoglobin greater than 7- 8 grams/dL --> noted slowly trending down again, now 8.1  Ordered FOBT x3  Patient reported no BM this week.  Ordered Dulcolax suppository x1 to move the bowels 1st  Total received 3units of packed red blood cell this admission, verified with blood bank  Nephrology on board for hemodialysis needs 12/6- received tunneled catheter given renal functions did not recover despite bilateral ureteral stenting 11/27 for hydronephrosis, continued hemodialysis need on Monday Wednesday and Friday while in-house  Dialysis chair time has been secured, patient to start on Saturday with Tuesday Thursday Saturday schedule.  Patient was cleared to discharge per Nephrology to start her hemodialysis on Saturday as outpatient but given drop in hemoglobin, will hold off the discharge and work her up keeping in mind she is on blood thinners as well  CIS evaluated patient to begin low-dose metoprolol tartrate at 12.5 mg b.i.d. and resume Eliquis for stroke prevention  Patient got a MediPort and J-tube placed on 12/01  Cystogram reviewed by Urology with patent stents and recommends outpatient follow-up with Urology  Appreciate oncology input plans for systemic therapy outpatient if renal function  improves, patient is planning to pursue treatment   Palliative Care on board- patient wants everything done, plan to go to her oncology appointment and discussed options of treatment, patient will still be under palliative care service upon discharge  IM thorazine prn hiccups   Morning CBC, bmp ordered     VTE prophylaxis:  eliquis      Patient condition:  stable     Anticipated discharge and Disposition:  Probably home with palliative Care tomorrow if hemoglobin remains stable.  Patient to resume her hemodialysis on Saturday at her dialysis center per Nephrology          All diagnosis and differential diagnosis have been reviewed; assessment and plan has been documented; I have personally reviewed the labs and test results that are presently available; I have reviewed the patients medication list; I have reviewed the consulting providers response and recommendations. I have reviewed or attempted to review medical records based upon their availability    All of the patient's questions have been  addressed and answered. Patient's is agreeable to the above stated plan. I will continue to monitor closely and make adjustments to medical management as needed.  _____________________________________________________________________    Nutrition Status:  Patient meets ASPEN criteria for severe malnutrition of acute illness or injury per RD assessment as evidenced by:  Energy Intake (Malnutrition): less than or equal to 50% for greater than or equal to 5 days  Weight Loss (Malnutrition): greater than 7.5% in 3 months  Subcutaneous Fat (Malnutrition): moderate depletion  Muscle Mass (Malnutrition): moderate depletion           A minimum of two characteristics is recommended for diagnosis of either severe or non-severe malnutrition.   Radiology:   I have personally reviewed the images and agree with radiologist report  Cardiac catheterization  Procedure performed in the Invasive Lab    - See Procedure Log link below for nursing  documentation    - See OpNote on Surgeries Tab for physician findings    - See Imaging Tab for radiologist dictation  US Bladder Post Void Residual  Narrative: EXAMINATION:  US BLADDER POST VOID RESIDUAL    CLINICAL HISTORY:  urinary retention? need accurate amount of urine in bladder, bladder scan states 586cc;    COMPARISON:  None    FINDINGS:  Limited exam demonstrates the bladder to measure 4 cm x 4.4 cm x 7.2 cm with volume of 86.7 mL.    There is evidence of ascites  Impression: Bladder measurements as above.    Ascites    Electronically signed by: Elvin Bass  Date:    12/07/2023  Time:    10:26      Nick Plummer MD  Department of Hospital Medicine   [facility]   12/08/2023

## 2023-12-08 NOTE — PROGRESS NOTES
Inpatient Nutrition Assessment    Admit Date: 11/17/2023   Total duration of encounter: 21 days     Nutrition Recommendation/Prescription     -Oral diet per MD, currently NPO.     -Continue tube feeds as tolerated: Novasource @ 45ml/hr, goal rate.   *Increase free water flush: 20ml/hr (~400ml flush/d; 1050ml total fluids daily)    -Electrolytes and additional fluids per pharmacy/MD.     -Monitor need to add bowel regimen.     Communication of Recommendations: reviewed with nurse and reviewed with patient    Nutrition Assessment     Malnutrition Assessment/Nutrition-Focused Physical Exam    Malnutrition Context: acute illness or injury (11/18/23 1630)  Malnutrition Level: severe (11/18/23 1630)  Energy Intake (Malnutrition):  (does not meet criteria) (12/08/23 1238)  Weight Loss (Malnutrition): greater than 7.5% in 3 months (11/18/23 1630)  Subcutaneous Fat (Malnutrition): moderate depletion (11/18/23 1630)  Orbital Region (Subcutaneous Fat Loss): moderate depletion        Muscle Mass (Malnutrition): moderate depletion (11/18/23 1630)  [place] Region (Muscle Loss): moderate depletion     Clavicle and Acromion Bone Region (Muscle Loss): moderate depletion        Patellar Region (Muscle Loss): moderate depletion                 A minimum of two characteristics is recommended for diagnosis of either severe or non-severe malnutrition.    Chart Review    Reason Seen: malnutrition screening tool (MST), physician consult for wt loss, and follow-up    Malnutrition Screening Tool Results   Have you recently lost weight without trying?: Yes: 24-33 lbs  Have you been eating poorly because of a decreased appetite?: No   MST Score: 3     Diagnosis:  Hypotension, improved  Persistent leukocytosis- source unclear  Acute on chronic anemia, s/p 2 units prbcs transfusion 11/30  Hx of Acute Blood loss anemia with syncope and then fall 11/26-   Locally advanced gastric adenocarcinoma with intractable nausea and vomiting possible  Gastric/duodenal outlet obstruction  -now status post J Tube placement  Obstructive uropathy with bilateral hydronephrosis- S/P bilateral ureteral stent placed on 11/27  POORNIMA-now on HD - 11/29  Metabolic acidosis- resolved  Suspected bile leak with biloma and biliary obstruction--> H/O Laparoscopic incomplete cholecystectomy 11/10/2023  New onset atrial fibrillation with RVR- fluctuating  Hypokalemia- resolved with replacement  Hyponatremia  Severe malnutrition    Relevant Medical History:   Past Medical History:   Diagnosis Date    Hypertension     Sciatica      Past Surgical History:   Procedure Laterality Date    CARPAL TUNNEL RELEASE Left     CYSTOSCOPY W/ URETERAL STENT PLACEMENT Bilateral 11/27/2023    Procedure: CYSTOSCOPY, WITH URETERAL STENT INSERTION;  Surgeon: Huey Cardenas MD;  Location: [facility] OR;  Service: Urology;  Laterality: Bilateral;    EGD, WITH CLOSED BIOPSY  11/18/2023    Procedure: EGD, WITH CLOSED BIOPSY;  Surgeon: Alfred Goss MD;  Location: [facility] OR;  Service: Gastroenterology;;    ERCP N/A 11/18/2023    Procedure: ERCP (ENDOSCOPIC RETROGRADE CHOLANGIOPANCREATOGRAPHY);  Surgeon: Alfred Goss MD;  Location: [facility] OR;  Service: Gastroenterology;  Laterality: N/A;    HEMORRHOID SURGERY      INSERTION OF TUNNELED CENTRAL VENOUS CATHETER (CVC) WITH SUBCUTANEOUS PORT N/A 12/1/2023    Procedure: INSERTION, PORT-A-CATH;  Surgeon: William Morse MD;  Location: [facility] OR;  Service: General;  Laterality: N/A;    INSERTION OF TUNNELED CENTRAL VENOUS HEMODIALYSIS CATHETER N/A 12/6/2023    Procedure: Insertion, Catheter, Central Venous, Hemodialysis;  Surgeon: Satinder Luo DO;  Location: [facility] CATH LAB;  Service: Nephrology;  Laterality: N/A;    LAPAROSCOPIC INSERTION OF JEJUNOSTOMY TUBE N/A 12/1/2023    Procedure: INSERTION, JEJUNOSTOMY TUBE, LAPAROSCOPIC;  Surgeon: William Morse MD;  Location: [facility] OR;  Service: General;  Laterality: N/A;  PLUS MEDIPORT     Review of patient's  "allergies indicates:  No Known Allergies     Nutrition-Related Medications:    apixaban  5 mg Oral BID    metoprolol tartrate  12.5 mg Oral BID    midodrine  10 mg Oral TID WM    pantoprazole  40 mg Intravenous BID WM    sodium bicarbonate  1,300 mg Oral Daily    sodium chloride 0.9%  10 mL Intravenous Q6H        Calorie Containing IV Medications: no significant kcals from medications at this time    Nutrition-Related Labs:  No results found for: "HGBA1C"  12/6/2023: Magnesium Level 2.40 mg/dL (Ref range: 1.60 - 2.60 mg/dL)  12/8/2023: Phosphorus Level 4.3 mg/dL (Ref range: 2.3 - 4.7 mg/dL); Potassium Level 4.3 mmol/L (Ref range: 3.5 - 5.1 mmol/L); Sodium Level 131 mmol/L (L; Ref range: 136 - 145 mmol/L)   Recent Labs   Lab 12/06/23  0505 12/07/23  0445 12/08/23  0718   WBC 23.90* 20.49* 21.79*   RBC 2.98* 2.79* 2.70*   HGB 9.1* 8.5* 8.1*   HCT 25.6* 24.6* 23.5*   MCV 85.9 88.2 87.0   MCH 30.5 30.5 30.0   MCHC 35.5 34.6 34.5     Recent Labs   Lab 12/03/23  0531 12/04/23  0546 12/05/23  0455 12/06/23  0505 12/07/23  0445 12/08/23  0718   * 132* 129* 132* 133* 131*   K 4.0 4.7 4.4 4.3 4.1 4.3   CO2 25 23 24 24 26 25   BUN 48.7* 62.0* 51.3* 71.7* 50.1* 73.8*   CREATININE 4.64* 5.81* 4.55* 5.79* 4.61* 5.93*   GLUCOSE 118* 103 326* 116* 129* 142*   CALCIUM 8.4 8.6 8.2* 8.3* 8.0* 8.2*   MG 2.10 2.30 2.20 2.40  --   --    ALBUMIN 1.5* 1.5* 1.8* 1.6* 1.5* 1.5*   ALKPHOS 109 126 125  --   --   --    ALT 19 23 23  --   --   --    AST 19 26 30  --   --   --    BILITOT 4.6* 5.6* 5.1*  --   --   --        Diet/PN Order: No diet orders on file  Oral Supplement Order: none  Tube Feeding Order:  Novasourve @ 45ml/hr (see below for calculation)  Appetite/Oral Intake: NPO/NPO  Factors Affecting Nutritional Intake: altered gastrointestinal function and NPO  Food/Evangelical/Cultural Preferences: none reported  Food Allergies: none reported    Skin Integrity: drain/device(s)  Wound(s):   n/a    Comments    11/18/23: Pt reports poor " "appetite, nauseated, threw up after consuming ~10% of full liquid tray for lunch, appetite has been a struggle for 3 months over which time she lost about 30 lbs unintentionally, appetite has been worse since 11/10 incomplete cholecystectomy, chews/swallows well, agrees to vanilla ONS on diet advancement.     11/22/23: Pt with poor to fair intake of full liquids; states that she is still having some n/v occasionally; reports that she is drinking the Boost that is ordered.     11/24/23: Pt reports poor appetite, still w/ n/v, basically vomits up everything she consumes. Discussed w/ physician, we agree that she needs a J-tube. Pt reports that she is open to it.     11/26/23: breakfast: 0% tray; lunch- unknown, no tray receipt; dinner- 100% 2 orange sheberts and 1/2 bowl chicken broth. Consuming <25% energy needs.    11/27/23: breakfast: 50% cream of wheat and 100% orange juice; lunch: NPO for stent placements, but sx deferred until tomorrow so will allow liquids for dinner tonight.   Continues with emesis and nausea throughout day; receiving antiemetics. States Boost supplement also "comes back up". Will change to Boost Breeze and monitor tolerance.  Changing peripheral PN to total PN with lipids 2x/week to meet nutritional needs until able to place enteral access device for tube feeds or po intake improves. Discussed with MD and pharmacy.     11/30/23 TPN continues. Decreased rate to 50ml/hr until tomorrow and will order custom TPN for minimum volume until able to start on enteral nutrition. Started on dialysis s/t worsening renal indices and decreased urine output (<600mL x24 hr pr RN). Glucose lab of 547 this morning error, 159 on recheck.     12/1/23 Out of room for Jtube and mediport placement. Consult received for Jtube feeding recs. Continue in-house TPN at 50ml/hr, no need for custom at this time. Can wean TPN as tolerating tube feeds at 1/2 goal rate. Will use renal formula for tube feedings while pt " "continues on dilaysis.  Recommendations discussed with RN and pharmacy.     23 Consult received to start tube feedings. Order placed and discussed with RN. Start at 15ml/hr and advance per MD. Continue with renal formula and monitor electrolytes for ability to change to a standard formula. TPN infusing and NPO status continues.      23 Tolerating tube feeds at 25ml/hr. TPN decreased to 30ml/hr last night. Pt reports tolerating tube feeds fine. TPN bag should be complete in the next few hours. Can discontinue as long as tolerating tube feeds.    23 Tolerating tube feeds at goal last few days. Last BM . Reports gas but no abdominal pain.   No TPN/IVF infusing. Increase free water flush from 30ml q4hr to 20ml/hr.    Anthropometrics    Height: 5' 5.75" (167 cm) Height Method: Stated  Last Weight: 96.4 kg (212 lb 8.4 oz) (23 0551) Weight Method: Standard Scale  BMI (Calculated): 34.6  BMI Classification: overweight (BMI 25-29.9)     Ideal Body Weight (IBW), Female: 128.75 lb     % Ideal Body Weight, Female (lb): 140.58 %                             Usual Weight Provided By: patient and EMR weight history  23: 211 lbs  Wt Readings from Last 5 Encounters:   23 96.4 kg (212 lb 8.4 oz)   18 88 kg (194 lb)   18 90.4 kg (199 lb 4.7 oz)   17 90 kg (198 lb 6.6 oz)     Weight Change(s) Since Admission: 23 96.4kg   Admit Weight: 82.2 kg (181 lb 4 oz) (23 0625)    Estimated Needs    Weight Used For Calorie Calculations: 82.2 kg (181 lb 3.5 oz)  Energy Calorie Requirements (kcal): 1644 kcals (20 kcals/kg)  Energy Need Method: Kcal/kg  Weight Used For Protein Calculations: 82.2 kg (181 lb 3.5 oz)  Protein Requirements:  g (1.0-1.3 g/kg)  Fluid Requirements (mL): 1000mL + UOP (started on HD)  Temp (24hrs), Av.2 °F (37.3 °C), Min:98.1 °F (36.7 °C), Max:100.4 °F (38 °C)       Enteral Nutrition    Formula: Novasource Renal  Rate/Volume: 45ml/hr  Water Flushes: " 30ml q4hr (150ml free water)  Additives/Modulars: none at this time  Route: jejunostomy tube  Method: continuous  Total Nutrition Provided by Tube Feeding, Additives, and Flushes:  Calories Provided  1800 kcal/d, 109% needs   Protein Provided   81 g/d, 98% needs   Fluid Provided  798 ml/d, <80% needs   Continuous feeding calculations based on estimated 20 hr/d run time unless otherwise stated.    Parenteral Nutrition    Patient not receiving parenteral nutrition at this time.     Evaluation of Received Nutrient Intake    Calories: meeting estimated needs  Protein: meeting estimated needs    Patient Education    Not applicable.    Nutrition Diagnosis     PES: Malnutrition related to suboptimal protein/energy intake as evidenced by less than or equal to 50% needs met for greater than or equal to 5 days, moderate fat depletion, moderate muscle depletion, and greater than 7.5% weight loss in 3 months. (active)     Interventions/Goals     Intervention(s): prescription medication and collaboration with other providers  Goal: Meet greater than 75% of nutritional needs by follow-up. (goal met)    Monitoring & Evaluation     Dietitian will monitor energy intake, weight, electrolyte/renal panel, and gastrointestinal profile.  Nutrition Risk/Follow-Up: high (follow-up in 1-4 days)   Please consult if re-assessment needed sooner.    Lisa Correa RD   12/08/2023

## 2023-12-08 NOTE — PROGRESS NOTES
Nephrology consult follow up note    HPI:      Karen Briggs is a 58 y.o. female presented on  7 days post op from laparoscopic cholecystectomy on 11/10. Gallbladder was unable to be resected. Back and flank pain persisted post operatively prompting evaluation at [facility]. After workup her surgeon recommended ERCP for which she was sent to this facility. Left sided hydronephrosis noted on CT scans done on admission. At that time renal function was fairly normal and patient was given option for stenting or to defer as outpatient and she chose the latter.      During ERCP, malignant gastric mass noted and ERCP was unable to be completed due to duodenal scarring. 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 Patient ultimately had biliary drain placed per IR.      She developed A fib with RVR requiring amiodarone infusion. In the past 24 hours she had significant blood loss post removal of long term IV. She then had a fall later ambulating to the bathroom.      Patient renal function was relatively stable until decline starting 11/25. Patient was ultimately initiated on HD 11/28 post bilateral ureteral stent placement with Dr Cardenas same day.     Patient developed dialysis dependent acute kidney injury, and was started on hemodialysis 11/29/2023.    Interval history:     No acute events overnight. No new complaints.  Making very little urine output.  No chest pain, shortness of breath, nausea, vomiting, or lower extremity edema.     Review of Systems:     Comprehensive 10pt ROS negative except as noted per history.    Past medical, family, surgical, and social history reviewed and unchanged from initial consult note.     Objective:       VITAL SIGNS: 24 HR MIN & MAX LAST    Temp  Min: 98.2 °F (36.8 °C)  Max: 100.4 °F (38 °C)  98.2 °F (36.8 °C)        BP  Min: 82/55  Max: 108/71  92/63     Pulse  Min: 100  Max: 125  109     Resp  Min: 16  Max: 19  18    SpO2  Min: 97 %  Max: 99 %  97 %      GEN:   Well-appearing AAF  CV: RRR +S1,S2 without murmur  PULM: CTAB, unlabored  ABD: Soft, NT/ND abdomen with NABS  EXT:  2+ lower extremity edema  SKIN: Warm and dry  PSYCH: Awake, alert and appropriately conversant.   Dialysis access:  Right IJ tunneled dialysis catheter            Component Value Date/Time     (L) 12/08/2023 0718     (L) 12/07/2023 0445     04/24/2018 0340    K 4.3 12/08/2023 0718    K 4.1 12/07/2023 0445    K 3.2 (L) 04/24/2018 0340    CHLORIDE 96 (L) 12/08/2023 0718    CHLORIDE 96 (L) 12/07/2023 0445    CO2 25 12/08/2023 0718    CO2 26 12/07/2023 0445    CO2 20 (L) 04/24/2018 0340    BUN 73.8 (H) 12/08/2023 0718    BUN 50.1 (H) 12/07/2023 0445    BUN 10 04/24/2018 0340    CREATININE 5.93 (H) 12/08/2023 0718    CREATININE 4.61 (H) 12/07/2023 0445    CREATININE 0.7 04/24/2018 0340    CALCIUM 8.2 (L) 12/08/2023 0718    CALCIUM 8.0 (L) 12/07/2023 0445    CALCIUM 9.2 04/24/2018 0340    PHOS 4.3 12/08/2023 0718            Component Value Date/Time    WBC 21.79 (H) 12/08/2023 0718    WBC 20.49 (H) 12/07/2023 0445    WBC 24.01 12/05/2023 0455    WBC 23.73 11/23/2023 0723    WBC 12.03 04/24/2018 0340    HGB 8.1 (L) 12/08/2023 0718    HGB 8.5 (L) 12/07/2023 0445    HGB 11.3 (L) 04/24/2018 0340    HCT 23.5 (L) 12/08/2023 0718    HCT 24.6 (L) 12/07/2023 0445    HCT 35.3 (L) 04/24/2018 0340     (H) 12/08/2023 0718     (H) 12/07/2023 0445     04/24/2018 0340         Imaging reviewed      Assessment / Plan:       There are no hospital problems to display for this patient.      POORNIMA multifactorial secondary to obstructive uropathy, acute blood loss, contrast exposure, hypotension and Afib with RVR  ---Nephrotic range proteinuria noted. 4.4 g  Newly diagnosed malignant gastric mass. Pathology consistent with adenocarcinoma   -s/p J tube and mediport placement 12/1  Acute blood loss anemia 2/2 bleeding post long term IV removal   Bilateral hydronephrosis noted 11/21 - stent  placement initially deferred due to stable renal function   Afib with RVR on amiodarone infusion DC for hypotension.  She is on scheduled Lopressor 5 mg q.6  S/p biliary drain placement      Plan:  Patient's outpatient dialysis has been set up at [facility] at 6:30 a.m..  She can start dialysis tomorrow as an outpatient.  Okay with discharging from a Nephrology standpoint.  We will plan to dialyze her tomorrow if she remains inpatient.      Yahir Black DO  Nephrology  [facility]  Clinic number: [phone]

## 2023-12-08 NOTE — PT/OT/SLP PROGRESS
"Physical Therapy Treatment    Patient Name:  Karen Briggs   MRN:  [id number]    Recommendations:     Discharge therapy intensity: Moderate Intensity Therapy   Discharge Equipment Recommendations: walker, rolling  Barriers to discharge: Ongoing medical needs    Assessment:     Karen Briggs is a 58 y.o. female admitted with a medical diagnosis of <principal problem not specified>.  She presents with the following impairments/functional limitations: weakness, impaired endurance, impaired self care skills, impaired functional mobility, gait instability, impaired balance, decreased lower extremity function, decreased coordination, impaired cognition, decreased safety awareness, decreased ROM, impaired coordination, impaired fine motor, edema, impaired cardiopulmonary response to activity. Fair tolerance to PT treatment today. Pt walked 110' w/ RW and CGA, needed seated rest break and complained of feeling light headed. Max  bpm. Pt verbalized wanting to continue walking, however continued to have feelings of light headedness after ~5 min seated rest, so wheeled pt back to room. Pt left in chair at end of visit with all lines intact and call button in reach.     Rehab Prognosis: Fair; patient would benefit from acute skilled PT services to address these deficits and reach maximum level of function.    Recent Surgery: Procedure(s) (LRB):  Insertion, Catheter, Central Venous, Hemodialysis (N/A) 2 Days Post-Op    Plan:     During this hospitalization, patient to be seen 5 x/week to address the identified rehab impairments via gait training, therapeutic activities, therapeutic exercises, neuromuscular re-education and progress toward the following goals:    Plan of Care Expires:  12/04/23    Subjective     Chief Complaint: "feeling winded"  Patient/Family Comments/goals: none  Pain/Comfort:  Pain Rating 1: 0/10      Objective:     Communicated with nurse prior to session.  Patient found HOB elevated with   upon PT " entry to room.     General Precautions: Standard, fall, NPO  Orthopedic Precautions: N/A  Braces: N/A  Respiratory Status: Room air  Blood Pressure:   Skin Integrity: Visible skin intact      Functional Mobility:  Bed Mobility:     Supine to Sit: minimum assistance  Transfers:     Sit to Stand:  minimum assistance with rolling walker  Gait: 110' w/ RW and CGA, chair following. Increased time due to slow gait speed and long seated rest break.    Therapeutic Activities/Exercises:      Education:  Education Provided:  Role and goals of PT, transfer training, bed mobility, gait training, balance training, safety awareness, assistive device, strengthening exercises, and importance of participating in PT to return to PLOF.    Understanding was verbalized.     Patient left up in chair with all lines intact and call button in reach..    GOALS:   Multidisciplinary Problems       Physical Therapy Goals          Problem: Physical Therapy    Goal Priority Disciplines Outcome Goal Variances Interventions   Physical Therapy Goal     PT, PT/OT Ongoing, Progressing     Description: Pt will improve functional independence by performing:    Bed mobility: SBA  Sit to stand: SBA with rolling walker  Bed to chair t/f: Min A with Stand Step  with rolling walker  Ambulation x 15 ft with Min A  with rolling walker                       Time Tracking:     PT Received On: 12/08/23  PT Start Time: 1007     PT Stop Time: 1026  PT Total Time (min): 19 min     Billable Minutes: Therapeutic Activity 19    Treatment Type: Treatment  PT/PTA: PT     Number of PTA visits since last PT visit: 4     12/08/2023

## 2023-12-08 NOTE — Clinical Note
Cleaned hd catheter site according to protocol, leak is from suture site (eliquis), applied manual pressure as well as quick clot with 1L saline bag as additional pressure support. Cleaned and changed biliary drain site due to leakage, will periodically check until end of shift

## 2023-12-09 LAB
ANION GAP SERPL CALC-SCNC: 15 MEQ/L
BUN SERPL-MCNC: 94.8 MG/DL (ref 9.8–20.1)
CALCIUM SERPL-MCNC: 8.2 MG/DL (ref 8.4–10.2)
CHLORIDE SERPL-SCNC: 93 MMOL/L (ref 98–107)
CO2 SERPL-SCNC: 23 MMOL/L (ref 23–31)
CREAT SERPL-MCNC: 7.01 MG/DL (ref 0.55–1.02)
CREAT/UREA NIT SERPL: 14
ERYTHROCYTE [DISTWIDTH] IN BLOOD BY AUTOMATED COUNT: 18.6 % (ref 11.5–17)
GFR SERPLBLD CREATININE-BSD FMLA CKD-EPI: 6 MLS/MIN/1.73/M2
GLUCOSE SERPL-MCNC: 120 MG/DL (ref 82–115)
HCT VFR BLD AUTO: 23.5 % (ref 37–47)
HGB BLD-MCNC: 8.1 G/DL (ref 12–16)
MCH RBC QN AUTO: 30.3 PG (ref 27–31)
MCHC RBC AUTO-ENTMCNC: 34.5 G/DL (ref 33–36)
MCV RBC AUTO: 88 FL (ref 80–94)
NRBC BLD AUTO-RTO: 0 %
PLATELET # BLD AUTO: 487 X10(3)/MCL (ref 130–400)
PMV BLD AUTO: 10 FL (ref 7.4–10.4)
POTASSIUM SERPL-SCNC: 4.4 MMOL/L (ref 3.5–5.1)
RBC # BLD AUTO: 2.67 X10(6)/MCL (ref 4.2–5.4)
SODIUM SERPL-SCNC: 131 MMOL/L (ref 136–145)
WBC # SPEC AUTO: 24.65 X10(3)/MCL (ref 4.5–11.5)

## 2023-12-09 PROCEDURE — 25000003 PHARM REV CODE 250: Performed by: INTERNAL MEDICINE

## 2023-12-09 PROCEDURE — 80048 BASIC METABOLIC PNL TOTAL CA: CPT | Performed by: INTERNAL MEDICINE

## 2023-12-09 PROCEDURE — 25000003 PHARM REV CODE 250: Performed by: NURSE PRACTITIONER

## 2023-12-09 PROCEDURE — 80100016 HC MAINTENANCE HEMODIALYSIS

## 2023-12-09 PROCEDURE — C9113 INJ PANTOPRAZOLE SODIUM, VIA: HCPCS | Performed by: INTERNAL MEDICINE

## 2023-12-09 PROCEDURE — 63600175 PHARM REV CODE 636 W HCPCS: Performed by: INTERNAL MEDICINE

## 2023-12-09 PROCEDURE — 85027 COMPLETE CBC AUTOMATED: CPT | Performed by: INTERNAL MEDICINE

## 2023-12-09 PROCEDURE — 25500020 PHARM REV CODE 255: Performed by: INTERNAL MEDICINE

## 2023-12-09 PROCEDURE — 21400001 HC TELEMETRY ROOM

## 2023-12-09 PROCEDURE — A4216 STERILE WATER/SALINE, 10 ML: HCPCS | Performed by: INTERNAL MEDICINE

## 2023-12-09 RX ADMIN — SODIUM CHLORIDE, PRESERVATIVE FREE 10 ML: 5 INJECTION INTRAVENOUS at 06:12

## 2023-12-09 RX ADMIN — PANTOPRAZOLE SODIUM 40 MG: 40 INJECTION, POWDER, FOR SOLUTION INTRAVENOUS at 07:12

## 2023-12-09 RX ADMIN — MIDODRINE HYDROCHLORIDE 10 MG: 5 TABLET ORAL at 06:12

## 2023-12-09 RX ADMIN — MIDODRINE HYDROCHLORIDE 10 MG: 5 TABLET ORAL at 07:12

## 2023-12-09 RX ADMIN — SODIUM CHLORIDE, PRESERVATIVE FREE 10 ML: 5 INJECTION INTRAVENOUS at 07:12

## 2023-12-09 RX ADMIN — SODIUM BICARBONATE 650 MG TABLET 1300 MG: at 08:12

## 2023-12-09 RX ADMIN — IOPAMIDOL 100 ML: 755 INJECTION, SOLUTION INTRAVENOUS at 12:12

## 2023-12-09 RX ADMIN — APIXABAN 5 MG: 5 TABLET, FILM COATED ORAL at 08:12

## 2023-12-09 RX ADMIN — METOPROLOL TARTRATE 12.5 MG: 25 TABLET, FILM COATED ORAL at 08:12

## 2023-12-09 RX ADMIN — PANTOPRAZOLE SODIUM 40 MG: 40 INJECTION, POWDER, FOR SOLUTION INTRAVENOUS at 06:12

## 2023-12-09 RX ADMIN — SODIUM CHLORIDE, PRESERVATIVE FREE 10 ML: 5 INJECTION INTRAVENOUS at 01:12

## 2023-12-09 RX ADMIN — MIDODRINE HYDROCHLORIDE 10 MG: 5 TABLET ORAL at 01:12

## 2023-12-09 NOTE — NURSING
Received bedside SBAR from LOS Lawson. Discussed HX, DX, POC and activity. Reviewed Vitals, Tele, Lab results and new meds. Met patient and assumed care. Observed patient resting quietly in bed without needs or complaints. Will continue to monitor for needs and provide care as needed.

## 2023-12-09 NOTE — PROGRESS NOTES
Nephrology consult follow up note    HPI:      Karen Briggs is a 58 y.o. female presented on  7 days post op from laparoscopic cholecystectomy on 11/10. Gallbladder was unable to be resected. Back and flank pain persisted post operatively prompting evaluation at [facility]. After workup her surgeon recommended ERCP for which she was sent to this facility. Left sided hydronephrosis noted on CT scans done on admission. At that time renal function was fairly normal and patient was given option for stenting or to defer as outpatient and she chose the latter.      During ERCP, malignant gastric mass noted and ERCP was unable to be completed due to duodenal scarring. 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 Patient ultimately had biliary drain placed per IR.      She developed A fib with RVR requiring amiodarone infusion. In the past 24 hours she had significant blood loss post removal of long term IV. She then had a fall later ambulating to the bathroom.      Patient renal function was relatively stable until decline starting 11/25. Patient was ultimately initiated on HD 11/28 post bilateral ureteral stent placement with Dr Cardenas same day.     Patient developed dialysis dependent acute kidney injury, and was started on hemodialysis 11/29/2023.    Interval history:     No acute events overnight. No new complaints.  Making very little urine output.  No chest pain, shortness of breath, nausea, vomiting, or lower extremity edema.    12/09/2023 currently tolerating dialysis.  No complaints of any shortness of breath.     Review of Systems:     Comprehensive 10pt ROS negative except as noted per history.    Past medical, family, surgical, and social history reviewed and unchanged from initial consult note.     Objective:       VITAL SIGNS: 24 HR MIN & MAX LAST    Temp  Min: 97.9 °F (36.6 °C)  Max: 100.4 °F (38 °C)  99.1 °F (37.3 °C)        BP  Min: 98/68  Max: 113/77  98/68     Pulse  Min: 110  Max: 121  (!)  115     Resp  Min: 17  Max: 18  18    SpO2  Min: 98 %  Max: 100 %  98 %      GEN:  Well-appearing AAF currently tolerating dialysis.  Awake alert oriented.  CV: RRR without rub or gallop.  PULM: CTAB, unlabored  ABD: Soft, NT/ND abdomen with NABS  EXT:  2+ lower extremity edema  SKIN: Warm and dry  PSYCH: Awake, alert and appropriately conversant.   Dialysis access:  Right IJ tunneled dialysis catheter            Component Value Date/Time     (L) 12/09/2023 1000     (L) 12/08/2023 0718     04/24/2018 0340    K 4.4 12/09/2023 1000    K 4.3 12/08/2023 0718    K 3.2 (L) 04/24/2018 0340    CHLORIDE 93 (L) 12/09/2023 1000    CHLORIDE 96 (L) 12/08/2023 0718    CO2 23 12/09/2023 1000    CO2 25 12/08/2023 0718    CO2 20 (L) 04/24/2018 0340    BUN 94.8 (H) 12/09/2023 1000    BUN 73.8 (H) 12/08/2023 0718    BUN 10 04/24/2018 0340    CREATININE 7.01 (H) 12/09/2023 1000    CREATININE 5.93 (H) 12/08/2023 0718    CREATININE 0.7 04/24/2018 0340    CALCIUM 8.2 (L) 12/09/2023 1000    CALCIUM 8.2 (L) 12/08/2023 0718    CALCIUM 9.2 04/24/2018 0340    PHOS 4.3 12/08/2023 0718            Component Value Date/Time    WBC 24.65 (H) 12/09/2023 1000    WBC 21.79 (H) 12/08/2023 0718    WBC 24.01 12/05/2023 0455    WBC 23.73 11/23/2023 0723    WBC 12.03 04/24/2018 0340    HGB 8.1 (L) 12/09/2023 1000    HGB 8.1 (L) 12/08/2023 0718    HGB 11.3 (L) 04/24/2018 0340    HCT 23.5 (L) 12/09/2023 1000    HCT 23.5 (L) 12/08/2023 0718    HCT 35.3 (L) 04/24/2018 0340     (H) 12/09/2023 1000     (H) 12/08/2023 0718     04/24/2018 0340         Imaging reviewed      Assessment / Plan:         POORNIMA multifactorial secondary to obstructive uropathy, acute blood loss, contrast exposure, hypotension and Afib with RVR  ---Nephrotic range proteinuria noted. 4.4 g  Newly diagnosed malignant gastric mass. Pathology consistent with adenocarcinoma   -s/p J tube and mediport placement 12/1  Acute blood loss anemia 2/2 bleeding post  long term IV removal   Bilateral hydronephrosis noted 11/21 - stent placement initially deferred due to stable renal function   Afib with RVR on amiodarone infusion DC for hypotension.  She is on scheduled Lopressor 5 mg q.6  S/p biliary drain placement      Plan:  Will not be able to remove any fluids and she has not short of breath.  She does have some pedal edema.  Continue all other medical management.

## 2023-12-09 NOTE — NURSING
12/09/23 1729   Post-Hemodialysis Assessment   Duration of Treatment 180 minutes   Post-Hemodialysis Comments 3 hr.  no fluid removal per MD orders.  VSS.  Bp on lower side.  Hr-100-120.  Pt awake and alert.

## 2023-12-10 LAB
ABO + RH BLD: NORMAL
ABO + RH BLD: NORMAL
ALBUMIN SERPL-MCNC: 1.4 G/DL (ref 3.4–4.8)
ALBUMIN/GLOB SERPL: 0.3 RATIO (ref 1.1–2)
ALP SERPL-CCNC: 204 UNIT/L (ref 40–150)
ALT SERPL-CCNC: 35 UNIT/L (ref 0–55)
AST SERPL-CCNC: 36 UNIT/L (ref 5–34)
BASOPHILS # BLD AUTO: 0.07 X10(3)/MCL
BASOPHILS NFR BLD AUTO: 0.3 %
BILIRUB SERPL-MCNC: 3.8 MG/DL
BLD PROD TYP BPU: NORMAL
BLD PROD TYP BPU: NORMAL
BLOOD UNIT EXPIRATION DATE: NORMAL
BLOOD UNIT EXPIRATION DATE: NORMAL
BLOOD UNIT TYPE CODE: 5100
BLOOD UNIT TYPE CODE: 5100
BUN SERPL-MCNC: 61.9 MG/DL (ref 9.8–20.1)
CALCIUM SERPL-MCNC: 8 MG/DL (ref 8.4–10.2)
CHLORIDE SERPL-SCNC: 97 MMOL/L (ref 98–107)
CO2 SERPL-SCNC: 20 MMOL/L (ref 23–31)
COLOR STL: ABNORMAL
CONSISTENCY STL: ABNORMAL
CREAT SERPL-MCNC: 5.01 MG/DL (ref 0.55–1.02)
CROSSMATCH INTERPRETATION: NORMAL
CROSSMATCH INTERPRETATION: NORMAL
DISPENSE STATUS: NORMAL
DISPENSE STATUS: NORMAL
EOSINOPHIL # BLD AUTO: 0.2 X10(3)/MCL (ref 0–0.9)
EOSINOPHIL NFR BLD AUTO: 0.8 %
ERYTHROCYTE [DISTWIDTH] IN BLOOD BY AUTOMATED COUNT: 18.7 % (ref 11.5–17)
GFR SERPLBLD CREATININE-BSD FMLA CKD-EPI: 9 MLS/MIN/1.73/M2
GLOBULIN SER-MCNC: 4.4 GM/DL (ref 2.4–3.5)
GLUCOSE SERPL-MCNC: 125 MG/DL (ref 82–115)
GROUP & RH: NORMAL
HCT VFR BLD AUTO: 18.4 % (ref 37–47)
HCT VFR BLD AUTO: 31.2 % (ref 37–47)
HEMOCCULT SP2 STL QL: POSITIVE
HEMOCCULT SP3 STL QL: POSITIVE
HGB BLD-MCNC: 10.9 G/DL (ref 12–16)
HGB BLD-MCNC: 6.4 G/DL (ref 12–16)
IMM GRANULOCYTES # BLD AUTO: 0.48 X10(3)/MCL (ref 0–0.04)
IMM GRANULOCYTES NFR BLD AUTO: 1.8 %
INDIRECT COOMBS: NORMAL
LYMPHOCYTES # BLD AUTO: 0.85 X10(3)/MCL (ref 0.6–4.6)
LYMPHOCYTES NFR BLD AUTO: 3.2 %
MCH RBC QN AUTO: 30.5 PG (ref 27–31)
MCHC RBC AUTO-ENTMCNC: 34.8 G/DL (ref 33–36)
MCV RBC AUTO: 87.6 FL (ref 80–94)
MONOCYTES # BLD AUTO: 1.51 X10(3)/MCL (ref 0.1–1.3)
MONOCYTES NFR BLD AUTO: 5.7 %
NEUTROPHILS # BLD AUTO: 23.27 X10(3)/MCL (ref 2.1–9.2)
NEUTROPHILS NFR BLD AUTO: 88.2 %
NRBC BLD AUTO-RTO: 0 %
PLATELET # BLD AUTO: 493 X10(3)/MCL (ref 130–400)
PMV BLD AUTO: 10.2 FL (ref 7.4–10.4)
POTASSIUM SERPL-SCNC: 3.9 MMOL/L (ref 3.5–5.1)
PROT SERPL-MCNC: 5.8 GM/DL (ref 5.8–7.6)
RBC # BLD AUTO: 2.1 X10(6)/MCL (ref 4.2–5.4)
RET# (OHS): 0.02 X10E6/UL (ref 0.02–0.08)
RETICULOCYTE COUNT AUTOMATED (OLG): 0.97 % (ref 1.1–2.1)
SODIUM SERPL-SCNC: 130 MMOL/L (ref 136–145)
SPECIMEN OUTDATE: NORMAL
UNIT NUMBER: NORMAL
UNIT NUMBER: NORMAL
WBC # SPEC AUTO: 26.38 X10(3)/MCL (ref 4.5–11.5)

## 2023-12-10 PROCEDURE — 82272 OCCULT BLD FECES 1-3 TESTS: CPT | Performed by: INTERNAL MEDICINE

## 2023-12-10 PROCEDURE — 25000003 PHARM REV CODE 250: Performed by: INTERNAL MEDICINE

## 2023-12-10 PROCEDURE — 85045 AUTOMATED RETICULOCYTE COUNT: CPT | Performed by: STUDENT IN AN ORGANIZED HEALTH CARE EDUCATION/TRAINING PROGRAM

## 2023-12-10 PROCEDURE — 85025 COMPLETE CBC W/AUTO DIFF WBC: CPT | Performed by: STUDENT IN AN ORGANIZED HEALTH CARE EDUCATION/TRAINING PROGRAM

## 2023-12-10 PROCEDURE — 25000003 PHARM REV CODE 250: Performed by: STUDENT IN AN ORGANIZED HEALTH CARE EDUCATION/TRAINING PROGRAM

## 2023-12-10 PROCEDURE — 87154 CUL TYP ID BLD PTHGN 6+ TRGT: CPT | Performed by: STUDENT IN AN ORGANIZED HEALTH CARE EDUCATION/TRAINING PROGRAM

## 2023-12-10 PROCEDURE — 25000003 PHARM REV CODE 250: Performed by: NURSE PRACTITIONER

## 2023-12-10 PROCEDURE — 63600175 PHARM REV CODE 636 W HCPCS: Performed by: INTERNAL MEDICINE

## 2023-12-10 PROCEDURE — C9113 INJ PANTOPRAZOLE SODIUM, VIA: HCPCS | Performed by: INTERNAL MEDICINE

## 2023-12-10 PROCEDURE — 87077 CULTURE AEROBIC IDENTIFY: CPT | Performed by: STUDENT IN AN ORGANIZED HEALTH CARE EDUCATION/TRAINING PROGRAM

## 2023-12-10 PROCEDURE — P9016 RBC LEUKOCYTES REDUCED: HCPCS

## 2023-12-10 PROCEDURE — 80100016 HC MAINTENANCE HEMODIALYSIS

## 2023-12-10 PROCEDURE — 87186 SC STD MICRODIL/AGAR DIL: CPT | Performed by: STUDENT IN AN ORGANIZED HEALTH CARE EDUCATION/TRAINING PROGRAM

## 2023-12-10 PROCEDURE — 80053 COMPREHEN METABOLIC PANEL: CPT | Performed by: STUDENT IN AN ORGANIZED HEALTH CARE EDUCATION/TRAINING PROGRAM

## 2023-12-10 PROCEDURE — 21400001 HC TELEMETRY ROOM

## 2023-12-10 PROCEDURE — 86850 RBC ANTIBODY SCREEN: CPT

## 2023-12-10 PROCEDURE — 86923 COMPATIBILITY TEST ELECTRIC: CPT | Mod: 91

## 2023-12-10 PROCEDURE — 63600175 PHARM REV CODE 636 W HCPCS: Performed by: STUDENT IN AN ORGANIZED HEALTH CARE EDUCATION/TRAINING PROGRAM

## 2023-12-10 PROCEDURE — A4216 STERILE WATER/SALINE, 10 ML: HCPCS | Performed by: INTERNAL MEDICINE

## 2023-12-10 PROCEDURE — 85018 HEMOGLOBIN: CPT | Performed by: INTERNAL MEDICINE

## 2023-12-10 RX ORDER — HYDROCODONE BITARTRATE AND ACETAMINOPHEN 500; 5 MG/1; MG/1
TABLET ORAL
Status: DISCONTINUED | OUTPATIENT
Start: 2023-12-10 | End: 2023-12-25 | Stop reason: HOSPADM

## 2023-12-10 RX ORDER — ENOXAPARIN SODIUM 100 MG/ML
1 INJECTION SUBCUTANEOUS EVERY 24 HOURS
Status: DISCONTINUED | OUTPATIENT
Start: 2023-12-11 | End: 2023-12-25 | Stop reason: HOSPADM

## 2023-12-10 RX ADMIN — MIDODRINE HYDROCHLORIDE 10 MG: 5 TABLET ORAL at 01:12

## 2023-12-10 RX ADMIN — SODIUM CHLORIDE, PRESERVATIVE FREE 10 ML: 5 INJECTION INTRAVENOUS at 05:12

## 2023-12-10 RX ADMIN — PIPERACILLIN AND TAZOBACTAM 4.5 G: 4; .5 INJECTION, POWDER, LYOPHILIZED, FOR SOLUTION INTRAVENOUS; PARENTERAL at 02:12

## 2023-12-10 RX ADMIN — SODIUM BICARBONATE 650 MG TABLET 1300 MG: at 08:12

## 2023-12-10 RX ADMIN — PANTOPRAZOLE SODIUM 40 MG: 40 INJECTION, POWDER, FOR SOLUTION INTRAVENOUS at 04:12

## 2023-12-10 RX ADMIN — MIDODRINE HYDROCHLORIDE 10 MG: 5 TABLET ORAL at 08:12

## 2023-12-10 RX ADMIN — MIDODRINE HYDROCHLORIDE 10 MG: 5 TABLET ORAL at 04:12

## 2023-12-10 RX ADMIN — SODIUM CHLORIDE, PRESERVATIVE FREE 10 ML: 5 INJECTION INTRAVENOUS at 01:12

## 2023-12-10 RX ADMIN — VANCOMYCIN HYDROCHLORIDE 2000 MG: 500 INJECTION, POWDER, LYOPHILIZED, FOR SOLUTION INTRAVENOUS at 05:12

## 2023-12-10 RX ADMIN — APIXABAN 5 MG: 5 TABLET, FILM COATED ORAL at 08:12

## 2023-12-10 RX ADMIN — SODIUM CHLORIDE: 9 INJECTION, SOLUTION INTRAVENOUS at 02:12

## 2023-12-10 RX ADMIN — METOPROLOL TARTRATE 12.5 MG: 25 TABLET, FILM COATED ORAL at 08:12

## 2023-12-10 RX ADMIN — Medication 6 MG: at 08:12

## 2023-12-10 RX ADMIN — PANTOPRAZOLE SODIUM 40 MG: 40 INJECTION, POWDER, FOR SOLUTION INTRAVENOUS at 08:12

## 2023-12-10 NOTE — PROGRESS NOTES
[facility]  Hospital Medicine Progress Note        Chief Complaint: Inpatient Follow-up for intractable nausea vomiting due to gastric adenocarcinoma    HPI: 58-year-old female with medical history of hypertension who recently underwent laparoscopic cholecystectomy on 11/10/2023, procedure was incomplete and was unable to completely resect the gallbladder.  Since surgery she continued to have right flank/back pain and followed up with her PCP and CT abdomen and pelvis on 11/17/2023 revealed left-sided hydronephrosis with suspected distal obstruction/no clear stone visualized, postoperative changes of cholecystectomy with fluid in the gallbladder fossa may reflect postoperative seroma but biloma can not be entirely excluded, also noted for marked thickening of the stomach wall diffusely may be related to mesenteric edema/reactive.  She [place] [facility] [place]/17/2023 and her labs notable for WBC 9.0, hemoglobin 12.4, platelets 442, creatinine 0.86, total bilirubin 8.7 with direct fraction 6.7, alkaline phosphatase 491, , .  Patient's surgeon was consulted and recommended ERCP which was not available at [facility] for which she was transferred to [facility] and referred to hospital medicine service for further evaluation and management.   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  Severe inflammation and oozing of blood noted proximal gastric lumen.  Unable to advance scope into the duodenum.  Surgical oncology consulted, IR consulted for external drain placement which was done November 21.  November 24th she developed new onset atrial fibrillation with RVR and Cardiology consulted.  Started on amiodarone drip  Unfortunately patient had acute blood loss due to hemorrhage from the midline site that was removed.  Patient was unaware of the bleed.  Became very weak, passed out.  Code was called but she came around.   Stat H&H done which was slightly lower but stable, MRI of the brain was negative for any acute ischemic changes. Pt is aware of gastric cancer diagnosis   GI following--> 11/18- EGD shows normal esophagus, malignant gastric tumor in the cardia.  Inflamed mucosa and ooze of blood throughout the proximal gastric lumen.  Gastric antrum scar would not allow advancement of scope into the duodenal ampulla area therefore biliary cannulation was not possible for bile leak evaluation  Pathology shows marked chronic gastritis with intestinal metaplasia, gastric cardia biopsy shows adenocarcinoma, moderately differentiated intestinal type   bilateral hydronephrosis with left greater than right  MRI brain without contrast-negative for acute finding  PET scan reviewed with patient by Oncology reports of locally advanced gastric adenocarcinoma and plans for systemic therapy outpatient if functional, nutritional and renal function improves. \  MediPort placement by surgical Oncology on 12/1, oncology on board plans for systemic therapy outpatient if functional, nutritional and renal function improves.  obstructive uropathy with bilateral hydronephrosis status post bilateral ureteral stent placed on 11/27 by Urology- no improvement in renal function, patient opted to have hemodialysis and a right-sided hemodialysis catheter has been placed by General surgery on 11/29, 2 continue hemodialysis per nephrology discretion.  Patient with symptoms of nausea and vomiting can not keep anything down in her stomach complicated by severe malnutrition on IV Clinimix and supportive medications for nausea and vomiting. Palliative care on board    Patient got a MediPort and J-tube placed on 12/01  Cystogram reviewed by Urology with patent stents and recommends outpatient follow-up with Urology  White cell count elevated at 20.4, Infectious Disease had started on IV Zosyn given concern for possible sepsis with low blood pressure on 12/02   CIS  evaluated patient to begin low-dose metoprolol tartrate at 12.5 mg b.i.d. and resume Eliquis for stroke prevention       Interval Hx:   Still having low grade fever and leucocytosis.   No acute symptoms.     Objective/physical exam:  General: In no acute distress, afebrile  Chest: Clear to auscultation bilaterally, bleeding noted around tunneled catheter  Heart: RRR, +S1, S2, no appreciable murmur  Abdomen: Soft, nontender, BS +  MSK: Warm, no lower extremity edema, no clubbing or cyanosis  Neurologic: Alert and oriented x4, Cranial nerve II-XII intact, Strength 5/5 in all 4 extremities    VITAL SIGNS: 24 HRS MIN & MAX LAST   Temp  Min: 97.9 °F (36.6 °C)  Max: 100.4 °F (38 °C) 99.1 °F (37.3 °C)   BP  Min: 98/68  Max: 113/77 98/68   Pulse  Min: 110  Max: 121  (!) 115   Resp  Min: 17  Max: 18 18   SpO2  Min: 98 %  Max: 100 % 98 %     I reviewed the labs below:  Recent Labs   Lab 12/07/23 0445 12/08/23 0718 12/09/23  1000   WBC 20.49* 21.79* 24.65*   RBC 2.79* 2.70* 2.67*   HGB 8.5* 8.1* 8.1*   HCT 24.6* 23.5* 23.5*   MCV 88.2 87.0 88.0   MCH 30.5 30.0 30.3   MCHC 34.6 34.5 34.5   RDW 18.9* 18.6* 18.6*   * 474* 487*   MPV 9.7 10.1 10.0       Recent Labs   Lab 12/03/23  0531 12/04/23  0546 12/05/23  0455 12/06/23  0505 12/07/23  0445 12/08/23  0718 12/09/23  1000   * 132* 129* 132* 133* 131* 131*   K 4.0 4.7 4.4 4.3 4.1 4.3 4.4   CO2 25 23 24 24 26 25 23   BUN 48.7* 62.0* 51.3* 71.7* 50.1* 73.8* 94.8*   CREATININE 4.64* 5.81* 4.55* 5.79* 4.61* 5.93* 7.01*   CALCIUM 8.4 8.6 8.2* 8.3* 8.0* 8.2* 8.2*   MG 2.10 2.30 2.20 2.40  --   --   --    ALBUMIN 1.5* 1.5* 1.8* 1.6* 1.5* 1.5*  --    ALKPHOS 109 126 125  --   --   --   --    ALT 19 23 23  --   --   --   --    AST 19 26 30  --   --   --   --    BILITOT 4.6* 5.6* 5.1*  --   --   --   --        Microbiology Results (last 7 days)       Procedure Component Value Units Date/Time    Blood Culture [[id number]]     Order Status: Sent Specimen: Blood     Blood  Culture [[id number]]     Order Status: Sent Specimen: Blood     Blood Culture [[id number]]  (Normal) Collected: 12/02/23 2053    Order Status: Completed Specimen: Blood Updated: 12/07/23 2200     CULTURE, BLOOD ([facility]) No Growth at 5 days    Blood Culture [[id number]]  (Normal) Collected: 12/02/23 2053    Order Status: Completed Specimen: Blood Updated: 12/07/23 2200     CULTURE, BLOOD ([facility]) No Growth at 5 days           See below for Radiology    Scheduled Med:   apixaban  5 mg Oral BID    metoprolol tartrate  12.5 mg Oral BID    midodrine  10 mg Oral TID WM    pantoprazole  40 mg Intravenous BID WM    sodium bicarbonate  1,300 mg Oral Daily    sodium chloride 0.9%  10 mL Intravenous Q6H      Continuous Infusions:       PRN Meds:  0.9%  NaCl infusion (for blood administration), sodium chloride 0.9%, acetaminophen, aluminum-magnesium hydroxide-simethicone, bisacodyL, chlorproMAZINE, dicyclomine, hydrALAZINE, hyoscyamine, melatonin, metoclopramide HCl, metoprolol, morphine, ondansetron, oxyCODONE, polyethylene glycol, prochlorperazine, promethazine, senna-docusate 8.6-50 mg, sodium chloride 0.9%, Flushing PICC/Midline Protocol **AND** sodium chloride 0.9% **AND** sodium chloride 0.9%     Assessment/Plan:  Hypotension, improved with midodrine  Persistent leukocytosis- source unclear  Acute on chronic anemia, s/p 2 units prbcs transfusion 11/30  Hx of Acute Blood loss anemia with syncope and then fall 11/26-   Locally advanced gastric adenocarcinoma with intractable nausea and vomiting possible Gastric/duodenal outlet obstruction  -now status post J Tube placement  Obstructive uropathy with bilateral hydronephrosis- S/P bilateral ureteral stent placed on 11/27  POORNIMA-now on HD - 11/29  Metabolic acidosis- resolved  Suspected bile leak with biloma and biliary obstruction--> H/O Laparoscopic incomplete cholecystectomy 11/10/2023  New onset atrial fibrillation with RVR- fluctuating  Hypokalemia- resolved with  replacement  Hyponatremia  History of hypertension and DDD/sciatica  Severe malnutrition      Begin j tube feed with [employer] at 45 mL/hour goal rates, home feeding pump has been arranged already  WBC 21 K   Infectious disease Dr Carlisle on board, given all cultures has been negative, recommended to discontinue IV antibiotics and monitor her off of antibiotics for now, patient has been stable and Infectious Disease has signed off  Trend hemoglobin levels and keep hemoglobin greater than 7- 8 grams/dL --> noted slowly trending down again, now 8.1  Ordered FOBT x3  Patient reported no BM this week.  Ordered Dulcolax suppository x1 to move the bowels 1st  Total received 3units of packed red blood cell this admission, verified with blood bank  Nephrology on board for hemodialysis needs 12/6- received tunneled catheter given renal functions did not recover despite bilateral ureteral stenting 11/27 for hydronephrosis, continued hemodialysis need on Monday Wednesday and Friday while in-house  Dialysis chair time has been secured, patient to start on Saturday with Tuesday Thursday Saturday schedule.  Patient was cleared to discharge per Nephrology to start her hemodialysis on Saturday as outpatient but given drop in hemoglobin, will hold off the discharge and work her up keeping in mind she is on blood thinners as well  CIS evaluated patient to begin low-dose metoprolol tartrate at 12.5 mg b.i.d. and resume Eliquis for stroke prevention  Patient got a MediPort and J-tube placed on 12/01  Cystogram reviewed by Urology with patent stents and recommends outpatient follow-up with Urology  Appreciate oncology input plans for systemic therapy outpatient if renal function improves, patient is planning to pursue treatment   Palliative Care on board- patient wants everything done, plan to go to her oncology appointment and discussed options of treatment, patient will still be under palliative care service upon discharge  IM  thorazine prn hiccups   Morning CBC, bmp ordered     VTE prophylaxis:  eliquis      Patient condition:  stable     Anticipated discharge and Disposition:  Probably home with palliative Care tomorrow if hemoglobin remains stable.  Patient to resume her hemodialysis on Saturday at her dialysis center per Nephrology    All diagnosis and differential diagnosis have been reviewed; assessment and plan has been documented; I have personally reviewed the labs and test results that are presently available; I have reviewed the patients medication list; I have reviewed the consulting providers response and recommendations. I have reviewed or attempted to review medical records based upon their availability    All of the patient's questions have been  addressed and answered. Patient's is agreeable to the above stated plan. I will continue to monitor closely and make adjustments to medical management as needed.  _____________________________________________________________________    Nutrition Status:  Patient meets ASPEN criteria for severe malnutrition of acute illness or injury per RD assessment as evidenced by:  Energy Intake (Malnutrition):  (does not meet criteria)  Weight Loss (Malnutrition): greater than 7.5% in 3 months  Subcutaneous Fat (Malnutrition): moderate depletion  Muscle Mass (Malnutrition): moderate depletion           A minimum of two characteristics is recommended for diagnosis of either severe or non-severe malnutrition.   Radiology:   I have personally reviewed the images and agree with radiologist report  CT Chest Abdomen Pelvis With IV Contrast (XPD) NO Oral Contrast  Narrative: EXAMINATION:  CT CHEST ABDOMEN PELVIS WITH IV CONTRAST (XPD)    CLINICAL HISTORY:  Sepsis;    TECHNIQUE:  CT imaging from the chest through the pelvis after IV contrast.  Axial, coronal and sagittal reformatted images reviewed. Dose length product 1677 mGycm. Automatic exposure control, adjustment of mA/kV or iterative  reconstruction technique used to limit radiation dose.    COMPARISON:  CTs 11/19/2023    FINDINGS:  Lung and large airways: Mild respiratory motion.  No significant consolidation identified.    Pleura: Normal.    Mediastinum and erika: Right-sided central venous catheter tip in the right atrium.  Left-sided MediPort catheter tip in the SVC.  No pathologically enlarged lymph node identified.    Chest wall/axilla and lower neck: No significant findings.    Liver/biliary: Percutaneous biliary drain.  No significant biliary dilatation.    Pancreas: Normal.    Spleen: Normal.    Adrenals: Normal.    Genitourinary: Bilateral ureteral stents with mild to moderate bilateral hydronephrosis.    Stomach/Bowel: Percutaneous jejunostomy tube.  Gastric mass not well delineated on current exam.  No bowel obstruction.    Abdominal/pelvic lymph nodes and peritoneum: Moderate volume ascites.    Abdominal/pelvic vasculature: Normal caliber of the abdominal aorta.    Abdominal wall: Areas of mild to moderate subcutaneous edema.    Bones: No acute osseous findings.  Impression: Moderate volume ascites with areas of subcutaneous edema in the abdominal wall.    Electronically signed by: George Dove  Date:    12/09/2023  Time:    13:33      Mukesh Cole MD  Department of Hospital Medicine   [facility]   12/09/2023

## 2023-12-10 NOTE — PROGRESS NOTES
Pharmacokinetic Initial Assessment: IV Vancomycin    Assessment/Plan:    Initiate intravenous vancomycin with loading dose of 2000 mg (20mg/kg) once with subsequent doses when random concentrations are less than 20 mcg/mL.   Off schedule HD today, then TTS  Desired empiric serum trough concentration is 15 to 20 mcg/mL  Draw vancomycin random level on 12/12 at 0430.  Pharmacy will continue to follow and monitor vancomycin.      Please contact pharmacy at extension [phone] with any questions regarding this assessment.     Thank you for the consult,   Pam Phillips       Patient brief summary:  Karen Briggs is a 58 y.o. female initiated on antimicrobial therapy with IV Vancomycin for treatment of suspected sepsis    Drug Allergies:   Review of patient's allergies indicates:  No Known Allergies    Actual Body Weight:   96.2 kg    Renal Function:   Estimated Creatinine Clearance: 13.9 mL/min (A) (based on SCr of 5.01 mg/dL (H)).,     Dialysis Method (if applicable):  intermittent HD, Off schedule HD today, then to start TTS    CBC (last 72 hours):  Recent Labs   Lab Result Units 12/08/23  0718 12/09/23  1000 12/10/23  0605   WBC x10(3)/mcL 21.79* 24.65* 26.38*   Hgb g/dL 8.1* 8.1* 6.4*   Hct % 23.5* 23.5* 18.4*   Platelet x10(3)/mcL 474* 487* 493*   Mono % % 6.1  --  5.7   Eos % % 0.5  --  0.8   Basophil % % 0.4  --  0.3       Metabolic Panel (last 72 hours):  Recent Labs   Lab Result Units 12/08/23  0718 12/09/23  1000 12/10/23  0605   Sodium Level mmol/L 131* 131* 130*   Potassium Level mmol/L 4.3 4.4 3.9   Chloride mmol/L 96* 93* 97*   Carbon Dioxide mmol/L 25 23 20*   Glucose Level mg/dL 142* 120* 125*   Blood Urea Nitrogen mg/dL 73.8* 94.8* 61.9*   Creatinine mg/dL 5.93* 7.01* 5.01*   Albumin Level g/dL 1.5*  --  1.4*   Bilirubin Total mg/dL  --   --  3.8*   Alkaline Phosphatase unit/L  --   --  204*   Aspartate Aminotransferase unit/L  --   --  36*   Alanine Aminotransferase unit/L  --   --  35   Phosphorus Level  "mg/dL 4.3  --   --        Drug levels (last 3 results):  No results for input(s): "VANCOMYCINRA", "VANCORANDOM", "VANCOMYCINPE", "VANCOPEAK", "VANCOMYCINTR", "VANCOTROUGH" in the last 72 hours.    Microbiologic Results:  Microbiology Results (last 7 days)       Procedure Component Value Units Date/Time    Blood Culture [[id number]] Collected: 12/10/23 0605    Order Status: Resulted Specimen: Blood from PICC Line Updated: 12/10/23 0645    Blood Culture [[id number]] Collected: 12/10/23 0605    Order Status: Resulted Specimen: Blood from Hand, Left Updated: 12/10/23 0645    Blood Culture [[id number]]  (Normal) Collected: 12/02/23 2053    Order Status: Completed Specimen: Blood Updated: 12/07/23 2200     CULTURE, BLOOD ([facility]) No Growth at 5 days    Blood Culture [[id number]]  (Normal) Collected: 12/02/23 2053    Order Status: Completed Specimen: Blood Updated: 12/07/23 2200     CULTURE, BLOOD ([facility]) No Growth at 5 days            "

## 2023-12-10 NOTE — NURSING
12/10/23 1255   Post-Hemodialysis Assessment   Rinseback Volume (mL) 500 mL   Blood Volume Processed (Liters) 43 L   Dialyzer Clearance Clear   Duration of Treatment 120 minutes   Additional Fluid Intake (mL) 850 mL   Total UF (mL) 1345 mL   Net Fluid Removal 0   Patient Response to Treatment pt tolerated ok. Had a couple episodes of hypotension that required a decrease in UFG and 1 bolus. Eventually Uf turned off due to low BP. Pt ended up +5mL. 2 units Prbc given. New caps applied.

## 2023-12-10 NOTE — PROGRESS NOTES
Nephrology consult follow up note    HPI:      Karen Briggs is a 58 y.o. female presented on  7 days post op from laparoscopic cholecystectomy on 11/10. Gallbladder was unable to be resected. Back and flank pain persisted post operatively prompting evaluation at [facility]. After workup her surgeon recommended ERCP for which she was sent to this facility. Left sided hydronephrosis noted on CT scans done on admission. At that time renal function was fairly normal and patient was given option for stenting or to defer as outpatient and she chose the latter.      During ERCP, malignant gastric mass noted and ERCP was unable to be completed due to duodenal scarring. 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 Patient ultimately had biliary drain placed per IR.      She developed A fib with RVR requiring amiodarone infusion. In the past 24 hours she had significant blood loss post removal of long term IV. She then had a fall later ambulating to the bathroom.      Patient renal function was relatively stable until decline starting 11/25. Patient was ultimately initiated on HD 11/28 post bilateral ureteral stent placement with Dr Cardenas same day.     Patient developed dialysis dependent acute kidney injury, and was started on hemodialysis 11/29/2023.    Interval history:     No acute events overnight. No new complaints.  Making very little urine output.  No chest pain, shortness of breath, nausea, vomiting, or lower extremity edema.    12/09/2023 currently tolerating dialysis.  No complaints of any shortness of breath.    12/10- Hgb 6.2 down from 8.1/33.5 yesterday.  Dialysis nurse did report a little hypotension and tachycardia during treatment yesterday.  No obvious bleeding overnight.  Patient is resting comfortably in bed on room air.  No complaints.  Significant other at bedside.         Objective:       VITAL SIGNS: 24 HR MIN & MAX LAST    Temp  Min: 98 °F (36.7 °C)  Max: 99.1 °F (37.3 °C)  99 °F (37.2  °C)        BP  Min: 90/61  Max: 104/68  92/60     Pulse  Min: 115  Max: 123  (!) 118     Resp  Min: 17  Max: 18  18    SpO2  Min: 97 %  Max: 98 %  98 %      GEN:  Well-appearing AAF currently tolerating dialysis.  Awake alert oriented.  CV: RRR without rub or gallop.  PULM: CTAB, unlabored  ABD: Soft, NT/ND abdomen with NABS, tube feeding in progress via J-tube  EXT:  2+ lower extremity edema  SKIN: Warm and dry  PSYCH: Awake, alert and appropriately conversant.   Dialysis access:  Right IJ tunneled dialysis catheter            Component Value Date/Time     (L) 12/10/2023 0605     (L) 12/09/2023 1000     04/24/2018 0340    K 3.9 12/10/2023 0605    K 4.4 12/09/2023 1000    K 3.2 (L) 04/24/2018 0340    CHLORIDE 97 (L) 12/10/2023 0605    CHLORIDE 93 (L) 12/09/2023 1000    CO2 20 (L) 12/10/2023 0605    CO2 23 12/09/2023 1000    CO2 20 (L) 04/24/2018 0340    BUN 61.9 (H) 12/10/2023 0605    BUN 94.8 (H) 12/09/2023 1000    BUN 10 04/24/2018 0340    CREATININE 5.01 (H) 12/10/2023 0605    CREATININE 7.01 (H) 12/09/2023 1000    CREATININE 0.7 04/24/2018 0340    CALCIUM 8.0 (L) 12/10/2023 0605    CALCIUM 8.2 (L) 12/09/2023 1000    CALCIUM 9.2 04/24/2018 0340    PHOS 4.3 12/08/2023 0718            Component Value Date/Time    WBC 26.38 (H) 12/10/2023 0605    WBC 24.65 (H) 12/09/2023 1000    WBC 24.01 12/05/2023 0455    WBC 23.73 11/23/2023 0723    WBC 12.03 04/24/2018 0340    HGB 6.4 (L) 12/10/2023 0605    HGB 8.1 (L) 12/09/2023 1000    HGB 11.3 (L) 04/24/2018 0340    HCT 18.4 (LL) 12/10/2023 0605    HCT 23.5 (L) 12/09/2023 1000    HCT 35.3 (L) 04/24/2018 0340     (H) 12/10/2023 0605     (H) 12/09/2023 1000     04/24/2018 0340         Imaging reviewed      Assessment / Plan:         POORNIMA multifactorial secondary to obstructive uropathy, acute blood loss, contrast exposure, hypotension and Afib with RVR  ---Nephrotic range proteinuria noted. 4.4 g  Newly diagnosed malignant gastric mass.  Pathology consistent with adenocarcinoma   -s/p J tube and mediport placement 12/1  Acute blood loss anemia 2/2 bleeding post long term IV removal   --s/p transfusion 12/1  Bilateral hydronephrosis noted 11/21 - stent placement initially deferred due to stable renal function   Afib with RVR on amiodarone infusion DC for hypotension.  She is on scheduled Lopressor 5 mg q.6  S/p biliary drain placement      Plan:  Patient will need off schedule HD for transfusion 2 units PRBC today.  Discussed with patient.  Verbalized understanding.    We will otherwise continue on 3 times weekly schedule with next planned dialysis Tuesday for TTS schedule.    Patient has been set up for outpatient dialysis at [facility].

## 2023-12-10 NOTE — CONSULTS
Karen Briggs   MRN: [id number]   ADMISSION DATE: 2023  : 1965  AGE: 58 y.o.    DATE :  12/10/2023       PROVIDER: KEVIN MONTGOMERY    REASON FOR REFERRAL         Karen Briggs is a 58 y.o. female presented on  7 days post op from laparoscopic cholecystectomy on 11/10. Gallbladder was unable to be resected. Back and flank pain persisted post operatively prompting evaluation at [facility]. After workup her surgeon recommended ERCP for which she was sent to this facility. Left sided hydronephrosis noted on CT scans done on admission. At that time renal function was fairly normal and patient was given option for stenting or to defer as outpatient and she chose the latter.      During ERCP, malignant gastric mass noted and ERCP was unable to be completed due to duodenal scarring. 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 Patient ultimately had biliary drain placed per IR.      She developed A fib with RVR requiring amiodarone infusion. In the past 24 hours she had significant blood loss post removal of long term IV. She then had a fall later ambulating to the bathroom.      Patient renal function was relatively stable until decline starting . Patient was ultimately initiated on HD  post bilateral ureteral stent placement with Dr Cardenas same day.      Patient developed dialysis dependent acute kidney injury, and was started on hemodialysis 2023.        SUBJECTIVE:       Karen Briggs is a 58 y.o. female presented on  7 days post op from laparoscopic cholecystectomy on 11/10. Gallbladder was unable to be resected. Back and flank pain persisted post operatively prompting evaluation at [facility]. After workup her surgeon recommended ERCP for which she was sent to this facility. Left sided hydronephrosis noted on CT scans done on admission. At that time renal function was fairly normal and patient was given option for stenting or to defer as outpatient and she chose the latter.       During ERCP, malignant gastric mass noted and ERCP was unable to be completed due to duodenal scarring. 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 Patient ultimately had biliary drain placed per IR.      She developed A fib with RVR requiring amiodarone infusion. In the past 24 hours she had significant blood loss post removal of long term IV. She then had a fall later ambulating to the bathroom.      Patient renal function was relatively stable until decline starting 11/25. Patient was ultimately initiated on HD 11/28 post bilateral ureteral stent placement with Dr Cardenas same day.      Review of Systems   Patient developed dialysis dependent acute kidney injury, and was started on hemodialysis 11/29/2023.  Patient reported low-grade fever.  No overt GI blood loss.  Stools are noted to be light brown.  She does complains of generalized abdominal discomfort especially on palpation no cardiopulmonary symptoms at this time.  She is on J-tube feeding    Review of patient's allergies indicates:  No Known Allergies      apixaban  5 mg Oral BID    metoprolol tartrate  12.5 mg Oral BID    midodrine  10 mg Oral TID WM    pantoprazole  40 mg Intravenous BID WM    piperacillin-tazobactam (Zosyn) IV (PEDS and ADULTS) (extended infusion is not appropriate)  4.5 g Intravenous Q12H    sodium bicarbonate  1,300 mg Oral Daily    sodium chloride 0.9%  10 mL Intravenous Q6H    vancomycin (VANCOCIN) IV (PEDS and ADULTS)  2,000 mg Intravenous Once       Medications Discontinued During This Encounter   Medication Reason    amLODIPine (NORVASC) 10 MG tablet Discontinued by another clinician    valsartan (DIOVAN) 320 MG tablet Discontinued by another clinician    indomethacin 50 mg suppository 100 mg     piperacillin-tazobactam (ZOSYN) 4.5 g in dextrose 5 % in water (D5W) 100 mL IVPB (MB+)     0.9%  NaCl infusion     dextrose 5 % and 0.45 % NaCl with KCl 20 mEq infusion     electrolyte-A infusion     pantoprazole  injection 40 mg     iopamidoL (ISOVUE-300) injection 50 mL     HYDROmorphone (PF) injection 0.5 mg     sodium chloride 0.9% flush 10 mL     HYDROmorphone (PF) injection 0.2 mg     HYDROmorphone (PF) injection 0.2 mg     HYDROcodone-acetaminophen 5-325 mg per tablet 1 tablet     metoprolol injection 5 mg     lactated ringers infusion     amino acid 4.25% - dextrose 5% solution     piperacillin-tazobactam (ZOSYN) 4.5 g in dextrose 5 % in water (D5W) 100 mL IVPB (MB+)     enoxaparin injection 80 mg     metoprolol tartrate (LOPRESSOR) tablet 25 mg     amiodarone 360 mg/200 mL (1.8 mg/mL) infusion     0.9 % NaCl with KCl 20 mEq infusion     amino acid 4.25% - dextrose 5% solution     sodium chloride 0.9% bolus 1,000 mL 1,000 mL     sodium bicarbonate tablet 650 mg     enoxaparin injection 80 mg     lactated ringers infusion     pantoprazole EC tablet 40 mg     fat emulsion 20% infusion 250 mL     amino acid 5% in 15% dextrose (CLINIMIX-E) solution (1L provides 50gm AA, 150gm CHO (510 kcal/L dextrose), Na 35, K 30, Mg 5, Ca 4.5, Acetate 80, Cl 39, Phos 15) (710 total kcal/L)     metoclopramide HCl injection 10 mg     Amino acid 4.25% - dextrose 5% (CLINIMIX-E) solution (1L provides 42.5 gm AA, 50 gm CHO (170 kcal/L dextrose), Na 35, K 30, Mg 5, Ca 4.5, Acetate 70, Cl 39, Phos 15)     ondansetron injection 4 mg     calcium chloride 100 mg/mL (10 %) injection 1 g     piperacillin-tazobactam (ZOSYN) 4.5 g in dextrose 5 % in water (D5W) 100 mL IVPB (MB+)     metoclopramide HCl injection 10 mg     amino acid 5% in 15% dextrose (CLINIMIX-E) solution (1L provides 50gm AA, 150gm CHO (510 kcal/L dextrose), Na 35, K 30, Mg 5, Ca 4.5, Acetate 80, Cl 39, Phos 15) (710 total kcal/L)     midodrine tablet 10 mg     metoprolol injection 5 mg     metoprolol injection 5 mg Duplicate Order    calcium chloride 100 mg/mL (10 %) injection 1 g     piperacillin-tazobactam (ZOSYN) 4.5 g in dextrose 5 % in water (D5W) 100 mL IVPB (MB+)     sodium  bicarbonate tablet 1,300 mg     fentaNYL injection Patient Transfer    LIDOcaine HCL 10 mg/ml (1%) injection Patient Transfer    midazolam (VERSED) 1 mg/mL injection Patient Transfer             Past Medical History:   Diagnosis Date    Hypertension     Sciatica       Social History     Socioeconomic History    Marital status:    Tobacco Use    Smoking status: Never    Smokeless tobacco: Never   Substance and Sexual Activity    Alcohol use: No    Drug use: No    Sexual activity: Never     Birth control/protection: Post-menopausal      Past Surgical History:   Procedure Laterality Date    CARPAL TUNNEL RELEASE Left     CYSTOSCOPY W/ URETERAL STENT PLACEMENT Bilateral 11/27/2023    Procedure: CYSTOSCOPY, WITH URETERAL STENT INSERTION;  Surgeon: Huey Cardenas MD;  Location: [facility];  Service: Urology;  Laterality: Bilateral;    EGD, WITH CLOSED BIOPSY  11/18/2023    Procedure: EGD, WITH CLOSED BIOPSY;  Surgeon: Alfred Goss MD;  Location: [facility] OR;  Service: Gastroenterology;;    ERCP N/A 11/18/2023    Procedure: ERCP (ENDOSCOPIC RETROGRADE CHOLANGIOPANCREATOGRAPHY);  Surgeon: Alfred Goss MD;  Location: [facility];  Service: Gastroenterology;  Laterality: N/A;    HEMORRHOID SURGERY      INSERTION OF TUNNELED CENTRAL VENOUS CATHETER (CVC) WITH SUBCUTANEOUS PORT N/A 12/1/2023    Procedure: INSERTION, PORT-A-CATH;  Surgeon: William Morse MD;  Location: [facility] OR;  Service: General;  Laterality: N/A;    INSERTION OF TUNNELED CENTRAL VENOUS HEMODIALYSIS CATHETER N/A 12/6/2023    Procedure: Insertion, Catheter, Central Venous, Hemodialysis;  Surgeon: Satinder Luo DO;  Location: [facility] CATH LAB;  Service: Nephrology;  Laterality: N/A;    LAPAROSCOPIC INSERTION OF JEJUNOSTOMY TUBE N/A 12/1/2023    Procedure: INSERTION, JEJUNOSTOMY TUBE, LAPAROSCOPIC;  Surgeon: William Morse MD;  Location: [facility] OR;  Service: General;  Laterality: N/A;  PLUS MEDIPORT        Family History   Problem Relation  "Age of Onset    Breast cancer Mother     Cancer Maternal Aunt         colon cancer          OBJECTIVE:     Vitals:    12/10/23 1127 12/10/23 1142 12/10/23 1152 12/10/23 1342   BP: 98/70 98/67 107/74 103/64   Pulse: (!) 120 (!) 120 (!) 120 (!) 120   Resp:    20   Temp: 98.6 °F (37 °C) 98.6 °F (37 °C) 98.8 °F (37.1 °C) 98.8 °F (37.1 °C)   TempSrc: Oral Oral Oral Oral   SpO2:    97%   Weight:       Height:             Physical Exam   HEENT examination:  Pale conjunctiva, anicteric sclera   Neck:  Supple   Chest: Decreased breath sounds bilaterally  Heart examination:  S1, S2 audible   Abdomen:  J-tube in place.  Bowel sounds positive.  Mild generalized abdominal tenderness especially in the periumbilical region.  No guarding or rebound.  Extremities:  Positive edema bilaterally.    LABS    Recent Labs   Lab 12/08/23  0718 12/09/23  1000 12/10/23  0605   WBC 21.79* 24.65* 26.38*   HGB 8.1* 8.1* 6.4*   HCT 23.5* 23.5* 18.4*   * 487* 493*      Recent Labs   Lab 12/04/23  0546 12/05/23  0455 12/06/23  0505 12/08/23  0718 12/09/23  1000 12/10/23  0605   * 129*   < > 131* 131* 130*   K 4.7 4.4   < > 4.3 4.4 3.9   CO2 23 24   < > 25 23 20*   BUN 62.0* 51.3*   < > 73.8* 94.8* 61.9*   CREATININE 5.81* 4.55*   < > 5.93* 7.01* 5.01*   CALCIUM 8.6 8.2*   < > 8.2* 8.2* 8.0*   BILITOT 5.6* 5.1*  --   --   --  3.8*   ALKPHOS 126 125  --   --   --  204*   ALT 23 23  --   --   --  35   AST 26 30  --   --   --  36*   GLUCOSE 103 326*   < > 142* 120* 125*    < > = values in this interval not displayed.    No results for input(s): "INR" in the last 168 hours. No results for input(s): "AMYLASE" in the last 168 hours. No results for input(s): "APTT", "INR", "PTT" in the last 168 hours.        RESULTS: CT Chest Abdomen Pelvis With IV Contrast (XPD) NO Oral Contrast    Result Date: 12/9/2023  EXAMINATION: CT CHEST ABDOMEN PELVIS WITH IV CONTRAST (XPD) CLINICAL HISTORY: Sepsis; TECHNIQUE: CT imaging from the chest through the " pelvis after IV contrast.  Axial, coronal and sagittal reformatted images reviewed. Dose length product 1677 mGycm. Automatic exposure control, adjustment of mA/kV or iterative reconstruction technique used to limit radiation dose. COMPARISON: CTs 11/19/2023 FINDINGS: Lung and large airways: Mild respiratory motion.  No significant consolidation identified. Pleura: Normal. Mediastinum and erika: Right-sided central venous catheter tip in the right atrium.  Left-sided MediPort catheter tip in the SVC.  No pathologically enlarged lymph node identified. Chest wall/axilla and lower neck: No significant findings. Liver/biliary: Percutaneous biliary drain.  No significant biliary dilatation. Pancreas: Normal. Spleen: Normal. Adrenals: Normal. Genitourinary: Bilateral ureteral stents with mild to moderate bilateral hydronephrosis. Stomach/Bowel: Percutaneous jejunostomy tube.  Gastric mass not well delineated on current exam.  No bowel obstruction. Abdominal/pelvic lymph nodes and peritoneum: Moderate volume ascites. Abdominal/pelvic vasculature: Normal caliber of the abdominal aorta. Abdominal wall: Areas of mild to moderate subcutaneous edema. Bones: No acute osseous findings.     Moderate volume ascites with areas of subcutaneous edema in the abdominal wall. Electronically signed by: George Dove Date:    12/09/2023 Time:    13:33    US Bladder Post Void Residual    Result Date: 12/7/2023  EXAMINATION: US BLADDER POST VOID RESIDUAL CLINICAL HISTORY: urinary retention? need accurate amount of urine in bladder, bladder scan states 586cc; COMPARISON: None FINDINGS: Limited exam demonstrates the bladder to measure 4 cm x 4.4 cm x 7.2 cm with volume of 86.7 mL. There is evidence of ascites     Bladder measurements as above. Ascites Electronically signed by: Elvin Bass Date:    12/07/2023 Time:    10:26    Cardiac catheterization    Result Date: 12/6/2023  Procedure performed in the Invasive Lab  - See Procedure Log  link below for nursing documentation  - See OpNote on Surgeries Tab for physician findings  - See Imaging Tab for radiologist dictation    FL Cystogram Retrograde (xpd)    Result Date: 12/1/2023  EXAMINATION FL Cystogram Retrograde CLINICAL HISTORY gravity cystogram to eval for bilateral stent reflux; please use at least 300ml contrast; TECHNIQUE Water-soluble contrast administered into the urinary bladder via Jin catheter under live fluoroscopy. CONTRAST Cystografin, 300 mL COMPARISON 29 November 2023 FINDINGS : Right upper quadrant internal/external biliary drain remains in similar position.  There is also similar appearance of bilateral ureteral stents.  A new curvilinear tube suggestive of gastrostomy projects over the left mid abdomen.  There are no findings to suggest high-grade bowel obstruction or new/worsened intra-abdominal mass effect.  Residual oral contrast media opacifies the colon, similar to the prior study.  Regional osseous structures are unchanged. CYSTOGRAPHY: The urinary bladder demonstrates no evidence of abnormal filling defects. The wall contour is smooth. There is no ureteral reflux bilaterally. No extraluminal contrast spillage is appreciated. There is gross contrast reflux and retrograde progression to the level of the renal central collecting system at the point of approximately 200 mL contrast filling of the urinary bladder.  Additional contrast administration resulted in opacification of the dilated right renal pelvis.  At the point of maximal filling, there was no convincing contrast or grossly visualized dilatation of the left central collecting system. IMPRESSION 1. Widely patent bilateral ureters/stents, with contrast reflux to the central collecting systems. 2. Contrast filling of moderate dilated right renal pelvis.  No significant contrast visualized within the left renal pelvis. 3. No evidence of acute or focal abnormality of the urinary bladder. RADIATION DOSE  "Lowest-rate pulsed fluoroscopy and "last image capture" technique were utilized in order to minimize radiation exposure. *Fluoro: 30 seconds *DAP: 1874.7 uGy x m^2 *Dose: 27.1 mGy *Number of images: 16 Electronically signed by: Tay Lynch Date:    12/01/2023 Time:    19:08    X-Ray Chest 1 View    Result Date: 12/1/2023  EXAMINATION: XR CHEST 1 VIEW CLINICAL HISTORY: Other specified symptoms and signs involving the circulatory and respiratory systems TECHNIQUE: Single frontal view of the chest was performed. COMPARISON: 11/29/2023 FINDINGS: LINES AND TUBES: Left subclavian pam catheter tip terminates at the SVC.  Right IJ sheath tip projects over the SVC.  Right upper extremity PICC tip projects over the superior cavoatrial junction.  EKG/telemetry leads overlie the chest. . MEDIASTINUM AND SARA: Cardiac silhouette is at the upper limits of normal, likely exaggerated by portable technique. LUNGS: Lung volumes are low with associated atelectatic change. PLEURA:No pleural effusion. No pneumothorax. OTHER: No acute osseous abnormality.     Mild retrocardiac atelectasis. Electronically signed by: Sulema Solorzano Date:    12/01/2023 Time:    10:43    SURG FL Surgery Fluoro Usage    Result Date: 12/1/2023  See OP Notes for results. IMPRESSION: See OP Notes for results. This procedure was auto-finalized by: Virtual Radiologist    X-Ray Abdomen AP 1 View    Result Date: 11/29/2023  EXAMINATION: XR ABDOMEN AP 1 VIEW CLINICAL HISTORY: eval position of ureteral stents; TECHNIQUE: AP View(s) of the abdomen was performed. COMPARISON: None FINDINGS: The bowel gas pattern is nonspecific.  No free air is seen.  No free fluid is seen.  There is a percutaneous transhepatic biliary drain with the tip in the  region of the duodenum.  There is some contrast seen in the colon from prior study.  There is a nephroureteral stent seen on the right and left side.  The proximal aspects of both stents appear to be in the region of the " renal pelvis.  The distal aspect of the nephroureteral stents appear to be in the region of the bladder. There is a Jin catheter in place.  Bones and joints show no acute abnormality.     Nephroureteral stents appear to be in good position Biliary drain appears to be in the expected position. Electronically signed by: Teddy Pedro Date:    11/29/2023 Time:    16:28    X-Ray Chest 1 View    Result Date: 11/29/2023  EXAMINATION: XR CHEST 1 VIEW CLINICAL HISTORY: central line placement; TECHNIQUE: One view COMPARISON: November 26, 2011. FINDINGS: Right transjugular approach central venous catheter terminates within the distal superior vena cava and is optimal.  Previously present PICC line terminates within the superior vena cava.  Cardiopericardial silhouette is within normal limits.  No acute dense focal or segmental consolidation, congestive process, pleural effusions or pneumothorax.     Central venous catheter terminates within the superior vena cava. Electronically signed by: Victoriano Garcia Date:    11/29/2023 Time:    16:26    NM PET CT FDG Skull Base to Mid Thigh    Result Date: 11/29/2023  EXAMINATION: NM PET CT FDG SKULL BASE TO MID THIGH CLINICAL HISTORY: Malignant neoplasm of stomach, unspecifiedGastric adenocarcinoma; TECHNIQUE: The patient was given intravenous injection of F-18 FDG.  Images were obtained from the skull base to the upper thighs with additional 3D reformatted and multiplanar images submitted for review. In addition, a noncontrasted low-dose CT was obtained from the skull base through the upper thighs for purposes of attenuation correction and localization. The absence of intravenous contrasts limits the accuracy of the CT for evaluation of the soft tissues and vasculature. The DLP is 1046.  Automatic exposure control, adjustment of mA/kV or iterative reconstruction technique was used to reduce radiation. Radiopharmaceutical. 11.4 mCi F18-FDG, IV. COMPARISON: CT 22 November 2020. FINDINGS:  Activity within the imaged brain is symmetric.  There are no hypermetabolic cervical lymph nodes. No hypermetabolic mediastinal, hilar or axillary adenopathy.  No suspicious pulmonary nodule seen.  Right PICC tip right atrium. There is an approximately 2-3 cm area of increased FDG activity gastric cardia fused image 125 max SUV 10.0.  A few gastrohepatic lymph nodes are seen but these are not significantly hypermetabolic.  For example on image 128 series 3 max SUV is only 2.2.  No hypermetabolic liver lesion is seen.  There is moderate volume ascites. No hypermetabolic peritoneal nodules are seen. Internal external biliary drain is in place.  There also bilateral ureteral stents.  There is a Jin catheter. There is no suspicious osseous lesion.     1. Hypermetabolic lesion along the gastric cardia compatible with known malignancy. 2. No convincing PET evidence of metastatic spread.  Some gastrohepatic lymph nodes are not significantly hypermetabolic. 3. Moderate ascites. Electronically signed by: Wei Wright Date:    11/29/2023 Time:    12:13    SURG FL Surgery Fluoro Usage    Result Date: 11/28/2023  See OP Notes for results. IMPRESSION: See OP Notes for results. This procedure was auto-finalized by: Virtual Radiologist    US Retroperitoneal Complete    Result Date: 11/27/2023  EXAMINATION: US RETROPERITONEAL COMPLETE CLINICAL HISTORY: Worsening renal functions despite fluid resussitation; COMPARISON: CT 22 November 2023 FINDINGS: Grayscale and color Doppler sonographic evaluation of the kidneys and urinary bladder. The right kidney measures 13 cm. The left kidney measures 11.5 cm.   There is bilateral mild to moderate hydronephrosis, left greater than right.  This is similar to the prior CT.  Grossly normal renal parenchymal echogenicity otherwise. Jin in the bladder.     Similar bilateral hydronephrosis. Electronically signed by: Wei Wright Date:    11/27/2023 Time:    12:22    CT Cervical Spine Without  Contrast    Result Date: 11/27/2023  EXAMINATION: CT CERVICAL SPINE WITHOUT CONTRAST CLINICAL HISTORY: Unwitnessed fall; TECHNIQUE: Noncontrast CT images of the cervical spine. Axial, coronal, and sagittal reformatted images were obtained. Dose length product is 368 mGycm. Automatic exposure control, adjustment of mA/kV or iterative reconstruction technique was used to limit radiation dose. COMPARISON: None FINDINGS: The cervical spine is visualized through the level of T1. There is no acute fracture identified.  There are mild degenerative changes with marginal osteophytes and facet arthropathy.  There is no paraspinal hematoma.     No acute fracture identified. No significant change from the [facility] interpretation. Electronically signed by: Mary Landa Date:    11/27/2023 Time:    06:34    CT Head Without Contrast    Result Date: 11/27/2023  EXAMINATION: CT HEAD WITHOUT CONTRAST CLINICAL HISTORY: Head trauma, minor, normal mental status (Age 19-64y);Fall; TECHNIQUE: Axial scans were obtained from skull base to the vertex. Coronal and sagittal reconstructions obtained from the axial data. Automatic exposure control was utilized to limit radiation dose. Contrast: None Radiation Dose: Total DLP: 10 13 mGy*cm COMPARISON: MRI brain dated 11/26/2023 FINDINGS: There is no acute intracranial hemorrhage or edema. The gray-white matter differentiation is preserved.  Patchy hypodensities in the subcortical and periventricular white matter likely represent chronic microvascular ischemic changes. There is no mass effect or midline shift.  There is diffuse parenchymal volume loss.  The basal cisterns are patent. There is no abnormal extra-axial fluid collection.  Carotid and vertebral artery calcifications are noted. The calvarium and skull base are intact. The visualized paranasal sinuses and the mastoid air cells are clear.     1. No acute intracranial abnormality. 2. Chronic microvascular ischemic changes. No  significant change from the [facility] interpretation. Electronically signed by: Mary Landa Date:    11/27/2023 Time:    06:31    MRI Brain Without Contrast    Result Date: 11/26/2023  EXAMINATION: MRI BRAIN WITHOUT CONTRAST CLINICAL HISTORY: Syncope, simple, abnormal neuro exam;RAPID RESPONSE; TECHNIQUE: Multiplanar MRI sequences were performed of the brain without contrast. COMPARISON: None available FINDINGS: Bilateral cerebral periventricular and subcortical white matter variable size T2-FLAIR hyperintense signals are consistent with marked chronic microangiopathic ischemia. Involutional changes contribute to cerebellar and cerebral cortical volume loss. Gradient echo sequences demonstrate no evidence of de phasing artifact to suggest hemorrhagic byproducts. No evidence of diffusion restriction or ADC map signal drop out to suggest acute infarct. The sella and suprasellar areas are unremarkable. The cerebellar tonsils are normally positioned. There is no acute intracranial hemorrhage, hydrocephalus, midline shift or mass effect. No acute extra axial fluid collections identified. The mastoid air cells are clear.     1.  No acute intracranial findings identified. 2.  Chronic microangiopathic ischemia and atrophy. Electronically signed by: Victoriano Garcia Date:    11/26/2023 Time:    18:43    X-Ray Chest 1 View for Line/Tube Placement    Result Date: 11/26/2023  EXAMINATION: XR CHEST 1 VIEW FOR LINE/TUBE PLACEMENT CLINICAL HISTORY: picc placement; COMPARISON: Chest x-ray dated 04/24/2018 FINDINGS: Right-sided PICC line has its tip over the superior vena cava.  The heart is stable in size.  Left retrocardiac opacity may represent atelectasis or developing consolidation with small pleural effusion.  There is no definite visible pneumothorax.     Small left retrocardiac opacity with small pleural effusion Electronically signed by: Mary Landa Date:    11/26/2023 Time:    13:11    Echo    Result Date:  11/26/2023    Left Ventricle: Normal wall motion. There is normal systolic function with a visually estimated ejection fraction of 55%. Unable to assess diastolic function.   Right Ventricle: Normal right ventricular cavity size. Systolic function is normal.   Tricuspid Valve: There is mild regurgitation.   IVC/SVC: Normal venous pressure at 3 mmHg.     FL Upper GI    Result Date: 11/26/2023  EXAMINATION: FL UPPER GI CLINICAL HISTORY: gastric cancer; TECHNIQUE: Patient was given oral barium and fluoroscopic imaging of the esophagus was performed in multiple projections. Fluoroscopy time: 26 seconds Reference air Kerma: 14.5 mGy COMPARISON: CT abdomen pelvis 11/22/2023 FINDINGS: Esophagus is grossly normal in contour.   There is significant large volume gastroesophageal reflux throughout the exam. There is narrowing at the gastric antrum.  A small volume of contrast is seen to pass into the duodenum.  The lumen of 2nd portion of the duodenum appears narrowed.  Insufficient volume of contrast is seen to pass through the gastric outlet to confidently assess the distensibility of the duodenum.     Partial obstruction at the gastric outlet.  Small volume of contrast is seen to pass into the duodenum.  There is narrowing at the gastric antrum and 2nd portion of the duodenum. There were multiple episodes of large volume gastroesophageal reflux during the course of the exam despite patient being placed in a semiupright position through the course of the exam. Electronically signed by: Sulema Solorzano Date:    11/26/2023 Time:    10:54    CT Abdomen With IV Contrast    Result Date: 11/22/2023  EXAMINATION: CT ABDOMEN WITH IV CONTRAST CLINICAL HISTORY: Abdominal abscess/infection suspected; TECHNIQUE: CT abdomen and pelvis performed after intravenous contrast using routine protocol.  Oral contrast given.  .  Automated exposure control used. COMPARISON: 11/19/2023 FINDINGS: Liver demonstrates normal enhancement with no  focal lesion.  There is a transit Paddock biliary ductal stent demonstrating satisfactory position. A globular area of contrast noted within the gallbladder fossa.  No definite evidence of extravasation of loose contrast into the adjacent region. Diffuse gastric.  Pancreas, spleen, adrenal glands normal. Moderate severe left-sided hydronephrosis with delayed renal enhancement.  Stable.  Mild moderate right-sided hydronephrosis, increased in the interval. Aorta tapers normally. Moderate severe generalized inflammatory change and stranding and scattered areas of trace fluid throughout the mesentery.  No lymphadenopathy in the abdomen or pelvis.  Mild fluid in the pelvis. Bladder contrast noted.  Rectum normal.  Uterus and adnexa normal. Small and large bowel loops normal with no significant thickening or dilation.  Distal small bowel loops nearly completely decompressed. Bones intact. Trace pleural fluid bilaterally.     The small globular area of contrast within the gallbladder fossa new from the prior suggesting biliary leak possibly within a contained cavity possibly indicating an gallbladder remnant or dilated cystic duct.  No evidence of extravasation of contrast into the surrounding region.  Persistent regional gallbladder fossa fluid. Interval placement of a transhepatic biliary stent satisfactory position. Diffuse gastric wall thickening consistent with gastritis. Bilateral hydronephrosis severe on the left and moderate on the right, stable. Generalized mesenteric inflammatory change, mild ascites, mild pleural fluid. Electronically signed by: Paul Ocampo MD Date:    11/22/2023 Time:    09:55    IR Biliary Drain Internal/External    Addendum Date: 11/22/2023    No full cholangiogram required in 24-48 hours. Electronically signed by: Renny Batres Date:    11/22/2023 Time:    07:40    Result Date: 11/22/2023  EXAMINATION: Ultrasound guidance Fluoroscopic guidance Percutaneous transhepatic cholangiogram  "Internal external biliary drain placement CLINICAL HISTORY: 58-year-old requiring biliary drainage. Attending: Renny Batres Anesthesia: General anesthesia was provided by the anesthesiology department. TECHNIQUE: After the risks, benefits and alternatives were discussed, consent was obtained. A time out was performed to verify the patient's identity and procedure. The patient was placed supine on the angiographic table. The abdomen was cleaned and prepped in normal sterile fashion. A right access site was identified using fluoroscopy. Subcutaneous tissues were anesthetized with lidocaine solution. A dermatotomy was performed with an #11 blade scalpel. Using a 21 gauge Chiba needle an mid axillary approach with dilute contrast was performed into the liver parenchyma. Realtime ultrasound was used for needle guidance and a static image was obtained.  Once access was obtained into an intrahepatic duct an 0.018" wire was advanced into the intrahepatic ducts. Over the wire, an accustick system was advanced and the inner obturators were removed. The narrowing of the common bile duct was traversed utilizing a Glidewire and a hockey-stick catheter.  The Glidewire was exchanged for an Amplatz wire through a hockey-stick catheter within the bowel. A new 8 [place]an internal external biliary drain was placed over the Amplatz wire without complication.  Cholangiogram confirmed appropriate location. Catheter was sutured to the skin using 2-0 monofilament. The patient tolerated the procedure well. There were no immediate complications. All needle, wire and catheter manipulations were done under fluoroscopic guidance. EBL: < 10mL Fluoro Time (min): 4.3 Fluoro Dose (mGy): 36     Successful placement of biliary drainage catheter: 8 [place]an internal external biliary drainage catheter. Plan: Patient should return in 24-48 hours for a full cholangiogram. I, Renny Batres, was present for the entire procedure. Electronically signed " "by: Renny Batres Date:    11/22/2023 Time:    07:37    US Guided Needle Placement    Addendum Date: 11/22/2023    No full cholangiogram required in 24-48 hours. Electronically signed by: Renny Batres Date:    11/22/2023 Time:    07:40    Result Date: 11/22/2023  EXAMINATION: Ultrasound guidance Fluoroscopic guidance Percutaneous transhepatic cholangiogram Internal external biliary drain placement CLINICAL HISTORY: 58-year-old requiring biliary drainage. Attending: Renny Batres Anesthesia: General anesthesia was provided by the anesthesiology department. TECHNIQUE: After the risks, benefits and alternatives were discussed, consent was obtained. A time out was performed to verify the patient's identity and procedure. The patient was placed supine on the angiographic table. The abdomen was cleaned and prepped in normal sterile fashion. A right access site was identified using fluoroscopy. Subcutaneous tissues were anesthetized with lidocaine solution. A dermatotomy was performed with an #11 blade scalpel. Using a 21 gauge Chiba needle an mid axillary approach with dilute contrast was performed into the liver parenchyma. Realtime ultrasound was used for needle guidance and a static image was obtained.  Once access was obtained into an intrahepatic duct an 0.018" wire was advanced into the intrahepatic ducts. Over the wire, an accustick system was advanced and the inner obturators were removed. The narrowing of the common bile duct was traversed utilizing a Glidewire and a hockey-stick catheter.  The Glidewire was exchanged for an Amplatz wire through a hockey-stick catheter within the bowel. A new 8 Mongolian internal external biliary drain was placed over the Amplatz wire without complication.  Cholangiogram confirmed appropriate location. Catheter was sutured to the skin using 2-0 monofilament. The patient tolerated the procedure well. There were no immediate complications. All needle, wire and catheter " manipulations were done under fluoroscopic guidance. EBL: < 10mL Fluoro Time (min): 4.3 Fluoro Dose (mGy): 36     Successful placement of biliary drainage catheter: 8 Lithuanian internal external biliary drainage catheter. Plan: Patient should return in 24-48 hours for a full cholangiogram. I, Renny Batres, was present for the entire procedure. Electronically signed by: Renny Batres Date:    11/22/2023 Time:    07:37    NM Hepatobiliary Scan (HIDA)    Result Date: 11/20/2023  EXAMINATION: [facility]  (H[facility]) CLINICAL HISTORY: Abdominal pain, post-op;rule out bile leak; TECHNIQUE: Following the IV administration of 7 mCi of [id number]-mebrofenin, sequential dynamic anterior images of the abdomen were obtained for 60 minutes followed by a right lateral view at 60 minutes. COMPARISON: None FINDINGS: The early images demonstrate homogeneous uptake of the radiopharmaceutical by the liver with no focal hepatic abnormalities. There is persistent retained activity at the liver at 4 hours.No appreciable excretion of radiotracer into the bile ducts. No activity at the small bowel, as expected in the absence of biliary activity.     No appreciable excretion into the bile ducts by 4 hours.  This can be seen with bile duct obstruction or hepatic dysfunction. Unable to assess for biliary leak in the absence of excreted radiotracer within the bile ducts. Electronically signed by: Sulema Solorzano Date:    11/20/2023 Time:    16:10    US Retroperitoneal Complete    Result Date: 11/20/2023  EXAMINATION: US RETROPERITONEAL COMPLETE CLINICAL HISTORY: left hydro improving?; TECHNIQUE: Ultrasound evaluation of the kidneys, region of the ureters, and urinary bladder. COMPARISON: CT 11/19/2023 FINDINGS: Mild left-sided hydronephrosis.  Dilatation has mildly improved since prior CT.  No significant collecting system dilatation on the right. Calculated bladder volume 108 mL. No significant findings regarding the visualized  segments of the abdominal aorta and IVC. Measurements: - Right kidney: 11.4 cm in length - Left kidney: 11.3 cm in length     Mild improvement in left-sided hydronephrosis since 11/19/2023. Electronically signed by: George Dove Date:    11/20/2023 Time:    14:48    MRI MRCP Without Contrast    Result Date: 11/20/2023  EXAMINATION: MRI ABDOMEN WITHOUT CONTRAST MRCP CLINICAL HISTORY: Jaundice; TECHNIQUE: Multiplanar, multisequence images are performed through the liver and upper abdomen.  Additionally 2D and 3D MRCP sequences are performed as well as MIP images. COMPARISON: None. FINDINGS: LIVER: Normal signal. No focal mass. BILIARY: Mild intrahepatic biliary ductal dilatation.  There is tapering  at the confluence of the right and left hepatic ducts.  Diminutive caliber proximal common hepatic duct is seen just beyond the confluence.  There is an approximately 3 cm long segment of common duct which is not visible as a fluid-filled structure.  There is edema surrounding the duct.  On prior contrast enhanced CT exam there was mural enhancement noted.  This is nonspecific and may be inflammatory or neoplastic given the history of GI malignancy..  Distal common bile duct is visible at the head of the pancreas and is normal in caliber. PANCREAS: Partially obscured by artifact at the mid abdomen. SPLEEN: Normal. ADRENALS: Normal. KIDNEYS: Left hydronephrosis to the UPJ. BOWEL: Wall thickening and increased T2 signal at the gastric antrum. BONES: Normal marrow signal. OTHER: Small volume abdominal free fluid with Nai Paddock and perisplenic free fluid.  Omental stranding.     Mild intrahepatic biliary ductal dilatation.  Tapering of the bile ducts at the confluence of the right and left hepatic ducts with evidence of accompanying ductal wall thickening and enhancement at the confluence and proximal common hepatic and bile ducts on a prior contrast enhanced CT exam.  This enhancement may be inflammatory or neoplastic.  Small volume free intraperitoneal fluid. There is artifact in the mid abdomen which partially obscures the stomach and pancreas.  Known gastric lesion and perigastric lymph nodes more clearly demonstrated on prior CT exams. Left hydronephrosis to the ureteropelvic junction. Electronically signed by: Sulema Solorzano Date:    11/20/2023 Time:    11:44    CT Abdomen Pelvis With IV Contrast    Result Date: 11/19/2023  EXAMINATION: CT ABDOMEN PELVIS WITH IV CONTRAST CLINICAL HISTORY: Abdominal mass, intra-abdominal neoplasm suspected; TECHNIQUE: Helical acquisition through the abdomen and pelvis with IV contrast.  Three plane reconstructions were provided for review.  mGycm. Automatic exposure control, adjustment of mA/kV or iterative reconstruction technique was used to reduce radiation. COMPARISON: CT performed yesterday FINDINGS: Chest discussed in separate report. No focal suspicious liver lesion is seen.  The portal vein is narrowed but grossly patent.  Heterogeneous appearance of the gallbladder fossa similar to yesterday.  There is mild intrahepatic biliary ductal dilatation.  The common bile duct is not clearly seen, no grossly dilated. No significant abnormality of the spleen.  Mild peripancreatic inflammation around the head likely secondary to duodenitis.  The main pancreatic duct is not dilated.  No adrenal nodule. There is moderate left hydronephrosis which is similar to yesterday.  An obstructing lesion is not clearly seen.  No significant abnormality of the right kidney. There is prominent wall thickening of the stomach and duodenum with mucosal enhancement.  There is irregular wall thickening of the proximal stomach just past the GE junction around image 34 series 4 likely representing known tumor.  No free air is seen.  There is small volume ascites. Urinary bladder is unremarkable.  The abdominal aorta is normal in caliber. Moderate degenerative changes of the spine. Mild L1 compression  deformity likely chronic.     1. Gastritis and duodenitis. 2. Irregular wall thickening of the proximal stomach compatible with known tumor. 3. Similar heterogeneous appearance of the gallbladder fossa with small volume ascites.  Bile leak is possible.  Mild intrahepatic biliary ductal dilatation without significant dilatation of the common bile duct. 4. Similar moderate left hydronephrosis. Electronically signed by: Wei Wright Date:    11/19/2023 Time:    13:17    CT Chest Without Contrast    Result Date: 11/19/2023  EXAMINATION: CT CHEST WITHOUT CONTRAST CLINICAL HISTORY: Gastric mass; TECHNIQUE: Helical acquisition through the chest performed without IV contrast. Three plane reconstructions made available for review.  mGycm. Automatic exposure control, adjustment of mA/kV or iterative reconstruction technique was used to reduce radiation. COMPARISON: No prior chest CT FINDINGS: There is no mediastinal, hilar or axillary lymphadenopathy by size criteria.  Mildly dilated esophagus. The heart is not enlarged.  No pericardial effusion.  There are coronary artery calcifications. There is no pleural effusion.  Mild chronic changes of the lungs with no suspicious pulmonary nodule. Mild degenerative changes of the spine.  No suspicious osseous lesion.  The abdomen is discussed in a separate report.     No convincing CT evidence of metastatic disease in the chest. Electronically signed by: Wei Wright Date:    11/19/2023 Time:    12:58    FL ERCP Biliary And Pancreatic By Rad Tech    Result Date: 11/18/2023  EXAMINATION: FL ERCP BILIARY AND PANCREATIC CLINICAL HISTORY: ercp; FINDINGS: Fluoroscopic assistance provided during ERCP.  Images were independently interpreted by the attending physician performing the procedure. Fluoro time 3 seconds. Reference Air Kerma: 0.8 mGy.     Fluoroscopic assistance provided as above. Electronically signed by: Wei Wright Date:    11/18/2023 Time:    14:21             ICD-10-CM  ICD-9-CM   1. Chest pain  R07.9 786.50   2. Hydronephrosis  N13.30 591   3. Biloma following surgery, initial encounter  T81.89XA 998.89    K66.8 568.89   4. Hyperbilirubinemia  E80.6 782.4   5. Tachycardia  R00.0 785.0   6. A-fib  I48.91 427.31   7. Gastric adenocarcinoma  C16.9 151.9   8. Hydronephrosis concurrent with and due to ureteral stricture  N13.1 591     593.3   9. Respiratory crackles  R09.89 786.7   10. ESRD (end stage renal disease)  N18.6 585.6   11. DVT (deep venous thrombosis)  I82.409 453.40       ASSESSMENT & PLAN:      Karen Briggs is a 58 y.o. female presented on  7 days post op from laparoscopic cholecystectomy on 11/10. Gallbladder was unable to be resected. Back and flank pain persisted post operatively prompting evaluation at [facility]. After workup her surgeon recommended ERCP for which she was sent to this facility. Left sided hydronephrosis noted on CT scans done on admission. At that time renal function was fairly normal and patient was given option for stenting or to defer as outpatient and she chose the latter.      During ERCP, malignant gastric mass noted and ERCP was unable to be completed due to duodenal scarring. 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 Patient ultimately had biliary drain placed per IR.      She developed A fib with RVR requiring amiodarone infusion. In the past 24 hours she had significant blood loss post removal of long term IV. She then had a fall later ambulating to the bathroom.      Patient renal function was relatively stable until decline starting 11/25. Patient was ultimately initiated on HD 11/28 post bilateral ureteral stent placement with Dr Cardenas same day.      Patient developed dialysis dependent acute kidney injury, and was started on hemodialysis 11/29/2023.    Recommendations:   1. Monitor post transfusion hemoglobin/hematocrit.  If hemoglobin less than 7, transfuse 1 more unit of packed RBC.    2. IV ppi.    3. Patient is  presently on Eliquis.  Preferably, she needs to be off Eliquis for 48 hours before any endoscopy procedures.  She does have underlying gastric malignancy which could partly be contributing to anemia.  She is reported to have right brown stool at this time. Reported to be Hemoccult positive.  4. Avoid NSAIDs.    5. GI consult service to follow the patient tomorrow.  Patient is known to Dr. Goss from previous hospitalization

## 2023-12-10 NOTE — PROGRESS NOTES
[facility]  Hospital Medicine Progress Note        Chief Complaint: Inpatient Follow-up for intractable nausea vomiting due to gastric adenocarcinoma     HPI: 58-year-old female with medical history of hypertension who recently underwent laparoscopic cholecystectomy on 11/10/2023, procedure was incomplete and was unable to completely resect the gallbladder.  Since surgery she continued to have right flank/back pain and followed up with her PCP and CT abdomen and pelvis on 11/17/2023 revealed left-sided hydronephrosis with suspected distal obstruction/no clear stone visualized, postoperative changes of cholecystectomy with fluid in the gallbladder fossa may reflect postoperative seroma but biloma can not be entirely excluded, also noted for marked thickening of the stomach wall diffusely may be related to mesenteric edema/reactive.  She presented to [facility] [place] ED the same day 11/17/2023 and her labs notable for WBC 9.0, hemoglobin 12.4, platelets 442, creatinine 0.86, total bilirubin 8.7 with direct fraction 6.7, alkaline phosphatase 491, , .  Patient's surgeon was consulted and recommended ERCP which was not available at [facility] [place] for which she was transferred to [facility] and referred to hospital medicine service for further evaluation and management.   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  Severe inflammation and oozing of blood noted proximal gastric lumen.  Unable to advance scope into the duodenum.  Surgical oncology consulted, IR consulted for external drain placement which was done November 21.  November 24th she developed new onset atrial fibrillation with RVR and Cardiology consulted.  Started on amiodarone drip  Unfortunately patient had acute blood loss due to hemorrhage from the midline site that was removed.  Patient was unaware of the bleed.  Became very weak, passed out.  Code was called but she came around.   Stat H&H done which was slightly lower but stable, MRI of the brain was negative for any acute ischemic changes. Pt is aware of gastric cancer diagnosis   GI following--> 11/18- EGD shows normal esophagus, malignant gastric tumor in the cardia.  Inflamed mucosa and ooze of blood throughout the proximal gastric lumen.  Gastric antrum scar would not allow advancement of scope into the duodenal ampulla area therefore biliary cannulation was not possible for bile leak evaluation  Pathology shows marked chronic gastritis with intestinal metaplasia, gastric cardia biopsy shows adenocarcinoma, moderately differentiated intestinal type   bilateral hydronephrosis with left greater than right  MRI brain without contrast-negative for acute finding  PET scan reviewed with patient by Oncology reports of locally advanced gastric adenocarcinoma and plans for systemic therapy outpatient if functional, nutritional and renal function improves. \  MediPort placement by surgical Oncology on 12/1, oncology on board plans for systemic therapy outpatient if functional, nutritional and renal function improves.  obstructive uropathy with bilateral hydronephrosis status post bilateral ureteral stent placed on 11/27 by Urology- no improvement in renal function, patient opted to have hemodialysis and a right-sided hemodialysis catheter has been placed by General surgery on 11/29, 2 continue hemodialysis per nephrology discretion.  Patient with symptoms of nausea and vomiting can not keep anything down in her stomach complicated by severe malnutrition on IV Clinimix and supportive medications for nausea and vomiting. Palliative care on board    Patient got a MediPort and J-tube placed on 12/01  Cystogram reviewed by Urology with patent stents and recommends outpatient follow-up with Urology  White cell count elevated at 20.4, Infectious Disease had started on IV Zosyn given concern for possible sepsis with low blood pressure on 12/02   CIS  evaluated patient to begin low-dose metoprolol tartrate at 12.5 mg b.i.d. and resume Eliquis for stroke prevention    Interval Hx:   Still having low grade fever and leucocytosis.   No acute symptoms.     Objective/physical exam:  General: In no acute distress, afebrile  Chest: Clear to auscultation bilaterally, bleeding noted around tunneled catheter  Heart: RRR, +S1, S2, no appreciable murmur  Abdomen: Soft, nontender, BS +  MSK: Warm, no lower extremity edema, no clubbing or cyanosis  Neurologic: Alert and oriented x4, Cranial nerve II-XII intact, Strength 5/5 in all 4 extremities    VITAL SIGNS: 24 HRS MIN & MAX LAST   Temp  Min: 98 °F (36.7 °C)  Max: 99 °F (37.2 °C) 98.6 °F (37 °C)   BP  Min: 90/61  Max: 104/68 98/70   Pulse  Min: 113  Max: 123  (!) 120   Resp  Min: 17  Max: 18 18   SpO2  Min: 97 %  Max: 98 % 98 %     I reviewed the labs below:  Recent Labs   Lab 12/08/23 0718 12/09/23  1000 12/10/23  0605   WBC 21.79* 24.65* 26.38*   RBC 2.70* 2.67* 2.10*   HGB 8.1* 8.1* 6.4*   HCT 23.5* 23.5* 18.4*   MCV 87.0 88.0 87.6   MCH 30.0 30.3 30.5   MCHC 34.5 34.5 34.8   RDW 18.6* 18.6* 18.7*   * 487* 493*   MPV 10.1 10.0 10.2       Recent Labs   Lab 12/04/23  0546 12/05/23  0455 12/06/23  0505 12/07/23  0445 12/08/23  0718 12/09/23  1000 12/10/23  0605   * 129* 132* 133* 131* 131* 130*   K 4.7 4.4 4.3 4.1 4.3 4.4 3.9   CO2 23 24 24 26 25 23 20*   BUN 62.0* 51.3* 71.7* 50.1* 73.8* 94.8* 61.9*   CREATININE 5.81* 4.55* 5.79* 4.61* 5.93* 7.01* 5.01*   CALCIUM 8.6 8.2* 8.3* 8.0* 8.2* 8.2* 8.0*   MG 2.30 2.20 2.40  --   --   --   --    ALBUMIN 1.5* 1.8* 1.6* 1.5* 1.5*  --  1.4*   ALKPHOS 126 125  --   --   --   --  204*   ALT 23 23  --   --   --   --  35   AST 26 30  --   --   --   --  36*   BILITOT 5.6* 5.1*  --   --   --   --  3.8*       Microbiology Results (last 7 days)       Procedure Component Value Units Date/Time    Blood Culture [[id number]] Collected: 12/10/23 0605    Order Status: Resulted  Specimen: Blood from PICC Line Updated: 12/10/23 0645    Blood Culture [[id number]] Collected: 12/10/23 0605    Order Status: Resulted Specimen: Blood from Hand, Left Updated: 12/10/23 0645    Blood Culture [[id number]]  (Normal) Collected: 12/02/23 2053    Order Status: Completed Specimen: Blood Updated: 12/07/23 2200     CULTURE, BLOOD ([facility]) No Growth at 5 days    Blood Culture [[id number]]  (Normal) Collected: 12/02/23 2053    Order Status: Completed Specimen: Blood Updated: 12/07/23 2200     CULTURE, BLOOD ([facility]) No Growth at 5 days           See below for Radiology    Scheduled Med:   apixaban  5 mg Oral BID    metoprolol tartrate  12.5 mg Oral BID    midodrine  10 mg Oral TID WM    pantoprazole  40 mg Intravenous BID WM    sodium bicarbonate  1,300 mg Oral Daily    sodium chloride 0.9%  10 mL Intravenous Q6H      PRN Meds:  0.9%  NaCl infusion (for blood administration), 0.9%  NaCl infusion (for blood administration), sodium chloride 0.9%, acetaminophen, aluminum-magnesium hydroxide-simethicone, bisacodyL, chlorproMAZINE, dicyclomine, hydrALAZINE, hyoscyamine, melatonin, metoclopramide HCl, metoprolol, morphine, ondansetron, oxyCODONE, polyethylene glycol, prochlorperazine, promethazine, senna-docusate 8.6-50 mg, sodium chloride 0.9%, sodium chloride 0.9%, Flushing PICC/Midline Protocol **AND** sodium chloride 0.9% **AND** sodium chloride 0.9%     Assessment/Plan:  Hypotension, improved with midodrine  Persistent leukocytosis- source unclear  Acute on chronic anemia, s/p 2 units prbcs transfusion 11/30  Hx of Acute Blood loss anemia with syncope and then fall 11/26-   Locally advanced gastric adenocarcinoma with intractable nausea and vomiting possible Gastric/duodenal outlet obstruction  -now status post J Tube placement  Obstructive uropathy with bilateral hydronephrosis- S/P bilateral ureteral stent placed on 11/27  POORNIMA-now on HD - 11/29  Metabolic acidosis- resolved  Suspected bile leak with biloma and  biliary obstruction--> H/O Laparoscopic incomplete cholecystectomy 11/10/2023  New onset atrial fibrillation with RVR- fluctuating  Hypokalemia- resolved with replacement  Hyponatremia  History of hypertension and DDD/sciatica  Severe malnutrition      Begin j tube feed with [employer] at 45 mL/hour goal rates, home feeding pump has been arranged already  Although WBC is elevated patient does not have fever.   Will consider adding abx due to possible sepsis   - vanc and zosyn. Monitor.   CT is grossly unrevealing.   -  ID was notified.   - decrease in Hg. FOBT is positive, will call GI.     Infectious disease Dr Carlisle on board, given all cultures has been negative, recommended to discontinue IV antibiotics and monitor her off of antibiotics for now, patient has been stable and Infectious Disease has signed off  Trend hemoglobin levels and keep hemoglobin greater than 7- 8 grams/dL --> noted slowly trending down again, now 8.1  Ordered FOBT x3  Patient reported no BM this week.  Ordered Dulcolax suppository x1 to move the bowels 1st  Total received 3units of packed red blood cell this admission, verified with blood bank  Nephrology on board for hemodialysis needs 12/6- received tunneled catheter given renal functions did not recover despite bilateral ureteral stenting 11/27 for hydronephrosis, continued hemodialysis need on Monday Wednesday and Friday while in-house  Dialysis chair time has been secured, patient to start on Saturday with Tuesday Thursday Saturday schedule.  Patient was cleared to discharge per Nephrology to start her hemodialysis on Saturday as outpatient but given drop in hemoglobin, will hold off the discharge and work her up keeping in mind she is on blood thinners as well  CIS evaluated patient to begin low-dose metoprolol tartrate at 12.5 mg b.i.d. and resume Eliquis for stroke prevention  Patient got a MediPort and J-tube placed on 12/01  Cystogram reviewed by Urology with patent stents and  recommends outpatient follow-up with Urology  Appreciate oncology input plans for systemic therapy outpatient if renal function improves, patient is planning to pursue treatment   Palliative Care on board- patient wants everything done, plan to go to her oncology appointment and discussed options of treatment, patient will still be under palliative care service upon discharge  IM thorazine prn hiccups   Morning CBC, bmp ordered     VTE prophylaxis:  eliquis      Patient condition:  stable     Anticipated discharge and Disposition:  Probably home with palliative Care tomorrow if hemoglobin remains stable.  Patient to resume her hemodialysis on Saturday at her dialysis center per Nephrology    All diagnosis and differential diagnosis have been reviewed; assessment and plan has been documented; I have personally reviewed the labs and test results that are presently available; I have reviewed the patients medication list; I have reviewed the consulting providers response and recommendations. I have reviewed or attempted to review medical records based upon their availability    All of the patient's questions have been  addressed and answered. Patient's is agreeable to the above stated plan. I will continue to monitor closely and make adjustments to medical management as needed.  _____________________________________________________________________    Nutrition Status:  Patient meets ASPEN criteria for severe malnutrition of acute illness or injury per RD assessment as evidenced by:  Energy Intake (Malnutrition):  (does not meet criteria)  Weight Loss (Malnutrition): greater than 7.5% in 3 months  Subcutaneous Fat (Malnutrition): moderate depletion  Muscle Mass (Malnutrition): moderate depletion           A minimum of two characteristics is recommended for diagnosis of either severe or non-severe malnutrition.   Radiology:   I have personally reviewed the images and agree with radiologist report  CT Chest Abdomen Pelvis  With IV Contrast (XPD) NO Oral Contrast  Narrative: EXAMINATION:  CT CHEST ABDOMEN PELVIS WITH IV CONTRAST (XPD)    CLINICAL HISTORY:  Sepsis;    TECHNIQUE:  CT imaging from the chest through the pelvis after IV contrast.  Axial, coronal and sagittal reformatted images reviewed. Dose length product 1677 mGycm. Automatic exposure control, adjustment of mA/kV or iterative reconstruction technique used to limit radiation dose.    COMPARISON:  CTs 11/19/2023    FINDINGS:  Lung and large airways: Mild respiratory motion.  No significant consolidation identified.    Pleura: Normal.    Mediastinum and erika: Right-sided central venous catheter tip in the right atrium.  Left-sided MediPort catheter tip in the SVC.  No pathologically enlarged lymph node identified.    Chest wall/axilla and lower neck: No significant findings.    Liver/biliary: Percutaneous biliary drain.  No significant biliary dilatation.    Pancreas: Normal.    Spleen: Normal.    Adrenals: Normal.    Genitourinary: Bilateral ureteral stents with mild to moderate bilateral hydronephrosis.    Stomach/Bowel: Percutaneous jejunostomy tube.  Gastric mass not well delineated on current exam.  No bowel obstruction.    Abdominal/pelvic lymph nodes and peritoneum: Moderate volume ascites.    Abdominal/pelvic vasculature: Normal caliber of the abdominal aorta.    Abdominal wall: Areas of mild to moderate subcutaneous edema.    Bones: No acute osseous findings.  Impression: Moderate volume ascites with areas of subcutaneous edema in the abdominal wall.    Electronically signed by: George Dove  Date:    12/09/2023  Time:    13:33      Mukesh Cole MD  Department of Hospital Medicine   [facility]   12/10/2023

## 2023-12-11 LAB
ACINETOBACTER CALCOACETICUS-BAUMANNII COMPLEX (OHS): NOT DETECTED
ALBUMIN SERPL-MCNC: 1.4 G/DL (ref 3.4–4.8)
ALBUMIN/GLOB SERPL: 0.3 RATIO (ref 1.1–2)
ALP SERPL-CCNC: 199 UNIT/L (ref 40–150)
ALT SERPL-CCNC: 40 UNIT/L (ref 0–55)
AST SERPL-CCNC: 43 UNIT/L (ref 5–34)
AV INDEX (PROSTH): 0.91
AV MEAN GRADIENT: 6 MMHG
AV PEAK GRADIENT: 10 MMHG
AV VALVE AREA BY VELOCITY RATIO: 2.88 CM²
AV VALVE AREA: 2.86 CM²
AV VELOCITY RATIO: 0.92
BACTEROIDES FRAGILIS (OHS): NOT DETECTED
BASOPHILS # BLD AUTO: 0.1 X10(3)/MCL
BASOPHILS NFR BLD AUTO: 0.4 %
BILIRUB SERPL-MCNC: 3.9 MG/DL
BSA FOR ECHO PROCEDURE: 1.95 M2
BUN SERPL-MCNC: 54.3 MG/DL (ref 9.8–20.1)
C AURIS DNA BLD POS QL NAA+NON-PROBE: NOT DETECTED
C GATTII+NEOFOR DNA CSF QL NAA+NON-PROBE: NOT DETECTED
CALCIUM SERPL-MCNC: 8.5 MG/DL (ref 8.4–10.2)
CANDIDA ALBICANS (OHS): NOT DETECTED
CANDIDA GLABRATA (OHS): DETECTED
CANDIDA KRUSEI (OHS): NOT DETECTED
CANDIDA PARAPSILOSIS (OHS): NOT DETECTED
CANDIDA TROPICALIS (OHS): NOT DETECTED
CHLORIDE SERPL-SCNC: 95 MMOL/L (ref 98–107)
CO2 SERPL-SCNC: 22 MMOL/L (ref 23–31)
CREAT SERPL-MCNC: 4.15 MG/DL (ref 0.55–1.02)
CTX-M (OHS): ABNORMAL
CV ECHO LV RWT: 0.42 CM
DOP CALC AO PEAK VEL: 1.6 M/S
DOP CALC AO VTI: 20.5 CM
DOP CALC LVOT AREA: 3.1 CM2
DOP CALC LVOT DIAMETER: 2 CM
DOP CALC LVOT PEAK VEL: 1.47 M/S
DOP CALC LVOT STROKE VOLUME: 58.72 CM3
DOP CALC MV VTI: 17.1 CM
DOP CALCLVOT PEAK VEL VTI: 18.7 CM
E WAVE DECELERATION TIME: 148 MSEC
E/A RATIO: 0.58
E/E' RATIO: 6.53 M/S
ECHO LV POSTERIOR WALL: 0.9 CM (ref 0.6–1.1)
ENTEROBACTER CLOACAE COMPLEX (OHS): NOT DETECTED
ENTEROBACTERALES (OHS): NOT DETECTED
ENTEROCOCCUS FAECALIS (OHS): NOT DETECTED
ENTEROCOCCUS FAECIUM (OHS): NOT DETECTED
EOSINOPHIL # BLD AUTO: 0.19 X10(3)/MCL (ref 0–0.9)
EOSINOPHIL NFR BLD AUTO: 0.7 %
ERYTHROCYTE [DISTWIDTH] IN BLOOD BY AUTOMATED COUNT: 17.6 % (ref 11.5–17)
ESCHERICHIA COLI (OHS): NOT DETECTED
FRACTIONAL SHORTENING: 50 % (ref 28–44)
GFR SERPLBLD CREATININE-BSD FMLA CKD-EPI: 12 MLS/MIN/1.73/M2
GLOBULIN SER-MCNC: 5.2 GM/DL (ref 2.4–3.5)
GLUCOSE SERPL-MCNC: 97 MG/DL (ref 82–115)
GP B STREP DNA CSF QL NAA+NON-PROBE: NOT DETECTED
HAEM INFLU DNA CSF QL NAA+NON-PROBE: NOT DETECTED
HCT VFR BLD AUTO: 28.2 % (ref 37–47)
HCT VFR BLD AUTO: 28.2 % (ref 37–47)
HCT VFR BLD AUTO: 29.5 % (ref 37–47)
HGB BLD-MCNC: 10.3 G/DL (ref 12–16)
HGB BLD-MCNC: 9.8 G/DL (ref 12–16)
HGB BLD-MCNC: 9.9 G/DL (ref 12–16)
IMM GRANULOCYTES # BLD AUTO: 0.53 X10(3)/MCL (ref 0–0.04)
IMM GRANULOCYTES NFR BLD AUTO: 2 %
IMP (OHS): ABNORMAL
INTERVENTRICULAR SEPTUM: 0.73 CM (ref 0.6–1.1)
KLEBSIELLA AEROGENES (OHS): NOT DETECTED
KLEBSIELLA OXYTOCA (OHS): NOT DETECTED
KLEBSIELLA PNEUMONIAE GROUP (OHS): NOT DETECTED
KPC (OHS): ABNORMAL
L MONOCYTOG DNA CSF QL NAA+NON-PROBE: NOT DETECTED
LEFT ATRIUM SIZE: 3.8 CM
LEFT INTERNAL DIMENSION IN SYSTOLE: 2.14 CM (ref 2.1–4)
LEFT VENTRICLE DIASTOLIC VOLUME INDEX: 40.49 ML/M2
LEFT VENTRICLE DIASTOLIC VOLUME: 82.6 ML
LEFT VENTRICLE MASS INDEX: 53 G/M2
LEFT VENTRICLE SYSTOLIC VOLUME INDEX: 7.4 ML/M2
LEFT VENTRICLE SYSTOLIC VOLUME: 15.1 ML
LEFT VENTRICULAR INTERNAL DIMENSION IN DIASTOLE: 4.29 CM (ref 3.5–6)
LEFT VENTRICULAR MASS: 107.53 G
LV LATERAL E/E' RATIO: 4.77 M/S
LV SEPTAL E/E' RATIO: 10.33 M/S
LVOT MG: 5 MMHG
LVOT MV: 0.99 CM/S
LYMPHOCYTES # BLD AUTO: 0.75 X10(3)/MCL (ref 0.6–4.6)
LYMPHOCYTES NFR BLD AUTO: 2.9 %
MAGNESIUM SERPL-MCNC: 2.1 MG/DL (ref 1.6–2.6)
MCH RBC QN AUTO: 30.2 PG (ref 27–31)
MCHC RBC AUTO-ENTMCNC: 35.1 G/DL (ref 33–36)
MCR-1 (OHS): ABNORMAL
MCV RBC AUTO: 86 FL (ref 80–94)
MECA/C (OHS): ABNORMAL
MECA/C AND MREJ (MRSA)(OHS): ABNORMAL
MONOCYTES # BLD AUTO: 1.53 X10(3)/MCL (ref 0.1–1.3)
MONOCYTES NFR BLD AUTO: 5.9 %
MV MEAN GRADIENT: 2 MMHG
MV PEAK A VEL: 1.06 M/S
MV PEAK E VEL: 0.62 M/S
MV PEAK GRADIENT: 4 MMHG
MV STENOSIS PRESSURE HALF TIME: 48 MS
MV VALVE AREA BY CONTINUITY EQUATION: 3.43 CM2
MV VALVE AREA P 1/2 METHOD: 4.58 CM2
N MEN DNA CSF QL NAA+NON-PROBE: NOT DETECTED
NDM (OHS): ABNORMAL
NEUTROPHILS # BLD AUTO: 22.84 X10(3)/MCL (ref 2.1–9.2)
NEUTROPHILS NFR BLD AUTO: 88.1 %
NRBC BLD AUTO-RTO: 0 %
OHS LV EJECTION FRACTION SIMPSONS BIPLANE MOD: 53 %
OXA-48-LIKE (OHS): ABNORMAL
PISA TR MAX VEL: 2.47 M/S
PLATELET # BLD AUTO: 448 X10(3)/MCL (ref 130–400)
PMV BLD AUTO: 10.3 FL (ref 7.4–10.4)
POTASSIUM SERPL-SCNC: 3.4 MMOL/L (ref 3.5–5.1)
PROT SERPL-MCNC: 6.6 GM/DL (ref 5.8–7.6)
PROTEUS SPP. (OHS): NOT DETECTED
PSEUDOMONAS AERUGINOSA (OHS): NOT DETECTED
PV PEAK GRADIENT: 6 MMHG
PV PEAK VELOCITY: 1.2 M/S
RA PRESSURE ESTIMATED: 3 MMHG
RBC # BLD AUTO: 3.28 X10(6)/MCL (ref 4.2–5.4)
RV TB RVSP: 5 MMHG
S ENT+BONG DNA STL QL NAA+NON-PROBE: NOT DETECTED
S PNEUM DNA CSF QL NAA+NON-PROBE: NOT DETECTED
SERRATIA MARCESCENS (OHS): NOT DETECTED
SODIUM SERPL-SCNC: 131 MMOL/L (ref 136–145)
STAPHYLOCOCCUS AUREUS (OHS): NOT DETECTED
STAPHYLOCOCCUS EPIDERMIDIS (OHS): NOT DETECTED
STAPHYLOCOCCUS LUGDUNENSIS (OHS): NOT DETECTED
STAPHYLOCOCCUS SPP. (OHS): NOT DETECTED
STENOTROPHOMONAS MALTOPHILIA (OHS): NOT DETECTED
STREPTOCOCCUS PYOGENES (GROUP A)(OHS): NOT DETECTED
STREPTOCOCCUS SPP. (OHS): NOT DETECTED
TDI LATERAL: 0.13 M/S
TDI SEPTAL: 0.06 M/S
TDI: 0.1 M/S
TR MAX PG: 24 MMHG
TRICUSPID ANNULAR PLANE SYSTOLIC EXCURSION: 1.34 CM
TV REST PULMONARY ARTERY PRESSURE: 27 MMHG
VANA/B (OHS): ABNORMAL
VIM (OHS): ABNORMAL
WBC # SPEC AUTO: 25.94 X10(3)/MCL (ref 4.5–11.5)
Z-SCORE OF LEFT VENTRICULAR DIMENSION IN END DIASTOLE: -3.49
Z-SCORE OF LEFT VENTRICULAR DIMENSION IN END SYSTOLE: -4.39

## 2023-12-11 PROCEDURE — 63600175 PHARM REV CODE 636 W HCPCS: Performed by: INTERNAL MEDICINE

## 2023-12-11 PROCEDURE — 85018 HEMOGLOBIN: CPT | Performed by: STUDENT IN AN ORGANIZED HEALTH CARE EDUCATION/TRAINING PROGRAM

## 2023-12-11 PROCEDURE — 87077 CULTURE AEROBIC IDENTIFY: CPT | Performed by: STUDENT IN AN ORGANIZED HEALTH CARE EDUCATION/TRAINING PROGRAM

## 2023-12-11 PROCEDURE — 25000003 PHARM REV CODE 250: Performed by: INTERNAL MEDICINE

## 2023-12-11 PROCEDURE — 25000003 PHARM REV CODE 250: Performed by: STUDENT IN AN ORGANIZED HEALTH CARE EDUCATION/TRAINING PROGRAM

## 2023-12-11 PROCEDURE — A4216 STERILE WATER/SALINE, 10 ML: HCPCS | Performed by: INTERNAL MEDICINE

## 2023-12-11 PROCEDURE — 80053 COMPREHEN METABOLIC PANEL: CPT | Performed by: STUDENT IN AN ORGANIZED HEALTH CARE EDUCATION/TRAINING PROGRAM

## 2023-12-11 PROCEDURE — 21400001 HC TELEMETRY ROOM

## 2023-12-11 PROCEDURE — 97116 GAIT TRAINING THERAPY: CPT | Mod: CQ

## 2023-12-11 PROCEDURE — 85025 COMPLETE CBC W/AUTO DIFF WBC: CPT | Performed by: STUDENT IN AN ORGANIZED HEALTH CARE EDUCATION/TRAINING PROGRAM

## 2023-12-11 PROCEDURE — 25000003 PHARM REV CODE 250: Performed by: NURSE PRACTITIONER

## 2023-12-11 PROCEDURE — C9113 INJ PANTOPRAZOLE SODIUM, VIA: HCPCS | Performed by: INTERNAL MEDICINE

## 2023-12-11 PROCEDURE — 87040 BLOOD CULTURE FOR BACTERIA: CPT | Performed by: STUDENT IN AN ORGANIZED HEALTH CARE EDUCATION/TRAINING PROGRAM

## 2023-12-11 PROCEDURE — 83735 ASSAY OF MAGNESIUM: CPT | Performed by: STUDENT IN AN ORGANIZED HEALTH CARE EDUCATION/TRAINING PROGRAM

## 2023-12-11 PROCEDURE — 97530 THERAPEUTIC ACTIVITIES: CPT | Mod: CQ

## 2023-12-11 PROCEDURE — 63600175 PHARM REV CODE 636 W HCPCS: Performed by: STUDENT IN AN ORGANIZED HEALTH CARE EDUCATION/TRAINING PROGRAM

## 2023-12-11 PROCEDURE — 99291 CRITICAL CARE FIRST HOUR: CPT | Mod: FS,,, | Performed by: GENERAL PRACTICE

## 2023-12-11 RX ADMIN — SODIUM CHLORIDE, PRESERVATIVE FREE 10 ML: 5 INJECTION INTRAVENOUS at 12:12

## 2023-12-11 RX ADMIN — ENOXAPARIN SODIUM 100 MG: 100 INJECTION SUBCUTANEOUS at 12:12

## 2023-12-11 RX ADMIN — MIDODRINE HYDROCHLORIDE 10 MG: 5 TABLET ORAL at 06:12

## 2023-12-11 RX ADMIN — PANTOPRAZOLE SODIUM 40 MG: 40 INJECTION, POWDER, FOR SOLUTION INTRAVENOUS at 05:12

## 2023-12-11 RX ADMIN — MIDODRINE HYDROCHLORIDE 10 MG: 5 TABLET ORAL at 01:12

## 2023-12-11 RX ADMIN — MICAFUNGIN SODIUM 100 MG: 100 INJECTION, POWDER, LYOPHILIZED, FOR SOLUTION INTRAVENOUS at 06:12

## 2023-12-11 RX ADMIN — MIDODRINE HYDROCHLORIDE 10 MG: 5 TABLET ORAL at 10:12

## 2023-12-11 RX ADMIN — PANTOPRAZOLE SODIUM 40 MG: 40 INJECTION, POWDER, FOR SOLUTION INTRAVENOUS at 10:12

## 2023-12-11 RX ADMIN — METOPROLOL TARTRATE 12.5 MG: 25 TABLET, FILM COATED ORAL at 08:12

## 2023-12-11 RX ADMIN — SODIUM CHLORIDE, PRESERVATIVE FREE 10 ML: 5 INJECTION INTRAVENOUS at 06:12

## 2023-12-11 RX ADMIN — SODIUM CHLORIDE, PRESERVATIVE FREE 10 ML: 5 INJECTION INTRAVENOUS at 05:12

## 2023-12-11 RX ADMIN — PIPERACILLIN AND TAZOBACTAM 4.5 G: 4; .5 INJECTION, POWDER, LYOPHILIZED, FOR SOLUTION INTRAVENOUS; PARENTERAL at 02:12

## 2023-12-11 RX ADMIN — METOPROLOL TARTRATE 12.5 MG: 25 TABLET, FILM COATED ORAL at 10:12

## 2023-12-11 RX ADMIN — SODIUM BICARBONATE 650 MG TABLET 1300 MG: at 10:12

## 2023-12-11 NOTE — PROGRESS NOTES
[facility]  Hospital Medicine Progress Note        Chief Complaint: Inpatient Follow-up for intractable nausea vomiting due to gastric adenocarcinoma     HPI: 58-year-old female with medical history of hypertension who recently underwent laparoscopic cholecystectomy on 11/10/2023, procedure was incomplete and was unable to completely resect the gallbladder.  Since surgery she continued to have right flank/back pain and followed up with her PCP and CT abdomen and pelvis on 11/17/2023 revealed left-sided hydronephrosis with suspected distal obstruction/no clear stone visualized, postoperative changes of cholecystectomy with fluid in the gallbladder fossa may reflect postoperative seroma but biloma can not be entirely excluded, also noted for marked thickening of the stomach wall diffusely may be related to mesenteric edema/reactive.  She [place] [facility] [place]17/2023 and her labs notable for WBC 9.0, hemoglobin 12.4, platelets 442, creatinine 0.86, total bilirubin 8.7 with direct fraction 6.7, alkaline phosphatase 491, , .  Patient's surgeon was consulted and recommended ERCP which was not available at [facility] [place] which she was transferred [facility] and referred to hospital medicine service for further evaluation and management.   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  Severe inflammation and oozing of blood noted proximal gastric lumen.  Unable to advance scope into the duodenum.  Surgical oncology consulted, IR consulted for external drain placement which was done November 21.  November 24th she developed new onset atrial fibrillation with RVR and Cardiology consulted.  Started on amiodarone drip  Unfortunately patient had acute blood loss due to hemorrhage from the midline site that was removed.  Patient was unaware of the bleed.  Became very weak, passed out.  Code was called but she came around.   Stat H&H done which was slightly lower but stable, MRI of the brain was negative for any acute ischemic changes. Pt is aware of gastric cancer diagnosis   GI following--> 11/18- EGD shows normal esophagus, malignant gastric tumor in the cardia.  Inflamed mucosa and ooze of blood throughout the proximal gastric lumen.  Gastric antrum scar would not allow advancement of scope into the duodenal ampulla area therefore biliary cannulation was not possible for bile leak evaluation  Pathology shows marked chronic gastritis with intestinal metaplasia, gastric cardia biopsy shows adenocarcinoma, moderately differentiated intestinal type   bilateral hydronephrosis with left greater than right  MRI brain without contrast-negative for acute finding  PET scan reviewed with patient by Oncology reports of locally advanced gastric adenocarcinoma and plans for systemic therapy outpatient if functional, nutritional and renal function improves. \  MediPort placement by surgical Oncology on 12/1, oncology on board plans for systemic therapy outpatient if functional, nutritional and renal function improves.  obstructive uropathy with bilateral hydronephrosis status post bilateral ureteral stent placed on 11/27 by Urology- no improvement in renal function, patient opted to have hemodialysis and a right-sided hemodialysis catheter has been placed by General surgery on 11/29, 2 continue hemodialysis per nephrology discretion.  Patient with symptoms of nausea and vomiting can not keep anything down in her stomach complicated by severe malnutrition on IV Clinimix and supportive medications for nausea and vomiting. Palliative care on board    Patient got a MediPort and J-tube placed on 12/01  Cystogram reviewed by Urology with patent stents and recommends outpatient follow-up with Urology  White cell count elevated at 20.4, Infectious Disease had started on IV Zosyn given concern for possible sepsis with low blood pressure on 12/02   CIS  evaluated patient to begin low-dose metoprolol tartrate at 12.5 mg b.i.d. and resume Eliquis for stroke prevention    Interval Hx:   Still having low grade fever and leucocytosis.   No acute symptoms.     Objective/physical exam:  General: In no acute distress, afebrile  Chest: Clear to auscultation bilaterally, bleeding noted around tunneled catheter  Heart: RRR, +S1, S2, no appreciable murmur  Abdomen: Soft, nontender, BS +  MSK: Warm, no lower extremity edema, no clubbing or cyanosis  Neurologic: Alert and oriented x4, Cranial nerve II-XII intact, Strength 5/5 in all 4 extremities    VITAL SIGNS: 24 HRS MIN & MAX LAST   Temp  Min: 98.1 °F (36.7 °C)  Max: 100.3 °F (37.9 °C) 98.1 °F (36.7 °C)   BP  Min: 83/53  Max: 113/75 106/76   Pulse  Min: 97  Max: 122  97   Resp  Min: 18  Max: 20 18   SpO2  Min: 92 %  Max: 99 % 98 %     I reviewed the labs below:  Recent Labs   Lab 12/09/23  1000 12/10/23  0605 12/10/23  1519 12/11/23  0024 12/11/23  0326 12/11/23  0808   WBC 24.65* 26.38*  --   --  25.94*  --    RBC 2.67* 2.10*  --   --  3.28*  --    HGB 8.1* 6.4*   < > 9.8* 9.9* 10.3*   HCT 23.5* 18.4*   < > 28.2* 28.2* 29.5*   MCV 88.0 87.6  --   --  86.0  --    MCH 30.3 30.5  --   --  30.2  --    MCHC 34.5 34.8  --   --  35.1  --    RDW 18.6* 18.7*  --   --  17.6*  --    * 493*  --   --  448*  --    MPV 10.0 10.2  --   --  10.3  --     < > = values in this interval not displayed.       Recent Labs   Lab 12/05/23  0455 12/06/23  0505 12/07/23  0445 12/08/23  0718 12/09/23  1000 12/10/23  0605 12/11/23  0326   * 132*   < > 131* 131* 130* 131*   K 4.4 4.3   < > 4.3 4.4 3.9 3.4*   CO2 24 24   < > 25 23 20* 22*   BUN 51.3* 71.7*   < > 73.8* 94.8* 61.9* 54.3*   CREATININE 4.55* 5.79*   < > 5.93* 7.01* 5.01* 4.15*   CALCIUM 8.2* 8.3*   < > 8.2* 8.2* 8.0* 8.5   MG 2.20 2.40  --   --   --   --  2.10   ALBUMIN 1.8* 1.6*   < > 1.5*  --  1.4* 1.4*   ALKPHOS 125  --   --   --   --  204* 199*   ALT 23  --   --   --   --  35  40   AST 30  --   --   --   --  36* 43*   BILITOT 5.1*  --   --   --   --  3.8* 3.9*    < > = values in this interval not displayed.       Microbiology Results (last 7 days)       Procedure Component Value Units Date/Time    Blood Culture [[id number]]  (Abnormal) Collected: 12/10/23 0605    Order Status: Completed Specimen: Blood from PICC Line Updated: 12/11/23 1318     GRAM STAIN Yeast      Seen in gram stain of broth only      1 of 2 Aerobic bottles positive    BCID2 Panel [[id number]] Collected: 12/10/23 0605    Order Status: Sent Specimen: Blood from PICC Line Updated: 12/11/23 1317    Blood Culture [[id number]]  (Normal) Collected: 12/10/23 0605    Order Status: Completed Specimen: Blood from Hand, Left Updated: 12/11/23 1002     CULTURE, BLOOD ([facility]) No Growth At 24 Hours    Blood Culture [[id number]]  (Normal) Collected: 12/02/23 2053    Order Status: Completed Specimen: Blood Updated: 12/07/23 2200     CULTURE, BLOOD ([facility]) No Growth at 5 days    Blood Culture [[id number]]  (Normal) Collected: 12/02/23 2053    Order Status: Completed Specimen: Blood Updated: 12/07/23 2200     CULTURE, BLOOD ([facility]) No Growth at 5 days           See below for Radiology    Scheduled Med:   enoxparin  1 mg/kg Subcutaneous Q24H (treatment, non-standard time)    metoprolol tartrate  12.5 mg Oral BID    midodrine  10 mg Oral TID WM    pantoprazole  40 mg Intravenous BID WM    sodium bicarbonate  1,300 mg Oral Daily    sodium chloride 0.9%  10 mL Intravenous Q6H      PRN Meds:  0.9%  NaCl infusion (for blood administration), 0.9%  NaCl infusion (for blood administration), sodium chloride 0.9%, acetaminophen, aluminum-magnesium hydroxide-simethicone, bisacodyL, chlorproMAZINE, dicyclomine, hydrALAZINE, hyoscyamine, melatonin, metoclopramide HCl, metoprolol, morphine, ondansetron, oxyCODONE, polyethylene glycol, prochlorperazine, promethazine, senna-docusate 8.6-50 mg, sodium chloride 0.9%, sodium chloride 0.9%, Flushing PICC/Midline  Protocol **AND** sodium chloride 0.9% **AND** sodium chloride 0.9%, Pharmacy to dose Vancomycin consult **AND** vancomycin - pharmacy to dose     Assessment/Plan:  Hypotension, improved with midodrine  Persistent leukocytosis- source unclear  Acute on chronic anemia, s/p 2 units prbcs transfusion 11/30  Hx of Acute Blood loss anemia with syncope and then fall 11/26-   Locally advanced gastric adenocarcinoma with intractable nausea and vomiting possible Gastric/duodenal outlet obstruction  -now status post J Tube placement  Obstructive uropathy with bilateral hydronephrosis- S/P bilateral ureteral stent placed on 11/27  POORNIMA-now on HD - 11/29  Metabolic acidosis- resolved  Suspected bile leak with biloma and biliary obstruction--> H/O Laparoscopic incomplete cholecystectomy 11/10/2023  New onset atrial fibrillation with RVR- fluctuating  Hypokalemia- resolved with replacement  Hyponatremia  History of hypertension and DDD/sciatica  Severe malnutrition    CT is grossly unrevealing with no gross changes.   Cultures came back positive for candida glabrata in the blood 1/2 tubes  - Start Micofungin.  - ID was notified.   - will remove the PICC line, repeat bcx2 now and tomorrow,   - IR exchange of cholecystostomy tubes  - Gen surgery consult for consideration of removal of gallbladder given the likely source is abdominal.   - expect antifungals for 2 weeks and repeat cultures, if bc is still positive for C. Glabrata, consider removing HD line and Mediport.     GI is called for positive FOBT and drop in Hg. They recommending monitoring.  Nephrology on board for hemodialysis needs 12/6- received tunneled catheter given renal functions did not recover despite bilateral ureteral stenting 11/27 for hydronephrosis, continued hemodialysis need on Monday Wednesday and Friday while in-house. Nephrology is on and they are recommending continuing HD in a regular base for now.   Patient got a MediPort and J-tube placed on  12/01  Cystogram reviewed by Urology with patent stents and recommends outpatient follow-up with Urology  Appreciate oncology input plans for systemic therapy outpatient if renal function improves, patient is planning to pursue treatment      VTE prophylaxis:  eliquis      Patient condition:  stable     Anticipated discharge and Disposition:  Probably home with palliative Care tomorrow if hemoglobin remains stable.  Patient to resume her hemodialysis on Saturday at her dialysis center per Nephrology    All diagnosis and differential diagnosis have been reviewed; assessment and plan has been documented; I have personally reviewed the labs and test results that are presently available; I have reviewed the patients medication list; I have reviewed the consulting providers response and recommendations. I have reviewed or attempted to review medical records based upon their availability    All of the patient's questions have been  addressed and answered. Patient's is agreeable to the above stated plan. I will continue to monitor closely and make adjustments to medical management as needed.  _____________________________________________________________________    Nutrition Status:  Patient meets ASPEN criteria for severe malnutrition of acute illness or injury per RD assessment as evidenced by:  Energy Intake (Malnutrition):  (does not meet criteria)  Weight Loss (Malnutrition): greater than 7.5% in 3 months  Subcutaneous Fat (Malnutrition): moderate depletion  Muscle Mass (Malnutrition): moderate depletion           A minimum of two characteristics is recommended for diagnosis of either severe or non-severe malnutrition.   Radiology:   I have personally reviewed the images and agree with radiologist report  CV Ultrasound doppler venous legs bilat    The right superficial femoral middle vein is normal.    The left superficial femoral middle vein is normal.    There was no evidence of deep or superficial vein thrombosis in  bilateral   lower extremities.       Mukesh Cole MD  Department of Hospital Medicine   [facility]   12/11/2023

## 2023-12-11 NOTE — PROGRESS NOTES
"Gastroenterology Progress Note    Subjective/Interval History:  H&H 10.3/29.5 s/p 2u PRBC - robust response    Spoke with nurse regarding patient. Several BM overnight with no overt GI bleeding noted. Patient wants to be discharged home.    Patient sitting in recliner. TF running through j-tube. She is feeling ok. She has not noticed any red or black blood in her stool.     ROS:  Review of Systems   Constitutional:  Positive for malaise/fatigue.   Respiratory:  Negative for cough and shortness of breath.    Cardiovascular:  Negative for chest pain.   Gastrointestinal:  Negative for abdominal pain, blood in stool, melena, nausea and vomiting.   Neurological:  Negative for weakness.       Vital Signs:  /76   Pulse 97   Temp 98.1 °F (36.7 °C)   Resp 18   Ht 5' 5.75" (1.67 m)   Wt 96.2 kg (212 lb)   SpO2 98%   Breastfeeding No   BMI 34.48 kg/m²   Body mass index is 34.48 kg/m².    Physical Exam:  Physical Exam  Constitutional:       General: She is not in acute distress.     Appearance: She is obese. She is not ill-appearing.   HENT:      Head: Normocephalic and atraumatic.      Right Ear: External ear normal.      Left Ear: External ear normal.      Nose: Nose normal.      Mouth/Throat:      Pharynx: Oropharynx is clear.   Eyes:      Conjunctiva/sclera: Conjunctivae normal.   Pulmonary:      Effort: Pulmonary effort is normal. No respiratory distress.   Abdominal:      General: Abdomen is flat. There is no distension.      Palpations: Abdomen is soft.      Tenderness: There is no abdominal tenderness. There is no guarding.      Comments: J-tube with TF running   Musculoskeletal:         General: Normal range of motion.      Cervical back: Normal range of motion.   Skin:     General: Skin is warm and dry.   Neurological:      Mental Status: She is alert and oriented to person, place, and time. Mental status is at baseline.   Psychiatric:         Mood and Affect: Mood normal.         Behavior: Behavior " normal.         Thought Content: Thought content normal.         Labs:  Recent Results (from the past 24 hour(s))   Occult Blood, Stool 3rd Specimen    Collection Time: 12/10/23  2:40 PM   Result Value Ref Range    Stool Color 3 Green     Stool Consistancy 3 soft     Occult Blood Stool 3 Positive (A) Negative, N/A   Hemoglobin and Hematocrit    Collection Time: 12/10/23  3:19 PM   Result Value Ref Range    Hgb 10.9 (L) 12.0 - 16.0 g/dL    Hct 31.2 (L) 37.0 - 47.0 %   Hemoglobin and Hematocrit    Collection Time: 12/11/23 12:24 AM   Result Value Ref Range    Hgb 9.8 (L) 12.0 - 16.0 g/dL    Hct 28.2 (L) 37.0 - 47.0 %   Comprehensive Metabolic Panel    Collection Time: 12/11/23  3:26 AM   Result Value Ref Range    Sodium Level 131 (L) 136 - 145 mmol/L    Potassium Level 3.4 (L) 3.5 - 5.1 mmol/L    Chloride 95 (L) 98 - 107 mmol/L    Carbon Dioxide 22 (L) 23 - 31 mmol/L    Glucose Level 97 82 - 115 mg/dL    Blood Urea Nitrogen 54.3 (H) 9.8 - 20.1 mg/dL    Creatinine 4.15 (H) 0.55 - 1.02 mg/dL    Calcium Level Total 8.5 8.4 - 10.2 mg/dL    Protein Total 6.6 5.8 - 7.6 gm/dL    Albumin Level 1.4 (L) 3.4 - 4.8 g/dL    Globulin 5.2 (H) 2.4 - 3.5 gm/dL    Albumin/Globulin Ratio 0.3 (L) 1.1 - 2.0 ratio    Bilirubin Total 3.9 (H) <=1.5 mg/dL    Alkaline Phosphatase 199 (H) 40 - 150 unit/L    Alanine Aminotransferase 40 0 - 55 unit/L    Aspartate Aminotransferase 43 (H) 5 - 34 unit/L    eGFR 12 mls/min/1.73/m2   Magnesium    Collection Time: 12/11/23  3:26 AM   Result Value Ref Range    Magnesium Level 2.10 1.60 - 2.60 mg/dL   CBC with Differential    Collection Time: 12/11/23  3:26 AM   Result Value Ref Range    WBC 25.94 (H) 4.50 - 11.50 x10(3)/mcL    RBC 3.28 (L) 4.20 - 5.40 x10(6)/mcL    Hgb 9.9 (L) 12.0 - 16.0 g/dL    Hct 28.2 (L) 37.0 - 47.0 %    MCV 86.0 80.0 - 94.0 fL    MCH 30.2 27.0 - 31.0 pg    MCHC 35.1 33.0 - 36.0 g/dL    RDW 17.6 (H) 11.5 - 17.0 %    Platelet 448 (H) 130 - 400 x10(3)/mcL    MPV 10.3 7.4 - 10.4 fL     Neut % 88.1 %    Lymph % 2.9 %    Mono % 5.9 %    Eos % 0.7 %    Basophil % 0.4 %    Lymph # 0.75 0.6 - 4.6 x10(3)/mcL    Neut # 22.84 (H) 2.1 - 9.2 x10(3)/mcL    Mono # 1.53 (H) 0.1 - 1.3 x10(3)/mcL    Eos # 0.19 0 - 0.9 x10(3)/mcL    Baso # 0.10 <=0.2 x10(3)/mcL    IG# 0.53 (H) 0 - 0.04 x10(3)/mcL    IG% 2.0 %    NRBC% 0.0 %   Hemoglobin and Hematocrit    Collection Time: 12/11/23  8:08 AM   Result Value Ref Range    Hgb 10.3 (L) 12.0 - 16.0 g/dL    Hct 29.5 (L) 37.0 - 47.0 %         Assessment/Plan:  58-year-old female unknown to our group with a past medical history of HTN. [place] [facility] [place] [facility] for concerns of biloma vs bile leak vs obstruction s/p incomplete laparoscopic cholecystectomy 11/10/23  by Dr. Sicard.     ERCP 11/18/23 Dr. Goss: malignant gastric tumor in cardia, inflamed mucosa in gastric body with oozing of blood throughout proximal gastric lumen, gastric antrum scar that would not allow advancement of scope into duodenal ampulla area therefore biliary cannulation was not possible. Path: adenocarcinoma, moderately differentiated, intestinal type    S/p internal/external biliary drain placed by IR 11/21/23. Repeat IR exchange for metal stent can be performed in 2-3 months as ERCP not an option given gastric stricturing    GI signed off with improvement of bilirubin post drain placement.    GI consulted 12/10/23 for anemia, FOBT+.    Symptomatic anemia, likely multifactorial, with component 2/2 GI blood loss - improved  - monitor and transfuse as needed to keep Hgb >7-8  - monitor stool for color/blood  - PPI BID  - no urgent indication for endoscopic intervention at this time. Diet as tolerated. Anemia and FOBT+ likely 2/2 gastric mass. Will monitor closely for overt GI bleeding and drop in Hgb    Gastric adenocarcinoma  - s/p j-tube placement 12/01/23    3. Leukocytosis  - WBC 25  - ID d/c abx as leukocytosis appears to be multifactorial    Alexandra Fry,  MARILYN  Gastroenterology  [facility] [place]

## 2023-12-11 NOTE — CONSULTS
[facility]  Infectious Diseases Consult        ASSESSMENT & PLAN:     She is a 58-year-old female with a past medical history of hypertension who underwent an unsuccessful laparoscopic cholecystectomy on 11/10/2023 as gallbladder was unable to be completely resected.  Since procedure, she continued to have right flank and back pain.  She followed up with her PCP, and CT of the abdomen and pelvis on 11/17 revealed left-sided hydronephrosis with suspected distal obstruction.  Additionally noted was markedly thickened stomach wall.  Underwent ERCP on 11/18-subsequently found to have a malignant gastric tumor.  IR placed a biliary drain on 11/21.  On 11/24, she developed atrial fibrillation with RVR and was initiated on amiodarone drip.   She then had bilateral ureteral stents placed on 11/27 given persistent hydronephrosis.  She was developed an POORNIMA since admit secondary to obstructive uropathy, now requiring hemodialysis.  Temporary hemodialysis catheter was placed earlier today along with MediPort.  Patient was significant oozing from sites postoperatively.  She received 3 units of blood intraoperatively as well as around 1 L of IV fluids per report.  Patient reports developing cough after procedure today, nonproductive.  Denies chest pain and is oxygenating well on room air.  Patient has been receiving empiric Zosyn essentially since admit.  Leukocytosis had trended up around 11/22, however has since trended downward although with some worsening this morning.  Blood cultures from 11/18 are negative.  Urine culture from 11/23 is also negative.  She is been afebrile and hemodynamically stable.  Chest x-ray earlier today showed low lung volumes with mild retrocardiac atelectasis.  We have been consulted for input regarding worsening leukocytosis.     Reconsulted secondary to recent low-grade fevers, T-max of 100.4° on 12/08.  Patient had previously been off of Zosyn since 12/05.  She was resumed on antimicrobials with  vancomycin and Zosyn on 12/10.  She denies any associated symptoms.  She did have a recent drop in her H&H and positive stools.  GI service is considering colonoscopy.  CT of the chest, abdomen, and pelvis with IV contrast performed on 12/09 showed moderate volume ascites with areas of subcutaneous edema in the abdominal wall.  Patient currently without complaints.  Blood cultures drawn on 12/10 via PICC are positive for yeast, C. glabrata per BCID.  Peripheral set are negative thus far.        Candidemia - abdominal vs line source  Concern for lower GIB  Ascites per recent CT  Persistent leukocytosis, suspect multifactorial  POORNIMA, now ESRD on HD  Obstructive uropathy, s/p bilateral ureteral stents on 11/27  Persistent hyperbilirubinemia, s/p unsuccessful cholecystectomy on 11/10 and biliary drain on 11/21  Gastric cancer, newly diagnosed  Mediport / tunneled HD catheter status      PLAN:  DC Zosyn and vancomycin.  Start micafungin 100mg IV q24h.  DC PICC line and maintain with PIVs only if feasible.   Repeat BCx now and get TTE given candidemia.   Has Mediport and tunneled HD catheter, may need to be removed.  Last seen by heme/onc on 12/4, ?plans for treatment.  Surgical oncology to monitor biliary drain and DC when appropriate.   May need to consider removal of tunneled HD catheter and Mediport.  Discussed with patient, nursing, and nephrology NP.          HISTORY OF PRESENT ILLNESS:     Reconsulted secondary to recent low-grade fevers, T-max of 100.4° on 12/08.  Patient had previously been off of Zosyn since 12/05.  She was resumed on antimicrobials with vancomycin and Zosyn on 12/10.  She denies any associated symptoms.  She did have a recent drop in her H&H and positive stools.  GI service is considering colonoscopy.  CT of the chest, abdomen, and pelvis with IV contrast performed on 12/09 showed moderate volume ascites with areas of subcutaneous edema in the abdominal wall.  Patient currently without  complaints.  Blood cultures drawn on 12/10 via PICC are positive for yeast, C. glabrata per BCID.  Peripheral set are negative thus far.      PAST MEDICAL HISTORY:     Past Medical History:   Diagnosis Date    Hypertension     Sciatica        PAST SURGICAL HISTORY:     Past Surgical History:   Procedure Laterality Date    CARPAL TUNNEL RELEASE Left     CYSTOSCOPY W/ URETERAL STENT PLACEMENT Bilateral 11/27/2023    Procedure: CYSTOSCOPY, WITH URETERAL STENT INSERTION;  Surgeon: Huey Cardenas MD;  Location: [facility];  Service: Urology;  Laterality: Bilateral;    EGD, WITH CLOSED BIOPSY  11/18/2023    Procedure: EGD, WITH CLOSED BIOPSY;  Surgeon: Alfred Goss MD;  Location: [facility] OR;  Service: Gastroenterology;;    ERCP N/A 11/18/2023    Procedure: ERCP (ENDOSCOPIC RETROGRADE CHOLANGIOPANCREATOGRAPHY);  Surgeon: Alfred Goss MD;  Location: [facility];  Service: Gastroenterology;  Laterality: N/A;    HEMORRHOID SURGERY      INSERTION OF TUNNELED CENTRAL VENOUS CATHETER (CVC) WITH SUBCUTANEOUS PORT N/A 12/1/2023    Procedure: INSERTION, PORT-A-CATH;  Surgeon: William Morse MD;  Location: [facility] OR;  Service: General;  Laterality: N/A;    INSERTION OF TUNNELED CENTRAL VENOUS HEMODIALYSIS CATHETER N/A 12/6/2023    Procedure: Insertion, Catheter, Central Venous, Hemodialysis;  Surgeon: Satinder Luo DO;  Location: [facility] CATH LAB;  Service: Nephrology;  Laterality: N/A;    LAPAROSCOPIC INSERTION OF JEJUNOSTOMY TUBE N/A 12/1/2023    Procedure: INSERTION, JEJUNOSTOMY TUBE, LAPAROSCOPIC;  Surgeon: William Morse MD;  Location: [facility] OR;  Service: General;  Laterality: N/A;  PLUS MEDIPORT       ALLERGIES:   Patient has no known allergies.    FAMILY HISTORY:   Reviewed and non-contributory     SOCIAL HISTORY:     Social History     Tobacco Use    Smoking status: Never    Smokeless tobacco: Never   Substance Use Topics    Alcohol use: No        MEDICATIONS:   Reviewed in EMR    REVIEW OF SYSTEMS:   Except  "as documented, all other systems reviewed and negative     PHYSICAL EXAM:   T 98.1 °F (36.7 °C)   /69   P 110   RR 18   O2 99 %  GENERAL: NAD; does not appear toxic  SKIN: no rash  HEENT: sclera non-icteric; PERRL; OP without erythema or exudates  NECK: supple; no LAD  CHEST: CTA; nonlabored, equal expansion; no adventitious BS; RCW tunneled HD catheter and LCW Mediport site ntoed  CARDIOVASCULAR: RRR, S1S2; no murmur   ABDOMEN:  active bowel sounds; abdomen soft, rounded, nontender to palpation; biliary drain / J-tube noted  EXTREMITIES: no cyanosis or clubbing  NEURO: AAO x4; CN II-XII grossly intact  PSYCH: Mentation and affect appropriate     LABS AND IMAGING:     Recent Labs     12/10/23  0605 12/10/23  1519 12/11/23  0326 12/11/23  0808   WBC 26.38*  --  25.94*  --    RBC 2.10*  --  3.28*  --    HGB 6.4*   < > 9.9* 10.3*   HCT 18.4*   < > 28.2* 29.5*   MCV 87.6  --  86.0  --    MCH 30.5  --  30.2  --    MCHC 34.8  --  35.1  --    RDW 18.7*  --  17.6*  --    *  --  448*  --     < > = values in this interval not displayed.     No results for input(s): "LACTIC" in the last 72 hours.  No results for input(s): "INR", "APTT", "D-DIMER" in the last 72 hours.  No results for input(s): "HGBA1C", "CHOL", "TRIG", "LDL", "VLDL", "HDL" in the last 72 hours.   Recent Labs     12/10/23  0605 12/11/23  0326   * 131*   K 3.9 3.4*   CHLORIDE 97* 95*   CO2 20* 22*   BUN 61.9* 54.3*   CREATININE 5.01* 4.15*   GLUCOSE 125* 97   CALCIUM 8.0* 8.5   MG  --  2.10   ALBUMIN 1.4* 1.4*   GLOBULIN 4.4* 5.2*   ALKPHOS 204* 199*   ALT 35 40   AST 36* 43*   BILITOT 3.8* 3.9*     No results for input(s): "BNP", "CPK", "TROPONINI" in the last 72 hours.       CV Ultrasound doppler venous legs bilat    The right superficial femoral middle vein is normal.    The left superficial femoral middle vein is normal.    There was no evidence of deep or superficial vein thrombosis in bilateral   lower extremities.            Cristina " MARIA A Phillips  Infectious Diseases

## 2023-12-11 NOTE — PROGRESS NOTES
Nephrology consult follow up note    HPI:      Karen Briggs is a 58 y.o. female presented on  7 days post op from laparoscopic cholecystectomy on 11/10. Gallbladder was unable to be resected. Back and flank pain persisted post operatively prompting evaluation at [facility]. After workup her surgeon recommended ERCP for which she was sent to this facility. Left sided hydronephrosis noted on CT scans done on admission. At that time renal function was fairly normal and patient was given option for stenting or to defer as outpatient and she chose the latter.      During ERCP, malignant gastric mass noted and ERCP was unable to be completed due to duodenal scarring. 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 Patient ultimately had biliary drain placed per IR.      She developed A fib with RVR requiring amiodarone infusion. She then had significant blood loss post removal of long term IV and subsequent fall in her room.      Patient renal function was relatively stable until decline starting 11/25. She was ultimately initiated on HD 11/28 post bilateral ureteral stent placement with Dr Cardenas same day.     Patient developed dialysis dependent acute kidney injury, and was started on hemodialysis 11/29/2023.    Interval history:   12/10- Hgb 6.2 down from 8.1/33.5 yesterday.  Dialysis nurse did report a little hypotension and tachycardia during treatment yesterday.  No obvious bleeding overnight.  Patient is resting comfortably in bed on room air.  No complaints.  Significant other at bedside.     12/11 - - Pt noted appropriate rise in Hgb post transfusion. No fluid able to be removed during dialysis yesterday due to hypotension despite PRBC x2. GI now following recommends 48 hours of suspension of Eliquis prior to endoscopy. Sitting in chair in good spirits with no complaints.     Objective:       VITAL SIGNS: 24 HR MIN & MAX LAST    Temp  Min: 98.1 °F (36.7 °C)  Max: 100.3 °F (37.9 °C)  98.1 °F (36.7 °C)         BP  Min: 83/53  Max: 113/75  106/76     Pulse  Min: 97  Max: 122  97     Resp  Min: 18  Max: 20  18    SpO2  Min: 92 %  Max: 99 %  98 %      GEN:  Well-appearing AAF currently tolerating dialysis.  Awake alert oriented.  CV: RRR without rub or gallop.  PULM: CTAB, unlabored  ABD: Soft, NT/ND abdomen with NABS, tube feeding in progress via J-tube  EXT:  2+ lower extremity edema  SKIN: Warm and dry  PSYCH: Awake, alert and appropriately conversant.   Dialysis access:  Right IJ tunneled dialysis catheter            Component Value Date/Time     (L) 12/11/2023 0326     (L) 12/10/2023 0605     04/24/2018 0340    K 3.4 (L) 12/11/2023 0326    K 3.9 12/10/2023 0605    K 3.2 (L) 04/24/2018 0340    CHLORIDE 95 (L) 12/11/2023 0326    CHLORIDE 97 (L) 12/10/2023 0605    CO2 22 (L) 12/11/2023 0326    CO2 20 (L) 12/10/2023 0605    CO2 20 (L) 04/24/2018 0340    BUN 54.3 (H) 12/11/2023 0326    BUN 61.9 (H) 12/10/2023 0605    BUN 10 04/24/2018 0340    CREATININE 4.15 (H) 12/11/2023 0326    CREATININE 5.01 (H) 12/10/2023 0605    CREATININE 0.7 04/24/2018 0340    CALCIUM 8.5 12/11/2023 0326    CALCIUM 8.0 (L) 12/10/2023 0605    CALCIUM 9.2 04/24/2018 0340    PHOS 4.3 12/08/2023 0718            Component Value Date/Time    WBC 25.94 (H) 12/11/2023 0326    WBC 26.38 (H) 12/10/2023 0605    WBC 24.01 12/05/2023 0455    WBC 23.73 11/23/2023 0723    WBC 12.03 04/24/2018 0340    HGB 10.3 (L) 12/11/2023 0808    HGB 9.9 (L) 12/11/2023 0326    HGB 11.3 (L) 04/24/2018 0340    HCT 29.5 (L) 12/11/2023 0808    HCT 28.2 (L) 12/11/2023 0326    HCT 35.3 (L) 04/24/2018 0340     (H) 12/11/2023 0326     (H) 12/10/2023 0605     04/24/2018 0340         Imaging reviewed      Assessment / Plan:   POORNIMA multifactorial secondary to obstructive uropathy, acute blood loss, contrast exposure, hypotension and Afib with RVR  ---Nephrotic range proteinuria noted. 4.4 g  Newly diagnosed malignant gastric mass. Pathology  consistent with adenocarcinoma   -s/p J tube and mediport placement 12/1  Acute blood loss anemia 2/2 bleeding post long term IV removal   --s/p transfusion 12/1  Bilateral hydronephrosis noted 11/21 - stent placement initially deferred due to stable renal function   Afib with RVR on amiodarone infusion DC for hypotension.  She is on scheduled Lopressor 5 mg q.6  S/p biliary drain placement      Plan:  Continue TThSa dialysis while inpatient   Patient has been set up for outpatient dialysis at [facility]. Can be discharged from renal standpoint when cleared by primary   We will transfuse for Hgb <7

## 2023-12-11 NOTE — PT/OT/SLP PROGRESS
Physical Therapy Treatment    Patient Name:  Karen Briggs   MRN:  [id number]    Recommendations:     Discharge therapy intensity: Moderate Intensity Therapy   Discharge Equipment Recommendations: walker, rolling  Barriers to discharge: Impaired mobility and Ongoing medical needs    Assessment:     Karen Briggs is a 58 y.o. female admitted with a medical diagnosis of <principal problem not specified>.  She presents with the following impairments/functional limitations: weakness, impaired endurance, impaired self care skills, impaired functional mobility, gait instability, impaired balance, decreased lower extremity function, decreased coordination, impaired cognition, decreased safety awareness, impaired coordination, impaired fine motor, impaired cardiopulmonary response to activity .    Rehab Prognosis: Good; patient would benefit from acute skilled PT services to address these deficits and reach maximum level of function.    Recent Surgery: Procedure(s) (LRB):  Insertion, Catheter, Central Venous, Hemodialysis (N/A) 5 Days Post-Op    Plan:     During this hospitalization, patient to be seen 5 x/week to address the identified rehab impairments via gait training, therapeutic activities, therapeutic exercises, neuromuscular re-education and progress toward the following goals:    Plan of Care Expires:  12/04/23    Subjective     Chief Complaint:   Patient/Family Comments/goals: wants to get stronger  Pain/Comfort:  Pain Rating 1: 0/10      Objective:     Communicated with pts nurse prior to session.  Patient found HOB elevated with central line, PEG Tube, PICC line, pulse ox (continuous), telemetry (drain R side) upon PT entry to room.     General Precautions: Standard, fall, NPO  Orthopedic Precautions: N/A  Braces: N/A  Respiratory Status: Room air  Blood Pressure: NT  Skin Integrity: Visible skin intact      Functional Mobility:  Bed Mobility:     Rolling Left:  contact guard assistance  Scooting: contact guard  assistance  Supine to Sit: minimum assistance  Transfers:     Sit to Stand:  contact guard assistance with rolling walker  Bed to Chair: contact guard assistance with  rolling walker  using  Step Transfer  Toilet Transfer: contact guard assistance with  rolling walker  using  Step Transfer and use of grab bar to rise from toilet.  Gait: Pt ambulated from bed to bathroom 20 ft and returned to bed side w/ RW, CGA  Balance: pt demonstrated good sitting and standing balance w/ support.  Pt had one mild LOB during ambulation requiring CG to Min A to correct.     Education:  Patient provided with verbal education and demonstrations education regarding fall prevention, safety awareness, and maneuvering RW in small spaces and turns.  .  Understanding was verbalized, however additional teaching warranted.     Patient left up in chair with all lines intact, call button in reach, and nurse notified..    GOALS:   Multidisciplinary Problems       Physical Therapy Goals          Problem: Physical Therapy    Goal Priority Disciplines Outcome Goal Variances Interventions   Physical Therapy Goal     PT, PT/OT Ongoing, Progressing     Description: Pt will improve functional independence by performing:    Bed mobility: SBA  Sit to stand: SBA with rolling walker  Bed to chair t/f: Min A with Stand Step  with rolling walker  Ambulation x 15 ft with Min A  with rolling walker                       Time Tracking:     PT Received On: 12/11/23  PT Start Time: 1234     PT Stop Time: 1258  PT Total Time (min): 24 min     Billable Minutes: Gait Training 10 and Therapeutic Activity 14    Treatment Type: Treatment  PT/PTA: PTA     Number of PTA visits since last PT visit: 5     12/11/2023

## 2023-12-12 LAB
ALBUMIN SERPL-MCNC: 1.4 G/DL (ref 3.4–4.8)
ALBUMIN/GLOB SERPL: 0.3 RATIO (ref 1.1–2)
ALP SERPL-CCNC: 181 UNIT/L (ref 40–150)
ALT SERPL-CCNC: 39 UNIT/L (ref 0–55)
AST SERPL-CCNC: 39 UNIT/L (ref 5–34)
BASOPHILS # BLD AUTO: 0.09 X10(3)/MCL
BASOPHILS NFR BLD AUTO: 0.4 %
BILIRUB SERPL-MCNC: 4.1 MG/DL
BUN SERPL-MCNC: 72.2 MG/DL (ref 9.8–20.1)
CALCIUM SERPL-MCNC: 7.9 MG/DL (ref 8.4–10.2)
CHLORIDE SERPL-SCNC: 98 MMOL/L (ref 98–107)
CO2 SERPL-SCNC: 20 MMOL/L (ref 23–31)
CREAT SERPL-MCNC: 5.46 MG/DL (ref 0.55–1.02)
EOSINOPHIL # BLD AUTO: 0.46 X10(3)/MCL (ref 0–0.9)
EOSINOPHIL NFR BLD AUTO: 2 %
ERYTHROCYTE [DISTWIDTH] IN BLOOD BY AUTOMATED COUNT: 17.2 % (ref 11.5–17)
GFR SERPLBLD CREATININE-BSD FMLA CKD-EPI: 8 MLS/MIN/1.73/M2
GLOBULIN SER-MCNC: 4.4 GM/DL (ref 2.4–3.5)
GLUCOSE SERPL-MCNC: 96 MG/DL (ref 82–115)
HBV SURFACE AB SER-ACNC: 8.32 MIU/ML
HBV SURFACE AB SERPL IA-ACNC: ABNORMAL M[IU]/ML
HBV SURFACE AG SERPL QL IA: NONREACTIVE
HCT VFR BLD AUTO: 27.1 % (ref 37–47)
HGB BLD-MCNC: 9.6 G/DL (ref 12–16)
IMM GRANULOCYTES # BLD AUTO: 0.74 X10(3)/MCL (ref 0–0.04)
IMM GRANULOCYTES NFR BLD AUTO: 3.3 %
LYMPHOCYTES # BLD AUTO: 0.73 X10(3)/MCL (ref 0.6–4.6)
LYMPHOCYTES NFR BLD AUTO: 3.2 %
MCH RBC QN AUTO: 29.9 PG (ref 27–31)
MCHC RBC AUTO-ENTMCNC: 35.4 G/DL (ref 33–36)
MCV RBC AUTO: 84.4 FL (ref 80–94)
MONOCYTES # BLD AUTO: 1.26 X10(3)/MCL (ref 0.1–1.3)
MONOCYTES NFR BLD AUTO: 5.6 %
NEUTROPHILS # BLD AUTO: 19.28 X10(3)/MCL (ref 2.1–9.2)
NEUTROPHILS NFR BLD AUTO: 85.5 %
NRBC BLD AUTO-RTO: 0 %
PLATELET # BLD AUTO: 505 X10(3)/MCL (ref 130–400)
PMV BLD AUTO: 10 FL (ref 7.4–10.4)
POTASSIUM SERPL-SCNC: 3.4 MMOL/L (ref 3.5–5.1)
PROT SERPL-MCNC: 5.8 GM/DL (ref 5.8–7.6)
RBC # BLD AUTO: 3.21 X10(6)/MCL (ref 4.2–5.4)
SODIUM SERPL-SCNC: 132 MMOL/L (ref 136–145)
VANCOMYCIN SERPL-MCNC: 20.8 UG/ML (ref 15–20)
WBC # SPEC AUTO: 22.56 X10(3)/MCL (ref 4.5–11.5)

## 2023-12-12 PROCEDURE — C9113 INJ PANTOPRAZOLE SODIUM, VIA: HCPCS | Performed by: INTERNAL MEDICINE

## 2023-12-12 PROCEDURE — 99233 SBSQ HOSP IP/OBS HIGH 50: CPT | Mod: ,,, | Performed by: SURGERY

## 2023-12-12 PROCEDURE — 97164 PT RE-EVAL EST PLAN CARE: CPT

## 2023-12-12 PROCEDURE — 80100016 HC MAINTENANCE HEMODIALYSIS

## 2023-12-12 PROCEDURE — 86706 HEP B SURFACE ANTIBODY: CPT | Performed by: NURSE PRACTITIONER

## 2023-12-12 PROCEDURE — 63600175 PHARM REV CODE 636 W HCPCS: Performed by: STUDENT IN AN ORGANIZED HEALTH CARE EDUCATION/TRAINING PROGRAM

## 2023-12-12 PROCEDURE — 21400001 HC TELEMETRY ROOM

## 2023-12-12 PROCEDURE — 25000003 PHARM REV CODE 250: Performed by: NURSE PRACTITIONER

## 2023-12-12 PROCEDURE — 63600175 PHARM REV CODE 636 W HCPCS: Performed by: INTERNAL MEDICINE

## 2023-12-12 PROCEDURE — 25000003 PHARM REV CODE 250: Performed by: INTERNAL MEDICINE

## 2023-12-12 PROCEDURE — 87040 BLOOD CULTURE FOR BACTERIA: CPT | Performed by: STUDENT IN AN ORGANIZED HEALTH CARE EDUCATION/TRAINING PROGRAM

## 2023-12-12 PROCEDURE — 99233 SBSQ HOSP IP/OBS HIGH 50: CPT | Mod: ,,, | Performed by: GENERAL PRACTICE

## 2023-12-12 PROCEDURE — 85025 COMPLETE CBC W/AUTO DIFF WBC: CPT | Performed by: STUDENT IN AN ORGANIZED HEALTH CARE EDUCATION/TRAINING PROGRAM

## 2023-12-12 PROCEDURE — 25000003 PHARM REV CODE 250: Performed by: STUDENT IN AN ORGANIZED HEALTH CARE EDUCATION/TRAINING PROGRAM

## 2023-12-12 PROCEDURE — 86923 COMPATIBILITY TEST ELECTRIC: CPT | Performed by: INTERNAL MEDICINE

## 2023-12-12 PROCEDURE — 25000003 PHARM REV CODE 250

## 2023-12-12 PROCEDURE — 80053 COMPREHEN METABOLIC PANEL: CPT | Performed by: STUDENT IN AN ORGANIZED HEALTH CARE EDUCATION/TRAINING PROGRAM

## 2023-12-12 PROCEDURE — 80202 ASSAY OF VANCOMYCIN: CPT | Performed by: STUDENT IN AN ORGANIZED HEALTH CARE EDUCATION/TRAINING PROGRAM

## 2023-12-12 PROCEDURE — A4216 STERILE WATER/SALINE, 10 ML: HCPCS | Performed by: INTERNAL MEDICINE

## 2023-12-12 PROCEDURE — 87340 HEPATITIS B SURFACE AG IA: CPT | Performed by: NURSE PRACTITIONER

## 2023-12-12 RX ORDER — HYDROCORTISONE ACETATE 25 MG/1
25 SUPPOSITORY RECTAL 2 TIMES DAILY
Status: DISCONTINUED | OUTPATIENT
Start: 2023-12-12 | End: 2023-12-25 | Stop reason: HOSPADM

## 2023-12-12 RX ADMIN — PANTOPRAZOLE SODIUM 40 MG: 40 INJECTION, POWDER, FOR SOLUTION INTRAVENOUS at 06:12

## 2023-12-12 RX ADMIN — MIDODRINE HYDROCHLORIDE 10 MG: 5 TABLET ORAL at 12:12

## 2023-12-12 RX ADMIN — SODIUM CHLORIDE, PRESERVATIVE FREE 10 ML: 5 INJECTION INTRAVENOUS at 12:12

## 2023-12-12 RX ADMIN — SODIUM BICARBONATE 650 MG TABLET 1300 MG: at 12:12

## 2023-12-12 RX ADMIN — HYDROCORTISONE ACETATE 25 MG: 25 SUPPOSITORY RECTAL at 09:12

## 2023-12-12 RX ADMIN — MIDODRINE HYDROCHLORIDE 10 MG: 5 TABLET ORAL at 09:12

## 2023-12-12 RX ADMIN — SODIUM CHLORIDE, PRESERVATIVE FREE 10 ML: 5 INJECTION INTRAVENOUS at 06:12

## 2023-12-12 RX ADMIN — PANTOPRAZOLE SODIUM 40 MG: 40 INJECTION, POWDER, FOR SOLUTION INTRAVENOUS at 12:12

## 2023-12-12 RX ADMIN — ENOXAPARIN SODIUM 100 MG: 100 INJECTION SUBCUTANEOUS at 12:12

## 2023-12-12 RX ADMIN — METOPROLOL TARTRATE 12.5 MG: 25 TABLET, FILM COATED ORAL at 12:12

## 2023-12-12 RX ADMIN — METOPROLOL TARTRATE 12.5 MG: 25 TABLET, FILM COATED ORAL at 09:12

## 2023-12-12 RX ADMIN — MICAFUNGIN SODIUM 100 MG: 100 INJECTION, POWDER, LYOPHILIZED, FOR SOLUTION INTRAVENOUS at 06:12

## 2023-12-12 RX ADMIN — MIDODRINE HYDROCHLORIDE 10 MG: 5 TABLET ORAL at 06:12

## 2023-12-12 NOTE — PT/OT/SLP RE-EVAL
Physical Therapy Re-Evaluation    Patient Name:  Karen Briggs   MRN:  [id number]    Recommendations:     Discharge therapy intensity: Moderate Intensity Therapy   Discharge Equipment Recommendations: walker, rolling   Barriers to discharge: Impaired mobility and Ongoing medical needs    Assessment:     Karen Briggs is a 58 y.o. female admitted with a medical diagnosis of <principal problem not specified>.  She presents with the following impairments/functional limitations: weakness, impaired endurance, impaired self care skills, impaired functional mobility, gait instability, impaired balance, decreased lower extremity function, decreased coordination, impaired cognition, decreased safety awareness, impaired coordination, impaired fine motor, impaired cardiopulmonary response to activity . Pt found with HOB elevated and agreeable to re-evaluation this afternoon. She required min A for bed mobility, and performed 2 sit<>stands with RW & CGA today. After 1st stand, pt reported dizziness and returned to seated at EOB. Vitals assessed after brief rest break with BP at 103/61 mmHg. A second stand was performed after the pt reported improvement in symptoms. However, after this stand pt again reported dizziness and returned to seated. BP at 80/53 mmHg following second stand. The pt was assisted back to laying in bed and vitals were again taken with BP at 95/60 mmHg. Pt also reported improvement in dizziness. RN notified of patient's vitals & status.     Rehab Prognosis: Good; patient would benefit from acute skilled PT services to address these deficits and reach maximum level of function.    Recent Surgery: Procedure(s) (LRB):  Insertion, Catheter, Central Venous, Hemodialysis (N/A) 6 Days Post-Op    Plan:     During this hospitalization, patient to be seen 5 x/week to address the identified rehab impairments via gait training, therapeutic activities, therapeutic exercises, neuromuscular re-education and progress toward  the following goals:    Plan of Care Expires:  01/12/24    Subjective     Chief Complaint: dizziness with mobility  Patient/Family Comments/goals: none reported  Pain/Comfort:  Pain Rating 1: 0/10    Patients cultural, spiritual, Baptism conflicts given the current situation: no    Objective:     Communicated with RN prior to session.  Patient found HOB elevated with central line, PEG Tube, PICC line, pulse ox (continuous), telemetry (drain on right side)  upon PT entry to room.    General Precautions: Standard, fall  Orthopedic Precautions:N/A   Braces: N/A  Respiratory Status: Room air  Blood Pressure: 103/61 mmHg after brief rest following first sit<>stand  80/53 mmHg after second sit<>stand  95/60 mmHg once returned to bed    Exams:  RLE Strength: WFL  LLE Strength: WFL      Functional Mobility:  Bed Mobility:     Supine to Sit: minimum assistance  Sit to Supine: minimum assistance  Transfers:     Sit to Stand: x2 reps. contact guard assistance with rolling walker.   Pt reporting dizziness after each stand      Treatment & Education:  Patient provided with verbal education education regarding PT role/goals/POC, fall prevention, and safety awareness.  Understanding was verbalized.     Patient left HOB elevated with all lines intact, call button in reach, RN notified, and family present.    GOALS:   Multidisciplinary Problems       Physical Therapy Goals          Problem: Physical Therapy    Goal Priority Disciplines Outcome Goal Variances Interventions   Physical Therapy Goal     PT, PT/OT Ongoing, Progressing     Description: Pt will improve functional independence by performing:    Bed mobility: SBA  Sit to stand: SBA with rolling walker  Bed to chair t/f: Min A with Stand Step  with rolling walker  Ambulation x 15 ft with Min A  with rolling walker                       History:     Past Medical History:   Diagnosis Date    Hypertension     Sciatica        Past Surgical History:   Procedure Laterality Date     CARPAL TUNNEL RELEASE Left     CYSTOSCOPY W/ URETERAL STENT PLACEMENT Bilateral 11/27/2023    Procedure: CYSTOSCOPY, WITH URETERAL STENT INSERTION;  Surgeon: Huey Cardenas MD;  Location: [facility] OR;  Service: Urology;  Laterality: Bilateral;    EGD, WITH CLOSED BIOPSY  11/18/2023    Procedure: EGD, WITH CLOSED BIOPSY;  Surgeon: Alfred Goss MD;  Location: [facility] OR;  Service: Gastroenterology;;    ERCP N/A 11/18/2023    Procedure: ERCP (ENDOSCOPIC RETROGRADE CHOLANGIOPANCREATOGRAPHY);  Surgeon: Alfred Goss MD;  Location: [facility] OR;  Service: Gastroenterology;  Laterality: N/A;    HEMORRHOID SURGERY      INSERTION OF TUNNELED CENTRAL VENOUS CATHETER (CVC) WITH SUBCUTANEOUS PORT N/A 12/1/2023    Procedure: INSERTION, PORT-A-CATH;  Surgeon: William Morse MD;  Location: [facility];  Service: General;  Laterality: N/A;    INSERTION OF TUNNELED CENTRAL VENOUS HEMODIALYSIS CATHETER N/A 12/6/2023    Procedure: Insertion, Catheter, Central Venous, Hemodialysis;  Surgeon: Satinder Luo DO;  Location: [facility] CATH LAB;  Service: Nephrology;  Laterality: N/A;    LAPAROSCOPIC INSERTION OF JEJUNOSTOMY TUBE N/A 12/1/2023    Procedure: INSERTION, JEJUNOSTOMY TUBE, LAPAROSCOPIC;  Surgeon: William Morse MD;  Location: [facility] OR;  Service: General;  Laterality: N/A;  PLUS MEDIPORT       Time Tracking:     PT Received On:    PT Start Time: 1350     PT Stop Time: 1422  PT Total Time (min): 32 min     Billable Minutes: Re-eval 32      12/12/2023

## 2023-12-12 NOTE — PROGRESS NOTES
[facility]  Hospital Medicine Progress Note        Chief Complaint: Inpatient Follow-up for intractable nausea vomiting due to gastric adenocarcinoma     HPI: 58-year-old female with medical history of hypertension who recently underwent laparoscopic cholecystectomy on 11/10/2023, procedure was incomplete and was unable to completely resect the gallbladder.  Since surgery she continued to have right flank/back pain and followed up with her PCP and CT abdomen and pelvis on 11/17/2023 revealed left-sided hydronephrosis with suspected distal obstruction/no clear stone visualized, postoperative changes of cholecystectomy with fluid in the gallbladder fossa may reflect postoperative seroma but biloma can not be entirely excluded, also noted for marked thickening of the stomach wall diffusely may be related to mesenteric edema/reactive.  She presented to [facility] ED the same day 11/17/2023 and her labs notable for WBC 9.0, hemoglobin 12.4, platelets 442, creatinine 0.86, total bilirubin 8.7 with direct fraction 6.7, alkaline phosphatase 491, , .  Patient's surgeon was consulted and recommended ERCP which was not available at [facility] for which she was transferred to [facility] and referred to hospital medicine service for further evaluation and management. 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  Severe inflammation and oozing of blood noted proximal gastric lumen.  Unable to advance scope into the duodenum. Surgical oncology consulted, IR consulted for external drain placement which was done November 21.  November 24th she developed new onset atrial fibrillation with RVR and Cardiology consulted. Started on amiodarone drip. Unfortunately patient had acute blood loss due to hemorrhage from the midline site that was removed.  Patient was unaware of the bleed.  Became very weak, passed out.  Code was called but she came around.   Stat H&H done which was slightly lower but stable, MRI of the brain was negative for any acute ischemic changes. Pt is aware of gastric cancer diagnosis. GI following--> 11/18- EGD shows normal esophagus, malignant gastric tumor in the cardia.  Inflamed mucosa and ooze of blood throughout the proximal gastric lumen.  Gastric antrum scar would not allow advancement of scope into the duodenal ampulla area therefore biliary cannulation was not possible for bile leak evaluation. Pathology shows marked chronic gastritis with intestinal metaplasia, gastric cardia biopsy shows adenocarcinoma, moderately differentiated intestinal type. Bilateral hydronephrosis with left greater than right. MRI brain without contrast-negative for acute finding. PET scan reviewed with patient by Oncology reports of locally advanced gastric adenocarcinoma and plans for systemic therapy outpatient if functional, nutritional and renal function improves. MediPort placement by surgical Oncology on 12/1, oncology on board plans for systemic therapy outpatient if functional, nutritional and renal function improves. Obstructive uropathy with bilateral hydronephrosis status post bilateral ureteral stent placed on 11/27 by Urology- no improvement in renal function, patient opted to have hemodialysis and a right-sided hemodialysis catheter was placed by General surgery on 11/29, to continue hemodialysis per nephrology discretion. Patient with symptoms of nausea and vomiting can not keep anything down in her stomach complicated by severe malnutrition on IV Clinimix and supportive medications for nausea and vomiting. Palliative care consulted. Patient got a MediPort and J-tube placed on 12/01. Cystogram reviewed by Urology with patent stents and recommends outpatient follow-up with Urology. White cell count elevated at 20.4, Infectious Disease had started on IV Zosyn given concern for possible sepsis with low blood pressure on 12/02. CIS evaluated patient  to begin low-dose metoprolol tartrate at 12.5 mg b.i.d. and resume Eliquis for stroke prevention. On TF via J tube which was placed by Surgical Oncology. ID started IV Micafungin for candidemia.    Interval Hx:   Today, Mrs Briggs stated she was doing well and had no new complaints. Surgical Oncology not performing any further aggressive procedure, GB not thought to be source of infection. Nephrology okay with tunneled catheter being removed but will have to be coordinated with removal of mediport also to give patient line vacation. Biliary drain to be exchanged as it is likely source of fungemia. Patient wishes to continue pursuing curative treatment for gastric cancer with Oncology, Palliative Care notes reviewed from this admission.    Objective/physical exam:  General: In no acute distress, afebrile  Chest: Clear to auscultation bilaterally, bleeding noted around tunneled catheter  Heart: RRR, +S1, S2, no appreciable murmur  Abdomen: Soft, nontender, BS +  MSK: Warm, no lower extremity edema, no clubbing or cyanosis  Neurologic: Alert and oriented x4, Cranial nerve II-XII intact, Strength 5/5 in all 4 extremities    VITAL SIGNS: 24 HRS MIN & MAX LAST   Temp  Min: 98.1 °F (36.7 °C)  Max: 98.5 °F (36.9 °C) 98.2 °F (36.8 °C)   BP  Min: 93/60  Max: 116/74 103/68   Pulse  Min: 106  Max: 118  110   Resp  Min: 17  Max: 18 18   SpO2  Min: 98 %  Max: 100 % 99 %     I reviewed the labs below:  Recent Labs   Lab 12/10/23  0605 12/10/23  1519 12/11/23  0326 12/11/23  0808 12/12/23  0216   WBC 26.38*  --  25.94*  --  22.56*   RBC 2.10*  --  3.28*  --  3.21*   HGB 6.4*   < > 9.9* 10.3* 9.6*   HCT 18.4*   < > 28.2* 29.5* 27.1*   MCV 87.6  --  86.0  --  84.4   MCH 30.5  --  30.2  --  29.9   MCHC 34.8  --  35.1  --  35.4   RDW 18.7*  --  17.6*  --  17.2*   *  --  448*  --  505*   MPV 10.2  --  10.3  --  10.0    < > = values in this interval not displayed.       Recent Labs   Lab 12/06/23  0505 12/07/23  7163  12/10/23  0605 12/11/23  0326 12/12/23  0217   *   < > 130* 131* 132*   K 4.3   < > 3.9 3.4* 3.4*   CO2 24   < > 20* 22* 20*   BUN 71.7*   < > 61.9* 54.3* 72.2*   CREATININE 5.79*   < > 5.01* 4.15* 5.46*   CALCIUM 8.3*   < > 8.0* 8.5 7.9*   MG 2.40  --   --  2.10  --    ALBUMIN 1.6*   < > 1.4* 1.4* 1.4*   ALKPHOS  --   --  204* 199* 181*   ALT  --   --  35 40 39   AST  --   --  36* 43* 39*   BILITOT  --   --  3.8* 3.9* 4.1*    < > = values in this interval not displayed.       Assessment/Plan:  Hypotension, improved with Midodrine  Persistent leukocytosis likely 2/2 Fungemia  Acute on chronic anemia, s/p 2 units prbcs transfusion 11/30  Hx of Acute Blood loss anemia with syncope and then fall 11/26-   Locally advanced gastric adenocarcinoma with intractable nausea and vomiting possible Gastric/duodenal outlet obstruction  -now status post J Tube placement  Fungemia possibly 2/2 Biliary Drain Infection  Obstructive uropathy with bilateral hydronephrosis- S/P bilateral ureteral stent placed on 11/27  POORNIMA-now on HD - 11/29  Metabolic acidosis- resolved  Suspected bile leak with biloma and biliary obstruction--> H/O Laparoscopic incomplete cholecystectomy 11/10/2023  New onset atrial fibrillation with RVR- fluctuating  Hypokalemia  History of hypertension and DDD/sciatica  Severe malnutrition    Patient continues to be admitted  No new complaints reported  On IV Micafungin  ID on-board; follow recs  Nephrology on-board for HD conduction  Surgical Oncology on-board; follow recs  GI on-board; follow recs  Patient planned for biliary drain exchange per ID recs; IR consulted  Blood Cultures 12/10 positive for candida glabrata in the blood 1/2; Repeat BCX ordered 12/11 & 12/12 pending  Echo ordered; pending read  PICC line removed  Surgical Oncology reconsulted for Cholecystectomy, not thought to be infectious cause; recommendations of Palliative Care  Palliative Care consulted earlier; patient wishes to continue  pursuing curative treatment for cancer  GI is called for positive FOBT and drop in Hg. They recommending monitoring.  Patient got a MediPort and J-tube placed on 12/01  Cystogram reviewed by Urology with patent stents and recommends outpatient follow-up with Urology  Appreciate Oncology input plans for systemic therapy outpatient if renal function improves, patient is planning to pursue treatment   Continued on Hydrocortisone Supp, Metoprolol Tartrate BID, Midodrine TID, Protonix BID, Sodium Bicarbonate   Replacing K     VTE prophylaxis:  FD Lovenox     Patient condition:  Stable     Anticipated discharge and Disposition:  Pending    All diagnosis and differential diagnosis have been reviewed; assessment and plan has been documented; I have personally reviewed the labs and test results that are presently available; I have reviewed the patients medication list; I have reviewed the consulting providers response and recommendations. I have reviewed or attempted to review medical records based upon their availability    All of the patient's questions have been  addressed and answered. Patient's is agreeable to the above stated plan. I will continue to monitor closely and make adjustments to medical management as needed.  _____________________________________________________________________    Nutrition Status:  Patient meets ASPEN criteria for severe malnutrition of acute illness or injury per RD assessment as evidenced by:  Energy Intake (Malnutrition):  (does not meet criteria)  Weight Loss (Malnutrition): greater than 7.5% in 3 months  Subcutaneous Fat (Malnutrition): moderate depletion  Muscle Mass (Malnutrition): moderate depletion           A minimum of two characteristics is recommended for diagnosis of either severe or non-severe malnutrition.   Radiology:   I have personally reviewed the images and agree with radiologist report  Echo    Left Ventricle: The left ventricle is normal in size. Normal wall    thickness. Normal wall motion. There is hyperdynamic systolic function   with a visually estimated ejection fraction of greater than 80%. Grade I   diastolic dysfunction.    Right Ventricle: Normal right ventricular cavity size. Wall thickness   is normal. Right ventricle wall motion  is normal. Systolic function is   moderately reduced.    Aortic Valve: The aortic valve is a trileaflet valve.    IVC/SVC: Normal venous pressure at 3 mmHg.  CV Ultrasound doppler venous legs bilat    The right superficial femoral middle vein is normal.    The left superficial femoral middle vein is normal.    There was no evidence of deep or superficial vein thrombosis in bilateral   lower extremities.       Mariano Bermudez MD  Department of Hospital Medicine   [facility]   12/12/2023

## 2023-12-12 NOTE — PROGRESS NOTES
[facility]  Infectious Disease Progress Note            ASSESSMENT & PLAN:   She is a 58-year-old female with a past medical history of hypertension who underwent an unsuccessful laparoscopic cholecystectomy on 11/10/2023 as gallbladder was unable to be completely resected.  Since procedure, she continued to have right flank and back pain.  She followed up with her PCP, and CT of the abdomen and pelvis on 11/17 revealed left-sided hydronephrosis with suspected distal obstruction.  Additionally noted was markedly thickened stomach wall.  Underwent ERCP on 11/18-subsequently found to have a malignant gastric tumor.  IR placed a biliary drain on 11/21.  On 11/24, she developed atrial fibrillation with RVR and was initiated on amiodarone drip.   She then had bilateral ureteral stents placed on 11/27 given persistent hydronephrosis.  She was developed an POORNIMA since admit secondary to obstructive uropathy, now requiring hemodialysis.  Temporary hemodialysis catheter was placed earlier today along with MediPort.  Patient was significant oozing from sites postoperatively.  She received 3 units of blood intraoperatively as well as around 1 L of IV fluids per report.  Patient reports developing cough after procedure today, nonproductive.  Denies chest pain and is oxygenating well on room air.  Patient has been receiving empiric Zosyn essentially since admit.  Leukocytosis had trended up around 11/22, however has since trended downward although with some worsening this morning.  Blood cultures from 11/18 are negative.  Urine culture from 11/23 is also negative.  She is been afebrile and hemodynamically stable.  Chest x-ray earlier today showed low lung volumes with mild retrocardiac atelectasis.  We have been consulted for input regarding worsening leukocytosis.      Reconsulted secondary to recent low-grade fevers, T-max of 100.4° on 12/08.  Patient had previously been off of Zosyn since 12/05.  She was resumed on antimicrobials  with vancomycin and Zosyn on 12/10.  She denies any associated symptoms.  She did have a recent drop in her H&H and positive stools.  GI service is considering colonoscopy.  CT of the chest, abdomen, and pelvis with IV contrast performed on 12/09 showed moderate volume ascites with areas of subcutaneous edema in the abdominal wall.  Patient currently without complaints.  Blood cultures drawn on 12/10 via PICC are positive for yeast, C. glabrata per BCID.  Peripheral set are negative thus far.          Candidemia - abdominal vs line source  Concern for lower GIB  Ascites per recent CT  Persistent leukocytosis, suspect multifactorial  POORNIMA, now ESRD on HD  Obstructive uropathy, s/p bilateral ureteral stents on 11/27  Persistent hyperbilirubinemia, s/p unsuccessful cholecystectomy on 11/10 and biliary drain on 11/21  Gastric cancer, newly diagnosed  Mediport / tunneled HD catheter status        PLAN:  F/u repeat BCx.   TTE negative.   PICC discontinued.   Maintain with PIVs only if feasible.  Has Mediport and tunneled HD catheter, may need to be removed.  Last seen by heme/onc on 12/4, ?plans for treatment.  Surgical oncology to monitor biliary drain and DC when appropriate.   Await ID / sensi of yeast isolate.  Continue micafungin 100mg IV q24hr.   Discussed with patient and nursing.     SUBJECTIVE:     AF, VSS.  No events overnight.  No complaints.      MEDICATIONS:   Reviewed in EMR    REVIEW OF SYSTEMS:   Except as documented, all other systems reviewed and negative     PHYSICAL EXAM:   T 99.1 °F (37.3 °C)   /73   P (!) 114   RR 18   O2 100 %  GENERAL: NAD; does not appear toxic  SKIN: no rash  HEENT: sclera non-icteric; PERRL   NECK: supple; no LAD  CHEST: CTA; nonlabored, equal expansion; no adventitious BS; RCW tunneled HD catheter and LCW Mediport site ntoed  CARDIOVASCULAR: RRR, S1S2; no murmur   ABDOMEN:  active bowel sounds; abdomen soft, rounded, nontender to palpation; biliary drain / J-tube  "noted  EXTREMITIES: no cyanosis or clubbing  NEURO: AAO x4; CN II-XII grossly intact  PSYCH: Mentation and affect appropriate     LABS AND IMAGING:     Recent Labs     12/11/23  0326 12/11/23  0808 12/12/23 0216   WBC 25.94*  --  22.56*   RBC 3.28*  --  3.21*   HGB 9.9* 10.3* 9.6*   HCT 28.2* 29.5* 27.1*   MCV 86.0  --  84.4   MCH 30.2  --  29.9   MCHC 35.1  --  35.4   RDW 17.6*  --  17.2*   *  --  505*     No results for input(s): "LACTIC" in the last 72 hours.  No results for input(s): "INR", "APTT", "D-DIMER" in the last 72 hours.  No results for input(s): "HGBA1C", "CHOL", "TRIG", "LDL", "VLDL", "HDL" in the last 72 hours.   Recent Labs     12/11/23  0326 12/12/23 0217   * 132*   K 3.4* 3.4*   CHLORIDE 95* 98   CO2 22* 20*   BUN 54.3* 72.2*   CREATININE 4.15* 5.46*   GLUCOSE 97 96   CALCIUM 8.5 7.9*   MG 2.10  --    ALBUMIN 1.4* 1.4*   GLOBULIN 5.2* 4.4*   ALKPHOS 199* 181*   ALT 40 39   AST 43* 39*   BILITOT 3.9* 4.1*     No results for input(s): "BNP", "CPK", "TROPONINI" in the last 72 hours.       Echo    Left Ventricle: The left ventricle is normal in size. Normal wall   thickness. Normal wall motion. There is hyperdynamic systolic function   with a visually estimated ejection fraction of greater than 80%. Grade I   diastolic dysfunction.    Right Ventricle: Normal right ventricular cavity size. Wall thickness   is normal. Right ventricle wall motion  is normal. Systolic function is   moderately reduced.    Aortic Valve: The aortic valve is a trileaflet valve.    IVC/SVC: Normal venous pressure at 3 mmHg.  CV Ultrasound doppler venous legs bilat    The right superficial femoral middle vein is normal.    The left superficial femoral middle vein is normal.    There was no evidence of deep or superficial vein thrombosis in bilateral   lower extremities.           MARIA A Small  Infectious Disease     "

## 2023-12-12 NOTE — PROGRESS NOTES
"Gastroenterology Progress Note    Subjective/Interval History:  GI called back for hematochezia.    Per nursing staff last night patient had red blood with BM. No BM today. In dialysis.    Patient states she had bright red blood with wiping yesterday evening after BM, she also states she saw red blood mixed into the stool. She states in the early hours of the morning she had another BM with red blood present. Her stools are loose but not liquid. She states that she had hemorrhoids before and had surgery on them previously. Last colonoscopy in about September 2023 with Dr. Sicard in [place] for what she states was hemorrhoid-related.    ROS:  Review of Systems   Constitutional:  Positive for malaise/fatigue.   Respiratory:  Negative for cough and shortness of breath.    Cardiovascular:  Negative for chest pain.   Gastrointestinal:  Positive for blood in stool. Negative for abdominal pain, constipation, diarrhea, melena, nausea and vomiting.   Neurological:  Negative for weakness.       Vital Signs:  BP 98/66   Pulse 107   Temp 98.5 °F (36.9 °C) (Oral)   Resp 18   Ht 5' 5.75" (1.67 m)   Wt 96.2 kg (212 lb)   SpO2 98%   Breastfeeding No   BMI 34.48 kg/m²   Body mass index is 34.48 kg/m².    Physical Exam:  Physical Exam  Constitutional:       General: She is not in acute distress.     Appearance: She is obese. She is not ill-appearing.   HENT:      Head: Normocephalic and atraumatic.      Right Ear: External ear normal.      Left Ear: External ear normal.      Nose: Nose normal.      Mouth/Throat:      Pharynx: Oropharynx is clear.   Eyes:      Conjunctiva/sclera: Conjunctivae normal.   Pulmonary:      Effort: Pulmonary effort is normal. No respiratory distress.   Abdominal:      General: Abdomen is flat. There is no distension.      Palpations: Abdomen is soft.      Tenderness: There is no abdominal tenderness. There is no guarding.      Comments: J-tube   Musculoskeletal:         General: Normal range of " motion.      Cervical back: Normal range of motion.   Skin:     General: Skin is warm and dry.   Neurological:      Mental Status: She is alert and oriented to person, place, and time. Mental status is at baseline.   Psychiatric:         Mood and Affect: Mood normal.         Behavior: Behavior normal.         Thought Content: Thought content normal.         Labs:  Recent Results (from the past 24 hour(s))   Echo    Collection Time: 12/11/23  5:41 PM   Result Value Ref Range    BSA 1.95 m2    Boggs's Biplane MOD Ejection Fraction 53 %    LVOT stroke volume 58.72 cm3    LVIDd 4.29 3.5 - 6.0 cm    LV Systolic Volume 15.10 mL    LV Systolic Volume Index 7.4 mL/m2    LVIDs 2.14 2.1 - 4.0 cm    LV Diastolic Volume 82.60 mL    LV Diastolic Volume Index 40.49 mL/m2    IVS 0.73 0.6 - 1.1 cm    LVOT diameter 2.00 cm    LVOT area 3.1 cm2    FS 50 (A) 28 - 44 %    Left Ventricle Relative Wall Thickness 0.42 cm    Posterior Wall 0.90 0.6 - 1.1 cm    LV mass 107.53 g    LV Mass Index 53 g/m2    MV Peak E Dante 0.62 m/s    TDI LATERAL 0.13 m/s    TDI SEPTAL 0.06 m/s    E/E' ratio 6.53 m/s    MV Peak A Dante 1.06 m/s    TR Max Dante 2.47 m/s    E/A ratio 0.58     E wave deceleration time 148.00 msec    LV SEPTAL E/E' RATIO 10.33 m/s    LV LATERAL E/E' RATIO 4.77 m/s    LVOT peak dante 1.47 m/s    Left Ventricular Outflow Tract Mean Velocity 0.99 cm/s    Left Ventricular Outflow Tract Mean Gradient 5.00 mmHg    TAPSE 1.34 cm    LA size 3.80 cm    AV mean gradient 6 mmHg    AV peak gradient 10 mmHg    Ao peak dante 1.60 m/s    Ao VTI 20.50 cm    LVOT peak VTI 18.70 cm    AV valve area 2.86 cm²    AV Velocity Ratio 0.92     AV index (prosthetic) 0.91     KAT by Velocity Ratio 2.88 cm²    MV mean gradient 2 mmHg    MV peak gradient 4 mmHg    MV stenosis pressure 1/2 time 48.00 ms    MV valve area p 1/2 method 4.58 cm2    MV valve area by continuity eq 3.43 cm2    MV VTI 17.1 cm    Triscuspid Valve Regurgitation Peak Gradient 24 mmHg    PV PEAK  VELOCITY 1.20 m/s    PV peak gradient 6 mmHg    Mean e' 0.10 m/s    ZLVIDS -4.39     ZLVIDD -3.49     TV resting pulmonary artery pressure 27 mmHg    RV TB RVSP 5 mmHg    Est. RA pres 3 mmHg   CBC with Differential    Collection Time: 12/12/23  2:16 AM   Result Value Ref Range    WBC 22.56 (H) 4.50 - 11.50 x10(3)/mcL    RBC 3.21 (L) 4.20 - 5.40 x10(6)/mcL    Hgb 9.6 (L) 12.0 - 16.0 g/dL    Hct 27.1 (L) 37.0 - 47.0 %    MCV 84.4 80.0 - 94.0 fL    MCH 29.9 27.0 - 31.0 pg    MCHC 35.4 33.0 - 36.0 g/dL    RDW 17.2 (H) 11.5 - 17.0 %    Platelet 505 (H) 130 - 400 x10(3)/mcL    MPV 10.0 7.4 - 10.4 fL    Neut % 85.5 %    Lymph % 3.2 %    Mono % 5.6 %    Eos % 2.0 %    Basophil % 0.4 %    Lymph # 0.73 0.6 - 4.6 x10(3)/mcL    Neut # 19.28 (H) 2.1 - 9.2 x10(3)/mcL    Mono # 1.26 0.1 - 1.3 x10(3)/mcL    Eos # 0.46 0 - 0.9 x10(3)/mcL    Baso # 0.09 <=0.2 x10(3)/mcL    IG# 0.74 (H) 0 - 0.04 x10(3)/mcL    IG% 3.3 %    NRBC% 0.0 %   Vancomycin, Random    Collection Time: 12/12/23  2:17 AM   Result Value Ref Range    Vanc Lvl Random 20.8 (H) 15.0 - 20.0 ug/ml   Comprehensive Metabolic Panel    Collection Time: 12/12/23  2:17 AM   Result Value Ref Range    Sodium Level 132 (L) 136 - 145 mmol/L    Potassium Level 3.4 (L) 3.5 - 5.1 mmol/L    Chloride 98 98 - 107 mmol/L    Carbon Dioxide 20 (L) 23 - 31 mmol/L    Glucose Level 96 82 - 115 mg/dL    Blood Urea Nitrogen 72.2 (H) 9.8 - 20.1 mg/dL    Creatinine 5.46 (H) 0.55 - 1.02 mg/dL    Calcium Level Total 7.9 (L) 8.4 - 10.2 mg/dL    Protein Total 5.8 5.8 - 7.6 gm/dL    Albumin Level 1.4 (L) 3.4 - 4.8 g/dL    Globulin 4.4 (H) 2.4 - 3.5 gm/dL    Albumin/Globulin Ratio 0.3 (L) 1.1 - 2.0 ratio    Bilirubin Total 4.1 (H) <=1.5 mg/dL    Alkaline Phosphatase 181 (H) 40 - 150 unit/L    Alanine Aminotransferase 39 0 - 55 unit/L    Aspartate Aminotransferase 39 (H) 5 - 34 unit/L    eGFR 8 mls/min/1.73/m2   Hepatitis B surface antigen    Collection Time: 12/12/23  2:17 AM   Result Value Ref Range     Hepatitis B Surface Antigen Nonreactive Nonreactive         Assessment/Plan:  58-year-old female unknown to our group with a past medical history of HTN. Transfer[place] [facility] to [facility] for concerns of biloma vs bile leak vs obstruction s/p incomplete laparoscopic cholecystectomy 11/10/23  by Dr. Sicard.     ERCP 11/18/23 Dr. Goss: malignant gastric tumor in cardia, inflamed mucosa in gastric body with oozing of blood throughout proximal gastric lumen, gastric antrum scar that would not allow advancement of scope into duodenal ampulla area therefore biliary cannulation was not possible. Path: adenocarcinoma, moderately differentiated, intestinal type    S/p internal/external biliary drain placed by IR 11/21/23. Repeat IR exchange for metal stent can be performed in 2-3 months as ERCP not an option given gastric stricturing    GI signed off with improvement of bilirubin post drain placement.    GI consulted 12/10/23 for anemia, FOBT+.    Symptomatic anemia, likely multifactorial, with component 2/2 GI blood loss  Blood in stool  - monitor and transfuse as needed to keep Hgb >7-8  - monitor stool for color/blood  - PPI BID  - hydrocortisone suppositories BID  - will continue to monitor closely. If blood in stool persists despite hemorrhoidal treatment with subsequent drop in Hgb, can consider colonoscopy    Gastric adenocarcinoma  - s/p j-tube placement 12/01/23    4. Leukocytosis  - WBC 22  - candidemia on cultures from PICC line  - ID reconsulted, recommended exchange of external biliary drain and removal of PICC    Alexandra Fry, PAGonzálezC  Gastroenterology  [facility]

## 2023-12-12 NOTE — PROGRESS NOTES
SURG ONC    CALLED AND IT APPEARS PT CONTINUES WITH LEUKOCYTOSIS AND LOW GRADE FEVERS    ID FOLLOWING AND APPEARS TO BE MULTIFACTORIAL    SHE HAS HAD MULTIPLE LINES INCLUDING MEDIPORT, TUNNELED CATH FOR HD, AND PICC LINE  AM TOLD PICC WAS REMOVED AND MEDIPORT IS NOT ACCESSED    BLOOD CULTURES 1/2 POSITIVE FOR YEAST BUT WAS DRAWN FROM PICC PRIOR TO ITS REMOVAL; CULTURES ARE BEING REPEATED AND MICAFUNGIN STARTED    SHE IS SCHEDULED FOR BILIARY DRAIN EXCHANGE TODAY I AM TOLD    ID REC THE ABOVE REMOVAL OF PICC AND OK'ED LEAVING MEDIPORT AND TUNNELED CATH    RENAL EVALUATED TODAY AND REPORTS IF TUNNELED CATH NEEDS TO BE REMOVED REC COORDINATING IT WITH MEDIPORT REMOVAL, THUS PT HAVING LINE VACATION    REGARDING PREVIOUS INCOMPLETE CHOLECYSTECTOMY WITH QUESTION OF GB BEING SOURCE DR SPRAGUE TO REVIEW AND MAKE RECS  MOST RECENT IMAGING 12/9/2023; NO GROSS CHANGES PER REPORT

## 2023-12-12 NOTE — PHYSICIAN QUERY
PT Name: Karen Briggs  MR #: [id number]     DOCUMENTATION CLARIFICATION     CDS: Louisa ESPINO,RN        Contact information:[email or web address]  This form is a permanent document in the medical record.     Query Date: 2023    By submitting this query, we are merely seeking further clarification of documentation.  Please utilize your independent clinical judgment when addressing the question(s) below.  Provider, please  clarify the integumentary diagnosis related to the documentation of  right lateral buttocks:   The Medical Record contains the followin/29  PT Evaluation  She presents with the following impairments/functional limitations: weakness, impaired endurance, impaired functional mobility, gait instability . Pt did much better today with all fxn'l mobility.      Wound Care Nursing: LOS Su  Patient with stage 2 to right medial buttocks and to right lateral buttocks, sacrum with stage 2 present.     Date First Assessed/Time First Assessed: 23 1500   Altered Skin Integrity Present on Admission - Did Patient arrive to the hospital with altered skin?: suspected hospital acquired  Side: Right  Orientation: lateral  Location: Buttocks  Is this i..                          Partial thickness tissue loss. Shallow open ulcer with a red or pink wound bed, without slough. Intact or Open/Ruptured Serum-filled blister        The clinical guidelines noted are only a system guideline. It does not replace the providers clinical judgment.    Per the National Pressure Injury Advisory Panel:   A pressure injury is localized damage to the skin and underlying soft tissue usually over a bony prominence or related to a medical or other device. The injury can present as intact skin or an open ulcer and may be painful. The injury occurs as a result of intense and/or prolonged pressure or pressure in combination with shear. The tolerance of soft tissue for pressure and shear may also be  affected by microclimate, nutrition, perfusion, co-morbidities and condition of the soft tissue.       Stage 1 Pressure Injury:  Intact skin with a localized area of non-blanchable erythema, which may appear differently in darkly pigmented skin. Color changes do not include purple or maroon discoloration; these may indicate deep tissue pressure injury.    Stage 2 Pressure Injury:  Partial-thickness loss of skin with exposed dermis. The wound bed is viable, pink or red, moist, and may also present as an intact or ruptured serum-filled blister.    Stage 3 Pressure Injury:  Full-thickness loss of skin, in which adipose (fat) is visible in the ulcer and granulation tissue and epibole (rolled wound edges) are often present. Slough and/or eschar may be visible. Undermining and tunneling may occur.    Stage 4 Pressure Injury:  Full-thickness skin and tissue loss with exposed or directly palpable fascia, muscle, tendon, ligament, cartilage or bone in the ulcer. Slough and/or eschar may be visible. Epibole (rolled edges), undermining and/or tunneling often occur.    Unstageable Pressure Injury:  Full-thickness skin and tissue loss in which the extent of tissue damage within the ulcer cannot be confirmed because it is obscured by slough or eschar. If slough or eschar is removed, a Stage 3 or Stage 4 pressure injury will be revealed.    Deep Tissue Pressure Injury:  Intact or non-intact skin with localized area of persistent non-blanchable deep red, maroon, purple discoloration or epidermal separation revealing a dark wound bed or blood filled blister. This injury results from intense and/or prolonged pressure and shear forces at the bone-muscle interface. The wound may evolve rapidly to reveal the actual extent of tissue injury, or may resolve without tissue loss. If necrotic tissue, subcutaneous tissue, granulation tissue, fascia, muscle or other underlying structures are visible, this indicates a full thickness pressure  injury (Unstageable, Stage 3 or Stage 4). Do not use DTPI to describe vascular, traumatic, neuropathic, or dermatologic conditions.       Provider, please clarify the integumentary diagnosis related to the documentation of  right lateral buttocks:     [  x ] Pressure Injury/Decubitus Ulcer, Stage 2---->buttocks   [   ] Other Integumentary Diagnosis (please specify):______________       Please document in your progress notes daily for the duration of treatment until resolved and include in your discharge summary.    Reference:    JANNA Hogan., SHELL Epps., Goldberg, M., FIORDALIZA Dejesus., JANNA Mcdonald, & DEZ Alvarez. (2016). Revised National Pressure Ulcer Advisory Panel Pressure Injury Staging System: Revised Pressure Injury Staging System. J Wound Ostomy Continence Nurs, 43(6), 585-597. doi:10.1097/won.[id number]    Form No.[id number]

## 2023-12-12 NOTE — NURSING
12/12/23 1130   Post-Hemodialysis Assessment   Rinseback Volume (mL) 250 mL   Blood Volume Processed (Liters) 61.6 L   Dialyzer Clearance Moderately streaked   Duration of Treatment 180 minutes   Total UF (mL) 500 mL   Net Fluid Removal 0   Patient Response to Treatment Tolerated well   Post-Hemodialysis Comments Blood rinsed back per P&P. Lines capped and secured.

## 2023-12-12 NOTE — PROGRESS NOTES
Inpatient Nutrition Assessment    Admit Date: 11/17/2023   Total duration of encounter: 25 days     Nutrition Recommendation/Prescription     -Continue tube feeds as tolerated: Novasource @ 45ml/hr, goal rate.   *Increase free water flush: 20ml/hr (~400ml flush/d; 1050ml total fluids daily)    -Electrolytes and additional fluids per pharmacy/MD.     -Monitor need to add bowel regimen.     Communication of Recommendations: reviewed with nurse and reviewed with patient    Nutrition Assessment     Malnutrition Assessment/Nutrition-Focused Physical Exam    Malnutrition Context: acute illness or injury (11/18/23 1630)  Malnutrition Level: severe (11/18/23 1630)  Energy Intake (Malnutrition):  (does not meet criteria) (12/08/23 1238)  Weight Loss (Malnutrition): greater than 7.5% in 3 months (11/18/23 1630)  Subcutaneous Fat (Malnutrition): moderate depletion (11/18/23 1630)  Orbital Region (Subcutaneous Fat Loss): moderate depletion        Muscle Mass (Malnutrition): moderate depletion (11/18/23 1630)  [place] Region (Muscle Loss): moderate depletion     Clavicle and Acromion Bone Region (Muscle Loss): moderate depletion        Patellar Region (Muscle Loss): moderate depletion                 A minimum of two characteristics is recommended for diagnosis of either severe or non-severe malnutrition.    Chart Review    Reason Seen: malnutrition screening tool (MST), physician consult for wt loss, and follow-up    Malnutrition Screening Tool Results   Have you recently lost weight without trying?: Yes: 24-33 lbs  Have you been eating poorly because of a decreased appetite?: No   MST Score: 3     Diagnosis:  Hypotension, improved  Persistent leukocytosis- source unclear  Acute on chronic anemia, s/p 2 units prbcs transfusion 11/30  Hx of Acute Blood loss anemia with syncope and then fall 11/26-   Locally advanced gastric adenocarcinoma with intractable nausea and vomiting possible Gastric/duodenal outlet obstruction  -now  status post J Tube placement  Obstructive uropathy with bilateral hydronephrosis- S/P bilateral ureteral stent placed on 11/27  POORNIMA-now on HD - 11/29  Metabolic acidosis- resolved  Suspected bile leak with biloma and biliary obstruction--> H/O Laparoscopic incomplete cholecystectomy 11/10/2023  New onset atrial fibrillation with RVR- fluctuating  Hypokalemia- resolved with replacement  Hyponatremia  Severe malnutrition    Relevant Medical History:   Past Medical History:   Diagnosis Date    Hypertension     Sciatica      Past Surgical History:   Procedure Laterality Date    CARPAL TUNNEL RELEASE Left     CYSTOSCOPY W/ URETERAL STENT PLACEMENT Bilateral 11/27/2023    Procedure: CYSTOSCOPY, WITH URETERAL STENT INSERTION;  Surgeon: Huey Cardenas MD;  Location: [facility] OR;  Service: Urology;  Laterality: Bilateral;    EGD, WITH CLOSED BIOPSY  11/18/2023    Procedure: EGD, WITH CLOSED BIOPSY;  Surgeon: Alfred Goss MD;  Location: [facility] OR;  Service: Gastroenterology;;    ERCP N/A 11/18/2023    Procedure: ERCP (ENDOSCOPIC RETROGRADE CHOLANGIOPANCREATOGRAPHY);  Surgeon: Alfred Goss MD;  Location: [facility] OR;  Service: Gastroenterology;  Laterality: N/A;    HEMORRHOID SURGERY      INSERTION OF TUNNELED CENTRAL VENOUS CATHETER (CVC) WITH SUBCUTANEOUS PORT N/A 12/1/2023    Procedure: INSERTION, PORT-A-CATH;  Surgeon: William Morse MD;  Location: [facility] OR;  Service: General;  Laterality: N/A;    INSERTION OF TUNNELED CENTRAL VENOUS HEMODIALYSIS CATHETER N/A 12/6/2023    Procedure: Insertion, Catheter, Central Venous, Hemodialysis;  Surgeon: Satinder Luo DO;  Location: [facility] CATH LAB;  Service: Nephrology;  Laterality: N/A;    LAPAROSCOPIC INSERTION OF JEJUNOSTOMY TUBE N/A 12/1/2023    Procedure: INSERTION, JEJUNOSTOMY TUBE, LAPAROSCOPIC;  Surgeon: William Morse MD;  Location: [facility] OR;  Service: General;  Laterality: N/A;  PLUS [facility]     Review of patient's allergies indicates:  No Known Allergies  "    Nutrition-Related Medications:    enoxparin  1 mg/kg Subcutaneous Q24H (treatment, non-standard time)    hydrocortisone  25 mg Rectal BID    metoprolol tartrate  12.5 mg Oral BID    micafungin (MYCAMINE) IVPB  100 mg Intravenous Q24H    midodrine  10 mg Oral TID WM    pantoprazole  40 mg Intravenous BID WM    perflutren lipid microspheres  1.3 mL Intravenous Once    sodium bicarbonate  1,300 mg Oral Daily    sodium chloride 0.9%  10 mL Intravenous Q6H        Calorie Containing IV Medications: no significant kcals from medications at this time    Nutrition-Related Labs:  No results found for: "HGBA1C"  12/8/2023: Phosphorus Level 4.3 mg/dL (Ref range: 2.3 - 4.7 mg/dL)  12/11/2023: Magnesium Level 2.10 mg/dL (Ref range: 1.60 - 2.60 mg/dL)  12/12/2023: Potassium Level 3.4 mmol/L (L; Ref range: 3.5 - 5.1 mmol/L); Sodium Level 132 mmol/L (L; Ref range: 136 - 145 mmol/L)   Recent Labs   Lab 12/10/23  0605 12/10/23  1519 12/11/23  0326 12/11/23  0808 12/12/23  0216   WBC 26.38*  --  25.94*  --  22.56*   RBC 2.10*  --  3.28*  --  3.21*   HGB 6.4*   < > 9.9* 10.3* 9.6*   HCT 18.4*   < > 28.2* 29.5* 27.1*   MCV 87.6  --  86.0  --  84.4   MCH 30.5  --  30.2  --  29.9   MCHC 34.8  --  35.1  --  35.4    < > = values in this interval not displayed.       Recent Labs   Lab 12/06/23  0505 12/07/23  0445 12/10/23  0605 12/11/23  0326 12/12/23  0217   *   < > 130* 131* 132*   K 4.3   < > 3.9 3.4* 3.4*   CO2 24   < > 20* 22* 20*   BUN 71.7*   < > 61.9* 54.3* 72.2*   CREATININE 5.79*   < > 5.01* 4.15* 5.46*   GLUCOSE 116*   < > 125* 97 96   CALCIUM 8.3*   < > 8.0* 8.5 7.9*   MG 2.40  --   --  2.10  --    ALBUMIN 1.6*   < > 1.4* 1.4* 1.4*   ALKPHOS  --   --  204* 199* 181*   ALT  --   --  35 40 39   AST  --   --  36* 43* 39*   BILITOT  --   --  3.8* 3.9* 4.1*    < > = values in this interval not displayed.         Diet/PN Order: Diet NPO  Oral Supplement Order: none  Tube Feeding Order:  Novasourve @ 45ml/hr (see below for " "calculation)  Appetite/Oral Intake: NPO/NPO  Factors Affecting Nutritional Intake: altered gastrointestinal function and NPO  Food/Sikh/Cultural Preferences: none reported  Food Allergies: none reported    Skin Integrity: wound, drain/device(s)  Wound(s):   n/a    Comments    11/18/23: Pt reports poor appetite, nauseated, threw up after consuming ~10% of full liquid tray for lunch, appetite has been a struggle for 3 months over which time she lost about 30 lbs unintentionally, appetite has been worse since 11/10 incomplete cholecystectomy, chews/swallows well, [facility]     11/22/23: Pt with poor to fair intake of full liquids; states that she is still having some n/v occasionally; reports that she is drinking the Boost that is ordered.     11/24/23: Pt reports poor appetite, still w/ n/v, basically vomits up everything she consumes. Discussed w/ physician, we agree that she needs a J-tube. Pt reports that she is open to it.     11/26/23: breakfast: 0% tray; lunch- unknown, no tray receipt; dinner- 100% 2 orange sheberts and 1/2 bowl chicken broth. Consuming <25% energy needs.    11/27/23: breakfast: 50% cream of wheat and 100% orange juice; lunch: NPO for stent placements, but sx deferred until tomorrow so will allow liquids for dinner tonight.   Continues with emesis and nausea throughout day; receiving antiemetics. States Boost supplement also "comes back up". Will change to Boost Breeze and monitor tolerance.  Changing peripheral PN to total PN with lipids 2x/week to meet nutritional needs until able to place enteral access device for tube feeds or po intake improves. Discussed with MD and pharmacy.     11/30/23 TPN continues. Decreased rate to 50ml/hr until tomorrow and will order custom TPN for minimum volume until able to start on enteral nutrition. Started on dialysis s/t worsening renal indices and decreased urine output (<600mL x24 hr pr RN). Glucose lab of 547 this " "morning error, 159 on recheck.     12/1/23 Out of room for Jtube and mediport placement. Consult received for Jtube feeding recs. Continue in-house TPN at 50ml/hr, no need for custom at this time. Can wean TPN as tolerating tube feeds at 1/2 goal rate. Will use renal formula for tube feedings while pt continues on dilaysis.  Recommendations discussed with RN and pharmacy.     12/4/23 Consult received to start tube feedings. Order placed and discussed with RN. Start at 15ml/hr and advance per MD. Continue with renal formula and monitor electrolytes for ability to change to a standard formula. TPN infusing and NPO status continues.      12/5/23 Tolerating tube feeds at 25ml/hr. TPN decreased to 30ml/hr last night. Pt reports tolerating tube feeds fine. TPN bag should be complete in the next few hours. Can discontinue as long as tolerating tube feeds.    12/8/23: Tolerating tube feeds at goal last few days. Last BM 12/5. Reports gas but no abdominal pain.   No TPN/IVF infusing. Increase free water flush from 30ml q4hr to 20ml/hr.    12/12/23: Pt in dialysis at time of rounds. Spoke with RN, she reported that TF were at goal before pt went NPO at midnight. RN stated that plans were to resume feeds once the pt returns from dialysis.     Anthropometrics    Height: 5' 5.75" (167 cm) Height Method: Stated  Last Weight:  (pt unable to stand .) (12/11/23 0631) Weight Method: Standard Scale  BMI (Calculated): 34.5  BMI Classification: overweight (BMI 25-29.9)     Ideal Body Weight (IBW), Female: 128.75 lb     % Ideal Body Weight, Female (lb): 140.58 %                             Usual Weight Provided By: patient and EMR weight history  8/14/23: 211 lbs  Wt Readings from Last 5 Encounters:   12/10/23 96.2 kg (212 lb)   04/24/18 88 kg (194 lb)   02/21/18 90.4 kg (199 lb 4.7 oz)   11/24/17 90 kg (198 lb 6.6 oz)     Weight Change(s) Since Admission: +14 kg, unsure of accuracy   Admit Weight: 82.2 kg (181 lb 4 oz) (11/17/23 " 1698)    Estimated Needs    Weight Used For Calorie Calculations: 82.2 kg (181 lb 3.5 oz)  Energy Calorie Requirements (kcal): 1644 kcals (20 kcals/kg)  Energy Need Method: Kcal/kg  Weight Used For Protein Calculations: 82.2 kg (181 lb 3.5 oz)  Protein Requirements:  g (1.0-1.3 g/kg)  Fluid Requirements (mL): 1000mL + UOP (started on HD)  Temp (24hrs), Av.3 °F (36.8 °C), Min:98.1 °F (36.7 °C), Max:98.5 °F (36.9 °C)       Enteral Nutrition    Formula: Novasource Renal  Rate/Volume: 45ml/hr  Water Flushes: 30ml q4hr (150ml free water)  Additives/Modulars: none at this time  Route: jejunostomy tube  Method: continuous  Total Nutrition Provided by Tube Feeding, Additives, and Flushes:  Calories Provided  1800 kcal/d, 109% needs   Protein Provided   81 g/d, 98% needs   Fluid Provided  798 ml/d, <80% needs   Continuous feeding calculations based on estimated 20 hr/d run time unless otherwise stated.    Parenteral Nutrition    Patient not receiving parenteral nutrition at this time.     Evaluation of Received Nutrient Intake    Calories: meeting estimated needs  Protein: meeting estimated needs    Patient Education    Not applicable.    Nutrition Diagnosis     PES: Malnutrition related to suboptimal protein/energy intake as evidenced by less than or equal to 50% needs met for greater than or equal to 5 days, moderate fat depletion, moderate muscle depletion, and greater than 7.5% weight loss in 3 months. (active)     Interventions/Goals     Intervention(s): prescription medication and collaboration with other providers  Goal: Meet greater than 75% of nutritional needs by follow-up. (goal met)    Monitoring & Evaluation     Dietitian will monitor energy intake, weight, electrolyte/renal panel, and gastrointestinal profile.  Nutrition Risk/Follow-Up: high (follow-up in 1-4 days)   Please consult if re-assessment needed sooner.    Nusrat Carballo RD   2023

## 2023-12-12 NOTE — NURSING
[facility] - [address] Medical Telemetry  Wound Care    Patient Name:  Karen Briggs   MRN:  [id number]  Date: 12/12/2023  Diagnosis: <principal problem not specified>    History:     Past Medical History:   Diagnosis Date    Hypertension     Sciatica        Social History     Socioeconomic History    Marital status:    Tobacco Use    Smoking status: Never    Smokeless tobacco: Never   Substance and Sexual Activity    Alcohol use: No    Drug use: No    Sexual activity: Never     Birth control/protection: Post-menopausal       Precautions:     Allergies as of 11/17/2023    (No Known Allergies)       [facility] Assessment Details/Treatment   Consult received for sacral wound. Patient awake, cooperative. States she thought there was something on her right side. Patient with stage 2 to right medial buttocks and to right lateral buttocks, sacrum with stage 2 present. Wounds cleansed wit normal saline, sacral bordered foam applied. Wedge present. Encouraged patient to turn. States she does turn. [facility] patient nurse states patient does turn independently. Will encourage turning and wedge placement, heel boots to be applied. Patient tolerated well.      12/12/23 1459   WOCN Assessment   WOCN Total Time (mins) 30   Visit Date 12/12/23   Visit Time 1459   Consult Type New   WOCN Speciality Wound   Wound pressure   Number of Wounds 3   Intervention assessed;changed;applied;chart review;orders   Skin Interventions   Pressure Reduction Devices pressure-redistributing mattress utilized   Positioning   Body Position 30 degrees   Positioning/Transfer Devices wedge;pillows        Altered Skin Integrity 12/12/23 1500 Right lateral Buttocks Partial thickness tissue loss. Shallow open ulcer with a red or pink wound bed, without slough. Intact or Open/Ruptured Serum-filled blister.   Date First Assessed/Time First Assessed: 12/12/23 1500   Altered Skin Integrity Present on Admission - Did Patient arrive to the hospital  with altered skin?: suspected hospital acquired  Side: Right  Orientation: lateral  Location: Buttocks  Is this i...   Wound Image   (camera malfunction please see pic for right medial buttocks)   Description of Altered Skin Integrity Partial thickness tissue loss. Shallow open ulcer with a red or pink wound bed, without slough. Intact or Open/Ruptured Serum-filled blister.   Dressing Appearance Open to air   Drainage Amount None   Appearance Red;Smooth   Tissue loss description Partial thickness   Periwound Area Intact   Wound Edges Irregular   Wound Length (cm) 1 cm   Wound Width (cm) 2 cm   Wound Depth (cm) 0.1 cm   Wound Volume (cm^3) 0.2 cm^3   Wound Surface Area (cm^2) 2 cm^2   Care Wound cleanser   Dressing Applied  (sacral bordered foam)        Altered Skin Integrity 12/12/23 1500 Right medial Buttocks Partial thickness tissue loss. Shallow open ulcer with a red or pink wound bed, without slough. Intact or Open/Ruptured Serum-filled blister.   Date First Assessed/Time First Assessed: 12/12/23 1500   Altered Skin Integrity Present on Admission - Did Patient arrive to the hospital with altered skin?: suspected hospital acquired  Side: Right  Orientation: medial  Location: Buttocks  Is this in...   Wound Image    Description of Altered Skin Integrity Partial thickness tissue loss. Shallow open ulcer with a red or pink wound bed, without slough. Intact or Open/Ruptured Serum-filled blister.   Drainage Amount None   Appearance Red;Smooth   Tissue loss description Partial thickness   Periwound Area Intact   Wound Edges Defined   Wound Length (cm) 1 cm   Wound Width (cm) 1 cm   Wound Depth (cm) 0.1 cm   Wound Volume (cm^3) 0.1 cm^3   Wound Surface Area (cm^2) 1 cm^2   Care Wound cleanser   Dressing Applied  (sacral bordered foam)        Altered Skin Integrity 12/12/23 1500 Sacral spine Partial thickness tissue loss. Shallow open ulcer with a red or pink wound bed, without slough. Intact or Open/Ruptured  Serum-filled blister.   Date First Assessed/Time First Assessed: 12/12/23 1500   Altered Skin Integrity Present on Admission - Did Patient arrive to the hospital with altered skin?: suspected hospital acquired  Location: Sacral spine  Description of Altered Skin Integrity: P...   Wound Image   (camera malfunction)   Description of Altered Skin Integrity Partial thickness tissue loss. Shallow open ulcer with a red or pink wound bed, without slough. Intact or Open/Ruptured Serum-filled blister.   Drainage Amount None   Appearance Red;Smooth   Tissue loss description Partial thickness   Periwound Area Intact   Wound Edges Defined   Wound Length (cm) 0.5 cm   Wound Width (cm) 1.8 cm   Wound Depth (cm) 0.1 cm   Wound Volume (cm^3) 0.09 cm^3   Wound Surface Area (cm^2) 0.9 cm^2   Care Sterile normal saline   Dressing Applied  (bordered sacral foam)         Recommendations made to primary team for wound care with sacral bordered foam, continue pressure relief measures, heel boots to be worn at all times while in bed. Orders placed.     12/12/2023

## 2023-12-12 NOTE — PROGRESS NOTES
Nephrology consult follow up note    HPI:      Karen Briggs is a 58 y.o. female presented on  7 days post op from laparoscopic cholecystectomy on 11/10. Gallbladder was unable to be resected. Back and flank pain persisted post operatively prompting evaluation at [facility]. After workup her surgeon recommended ERCP for which she was sent to this facility. Left sided hydronephrosis noted on CT scans done on admission. At that time renal function was fairly normal and patient was given option for stenting or to defer as outpatient and she chose the latter.      During ERCP, malignant gastric mass noted and ERCP was unable to be completed due to duodenal scarring. 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 Patient ultimately had biliary drain placed per IR.      She developed A fib with RVR requiring amiodarone infusion. She then had significant blood loss post removal of long term IV and subsequent fall in her room.      Patient renal function was relatively stable until decline starting 11/25. She was ultimately initiated on HD 11/28 post bilateral ureteral stent placement with Dr Cardenas same day.     Patient developed dialysis dependent acute kidney injury, and was started on hemodialysis 11/29/2023.    Interval history:   12/10- Hgb 6.2 down from 8.1/33.5 yesterday.  Dialysis nurse did report a little hypotension and tachycardia during treatment yesterday.  No obvious bleeding overnight.  Patient is resting comfortably in bed on room air.  No complaints.  Significant other at bedside.     12/11 - - Pt noted appropriate rise in Hgb post transfusion. No fluid able to be removed during dialysis yesterday due to hypotension despite PRBC x2. GI now following recommends 48 hours of suspension of Eliquis prior to endoscopy. Sitting in chair in good spirits with no complaints.     12/12/2023 currently tolerating dialysis.  Little short of breath.  Otherwise no new complaints.  She did receive 2 units of  packed RBCs yesterday.  ID MD note noted.  If tunneled dialysis catheter and MediPort needs to be removed, then it needs to be removed at the same time which needs to be coordinated with 2 different surgeons.    Objective:       VITAL SIGNS: 24 HR MIN & MAX LAST    Temp  Min: 98.1 °F (36.7 °C)  Max: 98.5 °F (36.9 °C)  98.5 °F (36.9 °C)        BP  Min: 93/60  Max: 116/74  98/66     Pulse  Min: 97  Max: 118  107     Resp  Min: 17  Max: 18  18    SpO2  Min: 98 %  Max: 100 %  98 %      GEN:  Well-appearing AAF currently tolerating dialysis.  Awake alert oriented.  No obvious respiratory distress noted.  Blood pressure is 96/57 so we will not be able to remove any fluids today.  CV: RRR without rub or gallop.  PULM: CTAB, unlabored  ABD: Soft, NT/ND abdomen with NABS, tube feeding in progress via J-tube  EXT:  2+ lower extremity edema  SKIN: Warm and dry  PSYCH: Awake, alert and appropriately conversant.   Dialysis access:  Right IJ tunneled dialysis catheter            Component Value Date/Time     (L) 12/12/2023 0217     (L) 12/11/2023 0326     04/24/2018 0340    K 3.4 (L) 12/12/2023 0217    K 3.4 (L) 12/11/2023 0326    K 3.2 (L) 04/24/2018 0340    CHLORIDE 98 12/12/2023 0217    CHLORIDE 95 (L) 12/11/2023 0326    CO2 20 (L) 12/12/2023 0217    CO2 22 (L) 12/11/2023 0326    CO2 20 (L) 04/24/2018 0340    BUN 72.2 (H) 12/12/2023 0217    BUN 54.3 (H) 12/11/2023 0326    BUN 10 04/24/2018 0340    CREATININE 5.46 (H) 12/12/2023 0217    CREATININE 4.15 (H) 12/11/2023 0326    CREATININE 0.7 04/24/2018 0340    CALCIUM 7.9 (L) 12/12/2023 0217    CALCIUM 8.5 12/11/2023 0326    CALCIUM 9.2 04/24/2018 0340    PHOS 4.3 12/08/2023 0718            Component Value Date/Time    WBC 22.56 (H) 12/12/2023 0216    WBC 25.94 (H) 12/11/2023 0326    WBC 24.01 12/05/2023 0455    WBC 23.73 11/23/2023 0723    WBC 12.03 04/24/2018 0340    HGB 9.6 (L) 12/12/2023 0216    HGB 10.3 (L) 12/11/2023 0808    HGB 11.3 (L) 04/24/2018 0340     HCT 27.1 (L) 12/12/2023 0216    HCT 29.5 (L) 12/11/2023 0808    HCT 35.3 (L) 04/24/2018 0340     (H) 12/12/2023 0216     (H) 12/11/2023 0326     04/24/2018 0340         Imaging reviewed      Assessment / Plan:   POORNIMA multifactorial secondary to obstructive uropathy, acute blood loss, contrast exposure, hypotension and Afib with RVR  ---Nephrotic range proteinuria noted. 4.4 g  Newly diagnosed malignant gastric mass. Pathology consistent with adenocarcinoma   -s/p J tube and mediport placement 12/1  Acute blood loss anemia 2/2 bleeding post long term IV removal   --s/p transfusion 12/1  Bilateral hydronephrosis noted 11/21 - stent placement initially deferred due to stable renal function   Afib with RVR on amiodarone infusion DC for hypotension.  She is on scheduled Lopressor 5 mg q.6  S/p biliary drain placement      Recommendations  From renal standpoint of view to not have any objection to remove the tunneled dialysis catheter and maybe have line vacation for few days prior to starting any tunneled dialysis catheter.  But it needs to be coordinated with the general surgeon to remove the MediPort also  Continue all other medical management.

## 2023-12-13 LAB
ALBUMIN SERPL-MCNC: 1.5 G/DL (ref 3.4–4.8)
ALBUMIN/GLOB SERPL: 0.3 RATIO (ref 1.1–2)
ALP SERPL-CCNC: 141 UNIT/L (ref 40–150)
ALT SERPL-CCNC: 52 UNIT/L (ref 0–55)
AST SERPL-CCNC: 68 UNIT/L (ref 5–34)
BASOPHILS # BLD AUTO: 0.1 X10(3)/MCL
BASOPHILS NFR BLD AUTO: 0.6 %
BILIRUB SERPL-MCNC: 4.6 MG/DL
BUN SERPL-MCNC: 49.8 MG/DL (ref 9.8–20.1)
CALCIUM SERPL-MCNC: 8.7 MG/DL (ref 8.4–10.2)
CHLORIDE SERPL-SCNC: 100 MMOL/L (ref 98–107)
CO2 SERPL-SCNC: 22 MMOL/L (ref 23–31)
CREAT SERPL-MCNC: 4.6 MG/DL (ref 0.55–1.02)
EOSINOPHIL # BLD AUTO: 0.21 X10(3)/MCL (ref 0–0.9)
EOSINOPHIL NFR BLD AUTO: 1.3 %
ERYTHROCYTE [DISTWIDTH] IN BLOOD BY AUTOMATED COUNT: 17.5 % (ref 11.5–17)
GFR SERPLBLD CREATININE-BSD FMLA CKD-EPI: 10 MLS/MIN/1.73/M2
GLOBULIN SER-MCNC: 5.9 GM/DL (ref 2.4–3.5)
GLUCOSE SERPL-MCNC: 94 MG/DL (ref 82–115)
HCT VFR BLD AUTO: 31.7 % (ref 37–47)
HGB BLD-MCNC: 10.8 G/DL (ref 12–16)
IMM GRANULOCYTES # BLD AUTO: 0.64 X10(3)/MCL (ref 0–0.04)
IMM GRANULOCYTES NFR BLD AUTO: 3.9 %
LYMPHOCYTES # BLD AUTO: 0.7 X10(3)/MCL (ref 0.6–4.6)
LYMPHOCYTES NFR BLD AUTO: 4.3 %
MAGNESIUM SERPL-MCNC: 2.2 MG/DL (ref 1.6–2.6)
MCH RBC QN AUTO: 30.1 PG (ref 27–31)
MCHC RBC AUTO-ENTMCNC: 34.1 G/DL (ref 33–36)
MCV RBC AUTO: 88.3 FL (ref 80–94)
MONOCYTES # BLD AUTO: 0.94 X10(3)/MCL (ref 0.1–1.3)
MONOCYTES NFR BLD AUTO: 5.8 %
NEUTROPHILS # BLD AUTO: 13.64 X10(3)/MCL (ref 2.1–9.2)
NEUTROPHILS NFR BLD AUTO: 84.1 %
NRBC BLD AUTO-RTO: 0 %
PHOSPHATE SERPL-MCNC: 4.6 MG/DL (ref 2.3–4.7)
PLATELET # BLD AUTO: 516 X10(3)/MCL (ref 130–400)
PMV BLD AUTO: 10.3 FL (ref 7.4–10.4)
POTASSIUM SERPL-SCNC: 5.6 MMOL/L (ref 3.5–5.1)
PROT SERPL-MCNC: 7.4 GM/DL (ref 5.8–7.6)
RBC # BLD AUTO: 3.59 X10(6)/MCL (ref 4.2–5.4)
SODIUM SERPL-SCNC: 131 MMOL/L (ref 136–145)
WBC # SPEC AUTO: 16.23 X10(3)/MCL (ref 4.5–11.5)

## 2023-12-13 PROCEDURE — 63600175 PHARM REV CODE 636 W HCPCS: Performed by: STUDENT IN AN ORGANIZED HEALTH CARE EDUCATION/TRAINING PROGRAM

## 2023-12-13 PROCEDURE — 63600175 PHARM REV CODE 636 W HCPCS: Performed by: INTERNAL MEDICINE

## 2023-12-13 PROCEDURE — 25000003 PHARM REV CODE 250

## 2023-12-13 PROCEDURE — 97530 THERAPEUTIC ACTIVITIES: CPT | Mod: CQ

## 2023-12-13 PROCEDURE — 25000003 PHARM REV CODE 250: Performed by: STUDENT IN AN ORGANIZED HEALTH CARE EDUCATION/TRAINING PROGRAM

## 2023-12-13 PROCEDURE — 25500020 PHARM REV CODE 255: Performed by: STUDENT IN AN ORGANIZED HEALTH CARE EDUCATION/TRAINING PROGRAM

## 2023-12-13 PROCEDURE — 0F24X0Z CHANGE DRAINAGE DEVICE IN GALLBLADDER, EXTERNAL APPROACH: ICD-10-PCS | Performed by: STUDENT IN AN ORGANIZED HEALTH CARE EDUCATION/TRAINING PROGRAM

## 2023-12-13 PROCEDURE — 25000003 PHARM REV CODE 250: Performed by: NURSE PRACTITIONER

## 2023-12-13 PROCEDURE — 97116 GAIT TRAINING THERAPY: CPT | Mod: CQ

## 2023-12-13 PROCEDURE — 84100 ASSAY OF PHOSPHORUS: CPT | Performed by: STUDENT IN AN ORGANIZED HEALTH CARE EDUCATION/TRAINING PROGRAM

## 2023-12-13 PROCEDURE — 99233 SBSQ HOSP IP/OBS HIGH 50: CPT | Mod: ,,, | Performed by: GENERAL PRACTICE

## 2023-12-13 PROCEDURE — C9113 INJ PANTOPRAZOLE SODIUM, VIA: HCPCS | Performed by: INTERNAL MEDICINE

## 2023-12-13 PROCEDURE — 25000003 PHARM REV CODE 250: Performed by: INTERNAL MEDICINE

## 2023-12-13 PROCEDURE — 27000221 HC OXYGEN, UP TO 24 HOURS

## 2023-12-13 PROCEDURE — 85025 COMPLETE CBC W/AUTO DIFF WBC: CPT | Performed by: STUDENT IN AN ORGANIZED HEALTH CARE EDUCATION/TRAINING PROGRAM

## 2023-12-13 PROCEDURE — 83735 ASSAY OF MAGNESIUM: CPT | Performed by: STUDENT IN AN ORGANIZED HEALTH CARE EDUCATION/TRAINING PROGRAM

## 2023-12-13 PROCEDURE — 80053 COMPREHEN METABOLIC PANEL: CPT | Performed by: STUDENT IN AN ORGANIZED HEALTH CARE EDUCATION/TRAINING PROGRAM

## 2023-12-13 PROCEDURE — 21400001 HC TELEMETRY ROOM

## 2023-12-13 PROCEDURE — A4216 STERILE WATER/SALINE, 10 ML: HCPCS | Performed by: INTERNAL MEDICINE

## 2023-12-13 PROCEDURE — 25000003 PHARM REV CODE 250: Performed by: RADIOLOGY

## 2023-12-13 RX ORDER — LIDOCAINE HYDROCHLORIDE 20 MG/ML
INJECTION, SOLUTION INFILTRATION; PERINEURAL
Status: COMPLETED | OUTPATIENT
Start: 2023-12-13 | End: 2023-12-13

## 2023-12-13 RX ADMIN — HYDROCORTISONE ACETATE 25 MG: 25 SUPPOSITORY RECTAL at 08:12

## 2023-12-13 RX ADMIN — ENOXAPARIN SODIUM 100 MG: 100 INJECTION SUBCUTANEOUS at 12:12

## 2023-12-13 RX ADMIN — ENOXAPARIN SODIUM 100 MG: 100 INJECTION SUBCUTANEOUS at 11:12

## 2023-12-13 RX ADMIN — SODIUM BICARBONATE 650 MG TABLET 1300 MG: at 08:12

## 2023-12-13 RX ADMIN — SODIUM CHLORIDE, PRESERVATIVE FREE 10 ML: 5 INJECTION INTRAVENOUS at 01:12

## 2023-12-13 RX ADMIN — LIDOCAINE HYDROCHLORIDE 5 ML: 20 INJECTION, SOLUTION INFILTRATION; PERINEURAL at 08:12

## 2023-12-13 RX ADMIN — MIDODRINE HYDROCHLORIDE 10 MG: 5 TABLET ORAL at 01:12

## 2023-12-13 RX ADMIN — PANTOPRAZOLE SODIUM 40 MG: 40 INJECTION, POWDER, FOR SOLUTION INTRAVENOUS at 07:12

## 2023-12-13 RX ADMIN — MIDODRINE HYDROCHLORIDE 10 MG: 5 TABLET ORAL at 06:12

## 2023-12-13 RX ADMIN — MIDODRINE HYDROCHLORIDE 10 MG: 5 TABLET ORAL at 07:12

## 2023-12-13 RX ADMIN — METOCLOPRAMIDE 5 MG: 5 INJECTION, SOLUTION INTRAMUSCULAR; INTRAVENOUS at 04:12

## 2023-12-13 RX ADMIN — PANTOPRAZOLE SODIUM 40 MG: 40 INJECTION, POWDER, FOR SOLUTION INTRAVENOUS at 06:12

## 2023-12-13 RX ADMIN — MICAFUNGIN SODIUM 100 MG: 100 INJECTION, POWDER, LYOPHILIZED, FOR SOLUTION INTRAVENOUS at 06:12

## 2023-12-13 RX ADMIN — METOPROLOL TARTRATE 12.5 MG: 25 TABLET, FILM COATED ORAL at 08:12

## 2023-12-13 RX ADMIN — SODIUM CHLORIDE, PRESERVATIVE FREE 10 ML: 5 INJECTION INTRAVENOUS at 11:12

## 2023-12-13 RX ADMIN — SODIUM CHLORIDE, PRESERVATIVE FREE 10 ML: 5 INJECTION INTRAVENOUS at 12:12

## 2023-12-13 RX ADMIN — IOPAMIDOL 10 ML: 755 INJECTION, SOLUTION INTRAVENOUS at 09:12

## 2023-12-13 RX ADMIN — Medication 6 MG: at 08:12

## 2023-12-13 NOTE — PHYSICIAN QUERY
PT Name: Karen Briggs  MR #: [id number]     DOCUMENTATION CLARIFICATION     CDS: Louisa ESPINO,RN        Contact information:[email or web address]  This form is a permanent document in the medical record.     Query Date: 2023    By submitting this query, we are merely seeking further clarification of documentation.  Please utilize your independent clinical judgment when addressing the question(s) below.  Provider, please clarify the integumentary diagnosis related to the documentation of sacrum :   The Medical Record contains the followin/29  PT Evaluation  She presents with the following impairments/functional limitations: weakness, impaired endurance, impaired functional mobility, gait instability . Pt did much better today with all fxn'l mobility.      Wound Care Nursing: LOS Su  Patient with stage 2 to right medial buttocks and to right lateral buttocks, sacrum with stage 2 present.   Consult received for sacral wound. Sacrum with stage 2 present.    Date First Assessed/Time First Assessed: 23 1500   Altered Skin Integrity Present on Admission - Did Patient arrive to the hospital with altered skin?: suspected hospital acquired Location: Sacral spine  Description of Altered Skin Integrity: P...     Partial thickness tissue loss. Shallow open ulcer with a red or pink wound bed, without slough. Intact or Open/Ruptured Serum-filled blister.        The clinical guidelines noted are only a system guideline. It does not replace the providers clinical judgment.    Per the National Pressure Injury Advisory Panel:   A pressure injury is localized damage to the skin and underlying soft tissue usually over a bony prominence or related to a medical or other device. The injury can present as intact skin or an open ulcer and may be painful. The injury occurs as a result of intense and/or prolonged pressure or pressure in combination with shear. The tolerance of soft tissue for  pressure and shear may also be affected by microclimate, nutrition, perfusion, co-morbidities and condition of the soft tissue.       Stage 1 Pressure Injury:  Intact skin with a localized area of non-blanchable erythema, which may appear differently in darkly pigmented skin. Color changes do not include purple or maroon discoloration; these may indicate deep tissue pressure injury.    Stage 2 Pressure Injury:  Partial-thickness loss of skin with exposed dermis. The wound bed is viable, pink or red, moist, and may also present as an intact or ruptured serum-filled blister.    Stage 3 Pressure Injury:  Full-thickness loss of skin, in which adipose (fat) is visible in the ulcer and granulation tissue and epibole (rolled wound edges) are often present. Slough and/or eschar may be visible. Undermining and tunneling may occur.    Stage 4 Pressure Injury:  Full-thickness skin and tissue loss with exposed or directly palpable fascia, muscle, tendon, ligament, cartilage or bone in the ulcer. Slough and/or eschar may be visible. Epibole (rolled edges), undermining and/or tunneling often occur.    Unstageable Pressure Injury:  Full-thickness skin and tissue loss in which the extent of tissue damage within the ulcer cannot be confirmed because it is obscured by slough or eschar. If slough or eschar is removed, a Stage 3 or Stage 4 pressure injury will be revealed.    Deep Tissue Pressure Injury:  Intact or non-intact skin with localized area of persistent non-blanchable deep red, maroon, purple discoloration or epidermal separation revealing a dark wound bed or blood filled blister. This injury results from intense and/or prolonged pressure and shear forces at the bone-muscle interface. The wound may evolve rapidly to reveal the actual extent of tissue injury, or may resolve without tissue loss. If necrotic tissue, subcutaneous tissue, granulation tissue, fascia, muscle or other underlying structures are visible, this  indicates a full thickness pressure injury (Unstageable, Stage 3 or Stage 4). Do not use DTPI to describe vascular, traumatic, neuropathic, or dermatologic conditions.       Provider, please clarify the integumentary diagnosis related to the documentation of sacrum:     [  x ] Pressure Injury/Decubitus Ulcer, Stage 2---->sacrum   [   ] Other Integumentary Diagnosis (please specify):______________       Please document in your progress notes daily for the duration of treatment until resolved and include in your discharge summary.    Reference:    JANNA Hogan., SHELL Epps., Goldberg, M., FIORDALIZA Dejesus., JANNA Mcdonald, & DEZ Alvarez. (2016). Revised National Pressure Ulcer Advisory Panel Pressure Injury Staging System: Revised Pressure Injury Staging System. J Wound Ostomy Continence Nurs, 43(6), 585-597. doi:10.1097/[id number]    Form No.[id number]

## 2023-12-13 NOTE — PROGRESS NOTES
[facility]  Infectious Disease Progress Note            ASSESSMENT & PLAN:   She is a 58-year-old female with a past medical history of hypertension who underwent an unsuccessful laparoscopic cholecystectomy on 11/10/2023 as gallbladder was unable to be completely resected.  Since procedure, she continued to have right flank and back pain.  She followed up with her PCP, and CT of the abdomen and pelvis on 11/17 revealed left-sided hydronephrosis with suspected distal obstruction.  Additionally noted was markedly thickened stomach wall.  Underwent ERCP on 11/18-subsequently found to have a malignant gastric tumor.  IR placed a biliary drain on 11/21.  On 11/24, she developed atrial fibrillation with RVR and was initiated on amiodarone drip.   She then had bilateral ureteral stents placed on 11/27 given persistent hydronephrosis.  She was developed an POORNIMA since admit secondary to obstructive uropathy, now requiring hemodialysis.  Temporary hemodialysis catheter was placed earlier today along with MediPort.  Patient was significant oozing from sites postoperatively.  She received 3 units of blood intraoperatively as well as around 1 L of IV fluids per report.  Patient reports developing cough after procedure today, nonproductive.  Denies chest pain and is oxygenating well on room air.  Patient has been receiving empiric Zosyn essentially since admit.  Leukocytosis had trended up around 11/22, however has since trended downward although with some worsening this morning.  Blood cultures from 11/18 are negative.  Urine culture from 11/23 is also negative.  She is been afebrile and hemodynamically stable.  Chest x-ray earlier today showed low lung volumes with mild retrocardiac atelectasis.  We have been consulted for input regarding worsening leukocytosis.      Reconsulted secondary to recent low-grade fevers, T-max of 100.4° on 12/08.  Patient had previously been off of Zosyn since 12/05.  She was resumed on antimicrobials  with vancomycin and Zosyn on 12/10.  She denies any associated symptoms.  She did have a recent drop in her H&H and positive stools.  GI service is considering colonoscopy.  CT of the chest, abdomen, and pelvis with IV contrast performed on 12/09 showed moderate volume ascites with areas of subcutaneous edema in the abdominal wall.  Patient currently without complaints.  Blood cultures drawn on 12/10 via PICC are positive for yeast, C. glabrata per BC[facility].  Peripheral set are negative thus far.          Candidemia - abdominal vs line source  Concern for lower GIB  Ascites per recent CT  Persistent leukocytosis, suspect multifactorial  POORNIMA, now ESRD on HD  Obstructive uropathy, s/p bilateral ureteral stents on 11/27  Persistent hyperbilirubinemia, s/p unsuccessful cholecystectomy on 11/10 and biliary drain on 11/21, exchanged 12/13  Gastric cancer, newly diagnosed  Mediport / tunneled HD catheter status    -Repeat blood cultures unfortunately still positive for Candida glabrata although it appears these were drawn before antibiotics and before pulling out the PICC line   -she does have a temperature of 100.2 today  -Her leukocytosis is however improving   -Had her biliary drain replaced 12/13/2023      Plan:  -Will need GAYLA given persistent fungemia  -Likely need Tunneled cath and mediport removed as well given persistent fungemia   -Maintain with PIVs only if feasible.  -Continue micafungin 100mg IV q24hr.   -Long term prognosis is poor for this patient with advanced cancer, active fungal infection and need for hardware removal  -Discussed with patient and nursing as well as primary team attending     ID will continue following. Please call with any questions or concerns.       SUBJECTIVE:     No fevers, no chills, feeling stable. Is very deconditioned however. Biliary stent exchanged today     MEDICATIONS:   Reviewed in EMR    REVIEW OF SYSTEMS:   Except as documented, all other systems reviewed and negative  "    PHYSICAL EXAM:   T 100.2 °F (37.9 °C)   /78   P 110   RR 19   O2 100 %  GENERAL: NAD; does not appear toxic  SKIN: no rash  HEENT: sclera non-icteric; PERRL   NECK: supple; no LAD  CHEST: CTA; nonlabored, equal expansion; no adventitious BS; RCW tunneled HD catheter and LCW Mediport site ntoed  CARDIOVASCULAR: RRR, S1S2; no murmur   ABDOMEN:  active bowel sounds; abdomen soft, rounded, nontender to palpation; biliary drain / J-tube noted  EXTREMITIES: no cyanosis or clubbing  NEURO: AAO x4; CN II-XII grossly intact  PSYCH: Mentation and affect appropriate     LABS AND IMAGING:     Recent Labs     12/12/23  0216 12/13/23  1001   WBC 22.56* 16.23*   RBC 3.21* 3.59*   HGB 9.6* 10.8*   HCT 27.1* 31.7*   MCV 84.4 88.3   MCH 29.9 30.1   MCHC 35.4 34.1   RDW 17.2* 17.5*   * 516*     No results for input(s): "LACTIC" in the last 72 hours.  No results for input(s): "INR", "APTT", "D-DIMER" in the last 72 hours.  No results for input(s): "HGBA1C", "CHOL", "TRIG", "LDL", "VLDL", "HDL" in the last 72 hours.   Recent Labs     12/11/23  0326 12/12/23  0217 12/13/23  1001   * 132* 131*   K 3.4* 3.4* 5.6*   CHLORIDE 95* 98 100   CO2 22* 20* 22*   BUN 54.3* 72.2* 49.8*   CREATININE 4.15* 5.46* 4.60*   GLUCOSE 97 96 94   CALCIUM 8.5 7.9* 8.7   MG 2.10  --  2.20   PHOS  --   --  4.6   ALBUMIN 1.4* 1.4* 1.5*   GLOBULIN 5.2* 4.4* 5.9*   ALKPHOS 199* 181* 141   ALT 40 39 52   AST 43* 39* 68*   BILITOT 3.9* 4.1* 4.6*     No results for input(s): "BNP", "CPK", "TROPONINI" in the last 72 hours.       Echo    Left Ventricle: The left ventricle is normal in size. Normal wall   thickness. Normal wall motion. There is hyperdynamic systolic function   with a visually estimated ejection fraction of greater than 80%. Grade I   diastolic dysfunction.    Right Ventricle: Normal right ventricular cavity size. Wall thickness   is normal. Right ventricle wall motion  is normal. Systolic function is   moderately reduced.    Aortic " Valve: The aortic valve is a trileaflet valve.    IVC/SVC: Normal venous pressure at 3 mmHg.  CV Ultrasound doppler venous legs bilat    The right superficial femoral middle vein is normal.    The left superficial femoral middle vein is normal.    There was no evidence of deep or superficial vein thrombosis in bilateral   lower extremities.       Nathan Carlisle MD  Infectious Disease  [facility]

## 2023-12-13 NOTE — PROGRESS NOTES
[facility]  Hospital Medicine Progress Note        Chief Complaint: Inpatient Follow-up for intractable nausea vomiting due to gastric adenocarcinoma     HPI:   58-year-old female with medical history of hypertension who recently underwent laparoscopic cholecystectomy on 11/10/2023, procedure was incomplete and was unable to completely resect the gallbladder.  Since surgery she continued to have right flank/back pain and followed up with her PCP and CT abdomen and pelvis on 11/17/2023 revealed left-sided hydronephrosis with suspected distal obstruction/no clear stone visualized, postoperative changes of cholecystectomy with fluid in the gallbladder fossa may reflect postoperative seroma but biloma can not be entirely excluded, also noted for marked thickening of the stomach wall diffusely may be related to mesenteric edema/reactive.  She [place] [facility] [place] same [place] 11/17/2023 and her labs notable for WBC 9.0, hemoglobin 12.4, platelets 442, creatinine 0.86, total bilirubin 8.7 with direct fraction 6.7, alkaline phosphatase 491, , .  Patient's surgeon was consulted and recommended ERCP which was not available at [facility] for which she was transferred to [facility] and referred to hospital medicine service for further evaluation and management. 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  Severe inflammation and oozing of blood noted proximal gastric lumen.  Unable to advance scope into the duodenum. Surgical oncology consulted, IR consulted for external drain placement which was done November 21.  November 24th she developed new onset atrial fibrillation with RVR and Cardiology consulted. Started on amiodarone drip. Unfortunately patient had acute blood loss due to hemorrhage from the midline site that was removed. Patient was unaware of the bleed.  Became very weak, passed out.  Code was called but she came around.   Stat H&H done which was slightly lower but stable, MRI of the brain was negative for any acute ischemic changes. Pt is aware of gastric cancer diagnosis. GI following--> 11/18- EGD shows normal esophagus, malignant gastric tumor in the cardia.  Inflamed mucosa and ooze of blood throughout the proximal gastric lumen.  Gastric antrum scar would not allow advancement of scope into the duodenal ampulla area therefore biliary cannulation was not possible for bile leak evaluation. Pathology shows marked chronic gastritis with intestinal metaplasia, gastric cardia biopsy shows adenocarcinoma, moderately differentiated intestinal type. Bilateral hydronephrosis with left greater than right. MRI brain without contrast-negative for acute finding. PET scan reviewed with patient by Oncology reports of locally advanced gastric adenocarcinoma and plans for systemic therapy outpatient if functional, nutritional and renal function improves. MediPort placement by surgical Oncology on 12/1, oncology on board plans for systemic therapy outpatient if functional, nutritional and renal function improves. Obstructive uropathy with bilateral hydronephrosis status post bilateral ureteral stent placed on 11/27 by Urology- no improvement in renal function, patient opted to have hemodialysis and a right-sided hemodialysis catheter was placed by General surgery on 11/29, to continue hemodialysis per nephrology discretion. Patient with symptoms of nausea and vomiting can not keep anything down in her stomach complicated by severe malnutrition on IV Clinimix and supportive medications for nausea and vomiting. Palliative care consulted. Patient got a MediPort and J-tube placed on 12/01. Cystogram reviewed by Urology with patent stents and recommends outpatient follow-up with Urology. White cell count elevated at 20.4, Infectious Disease had started on IV Zosyn given concern for possible sepsis with low blood pressure on 12/02. CIS evaluated patient  to begin low-dose metoprolol tartrate at 12.5 mg b.i.d. and resume Eliquis for stroke prevention. On TF via J tube which was placed by Surgical Oncology. ID started IV Micafungin for candidemia.     Interval Hx:   Today, Mrs Briggs stated she was doing well and had no new complaints. Noted to have persistent fungemia on 1/2 BCX from 12/11. Consulting CIS for GAYLA. Surgical Oncology will be requested to remove HD line & Mediport. Awaiting labs.    Objective/physical exam:  General: In no acute distress, afebrile  Chest: Clear to auscultation bilaterally, bleeding noted around tunneled catheter  Heart: RRR, +S1, S2, no appreciable murmur  Abdomen: Soft, nontender, BS +  MSK: Warm, no lower extremity edema, no clubbing or cyanosis  Neurologic: Alert and oriented x4, Cranial nerve II-XII intact, Strength 5/5 in all 4 extremities    VITAL SIGNS: 24 HRS MIN & MAX LAST   Temp  Min: 98.2 °F (36.8 °C)  Max: 99.8 °F (37.7 °C) 98.9 °F (37.2 °C)   BP  Min: 103/68  Max: 130/84 109/73   Pulse  Min: 109  Max: 114  109   Resp  Min: 18  Max: 18 18   SpO2  Min: 98 %  Max: 100 % 99 %     I reviewed the labs below:  Recent Labs   Lab 12/10/23  0605 12/10/23  1519 12/11/23  0326 12/11/23  0808 12/12/23  0216   WBC 26.38*  --  25.94*  --  22.56*   RBC 2.10*  --  3.28*  --  3.21*   HGB 6.4*   < > 9.9* 10.3* 9.6*   HCT 18.4*   < > 28.2* 29.5* 27.1*   MCV 87.6  --  86.0  --  84.4   MCH 30.5  --  30.2  --  29.9   MCHC 34.8  --  35.1  --  35.4   RDW 18.7*  --  17.6*  --  17.2*   *  --  448*  --  505*   MPV 10.2  --  10.3  --  10.0    < > = values in this interval not displayed.       Recent Labs   Lab 12/10/23  0605 12/11/23  0326 12/12/23  0217   * 131* 132*   K 3.9 3.4* 3.4*   CO2 20* 22* 20*   BUN 61.9* 54.3* 72.2*   CREATININE 5.01* 4.15* 5.46*   CALCIUM 8.0* 8.5 7.9*   MG  --  2.10  --    ALBUMIN 1.4* 1.4* 1.4*   ALKPHOS 204* 199* 181*   ALT 35 40 39   AST 36* 43* 39*   BILITOT 3.8* 3.9* 4.1*        Assessment/Plan:  Hypotension, improved with Midodrine  Persistent leukocytosis likely 2/2 Fungemia  Acute on chronic anemia, s/p 2 units prbcs transfusion 11/30  Hx of Acute Blood loss anemia with syncope and then fall 11/26-   Locally advanced gastric adenocarcinoma with intractable nausea and vomiting possible Gastric/duodenal outlet obstruction  -now status post J Tube placement  Fungemia possibly 2/2 Biliary Drain Infection  Obstructive uropathy with bilateral hydronephrosis- S/P bilateral ureteral stent placed on 11/27  POORNIMA-now on HD - 11/29  Metabolic acidosis- resolved  Suspected bile leak with biloma and biliary obstruction--> H/O Laparoscopic incomplete cholecystectomy 11/10/2023  New onset atrial fibrillation with RVR- fluctuating  History of hypertension and DDD/sciatica  Severe malnutrition    Patient continues to be admitted  No new complaints reported  On IV Micafungin  ID on-board; follow recs  Nephrology on-board for HD conduction  Surgical Oncology on-board; follow recs  GI on-board; follow recs  Patient planned for biliary drain exchange per ID recs; IR consulted  Blood Cultures 12/10 positive for candida glabrata in the blood 1/2; Repeat BCX ordered 12/11 positive for yeasts; BCX 12/12 pending  CIS consulted for GAYLA; TTE with EF 80%, grade I DD  PICC line removed; will request surgical oncology to remove HD line & Mediport  Surgical Oncology consulted for Cholecystectomy, not thought to be infectious cause; recommendations of Palliative Care  Palliative Care consulted earlier; patient wishes to continue pursuing curative treatment for cancer  GI is called for positive FOBT and drop in Hg. They recommending monitoring.  Patient had MediPort and J-tube placed on 12/01  Cystogram reviewed by Urology with patent stents and recommends outpatient follow-up with Urology  Appreciate Oncology input; plans for systemic therapy outpatient if renal function improves, patient is planning to pursue  treatment   Continued on Hydrocortisone Supp, Metoprolol Tartrate BID, Midodrine TID, Protonix BID, Sodium Bicarbonate     VTE prophylaxis:  FD Lovenox     Patient condition:  Stable     Anticipated discharge and Disposition:  Pending    All diagnosis and differential diagnosis have been reviewed; assessment and plan has been documented; I have personally reviewed the labs and test results that are presently available; I have reviewed the patients medication list; I have reviewed the consulting providers response and recommendations. I have reviewed or attempted to review medical records based upon their availability    All of the patient's questions have been  addressed and answered. Patient's is agreeable to the above stated plan. I will continue to monitor closely and make adjustments to medical management as needed.  _____________________________________________________________________    Nutrition Status:  Patient meets ASPEN criteria for severe malnutrition of acute illness or injury per RD assessment as evidenced by:  Energy Intake (Malnutrition):  (does not meet criteria)  Weight Loss (Malnutrition): greater than 7.5% in 3 months  Subcutaneous Fat (Malnutrition): moderate depletion  Muscle Mass (Malnutrition): moderate depletion           A minimum of two characteristics is recommended for diagnosis of either severe or non-severe malnutrition.   Radiology:   I have personally reviewed the images and agree with radiologist report  Echo    Left Ventricle: The left ventricle is normal in size. Normal wall   thickness. Normal wall motion. There is hyperdynamic systolic function   with a visually estimated ejection fraction of greater than 80%. Grade I   diastolic dysfunction.    Right Ventricle: Normal right ventricular cavity size. Wall thickness   is normal. Right ventricle wall motion  is normal. Systolic function is   moderately reduced.    Aortic Valve: The aortic valve is a trileaflet valve.    IVC/SVC:  Normal venous pressure at 3 mmHg.  CV Ultrasound doppler venous legs bilat    The right superficial femoral middle vein is normal.    The left superficial femoral middle vein is normal.    There was no evidence of deep or superficial vein thrombosis in bilateral   lower extremities.       Mariano Bermudez MD  Department of Hospital Medicine   [facility]   12/13/2023

## 2023-12-13 NOTE — PROGRESS NOTES
"Gastroenterology Progress Note    Subjective/Interval History:  H&H 10.8/31.7     Spoke with nurse regarding patient. External biliary drain exchanged today. Surg onc to remove mediport and tunnel cath. Patient continues with bright red blood mixed into stool. She is c/o rectal pain and postponed morning dose of hydrocortisone suppository until after biliary drain exchange. Per nurse the stool looks like "mercury".    Patient resting in bed. Her right abd is sore from recent biliary drain exchange. She states the suppositories are helping a little but not much.    ROS:  Review of Systems   Constitutional:  Positive for malaise/fatigue.   Respiratory:  Negative for cough and shortness of breath.    Cardiovascular:  Negative for chest pain.   Gastrointestinal:  Positive for blood in stool. Negative for abdominal pain, constipation, diarrhea, melena, nausea and vomiting.   Neurological:  Negative for weakness.       Vital Signs:  /73   Pulse 109   Temp 98.9 °F (37.2 °C) (Oral)   Resp 18   Ht 5' 5.75" (1.67 m)   Wt 96.2 kg (212 lb)   SpO2 99%   Breastfeeding No   BMI 34.48 kg/m²   Body mass index is 34.48 kg/m².    Physical Exam:  Physical Exam  Constitutional:       General: She is not in acute distress.     Appearance: She is obese. She is not ill-appearing.   HENT:      Head: Normocephalic and atraumatic.      Right Ear: External ear normal.      Left Ear: External ear normal.      Nose: Nose normal.      Mouth/Throat:      Pharynx: Oropharynx is clear.   Eyes:      Conjunctiva/sclera: Conjunctivae normal.   Pulmonary:      Effort: Pulmonary effort is normal. No respiratory distress.   Abdominal:      General: Abdomen is flat. There is no distension.      Palpations: Abdomen is soft.      Tenderness: There is no abdominal tenderness. There is no guarding.      Comments: J-tube   Musculoskeletal:         General: Normal range of motion.      Cervical back: Normal range of motion.   Skin:     General: " Skin is warm and dry.   Neurological:      Mental Status: She is alert and oriented to person, place, and time. Mental status is at baseline.   Psychiatric:         Mood and Affect: Mood normal.         Behavior: Behavior normal.         Thought Content: Thought content normal.         Labs:  Recent Results (from the past 24 hour(s))   CBC with Differential    Collection Time: 12/13/23 10:01 AM   Result Value Ref Range    WBC 16.23 (H) 4.50 - 11.50 x10(3)/mcL    RBC 3.59 (L) 4.20 - 5.40 x10(6)/mcL    Hgb 10.8 (L) 12.0 - 16.0 g/dL    Hct 31.7 (L) 37.0 - 47.0 %    MCV 88.3 80.0 - 94.0 fL    MCH 30.1 27.0 - 31.0 pg    MCHC 34.1 33.0 - 36.0 g/dL    RDW 17.5 (H) 11.5 - 17.0 %    Platelet 516 (H) 130 - 400 x10(3)/mcL    MPV 10.3 7.4 - 10.4 fL    Neut % 84.1 %    Lymph % 4.3 %    Mono % 5.8 %    Eos % 1.3 %    Basophil % 0.6 %    Lymph # 0.70 0.6 - 4.6 x10(3)/mcL    Neut # 13.64 (H) 2.1 - 9.2 x10(3)/mcL    Mono # 0.94 0.1 - 1.3 x10(3)/mcL    Eos # 0.21 0 - 0.9 x10(3)/mcL    Baso # 0.10 <=0.2 x10(3)/mcL    IG# 0.64 (H) 0 - 0.04 x10(3)/mcL    IG% 3.9 %    NRBC% 0.0 %         Assessment/Plan:  58-year-old female unknown to our group with a past medical history of HTN. Transferred [facility] to [facility] for concerns of biloma vs bile leak vs obstruction s/p incomplete laparoscopic cholecystectomy 11/10/23  by Dr. Sicard.     ERCP 11/18/23 Dr. Goss: malignant gastric tumor in cardia, inflamed mucosa in gastric body with oozing of blood throughout proximal gastric lumen, gastric antrum scar that would not allow advancement of scope into duodenal ampulla area therefore biliary cannulation was not possible. Path: adenocarcinoma, moderately differentiated, intestinal type    S/p internal/external biliary drain placed by IR 11/21/23. Repeat IR exchange for metal stent can be performed in 2-3 months as ERCP not an option given gastric stricturing    GI signed off with improvement of bilirubin post drain placement.    GI consulted  12/10/23 for anemia, FOBT+.    Symptomatic anemia, likely multifactorial, with component 2/2 GI blood loss - stable  Blood in stool  - monitor and transfuse as needed to keep Hgb >7-8  - monitor stool for color/blood  - PPI BID  - hydrocortisone suppositories BID  - patient reports hemorrhoidectomy at time of colonoscopy in Sept 2023 with Dr. Sicard  - will continue to monitor closely    Gastric adenocarcinoma  - s/p j-tube placement 12/01/23    4. Leukocytosis - improving  - WBC 16  - candidemia on cultures from PICC line  - ID reconsulted, recommended exchange of external biliary drain and removal of PICC. Biliary drain exchanged today.     Alexandra rFy PA-C  Gastroenterology  [facility]

## 2023-12-13 NOTE — PHYSICIAN QUERY
PT Name: Karen Briggs  MR #: [id number]    DOCUMENTATION CLARIFICATION     CDS: Louisa ESPINO,RN        Contact information:[email or web address]    This form is a permanent document in the medical record.     Query Date: December 13, 2023    By submitting this query, we are merely seeking further clarification of documentation. Please utilize your independent clinical judgment when addressing the question(s) below.    Supporting Clinical Findings Location in Medical Record   Patient very well known to us from this admission, we were reconsulted due to recurrent fevers after discontinuation of antibiotics. Had placement of MediPort on 12/01/2023 and placement of tunneled catheter on 12/06/2023.  Patient started developing fevers again on 12/07/2023.   However blood cultures collected on 12/10/2023 had 1 of 2 aerobic bottles collected from the PICC line positive for yeast.     Unfortunately, patient's cultures were collected from the PICC line, positive for yeast, given her underlying pathology, fevers, ongoing leukocytosis, and the fact that this is candidemia, this is a concerning presentation       DC PICC line and maintain with PIVs only if feasible.   Repeat BCx now and get TTE given candidemia.               12/11 ID Consults                 Blood Culture  Candida glabrata Panic   Susceptibility sent to reference lab. Results to follow on separate report.    Gram Stain Result   Yeast  1 of 2 Aerobic bottles positive  Seen in gram stain of broth only    Micafungin 100 mg  in sodium chloride 0.9% 100 ml IVPB  Intravenous  Every 24 hours            12/10----->12/11 Labs                  12/11------>present MAR       Provider, please clarify if there is any clinical correlation between PICC line and Candida glabrata.           Are the conditions:      [  ] Due to or associated with each other   [ x ] Unrelated to each other   [  ] Other explanation (Please Specify): ______________   [  ] Clinically  Undetermined                                                                               Please document in your progress notes daily for the duration of treatment until resolved and include in your discharge summary.

## 2023-12-13 NOTE — PLAN OF CARE
Home health has been arranged with [facility]. [facility] (Palliative care only) will admit patient upon dc. Enteral feedings will be supplied by [facility]. Patient also has outpatient dialysis setup with [facility].    Patient condition has since changed and dc plan is subject to change. CM will continue to follow.

## 2023-12-13 NOTE — PROGRESS NOTES
[facility] - 8th Floor Med Surg    Cardiology  Progress Note    Patient Name: Karen Briggs  MRN: [id number]  Admission Date: 11/17/2023  Hospital Length of Stay: 26 days  Code Status: Full Code   Attending Provider: Mariano Bermudez MD   Consulting Provider: MARIA A Dockery  Primary Care Physician: Marian White FNP-C  Principal Problem:<principal problem not specified>    Patient information was obtained from patient, past medical records, ER records, and primary team.     Subjective:     Chief Complaint: Reason for Consult: AFIB    HPI: Ms. Briggs is a 59 y/o female with a history of HTN, who was last known to [facility], Dr. Randolph (2020). She presented to the ER on 11.17.23 with complaints of right flank pain. She went to see her PCP and an CT Abdomen and Pelvis was obtained. CT (11.17.23) revealed left-sided hydronephrosis with suspected distal obstruction/no clear stone visualized, postoperative changes of cholecystectomy with fluid in the gallbladder fossa may reflect postoperative seroma but biloma can not be entirely excluded, also noted for marked thickening of the stomach wall diffusely may be related to mesenteric edema/reactive. She does report having a laparoscopic cholecystectomy on 11.10.23, in which the gall bladder was not completely resect the gallbladder. On 11.24.23, she was found to be tachycardiac and an EKG was obtained that showed AFIB RVR. Significant labs include WBC 19.4, Chloride 108, CO2 17, BUN/Creat 25.6/1.19, , Albumin 2.1, AST 35, ALT 56. CT Chest unremarkable. CT Abd Plevis revealed Interval placement of a transhepatic biliary stent satisfactory position, Diffuse gastric wall thickening consistent with gastritis, Bilateral hydronephrosis severe on the left and moderate on the right, stable, and Generalized mesenteric inflammatory change, mild ascites, mild pleural fluid. ERCP performed November 18: Malignant gastric tumor in the cardia, inflamed mucosa  "in the gastric body.  Severe inflammation and oozing of blood noted proximal gastric lumen.  Unable to advance scope into the duodenum.  [facility] has been consulted for AFIB management.    Hospital Course:  11.26.23: Remains Afib, Rvr. Amiodarone drip in progress. LFTs stable. H/H stable on weight based lovenox BID.   11.27.23: NAD. Converted to NSR. Remains on amio gtt per protocol. Renal indices worsening. H&H down trending. Remains with nausea but denies CP, SOB, or palps. Hypotensive this am.    11.28.23: NAD. Remains NSR on tele. Renal indices and H&H continue to worsen. Denies CP, SOB, or palps. Remains with nausea. "I am feeling better."   12.13.23: NAD. VSS. No complaints of CP/SOB/Palps. :I feel okay"    PMH: HTN, Sciatica   PSH: Carpal Tunnel Release, Hemorrhoidectomy, EGD, ERCP  Family History: Father - MI, CAD, HTN; Mother - DM II, MI, CAD, HTN, Cancer; Daughter DM II, Rheumatoid Arthritis   Social History: Denies Smoking, illicit drug use, and alcohol use.     Previous Cardiac Diagnostics:   TTE 11.26.23:  Left Ventricle: Normal wall motion. There is normal systolic function with a visually estimated ejection fraction of 55%. Unable to assess diastolic function.  Right Ventricle: Normal right ventricular cavity size. Systolic function is normal.  Tricuspid Valve: There is mild regurgitation.  IVC/SVC: Normal venous pressure at 3 mmHg.    Lower Ext Venous US (1.6.21):  The study quality is average. 2.6 seconds of reflux is noted in the right GSV at the SFJ with a diameter of 6.3mm 1.2 seconds of reflux is noted in the left GSV at the SFJ with a diameter of 8.7mm. Limited study to evaluate SFJ.    MANFRED (11.16.20):  The study quality is good. The resting right ankle brachial index is 1.15 consistent with no significant right peripheral vascular disease.The resting left ankle brachial index is 1.1 consistent with no significant left peripheral vascular disease.    Lower Ext Arterial US (11.16.20):  The study " quality is good. Triphasic waveforms and minimal plaque noted throughout the bilateral lower extremity arteries. No evidence of stenosis found. Calcified artery walls noted in some areas of the bilateral tibial arteries.    TTE (3.12.20):  The study quality is average. The left ventricle is normal in size with mild left ventricular hypertrophy and normal global left ventricular systolic function. The left ventricular ejection fraction is 60%. The left ventricle diastolic function is impaired (Grade I) with normal left atrial pressure. Mild calcification of the aortic valve is noted with adequate cuspal excursion. Mild (1+) tricuspid regurgitation.The estimated pulmonary artery systolic pressure is 23.9 mmHg assuming a right atrial pressure of 3 mmHg.     LHC (6.11.20):  Normal Coronaries     MPI (2.18.20):  This is probably a normal perfusion study. There is no evidence of ischemia. This scan is suggestive of low risk for future cardiovascular events. Small reversible perfusion abnormality of mild intensity in the basal inferoseptal segment. The left ventricular cavity is noted to be normal on the stress study. The left ventricular ejection fraction was calculated to be 71% and left ventricular global function is normal. The study quality is excellent.     Calcium Score (2.10.20):  169    Review of Systems   Respiratory:  Negative for chest tightness and shortness of breath.    Cardiovascular:  Negative for chest pain, palpitations and leg swelling.   Gastrointestinal:  Positive for nausea.   All other systems reviewed and are negative.      Objective:     Vital Signs (Most Recent):  Temp: 98.9 °F (37.2 °C) (12/13/23 0729)  Pulse: 109 (12/13/23 0729)  Resp: 18 (12/13/23 0729)  BP: 109/73 (12/13/23 0729)  SpO2: 99 % (12/13/23 0729) Vital Signs (24h Range):  Temp:  [98.2 °F (36.8 °C)-99.8 °F (37.7 °C)] 98.9 °F (37.2 °C)  Pulse:  [109-114] 109  Resp:  [18] 18  SpO2:  [98 %-100 %] 99 %  BP: (103-130)/(68-84) 109/73      Weight:  (pt unable to stand .)  Body mass index is 34.48 kg/m².    SpO2: 99 %         Intake/Output Summary (Last 24 hours) at 12/13/2023 0847  Last data filed at 12/13/2023 0200  Gross per 24 hour   Intake 362.41 ml   Output 670 ml   Net -307.59 ml         Lines/Drains/Airways       Central Venous Catheter Line  Duration             Tunneled Central Line Insertion/Assessment - Double Lumen  12/06/23 0925 Internal Jugular Right 6 days              Drain  Duration                  Biliary Tube 11/21/23 1352 Other (Comment) 8 Fr. RUQ 21 days         Gastrostomy/Enterostomy 12/01/23 0845 LUQ 12 days              Peripheral Intravenous Line  Duration                  Peripheral IV - Single Lumen 11/24/23 1914 20 G Anterior;Distal;Right Forearm 18 days                    Significant Labs:  Recent Results (from the past 72 hour(s))   Occult Blood, Stool 2nd Specimen    Collection Time: 12/10/23  9:23 AM   Result Value Ref Range    Occult Blood Stool 2 Positive (A) Negative, N/A   Prepare RBC 2 Units; to transfuse    Collection Time: 12/10/23  9:35 AM   Result Value Ref Range    UNIT NUMBER [id number]     UNIT ABO/RH O POS     DISPENSE STATUS Transfused     Unit Expiration [id number]     Product Code A9509B28     Unit Blood Type Code 5100     CROSSMATCH INTERPRETATION Compatible     UNIT NUMBER [id number]     UNIT ABO/RH O POS     DISPENSE STATUS Transfused     Unit Expiration [id number]     Product Code I8351J87     Unit Blood Type Code 5100     CROSSMATCH INTERPRETATION Compatible    Type & Screen    Collection Time: 12/10/23  9:35 AM   Result Value Ref Range    Group & Rh O POS     Indirect Yasir GEL NEG     Specimen Outdate 12/13/2023 23:59    Prepare RBC 2 Units; passing blood via rectum, need PRBC on standby    Collection Time: 12/10/23  9:35 AM   Result Value Ref Range    UNIT NUMBER [id number]     UNIT ABO/RH O POS     DISPENSE STATUS Selected     Unit Expiration [id number]     Product Code  I7689O88     Unit Blood Type Code 5100     CROSSMATCH INTERPRETATION Compatible     UNIT NUMBER [id number]     UNIT ABO/RH O POS     DISPENSE STATUS Selected     Unit Expiration [id number]     Product Code Q4015R80     Unit Blood Type Code 5100     CROSSMATCH INTERPRETATION Compatible    Occult Blood, Stool 3rd Specimen    Collection Time: 12/10/23  2:40 PM   Result Value Ref Range    Stool Color 3 Green     Stool Consistancy 3 soft     Occult Blood Stool 3 Positive (A) Negative, N/A   Hemoglobin and Hematocrit    Collection Time: 12/10/23  3:19 PM   Result Value Ref Range    Hgb 10.9 (L) 12.0 - 16.0 g/dL    Hct 31.2 (L) 37.0 - 47.0 %   Hemoglobin and Hematocrit    Collection Time: 12/11/23 12:24 AM   Result Value Ref Range    Hgb 9.8 (L) 12.0 - 16.0 g/dL    Hct 28.2 (L) 37.0 - 47.0 %   Comprehensive Metabolic Panel    Collection Time: 12/11/23  3:26 AM   Result Value Ref Range    Sodium Level 131 (L) 136 - 145 mmol/L    Potassium Level 3.4 (L) 3.5 - 5.1 mmol/L    Chloride 95 (L) 98 - 107 mmol/L    Carbon Dioxide 22 (L) 23 - 31 mmol/L    Glucose Level 97 82 - 115 mg/dL    Blood Urea Nitrogen 54.3 (H) 9.8 - 20.1 mg/dL    Creatinine 4.15 (H) 0.55 - 1.02 mg/dL    Calcium Level Total 8.5 8.4 - 10.2 mg/dL    Protein Total 6.6 5.8 - 7.6 gm/dL    Albumin Level 1.4 (L) 3.4 - 4.8 g/dL    Globulin 5.2 (H) 2.4 - 3.5 gm/dL    Albumin/Globulin Ratio 0.3 (L) 1.1 - 2.0 ratio    Bilirubin Total 3.9 (H) <=1.5 mg/dL    Alkaline Phosphatase 199 (H) 40 - 150 unit/L    Alanine Aminotransferase 40 0 - 55 unit/L    Aspartate Aminotransferase 43 (H) 5 - 34 unit/L    eGFR 12 mls/min/1.73/m2   Magnesium    Collection Time: 12/11/23  3:26 AM   Result Value Ref Range    Magnesium Level 2.10 1.60 - 2.60 mg/dL   CBC with Differential    Collection Time: 12/11/23  3:26 AM   Result Value Ref Range    WBC 25.94 (H) 4.50 - 11.50 x10(3)/mcL    RBC 3.28 (L) 4.20 - 5.40 x10(6)/mcL    Hgb 9.9 (L) 12.0 - 16.0 g/dL    Hct 28.2 (L) 37.0 - 47.0 %     MCV 86.0 80.0 - 94.0 fL    MCH 30.2 27.0 - 31.0 pg    MCHC 35.1 33.0 - 36.0 g/dL    RDW 17.6 (H) 11.5 - 17.0 %    Platelet 448 (H) 130 - 400 x10(3)/mcL    MPV 10.3 7.4 - 10.4 fL    Neut % 88.1 %    Lymph % 2.9 %    Mono % 5.9 %    Eos % 0.7 %    Basophil % 0.4 %    Lymph # 0.75 0.6 - 4.6 x10(3)/mcL    Neut # 22.84 (H) 2.1 - 9.2 x10(3)/mcL    Mono # 1.53 (H) 0.1 - 1.3 x10(3)/mcL    Eos # 0.19 0 - 0.9 x10(3)/mcL    Baso # 0.10 <=0.2 x10(3)/mcL    IG# 0.53 (H) 0 - 0.04 x10(3)/mcL    IG% 2.0 %    NRBC% 0.0 %   Hemoglobin and Hematocrit    Collection Time: 12/11/23  8:08 AM   Result Value Ref Range    Hgb 10.3 (L) 12.0 - 16.0 g/dL    Hct 29.5 (L) 37.0 - 47.0 %   Blood Culture    Collection Time: 12/11/23  4:11 PM    Specimen: Blood   Result Value Ref Range    CULTURE, BLOOD ([facility]) No Growth At 24 Hours    Blood Culture    Collection Time: 12/11/23  4:11 PM    Specimen: Blood   Result Value Ref Range    CULTURE, BLOOD ([facility]) Yeast     GRAM STAIN Yeast (AA)     GRAM STAIN 1 of 2 Aerobic bottles positive (AA)     GRAM STAIN Seen in gram stain of broth only (AA)    Echo    Collection Time: 12/11/23  5:41 PM   Result Value Ref Range    BSA 1.95 m2    Boggs's Biplane MOD Ejection Fraction 53 %    LVOT stroke volume 58.72 cm3    LVIDd 4.29 3.5 - 6.0 cm    LV Systolic Volume 15.10 mL    LV Systolic Volume Index 7.4 mL/m2    LVIDs 2.14 2.1 - 4.0 cm    LV Diastolic Volume 82.60 mL    LV Diastolic Volume Index 40.49 mL/m2    IVS 0.73 0.6 - 1.1 cm    LVOT diameter 2.00 cm    LVOT area 3.1 cm2    FS 50 (A) 28 - 44 %    Left Ventricle Relative Wall Thickness 0.42 cm    Posterior Wall 0.90 0.6 - 1.1 cm    LV mass 107.53 g    LV Mass Index 53 g/m2    MV Peak E Dante 0.62 m/s    TDI LATERAL 0.13 m/s    TDI SEPTAL 0.06 m/s    E/E' ratio 6.53 m/s    MV Peak A Dante 1.06 m/s    TR Max Dante 2.47 m/s    E/A ratio 0.58     E wave deceleration time 148.00 msec    LV SEPTAL E/E' RATIO 10.33 m/s    LV LATERAL E/E' RATIO 4.77 m/s    LVOT peak dante 1.47  m/s    Left Ventricular Outflow Tract Mean Velocity 0.99 cm/s    Left Ventricular Outflow Tract Mean Gradient 5.00 mmHg    TAPSE 1.34 cm    LA size 3.80 cm    AV mean gradient 6 mmHg    AV peak gradient 10 mmHg    Ao peak vishnu 1.60 m/s    Ao VTI 20.50 cm    LVOT peak VTI 18.70 cm    AV valve area 2.86 cm²    AV Velocity Ratio 0.92     AV index (prosthetic) 0.91     KAT by Velocity Ratio 2.88 cm²    MV mean gradient 2 mmHg    MV peak gradient 4 mmHg    MV stenosis pressure 1/2 time 48.00 ms    MV valve area p 1/2 method 4.58 cm2    MV valve area by continuity eq 3.43 cm2    MV VTI 17.1 cm    Triscuspid Valve Regurgitation Peak Gradient 24 mmHg    PV PEAK VELOCITY 1.20 m/s    PV peak gradient 6 mmHg    Mean e' 0.10 m/s    ZLVIDS -4.39     ZLVIDD -3.49     TV resting pulmonary artery pressure 27 mmHg    RV TB RVSP 5 mmHg    Est. RA pres 3 mmHg   CBC with Differential    Collection Time: 12/12/23  2:16 AM   Result Value Ref Range    WBC 22.56 (H) 4.50 - 11.50 x10(3)/mcL    RBC 3.21 (L) 4.20 - 5.40 x10(6)/mcL    Hgb 9.6 (L) 12.0 - 16.0 g/dL    Hct 27.1 (L) 37.0 - 47.0 %    MCV 84.4 80.0 - 94.0 fL    MCH 29.9 27.0 - 31.0 pg    MCHC 35.4 33.0 - 36.0 g/dL    RDW 17.2 (H) 11.5 - 17.0 %    Platelet 505 (H) 130 - 400 x10(3)/mcL    MPV 10.0 7.4 - 10.4 fL    Neut % 85.5 %    Lymph % 3.2 %    Mono % 5.6 %    Eos % 2.0 %    Basophil % 0.4 %    Lymph # 0.73 0.6 - 4.6 x10(3)/mcL    Neut # 19.28 (H) 2.1 - 9.2 x10(3)/mcL    Mono # 1.26 0.1 - 1.3 x10(3)/mcL    Eos # 0.46 0 - 0.9 x10(3)/mcL    Baso # 0.09 <=0.2 x10(3)/mcL    IG# 0.74 (H) 0 - 0.04 x10(3)/mcL    IG% 3.3 %    NRBC% 0.0 %   Vancomycin, Random    Collection Time: 12/12/23  2:17 AM   Result Value Ref Range    Vanc Lvl Random 20.8 (H) 15.0 - 20.0 ug/ml   Comprehensive Metabolic Panel    Collection Time: 12/12/23  2:17 AM   Result Value Ref Range    Sodium Level 132 (L) 136 - 145 mmol/L    Potassium Level 3.4 (L) 3.5 - 5.1 mmol/L    Chloride 98 98 - 107 mmol/L    Carbon Dioxide  20 (L) 23 - 31 mmol/L    Glucose Level 96 82 - 115 mg/dL    Blood Urea Nitrogen 72.2 (H) 9.8 - 20.1 mg/dL    Creatinine 5.46 (H) 0.55 - 1.02 mg/dL    Calcium Level Total 7.9 (L) 8.4 - 10.2 mg/dL    Protein Total 5.8 5.8 - 7.6 gm/dL    Albumin Level 1.4 (L) 3.4 - 4.8 g/dL    Globulin 4.4 (H) 2.4 - 3.5 gm/dL    Albumin/Globulin Ratio 0.3 (L) 1.1 - 2.0 ratio    Bilirubin Total 4.1 (H) <=1.5 mg/dL    Alkaline Phosphatase 181 (H) 40 - 150 unit/L    Alanine Aminotransferase 39 0 - 55 unit/L    Aspartate Aminotransferase 39 (H) 5 - 34 unit/L    eGFR 8 mls/min/1.73/m2   Hepatitis B surface antigen    Collection Time: 12/12/23  2:17 AM   Result Value Ref Range    Hepatitis B Surface Antigen Nonreactive Nonreactive   Hepatitis B Surface Ab, Qualitative    Collection Time: 12/12/23  9:40 AM   Result Value Ref Range    Hepatitis B Surface Antibody [place] (A) Nonreactive    Hepatitis B Surface Antibody Qnt 8.32 mIU/mL     Telemetry:  Afib RVR       Physical Exam  Vitals and nursing note reviewed.   Constitutional:       Appearance: She is ill-appearing.   HENT:      Head: Normocephalic.      Nose: Nose normal.      Mouth/Throat:      Mouth: Mucous membranes are moist.      Pharynx: Oropharynx is clear.   Eyes:      Pupils: Pupils are equal, round, and reactive to light.   Cardiovascular:      Rate and Rhythm: Normal rate and regular rhythm.      Pulses: Normal pulses.      Heart sounds: Normal heart sounds.   Pulmonary:      Effort: Pulmonary effort is normal. No respiratory distress.      Breath sounds: Normal breath sounds.   Abdominal:      General: Bowel sounds are normal.      Palpations: Abdomen is soft.      Comments: Biliary drain   Musculoskeletal:         General: Normal range of motion.      Cervical back: Normal range of motion.   Skin:     General: Skin is warm and dry.   Neurological:      Mental Status: She is alert and oriented to person, place, and time.   Psychiatric:         Mood and Affect: Mood normal.          Behavior: Behavior normal.         Judgment: Judgment normal.       Home Medications:   No current facility-administered medications on file prior to encounter.     Current Outpatient Medications on File Prior to Encounter   Medication Sig Dispense Refill    olmesartan (BENICAR) 20 MG tablet Take 20 mg by mouth once daily.       Current Inpatient Medications:    Current Facility-Administered Medications:     0.9%  NaCl infusion (for blood administration), , Intravenous, Q24H PRN, Almaz Cheney, FNP    0.9%  NaCl infusion (for blood administration), , Intravenous, Q24H PRN, Maryanne Moise, AGNP    0.9%  NaCl infusion, , Intravenous, PRN, Hussein Merino MD, Last Rate: 5 mL/hr at 12/10/23 1928, Rate Verify at 12/10/23 1928    acetaminophen tablet 650 mg, 650 mg, Oral, Q4H PRN, Hussein Merino MD, 650 mg at 12/08/23 0514    aluminum-magnesium hydroxide-simethicone 200-200-20 mg/5 mL suspension 30 mL, 30 mL, Oral, QID PRN, Hussein Merino MD    bisacodyL suppository 10 mg, 10 mg, Rectal, Daily PRN, Hussein Merino MD    chlorproMAZINE injection 25 mg, 25 mg, Intramuscular, QID PRN, Dalton Palacios MD, 25 mg at 12/05/23 1102    dicyclomine injection 20 mg, 20 mg, Intramuscular, QID PRN, Dalton Palacios MD, 20 mg at 11/30/23 1754    enoxaparin injection 100 mg, 1 mg/kg, Subcutaneous, Q24H (treatment, non-standard time), Mukesh Cole MD, 100 mg at 12/13/23 0006    hydrALAZINE injection 10 mg, 10 mg, Intravenous, Q6H PRN, Kenney Steiner MD    hydrocortisone suppository 25 mg, 25 mg, Rectal, BID, Alexandra Fry PA, 25 mg at 12/13/23 0801    hyoscyamine ODT 0.125 mg, 0.125 mg, Sublingual, Q4H PRN, Sara Waldrop AGACNP-BC, 0.125 mg at 11/30/23 1735    melatonin tablet 6 mg, 6 mg, Oral, Nightly PRN, Hussein Merino MD, 6 mg at 12/10/23 2045    metoclopramide HCl injection 5 mg, 5 mg, Intravenous, Q6H PRN, Dalton Palacios MD, 5 mg at 12/13/23 0453    metoprolol injection 5 mg, 5 mg, Intravenous, Q4H PRN, Ty  Rein T, FNP, 5 mg at 12/08/23 0329    metoprolol tartrate (LOPRESSOR) split tablet 12.5 mg, 12.5 mg, Oral, BID, Genaro Crawford T, FNP, 12.5 mg at 12/13/23 0801    micafungin 100 mg in sodium chloride 0.9 % 100 mL IVPB (MB+), 100 mg, Intravenous, Q24H, Mukesh Cole MD, Stopped at 12/12/23 1928    midodrine tablet 10 mg, 10 mg, Oral, TID WM, Dalton Palacios MD, 10 mg at 12/13/23 0759    morphine injection 4 mg, 4 mg, Intravenous, Q4H PRN, Hussein Merino MD, 4 mg at 12/02/23 0217    ondansetron injection 4 mg, 4 mg, Intravenous, Q4H PRN, Dalton Palacios MD    oxyCODONE immediate release tablet 5 mg, 5 mg, Oral, Q6H PRN, Hussein Merino MD, 5 mg at 12/04/23 2023    pantoprazole injection 40 mg, 40 mg, Intravenous, BID WM, Dalton Palacios MD, 40 mg at 12/13/23 0759    perflutren lipid microspheres injection 1.3 mL, 1.3 mL, Intravenous, Once, Dalton Palacios MD    polyethylene glycol packet 17 g, 17 g, Oral, BID PRN, Hussein Merino MD    prochlorperazine injection Soln 5 mg, 5 mg, Intravenous, Q6H PRN, Hussein Merino MD, 5 mg at 11/22/23 1347    promethazine injection 25 mg, 25 mg, Intramuscular, Q4H PRN, Lyssa Car AGACNP-BC, 25 mg at 11/23/23 1620    senna-docusate 8.6-50 mg per tablet 2 tablet, 2 tablet, Oral, BID PRN, Hussein Merino MD    sodium bicarbonate tablet 1,300 mg, 1,300 mg, Oral, Daily, Maryanne Moise, AGNP, 1,300 mg at 12/13/23 0801    sodium chloride 0.9% bolus 250 mL 250 mL, 250 mL, Intravenous, PRN, Maryanne Moise, FLOR    sodium chloride 0.9% flush 10 mL, 10 mL, Intravenous, PRN, Hussein Merino MD    Flushing PICC/Midline Protocol, , , Until Discontinued **AND** sodium chloride 0.9% flush 10 mL, 10 mL, Intravenous, Q6H, 10 mL at 12/13/23 0005 **AND** sodium chloride 0.9% flush 10 mL, 10 mL, Intravenous, PRN, Kenney Steiner MD         VTE Risk Mitigation (From admission, onward)           Ordered     enoxaparin injection 100 mg  Every 24 hours         12/10/23 1703     IP VTE LOW RISK PATIENT  Once          11/18/23 0447     Place sequential compression device  Until discontinued         11/18/23 0447                    Assessment:   Persistent Fungemia   Newly Diagnosed Atrial Fibrillation  - now SR    - [id number] Score 2 (2.2% Stroke Risk per Year)  Biliary Obstruction likely s/t portal hepatic lymphadenopathy    - Successful placement of biliary drainage catheter: 8 Polish internal external biliary drainage catheter on 11.21.23     - Incomplete Laparoscopic cholecystectomy 11.10.23  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     - s/p Mediport 12.1.23  Malnutrition    - j-tube placed on 12.1.23  POORNIMA     - now on dialysis; hemodialysis cath 11.29.23  Elevated LFTs  Anemia - Improved  Leukocytosis   Electrolyte Derangements     - Hyperkalemia     - Hyponatremia   Bilateral Hydronephrosis     - Severe on the Left    - Moderate on the Right     - s/p bilateral ureteral stents (11.27.23)   HTN  Sciatica   Hypokalemia  No known History of GI Bleed     Plan:   Plan for GAYLA Tomorrow   NPO after MN and Hold Tube at MN  Continue Metoprolol   Continue FD Lovenox for Stroke Prevention   Medical Management per Primary Team  Keep Mag > 2 and Potassium > 4  Labs in AM: CBC, CMP, and Mag     MARIA A Dockery  Cardiology  [facility]   12/13/2023     Physician addendum:  I have seen and examined this patient as a split-shared visit with the JUAN A d/t complicated medical management of above problems written in assessment and high acuity requiring physician expertise in medical decision-making. I performed the substantive portion of the history and exam. Above medical decision-making is also formulated by me.    Cardiovascular exam:  S1, S2  Lungs:  fine crackles at bases.  Extremities: + edema bilaterally    Plan:  Medications and plan as above.      Blair Lundberg MD  Cardiologist

## 2023-12-13 NOTE — PT/OT/SLP PROGRESS
Physical Therapy Treatment    Patient Name:  Karen Briggs   MRN:  [id number]    Recommendations:     Discharge therapy intensity: Moderate Intensity Therapy   Discharge Equipment Recommendations: walker, rolling  Barriers to discharge: Impaired mobility and Ongoing medical needs    Assessment:     Karen Briggs is a 58 y.o. female admitted with a medical diagnosis of <principal problem not specified>.  She presents with the following impairments/functional limitations: weakness, impaired endurance, impaired functional mobility, gait instability, decreased coordination, impaired cognition, impaired cardiopulmonary response to activity .    Rehab Prognosis: Good; patient would benefit from acute skilled PT services to address these deficits and reach maximum level of function.    Recent Surgery: Procedure(s) (LRB):  Insertion, Catheter, Central Venous, Hemodialysis (N/A) 7 Days Post-Op    Plan:     During this hospitalization, patient to be seen 5 x/week to address the identified rehab impairments via gait training, therapeutic activities, therapeutic exercises, neuromuscular re-education and progress toward the following goals:    Plan of Care Expires:  01/12/24    Subjective     Chief Complaint: fatigue  Patient/Family Comments/goals: none expressed  Pain/Comfort:  Pain Rating Post-Intervention 1: 0/10      Objective:     Communicated with pts nurse prior to session.  Patient found HOB elevated with central line, PEG Tube, PICC line, pulse ox (continuous), telemetry upon PT entry to room.     General Precautions: Standard, fall  Orthopedic Precautions: N/A  Braces: N/A  Respiratory Status: Room air  Blood Pressure: 101/69  Skin Integrity: Visible skin intact      Functional Mobility:  Bed Mobility:     Scooting: contact guard assistance  Bridging: contact guard assistance  Supine to Sit: contact guard assistance and minimum assistance  Sit to Supine: minimum assistance  Transfers:     Sit to Stand:  minimum  assistance with rolling walker and from EOB and BSC  Bed to Chair: contact guard assistance and minimum assistance with  rolling walker  using  Step Transfer  Gait: Pt ambulated 30 ft and 20 ft w/ RW, slow pace, cues for posture and to step into walker      Education:  Patient provided with verbal education and demonstrations education regarding  mechanics for transfers and bed mobility strategies .  Understanding was verbalized, however additional teaching warranted.     Patient left up in chair with all lines intact, call button in reach, and returned bck to bed 2 hours later by PTA ..    GOALS:   Multidisciplinary Problems       Physical Therapy Goals          Problem: Physical Therapy    Goal Priority Disciplines Outcome Goal Variances Interventions   Physical Therapy Goal     PT, PT/OT Ongoing, Progressing     Description: Pt will improve functional independence by performing:    Bed mobility: SBA  Sit to stand: SBA with rolling walker  Bed to chair t/f: Min A with Stand Step  with rolling walker  Ambulation x 15 ft with Min A  with rolling walker                       Time Tracking:     PT Received On: 12/13/23  PT Start Time: 1138     PT Stop Time: 1202  PT Total Time (min): 24 min     Billable Minutes: Gait Training 10 and Therapeutic Activity 14    Treatment Type: Treatment  PT/PTA: PTA     Number of PTA visits since last PT visit: 1 12/13/2023

## 2023-12-13 NOTE — PHYSICIAN QUERY
PT Name: Karen Briggs  MR #: [id number]     DOCUMENTATION CLARIFICATION     CDS: Louisa ESPINO,RN        Contact information:[email or web address]  This form is a permanent document in the medical record.     Query Date: 2023    By submitting this query, we are merely seeking further clarification of documentation.  Please utilize your independent clinical judgment when addressing the question(s) below.  Provider, please clarify  the integumentary diagnosis related to the documentation of right medial buttocks :   The Medical Record contains the followin/29  PT Evaluation  She presents with the following impairments/functional limitations: weakness, impaired endurance, impaired functional mobility, gait instability . Pt did much better today with all fxn'l mobility.       Wound Care Nursing: LOS Su  Patient with stage 2 to right medial buttocks and to right lateral buttocks, sacrum with stage 2 present.     Date First Assessed/Time First Assessed: 23 1500   Altered Skin Integrity Present on Admission - Did Patient arrive to the hospital with altered skin?: suspected hospital acquired  Side: Right  Orientation: medial  Location: Buttocks  Is this in...                  Partial thickness tissue loss. Shallow open ulcer with a red or pink wound bed, without slough. Intact or Open/Ruptured Serum-filled blister.          The clinical guidelines noted are only a system guideline. It does not replace the providers clinical judgment.    Per the National Pressure Injury Advisory Panel:   A pressure injury is localized damage to the skin and underlying soft tissue usually over a bony prominence or related to a medical or other device. The injury can present as intact skin or an open ulcer and may be painful. The injury occurs as a result of intense and/or prolonged pressure or pressure in combination with shear. The tolerance of soft tissue for pressure and shear may also be  affected by microclimate, nutrition, perfusion, co-morbidities and condition of the soft tissue.       Stage 1 Pressure Injury:  Intact skin with a localized area of non-blanchable erythema, which may appear differently in darkly pigmented skin. Color changes do not include purple or maroon discoloration; these may indicate deep tissue pressure injury.    Stage 2 Pressure Injury:  Partial-thickness loss of skin with exposed dermis. The wound bed is viable, pink or red, moist, and may also present as an intact or ruptured serum-filled blister.    Stage 3 Pressure Injury:  Full-thickness loss of skin, in which adipose (fat) is visible in the ulcer and granulation tissue and epibole (rolled wound edges) are often present. Slough and/or eschar may be visible. Undermining and tunneling may occur.    Stage 4 Pressure Injury:  Full-thickness skin and tissue loss with exposed or directly palpable fascia, muscle, tendon, ligament, cartilage or bone in the ulcer. Slough and/or eschar may be visible. Epibole (rolled edges), undermining and/or tunneling often occur.    Unstageable Pressure Injury:  Full-thickness skin and tissue loss in which the extent of tissue damage within the ulcer cannot be confirmed because it is obscured by slough or eschar. If slough or eschar is removed, a Stage 3 or Stage 4 pressure injury will be revealed.    Deep Tissue Pressure Injury:  Intact or non-intact skin with localized area of persistent non-blanchable deep red, maroon, purple discoloration or epidermal separation revealing a dark wound bed or blood filled blister. This injury results from intense and/or prolonged pressure and shear forces at the bone-muscle interface. The wound may evolve rapidly to reveal the actual extent of tissue injury, or may resolve without tissue loss. If necrotic tissue, subcutaneous tissue, granulation tissue, fascia, muscle or other underlying structures are visible, this indicates a full thickness pressure  injury (Unstageable, Stage 3 or Stage 4). Do not use DTPI to describe vascular, traumatic, neuropathic, or dermatologic conditions.       Provider, please clarify  the integumentary diagnosis related to the documentation of right medial buttocks:     [  x ] Pressure Injury/Decubitus Ulcer, Stage 2----->buttocks   [   ] Other Integumentary Diagnosis (please specify):______________       Please document in your progress notes daily for the duration of treatment until resolved and include in your discharge summary.    Reference:    JANNA Hogan., SHELL Epps., Goldberg, M., JANNA Dejesus, JANNA Mcdonald, & DEZ Alvarez. (2016). Revised National Pressure Ulcer Advisory Panel Pressure Injury Staging System: Revised Pressure Injury Staging System. J Wound Ostomy Continence Nurs, 43(6), 585-597. doi:10.1097/won.[id number]    Form No.41632

## 2023-12-14 ENCOUNTER — ANESTHESIA EVENT (OUTPATIENT)
Dept: CARDIOLOGY | Facility: HOSPITAL | Age: 58
DRG: 356 | End: 2023-12-14
Payer: COMMERCIAL

## 2023-12-14 ENCOUNTER — ANESTHESIA (OUTPATIENT)
Dept: CARDIOLOGY | Facility: HOSPITAL | Age: 58
DRG: 356 | End: 2023-12-14
Payer: COMMERCIAL

## 2023-12-14 LAB
ABO + RH BLD: NORMAL
ABO + RH BLD: NORMAL
ALBUMIN SERPL-MCNC: 1.5 G/DL (ref 3.4–4.8)
ALBUMIN/GLOB SERPL: 0.3 RATIO (ref 1.1–2)
ALP SERPL-CCNC: 156 UNIT/L (ref 40–150)
ALT SERPL-CCNC: 53 UNIT/L (ref 0–55)
AST SERPL-CCNC: 46 UNIT/L (ref 5–34)
BACTERIA BLD CULT: ABNORMAL
BASOPHILS # BLD AUTO: 0.09 X10(3)/MCL
BASOPHILS NFR BLD AUTO: 0.5 %
BILIRUB SERPL-MCNC: 4.3 MG/DL
BLD PROD TYP BPU: NORMAL
BLD PROD TYP BPU: NORMAL
BLOOD UNIT EXPIRATION DATE: NORMAL
BLOOD UNIT EXPIRATION DATE: NORMAL
BLOOD UNIT TYPE CODE: 5100
BLOOD UNIT TYPE CODE: 5100
BUN SERPL-MCNC: 68.6 MG/DL (ref 9.8–20.1)
CALCIUM SERPL-MCNC: 8.8 MG/DL (ref 8.4–10.2)
CHLORIDE SERPL-SCNC: 99 MMOL/L (ref 98–107)
CO2 SERPL-SCNC: 21 MMOL/L (ref 23–31)
CREAT SERPL-MCNC: 5.54 MG/DL (ref 0.55–1.02)
CROSSMATCH INTERPRETATION: NORMAL
CROSSMATCH INTERPRETATION: NORMAL
DISPENSE STATUS: NORMAL
DISPENSE STATUS: NORMAL
EOSINOPHIL # BLD AUTO: 0.45 X10(3)/MCL (ref 0–0.9)
EOSINOPHIL NFR BLD AUTO: 2.7 %
ERYTHROCYTE [DISTWIDTH] IN BLOOD BY AUTOMATED COUNT: 17.2 % (ref 11.5–17)
GFR SERPLBLD CREATININE-BSD FMLA CKD-EPI: 8 MLS/MIN/1.73/M2
GLOBULIN SER-MCNC: 5.3 GM/DL (ref 2.4–3.5)
GLUCOSE SERPL-MCNC: 97 MG/DL (ref 82–115)
GRAM STN SPEC: ABNORMAL
HCT VFR BLD AUTO: 30 % (ref 37–47)
HGB BLD-MCNC: 10.4 G/DL (ref 12–16)
IMM GRANULOCYTES # BLD AUTO: 0.73 X10(3)/MCL (ref 0–0.04)
IMM GRANULOCYTES NFR BLD AUTO: 4.4 %
LYMPHOCYTES # BLD AUTO: 0.72 X10(3)/MCL (ref 0.6–4.6)
LYMPHOCYTES NFR BLD AUTO: 4.3 %
MAGNESIUM SERPL-MCNC: 2.3 MG/DL (ref 1.6–2.6)
MCH RBC QN AUTO: 30 PG (ref 27–31)
MCHC RBC AUTO-ENTMCNC: 34.7 G/DL (ref 33–36)
MCV RBC AUTO: 86.5 FL (ref 80–94)
MONOCYTES # BLD AUTO: 0.99 X10(3)/MCL (ref 0.1–1.3)
MONOCYTES NFR BLD AUTO: 5.9 %
NEUTROPHILS # BLD AUTO: 13.72 X10(3)/MCL (ref 2.1–9.2)
NEUTROPHILS NFR BLD AUTO: 82.2 %
NRBC BLD AUTO-RTO: 0 %
PHOSPHATE SERPL-MCNC: 4.6 MG/DL (ref 2.3–4.7)
PLATELET # BLD AUTO: 673 X10(3)/MCL (ref 130–400)
PMV BLD AUTO: 10 FL (ref 7.4–10.4)
POTASSIUM SERPL-SCNC: 3.4 MMOL/L (ref 3.5–5.1)
PROT SERPL-MCNC: 6.8 GM/DL (ref 5.8–7.6)
RBC # BLD AUTO: 3.47 X10(6)/MCL (ref 4.2–5.4)
SODIUM SERPL-SCNC: 131 MMOL/L (ref 136–145)
UNIT NUMBER: NORMAL
UNIT NUMBER: NORMAL
WBC # SPEC AUTO: 16.7 X10(3)/MCL (ref 4.5–11.5)

## 2023-12-14 PROCEDURE — 25000003 PHARM REV CODE 250: Performed by: INTERNAL MEDICINE

## 2023-12-14 PROCEDURE — D9220A PRA ANESTHESIA: ICD-10-PCS | Mod: ANES,,, | Performed by: ANESTHESIOLOGY

## 2023-12-14 PROCEDURE — 83735 ASSAY OF MAGNESIUM: CPT | Performed by: STUDENT IN AN ORGANIZED HEALTH CARE EDUCATION/TRAINING PROGRAM

## 2023-12-14 PROCEDURE — 80100016 HC MAINTENANCE HEMODIALYSIS

## 2023-12-14 PROCEDURE — 97116 GAIT TRAINING THERAPY: CPT | Mod: CQ

## 2023-12-14 PROCEDURE — 25000003 PHARM REV CODE 250

## 2023-12-14 PROCEDURE — D9220A PRA ANESTHESIA: Mod: CRNA,,,

## 2023-12-14 PROCEDURE — 25000003 PHARM REV CODE 250: Performed by: STUDENT IN AN ORGANIZED HEALTH CARE EDUCATION/TRAINING PROGRAM

## 2023-12-14 PROCEDURE — 21400001 HC TELEMETRY ROOM

## 2023-12-14 PROCEDURE — 25000003 PHARM REV CODE 250: Performed by: NURSE PRACTITIONER

## 2023-12-14 PROCEDURE — 99233 SBSQ HOSP IP/OBS HIGH 50: CPT | Mod: FS,,, | Performed by: GENERAL PRACTICE

## 2023-12-14 PROCEDURE — 84100 ASSAY OF PHOSPHORUS: CPT | Performed by: STUDENT IN AN ORGANIZED HEALTH CARE EDUCATION/TRAINING PROGRAM

## 2023-12-14 PROCEDURE — 63600175 PHARM REV CODE 636 W HCPCS: Performed by: STUDENT IN AN ORGANIZED HEALTH CARE EDUCATION/TRAINING PROGRAM

## 2023-12-14 PROCEDURE — D9220A PRA ANESTHESIA: ICD-10-PCS | Mod: CRNA,,,

## 2023-12-14 PROCEDURE — 63600175 PHARM REV CODE 636 W HCPCS: Performed by: INTERNAL MEDICINE

## 2023-12-14 PROCEDURE — 80053 COMPREHEN METABOLIC PANEL: CPT | Performed by: STUDENT IN AN ORGANIZED HEALTH CARE EDUCATION/TRAINING PROGRAM

## 2023-12-14 PROCEDURE — 85025 COMPLETE CBC W/AUTO DIFF WBC: CPT | Performed by: STUDENT IN AN ORGANIZED HEALTH CARE EDUCATION/TRAINING PROGRAM

## 2023-12-14 PROCEDURE — 63600175 PHARM REV CODE 636 W HCPCS

## 2023-12-14 PROCEDURE — A4216 STERILE WATER/SALINE, 10 ML: HCPCS | Performed by: INTERNAL MEDICINE

## 2023-12-14 PROCEDURE — D9220A PRA ANESTHESIA: Mod: ANES,,, | Performed by: ANESTHESIOLOGY

## 2023-12-14 PROCEDURE — C9113 INJ PANTOPRAZOLE SODIUM, VIA: HCPCS | Performed by: INTERNAL MEDICINE

## 2023-12-14 RX ORDER — SODIUM CHLORIDE 0.9 % (FLUSH) 0.9 %
10 SYRINGE (ML) INJECTION
Status: CANCELLED | OUTPATIENT
Start: 2023-12-14

## 2023-12-14 RX ORDER — LIDOCAINE HYDROCHLORIDE 20 MG/ML
INJECTION INTRAVENOUS
Status: DISCONTINUED | OUTPATIENT
Start: 2023-12-14 | End: 2023-12-14

## 2023-12-14 RX ORDER — LIDOCAINE HYDROCHLORIDE 10 MG/ML
1 INJECTION, SOLUTION EPIDURAL; INFILTRATION; INTRACAUDAL; PERINEURAL ONCE
Status: CANCELLED | OUTPATIENT
Start: 2023-12-14 | End: 2023-12-14

## 2023-12-14 RX ORDER — GLYCOPYRROLATE 0.2 MG/ML
INJECTION INTRAMUSCULAR; INTRAVENOUS
Status: DISCONTINUED | OUTPATIENT
Start: 2023-12-14 | End: 2023-12-14

## 2023-12-14 RX ORDER — PROPOFOL 10 MG/ML
VIAL (ML) INTRAVENOUS
Status: DISCONTINUED | OUTPATIENT
Start: 2023-12-14 | End: 2023-12-14

## 2023-12-14 RX ADMIN — PANTOPRAZOLE SODIUM 40 MG: 40 INJECTION, POWDER, FOR SOLUTION INTRAVENOUS at 09:12

## 2023-12-14 RX ADMIN — METOPROLOL TARTRATE 12.5 MG: 25 TABLET, FILM COATED ORAL at 09:12

## 2023-12-14 RX ADMIN — HYDROCORTISONE ACETATE 25 MG: 25 SUPPOSITORY RECTAL at 09:12

## 2023-12-14 RX ADMIN — ACETAMINOPHEN 650 MG: 325 TABLET, FILM COATED ORAL at 03:12

## 2023-12-14 RX ADMIN — LIDOCAINE HYDROCHLORIDE 80 MG: 20 INJECTION INTRAVENOUS at 02:12

## 2023-12-14 RX ADMIN — GLYCOPYRROLATE 0.2 MG: 0.2 INJECTION INTRAMUSCULAR; INTRAVENOUS at 02:12

## 2023-12-14 RX ADMIN — MIDODRINE HYDROCHLORIDE 10 MG: 5 TABLET ORAL at 08:12

## 2023-12-14 RX ADMIN — PANTOPRAZOLE SODIUM 40 MG: 40 INJECTION, POWDER, FOR SOLUTION INTRAVENOUS at 08:12

## 2023-12-14 RX ADMIN — SODIUM CHLORIDE, PRESERVATIVE FREE 10 ML: 5 INJECTION INTRAVENOUS at 03:12

## 2023-12-14 RX ADMIN — METOPROLOL TARTRATE 12.5 MG: 25 TABLET, FILM COATED ORAL at 08:12

## 2023-12-14 RX ADMIN — PROPOFOL 100 MG: 10 INJECTION, EMULSION INTRAVENOUS at 02:12

## 2023-12-14 RX ADMIN — SODIUM BICARBONATE 650 MG TABLET 1300 MG: at 09:12

## 2023-12-14 RX ADMIN — MICAFUNGIN SODIUM 100 MG: 100 INJECTION, POWDER, LYOPHILIZED, FOR SOLUTION INTRAVENOUS at 09:12

## 2023-12-14 RX ADMIN — MIDODRINE HYDROCHLORIDE 10 MG: 5 TABLET ORAL at 09:12

## 2023-12-14 RX ADMIN — SODIUM CHLORIDE, SODIUM GLUCONATE, SODIUM ACETATE, POTASSIUM CHLORIDE AND MAGNESIUM CHLORIDE: 526; 502; 368; 37; 30 INJECTION, SOLUTION INTRAVENOUS at 02:12

## 2023-12-14 RX ADMIN — HYDROCORTISONE ACETATE 25 MG: 25 SUPPOSITORY RECTAL at 08:12

## 2023-12-14 RX ADMIN — MIDODRINE HYDROCHLORIDE 10 MG: 5 TABLET ORAL at 03:12

## 2023-12-14 NOTE — TRANSFER OF CARE
"Anesthesia Transfer of Care Note    Patient: Karen Briggs    Procedure(s) Performed: * No procedures listed *    Patient location: Cath Lab    Anesthesia Type: MAC    Transport from OR: Transported from OR on room air with adequate spontaneous ventilation    Post pain: adequate analgesia    Post assessment: no apparent anesthetic complications    Post vital signs: stable    Level of consciousness: awake    Nausea/Vomiting: no nausea/vomiting    Complications: none    Transfer of care protocol was followed      Last vitals: Visit Vitals  /76 (BP Location: Right arm, Patient Position: Lying)   Pulse 104   Temp 37.2 °C (98.9 °F) (Oral)   Resp 14   Ht 5' 5.75" (1.67 m)   Wt 96.2 kg (212 lb)   SpO2 100%   Breastfeeding No   BMI 34.48 kg/m²     "

## 2023-12-14 NOTE — PROGRESS NOTES
"Gastroenterology Progress Note    Subjective/Interval History:  H&H 10.4/30.0    Spoke with nurse regarding patient. Per night shift patient still having silver stooling, no reports of red or black stool.    Patient resting in bed. She is having mild abd tenderness with palpation. Rectal pain improving. Otherwise she states she is the same as yesterday. External biliary drain with yellow/brown output.    ROS:  Review of Systems   Constitutional:  Positive for malaise/fatigue.   Respiratory:  Negative for cough and shortness of breath.    Cardiovascular:  Negative for chest pain.   Gastrointestinal:  Negative for abdominal pain, blood in stool, constipation, diarrhea, melena, nausea and vomiting.   Neurological:  Negative for weakness.       Vital Signs:  /72   Pulse 95   Temp 98.9 °F (37.2 °C) (Oral)   Resp 18   Ht 5' 5.75" (1.67 m)   Wt 96.2 kg (212 lb)   SpO2 99%   Breastfeeding No   BMI 34.48 kg/m²   Body mass index is 34.48 kg/m².    Physical Exam:  Physical Exam  Constitutional:       General: She is not in acute distress.     Appearance: She is obese. She is not ill-appearing.   HENT:      Head: Normocephalic and atraumatic.      Right Ear: External ear normal.      Left Ear: External ear normal.      Nose: Nose normal.      Mouth/Throat:      Pharynx: Oropharynx is clear.   Eyes:      Conjunctiva/sclera: Conjunctivae normal.   Pulmonary:      Effort: Pulmonary effort is normal. No respiratory distress.   Abdominal:      General: Abdomen is flat. There is no distension.      Palpations: Abdomen is soft.      Tenderness: There is no abdominal tenderness. There is no guarding.      Comments: J-tube. External biliary drain with brown/yellow output   Musculoskeletal:         General: Normal range of motion.      Cervical back: Normal range of motion.   Skin:     General: Skin is warm and dry.   Neurological:      Mental Status: She is alert and oriented to person, place, and time. Mental status is at " baseline.   Psychiatric:         Mood and Affect: Mood normal.         Behavior: Behavior normal.         Thought Content: Thought content normal.         Labs:  No results found for this or any previous visit (from the past 24 hour(s)).        Assessment/Plan:  58-year-old female unknown to our group with a past medical history of HTN. [place] [facility] [facility] for concerns of biloma vs bile leak vs obstruction s/p incomplete laparoscopic cholecystectomy 11/10/23  by Dr. Sicard.     ERCP 11/18/23 Dr. Goss: malignant gastric tumor in cardia, inflamed mucosa in gastric body with oozing of blood throughout proximal gastric lumen, gastric antrum scar that would not allow advancement of scope into duodenal ampulla area therefore biliary cannulation was not possible. Path: adenocarcinoma, moderately differentiated, intestinal type    S/p internal/external biliary drain placed by IR 11/21/23. Repeat IR exchange for metal stent can be performed in 2-3 months as ERCP not an option given gastric stricturing    GI signed off with improvement of bilirubin post drain placement.    GI consulted 12/10/23 for anemia, FOBT+.    Symptomatic anemia, likely multifactorial, with component 2/2 GI blood loss - stable  Blood in stool - resolved  - monitor and transfuse as needed to keep Hgb >7-8  - monitor stool for color/blood. Per nursing staff the stool appears silver  - PPI BID  - hydrocortisone suppositories BID  - patient reports hemorrhoidectomy at time of colonoscopy in Sept 2023 with Dr. Sicard  - will continue to monitor closely    Gastric adenocarcinoma  - s/p j-tube placement 12/01/23    4. Leukocytosis  - WBC 16  - candidemia on cultures from PICC line  - ID reconsulted, recommended exchange of external biliary drain and removal of PICC. Biliary drain exchanged yesterday    GI available if needed.    Alexandra Fry, PAGonzálezC  Gastroenterology  [facility]

## 2023-12-14 NOTE — ANESTHESIA PREPROCEDURE EVALUATION
12/14/2023  Karen Briggs is a 58 y.o., female inpatient for GAYLA (fungemia) to rule out vegetation. Patient originally admitted November 10th after incomplete resection of gallbladder and subsequent ERCP revealed malignant gastric tumor.  Biliary drain placed by Interventional Radiology and Afib with RVR noted during hospitalization as well.   During hospitalization patient  required renal stents for hydronephrosis with obstructive uropathy which progressed from acute renal insufficiency 2 end-stage renal disease requiring dialysis     Transthoracic echo December 11, 2023   EF 80% with grade 1 diastolic dysfunction   Normal right-sided pressures  No significant valvular disease    Pre-op Assessment    I have reviewed the Patient Summary Reports.     I have reviewed the Nursing Notes. I have reviewed the NPO Status.   I have reviewed the Medications.     Review of Systems  Anesthesia Hx:  No problems with previous Anesthesia               Denies Personal Hx of Anesthesia complications.                    Social:  Non-Smoker       Hematology/Oncology:                 Gastric (Stomach)         Hematology Comments: Fungemia      Current/Recent Cancer.                Cardiovascular:     Hypertension   Denies MI.     Dysrhythmias atrial fibrillation   Denies CHF.         Functional Capacity good / => 4 METS                   Hypertension         Pulmonary:    Denies COPD.  Denies Asthma.                    Hepatic/GI:      Denies GERD. Denies Liver Disease.  Denies Hepatitis.           Neurological:    Denies CVA. Neuromuscular Disease,   Denies Seizures.                              Neuromuscular Disease   Endocrine:  Denies Diabetes. Denies Hypothyroidism.  Denies Hyperthyroidism.       Denies Obesity / BMI > 30      Physical Exam  General: Well nourished, Cooperative, Alert and Oriented    Airway:  Mouth  Opening: Normal  TM Distance: Normal  Tongue: Normal  Neck ROM: Normal ROM    Dental:    Chest/Lungs:  Clear to auscultation, Normal Respiratory Rate    Heart:  Rate: Normal  Rhythm: Regular Rhythm        Anesthesia Plan  Type of Anesthesia, risks & benefits discussed:    Anesthesia Type: Gen Natural Airway  Intra-op Monitoring Plan: Standard ASA Monitors  Induction:  IV  Informed Consent: Informed consent signed with the Patient and all parties understand the risks and agree with anesthesia plan.  All questions answered.   ASA Score: 3  Day of Surgery Review of History & Physical: H&P Update referred to the surgeon/provider.    Ready For Surgery From Anesthesia Perspective.     .

## 2023-12-14 NOTE — PROGRESS NOTES
[facility]  Infectious Disease Progress Note            ASSESSMENT & PLAN:   She is a 58-year-old female with a past medical history of hypertension who underwent an unsuccessful laparoscopic cholecystectomy on 11/10/2023 as gallbladder was unable to be completely resected.  Since procedure, she continued to have right flank and back pain.  She followed up with her PCP, and CT of the abdomen and pelvis on 11/17 revealed left-sided hydronephrosis with suspected distal obstruction.  Additionally noted was markedly thickened stomach wall.  Underwent ERCP on 11/18-subsequently found to have a malignant gastric tumor.  IR placed a biliary drain on 11/21.  On 11/24, she developed atrial fibrillation with RVR and was initiated on amiodarone drip.   She then had bilateral ureteral stents placed on 11/27 given persistent hydronephrosis.  She was developed an POORNIMA since admit secondary to obstructive uropathy, now requiring hemodialysis.  Temporary hemodialysis catheter was placed earlier today along with MediPort.  Patient was significant oozing from sites postoperatively.  She received 3 units of blood intraoperatively as well as around 1 L of IV fluids per report.  Patient reports developing cough after procedure today, nonproductive.  Denies chest pain and is oxygenating well on room air.  Patient has been receiving empiric Zosyn essentially since admit.  Leukocytosis had trended up around 11/22, however has since trended downward although with some worsening this morning.  Blood cultures from 11/18 are negative.  Urine culture from 11/23 is also negative.  She is been afebrile and hemodynamically stable.  Chest x-ray earlier today showed low lung volumes with mild retrocardiac atelectasis.  We have been consulted for input regarding worsening leukocytosis.      Reconsulted secondary to recent low-grade fevers, T-max of 100.4° on 12/08.  Patient had previously been off of Zosyn since 12/05.  She was resumed on antimicrobials  with vancomycin and Zosyn on 12/10.  She denies any associated symptoms.  She did have a recent drop in her H&H and positive stools.  GI service is considering colonoscopy.  CT of the chest, abdomen, and pelvis with IV contrast performed on 12/09 showed moderate volume ascites with areas of subcutaneous edema in the abdominal wall.  Patient currently without complaints.  Blood cultures drawn on 12/10 via PICC are positive for yeast, C. glabrata per BCID.  Peripheral set are negative thus far.          Candidemia - abdominal vs line source  Concern for lower GIB  Ascites per recent CT  Persistent leukocytosis, suspect multifactorial  POORNIMA, now ESRD on HD  Obstructive uropathy, s/p bilateral ureteral stents on 11/27  Persistent hyperbilirubinemia, s/p unsuccessful cholecystectomy on 11/10 and biliary drain on 11/21, exchanged 12/13  Gastric cancer, newly diagnosed  Mediport / tunneled HD catheter status        Plan:  Needs GAYLA and Mediport / tunneled catheter removed.   F/u repeat BCx.   Maintain with PIVs only if feasible.  Continue micafungin 100mg IV q24hr.   Discussed with patient, nursing, and primary.       SUBJECTIVE:   AF, VSS.  No events overnight. No complaints.     MEDICATIONS:   Reviewed in EMR    REVIEW OF SYSTEMS:   Except as documented, all other systems reviewed and negative     PHYSICAL EXAM:   T 98.3 °F (36.8 °C)   /76   P 99   RR 18   O2 100 %  GENERAL: NAD; does not appear toxic  SKIN: no rash  HEENT: sclera non-icteric; PERRL   NECK: supple; no LAD  CHEST: CTA; nonlabored, equal expansion; no adventitious BS; RCW tunneled HD catheter and LCW Mediport site ntoed  CARDIOVASCULAR: RRR, S1S2; no murmur   ABDOMEN:  active bowel sounds; abdomen soft, rounded, nontender to palpation; biliary drain / J-tube noted  EXTREMITIES: no cyanosis or clubbing  NEURO: AAO x4; CN II-XII grossly intact  PSYCH: Mentation and affect appropriate     LABS AND IMAGING:     Recent Labs     12/13/23  1001  "12/14/23  1214   WBC 16.23* 16.70*   RBC 3.59* 3.47*   HGB 10.8* 10.4*   HCT 31.7* 30.0*   MCV 88.3 86.5   MCH 30.1 30.0   MCHC 34.1 34.7   RDW 17.5* 17.2*   * 673*       No results for input(s): "LACTIC" in the last 72 hours.  No results for input(s): "INR", "APTT", "D-DIMER" in the last 72 hours.  No results for input(s): "HGBA1C", "CHOL", "TRIG", "LDL", "VLDL", "HDL" in the last 72 hours.   Recent Labs     12/13/23  1001 12/14/23  1214   * 131*   K 5.6* 3.4*   CHLORIDE 100 99   CO2 22* 21*   BUN 49.8* 68.6*   CREATININE 4.60* 5.54*   GLUCOSE 94 97   CALCIUM 8.7 8.8   MG 2.20 2.30   PHOS 4.6 4.6   ALBUMIN 1.5* 1.5*   GLOBULIN 5.9* 5.3*   ALKPHOS 141 156*   ALT 52 53   AST 68* 46*   BILITOT 4.6* 4.3*       No results for input(s): "BNP", "CPK", "TROPONINI" in the last 72 hours.       IR Biliary Catheter Exchange with Imaging  Narrative: EXAMINATION:  Fluoroscopic guidance    Percutaneous transhepatic cholangiogram    Internal external biliary drain exchange    CLINICAL HISTORY:  Leaking internal external biliary drainage catheter.    TECHNIQUE:  After the risks, benefits and alternatives were discussed, consent was obtained. A time out was performed to verify the patient's identity and procedure.    The patient was placed supine on the angiographic table. The abdomen was cleaned and prepped in normal sterile fashion.  Contrast injected through the existing catheter demonstrating patency.    The existing catheter was removed over a Bentson wire without complication.  The Bentson wire was exchanged for an Amplatz wire through a hockey-stick catheter.    A new upsized 10 Kittitian internal external biliary drainage catheter was placed over the wire without complication.  Cholangiogram confirmed appropriate placement.    Catheter was sutured to the skin using 0 silk.  The patient tolerated the procedure well. There were no immediate complications.    All needle, wire and catheter manipulations were done under " fluoroscopic guidance.    EBL: < 10mL    Fluoro Time (min): 1.6    Fluoro Dose (mGy): 22.9  Impression: Successful exchange and up sizing to a 10 Macedonian internal external biliary drainage catheter.    I, Renny Batres, was present for the entire procedure.    Electronically signed by: Renny Batres  Date:    12/14/2023  Time:    13:38      Nathan Carlisle MD  Infectious Disease  [facility]

## 2023-12-14 NOTE — PROGRESS NOTES
Nephrology consult follow up note    HPI:      Karen Briggs is a 58 y.o. female presented on  7 days post op from laparoscopic cholecystectomy on 11/10. Gallbladder was unable to be resected. Back and flank pain persisted post operatively [place] [facility] After workup her surgeon recommended ERCP for which she was sent to this facility. Left sided hydronephrosis noted on CT scans done on admission. At that time renal function was fairly normal and patient was given option for stenting or to defer as outpatient and she chose the latter.      During ERCP, malignant gastric mass noted and ERCP was unable to be completed due to duodenal scarring. 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 Patient ultimately had biliary drain placed per IR.      She developed A fib with RVR requiring amiodarone infusion. She then had significant blood loss post removal of long term IV and subsequent fall in her room.      Patient renal function was relatively stable until decline starting 11/25. She was ultimately initiated on HD 11/28 post bilateral ureteral stent placement with Dr Cardenas same day.     Patient developed dialysis dependent acute kidney injury, and was started on hemodialysis 11/29/2023.    Interval history:   12/10- Hgb 6.2 down from 8.1/33.5 yesterday.  Dialysis nurse did report a little hypotension and tachycardia during treatment yesterday.  No obvious bleeding overnight.  Patient is resting comfortably in bed on room air.  No complaints.  Significant other at bedside.     12/11 - - Pt noted appropriate rise in Hgb post transfusion. No fluid able to be removed during dialysis yesterday due to hypotension despite PRBC x2. GI now following recommends 48 hours of suspension of Eliquis prior to endoscopy. Sitting in chair in good spirits with no complaints.     12/12/2023 currently tolerating dialysis.  Little short of breath.  Otherwise no new complaints.  She did receive 2 units of  packed RBCs yesterday.  ID MD note noted.  If tunneled dialysis catheter and MediPort needs to be removed, then it needs to be removed at the same time which needs to be coordinated with 2 different surgeons.    12/14 - - Patient working with PT at present. Pending dialysis today     Objective:       VITAL SIGNS: 24 HR MIN & MAX LAST    Temp  Min: 98.1 °F (36.7 °C)  Max: 100.2 °F (37.9 °C)  98.9 °F (37.2 °C)        BP  Min: 105/72  Max: 123/83  105/72     Pulse  Min: 95  Max: 112  95     Resp  Min: 18  Max: 19  18    SpO2  Min: 99 %  Max: 100 %  99 %      GEN:  Well-appearing AAF currently tolerating dialysis.  Awake alert oriented.  No obvious respiratory distress noted.    CV: RRR without rub or gallop.  PULM: CTAB, unlabored  ABD: Soft, NT/ND abdomen with NABS, tube feeding in progress via J-tube  EXT:  2+ lower extremity edema  SKIN: Warm and dry  PSYCH: Awake, alert and appropriately conversant.   Dialysis access:  Right IJ tunneled dialysis catheter            Component Value Date/Time     (L) 12/13/2023 1001     (L) 12/12/2023 0217     04/24/2018 0340    K 5.6 (H) 12/13/2023 1001    K 3.4 (L) 12/12/2023 0217    K 3.2 (L) 04/24/2018 0340    CHLORIDE 100 12/13/2023 1001    CHLORIDE 98 12/12/2023 0217    CO2 22 (L) 12/13/2023 1001    CO2 20 (L) 12/12/2023 0217    CO2 20 (L) 04/24/2018 0340    BUN 49.8 (H) 12/13/2023 1001    BUN 72.2 (H) 12/12/2023 0217    BUN 10 04/24/2018 0340    CREATININE 4.60 (H) 12/13/2023 1001    CREATININE 5.46 (H) 12/12/2023 0217    CREATININE 0.7 04/24/2018 0340    CALCIUM 8.7 12/13/2023 1001    CALCIUM 7.9 (L) 12/12/2023 0217    CALCIUM 9.2 04/24/2018 0340    PHOS 4.6 12/13/2023 1001            Component Value Date/Time    WBC 16.70 (H) 12/14/2023 1214    WBC 16.23 (H) 12/13/2023 1001    WBC 24.01 12/05/2023 0455    WBC 23.73 11/23/2023 0723    WBC 12.03 04/24/2018 0340    HGB 10.4 (L) 12/14/2023 1214    HGB 10.8 (L) 12/13/2023 1001    HGB 11.3 (L) 04/24/2018 0340     HCT 30.0 (L) 12/14/2023 1214    HCT 31.7 (L) 12/13/2023 1001    HCT 35.3 (L) 04/24/2018 0340     (H) 12/14/2023 1214     (H) 12/13/2023 1001     04/24/2018 0340         Imaging reviewed      Assessment / Plan:   POORNIMA multifactorial secondary to obstructive uropathy, acute blood loss, contrast exposure, hypotension and Afib with RVR  ---Nephrotic range proteinuria noted. 4.4 g  ---Dialysis initiated 11/28 post tunneled catheter placement 12/6  Newly diagnosed malignant gastric mass. Pathology consistent with adenocarcinoma   -s/p J tube and mediport placement 12/1  Acute blood loss anemia 2/2 bleeding post long term IV removal   --s/p transfusion 11/30, 12/1, 12/10  Bilateral hydronephrosis noted 11/21 - stent placement initially deferred due to stable renal function   Afib with RVR resolved   S/p biliary drain placement and subsequent exchange due to fungemia   Fungemia - blood cultures positive 12/10, 12/11      Recommendations  Dr Luo prepared to remove tunneled dialysis catheter when timing is correct. Awaiting the go ahead from ID.   In the meantime we will continue dialysis on TThSa schedule

## 2023-12-14 NOTE — PROGRESS NOTES
[facility]  Hospital Medicine Progress Note        Chief Complaint: Inpatient Follow-up for intractable nausea vomiting due to gastric adenocarcinoma     HPI:   58-year-old female with medical history of hypertension who recently underwent laparoscopic cholecystectomy on 11/10/2023, procedure was incomplete and was unable to completely resect the gallbladder.  Since surgery she continued to have right flank/back pain and followed up with her PCP and CT abdomen and pelvis on 11/17/2023 revealed left-sided hydronephrosis with suspected distal obstruction/no clear stone visualized, postoperative changes of cholecystectomy with fluid in the gallbladder fossa may reflect postoperative seroma but biloma can not be entirely excluded, also noted for marked thickening of the stomach wall diffusely may be related to mesenteric edema/reactive.  [place] [facility] [place] and her labs notable for WBC 9.0, hemoglobin 12.4, platelets 442, creatinine 0.86, total bilirubin 8.7 with direct fraction 6.7, alkaline phosphatase 491, , .  Patient's surgeon was consulted and recommended ERCP which was not available [place] [facility] [place] which she was transferred to [facility] and referred to hospital medicine service for further evaluation and management. 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  Severe inflammation and oozing of blood noted proximal gastric lumen.  Unable to advance scope into the duodenum. Surgical oncology consulted, IR consulted for external drain placement which was done November 21.  November 24th she developed new onset atrial fibrillation with RVR and Cardiology consulted. Started on amiodarone drip. Unfortunately patient had acute blood loss due to hemorrhage from the midline site that was removed. Patient was unaware of the bleed.  Became very weak, passed out.  Code was called but she came around.   Stat H&H done which was slightly lower but stable, MRI of the brain was negative for any acute ischemic changes. Pt is aware of gastric cancer diagnosis. GI following--> 11/18- EGD shows normal esophagus, malignant gastric tumor in the cardia.  Inflamed mucosa and ooze of blood throughout the proximal gastric lumen.  Gastric antrum scar would not allow advancement of scope into the duodenal ampulla area therefore biliary cannulation was not possible for bile leak evaluation. Pathology shows marked chronic gastritis with intestinal metaplasia, gastric cardia biopsy shows adenocarcinoma, moderately differentiated intestinal type. Bilateral hydronephrosis with left greater than right. MRI brain without contrast-negative for acute finding. PET scan reviewed with patient by Oncology reports of locally advanced gastric adenocarcinoma and plans for systemic therapy outpatient if functional, nutritional and renal function improves. MediPort placement by surgical Oncology on 12/1, oncology on board plans for systemic therapy outpatient if functional, nutritional and renal function improves. Obstructive uropathy with bilateral hydronephrosis status post bilateral ureteral stent placed on 11/27 by Urology- no improvement in renal function, patient opted to have hemodialysis and a right-sided hemodialysis catheter was placed by General surgery on 11/29, to continue hemodialysis per nephrology discretion. Patient with symptoms of nausea and vomiting can not keep anything down in her stomach complicated by severe malnutrition on IV Clinimix and supportive medications for nausea and vomiting. Palliative care consulted. Patient got a MediPort and J-tube placed on 12/01. Cystogram reviewed by Urology with patent stents and recommends outpatient follow-up with Urology. White cell count elevated at 20.4, Infectious Disease had started on IV Zosyn given concern for possible sepsis with low blood pressure on 12/02. CIS evaluated patient  to begin low-dose metoprolol tartrate at 12.5 mg b.i.d. and resume Eliquis for stroke prevention. On TF via J tube which was placed by Surgical Oncology. ID started IV Micafungin for candidemia. Noted to have persistent fungemia on 1/2 BCX from 12/11.  Consulting CIS for GAYLA. Surgical Oncology will be requested to remove HD line & Mediport.     Interval Hx:   Today, Mrs Briggs stated she was doing well and had no new complaints. Will F/U on mediport & tunneled catheter removal. Due for GAYLA today. NPO overnight & TF held at MN. Awaiting labs.    Objective/physical exam:  General: In no acute distress, afebrile  Chest: Clear to auscultation bilaterally, bleeding noted around tunneled catheter  Heart: RRR, +S1, S2, no appreciable murmur  Abdomen: Soft, nontender, BS +  MSK: Warm, no lower extremity edema, no clubbing or cyanosis  Neurologic: Alert and oriented x4, Cranial nerve II-XII intact, Strength 5/5 in all 4 extremities    VITAL SIGNS: 24 HRS MIN & MAX LAST   Temp  Min: 98.1 °F (36.7 °C)  Max: 100.2 °F (37.9 °C) 98.1 °F (36.7 °C)   BP  Min: 101/69  Max: 123/83 109/78   Pulse  Min: 102  Max: 112  104   Resp  Min: 18  Max: 19 18   SpO2  Min: 99 %  Max: 100 % 100 %     I reviewed the labs below:  Recent Labs   Lab 12/11/23  0326 12/11/23  0808 12/12/23  0216 12/13/23  1001   WBC 25.94*  --  22.56* 16.23*   RBC 3.28*  --  3.21* 3.59*   HGB 9.9* 10.3* 9.6* 10.8*   HCT 28.2* 29.5* 27.1* 31.7*   MCV 86.0  --  84.4 88.3   MCH 30.2  --  29.9 30.1   MCHC 35.1  --  35.4 34.1   RDW 17.6*  --  17.2* 17.5*   *  --  505* 516*   MPV 10.3  --  10.0 10.3       Recent Labs   Lab 12/11/23  0326 12/12/23  0217 12/13/23  1001   * 132* 131*   K 3.4* 3.4* 5.6*   CO2 22* 20* 22*   BUN 54.3* 72.2* 49.8*   CREATININE 4.15* 5.46* 4.60*   CALCIUM 8.5 7.9* 8.7   MG 2.10  --  2.20   ALBUMIN 1.4* 1.4* 1.5*   ALKPHOS 199* 181* 141   ALT 40 39 52   AST 43* 39* 68*   BILITOT 3.9* 4.1* 4.6*       Assessment/Plan:  Hypotension,  improved with Midodrine  Persistent leukocytosis likely 2/2 Fungemia  Acute on chronic anemia, s/p 2 units prbcs transfusion 11/30  Hx of Acute Blood loss anemia with syncope and then fall 11/26-   Locally advanced gastric adenocarcinoma with intractable nausea and vomiting possible Gastric/duodenal outlet obstruction  -now status post J Tube placement  Fungemia possibly 2/2 Biliary Drain Infection  Obstructive uropathy with bilateral hydronephrosis- S/P bilateral ureteral stent placed on 11/27  POORNIMA-now on HD - 11/29  Metabolic acidosis- resolved  Suspected bile leak with biloma and biliary obstruction--> H/O Laparoscopic incomplete cholecystectomy 11/10/2023  New onset atrial fibrillation with RVR- fluctuating  History of hypertension and DDD/sciatica  Severe malnutrition    Patient continues to be admitted  No new complaints reported  Planned for GAYLA today  On IV Micafungin  ID on-board; follow recs  Nephrology on-board for HD conduction  Surgical Oncology on-board; follow recs  GI on-board; follow recs  Patient underwent biliary drain exchange on 12/13 per ID recs  Blood Cultures 12/10 positive for candida glabrata in the blood 1/2; Repeat BCX ordered 12/11 positive for yeasts; BCX 12/12 negative x 24 hrs  CIS consulted for GAYLA; TTE with EF 80%, grade I DD  PICC line removed; have requested surgical oncology to remove HD line & Mediport; will F/U  Patient had MediPort and J-tube placed on 12/01  Surgical Oncology consulted for Cholecystectomy, not thought to be infectious cause; recommendations of Palliative Care  Palliative Care consulted earlier; patient wishes to continue pursuing curative treatment for cancer  GI is called for positive FOBT and drop in Hg; monitoring H/H  Cystogram reviewed by Urology with patent stents and recommends outpatient follow-up with Urology  Appreciate Oncology input; plans for systemic therapy outpatient if renal function improves, patient is planning to pursue treatment    Continued on Hydrocortisone Supp, Metoprolol Tartrate BID, Midodrine TID, Protonix BID, Sodium Bicarbonate     VTE prophylaxis:  FD Lovenox     Patient condition:  Stable     Anticipated discharge and Disposition:  Pending    All diagnosis and differential diagnosis have been reviewed; assessment and plan has been documented; I have personally reviewed the labs and test results that are presently available; I have reviewed the patients medication list; I have reviewed the consulting providers response and recommendations. I have reviewed or attempted to review medical records based upon their availability    All of the patient's questions have been  addressed and answered. Patient's is agreeable to the above stated plan. I will continue to monitor closely and make adjustments to medical management as needed.  _____________________________________________________________________    Nutrition Status:  Patient meets ASPEN criteria for severe malnutrition of acute illness or injury per RD assessment as evidenced by:  Energy Intake (Malnutrition):  (does not meet criteria)  Weight Loss (Malnutrition): greater than 7.5% in 3 months  Subcutaneous Fat (Malnutrition): moderate depletion  Muscle Mass (Malnutrition): moderate depletion           A minimum of two characteristics is recommended for diagnosis of either severe or non-severe malnutrition.   Radiology:   I have personally reviewed the images and agree with radiologist report  Echo    Left Ventricle: The left ventricle is normal in size. Normal wall   thickness. Normal wall motion. There is hyperdynamic systolic function   with a visually estimated ejection fraction of greater than 80%. Grade I   diastolic dysfunction.    Right Ventricle: Normal right ventricular cavity size. Wall thickness   is normal. Right ventricle wall motion  is normal. Systolic function is   moderately reduced.    Aortic Valve: The aortic valve is a trileaflet valve.    IVC/SVC: Normal  venous pressure at 3 mmHg.  CV Ultrasound doppler venous legs bilat    The right superficial femoral middle vein is normal.    The left superficial femoral middle vein is normal.    There was no evidence of deep or superficial vein thrombosis in bilateral   lower extremities.       Mariano Bermudez MD  Department of Hospital Medicine   [facility]   12/14/2023

## 2023-12-14 NOTE — PT/OT/SLP PROGRESS
Physical Therapy Treatment    Patient Name:  Karen Briggs   MRN:  [id number]    Recommendations:     Discharge therapy intensity: Moderate Intensity Therapy   Discharge Equipment Recommendations: walker, rolling  Barriers to discharge: Impaired mobility and Ongoing medical needs    Assessment:     Karen Briggs is a 58 y.o. female admitted with a medical diagnosis of <principal problem not specified>.  She presents with the following impairments/functional limitations: weakness, impaired endurance, impaired functional mobility, gait instability, decreased coordination, impaired cognition, impaired cardiopulmonary response to activity.    Rehab Prognosis: Good; patient would benefit from acute skilled PT services to address these deficits and reach maximum level of function.    Recent Surgery: Procedure(s) (LRB):  Insertion, Catheter, Central Venous, Hemodialysis (N/A) 8 Days Post-Op    Plan:     During this hospitalization, patient to be seen 5 x/week to address the identified rehab impairments via gait training, therapeutic activities, therapeutic exercises, neuromuscular re-education and progress toward the following goals:    Plan of Care Expires:  01/12/24    Subjective     Chief Complaint: frustrated with medical condition  Patient/Family Comments/goals: ready to go home  Pain/Comfort:  Pain Rating 1: 0/10      Objective:     Communicated with pts nurse prior to session.  Patient found HOB elevated with central line, PEG Tube, PICC line, telemetry, pulse ox (continuous) upon PT entry to room.     General Precautions: Standard, fall  Orthopedic Precautions: N/A  Braces: N/A  Respiratory Status: Room air  Blood Pressure: 118/76, 111 HR  Skin Integrity: Visible skin intact      Functional Mobility:  Bed Mobility:     Scooting: contact guard assistance  Supine to Sit: minimum assistance  Sit to Supine: minimum assistance  Transfers:     Sit to Stand:  minimum assistance with rolling walker  Chair to bed: contact  guard assistance with  rolling walker using Step Transfer  Gait: Pt ambulated 19ft, 5ft with RW, CGA. Demo'd shortened step length with varying jenn. Required seated rest break. Reported increasing fatigue and requested to return to room.       Education:  Patient provided with verbal education and demonstrations education regarding PT role/goals/POC, fall prevention, and safety awareness.  Understanding was verbalized.     Patient left HOB elevated with all lines intact, call button in reach, and transportation staff present..    GOALS:   Multidisciplinary Problems       Physical Therapy Goals          Problem: Physical Therapy    Goal Priority Disciplines Outcome Goal Variances Interventions   Physical Therapy Goal     PT, PT/OT Ongoing, Progressing     Description: Pt will improve functional independence by performing:    Bed mobility: SBA  Sit to stand: SBA with rolling walker  Bed to chair t/f: Min A with Stand Step  with rolling walker  Ambulation x 15 ft with Min A  with rolling walker                       Time Tracking:     PT Received On:    PT Start Time: 1215     PT Stop Time: 1235  PT Total Time (min): 20 min     Billable Minutes: Gait Training 20    Treatment Type: Treatment  PT/PTA: PTA     Number of PTA visits since last PT visit: 2     12/14/2023

## 2023-12-14 NOTE — PROGRESS NOTES
[facility] - 8th Floor Med Surg    Cardiology  Progress Note    Patient Name: Karen Briggs  MRN: [id number]  Admission Date: 11/17/2023  Hospital Length of Stay: 27 days  Code Status: Full Code   Attending Provider: Mariano Bermudez MD   Consulting Provider: MARIA A Dockery  Primary Care Physician: Marian White FNP-C  Principal Problem:<principal problem not specified>    Patient information was obtained from patient, past medical records, ER records, and primary team.     Subjective:     Chief Complaint: Reason for Consult: AFIB    HPI: Ms. Briggs is a 57 y/o female with a history of HTN, who was last known to [facility], Dr. Randolph (2020). She presented to the ER on 11.17.23 with complaints of right flank pain. She went to see her PCP and an CT Abdomen and Pelvis was obtained. CT (11.17.23) revealed left-sided hydronephrosis with suspected distal obstruction/no clear stone visualized, postoperative changes of cholecystectomy with fluid in the gallbladder fossa may reflect postoperative seroma but biloma can not be entirely excluded, also noted for marked thickening of the stomach wall diffusely may be related to mesenteric edema/reactive. She does report having a laparoscopic cholecystectomy on 11.10.23, in which the gall bladder was not completely resect the gallbladder. On 11.24.23, she was found to be tachycardiac and an EKG was obtained that showed AFIB RVR. Significant labs include WBC 19.4, Chloride 108, CO2 17, BUN/Creat 25.6/1.19, , Albumin 2.1, AST 35, ALT 56. CT Chest unremarkable. CT Abd Plevis revealed Interval placement of a transhepatic biliary stent satisfactory position, Diffuse gastric wall thickening consistent with gastritis, Bilateral hydronephrosis severe on the left and moderate on the right, stable, and Generalized mesenteric inflammatory change, mild ascites, mild pleural fluid. ERCP performed November 18: Malignant gastric tumor in the cardia, inflamed mucosa  "in the gastric body.  Severe inflammation and oozing of blood noted proximal gastric lumen.  Unable to advance scope into the duodenum.  [facility] has been consulted for AFIB management.    Hospital Course:  11.26.23: Remains Afib, Rvr. Amiodarone drip in progress. LFTs stable. H/H stable on weight based lovenox BID.   11.27.23: NAD. Converted to NSR. Remains on amio gtt per protocol. Renal indices worsening. H&H down trending. Remains with nausea but denies CP, SOB, or palps. Hypotensive this am.    11.28.23: NAD. Remains NSR on tele. Renal indices and H&H continue to worsen. Denies CP, SOB, or palps. Remains with nausea. "I am feeling better."   12.13.23: NAD. VSS. No complaints of CP/SOB/Palps. :I feel okay"  12.14.23: NAD. VSS. No complaints of CP/SOB/Palps. "I feel good" Awaiting GAYAL    PMH: HTN, Sciatica   PSH: Carpal Tunnel Release, Hemorrhoidectomy, EGD, ERCP  Family History: Father - MI, CAD, HTN; Mother - DM II, MI, CAD, HTN, Cancer; Daughter DM II, Rheumatoid Arthritis   Social History: Denies Smoking, illicit drug use, and alcohol use.     Previous Cardiac Diagnostics:   TTE 11.26.23:  Left Ventricle: Normal wall motion. There is normal systolic function with a visually estimated ejection fraction of 55%. Unable to assess diastolic function.  Right Ventricle: Normal right ventricular cavity size. Systolic function is normal.  Tricuspid Valve: There is mild regurgitation.  IVC/SVC: Normal venous pressure at 3 mmHg.    Lower Ext Venous US (1.6.21):  The study quality is average. 2.6 seconds of reflux is noted in the right GSV at the SFJ with a diameter of 6.3mm 1.2 seconds of reflux is noted in the left GSV at the SFJ with a diameter of 8.7mm. Limited study to evaluate SFJ.    MANFRED (11.16.20):  The study quality is good. The resting right ankle brachial index is 1.15 consistent with no significant right peripheral vascular disease.The resting left ankle brachial index is 1.1 consistent with no significant left " peripheral vascular disease.    Lower Ext Arterial US (11.16.20):  The study quality is good. Triphasic waveforms and minimal plaque noted throughout the bilateral lower extremity arteries. No evidence of stenosis found. Calcified artery walls noted in some areas of the bilateral tibial arteries.    TTE (3.12.20):  The study quality is average. The left ventricle is normal in size with mild left ventricular hypertrophy and normal global left ventricular systolic function. The left ventricular ejection fraction is 60%. The left ventricle diastolic function is impaired (Grade I) with normal left atrial pressure. Mild calcification of the aortic valve is noted with adequate cuspal excursion. Mild (1+) tricuspid regurgitation.The estimated pulmonary artery systolic pressure is 23.9 mmHg assuming a right atrial pressure of 3 mmHg.     LHC (6.11.20):  Normal Coronaries     MPI (2.18.20):  This is probably a normal perfusion study. There is no evidence of ischemia. This scan is suggestive of low risk for future cardiovascular events. Small reversible perfusion abnormality of mild intensity in the basal inferoseptal segment. The left ventricular cavity is noted to be normal on the stress study. The left ventricular ejection fraction was calculated to be 71% and left ventricular global function is normal. The study quality is excellent.     Calcium Score (2.10.20):  169    Review of Systems   Respiratory:  Negative for chest tightness and shortness of breath.    Cardiovascular:  Negative for chest pain, palpitations and leg swelling.   Gastrointestinal:  Negative for nausea.   All other systems reviewed and are negative.      Objective:     Vital Signs (Most Recent):  Temp: 98.1 °F (36.7 °C) (12/14/23 0725)  Pulse: 104 (12/14/23 0725)  Resp: 18 (12/14/23 0725)  BP: 109/78 (12/14/23 0903)  SpO2: 100 % (12/14/23 0725) Vital Signs (24h Range):  Temp:  [98.1 °F (36.7 °C)-100.2 °F (37.9 °C)] 98.1 °F (36.7 °C)  Pulse:  [102-112]  104  Resp:  [18-19] 18  SpO2:  [99 %-100 %] 100 %  BP: (101-123)/(69-83) 109/78     Weight:  (pt unable to stand .)  Body mass index is 34.48 kg/m².    SpO2: 100 %         Intake/Output Summary (Last 24 hours) at 12/14/2023 1020  Last data filed at 12/13/2023 2000  Gross per 24 hour   Intake 0 ml   Output 275 ml   Net -275 ml         Lines/Drains/Airways       Central Venous Catheter Line  Duration             Tunneled Central Line Insertion/Assessment - Double Lumen  12/06/23 0925 Internal Jugular Right 8 days              Drain  Duration                  Gastrostomy/Enterostomy 12/01/23 0845 LUQ 13 days         Biliary Tube 12/13/23 0859 10 Fr. RUQ 1 day              Peripheral Intravenous Line  Duration                  Peripheral IV - Single Lumen 11/24/23 1914 20 G Anterior;Distal;Right Forearm 19 days                    Significant Labs:  Recent Results (from the past 72 hour(s))   Blood Culture    Collection Time: 12/11/23  4:11 PM    Specimen: Blood   Result Value Ref Range    CULTURE, BLOOD ([facility]) No Growth At 48 Hours    Blood Culture    Collection Time: 12/11/23  4:11 PM    Specimen: Blood   Result Value Ref Range    CULTURE, BLOOD ([facility]) Candida glabrata (A)     GRAM STAIN Yeast (AA)     GRAM STAIN 1 of 2 Aerobic bottles positive (AA)     GRAM STAIN Seen in gram stain of broth only (AA)    Echo    Collection Time: 12/11/23  5:41 PM   Result Value Ref Range    BSA 1.95 m2    Boggs's Biplane MOD Ejection Fraction 53 %    LVOT stroke volume 58.72 cm3    LVIDd 4.29 3.5 - 6.0 cm    LV Systolic Volume 15.10 mL    LV Systolic Volume Index 7.4 mL/m2    LVIDs 2.14 2.1 - 4.0 cm    LV Diastolic Volume 82.60 mL    LV Diastolic Volume Index 40.49 mL/m2    IVS 0.73 0.6 - 1.1 cm    LVOT diameter 2.00 cm    LVOT area 3.1 cm2    FS 50 (A) 28 - 44 %    Left Ventricle Relative Wall Thickness 0.42 cm    Posterior Wall 0.90 0.6 - 1.1 cm    LV mass 107.53 g    LV Mass Index 53 g/m2    MV Peak E Dante 0.62 m/s    TDI LATERAL  0.13 m/s    TDI SEPTAL 0.06 m/s    E/E' ratio 6.53 m/s    MV Peak A Dante 1.06 m/s    TR Max Dante 2.47 m/s    E/A ratio 0.58     E wave deceleration time 148.00 msec    LV SEPTAL E/E' RATIO 10.33 m/s    LV LATERAL E/E' RATIO 4.77 m/s    LVOT peak dante 1.47 m/s    Left Ventricular Outflow Tract Mean Velocity 0.99 cm/s    Left Ventricular Outflow Tract Mean Gradient 5.00 mmHg    TAPSE 1.34 cm    LA size 3.80 cm    AV mean gradient 6 mmHg    AV peak gradient 10 mmHg    Ao peak dante 1.60 m/s    Ao VTI 20.50 cm    LVOT peak VTI 18.70 cm    AV valve area 2.86 cm²    AV Velocity Ratio 0.92     AV index (prosthetic) 0.91     KAT by Velocity Ratio 2.88 cm²    MV mean gradient 2 mmHg    MV peak gradient 4 mmHg    MV stenosis pressure 1/2 time 48.00 ms    MV valve area p 1/2 method 4.58 cm2    MV valve area by continuity eq 3.43 cm2    MV VTI 17.1 cm    Triscuspid Valve Regurgitation Peak Gradient 24 mmHg    PV PEAK VELOCITY 1.20 m/s    PV peak gradient 6 mmHg    Mean e' 0.10 m/s    ZLVIDS -4.39     ZLVIDD -3.49     TV resting pulmonary artery pressure 27 mmHg    RV TB RVSP 5 mmHg    Est. RA pres 3 mmHg   CBC with Differential    Collection Time: 12/12/23  2:16 AM   Result Value Ref Range    WBC 22.56 (H) 4.50 - 11.50 x10(3)/mcL    RBC 3.21 (L) 4.20 - 5.40 x10(6)/mcL    Hgb 9.6 (L) 12.0 - 16.0 g/dL    Hct 27.1 (L) 37.0 - 47.0 %    MCV 84.4 80.0 - 94.0 fL    MCH 29.9 27.0 - 31.0 pg    MCHC 35.4 33.0 - 36.0 g/dL    RDW 17.2 (H) 11.5 - 17.0 %    Platelet 505 (H) 130 - 400 x10(3)/mcL    MPV 10.0 7.4 - 10.4 fL    Neut % 85.5 %    Lymph % 3.2 %    Mono % 5.6 %    Eos % 2.0 %    Basophil % 0.4 %    Lymph # 0.73 0.6 - 4.6 x10(3)/mcL    Neut # 19.28 (H) 2.1 - 9.2 x10(3)/mcL    Mono # 1.26 0.1 - 1.3 x10(3)/mcL    Eos # 0.46 0 - 0.9 x10(3)/mcL    Baso # 0.09 <=0.2 x10(3)/mcL    IG# 0.74 (H) 0 - 0.04 x10(3)/mcL    IG% 3.3 %    NRBC% 0.0 %   Vancomycin, Random    Collection Time: 12/12/23  2:17 AM   Result Value Ref Range    Vanc Lvl Random  20.8 (H) 15.0 - 20.0 ug/ml   Comprehensive Metabolic Panel    Collection Time: 12/12/23  2:17 AM   Result Value Ref Range    Sodium Level 132 (L) 136 - 145 mmol/L    Potassium Level 3.4 (L) 3.5 - 5.1 mmol/L    Chloride 98 98 - 107 mmol/L    Carbon Dioxide 20 (L) 23 - 31 mmol/L    Glucose Level 96 82 - 115 mg/dL    Blood Urea Nitrogen 72.2 (H) 9.8 - 20.1 mg/dL    Creatinine 5.46 (H) 0.55 - 1.02 mg/dL    Calcium Level Total 7.9 (L) 8.4 - 10.2 mg/dL    Protein Total 5.8 5.8 - 7.6 gm/dL    Albumin Level 1.4 (L) 3.4 - 4.8 g/dL    Globulin 4.4 (H) 2.4 - 3.5 gm/dL    Albumin/Globulin Ratio 0.3 (L) 1.1 - 2.0 ratio    Bilirubin Total 4.1 (H) <=1.5 mg/dL    Alkaline Phosphatase 181 (H) 40 - 150 unit/L    Alanine Aminotransferase 39 0 - 55 unit/L    Aspartate Aminotransferase 39 (H) 5 - 34 unit/L    eGFR 8 mls/min/1.73/m2   Hepatitis B surface antigen    Collection Time: 12/12/23  2:17 AM   Result Value Ref Range    Hepatitis B Surface Antigen Nonreactive Nonreactive   Hepatitis B Surface Ab, Qualitative    Collection Time: 12/12/23  9:40 AM   Result Value Ref Range    Hepatitis B Surface Antibody [place] (A) Nonreactive    Hepatitis B Surface Antibody Qnt 8.32 mIU/mL   Blood Culture    Collection Time: 12/12/23  3:36 PM    Specimen: Blood   Result Value Ref Range    CULTURE, BLOOD ([facility]) No Growth At 24 Hours    Blood Culture    Collection Time: 12/12/23  3:36 PM    Specimen: Blood   Result Value Ref Range    CULTURE, BLOOD ([facility]) No Growth At 24 Hours    Comprehensive Metabolic Panel    Collection Time: 12/13/23 10:01 AM   Result Value Ref Range    Sodium Level 131 (L) 136 - 145 mmol/L    Potassium Level 5.6 (H) 3.5 - 5.1 mmol/L    Chloride 100 98 - 107 mmol/L    Carbon Dioxide 22 (L) 23 - 31 mmol/L    Glucose Level 94 82 - 115 mg/dL    Blood Urea Nitrogen 49.8 (H) 9.8 - 20.1 mg/dL    Creatinine 4.60 (H) 0.55 - 1.02 mg/dL    Calcium Level Total 8.7 8.4 - 10.2 mg/dL    Protein Total 7.4 5.8 - 7.6 gm/dL    Albumin Level 1.5 (L)  3.4 - 4.8 g/dL    Globulin 5.9 (H) 2.4 - 3.5 gm/dL    Albumin/Globulin Ratio 0.3 (L) 1.1 - 2.0 ratio    Bilirubin Total 4.6 (H) <=1.5 mg/dL    Alkaline Phosphatase 141 40 - 150 unit/L    Alanine Aminotransferase 52 0 - 55 unit/L    Aspartate Aminotransferase 68 (H) 5 - 34 unit/L    eGFR 10 mls/min/1.73/m2   Magnesium    Collection Time: 12/13/23 10:01 AM   Result Value Ref Range    Magnesium Level 2.20 1.60 - 2.60 mg/dL   Phosphorus    Collection Time: 12/13/23 10:01 AM   Result Value Ref Range    Phosphorus Level 4.6 2.3 - 4.7 mg/dL   CBC with Differential    Collection Time: 12/13/23 10:01 AM   Result Value Ref Range    WBC 16.23 (H) 4.50 - 11.50 x10(3)/mcL    RBC 3.59 (L) 4.20 - 5.40 x10(6)/mcL    Hgb 10.8 (L) 12.0 - 16.0 g/dL    Hct 31.7 (L) 37.0 - 47.0 %    MCV 88.3 80.0 - 94.0 fL    MCH 30.1 27.0 - 31.0 pg    MCHC 34.1 33.0 - 36.0 g/dL    RDW 17.5 (H) 11.5 - 17.0 %    Platelet 516 (H) 130 - 400 x10(3)/mcL    MPV 10.3 7.4 - 10.4 fL    Neut % 84.1 %    Lymph % 4.3 %    Mono % 5.8 %    Eos % 1.3 %    Basophil % 0.6 %    Lymph # 0.70 0.6 - 4.6 x10(3)/mcL    Neut # 13.64 (H) 2.1 - 9.2 x10(3)/mcL    Mono # 0.94 0.1 - 1.3 x10(3)/mcL    Eos # 0.21 0 - 0.9 x10(3)/mcL    Baso # 0.10 <=0.2 x10(3)/mcL    IG# 0.64 (H) 0 - 0.04 x10(3)/mcL    IG% 3.9 %    NRBC% 0.0 %     Telemetry:  Afib RVR       Physical Exam  Vitals and nursing note reviewed.   Constitutional:       Appearance: She is not ill-appearing.   HENT:      Head: Normocephalic.      Nose: Nose normal.      Mouth/Throat:      Mouth: Mucous membranes are moist.      Pharynx: Oropharynx is clear.   Eyes:      Pupils: Pupils are equal, round, and reactive to light.   Cardiovascular:      Rate and Rhythm: Normal rate and regular rhythm.      Pulses: Normal pulses.      Heart sounds: Normal heart sounds.   Pulmonary:      Effort: Pulmonary effort is normal. No respiratory distress.      Breath sounds: Normal breath sounds.   Abdominal:      General: Bowel sounds are  normal.      Palpations: Abdomen is soft.      Comments: Biliary drain   Musculoskeletal:         General: Normal range of motion.      Cervical back: Normal range of motion.      Right lower leg: Edema present.      Left lower leg: Edema present.   Skin:     General: Skin is warm and dry.      Comments: Left Chest Wall Mediport Incision C/D/I  Right Chest Wall Dialysis Cath    Neurological:      Mental Status: She is alert and oriented to person, place, and time.   Psychiatric:         Mood and Affect: Mood normal.         Behavior: Behavior normal.         Judgment: Judgment normal.       Home Medications:   No current facility-administered medications on file prior to encounter.     Current Outpatient Medications on File Prior to Encounter   Medication Sig Dispense Refill    olmesartan (BENICAR) 20 MG tablet Take 20 mg by mouth once daily.       Current Inpatient Medications:    Current Facility-Administered Medications:     0.9%  NaCl infusion (for blood administration), , Intravenous, Q24H PRN, Almaz Cheney, FNP    0.9%  NaCl infusion (for blood administration), , Intravenous, Q24H PRN, Maryanne Moise, AGNP    0.9%  NaCl infusion, , Intravenous, PRN, Hussein Meirno MD, Last Rate: 5 mL/hr at 12/10/23 1928, Rate Verify at 12/10/23 1928    acetaminophen tablet 650 mg, 650 mg, Oral, Q4H PRN, Hussein Merino MD, 650 mg at 12/08/23 0514    aluminum-magnesium hydroxide-simethicone 200-200-20 mg/5 mL suspension 30 mL, 30 mL, Oral, QID PRN, Hussein Merino MD    bisacodyL suppository 10 mg, 10 mg, Rectal, Daily PRN, Hussein Merino MD    chlorproMAZINE injection 25 mg, 25 mg, Intramuscular, QID PRN, Dalton Palacios MD, 25 mg at 12/05/23 1102    dicyclomine injection 20 mg, 20 mg, Intramuscular, QID PRN, Dalton Palacios MD, 20 mg at 11/30/23 1754    enoxaparin injection 100 mg, 1 mg/kg, Subcutaneous, Q24H (treatment, non-standard time), Mukesh Cole MD, 100 mg at 12/13/23 2322    hydrALAZINE injection 10 mg, 10 mg,  Intravenous, Q6H PRN, Kenney Steiner MD    hydrocortisone suppository 25 mg, 25 mg, Rectal, BID, Alexandra Fry, PA, 25 mg at 12/14/23 0903    hyoscyamine ODT 0.125 mg, 0.125 mg, Sublingual, Q4H PRN, Sara Waldrop, AGACNP-BC, 0.125 mg at 11/30/23 1735    melatonin tablet 6 mg, 6 mg, Oral, Nightly PRN, Hussein Merino MD, 6 mg at 12/13/23 2028    metoclopramide HCl injection 5 mg, 5 mg, Intravenous, Q6H PRN, Dalton Palacios MD, 5 mg at 12/13/23 0453    metoprolol injection 5 mg, 5 mg, Intravenous, Q4H PRN, Genaro Crawford, FNP, 5 mg at 12/08/23 0329    metoprolol tartrate (LOPRESSOR) split tablet 12.5 mg, 12.5 mg, Oral, BID, Genaro Crawford, FNP, 12.5 mg at 12/14/23 0903    micafungin 100 mg in sodium chloride 0.9 % 100 mL IVPB (MB+), 100 mg, Intravenous, Q24H, Mukesh Cole MD, Stopped at 12/13/23 1905    midodrine tablet 10 mg, 10 mg, Oral, TID WM, Dalton Palacios MD, 10 mg at 12/14/23 0903    morphine injection 4 mg, 4 mg, Intravenous, Q4H PRN, Hussein Merino MD, 4 mg at 12/02/23 0217    ondansetron injection 4 mg, 4 mg, Intravenous, Q4H PRN, Dalton Palacios MD    oxyCODONE immediate release tablet 5 mg, 5 mg, Oral, Q6H PRN, Hussein Merino MD, 5 mg at 12/04/23 2023    pantoprazole injection 40 mg, 40 mg, Intravenous, BID WM, Dalton Palacios MD, 40 mg at 12/14/23 0903    perflutren lipid microspheres injection 1.3 mL, 1.3 mL, Intravenous, Once, Dalton Palacios MD    polyethylene glycol packet 17 g, 17 g, Oral, BID PRN, Hussein Merino MD    prochlorperazine injection Soln 5 mg, 5 mg, Intravenous, Q6H PRN, Hussein Merino MD, 5 mg at 11/22/23 1347    promethazine injection 25 mg, 25 mg, Intramuscular, Q4H PRN, Lyssa Car, AGACNP-BC, 25 mg at 11/23/23 1620    senna-docusate 8.6-50 mg per tablet 2 tablet, 2 tablet, Oral, BID PRN, Hussein Merino MD    sodium bicarbonate tablet 1,300 mg, 1,300 mg, Oral, Daily, Maryanne Moise, AGNP, 1,300 mg at 12/14/23 0903    sodium chloride 0.9% bolus 250 mL 250 mL, 250 mL,  Intravenous, PRN, Maryanne Moise, AGNP    sodium chloride 0.9% flush 10 mL, 10 mL, Intravenous, PRN, Hussein Merino MD    Flushing PICC/Midline Protocol, , , Until Discontinued **AND** sodium chloride 0.9% flush 10 mL, 10 mL, Intravenous, Q6H, 10 mL at 12/13/23 2326 **AND** sodium chloride 0.9% flush 10 mL, 10 mL, Intravenous, PRN, Kenney Steiner MD         VTE Risk Mitigation (From admission, onward)           Ordered     enoxaparin injection 100 mg  Every 24 hours         12/10/23 1703     IP VTE LOW RISK PATIENT  Once         11/18/23 0447     Place sequential compression device  Until discontinued         11/18/23 0447                    Assessment:   Persistent Fungemia   Newly Diagnosed Atrial Fibrillation  - now SR    - [id number] Score 2 (2.2% Stroke Risk per Year)  Biliary Obstruction likely s/t portal hepatic lymphadenopathy    - Successful placement of biliary drainage catheter: 8 [place] internal external biliary drainage catheter on 11.21.23     - Incomplete Laparoscopic cholecystectomy 11.10.23  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     - s/p Mediport 12.1.23  Malnutrition    - j-tube placed on 12.1.23  POORNIMA     - now on dialysis; hemodialysis cath 11.29.23  Elevated LFTs  Anemia - Improved  Leukocytosis   Electrolyte Derangements     - Hyperkalemia     - Hyponatremia   Bilateral Hydronephrosis     - Severe on the Left    - Moderate on the Right     - s/p bilateral ureteral stents (11.27.23)   HTN  Sciatica   Hypokalemia  No known History of GI Bleed     Plan:   GAYLA Today  Keep NPO and Holding Tube Feedings  Continue Metoprolol   Continue FD Lovenox for Stroke Prevention   Medical Management per Primary Team  Keep Mag > 2 and Potassium > 4  Labs in AM: CBC, CMP, and Mag     MARIA A Dockery  Cardiology  [facility]   12/14/2023

## 2023-12-15 LAB
ABS NEUT (OLG): 17.56 X10(3)/MCL (ref 2.1–9.2)
ALBUMIN SERPL-MCNC: 1.5 G/DL (ref 3.4–4.8)
ALBUMIN/GLOB SERPL: 0.3 RATIO (ref 1.1–2)
ALP SERPL-CCNC: 184 UNIT/L (ref 40–150)
ALT SERPL-CCNC: 52 UNIT/L (ref 0–55)
AST SERPL-CCNC: 42 UNIT/L (ref 5–34)
BACTERIA BLD CULT: NORMAL
BASOPHILS NFR BLD MANUAL: 0.19 X10(3)/MCL (ref 0–0.2)
BASOPHILS NFR BLD MANUAL: 1 %
BILIRUB SERPL-MCNC: 3.5 MG/DL
BSA FOR ECHO PROCEDURE: 1.95 M2
BUN SERPL-MCNC: 48.7 MG/DL (ref 9.8–20.1)
CALCIUM SERPL-MCNC: 8.1 MG/DL (ref 8.4–10.2)
CHLORIDE SERPL-SCNC: 101 MMOL/L (ref 98–107)
CO2 SERPL-SCNC: 23 MMOL/L (ref 23–31)
CREAT SERPL-MCNC: 4.56 MG/DL (ref 0.55–1.02)
EOSINOPHIL NFR BLD MANUAL: 0.19 X10(3)/MCL (ref 0–0.9)
EOSINOPHIL NFR BLD MANUAL: 1 %
ERYTHROCYTE [DISTWIDTH] IN BLOOD BY AUTOMATED COUNT: 17.1 % (ref 11.5–17)
GFR SERPLBLD CREATININE-BSD FMLA CKD-EPI: 10 MLS/MIN/1.73/M2
GLOBULIN SER-MCNC: 4.8 GM/DL (ref 2.4–3.5)
GLUCOSE SERPL-MCNC: 117 MG/DL (ref 82–115)
HCT VFR BLD AUTO: 28.4 % (ref 37–47)
HGB BLD-MCNC: 9.5 G/DL (ref 12–16)
INSTRUMENT WBC (OLG): 18.88 X10(3)/MCL
LMWH PPP CHRO-ACNC: 0.7 IU/ML (ref 0.6–1)
LYMPHOCYTES NFR BLD MANUAL: 0.38 X10(3)/MCL
LYMPHOCYTES NFR BLD MANUAL: 2 %
MACROCYTES BLD QL SMEAR: ABNORMAL
MAGNESIUM SERPL-MCNC: 2.1 MG/DL (ref 1.6–2.6)
MCH RBC QN AUTO: 29.2 PG (ref 27–31)
MCHC RBC AUTO-ENTMCNC: 33.5 G/DL (ref 33–36)
MCV RBC AUTO: 87.4 FL (ref 80–94)
MONOCYTES NFR BLD MANUAL: 0.57 X10(3)/MCL (ref 0.1–1.3)
MONOCYTES NFR BLD MANUAL: 3 %
MYELOCYTES NFR BLD MANUAL: 1 %
NEUTROPHILS NFR BLD MANUAL: 93 %
NRBC BLD AUTO-RTO: 0 %
PHOSPHATE SERPL-MCNC: 3.4 MG/DL (ref 2.3–4.7)
PLATELET # BLD AUTO: 737 X10(3)/MCL (ref 130–400)
PLATELET # BLD EST: ABNORMAL 10*3/UL
PMV BLD AUTO: 9.6 FL (ref 7.4–10.4)
POIKILOCYTOSIS BLD QL SMEAR: ABNORMAL
POTASSIUM SERPL-SCNC: 3.3 MMOL/L (ref 3.5–5.1)
PROT SERPL-MCNC: 6.3 GM/DL (ref 5.8–7.6)
RBC # BLD AUTO: 3.25 X10(6)/MCL (ref 4.2–5.4)
RBC MORPH BLD: ABNORMAL
SODIUM SERPL-SCNC: 136 MMOL/L (ref 136–145)
TARGETS BLD QL SMEAR: ABNORMAL
TROPONIN I SERPL-MCNC: <0.01 NG/ML (ref 0–0.04)
WBC # SPEC AUTO: 18.88 X10(3)/MCL (ref 4.5–11.5)

## 2023-12-15 PROCEDURE — 25000003 PHARM REV CODE 250: Performed by: INTERNAL MEDICINE

## 2023-12-15 PROCEDURE — 63600175 PHARM REV CODE 636 W HCPCS: Performed by: INTERNAL MEDICINE

## 2023-12-15 PROCEDURE — 25000003 PHARM REV CODE 250: Performed by: NURSE PRACTITIONER

## 2023-12-15 PROCEDURE — 87040 BLOOD CULTURE FOR BACTERIA: CPT | Performed by: GENERAL PRACTICE

## 2023-12-15 PROCEDURE — 85027 COMPLETE CBC AUTOMATED: CPT | Performed by: STUDENT IN AN ORGANIZED HEALTH CARE EDUCATION/TRAINING PROGRAM

## 2023-12-15 PROCEDURE — 25000003 PHARM REV CODE 250

## 2023-12-15 PROCEDURE — 21400001 HC TELEMETRY ROOM

## 2023-12-15 PROCEDURE — 25000003 PHARM REV CODE 250: Performed by: STUDENT IN AN ORGANIZED HEALTH CARE EDUCATION/TRAINING PROGRAM

## 2023-12-15 PROCEDURE — 80053 COMPREHEN METABOLIC PANEL: CPT | Performed by: STUDENT IN AN ORGANIZED HEALTH CARE EDUCATION/TRAINING PROGRAM

## 2023-12-15 PROCEDURE — 84100 ASSAY OF PHOSPHORUS: CPT | Performed by: STUDENT IN AN ORGANIZED HEALTH CARE EDUCATION/TRAINING PROGRAM

## 2023-12-15 PROCEDURE — A4216 STERILE WATER/SALINE, 10 ML: HCPCS | Performed by: INTERNAL MEDICINE

## 2023-12-15 PROCEDURE — 99233 SBSQ HOSP IP/OBS HIGH 50: CPT | Mod: FS,,, | Performed by: GENERAL PRACTICE

## 2023-12-15 PROCEDURE — 63600175 PHARM REV CODE 636 W HCPCS: Performed by: STUDENT IN AN ORGANIZED HEALTH CARE EDUCATION/TRAINING PROGRAM

## 2023-12-15 PROCEDURE — 85520 HEPARIN ASSAY: CPT | Performed by: STUDENT IN AN ORGANIZED HEALTH CARE EDUCATION/TRAINING PROGRAM

## 2023-12-15 PROCEDURE — 83735 ASSAY OF MAGNESIUM: CPT | Performed by: STUDENT IN AN ORGANIZED HEALTH CARE EDUCATION/TRAINING PROGRAM

## 2023-12-15 PROCEDURE — 84484 ASSAY OF TROPONIN QUANT: CPT | Performed by: STUDENT IN AN ORGANIZED HEALTH CARE EDUCATION/TRAINING PROGRAM

## 2023-12-15 PROCEDURE — C9113 INJ PANTOPRAZOLE SODIUM, VIA: HCPCS | Performed by: INTERNAL MEDICINE

## 2023-12-15 RX ADMIN — ENOXAPARIN SODIUM 100 MG: 100 INJECTION SUBCUTANEOUS at 01:12

## 2023-12-15 RX ADMIN — MIDODRINE HYDROCHLORIDE 10 MG: 5 TABLET ORAL at 05:12

## 2023-12-15 RX ADMIN — ONDANSETRON 4 MG: 2 INJECTION INTRAMUSCULAR; INTRAVENOUS at 05:12

## 2023-12-15 RX ADMIN — METOPROLOL TARTRATE 12.5 MG: 25 TABLET, FILM COATED ORAL at 08:12

## 2023-12-15 RX ADMIN — HYDROCORTISONE ACETATE 25 MG: 25 SUPPOSITORY RECTAL at 08:12

## 2023-12-15 RX ADMIN — SODIUM CHLORIDE, PRESERVATIVE FREE 10 ML: 5 INJECTION INTRAVENOUS at 05:12

## 2023-12-15 RX ADMIN — PANTOPRAZOLE SODIUM 40 MG: 40 INJECTION, POWDER, FOR SOLUTION INTRAVENOUS at 05:12

## 2023-12-15 RX ADMIN — ACETAMINOPHEN 650 MG: 325 TABLET, FILM COATED ORAL at 12:12

## 2023-12-15 RX ADMIN — HYDROCORTISONE ACETATE 25 MG: 25 SUPPOSITORY RECTAL at 09:12

## 2023-12-15 RX ADMIN — PANTOPRAZOLE SODIUM 40 MG: 40 INJECTION, POWDER, FOR SOLUTION INTRAVENOUS at 08:12

## 2023-12-15 RX ADMIN — MICAFUNGIN SODIUM 100 MG: 100 INJECTION, POWDER, LYOPHILIZED, FOR SOLUTION INTRAVENOUS at 09:12

## 2023-12-15 RX ADMIN — MIDODRINE HYDROCHLORIDE 10 MG: 5 TABLET ORAL at 08:12

## 2023-12-15 RX ADMIN — METOPROLOL TARTRATE 12.5 MG: 25 TABLET, FILM COATED ORAL at 09:12

## 2023-12-15 RX ADMIN — MIDODRINE HYDROCHLORIDE 10 MG: 5 TABLET ORAL at 12:12

## 2023-12-15 RX ADMIN — SODIUM CHLORIDE, PRESERVATIVE FREE 10 ML: 5 INJECTION INTRAVENOUS at 12:12

## 2023-12-15 RX ADMIN — SODIUM BICARBONATE 650 MG TABLET 1300 MG: at 08:12

## 2023-12-15 NOTE — PROGRESS NOTES
[facility] - 8th Floor Med Surg    Cardiology  Progress Note    Patient Name: Karen Briggs  MRN: [id number]  Admission Date: 11/17/2023  Hospital Length of Stay: 28 days  Code Status: Full Code   Attending Provider: Mariano Bermudez MD   Consulting Provider: MARIA A Garcia  Primary Care Physician: Marian White FNP-C  Principal Problem:<principal problem not specified>    Patient information was obtained from patient, past medical records, ER records, and primary team.     Subjective:     Chief Complaint: Reason for Consult: AFIB    HPI: Ms. Briggs is a 57 y/o female with a history of HTN, who was last known to [facility], Dr. Randolph (2020). She presented to the ER on 11.17.23 with complaints of right flank pain. She went to see her PCP and an CT Abdomen and Pelvis was obtained. CT (11.17.23) revealed left-sided hydronephrosis with suspected distal obstruction/no clear stone visualized, postoperative changes of cholecystectomy with fluid in the gallbladder fossa may reflect postoperative seroma but biloma can not be entirely excluded, also noted for marked thickening of the stomach wall diffusely may be related to mesenteric edema/reactive. She does report having a laparoscopic cholecystectomy on 11.10.23, in which the gall bladder was not completely resect the gallbladder. On 11.24.23, she was found to be tachycardiac and an EKG was obtained that showed AFIB RVR. Significant labs include WBC 19.4, Chloride 108, CO2 17, BUN/Creat 25.6/1.19, , Albumin 2.1, AST 35, ALT 56. CT Chest unremarkable. CT Abd Plevis revealed Interval placement of a transhepatic biliary stent satisfactory position, Diffuse gastric wall thickening consistent with gastritis, Bilateral hydronephrosis severe on the left and moderate on the right, stable, and Generalized mesenteric inflammatory change, mild ascites, mild pleural fluid. ERCP performed November 18: Malignant gastric tumor in the cardia, inflamed  "mucosa in the gastric body.  Severe inflammation and oozing of blood noted proximal gastric lumen.  Unable to advance scope into the duodenum.  [facility] has been consulted for AFIB management.    Hospital Course:  11.26.23: Remains Afib, Rvr. Amiodarone drip in progress. LFTs stable. H/H stable on weight based lovenox BID.   11.27.23: NAD. Converted to NSR. Remains on amio gtt per protocol. Renal indices worsening. H&H down trending. Remains with nausea but denies CP, SOB, or palps. Hypotensive this am.    11.28.23: NAD. Remains NSR on tele. Renal indices and H&H continue to worsen. Denies CP, SOB, or palps. Remains with nausea. "I am feeling better."   12.13.23: NAD. VSS. No complaints of CP/SOB/Palps. :I feel okay"  12.14.23: NAD. VSS. No complaints of CP/SOB/Palps. "I feel good" Awaiting GAYLA  12.15.23: NAD. Denies CP, SOB, or palps. "I am good." ST on tele. Tmax 100.7 F. BP on the lower side but stable.     PMH: HTN, Sciatica   PSH: Carpal Tunnel Release, Hemorrhoidectomy, EGD, ERCP  Family History: Father - MI, CAD, HTN; Mother - DM II, MI, CAD, HTN, Cancer; Daughter DM II, Rheumatoid Arthritis   Social History: Denies Smoking, illicit drug use, and alcohol use.     Previous Cardiac Diagnostics:   GAYLA 12.14.23:  LV systolic function 55%.  No intracardiac thrombus is present.  No vegetation or signs of infection.     TTE 11.26.23:  Left Ventricle: Normal wall motion. There is normal systolic function with a visually estimated ejection fraction of 55%. Unable to assess diastolic function.  Right Ventricle: Normal right ventricular cavity size. Systolic function is normal.  Tricuspid Valve: There is mild regurgitation.  IVC/SVC: Normal venous pressure at 3 mmHg.    Lower Ext Venous US (1.6.21):  The study quality is average. 2.6 seconds of reflux is noted in the right GSV at the SFJ with a diameter of 6.3mm 1.2 seconds of reflux is noted in the left GSV at the SFJ with a diameter of 8.7mm. Limited study to evaluate " [facility].    MANFRED (11.16.20):  The study quality is good. The resting right ankle brachial index is 1.15 consistent with no significant right peripheral vascular disease.The resting left ankle brachial index is 1.1 consistent with no significant left peripheral vascular disease.    Lower Ext Arterial US (11.16.20):  The study quality is good. Triphasic waveforms and minimal plaque noted throughout the bilateral lower extremity arteries. No evidence of stenosis found. Calcified artery walls noted in some areas of the bilateral tibial arteries.    TTE (3.12.20):  The study quality is average. The left ventricle is normal in size with mild left ventricular hypertrophy and normal global left ventricular systolic function. The left ventricular ejection fraction is 60%. The left ventricle diastolic function is impaired (Grade I) with normal left atrial pressure. Mild calcification of the aortic valve is noted with adequate cuspal excursion. Mild (1+) tricuspid regurgitation.The estimated pulmonary artery systolic pressure is 23.9 mmHg assuming a right atrial pressure of 3 mmHg.     LHC (6.11.20):  Normal Coronaries     MPI (2.18.20):  This is probably a normal perfusion study. There is no evidence of ischemia. This scan is suggestive of low risk for future cardiovascular events. Small reversible perfusion abnormality of mild intensity in the basal inferoseptal segment. The left ventricular cavity is noted to be normal on the stress study. The left ventricular ejection fraction was calculated to be 71% and left ventricular global function is normal. The study quality is excellent.     Calcium Score (2.10.20):  169    Review of Systems   Respiratory:  Negative for cough, choking, chest tightness and shortness of breath.    Cardiovascular:  Negative for chest pain, palpitations and leg swelling.   Gastrointestinal:  Negative for nausea.   All other systems reviewed and are negative.      Objective:     Vital Signs (Most  Recent):  Temp: 99.4 °F (37.4 °C) (12/15/23 0744)  Pulse: (!) 113 (12/15/23 0744)  Resp: 18 (12/15/23 0744)  BP: 104/67 (12/15/23 0744)  SpO2: 99 % (12/15/23 0744) Vital Signs (24h Range):  Temp:  [97.7 °F (36.5 °C)-100.7 °F (38.2 °C)] 99.4 °F (37.4 °C)  Pulse:  [] 113  Resp:  [14-20] 18  SpO2:  [98 %-100 %] 99 %  BP: ()/(63-78) 104/67     Weight:  (pt unable to stand .)  Body mass index is 34.48 kg/m².    SpO2: 99 %         Intake/Output Summary (Last 24 hours) at 12/15/2023 0807  Last data filed at 12/15/2023 0638  Gross per 24 hour   Intake 100 ml   Output 275 ml   Net -175 ml         Lines/Drains/Airways       Central Venous Catheter Line  Duration             Tunneled Central Line Insertion/Assessment - Double Lumen  12/06/23 0925 Internal Jugular Right 8 days              Drain  Duration                  Gastrostomy/Enterostomy 12/01/23 0845 LUQ 13 days         Biliary Tube 12/13/23 0859 10 Fr. RUQ 1 day              Peripheral Intravenous Line  Duration                  Peripheral IV - Single Lumen 11/24/23 1914 20 G Anterior;Distal;Right Forearm 20 days                    Significant Labs:  Recent Results (from the past 72 hour(s))   Hepatitis B Surface Ab, Qualitative    Collection Time: 12/12/23  9:40 AM   Result Value Ref Range    Hepatitis B Surface Antibody [place] (A) Nonreactive    Hepatitis B Surface Antibody Qnt 8.32 mIU/mL   Blood Culture    Collection Time: 12/12/23  3:36 PM    Specimen: Blood   Result Value Ref Range    CULTURE, BLOOD ([facility]) No Growth At 48 Hours    Blood Culture    Collection Time: 12/12/23  3:36 PM    Specimen: Blood   Result Value Ref Range    CULTURE, BLOOD ([facility]) No Growth At 48 Hours    Comprehensive Metabolic Panel    Collection Time: 12/13/23 10:01 AM   Result Value Ref Range    Sodium Level 131 (L) 136 - 145 mmol/L    Potassium Level 5.6 (H) 3.5 - 5.1 mmol/L    Chloride 100 98 - 107 mmol/L    Carbon Dioxide 22 (L) 23 - 31 mmol/L    Glucose Level 94 82 - 115  mg/dL    Blood Urea Nitrogen 49.8 (H) 9.8 - 20.1 mg/dL    Creatinine 4.60 (H) 0.55 - 1.02 mg/dL    Calcium Level Total 8.7 8.4 - 10.2 mg/dL    Protein Total 7.4 5.8 - 7.6 gm/dL    Albumin Level 1.5 (L) 3.4 - 4.8 g/dL    Globulin 5.9 (H) 2.4 - 3.5 gm/dL    Albumin/Globulin Ratio 0.3 (L) 1.1 - 2.0 ratio    Bilirubin Total 4.6 (H) <=1.5 mg/dL    Alkaline Phosphatase 141 40 - 150 unit/L    Alanine Aminotransferase 52 0 - 55 unit/L    Aspartate Aminotransferase 68 (H) 5 - 34 unit/L    eGFR 10 mls/min/1.73/m2   Magnesium    Collection Time: 12/13/23 10:01 AM   Result Value Ref Range    Magnesium Level 2.20 1.60 - 2.60 mg/dL   Phosphorus    Collection Time: 12/13/23 10:01 AM   Result Value Ref Range    Phosphorus Level 4.6 2.3 - 4.7 mg/dL   CBC with Differential    Collection Time: 12/13/23 10:01 AM   Result Value Ref Range    WBC 16.23 (H) 4.50 - 11.50 x10(3)/mcL    RBC 3.59 (L) 4.20 - 5.40 x10(6)/mcL    Hgb 10.8 (L) 12.0 - 16.0 g/dL    Hct 31.7 (L) 37.0 - 47.0 %    MCV 88.3 80.0 - 94.0 fL    MCH 30.1 27.0 - 31.0 pg    MCHC 34.1 33.0 - 36.0 g/dL    RDW 17.5 (H) 11.5 - 17.0 %    Platelet 516 (H) 130 - 400 x10(3)/mcL    MPV 10.3 7.4 - 10.4 fL    Neut % 84.1 %    Lymph % 4.3 %    Mono % 5.8 %    Eos % 1.3 %    Basophil % 0.6 %    Lymph # 0.70 0.6 - 4.6 x10(3)/mcL    Neut # 13.64 (H) 2.1 - 9.2 x10(3)/mcL    Mono # 0.94 0.1 - 1.3 x10(3)/mcL    Eos # 0.21 0 - 0.9 x10(3)/mcL    Baso # 0.10 <=0.2 x10(3)/mcL    IG# 0.64 (H) 0 - 0.04 x10(3)/mcL    IG% 3.9 %    NRBC% 0.0 %   Comprehensive Metabolic Panel    Collection Time: 12/14/23 12:14 PM   Result Value Ref Range    Sodium Level 131 (L) 136 - 145 mmol/L    Potassium Level 3.4 (L) 3.5 - 5.1 mmol/L    Chloride 99 98 - 107 mmol/L    Carbon Dioxide 21 (L) 23 - 31 mmol/L    Glucose Level 97 82 - 115 mg/dL    Blood Urea Nitrogen 68.6 (H) 9.8 - 20.1 mg/dL    Creatinine 5.54 (H) 0.55 - 1.02 mg/dL    Calcium Level Total 8.8 8.4 - 10.2 mg/dL    Protein Total 6.8 5.8 - 7.6 gm/dL    Albumin  Level 1.5 (L) 3.4 - 4.8 g/dL    Globulin 5.3 (H) 2.4 - 3.5 gm/dL    Albumin/Globulin Ratio 0.3 (L) 1.1 - 2.0 ratio    Bilirubin Total 4.3 (H) <=1.5 mg/dL    Alkaline Phosphatase 156 (H) 40 - 150 unit/L    Alanine Aminotransferase 53 0 - 55 unit/L    Aspartate Aminotransferase 46 (H) 5 - 34 unit/L    eGFR 8 mls/min/1.73/m2   Magnesium    Collection Time: 12/14/23 12:14 PM   Result Value Ref Range    Magnesium Level 2.30 1.60 - 2.60 mg/dL   Phosphorus    Collection Time: 12/14/23 12:14 PM   Result Value Ref Range    Phosphorus Level 4.6 2.3 - 4.7 mg/dL   CBC with Differential    Collection Time: 12/14/23 12:14 PM   Result Value Ref Range    WBC 16.70 (H) 4.50 - 11.50 x10(3)/mcL    RBC 3.47 (L) 4.20 - 5.40 x10(6)/mcL    Hgb 10.4 (L) 12.0 - 16.0 g/dL    Hct 30.0 (L) 37.0 - 47.0 %    MCV 86.5 80.0 - 94.0 fL    MCH 30.0 27.0 - 31.0 pg    MCHC 34.7 33.0 - 36.0 g/dL    RDW 17.2 (H) 11.5 - 17.0 %    Platelet 673 (H) 130 - 400 x10(3)/mcL    MPV 10.0 7.4 - 10.4 fL    Neut % 82.2 %    Lymph % 4.3 %    Mono % 5.9 %    Eos % 2.7 %    Basophil % 0.5 %    Lymph # 0.72 0.6 - 4.6 x10(3)/mcL    Neut # 13.72 (H) 2.1 - 9.2 x10(3)/mcL    Mono # 0.99 0.1 - 1.3 x10(3)/mcL    Eos # 0.45 0 - 0.9 x10(3)/mcL    Baso # 0.09 <=0.2 x10(3)/mcL    IG# 0.73 (H) 0 - 0.04 x10(3)/mcL    IG% 4.4 %    NRBC% 0.0 %   Transesophageal echo (GAYLA)    Collection Time: 12/14/23  2:44 PM   Result Value Ref Range    BSA 1.95 m2   Heparin Xa Low Mol Wgt    Collection Time: 12/15/23  5:38 AM   Result Value Ref Range    Anti Xa  LMW 0.7 0.6 - 1.0 IU/mL     Telemetry:  Afib RVR       Physical Exam  Vitals and nursing note reviewed.   Constitutional:       Appearance: She is not ill-appearing.   HENT:      Head: Normocephalic.      Nose: Nose normal.      Mouth/Throat:      Mouth: Mucous membranes are moist.      Pharynx: Oropharynx is clear.   Eyes:      Pupils: Pupils are equal, round, and reactive to light.   Cardiovascular:      Rate and Rhythm: Regular rhythm.  Tachycardia present.      Pulses: Normal pulses.      Heart sounds: Normal heart sounds.   Pulmonary:      Effort: Pulmonary effort is normal. No respiratory distress.      Breath sounds: Normal breath sounds.   Abdominal:      General: Bowel sounds are normal.      Palpations: Abdomen is soft.   Musculoskeletal:         General: Normal range of motion.      Cervical back: Normal range of motion.      Right lower leg: Edema present.      Left lower leg: Edema present.   Skin:     General: Skin is warm and dry.      Comments: Left Chest Wall Mediport Incision C/D/I  Right Chest Wall Dialysis Cath    Neurological:      Mental Status: She is alert and oriented to person, place, and time. Mental status is at baseline.   Psychiatric:         Mood and Affect: Mood normal.         Behavior: Behavior normal.         Judgment: Judgment normal.       Home Medications:   No current facility-administered medications on file prior to encounter.     Current Outpatient Medications on File Prior to Encounter   Medication Sig Dispense Refill    olmesartan (BENICAR) 20 MG tablet Take 20 mg by mouth once daily.       Current Inpatient Medications:    Current Facility-Administered Medications:     0.9%  NaCl infusion (for blood administration), , Intravenous, Q24H PRN, Almaz Cheney, FNP    0.9%  NaCl infusion (for blood administration), , Intravenous, Q24H PRN, Maryanne Moise, AGNP    0.9%  NaCl infusion, , Intravenous, PRN, Hussein Merino MD, Last Rate: 5 mL/hr at 12/10/23 1928, Rate Verify at 12/10/23 1928    acetaminophen tablet 650 mg, 650 mg, Oral, Q4H PRN, Hussein Merino MD, 650 mg at 12/14/23 1508    aluminum-magnesium hydroxide-simethicone 200-200-20 mg/5 mL suspension 30 mL, 30 mL, Oral, QID PRN, Hussein Merino MD    bisacodyL suppository 10 mg, 10 mg, Rectal, Daily PRN, Hussein Merino MD    chlorproMAZINE injection 25 mg, 25 mg, Intramuscular, QID PRN, Dalton Palacios MD, 25 mg at 12/05/23 1102    dicyclomine injection 20  mg, 20 mg, Intramuscular, QID PRN, Dalton Palacios MD, 20 mg at 11/30/23 1754    enoxaparin injection 100 mg, 1 mg/kg, Subcutaneous, Q24H (treatment, non-standard time), Mukesh Cole MD, 100 mg at 12/15/23 0112    hydrALAZINE injection 10 mg, 10 mg, Intravenous, Q6H PRN, Kenney Steiner MD    hydrocortisone suppository 25 mg, 25 mg, Rectal, BID, Alexandra Fry PA, 25 mg at 12/14/23 2047    hyoscyamine ODT 0.125 mg, 0.125 mg, Sublingual, Q4H PRN, Sara Waldrop AGACNP-BC, 0.125 mg at 11/30/23 1735    melatonin tablet 6 mg, 6 mg, Oral, Nightly PRN, Hussein Merino MD, 6 mg at 12/13/23 2028    metoclopramide HCl injection 5 mg, 5 mg, Intravenous, Q6H PRN, Dalton Palacios MD, 5 mg at 12/13/23 0453    metoprolol injection 5 mg, 5 mg, Intravenous, Q4H PRN, Genaro Crawford, FNP, 5 mg at 12/08/23 0329    metoprolol tartrate (LOPRESSOR) split tablet 12.5 mg, 12.5 mg, Oral, BID, Ty Rein T, FNP, 12.5 mg at 12/14/23 2047    micafungin 100 mg in sodium chloride 0.9 % 100 mL IVPB (MB+), 100 mg, Intravenous, Q24H, Mukehs Cole MD, Stopped at 12/14/23 2252    midodrine tablet 10 mg, 10 mg, Oral, TID WM, Dalton Palacios MD, 10 mg at 12/14/23 2050    morphine injection 4 mg, 4 mg, Intravenous, Q4H PRN, Hussein Merino MD, 4 mg at 12/02/23 0217    ondansetron injection 4 mg, 4 mg, Intravenous, Q4H PRN, Dalton Palacios MD    oxyCODONE immediate release tablet 5 mg, 5 mg, Oral, Q6H PRN, Hussein Merino MD, 5 mg at 12/04/23 2023    pantoprazole injection 40 mg, 40 mg, Intravenous, BID WM, Dalton Palacios MD, 40 mg at 12/14/23 2051    perflutren lipid microspheres injection 1.3 mL, 1.3 mL, Intravenous, Once, Dalton Palacios MD    polyethylene glycol packet 17 g, 17 g, Oral, BID PRN, Hussein Merino MD    prochlorperazine injection Soln 5 mg, 5 mg, Intravenous, Q6H PRN, Hussein Merino MD, 5 mg at 11/22/23 1347    promethazine injection 25 mg, 25 mg, Intramuscular, Q4H PRN, Lyssa Car, AGACNP-BC, 25 mg at 11/23/23 1620     senna-docusate 8.6-50 mg per tablet 2 tablet, 2 tablet, Oral, BID PRN, Hussein Merino MD    sodium bicarbonate tablet 1,300 mg, 1,300 mg, Oral, Daily, Maryanne Moise S, AGNP, 1,300 mg at 12/14/23 0903    sodium chloride 0.9% bolus 250 mL 250 mL, 250 mL, Intravenous, PRN, Maryanne Moise S, AGNP    sodium chloride 0.9% flush 10 mL, 10 mL, Intravenous, PRN, Hussein Merino MD    Flushing PICC/Midline Protocol, , , Until Discontinued **AND** sodium chloride 0.9% flush 10 mL, 10 mL, Intravenous, Q6H, 10 mL at 12/14/23 1509 **AND** sodium chloride 0.9% flush 10 mL, 10 mL, Intravenous, PRN, Kenney Steiner MD         VTE Risk Mitigation (From admission, onward)           Ordered     enoxaparin injection 100 mg  Every 24 hours         12/10/23 1703     IP VTE LOW RISK PATIENT  Once         11/18/23 0447     Place sequential compression device  Until discontinued         11/18/23 0447                    Assessment:   Persistent Fungemia   Newly Diagnosed Atrial Fibrillation  - now SR    - [id number] Score 2 (2.2% Stroke Risk per Year)  Biliary Obstruction likely s/t portal hepatic lymphadenopathy    - Successful placement of biliary drainage catheter: 8 Syrian internal external biliary drainage catheter on 11.21.23     - Incomplete Laparoscopic cholecystectomy 11.10.23  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   Continue Metoprolol   Continue FD Lovenox for Stroke Prevention. Transition to oral anticoagulation prior to  discharge.   Medical Management per Primary Team  Keep Mag > 2 and Potassium > 4. Electrolyte management per primary team.     We will sign off. Please don't hesitate to reach out with questions or concerns.     MARIA A Garcia  Cardiology  [facility]   12/15/2023

## 2023-12-15 NOTE — PROGRESS NOTES
Nephrology consult follow up note    HPI:      Karen Briggs is a 58 y.o. female presented on  7 days post op from laparoscopic cholecystectomy on 11/10. Gallbladder was unable to be resected. Back and flank pain persisted post operatively prompting evaluation [place] [facility]. After workup her surgeon recommended ERCP for which she was sent to this facility. Left sided hydronephrosis noted on CT scans done on admission. At that time renal function was fairly normal and patient was given option for stenting or to defer as outpatient and she chose the latter.      During ERCP, malignant gastric mass noted and ERCP was unable to be completed due to duodenal scarring. 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 Patient ultimately had biliary drain placed per IR.      She developed A fib with RVR requiring amiodarone infusion. She then had significant blood loss post removal of long term IV and subsequent fall in her room.      Patient renal function was relatively stable until decline starting 11/25. She was ultimately initiated on HD 11/28 post bilateral ureteral stent placement with Dr Cardenas same day.      Patient developed dialysis dependent acute kidney injury, and was started on hemodialysis 11/29/2023.    Interval history:     No acute events overnight.  Infectious Disease is recommending removal of her MediPort and tunneled dialysis catheter due to persistent fungemia.  She does have lower extremity edema.  No chest pain, shortness of breath, nausea, vomiting.     Review of Systems:       Past medical, family, surgical, and social history reviewed and unchanged from initial consult note.     Objective:       VITAL SIGNS: 24 HR MIN & MAX LAST    Temp  Min: 97.7 °F (36.5 °C)  Max: 100.7 °F (38.2 °C)  99.4 °F (37.4 °C)        BP  Min: 98/63  Max: 111/76  104/67     Pulse  Min: 93  Max: 116  (!) 113     Resp  Min: 14  Max: 20  18    SpO2  Min: 98 %  Max: 100 %  99 %      GEN: Well appearing AAF  CV: RRR  +S1,S2 without murmur  PULM: CTAB, unlabored  ABD: Soft, NT/ND abdomen with NABS  EXT: 2+ BLE edema  SKIN: Warm and dry  PSYCH: Awake, alert and appropriately conversant.   Dialysis access: Right IJ permcath            Component Value Date/Time     (L) 12/14/2023 1214     (L) 12/13/2023 1001     04/24/2018 0340    K 3.4 (L) 12/14/2023 1214    K 5.6 (H) 12/13/2023 1001    K 3.2 (L) 04/24/2018 0340    CHLORIDE 99 12/14/2023 1214    CHLORIDE 100 12/13/2023 1001    CO2 21 (L) 12/14/2023 1214    CO2 22 (L) 12/13/2023 1001    CO2 20 (L) 04/24/2018 0340    BUN 68.6 (H) 12/14/2023 1214    BUN 49.8 (H) 12/13/2023 1001    BUN 10 04/24/2018 0340    CREATININE 5.54 (H) 12/14/2023 1214    CREATININE 4.60 (H) 12/13/2023 1001    CREATININE 0.7 04/24/2018 0340    CALCIUM 8.8 12/14/2023 1214    CALCIUM 8.7 12/13/2023 1001    CALCIUM 9.2 04/24/2018 0340    PHOS 4.6 12/14/2023 1214            Component Value Date/Time    WBC 16.70 (H) 12/14/2023 1214    WBC 16.23 (H) 12/13/2023 1001    WBC 24.01 12/05/2023 0455    WBC 23.73 11/23/2023 0723    WBC 12.03 04/24/2018 0340    HGB 10.4 (L) 12/14/2023 1214    HGB 10.8 (L) 12/13/2023 1001    HGB 11.3 (L) 04/24/2018 0340    HCT 30.0 (L) 12/14/2023 1214    HCT 31.7 (L) 12/13/2023 1001    HCT 35.3 (L) 04/24/2018 0340     (H) 12/14/2023 1214     (H) 12/13/2023 1001     04/24/2018 0340         Imaging reviewed      Assessment / Plan:       There are no hospital problems to display for this patient.      POORNIMA multifactorial secondary to obstructive uropathy, acute blood loss, contrast exposure, hypotension and Afib with RVR  ---Nephrotic range proteinuria noted. 4.4 g  ---Dialysis initiated 11/28 post tunneled catheter placement 12/6  Newly diagnosed malignant gastric mass. Pathology consistent with adenocarcinoma   -s/p J tube and mediport placement 12/1  Acute blood loss anemia 2/2 bleeding post long term IV removal   --s/p transfusion 11/30, 12/1,  12/10  Bilateral hydronephrosis noted 11/21 - stent placement initially deferred due to stable renal function   Afib with RVR resolved   S/p biliary drain placement and subsequent exchange due to fungemia   Fungemia - blood cultures positive 12/10, 12/11     Plan:  ID recommending removal of tunneled dialysis catheter, and MediPort due to persistent fungemia.  Currently it does not seem like there is a definitive plan for when this is going to happen. We would like to dialyze her before removal tunneled dialysis catheter.  I discussed with nursing that if there is plans removal of catheters today, that we would dialyze her today and to please let us know.  Otherwise we will plan to dialyze tomorrow on regular TTS schedule.    Yahir Black DO  Nephrology  [facility]  Clinic number: [phone]

## 2023-12-15 NOTE — PROGRESS NOTES
Inpatient Nutrition Assessment    Admit Date: 11/17/2023   Total duration of encounter: 28 days     Nutrition Recommendation/Prescription     -Continue tube feeds as tolerated: Novasource @ 45ml/hr, goal rate.   Free water flush: 20ml/hr (~400ml flush/d; 1050ml total fluids daily)    -Electrolytes and additional fluids per pharmacy/MD.     -Monitor need to add bowel regimen.     Communication of Recommendations: reviewed with nurse, reviewed with patient, and reviewed with family    Nutrition Assessment     Malnutrition Assessment/Nutrition-Focused Physical Exam    Malnutrition Context: acute illness or injury (11/18/23 1630)  Malnutrition Level: severe (11/18/23 1630)  Energy Intake (Malnutrition):  (does not meet criteria) (12/08/23 1238)  Weight Loss (Malnutrition): greater than 7.5% in 3 months (11/18/23 1630)  Subcutaneous Fat (Malnutrition): moderate depletion (11/18/23 1630)  Orbital Region (Subcutaneous Fat Loss): moderate depletion        Muscle Mass (Malnutrition): moderate depletion (11/18/23 1630)  [place] Region (Muscle Loss): moderate depletion     Clavicle and Acromion Bone Region (Muscle Loss): moderate depletion        Patellar Region (Muscle Loss): moderate depletion                 A minimum of two characteristics is recommended for diagnosis of either severe or non-severe malnutrition.    Chart Review    Reason Seen: malnutrition screening tool (MST), physician consult for wt loss, and follow-up    Malnutrition Screening Tool Results   Have you recently lost weight without trying?: Yes: 24-33 lbs  Have you been eating poorly because of a decreased appetite?: No   MST Score: 3     Diagnosis:  Hypotension, improved  Persistent leukocytosis- source unclear  Acute on chronic anemia, s/p 2 units prbcs transfusion 11/30  Hx of Acute Blood loss anemia with syncope and then fall 11/26-   Locally advanced gastric adenocarcinoma with intractable nausea and vomiting possible Gastric/duodenal outlet  obstruction  -now status post J Tube placement  Obstructive uropathy with bilateral hydronephrosis- S/P bilateral ureteral stent placed on 11/27  POORNIMA-now on HD - 11/29  Metabolic acidosis- resolved  Suspected bile leak with biloma and biliary obstruction--> H/O Laparoscopic incomplete cholecystectomy 11/10/2023  New onset atrial fibrillation with RVR- fluctuating  Hypokalemia- resolved with replacement  Hyponatremia  Severe malnutrition    Relevant Medical History:   Past Medical History:   Diagnosis Date    Hypertension     Sciatica      Past Surgical History:   Procedure Laterality Date    CARPAL TUNNEL RELEASE Left     CYSTOSCOPY W/ URETERAL STENT PLACEMENT Bilateral 11/27/2023    Procedure: CYSTOSCOPY, WITH URETERAL STENT INSERTION;  Surgeon: Huey Cardenas MD;  Location: [facility] OR;  Service: Urology;  Laterality: Bilateral;    EGD, WITH CLOSED BIOPSY  11/18/2023    Procedure: EGD, WITH CLOSED BIOPSY;  Surgeon: Alfred Goss MD;  Location: [facility] OR;  Service: Gastroenterology;;    ERCP N/A 11/18/2023    Procedure: ERCP (ENDOSCOPIC RETROGRADE CHOLANGIOPANCREATOGRAPHY);  Surgeon: Alfred Goss MD;  Location: [facility] OR;  Service: Gastroenterology;  Laterality: N/A;    HEMORRHOID SURGERY      INSERTION OF TUNNELED CENTRAL VENOUS CATHETER (CVC) WITH SUBCUTANEOUS PORT N/A 12/1/2023    Procedure: INSERTION, PORT-A-CATH;  Surgeon: William Morse MD;  Location: [facility] OR;  Service: General;  Laterality: N/A;    INSERTION OF TUNNELED CENTRAL VENOUS HEMODIALYSIS CATHETER N/A 12/6/2023    Procedure: Insertion, Catheter, Central Venous, Hemodialysis;  Surgeon: Satinder Luo DO;  Location: [facility] CATH LAB;  Service: Nephrology;  Laterality: N/A;    LAPAROSCOPIC INSERTION OF JEJUNOSTOMY TUBE N/A 12/1/2023    Procedure: INSERTION, JEJUNOSTOMY TUBE, LAPAROSCOPIC;  Surgeon: William Morse MD;  Location: [facility] OR;  Service: General;  Laterality: N/A;  PLUS MEDIPORT     Review of patient's allergies  "indicates:  No Known Allergies     Nutrition-Related Medications:    enoxparin  1 mg/kg Subcutaneous Q24H (treatment, non-standard time)    hydrocortisone  25 mg Rectal BID    metoprolol tartrate  12.5 mg Oral BID    micafungin (MYCAMINE) IVPB  100 mg Intravenous Q24H    midodrine  10 mg Oral TID WM    pantoprazole  40 mg Intravenous BID WM    perflutren lipid microspheres  1.3 mL Intravenous Once    sodium bicarbonate  1,300 mg Oral Daily    sodium chloride 0.9%  10 mL Intravenous Q6H        Calorie Containing IV Medications: no significant kcals from medications at this time    Nutrition-Related Labs:  No results found for: "HGBA1C"  12/14/2023: Magnesium Level 2.30 mg/dL (Ref range: 1.60 - 2.60 mg/dL); Phosphorus Level 4.6 mg/dL (Ref range: 2.3 - 4.7 mg/dL); Potassium Level 3.4 mmol/L (L; Ref range: 3.5 - 5.1 mmol/L); Sodium Level 131 mmol/L (L; Ref range: 136 - 145 mmol/L)   Recent Labs   Lab 12/12/23  0216 12/13/23  1001 12/14/23  1214   WBC 22.56* 16.23* 16.70*   RBC 3.21* 3.59* 3.47*   HGB 9.6* 10.8* 10.4*   HCT 27.1* 31.7* 30.0*   MCV 84.4 88.3 86.5   MCH 29.9 30.1 30.0   MCHC 35.4 34.1 34.7       Recent Labs   Lab 12/11/23  0326 12/12/23  0217 12/13/23  1001 12/14/23  1214   * 132* 131* 131*   K 3.4* 3.4* 5.6* 3.4*   CO2 22* 20* 22* 21*   BUN 54.3* 72.2* 49.8* 68.6*   CREATININE 4.15* 5.46* 4.60* 5.54*   GLUCOSE 97 96 94 97   CALCIUM 8.5 7.9* 8.7 8.8   MG 2.10  --  2.20 2.30   ALBUMIN 1.4* 1.4* 1.5* 1.5*   ALKPHOS 199* 181* 141 156*   ALT 40 39 52 53   AST 43* 39* 68* 46*   BILITOT 3.9* 4.1* 4.6* 4.3*         Diet/PN Order: Diet NPO  Oral Supplement Order: none  Tube Feeding Order:  Novasource @ 45ml/hr (see below for calculation)  Appetite/Oral Intake: NPO/NPO  Factors Affecting Nutritional Intake: altered gastrointestinal function and NPO  Food/Faith/Cultural Preferences: none reported  Food Allergies: none reported    Skin Integrity: drain/device(s)  Wound(s):      Altered Skin Integrity " "12/12/23 1500 Right lateral Buttocks Partial thickness tissue loss. Shallow open ulcer with a red or pink wound bed, without slough. Intact or Open/Ruptured Serum-filled blister.-Tissue loss description: Partial thickness       Altered Skin Integrity 12/12/23 1500 Right medial Buttocks Partial thickness tissue loss. Shallow open ulcer with a red or pink wound bed, without slough. Intact or Open/Ruptured Serum-filled blister.-Tissue loss description: Partial thickness       Altered Skin Integrity 12/12/23 1500 Sacral spine Partial thickness tissue loss. Shallow open ulcer with a red or pink wound bed, without slough. Intact or Open/Ruptured Serum-filled blister.-Tissue loss description: Partial thickness n/a    Comments    11/18/23: Pt reports poor appetite, nauseated, threw up after consuming ~10% of full liquid tray for lunch, appetite has been a struggle for 3 months over which time she lost about 30 lbs unintentionally, appetite has been worse since 11/10 incomplete cholecystectomy, chews/swallows well, agrees to vanilla ONS on diet advancement.     11/22/23: Pt with poor to fair intake of full liquids; states that she is still having some n/v occasionally; reports that she is drinking the Boost that is ordered.     11/24/23: Pt reports poor appetite, still w/ n/v, basically vomits up everything she consumes. Discussed w/ physician, we agree that she needs a J-tube. Pt reports that she is open to it.     11/26/23: breakfast: 0% tray; lunch- unknown, no tray receipt; dinner- 100% 2 orange sheberts and 1/2 bowl chicken broth. Consuming <25% energy needs.    11/27/23: breakfast: 50% cream of wheat and 100% orange juice; lunch: NPO for stent placements, but sx deferred until tomorrow so will allow liquids for dinner tonight.   Continues with emesis and nausea throughout day; receiving antiemetics. States Boost supplement also "comes back up". Will change to Boost Breeze and monitor tolerance.  Changing peripheral PN " "to total PN with lipids 2x/week to meet nutritional needs until able to place enteral access device for tube feeds or po intake improves. Discussed with MD and pharmacy.     11/30/23 TPN continues. Decreased rate to 50ml/hr until tomorrow and will order custom TPN for minimum volume until able to start on enteral nutrition. Started on dialysis s/t worsening renal indices and decreased urine output (<600mL x24 hr pr RN). Glucose lab of 547 this morning error, 159 on recheck.     12/1/23 Out of room for Jtube and mediport placement. Consult received for Jtube feeding recs. Continue in-house TPN at 50ml/hr, no need for custom at this time. Can wean TPN as tolerating tube feeds at 1/2 goal rate. Will use renal formula for tube feedings while pt continues on dilaysis.  Recommendations discussed with RN and pharmacy.     12/4/23 Consult received to start tube feedings. Order placed and discussed with RN. Start at 15ml/hr and advance per MD. Continue with renal formula and monitor electrolytes for ability to change to a standard formula. TPN infusing and NPO status continues.      12/5/23 Tolerating tube feeds at 25ml/hr. TPN decreased to 30ml/hr last night. Pt reports tolerating tube feeds fine. TPN bag should be complete in the next few hours. Can discontinue as long as tolerating tube feeds.    12/8/23: Tolerating tube feeds at goal last few days. Last BM 12/5. Reports gas but no abdominal pain.   No TPN/IVF infusing. Increase free water flush from 30ml q4hr to 20ml/hr.    12/12/23: Pt in dialysis at time of rounds. Spoke with RN, she reported that TF were at goal before pt went NPO at midnight. RN stated that plans were to resume feeds once the pt returns from dialysis.     12/15/23: TF on hold this morning but RN states got the ok to resume at goal rate. Pt has been tolerating. Continue NPO.     Anthropometrics    Height: 5' 5.75" (167 cm) Height Method: Stated  Last Weight:  (pt unable to stand .) (12/11/23 9490) " Weight Method: Standard Scale  BMI (Calculated): 34.5  BMI Classification: overweight (BMI 25-29.9)     Ideal Body Weight (IBW), Female: 128.75 lb     % Ideal Body Weight, Female (lb): 140.58 %                             Usual Weight Provided By: patient and EMR weight history  23: 211 lbs  Wt Readings from Last 5 Encounters:   12/10/23 96.2 kg (212 lb)   18 88 kg (194 lb)   18 90.4 kg (199 lb 4.7 oz)   17 90 kg (198 lb 6.6 oz)     Weight Change(s) Since Admission: +14 kg, unsure of accuracy   Admit Weight: 82.2 kg (181 lb 4 oz) (23 2355)    Estimated Needs    Weight Used For Calorie Calculations: 82.2 kg (181 lb 3.5 oz)  Energy Calorie Requirements (kcal): 1644 kcals (20 kcals/kg)  Energy Need Method: Kcal/kg  Weight Used For Protein Calculations: 82.2 kg (181 lb 3.5 oz)  Protein Requirements:  g (1.0-1.3 g/kg)  Fluid Requirements (mL): 1000mL + UOP (started on HD)  Temp (24hrs), Av.9 °F (37.2 °C), Min:97.7 °F (36.5 °C), Max:100.7 °F (38.2 °C)       Enteral Nutrition    Formula: Novasource Renal  Rate/Volume: 45ml/hr  Water Flushes: 20ml/hr (400ml free water)  Additives/Modulars: none at this time  Route: jejunostomy tube  Method: continuous  Total Nutrition Provided by Tube Feeding, Additives, and Flushes:  Calories Provided  1800 kcal/d, 109% needs   Protein Provided   81 g/d, 98% needs   Fluid Provided  1050 ml/d, -% needs   Continuous feeding calculations based on estimated 20 hr/d run time unless otherwise stated.    Parenteral Nutrition    Patient not receiving parenteral nutrition at this time.     Evaluation of Received Nutrient Intake    Calories: meeting estimated needs  Protein: meeting estimated needs    Patient Education    Not applicable.    Nutrition Diagnosis     PES: Malnutrition related to suboptimal protein/energy intake as evidenced by less than or equal to 50% needs met for greater than or equal to 5 days, moderate fat depletion, moderate muscle  depletion, and greater than 7.5% weight loss in 3 months. (active)     Interventions/Goals     Intervention(s): collaboration with other providers  Goal: Meet greater than 75% of nutritional needs by follow-up. (goal met)    Monitoring & Evaluation     Dietitian will monitor energy intake, weight, electrolyte/renal panel, and gastrointestinal profile.  Nutrition Risk/Follow-Up: moderate (follow-up in 3-5 days)   Please consult if re-assessment needed sooner.    Lisa Correa RD   12/15/2023

## 2023-12-15 NOTE — PROGRESS NOTES
SURG ONC    NOTIFIED BY ID AND ATTENDING THAT ARE REQUESTING REMOVAL OF MEDIPORT IN COMBO WITH REMOVAL OF DIALYSIS CATH    DR SPRAGUE IS OUT OF TOWN UNTIL NEXT WEEK    WOULD RECOMMEND CONSULTING GEN/SURGERY OR SURGICAL HOSPITALIST FOR REMOVAL AS PT WILL LIKELY NEED DONE SOON RATHER THAN LATER    PLEASE CALL WITH ANY PROBLEMS

## 2023-12-15 NOTE — PROGRESS NOTES
[facility]  Infectious Disease Progress Note            ASSESSMENT & PLAN:   She is a 58-year-old female with a past medical history of hypertension who underwent an unsuccessful laparoscopic cholecystectomy on 11/10/2023 as gallbladder was unable to be completely resected.  Since procedure, she continued to have right flank and back pain.  She followed up with her PCP, and CT of the abdomen and pelvis on 11/17 revealed left-sided hydronephrosis with suspected distal obstruction.  Additionally noted was markedly thickened stomach wall.  Underwent ERCP on 11/18-subsequently found to have a malignant gastric tumor.  IR placed a biliary drain on 11/21.  On 11/24, she developed atrial fibrillation with RVR and was initiated on amiodarone drip.   She then had bilateral ureteral stents placed on 11/27 given persistent hydronephrosis.  She was developed an POORNIMA since admit secondary to obstructive uropathy, now requiring hemodialysis.  Temporary hemodialysis catheter was placed earlier today along with MediPort.  Patient was significant oozing from sites postoperatively.  She received 3 units of blood intraoperatively as well as around 1 L of IV fluids per report.  Patient reports developing cough after procedure today, nonproductive.  Denies chest pain and is oxygenating well on room air.  Patient has been receiving empiric Zosyn essentially since admit.  Leukocytosis had trended up around 11/22, however has since trended downward although with some worsening this morning.  Blood cultures from 11/18 are negative.  Urine culture from 11/23 is also negative.  She is been afebrile and hemodynamically stable.  Chest x-ray earlier today showed low lung volumes with mild retrocardiac atelectasis.  We have been consulted for input regarding worsening leukocytosis.      Reconsulted secondary to recent low-grade fevers, T-max of 100.4° on 12/08.  Patient had previously been off of Zosyn since 12/05.  She was resumed on antimicrobials  with vancomycin and Zosyn on 12/10.  She denies any associated symptoms.  She did have a recent drop in her H&H and positive stools.  GI service is considering colonoscopy.  CT of the chest, abdomen, and pelvis with IV contrast performed on 12/09 showed moderate volume ascites with areas of subcutaneous edema in the abdominal wall.  Patient currently without complaints.  Blood cultures drawn on 12/10 via PICC are positive for yeast, C. glabrata per BCID.  Peripheral set are negative thus far.          Candidemia - abdominal vs line source  Concern for lower GIB  Ascites per recent CT  Persistent leukocytosis, suspect multifactorial  POORNIMA, now ESRD on HD  Obstructive uropathy, s/p bilateral ureteral stents on 11/27  Persistent hyperbilirubinemia, s/p unsuccessful cholecystectomy on 11/10 and biliary drain on 11/21, exchanged 12/13  Gastric cancer, newly diagnosed  Mediport / tunneled HD catheter status     -100.7 temp overnight   -12/12 blood cultures without growth thus far      Plan:  -plans for Mediport and tunneled cath removal after HD tomorrow with general surgery  -repeat blood culture given fever overnight   -Maintain with PIVs only if feasible.  -Continue micafungin 100mg IV q24hr.   -Discussed with patient and nursing      SUBJECTIVE:   T 100.7 overnight. No complaints.     MEDICATIONS:   Reviewed in EMR    REVIEW OF SYSTEMS:   Except as documented, all other systems reviewed and negative     PHYSICAL EXAM:   T 98.1 °F (36.7 °C)   /79   P 103   RR 18   O2 100 %  GENERAL: NAD; does not appear toxic  SKIN: no rash  HEENT: sclera non-icteric; PERRL   NECK: supple; no LAD  CHEST: CTA; nonlabored, equal expansion; no adventitious BS; RCW tunneled HD catheter and LCW Mediport site ntoed  CARDIOVASCULAR: RRR, S1S2; no murmur   ABDOMEN:  active bowel sounds; abdomen soft, rounded, nontender to palpation; biliary drain / J-tube noted  EXTREMITIES: no cyanosis or clubbing  NEURO: AAO x4; CN II-XII grossly  "intact  PSYCH: Mentation and affect appropriate     LABS AND IMAGING:     Recent Labs     12/14/23  1214 12/15/23  1047   WBC 16.70* 18.88  18.88*   RBC 3.47* 3.25*   HGB 10.4* 9.5*   HCT 30.0* 28.4*   MCV 86.5 87.4   MCH 30.0 29.2   MCHC 34.7 33.5   RDW 17.2* 17.1*   * 737*     No results for input(s): "LACTIC" in the last 72 hours.  No results for input(s): "INR", "APTT", "D-DIMER" in the last 72 hours.  No results for input(s): "HGBA1C", "CHOL", "TRIG", "LDL", "VLDL", "HDL" in the last 72 hours.   Recent Labs     12/14/23  1214 12/15/23  1047   * 136   K 3.4* 3.3*   CHLORIDE 99 101   CO2 21* 23   BUN 68.6* 48.7*   CREATININE 5.54* 4.56*   GLUCOSE 97 117*   CALCIUM 8.8 8.1*   MG 2.30 2.10   PHOS 4.6 3.4   ALBUMIN 1.5* 1.5*   GLOBULIN 5.3* 4.8*   ALKPHOS 156* 184*   ALT 53 52   AST 46* 42*   BILITOT 4.3* 3.5*     Recent Labs     12/15/23  1047   TROPONINI <0.010          Transesophageal echo (GAYLA)  Procedure: GAYLA     Final impression:    LV systolic function 55%.  No intracardiac thrombus is present.  No vegetation or signs of infection.     Recommendations:    Medical management    Indication: Fungemia    Informed consent: obtained, signed copy placed in the chart.    Description of the procedure: After sedation was achieved (please see   anesthesia report for details), the esophagus intubated without   difficulties. Multiple orthogonal views obtained. Patient tolerated   procedure without immediate complications noted.    Findings:  Left atrium (LA): Normal LA size.  Left atrial appendage (JER): No JER thrombus is present.  Interatrial septum:  NO ASD or PFO noted on 2D or Color Doppler images.   Right atrium (RA): Normal RA size.  Left ventricle (LV): LVEF 55-60%.   Normal LV size.  Normal LV wall   thickness. No Regional wall motion abnormalities noted.  Right ventricle (RV): Partially visualized in this study.  Aortic valve: Trileaflet.  No Aortic stenosis.   No aortic regurgitation.   No " Vegetation is present.  Mitral valve: No mitral annular calcification is present.  No Mitral   stenosis.  No Mitral valve prolapse. Trace Mitral regurgitation. No   Vegetation is present.  Tricuspid valve: Partially visualized, No Tricuspid stenosis. Trace   Tricuspid regurgitation. No Vegetation is present.  Pulmonic valve: Not well visualized, no hemodynamically significant   pulmonic stenosis, no pulmonic regurgitation noted in this study. No   Vegetation is present.  Thoracic aorta: No Atherosclerotic changes of the descending thoracic   aorta.  Normal Caliber aortic root.   Normal Caliber Proximal portion of   the ascending aorta.   Pericardium: No significant pericardial effusion is present.         Anam Casey, FNP  Infectious Disease  [facility]

## 2023-12-15 NOTE — CONSULTS
Acute Care Surgery   History and Physical    Patient Name: Karen Briggs  YOB: 1965  Date: 12/15/2023 1:47 PM  Date of Admission: 11/17/2023  HD#28  POD#9 Days Post-Op    PRESENTING HISTORY   Chief Complaint/Reason for Admission: mediport and HD cath removal    History of Present Illness:  58F presents s/p laparoscopic cholecystectomy on 11/10 in which the gallbladder could not be resected which prompted ED visit leading to CT and ERCP. On workup, pt was found to have L sided hydronephrosis with declining kidney fxn eventually requiring HD. She was also found to have intestinal adenocarcinoma after pathology from ERCP. On 12/6 pt required placement of L subclavian mediport by surgical oncology. During hospitalization pt was found to have positive blood cultures growing yeast on 12/10. Today General surgery being consulted for removal of both HD line and mediport in the setting of fungemia.    Review of Systems:  12 point ROS negative except as stated in HPI    PAST HISTORY:   Past medical history:  Past Medical History:   Diagnosis Date    Hypertension     Sciatica        Past surgical history:  Past Surgical History:   Procedure Laterality Date    CARPAL TUNNEL RELEASE Left     CYSTOSCOPY W/ URETERAL STENT PLACEMENT Bilateral 11/27/2023    Procedure: CYSTOSCOPY, WITH URETERAL STENT INSERTION;  Surgeon: Huey Cardenas MD;  Location: [facility] OR;  Service: Urology;  Laterality: Bilateral;    EGD, WITH CLOSED BIOPSY  11/18/2023    Procedure: EGD, WITH CLOSED BIOPSY;  Surgeon: Alfred Goss MD;  Location: [facility] OR;  Service: Gastroenterology;;    ERCP N/A 11/18/2023    Procedure: ERCP (ENDOSCOPIC RETROGRADE CHOLANGIOPANCREATOGRAPHY);  Surgeon: Alfred Goss MD;  Location: [facility] OR;  Service: Gastroenterology;  Laterality: N/A;    HEMORRHOID SURGERY      INSERTION OF TUNNELED CENTRAL VENOUS CATHETER (CVC) WITH SUBCUTANEOUS PORT N/A 12/1/2023    Procedure: INSERTION, PORT-A-CATH;   Surgeon: William Morse MD;  Location: [facility] OR;  Service: General;  Laterality: N/A;    INSERTION OF TUNNELED CENTRAL VENOUS HEMODIALYSIS CATHETER N/A 12/6/2023    Procedure: Insertion, Catheter, Central Venous, Hemodialysis;  Surgeon: Satinder Luo DO;  Location: [facility] CATH LAB;  Service: Nephrology;  Laterality: N/A;    LAPAROSCOPIC INSERTION OF JEJUNOSTOMY TUBE N/A 12/1/2023    Procedure: INSERTION, JEJUNOSTOMY TUBE, LAPAROSCOPIC;  Surgeon: William Morse MD;  Location: [facility] OR;  Service: General;  Laterality: N/A;  PLUS MEDIPORT       Family history:  Family History   Problem Relation Age of Onset    Breast cancer Mother     Cancer Maternal Aunt         colon cancer       Social history:  Social History     Socioeconomic History    Marital status:    Tobacco Use    Smoking status: Never    Smokeless tobacco: Never   Substance and Sexual Activity    Alcohol use: No    Drug use: No    Sexual activity: Never     Birth control/protection: Post-menopausal     Social History     Tobacco Use   Smoking Status Never   Smokeless Tobacco Never      Social History     Substance and Sexual Activity   Alcohol Use No        MEDICATIONS & ALLERGIES:     No current facility-administered medications on file prior to encounter.     Current Outpatient Medications on File Prior to Encounter   Medication Sig    olmesartan (BENICAR) 20 MG tablet Take 20 mg by mouth once daily.       Allergies: Review of patient's allergies indicates:  No Known Allergies    Scheduled Meds:   enoxparin  1 mg/kg Subcutaneous Q24H (treatment, non-standard time)    hydrocortisone  25 mg Rectal BID    metoprolol tartrate  12.5 mg Oral BID    micafungin (MYCAMINE) IVPB  100 mg Intravenous Q24H    midodrine  10 mg Oral TID WM    pantoprazole  40 mg Intravenous BID WM    perflutren lipid microspheres  1.3 mL Intravenous Once    sodium bicarbonate  1,300 mg Oral Daily    sodium chloride 0.9%  10 mL Intravenous Q6H       Continuous Infusions:    PRN  "Meds:0.9%  NaCl infusion (for blood administration), 0.9%  NaCl infusion (for blood administration), sodium chloride 0.9%, acetaminophen, aluminum-magnesium hydroxide-simethicone, bisacodyL, chlorproMAZINE, dicyclomine, hydrALAZINE, hyoscyamine, melatonin, metoclopramide, metoprolol, morphine, ondansetron, oxyCODONE, polyethylene glycol, prochlorperazine, promethazine, senna-docusate 8.6-50 mg, sodium chloride 0.9%, sodium chloride 0.9%, Flushing PICC/Midline Protocol **AND** sodium chloride 0.9% **AND** sodium chloride 0.9%    OBJECTIVE:   Vital Signs:  VITAL SIGNS: 24 HR MIN & MAX LAST   Temp  Min: 97.7 °F (36.5 °C)  Max: 100.7 °F (38.2 °C)  100 °F (37.8 °C)   BP  Min: 98/63  Max: 113/75  113/75    Pulse  Min: 93  Max: 116  109    Resp  Min: 14  Max: 20  18    SpO2  Min: 98 %  Max: 100 %  99 %      HT: 5' 5.75" (167 cm)  WT:  (pt unable to stand .)  BMI: 34.5     Intake/output:  Intake/Output - Last 3 Shifts         12/13 0700  12/14 0659 12/14 0700  12/15 0659 12/15 0700  12/16 0659    P.O. 0      I.V. (mL/kg)       NG/GT       IV Piggyback  100     Total Intake(mL/kg) 0 (0) 100 (1)     Urine (mL/kg/hr) 0 (0)      Drains 585 275     Other       Stool 0      Total Output 585 275     Net -585 -175            Stool Occurrence 3 x              Intake/Output Summary (Last 24 hours) at 12/15/2023 1347  Last data filed at 12/15/2023 0638  Gross per 24 hour   Intake 100 ml   Output 275 ml   Net -175 ml         Physical Exam:  General: Well developed, well nourished, no acute distress  HEENT: Normocephalic, atraumatic, PERRL  Chest: RR, RIJ HD line in place, L subclavian mediport in place   Resp: NWOB  GI:  Abdomen soft, non-tender, non-distended, no guarding, no rebound  :  Deferred  MSK: No muscle atrophy, cyanosis, peripheral edema, moving all extremities spontaneously  Skin/wounds:  No rashes, ulcers, erythema  Neuro:  CNII-XII grossly intact, alert and oriented to person, place, and time    Labs:  Troponin:  Recent " "Labs     12/15/23  1047   TROPONINI <0.010     CBC:  Recent Labs     12/13/23  1001 12/14/23  1214 12/15/23  1047   WBC 16.23* 16.70* 18.88  18.88*   RBC 3.59* 3.47* 3.25*   HGB 10.8* 10.4* 9.5*   HCT 31.7* 30.0* 28.4*   * 673* 737*   MCV 88.3 86.5 87.4   MCH 30.1 30.0 29.2   MCHC 34.1 34.7 33.5     CMP:  Recent Labs     12/13/23  1001 12/14/23  1214 12/15/23  1047   CALCIUM 8.7 8.8 8.1*   ALBUMIN 1.5* 1.5* 1.5*   * 131* 136   K 5.6* 3.4* 3.3*   CO2 22* 21* 23   BUN 49.8* 68.6* 48.7*   CREATININE 4.60* 5.54* 4.56*   ALKPHOS 141 156* 184*   ALT 52 53 52   AST 68* 46* 42*   BILITOT 4.6* 4.3* 3.5*     Lactic Acid:  No results for input(s): "LACTATE" in the last 72 hours.  ETOH:  No results for input(s): "ETHANOL" in the last 72 hours.   Urine Drug Screen:  No results for input(s): "COCAINE", "OPIATE", "BARBITURATE", "AMPHETAMINE", "FENTANYL", "CANNABINOIDS", "MDMA" in the last 72 hours.    Invalid input(s): "BENZODIAZEPINE", "PHENCYCLIDINE"   ABG:  No results for input(s): "PH", "PO2", "PCO2", "HCO3", "BE" in the last 168 hours.     Diagnostic Results:  IR Biliary Catheter Exchange with Imaging   Final Result      Successful exchange and up sizing to a 10 [place]ian internal external biliary drainage catheter.      I, Renny Batres, was present for the entire procedure.         Electronically signed by: Renny Batres   Date:    12/14/2023   Time:    13:38      CT Chest Abdomen Pelvis With IV Contrast (XPD) NO Oral Contrast   Final Result      Moderate volume ascites with areas of subcutaneous edema in the abdominal wall.         Electronically signed by: George Dove   Date:    12/09/2023   Time:    13:33      US Bladder Post Void Residual   Final Result      Bladder measurements as above.      Ascites         Electronically signed by: Elvin Bass   Date:    12/07/2023   Time:    10:26      FL Cystogram Retrograde (xpd)   Final Result      X-Ray Chest 1 View   Final Result      Mild retrocardiac " atelectasis.         Electronically signed by: Sulema Solorzano   Date:    12/01/2023   Time:    10:43      SURG FL Surgery Fluoro Usage   Final Result      X-Ray Chest 1 View   Final Result      Central venous catheter terminates within the superior vena cava.         Electronically signed by: Victoriano Garcia   Date:    11/29/2023   Time:    16:26      X-Ray Abdomen AP 1 View   Final Result      Nephroureteral stents appear to be in good position      Biliary drain appears to be in the expected position.         Electronically signed by: Teddy Pedro   Date:    11/29/2023   Time:    16:28      SURG FL Surgery Fluoro Usage   Final Result      US Retroperitoneal Complete   Final Result      Similar bilateral hydronephrosis.         Electronically signed by: Wei Wright   Date:    11/27/2023   Time:    12:22      CT Head Without Contrast   Final Result      1. No acute intracranial abnormality.   2. Chronic microvascular ischemic changes.   No significant change from the [facility] interpretation.         Electronically signed by: Mary Landa   Date:    11/27/2023   Time:    06:31      CT Cervical Spine Without Contrast   Final Result      No acute fracture identified.      No significant change from the [facility] interpretation.         Electronically signed by: Mary Landa   Date:    11/27/2023   Time:    06:34      MRI Brain Without Contrast   Final Result      1.  No acute intracranial findings identified.      2.  Chronic microangiopathic ischemia and atrophy.         Electronically signed by: Victoriano Garcia   Date:    11/26/2023   Time:    18:43      X-Ray Chest 1 View for Line/Tube Placement   Final Result      Small left retrocardiac opacity with small pleural effusion         Electronically signed by: Mary Landa   Date:    11/26/2023   Time:    13:11      FL Upper GI   Final Result      Partial obstruction at the gastric outlet.  Small volume of contrast is seen to pass into the duodenum.  There  is narrowing at the gastric antrum and 2nd portion of the duodenum.      There were multiple episodes of large volume gastroesophageal reflux during the course of the exam despite patient being placed in a semiupright position through the course of the exam.         Electronically signed by: Sulema Solorzano   Date:    11/26/2023   Time:    10:54      CT Abdomen With IV Contrast   Final Result      The small globular area of contrast within the gallbladder fossa new from the prior suggesting biliary leak possibly within a contained cavity possibly indicating an gallbladder remnant or dilated cystic duct.  No evidence of extravasation of contrast into the surrounding region.  Persistent regional gallbladder fossa fluid.      Interval placement of a transhepatic biliary stent satisfactory position.      Diffuse gastric wall thickening consistent with gastritis.      Bilateral hydronephrosis severe on the left and moderate on the right, stable.      Generalized mesenteric inflammatory change, mild ascites, mild pleural fluid.         Electronically signed by: Paul Ocampo MD   Date:    11/22/2023   Time:    09:55      IR Biliary Drain Internal/External   Final Result   Addendum (preliminary) 1 of 1      No full cholangiogram required in 24-48 hours.         Electronically signed by: Renny Batres   Date:    11/22/2023   Time:    07:40      Final      Successful placement of biliary drainage catheter: 8 Spanish internal external biliary drainage catheter.      Plan:      Patient should return in 24-48 hours for a full cholangiogram.      I, Renny Batres, was present for the entire procedure.         Electronically signed by: Renny Batres   Date:    11/22/2023   Time:    07:37      US Guided Needle Placement   Final Result   Addendum (preliminary) 1 of 1      No full cholangiogram required in 24-48 hours.         Electronically signed by: Renny Batres   Date:    11/22/2023   Time:    07:40      Final       Successful placement of biliary drainage catheter: 8 [place]ian internal external biliary drainage catheter.      Plan:      Patient should return in 24-48 hours for a full cholangiogram.      I, Renny Batres, was present for the entire procedure.         Electronically signed by: Renny Batres   Date:    11/22/2023   Time:    07:37      NM Hepatobiliary Scan (HIDA)   Final Result      No appreciable excretion into the bile ducts by 4 hours.  This can be seen with bile duct obstruction or hepatic dysfunction.      Unable to assess for biliary leak in the absence of excreted radiotracer within the bile ducts.         Electronically signed by: Sulema Solorzano   Date:    11/20/2023   Time:    16:10      US Retroperitoneal Complete   Final Result      Mild improvement in left-sided hydronephrosis since 11/19/2023.         Electronically signed by: George Dove   Date:    11/20/2023   Time:    14:48      MRI MRCP Without Contrast   Final Result      Mild intrahepatic biliary ductal dilatation.  Tapering of the bile ducts at the confluence of the right and left hepatic ducts with evidence of accompanying ductal wall thickening and enhancement at the confluence and proximal common hepatic and bile ducts on a prior contrast enhanced CT exam.  This enhancement may be inflammatory or neoplastic.      Small volume free intraperitoneal fluid.      There is artifact in the mid abdomen which partially obscures the stomach and pancreas.  Known gastric lesion and perigastric lymph nodes more clearly demonstrated on prior CT exams.      Left hydronephrosis to the ureteropelvic junction.         Electronically signed by: Sulema Solorzano   Date:    11/20/2023   Time:    11:44      CT Abdomen Pelvis With IV Contrast   Final Result      1. Gastritis and duodenitis.   2. Irregular wall thickening of the proximal stomach compatible with known tumor.   3. Similar heterogeneous appearance of the gallbladder fossa with small volume  ascites.  Bile leak is possible.  Mild intrahepatic biliary ductal dilatation without significant dilatation of the common bile duct.   4. Similar moderate left hydronephrosis.         Electronically signed by: Wei Wright   Date:    11/19/2023   Time:    13:17      CT Chest Without Contrast   Final Result      No convincing CT evidence of metastatic disease in the chest.         Electronically signed by: Wei Wright   Date:    11/19/2023   Time:    12:58      FL ERCP Biliary And Pancreatic By [employer] Tech   Final Result      Fluoroscopic assistance provided as above.         Electronically signed by: Wei Wright   Date:    11/18/2023   Time:    14:21      CT Previous   Final Result      CT Previous   Final Result      IR Cholecystostomy    (Results Pending)       ASSESSMENT & PLAN:    58F with new diagnosis of intestinal adenocarcinoma and ongoing fungemia presents as a consult for mediport and HD cath removal    -dialysis tomorrow per primary  -lines to be removed tomorrow at bedside after dialysis   -will obtain consent  -rest of care per primary    Joie Dumont PGY1  [employer] General Surgery  12/15/2023   The patient is a 55y Male complaining of see chief complaint quote.

## 2023-12-15 NOTE — PT/OT/SLP PROGRESS
Physical Therapy      Patient Name:  Karen Briggs   MRN:  [id number]    Patient refusing PT services today. Stated she did not want to be fatigue before her procedure to remove HD line and mediport. Educated pt on OOB mobility however pt requested for PT to return tomorrow. PT to f/u as schedule allows.

## 2023-12-15 NOTE — PROGRESS NOTES
[facility]  Hospital Medicine Progress Note        Chief Complaint: Inpatient Follow-up for intractable nausea vomiting due to gastric adenocarcinoma     HPI:   58-year-old female with medical history of hypertension who recently underwent laparoscopic cholecystectomy on 11/10/2023, procedure was incomplete and was unable to completely resect the gallbladder.  Since surgery she continued to have right flank/back pain and followed up with her PCP and CT abdomen and pelvis on 11/17/2023 revealed left-sided hydronephrosis with suspected distal obstruction/no clear stone visualized, postoperative changes of cholecystectomy with fluid in the gallbladder fossa may reflect postoperative seroma but biloma can not be entirely excluded, also noted for marked thickening of the stomach wall diffusely may be related to mesenteric edema/reactive.  She presented to [facility] ED the same day 11/17/2023 and her labs notable for WBC 9.0, hemoglobin 12.4, platelets 442, creatinine 0.86, total bilirubin 8.7 with direct fraction 6.7, alkaline phosphatase 491, , .  Patient's surgeon was consulted and recommended ERCP which was not available at [facility] for which she was transferred to [facility] and referred to hospital medicine service for further evaluation and management. 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  Severe inflammation and oozing of blood noted proximal gastric lumen.  Unable to advance scope into the duodenum. Surgical oncology consulted, IR consulted for external drain placement which was done November 21.  November 24th she developed new onset atrial fibrillation with RVR and Cardiology consulted. Started on amiodarone drip. Unfortunately patient had acute blood loss due to hemorrhage from the midline site that was removed. Patient was unaware of the bleed.  Became very weak, passed out.  Code was called but she came around.   Stat H&H done which was slightly lower but stable, MRI of the brain was negative for any acute ischemic changes. Pt is aware of gastric cancer diagnosis. GI following--> 11/18- EGD shows normal esophagus, malignant gastric tumor in the cardia.  Inflamed mucosa and ooze of blood throughout the proximal gastric lumen.  Gastric antrum scar would not allow advancement of scope into the duodenal ampulla area therefore biliary cannulation was not possible for bile leak evaluation. Pathology shows marked chronic gastritis with intestinal metaplasia, gastric cardia biopsy shows adenocarcinoma, moderately differentiated intestinal type. Bilateral hydronephrosis with left greater than right. MRI brain without contrast-negative for acute finding. PET scan reviewed with patient by Oncology reports of locally advanced gastric adenocarcinoma and plans for systemic therapy outpatient if functional, nutritional and renal function improves. MediPort placement by surgical Oncology on 12/1, oncology on board plans for systemic therapy outpatient if functional, nutritional and renal function improves. Obstructive uropathy with bilateral hydronephrosis status post bilateral ureteral stent placed on 11/27 by Urology- no improvement in renal function, patient opted to have hemodialysis and a right-sided hemodialysis catheter was placed by General surgery on 11/29, to continue hemodialysis per nephrology discretion. Patient with symptoms of nausea and vomiting can not keep anything down in her stomach complicated by severe malnutrition on IV Clinimix and supportive medications for nausea and vomiting. Palliative care consulted. Patient got a MediPort and J-tube placed on 12/01. Cystogram reviewed by Urology with patent stents and recommends outpatient follow-up with Urology. White cell count elevated at 20.4, Infectious Disease had started on IV Zosyn given concern for possible sepsis with low blood pressure on 12/02. CIS evaluated patient  to begin low-dose metoprolol tartrate at 12.5 mg b.i.d. and resume Eliquis for stroke prevention. On TF via J tube which was placed by Surgical Oncology. ID started IV Micafungin for candidemia. Noted to have persistent fungemia on 1/2 BCX from 12/11.  Consulting CIS for GAYLA. Surgical Oncology will be requested to remove HD line & Mediport.     Interval Hx:   Today, Mrs Briggs stated she was doing well and had no new complaints. General Surgery consulted for mediport & tunneled catheter removal per Surgical Oncology recs. Underwent GAYLA yesterday; report pending. Back on TF. Will continue following Nephrology recommendations.    Objective/physical exam:  General: In no acute distress, afebrile  Chest: Clear to auscultation bilaterally, tunneled catheter & mediport in place  Heart: RRR, +S1, S2, no appreciable murmur  Abdomen: Soft, nontender, BS +  MSK: Warm, 2+ pitting lower extremity edema, no clubbing or cyanosis  Neurologic: Alert and oriented x4, Cranial nerve II-XII intact, Strength 5/5 in all 4 extremities    VITAL SIGNS: 24 HRS MIN & MAX LAST   Temp  Min: 97.7 °F (36.5 °C)  Max: 100.7 °F (38.2 °C) 99.4 °F (37.4 °C)   BP  Min: 98/63  Max: 111/76 104/67   Pulse  Min: 93  Max: 116  (!) 113   Resp  Min: 14  Max: 20 18   SpO2  Min: 98 %  Max: 100 % 99 %     I reviewed the labs below:  Recent Labs   Lab 12/12/23  0216 12/13/23  1001 12/14/23  1214   WBC 22.56* 16.23* 16.70*   RBC 3.21* 3.59* 3.47*   HGB 9.6* 10.8* 10.4*   HCT 27.1* 31.7* 30.0*   MCV 84.4 88.3 86.5   MCH 29.9 30.1 30.0   MCHC 35.4 34.1 34.7   RDW 17.2* 17.5* 17.2*   * 516* 673*   MPV 10.0 10.3 10.0       Recent Labs   Lab 12/11/23  0326 12/12/23  0217 12/13/23  1001 12/14/23  1214   * 132* 131* 131*   K 3.4* 3.4* 5.6* 3.4*   CO2 22* 20* 22* 21*   BUN 54.3* 72.2* 49.8* 68.6*   CREATININE 4.15* 5.46* 4.60* 5.54*   CALCIUM 8.5 7.9* 8.7 8.8   MG 2.10  --  2.20 2.30   ALBUMIN 1.4* 1.4* 1.5* 1.5*   ALKPHOS 199* 181* 141 156*   ALT 40 39 52  53   AST 43* 39* 68* 46*   BILITOT 3.9* 4.1* 4.6* 4.3*       Assessment/Plan:  Hypotension, improved with Midodrine  Persistent leukocytosis likely 2/2 Fungemia  Acute on chronic anemia, s/p 2 units prbcs transfusion 11/30  Hx of Acute Blood loss anemia with syncope and then fall 11/26-   Locally advanced gastric adenocarcinoma with intractable nausea and vomiting possible Gastric/duodenal outlet obstruction  -now status post J Tube placement  Fungemia possibly 2/2 Biliary Drain Infection  Obstructive uropathy with bilateral hydronephrosis- S/P bilateral ureteral stent placed on 11/27  POORNIMA-now on HD - 11/29  Metabolic acidosis- resolved  Suspected bile leak with biloma and biliary obstruction--> H/O Laparoscopic incomplete cholecystectomy 11/10/2023  New onset atrial fibrillation with RVR- fluctuating  History of hypertension and DDD/sciatica  Severe malnutrition    Patient continues to be admitted  No new complaints reported  Underwent GAYLA on 12/14 with report pending  On IV Micafungin  ID on-board; follow recs  Nephrology on-board for HD conduction  Surgical Oncology on-board; follow recs  Consulting General Surgery to remove HD catheter & Mediport per Surgical Oncology recs  GI on-board; follow recs  Patient underwent biliary drain exchange on 12/13 per ID recs  Blood Cultures 12/10 positive for candida glabrata in the blood 1/2; Repeat BCX ordered 12/11 positive for yeasts; BCX 12/12 negative x 48 hrs  PICC line removed  Patient had MediPort and J-tube placed on 12/01  Surgical Oncology consulted for Cholecystectomy, not thought to be infectious cause; recommendation to consult Palliative Care  Palliative Care consulted earlier; patient wishes to continue pursuing curative treatment for cancer  GI is called for positive FOBT and drop in Hg; monitoring H/H  Cystogram reviewed by Urology with patent stents and recommends outpatient follow-up with Urology  Appreciate Oncology input; plans for systemic therapy  outpatient if renal function improves, patient is planning to pursue treatment   Continued on Hydrocortisone Supp, Metoprolol Tartrate BID, Midodrine TID, Protonix BID, Sodium Bicarbonate     VTE prophylaxis:  FD Lovenox     Patient condition:  Stable     Anticipated discharge and Disposition:  Pending    All diagnosis and differential diagnosis have been reviewed; assessment and plan has been documented; I have personally reviewed the labs and test results that are presently available; I have reviewed the patients medication list; I have reviewed the consulting providers response and recommendations. I have reviewed or attempted to review medical records based upon their availability    All of the patient's questions have been  addressed and answered. Patient's is agreeable to the above stated plan. I will continue to monitor closely and make adjustments to medical management as needed.  _____________________________________________________________________    Nutrition Status:  Patient meets ASPEN criteria for severe malnutrition of acute illness or injury per RD assessment as evidenced by:  Energy Intake (Malnutrition):  (does not meet criteria)  Weight Loss (Malnutrition): greater than 7.5% in 3 months  Subcutaneous Fat (Malnutrition): moderate depletion  Muscle Mass (Malnutrition): moderate depletion           A minimum of two characteristics is recommended for diagnosis of either severe or non-severe malnutrition.   Radiology:   I have personally reviewed the images and agree with radiologist report  IR Biliary Catheter Exchange with Imaging  Narrative: EXAMINATION:  Fluoroscopic guidance    Percutaneous transhepatic cholangiogram    Internal external biliary drain exchange    CLINICAL HISTORY:  Leaking internal external biliary drainage catheter.    TECHNIQUE:  After the risks, benefits and alternatives were discussed, consent was obtained. A time out was performed to verify the patient's identity and  procedure.    The patient was placed supine on the angiographic table. The abdomen was cleaned and prepped in normal sterile fashion.  Contrast injected through the existing catheter demonstrating patency.    The existing catheter was removed over a Bentson wire without complication.  The Bentson wire was exchanged for an Amplatz wire through a hockey-stick catheter.    A new upsized 10 Maltese internal external biliary drainage catheter was placed over the wire without complication.  Cholangiogram confirmed appropriate placement.    Catheter was sutured to the skin using 0 silk.  The patient tolerated the procedure well. There were no immediate complications.    All needle, wire and catheter manipulations were done under fluoroscopic guidance.    EBL: < 10mL    Fluoro Time (min): 1.6    Fluoro Dose (mGy): 22.9  Impression: Successful exchange and up sizing to a 10 Maltese internal external biliary drainage catheter.    I, Renny Batres, was present for the entire procedure.    Electronically signed by: Renny Batres  Date:    12/14/2023  Time:    13:38      Mariano Bermudez MD  Department of Hospital Medicine   [facility]   12/15/2023

## 2023-12-16 LAB
ABS NEUT CALC (OHS): 17.13 X10(3)/MCL (ref 2.1–9.2)
ALBUMIN SERPL-MCNC: 1.5 G/DL (ref 3.4–4.8)
ANION GAP SERPL CALC-SCNC: 10 MEQ/L
ANISOCYTOSIS BLD QL SMEAR: ABNORMAL
BACTERIA BLD CULT: NORMAL
BUN SERPL-MCNC: 36.8 MG/DL (ref 9.8–20.1)
BUN SERPL-MCNC: 62.8 MG/DL (ref 9.8–20.1)
BURR CELLS (OLG): ABNORMAL
CALCIUM SERPL-MCNC: 8.2 MG/DL (ref 8.4–10.2)
CALCIUM SERPL-MCNC: 8.5 MG/DL (ref 8.4–10.2)
CHLORIDE SERPL-SCNC: 101 MMOL/L (ref 98–107)
CHLORIDE SERPL-SCNC: 102 MMOL/L (ref 98–107)
CO2 SERPL-SCNC: 19 MMOL/L (ref 23–31)
CO2 SERPL-SCNC: 20 MMOL/L (ref 23–31)
CREAT SERPL-MCNC: 3.57 MG/DL (ref 0.55–1.02)
CREAT SERPL-MCNC: 5.24 MG/DL (ref 0.55–1.02)
CREAT/UREA NIT SERPL: 10
EOSINOPHIL NFR BLD MANUAL: 0.19 X10(3)/MCL (ref 0–0.9)
EOSINOPHIL NFR BLD MANUAL: 1 % (ref 0–8)
ERYTHROCYTE [DISTWIDTH] IN BLOOD BY AUTOMATED COUNT: 17.5 % (ref 11.5–17)
GFR SERPLBLD CREATININE-BSD FMLA CKD-EPI: 14 MLS/MIN/1.73/M2
GFR SERPLBLD CREATININE-BSD FMLA CKD-EPI: 9 MLS/MIN/1.73/M2
GLUCOSE SERPL-MCNC: 124 MG/DL (ref 82–115)
GLUCOSE SERPL-MCNC: 131 MG/DL (ref 82–115)
HCT VFR BLD AUTO: 29.8 % (ref 37–47)
HGB BLD-MCNC: 9.9 G/DL (ref 12–16)
LYMPHOCYTES NFR BLD MANUAL: 0.57 X10(3)/MCL
LYMPHOCYTES NFR BLD MANUAL: 3 % (ref 13–40)
MCH RBC QN AUTO: 29.5 PG (ref 27–31)
MCHC RBC AUTO-ENTMCNC: 33.2 G/DL (ref 33–36)
MCV RBC AUTO: 88.7 FL (ref 80–94)
MONOCYTES NFR BLD MANUAL: 0.57 X10(3)/MCL (ref 0.1–1.3)
MONOCYTES NFR BLD MANUAL: 3 % (ref 2–11)
MYELOCYTES NFR BLD MANUAL: 3 %
NEUTROPHILS NFR BLD MANUAL: 90 % (ref 47–80)
NRBC BLD AUTO-RTO: 0 %
PHOSPHATE SERPL-MCNC: 3.3 MG/DL (ref 2.3–4.7)
PLATELET # BLD AUTO: 764 X10(3)/MCL (ref 130–400)
PLATELET # BLD EST: ABNORMAL 10*3/UL
PMV BLD AUTO: 10 FL (ref 7.4–10.4)
POIKILOCYTOSIS BLD QL SMEAR: ABNORMAL
POTASSIUM SERPL-SCNC: 3.1 MMOL/L (ref 3.5–5.1)
POTASSIUM SERPL-SCNC: 4 MMOL/L (ref 3.5–5.1)
RBC # BLD AUTO: 3.36 X10(6)/MCL (ref 4.2–5.4)
RBC MORPH BLD: ABNORMAL
SODIUM SERPL-SCNC: 131 MMOL/L (ref 136–145)
SODIUM SERPL-SCNC: 132 MMOL/L (ref 136–145)
TARGETS BLD QL SMEAR: ABNORMAL
WBC # SPEC AUTO: 19.03 X10(3)/MCL (ref 4.5–11.5)

## 2023-12-16 PROCEDURE — 80100016 HC MAINTENANCE HEMODIALYSIS

## 2023-12-16 PROCEDURE — 25000003 PHARM REV CODE 250

## 2023-12-16 PROCEDURE — 21400001 HC TELEMETRY ROOM

## 2023-12-16 PROCEDURE — 25000003 PHARM REV CODE 250: Performed by: INTERNAL MEDICINE

## 2023-12-16 PROCEDURE — 85027 COMPLETE CBC AUTOMATED: CPT | Performed by: STUDENT IN AN ORGANIZED HEALTH CARE EDUCATION/TRAINING PROGRAM

## 2023-12-16 PROCEDURE — 25000003 PHARM REV CODE 250: Performed by: STUDENT IN AN ORGANIZED HEALTH CARE EDUCATION/TRAINING PROGRAM

## 2023-12-16 PROCEDURE — A4216 STERILE WATER/SALINE, 10 ML: HCPCS | Performed by: INTERNAL MEDICINE

## 2023-12-16 PROCEDURE — 63600175 PHARM REV CODE 636 W HCPCS: Performed by: STUDENT IN AN ORGANIZED HEALTH CARE EDUCATION/TRAINING PROGRAM

## 2023-12-16 PROCEDURE — 25000003 PHARM REV CODE 250: Performed by: NURSE PRACTITIONER

## 2023-12-16 PROCEDURE — C9113 INJ PANTOPRAZOLE SODIUM, VIA: HCPCS | Performed by: INTERNAL MEDICINE

## 2023-12-16 PROCEDURE — 63600175 PHARM REV CODE 636 W HCPCS: Performed by: INTERNAL MEDICINE

## 2023-12-16 PROCEDURE — 80069 RENAL FUNCTION PANEL: CPT | Performed by: STUDENT IN AN ORGANIZED HEALTH CARE EDUCATION/TRAINING PROGRAM

## 2023-12-16 RX ORDER — METOPROLOL TARTRATE 25 MG/1
25 TABLET, FILM COATED ORAL 2 TIMES DAILY
Status: DISCONTINUED | OUTPATIENT
Start: 2023-12-16 | End: 2023-12-25 | Stop reason: HOSPADM

## 2023-12-16 RX ORDER — POTASSIUM CHLORIDE 20 MEQ/1
40 TABLET, EXTENDED RELEASE ORAL 2 TIMES DAILY
Status: DISCONTINUED | OUTPATIENT
Start: 2023-12-16 | End: 2023-12-16

## 2023-12-16 RX ADMIN — HYDROCORTISONE ACETATE 25 MG: 25 SUPPOSITORY RECTAL at 09:12

## 2023-12-16 RX ADMIN — SODIUM BICARBONATE 650 MG TABLET 1300 MG: at 07:12

## 2023-12-16 RX ADMIN — METOPROLOL TARTRATE 25 MG: 25 TABLET, FILM COATED ORAL at 09:12

## 2023-12-16 RX ADMIN — PANTOPRAZOLE SODIUM 40 MG: 40 INJECTION, POWDER, FOR SOLUTION INTRAVENOUS at 07:12

## 2023-12-16 RX ADMIN — HYDROCORTISONE ACETATE 25 MG: 25 SUPPOSITORY RECTAL at 07:12

## 2023-12-16 RX ADMIN — Medication 6 MG: at 09:12

## 2023-12-16 RX ADMIN — METOPROLOL TARTRATE 12.5 MG: 25 TABLET, FILM COATED ORAL at 07:12

## 2023-12-16 RX ADMIN — SODIUM CHLORIDE, PRESERVATIVE FREE 10 ML: 5 INJECTION INTRAVENOUS at 12:12

## 2023-12-16 RX ADMIN — SODIUM CHLORIDE, PRESERVATIVE FREE 10 ML: 5 INJECTION INTRAVENOUS at 06:12

## 2023-12-16 RX ADMIN — MIDODRINE HYDROCHLORIDE 10 MG: 5 TABLET ORAL at 12:12

## 2023-12-16 RX ADMIN — PANTOPRAZOLE SODIUM 40 MG: 40 INJECTION, POWDER, FOR SOLUTION INTRAVENOUS at 05:12

## 2023-12-16 RX ADMIN — MIDODRINE HYDROCHLORIDE 10 MG: 5 TABLET ORAL at 07:12

## 2023-12-16 RX ADMIN — ENOXAPARIN SODIUM 100 MG: 100 INJECTION SUBCUTANEOUS at 12:12

## 2023-12-16 RX ADMIN — MIDODRINE HYDROCHLORIDE 10 MG: 5 TABLET ORAL at 05:12

## 2023-12-16 RX ADMIN — SODIUM CHLORIDE, PRESERVATIVE FREE 10 ML: 5 INJECTION INTRAVENOUS at 05:12

## 2023-12-16 RX ADMIN — MICAFUNGIN SODIUM 100 MG: 100 INJECTION, POWDER, LYOPHILIZED, FOR SOLUTION INTRAVENOUS at 09:12

## 2023-12-16 RX ADMIN — ONDANSETRON 4 MG: 2 INJECTION INTRAMUSCULAR; INTRAVENOUS at 07:12

## 2023-12-16 NOTE — PROGRESS NOTES
Nephrology consult follow up note    HPI:      Karen Briggs is a 58 y.o. female     Interval history:     12/16/23-pt dialyzed today without complications.     Review of Systems:     Comprehensive 10pt ROS negative except as noted per HPI.       Past medical, family, surgical, and social history reviewed and unchanged from initial consult note.     MAR reviewed. See bottom of note for current medications.    Objective:       VITAL SIGNS: 24 HR MIN & MAX LAST    Temp  Min: 98.1 °F (36.7 °C)  Max: 99.4 °F (37.4 °C)  99.4 °F (37.4 °C)        BP  Min: 108/75  Max: 131/84  108/75     Pulse  Min: 94  Max: 114  108     Resp  Min: 18  Max: 28  18    SpO2  Min: 97 %  Max: 100 %  98 %      GEN: Chronically ill appearing AAF  HEENT: Conjunctiva anicteric, pupils equal, MMM, OP benign  CV: RRR +S1,S2 without murmur  PULM: CTAB, unlabored  ABD: Soft, NT/ND abdomen with NABS  EXT: 2+edema  SKIN: Warm and dry  PSYCH: awake, alert  Vascular access: R IJ permcath              Component Value Date/Time     (L) 12/16/2023 0414     12/15/2023 1047     04/24/2018 0340    K 3.1 (L) 12/16/2023 0414    K 3.3 (L) 12/15/2023 1047    K 3.2 (L) 04/24/2018 0340     04/24/2018 0340    CO2 20 (L) 12/16/2023 0414    CO2 23 12/15/2023 1047    CO2 20 (L) 04/24/2018 0340    BUN 62.8 (H) 12/16/2023 0414    BUN 48.7 (H) 12/15/2023 1047    BUN 10 04/24/2018 0340    CREATININE 5.24 (H) 12/16/2023 0414    CREATININE 4.56 (H) 12/15/2023 1047    CREATININE 0.7 04/24/2018 0340    CALCIUM 8.5 12/16/2023 0414    CALCIUM 8.1 (L) 12/15/2023 1047    CALCIUM 9.2 04/24/2018 0340    PHOS 3.3 12/16/2023 0414            Component Value Date/Time    WBC 19.03 (H) 12/16/2023 0414    WBC 18.88 (H) 12/15/2023 1047    WBC 18.88 12/15/2023 1047    WBC 24.01 12/05/2023 0455    WBC 12.03 04/24/2018 0340    HGB 9.9 (L) 12/16/2023 0414    HGB 9.5 (L) 12/15/2023 1047    HGB 11.3 (L) 04/24/2018 0340    HCT 29.8 (L) 12/16/2023 0414    HCT 28.4 (L)  12/15/2023 1047    HCT 35.3 (L) 04/24/2018 0340     (H) 12/16/2023 0414     (H) 12/15/2023 1047     04/24/2018 0340     Imaging reviewed      Assessment / Plan:     POORNIMA multifactorial secondary to obstructive uropathy, acute blood loss, contrast exposure, hypotension and Afib with RVR  ---Nephrotic range proteinuria noted. 4.4 g  ---Dialysis initiated 11/28 post tunneled catheter placement 12/6  Newly diagnosed malignant gastric mass. Pathology consistent with adenocarcinoma   -s/p J tube and mediport placement 12/1  Acute blood loss anemia 2/2 bleeding post long term IV removal   --s/p transfusion 11/30, 12/1, 12/10  Bilateral hydronephrosis noted 11/21 - stent placement initially deferred due to stable renal function   Afib with RVR resolved   S/p biliary drain placement and subsequent exchange due to fungemia   Fungemia - blood cultures positive 12/10, 12/11       Dialyzed today and tolerated 1L UF. Continue HD on TTS schedule. Check labs in am.      Chey Mendenhall NP      Current Facility-Administered Medications   Medication Dose Route Frequency Provider Last Rate Last Admin    0.9%  NaCl infusion (for blood administration)   Intravenous Q24H PRN Almaz Cheney L, FNP        0.9%  NaCl infusion (for blood administration)   Intravenous Q24H PRN Maryanne Moise S, AGNP        0.9%  NaCl infusion   Intravenous PRN Hussein Merino MD 5 mL/hr at 12/10/23 1928 Rate Verify at 12/10/23 1928    acetaminophen tablet 650 mg  650 mg Oral Q4H PRN Hussein Merino MD   650 mg at 12/15/23 1215    aluminum-magnesium hydroxide-simethicone 200-200-20 mg/5 mL suspension 30 mL  30 mL Oral QID PRN Hussein Merino MD        bisacodyL suppository 10 mg  10 mg Rectal Daily PRN Hussein Merino MD        chlorproMAZINE injection 25 mg  25 mg Intramuscular QID PRN Dalton Palacios MD   25 mg at 12/05/23 1102    dicyclomine injection 20 mg  20 mg Intramuscular QID PRN Dalton Palacios MD   20 mg at 11/30/23 7475     enoxaparin injection 100 mg  1 mg/kg Subcutaneous Q24H (treatment, non-standard time) Mukesh Cole MD   100 mg at 12/16/23 0054    hydrALAZINE injection 10 mg  10 mg Intravenous Q6H PRN Kenney Steiner MD        hydrocortisone suppository 25 mg  25 mg Rectal BID Alexandra Fry, PA   25 mg at 12/16/23 0737    hyoscyamine ODT 0.125 mg  0.125 mg Sublingual Q4H PRN Sara Waldrop AGACNP-BC   0.125 mg at 11/30/23 1735    melatonin tablet 6 mg  6 mg Oral Nightly PRN Hussein Merino MD   6 mg at 12/13/23 2028    metoclopramide HCl injection 5 mg  5 mg Intravenous Q6H PRN Dalton Palacios MD   5 mg at 12/13/23 0453    metoprolol injection 5 mg  5 mg Intravenous Q4H PRN Genaro Crawford, FNP   5 mg at 12/08/23 0329    metoprolol tartrate (LOPRESSOR) split tablet 12.5 mg  12.5 mg Oral BID Genaro Crawford, FNP   12.5 mg at 12/16/23 0736    micafungin 100 mg in sodium chloride 0.9 % 100 mL IVPB (MB+)  100 mg Intravenous Q24H Mukesh Cole MD   Stopped at 12/15/23 2233    midodrine tablet 10 mg  10 mg Oral TID  Dalton Palacios MD   10 mg at 12/16/23 1251    morphine injection 4 mg  4 mg Intravenous Q4H PRN Hussein Merino MD   4 mg at 12/02/23 0217    ondansetron injection 4 mg  4 mg Intravenous Q4H PRN Dalton Palacios MD   4 mg at 12/15/23 1749    oxyCODONE immediate release tablet 5 mg  5 mg Oral Q6H PRN Hussein Merino MD   5 mg at 12/04/23 2023    pantoprazole injection 40 mg  40 mg Intravenous BID  Dalton Palacios MD   40 mg at 12/16/23 0736    perflutren lipid microspheres injection 1.3 mL  1.3 mL Intravenous Once Dalton Palacios MD        polyethylene glycol packet 17 g  17 g Oral BID PRN WilberHussein borja MD        prochlorperazine injection Soln 5 mg  5 mg Intravenous Q6H PRN Hussein Merino MD   5 mg at 11/22/23 1347    promethazine injection 25 mg  25 mg Intramuscular Q4H PRN Lyssa Car AGACNP-BC   25 mg at 11/23/23 1620    senna-docusate 8.6-50 mg per tablet 2 tablet  2 tablet Oral BID PRN Hussein Merino MD         sodium bicarbonate tablet 1,300 mg  1,300 mg Oral Daily Maryanne Moise S, AGNP   1,300 mg at 12/16/23 0736    sodium chloride 0.9% bolus 250 mL 250 mL  250 mL Intravenous PRN Maryanne Moise S, AGNP        sodium chloride 0.9% flush 10 mL  10 mL Intravenous PRN Hussein Merino MD        sodium chloride 0.9% flush 10 mL  10 mL Intravenous Q6H Kenney Steiner MD   10 mL at 12/16/23 1251    And    sodium chloride 0.9% flush 10 mL  10 mL Intravenous PRN Kenney Steiner MD

## 2023-12-16 NOTE — AI DETERIORATION ALERT
Artificial Intelligence Notification  [facility]  [address]  [place] [address]  Phone: [phone]    This documentation was triggered by an Artificial Intelligence Notification:    Admit Date: 2023   LOS: 29  Code Status: Full Code  : 1965  Age: 58 y.o.  Weight:   Wt Readings from Last 1 Encounters:   12/10/23 96.2 kg (212 lb)        Sex: female  Bed: [address] [place] [address]  MRN: [id number]  Attending Physician: Mariano Bermudez MD     Date of Alert: 2023  Time AI Alert Received: 0732            Vitals:    23 0730   BP: 113/78   Pulse: (!) 114   Resp: (!) 28   Temp: 99 °F (37.2 °C)     SpO2: 99 %          Patient Condition: Spoke to nurse about patient. HR elevated, but patient has been having elevated HR. Patient is stable. Call if need anything else.

## 2023-12-16 NOTE — NURSING
12/16/23 1212   Post-Hemodialysis Assessment   Rinseback Volume (mL) 500 mL   Blood Volume Processed (Liters) 62.4 L   Dialyzer Clearance Lightly streaked   Duration of Treatment 180 minutes   Total UF (mL) 1000 mL   Net Fluid Removal 500   Post-Hemodialysis Comments Tx completed, pt reinfused. CVC deaccessed per P&P, lines flushed and locked with NS and new Clear Guard caps applied. Pt ran for 3 hrs, NET removed= 500ml

## 2023-12-16 NOTE — PLAN OF CARE
Problem: Adult Inpatient Plan of Care  Goal: Plan of Care Review  Outcome: Ongoing, Progressing  Goal: Patient-Specific Goal (Individualized)  Outcome: Ongoing, Progressing  Goal: Absence of Hospital-Acquired Illness or Injury  Outcome: Ongoing, Progressing  Goal: Optimal Comfort and Wellbeing  Outcome: Ongoing, Progressing  Goal: Readiness for Transition of Care  Outcome: Ongoing, Progressing     Problem: Pain Acute  Goal: Acceptable Pain Control and Functional Ability  Outcome: Ongoing, Progressing     Problem: Fall Injury Risk  Goal: Absence of Fall and Fall-Related Injury  Outcome: Ongoing, Progressing     Problem: Infection  Goal: Absence of Infection Signs and Symptoms  Outcome: Ongoing, Progressing     Problem: Coping Ineffective  Goal: Effective Coping  Outcome: Ongoing, Progressing     Problem: Skin Injury Risk Increased  Goal: Skin Health and Integrity  Outcome: Ongoing, Progressing     Problem: Impaired Wound Healing  Goal: Optimal Wound Healing  Outcome: Ongoing, Progressing

## 2023-12-16 NOTE — PROGRESS NOTES
[facility]  Hospital Medicine Progress Note        Chief Complaint: Inpatient Follow-up for intractable nausea vomiting due to gastric adenocarcinoma     HPI:   58-year-old female with medical history of hypertension who recently underwent laparoscopic cholecystectomy on 11/10/2023, procedure was incomplete and was unable to completely resect the gallbladder.  Since surgery she continued to have right flank/back pain and followed up with her PCP and CT abdomen and pelvis on 11/17/2023 revealed left-sided hydronephrosis with suspected distal obstruction/no clear stone visualized, postoperative changes of cholecystectomy with fluid in the gallbladder fossa may reflect postoperative seroma but biloma can not be entirely excluded, also noted for marked thickening of the stomach wall diffusely may be related to mesenteric edema/reactive.  She presented to [facility] ED the same day 11/17/2023 and her labs notable for WBC 9.0, hemoglobin 12.4, platelets 442, creatinine 0.86, total bilirubin 8.7 with direct fraction 6.7, alkaline phosphatase 491, , .  Patient's surgeon was consulted and recommended ERCP which was not available at [facility] for which she was transferred to [facility] and referred to hospital medicine service for further evaluation and management. 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  Severe inflammation and oozing of blood noted proximal gastric lumen.  Unable to advance scope into the duodenum. Surgical oncology consulted, IR consulted for external drain placement which was done November 21.  November 24th she developed new onset atrial fibrillation with RVR and Cardiology consulted. Started on amiodarone drip. Unfortunately patient had acute blood loss due to hemorrhage from the midline site that was removed. Patient was unaware of the bleed.  Became very weak, passed out.  Code was called but she came around.   Stat H&H done which was slightly lower but stable, MRI of the brain was negative for any acute ischemic changes. Pt is aware of gastric cancer diagnosis. GI following--> 11/18- EGD shows normal esophagus, malignant gastric tumor in the cardia.  Inflamed mucosa and ooze of blood throughout the proximal gastric lumen.  Gastric antrum scar would not allow advancement of scope into the duodenal ampulla area therefore biliary cannulation was not possible for bile leak evaluation. Pathology shows marked chronic gastritis with intestinal metaplasia, gastric cardia biopsy shows adenocarcinoma, moderately differentiated intestinal type. Bilateral hydronephrosis with left greater than right. MRI brain without contrast-negative for acute finding. PET scan reviewed with patient by Oncology reports of locally advanced gastric adenocarcinoma and plans for systemic therapy outpatient if functional, nutritional and renal function improves. MediPort placement by surgical Oncology on 12/1, oncology on board plans for systemic therapy outpatient if functional, nutritional and renal function improves. Obstructive uropathy with bilateral hydronephrosis status post bilateral ureteral stent placed on 11/27 by Urology- no improvement in renal function, patient opted to have hemodialysis and a right-sided hemodialysis catheter was placed by General surgery on 11/29, to continue hemodialysis per nephrology discretion. Patient with symptoms of nausea and vomiting can not keep anything down in her stomach complicated by severe malnutrition on IV Clinimix and supportive medications for nausea and vomiting. Palliative care consulted. Patient got a MediPort and J-tube placed on 12/01. Cystogram reviewed by Urology with patent stents and recommends outpatient follow-up with Urology. White cell count elevated at 20.4, Infectious Disease had started on IV Zosyn given concern for possible sepsis with low blood pressure on 12/02. [facility]IS evaluated patient  to begin low-dose metoprolol tartrate at 12.5 mg b.i.d. and resume Eliquis for stroke prevention. On TF via J tube which was placed by Surgical Oncology. ID started IV Micafungin for candidemia. Noted to have persistent fungemia on 1/2 BCX from 12/11.  Consulted CIS for GAYLA; report pending. General Surgery consulted to remove HD line & Mediport. Patient spiked a fever overnight on 12/15; BCX repeated by ID.    Interval Hx:   Today, Mrs Briggs stated she was doing well and had no new complaints. General Surgery to remove mediport & tunneled catheter today after HD. Back on TF. Tachycardia noted; likely from hypokalemia vs sepsis.    Objective/physical exam:  General: In no acute distress, afebrile  Chest: Clear to auscultation bilaterally, tunneled catheter & mediport in place  Heart: RRR, +S1, S2, no appreciable murmur  Abdomen: Soft, nontender, BS +  MSK: Warm, 2+ pitting lower extremity edema, no clubbing or cyanosis  Neurologic: Alert and oriented x4, Cranial nerve II-XII intact, Strength 5/5 in all 4 extremities    VITAL SIGNS: 24 HRS MIN & MAX LAST   Temp  Min: 98.1 °F (36.7 °C)  Max: 100 °F (37.8 °C) 99 °F (37.2 °C)   BP  Min: 113/78  Max: 131/84 113/78   Pulse  Min: 94  Max: 114  (!) 114   Resp  Min: 18  Max: 28 (!) 28   SpO2  Min: 97 %  Max: 100 % 99 %     I reviewed the labs below:  Recent Labs   Lab 12/14/23  1214 12/15/23  1047 12/16/23  0414   WBC 16.70* 18.88  18.88* 19.03*   RBC 3.47* 3.25* 3.36*   HGB 10.4* 9.5* 9.9*   HCT 30.0* 28.4* 29.8*   MCV 86.5 87.4 88.7   MCH 30.0 29.2 29.5   MCHC 34.7 33.5 33.2   RDW 17.2* 17.1* 17.5*   * 737* 764*   MPV 10.0 9.6 10.0       Recent Labs   Lab 12/13/23  1001 12/14/23  1214 12/15/23  1047 12/16/23  0414   * 131* 136 132*   K 5.6* 3.4* 3.3* 3.1*   CO2 22* 21* 23 20*   BUN 49.8* 68.6* 48.7* 62.8*   CREATININE 4.60* 5.54* 4.56* 5.24*   CALCIUM 8.7 8.8 8.1* 8.5   MG 2.20 2.30 2.10  --    ALBUMIN 1.5* 1.5* 1.5* 1.5*   ALKPHOS 141 156* 184*  --    ALT  52 53 52  --    AST 68* 46* 42*  --    BILITOT 4.6* 4.3* 3.5*  --        Assessment/Plan:  Hypotension, improved with Midodrine  Persistent leukocytosis likely 2/2 Fungemia  Acute on chronic anemia, s/p 2 units prbcs transfusion 11/30  Hx of Acute Blood loss anemia with syncope and then fall 11/26-   Locally advanced gastric adenocarcinoma with intractable nausea and vomiting possible Gastric/duodenal outlet obstruction  -now status post J Tube placement  Fungemia possibly 2/2 Biliary Drain Infection  Obstructive uropathy with bilateral hydronephrosis- S/P bilateral ureteral stent placed on 11/27  POORNIMA-now on HD - 11/29  Metabolic acidosis- resolved  Suspected bile leak with biloma and biliary obstruction--> H/O Laparoscopic incomplete cholecystectomy 11/10/2023  New onset atrial fibrillation with RVR- fluctuating  History of hypertension and DDD/sciatica  Severe malnutrition    Patient continues to be admitted  No new complaints reported; noted to be tachycardic  Underwent GAYLA on 12/14 with report pending  On IV Micafungin  ID on-board; follow recs  Nephrology on-board for HD conduction  Surgical Oncology on-board; follow recs  Consulted General Surgery to remove HD catheter & Mediport per Surgical Oncology recs; planned for today  GI on-board; follow recs  Patient underwent biliary drain exchange on 12/13 per ID recs  Blood Cultures 12/10 positive for candida glabrata in the blood 1/2; Repeat BCX 12/11 positive for yeasts; BCX 12/12 negative x 72 hrs; BCX repeated on 12/15 d/t fevers  PICC line removed  Patient had MediPort and J-tube placed on 12/01  Surgical Oncology consulted for Cholecystectomy, not thought to be infectious cause; recommendation to consult Palliative Care  Palliative Care consulted earlier; patient wishes to continue pursuing curative treatment for cancer  GI consulted for positive FOBT and drop in HB; monitoring H/H  Cystogram reviewed by Urology with patent stents and recommends outpatient  follow-up with Urology  Appreciate Oncology input; plans for systemic therapy outpatient if renal function improves, patient is planning to pursue treatment   Continued on Hydrocortisone Supp, Metoprolol Tartrate BID, Midodrine TID, Protonix BID, Sodium Bicarbonate     Critical care note:  Critical care diagnosis: Sepsis 2/2 Fungemia req IV Micafungin  Critical care interventions: Hands-on evaluation, review of labs/radiographs/records and discussion with patient and family if present  Critical care time spent: 45 minutes    VTE prophylaxis:  FD Lovenox     Patient condition:  Stable     Anticipated discharge and Disposition:  Pending    All diagnosis and differential diagnosis have been reviewed; assessment and plan has been documented; I have personally reviewed the labs and test results that are presently available; I have reviewed the patients medication list; I have reviewed the consulting providers response and recommendations. I have reviewed or attempted to review medical records based upon their availability    All of the patient's questions have been  addressed and answered. Patient's is agreeable to the above stated plan. I will continue to monitor closely and make adjustments to medical management as needed.  _____________________________________________________________________    Nutrition Status:  Patient meets ASPEN criteria for severe malnutrition of acute illness or injury per RD assessment as evidenced by:  Energy Intake (Malnutrition):  (does not meet criteria)  Weight Loss (Malnutrition): greater than 7.5% in 3 months  Subcutaneous Fat (Malnutrition): moderate depletion  Muscle Mass (Malnutrition): moderate depletion           A minimum of two characteristics is recommended for diagnosis of either severe or non-severe malnutrition.   Radiology:   I have personally reviewed the images and agree with radiologist report  Transesophageal echo (GAYLA)  Procedure: GAYLA     Final impression:    LV systolic  function 55%.  No intracardiac thrombus is present.  No vegetation or signs of infection.     Recommendations:    Medical management    Indication: Fungemia    Informed consent: obtained, signed copy placed in the chart.    Description of the procedure: After sedation was achieved (please see   anesthesia report for details), the esophagus intubated without   difficulties. Multiple orthogonal views obtained. Patient tolerated   procedure without immediate complications noted.    Findings:  Left atrium (LA): Normal LA size.  Left atrial appendage (JER): No JER thrombus is present.  Interatrial septum:  NO ASD or PFO noted on 2D or Color Doppler images.   Right atrium (RA): Normal RA size.  Left ventricle (LV): LVEF 55-60%.   Normal LV size.  Normal LV wall   thickness. No Regional wall motion abnormalities noted.  Right ventricle (RV): Partially visualized in this study.  Aortic valve: Trileaflet.  No Aortic stenosis.   No aortic regurgitation.   No Vegetation is present.  Mitral valve: No mitral annular calcification is present.  No Mitral   stenosis.  No Mitral valve prolapse. Trace Mitral regurgitation. No   Vegetation is present.  Tricuspid valve: Partially visualized, No Tricuspid stenosis. Trace   Tricuspid regurgitation. No Vegetation is present.  Pulmonic valve: Not well visualized, no hemodynamically significant   pulmonic stenosis, no pulmonic regurgitation noted in this study. No   Vegetation is present.  Thoracic aorta: No Atherosclerotic changes of the descending thoracic   aorta.  Normal Caliber aortic root.   Normal Caliber Proximal portion of   the ascending aorta.   Pericardium: No significant pericardial effusion is present.           Mariano Bermudez MD  Department of Hospital Medicine   [facility]   12/16/2023

## 2023-12-16 NOTE — PROCEDURES
Tracy removal - L subclavian and RIJ tunneled HD cath removal    Excision of Foreign Body    Date/Time: 11/17/2023 11:46 PM    Performed by: Joie Dumont MD  Authorized by: Joie Dumont MD    Consent Done?:  Yes (Written)  Timeout: prior to procedure the correct patient, procedure, and site was verified    Prep: patient was prepped and draped in usual sterile fashion    Local anesthesia used?: Yes    Anesthesia:  Local infiltration  Local anesthetic:  Lidocaine 1% without epinephrine  Anesthetic total (ml):  7  Assistants?: Yes    List of assistants:  Manasa Saleh MD  : Tracy.  Location: Subclavian.  Laterality:  Left  Position:  Supine  Anesthesia:  Local infiltration  Local anesthetic:  Lidocaine 1% without epinephrine  Excision type:  Skin  Scalpel size:  10  Incision type:  Single straight   Hemostasis was obtained.  Estimated blood loss (cc):  2  Wound closure:  Intermediate layered  Sutures:  2-0 Vicryl  Dressings: Dermabond.   Needle, instrument, and sponge counts were correct.   Patient tolerated the procedure well with no immediate complications.   Post-operative instructions were provided for the patient.  Excision of Foreign Body    Date/Time: 11/17/2023 11:46 PM    Performed by: Joie Dumont MD  Authorized by: Joie Dumont MD    Consent Done?:  Yes (Written)  Timeout: prior to procedure the correct patient, procedure, and site was verified    Prep: patient was prepped and draped in usual sterile fashion    Local anesthesia used?: Yes    Anesthesia:  Local infiltration  Local anesthetic:  Lidocaine 1% without epinephrine  Anesthetic total (ml):  3  Assistants?: Yes    List of assistants:  Manasa Saleh MD  : Tunnel HD catheter.  Location: internal jugular.  Laterality:  Right  Position:  Supine  Anesthesia:  Local infiltration  Local anesthetic:  Lidocaine 1% without epinephrine  Sterile dressings:  Tegaderm   Needle, instrument, and sponge counts were correct.   Patient  tolerated the procedure well with no immediate complications.   Post-operative instructions were provided for the patient.    Plan:  -keep Tegaderm to R neck for 24-48 hours  -patient may shower over wound  -keep incisions clean and dry  -okay to resume regular diet  -rest of care per primary  -Surgery signing off    Joie Dumont PGY1  [employer] General Surgery  12/16/2023

## 2023-12-17 LAB
ABS NEUT (OLG): 16.67 X10(3)/MCL (ref 2.1–9.2)
ALBUMIN SERPL-MCNC: 1.6 G/DL (ref 3.4–4.8)
ALBUMIN/GLOB SERPL: 0.3 RATIO (ref 1.1–2)
ALP SERPL-CCNC: 207 UNIT/L (ref 40–150)
ALT SERPL-CCNC: 49 UNIT/L (ref 0–55)
ANISOCYTOSIS BLD QL SMEAR: ABNORMAL
AST SERPL-CCNC: 37 UNIT/L (ref 5–34)
BACTERIA BLD CULT: NORMAL
BACTERIA BLD CULT: NORMAL
BILIRUB SERPL-MCNC: 2.8 MG/DL
BUN SERPL-MCNC: 51.4 MG/DL (ref 9.8–20.1)
CALCIUM SERPL-MCNC: 8.4 MG/DL (ref 8.4–10.2)
CHLORIDE SERPL-SCNC: 101 MMOL/L (ref 98–107)
CO2 SERPL-SCNC: 19 MMOL/L (ref 23–31)
CREAT SERPL-MCNC: 4.26 MG/DL (ref 0.55–1.02)
EOSINOPHIL NFR BLD MANUAL: 1.61 X10(3)/MCL (ref 0–0.9)
EOSINOPHIL NFR BLD MANUAL: 8 %
ERYTHROCYTE [DISTWIDTH] IN BLOOD BY AUTOMATED COUNT: 17.2 % (ref 11.5–17)
GFR SERPLBLD CREATININE-BSD FMLA CKD-EPI: 11 MLS/MIN/1.73/M2
GLOBULIN SER-MCNC: 5.1 GM/DL (ref 2.4–3.5)
GLUCOSE SERPL-MCNC: 142 MG/DL (ref 82–115)
HCT VFR BLD AUTO: 28.3 % (ref 37–47)
HGB BLD-MCNC: 9.5 G/DL (ref 12–16)
INSTRUMENT WBC (OLG): 20.09 X10(3)/MCL
LYMPHOCYTES NFR BLD MANUAL: 0.8 X10(3)/MCL
LYMPHOCYTES NFR BLD MANUAL: 4 %
MCH RBC QN AUTO: 29.7 PG (ref 27–31)
MCHC RBC AUTO-ENTMCNC: 33.6 G/DL (ref 33–36)
MCV RBC AUTO: 88.4 FL (ref 80–94)
METAMYELOCYTES NFR BLD MANUAL: 2 %
MONOCYTES NFR BLD MANUAL: 0.8 X10(3)/MCL (ref 0.1–1.3)
MONOCYTES NFR BLD MANUAL: 4 %
NEUTROPHILS NFR BLD MANUAL: 83 %
NRBC BLD AUTO-RTO: 0 %
PLATELET # BLD AUTO: 911 X10(3)/MCL (ref 130–400)
PLATELET # BLD EST: ABNORMAL 10*3/UL
PMV BLD AUTO: 9.9 FL (ref 7.4–10.4)
POIKILOCYTOSIS BLD QL SMEAR: ABNORMAL
POTASSIUM SERPL-SCNC: 3.4 MMOL/L (ref 3.5–5.1)
PROT SERPL-MCNC: 6.7 GM/DL (ref 5.8–7.6)
RBC # BLD AUTO: 3.2 X10(6)/MCL (ref 4.2–5.4)
RBC MORPH BLD: ABNORMAL
SODIUM SERPL-SCNC: 133 MMOL/L (ref 136–145)
TARGETS BLD QL SMEAR: ABNORMAL
TOXIC GRANULES BLD QL SMEAR: ABNORMAL
WBC # SPEC AUTO: 20.09 X10(3)/MCL (ref 4.5–11.5)

## 2023-12-17 PROCEDURE — 25000003 PHARM REV CODE 250: Performed by: NURSE PRACTITIONER

## 2023-12-17 PROCEDURE — 21400001 HC TELEMETRY ROOM

## 2023-12-17 PROCEDURE — 63600175 PHARM REV CODE 636 W HCPCS: Performed by: STUDENT IN AN ORGANIZED HEALTH CARE EDUCATION/TRAINING PROGRAM

## 2023-12-17 PROCEDURE — 63600175 PHARM REV CODE 636 W HCPCS: Performed by: INTERNAL MEDICINE

## 2023-12-17 PROCEDURE — 25000003 PHARM REV CODE 250: Performed by: STUDENT IN AN ORGANIZED HEALTH CARE EDUCATION/TRAINING PROGRAM

## 2023-12-17 PROCEDURE — 0WP103Z REMOVAL OF INFUSION DEVICE FROM CRANIAL CAVITY, OPEN APPROACH: ICD-10-PCS | Performed by: EMERGENCY MEDICINE

## 2023-12-17 PROCEDURE — 87103 BLOOD FUNGUS CULTURE: CPT | Performed by: STUDENT IN AN ORGANIZED HEALTH CARE EDUCATION/TRAINING PROGRAM

## 2023-12-17 PROCEDURE — 25000003 PHARM REV CODE 250

## 2023-12-17 PROCEDURE — 87040 BLOOD CULTURE FOR BACTERIA: CPT | Performed by: STUDENT IN AN ORGANIZED HEALTH CARE EDUCATION/TRAINING PROGRAM

## 2023-12-17 PROCEDURE — 0JPW3XZ REMOVAL OF TUNNELED VASCULAR ACCESS DEVICE FROM LOWER EXTREMITY SUBCUTANEOUS TISSUE AND FASCIA, PERCUTANEOUS APPROACH: ICD-10-PCS | Performed by: EMERGENCY MEDICINE

## 2023-12-17 PROCEDURE — 25000003 PHARM REV CODE 250: Performed by: INTERNAL MEDICINE

## 2023-12-17 PROCEDURE — C9113 INJ PANTOPRAZOLE SODIUM, VIA: HCPCS | Performed by: INTERNAL MEDICINE

## 2023-12-17 PROCEDURE — 80053 COMPREHEN METABOLIC PANEL: CPT | Performed by: NURSE PRACTITIONER

## 2023-12-17 PROCEDURE — A4216 STERILE WATER/SALINE, 10 ML: HCPCS | Performed by: INTERNAL MEDICINE

## 2023-12-17 PROCEDURE — 85027 COMPLETE CBC AUTOMATED: CPT | Performed by: NURSE PRACTITIONER

## 2023-12-17 RX ORDER — POTASSIUM CHLORIDE 20 MEQ/1
40 TABLET, EXTENDED RELEASE ORAL ONCE
Status: COMPLETED | OUTPATIENT
Start: 2023-12-17 | End: 2023-12-17

## 2023-12-17 RX ADMIN — HYDROCORTISONE ACETATE 25 MG: 25 SUPPOSITORY RECTAL at 09:12

## 2023-12-17 RX ADMIN — SODIUM BICARBONATE 650 MG TABLET 1300 MG: at 08:12

## 2023-12-17 RX ADMIN — MIDODRINE HYDROCHLORIDE 10 MG: 5 TABLET ORAL at 08:12

## 2023-12-17 RX ADMIN — SODIUM CHLORIDE, PRESERVATIVE FREE 10 ML: 5 INJECTION INTRAVENOUS at 06:12

## 2023-12-17 RX ADMIN — ACETAMINOPHEN 650 MG: 325 TABLET, FILM COATED ORAL at 12:12

## 2023-12-17 RX ADMIN — ENOXAPARIN SODIUM 100 MG: 100 INJECTION SUBCUTANEOUS at 12:12

## 2023-12-17 RX ADMIN — SODIUM CHLORIDE, PRESERVATIVE FREE 10 ML: 5 INJECTION INTRAVENOUS at 12:12

## 2023-12-17 RX ADMIN — HYDROCORTISONE ACETATE 25 MG: 25 SUPPOSITORY RECTAL at 08:12

## 2023-12-17 RX ADMIN — PANTOPRAZOLE SODIUM 40 MG: 40 INJECTION, POWDER, FOR SOLUTION INTRAVENOUS at 06:12

## 2023-12-17 RX ADMIN — PANTOPRAZOLE SODIUM 40 MG: 40 INJECTION, POWDER, FOR SOLUTION INTRAVENOUS at 08:12

## 2023-12-17 RX ADMIN — MICAFUNGIN SODIUM 100 MG: 100 INJECTION, POWDER, LYOPHILIZED, FOR SOLUTION INTRAVENOUS at 09:12

## 2023-12-17 RX ADMIN — METOPROLOL TARTRATE 25 MG: 25 TABLET, FILM COATED ORAL at 08:12

## 2023-12-17 RX ADMIN — POTASSIUM CHLORIDE 40 MEQ: 1500 TABLET, EXTENDED RELEASE ORAL at 12:12

## 2023-12-17 RX ADMIN — MIDODRINE HYDROCHLORIDE 10 MG: 5 TABLET ORAL at 12:12

## 2023-12-17 NOTE — PLAN OF CARE
Problem: Adult Inpatient Plan of Care  Goal: Plan of Care Review  Outcome: Ongoing, Progressing  Goal: Patient-Specific Goal (Individualized)  Outcome: Ongoing, Progressing  Goal: Absence of Hospital-Acquired Illness or Injury  Outcome: Ongoing, Progressing  Goal: Optimal Comfort and Wellbeing  Outcome: Ongoing, Progressing  Goal: Readiness for Transition of Care  Outcome: Ongoing, Progressing     Problem: Fall Injury Risk  Goal: Absence of Fall and Fall-Related Injury  Outcome: Ongoing, Progressing     Problem: Infection  Goal: Absence of Infection Signs and Symptoms  Outcome: Ongoing, Progressing     Problem: Coping Ineffective  Goal: Effective Coping  Outcome: Ongoing, Progressing     Problem: Skin Injury Risk Increased  Goal: Skin Health and Integrity  Outcome: Ongoing, Progressing     Problem: Impaired Wound Healing  Goal: Optimal Wound Healing  Outcome: Ongoing, Progressing

## 2023-12-17 NOTE — PROGRESS NOTES
[facility] [place] [facility] [place] [facility]  Hospital Medicine Progress Note        Chief Complaint: Inpatient Follow-up for intractable nausea vomiting due to gastric adenocarcinoma     HPI:   58-year-old female with medical history of hypertension who recently underwent laparoscopic cholecystectomy on 11/10/2023, procedure was incomplete and was unable to completely resect the gallbladder.  Since surgery she continued to have right flank/back pain and followed up with her PCP and CT abdomen and pelvis on 11/17/2023 revealed left-sided hydronephrosis with suspected distal obstruction/no clear stone visualized, postoperative changes of cholecystectomy with fluid in the gallbladder fossa may reflect postoperative seroma but biloma can not be entirely excluded, also noted for marked thickening of the stomach wall diffusely may be related to mesenteric edema/reactive.  She presented to [facility] ED the same day 11/17/2023 and her labs notable for WBC 9.0, hemoglobin 12.4, platelets 442, creatinine 0.86, total bilirubin 8.7 with direct fraction 6.7, alkaline phosphatase 491, , .  Patient's surgeon was consulted and recommended ERCP which was not available at [facility] for which she was transferred to [facility] and referred to hospital medicine service for further evaluation and management. 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  Severe inflammation and oozing of blood noted proximal gastric lumen.  Unable to advance scope into the duodenum. Surgical oncology consulted, IR consulted for external drain placement which was done November 21.  November 24th she developed new onset atrial fibrillation with RVR and Cardiology consulted. Started on amiodarone drip. Unfortunately patient had acute blood loss due to hemorrhage from the midline site that was removed. Patient was unaware of the bleed.  Became very weak, passed out.  Code was called but she came around.   Stat H&H done which was slightly lower but stable, MRI of the brain was negative for any acute ischemic changes. Pt is aware of gastric cancer diagnosis. GI following--> 11/18- EGD shows normal esophagus, malignant gastric tumor in the cardia.  Inflamed mucosa and ooze of blood throughout the proximal gastric lumen.  Gastric antrum scar would not allow advancement of scope into the duodenal ampulla area therefore biliary cannulation was not possible for bile leak evaluation. Pathology shows marked chronic gastritis with intestinal metaplasia, gastric cardia biopsy shows adenocarcinoma, moderately differentiated intestinal type. Bilateral hydronephrosis with left greater than right. MRI brain without contrast-negative for acute finding. PET scan reviewed with patient by Oncology reports of locally advanced gastric adenocarcinoma and plans for systemic therapy outpatient if functional, nutritional and renal function improves. MediPort placement by surgical Oncology on 12/1, oncology on board plans for systemic therapy outpatient if functional, nutritional and renal function improves. Obstructive uropathy with bilateral hydronephrosis status post bilateral ureteral stent placed on 11/27 by Urology- no improvement in renal function, patient opted to have hemodialysis and a right-sided hemodialysis catheter was placed by General surgery on 11/29, to continue hemodialysis per nephrology discretion. Patient with symptoms of nausea and vomiting can not keep anything down in her stomach complicated by severe malnutrition on IV Clinimix and supportive medications for nausea and vomiting. Palliative care consulted. Patient got a MediPort and J-tube placed on 12/01. Cystogram reviewed by Urology with patent stents and recommends outpatient follow-up with Urology. White cell count elevated at 20.4, Infectious Disease had started on IV Zosyn given concern for possible sepsis with low blood pressure on 12/02. [facility]IS evaluated patient  to begin low-dose metoprolol tartrate at 12.5 mg b.i.d. and resume Eliquis for stroke prevention. On TF via J tube which was placed by Surgical Oncology. ID started IV Micafungin for candidemia. Noted to have persistent fungemia on 1/2 BCX from 12/11.  Consulted CIS for GAYLA; report pending. General Surgery consulted to remove HD line & Mediport. Patient spiked a fever overnight on 12/15; BCX repeated by ID which are negative. General Surgery removed mediport & tunneled catheter after HD on 12/16.    Interval Hx:   Today, Mrs Briggs stated she was doing well and had no new complaints. Tachycardia improved. BCX from 12/15 negative. Afebrile since 12/14. Nephrology to decide on replacement HD catheter to continue HD.    Objective/physical exam:  General: In no acute distress, afebrile  Chest: Clear to auscultation bilaterally, tunneled catheter & mediport in place  Heart: RRR, +S1, S2, no appreciable murmur  Abdomen: Soft, nontender, BS +  MSK: Warm, 2+ pitting lower extremity edema, no clubbing or cyanosis  Neurologic: Alert and oriented x4, Cranial nerve II-XII intact, Strength 5/5 in all 4 extremities    VITAL SIGNS: 24 HRS MIN & MAX LAST   Temp  Min: 98.3 °F (36.8 °C)  Max: 100 °F (37.8 °C) 98.3 °F (36.8 °C)   BP  Min: 102/55  Max: 117/82 (!) 102/55   Pulse  Min: 103  Max: 115  (!) 111   Resp  Min: 13  Max: 19 19   SpO2  Min: 97 %  Max: 99 % 98 %     I reviewed the labs below:  Recent Labs   Lab 12/14/23  1214 12/15/23  1047 12/16/23  0414   WBC 16.70* 18.88  18.88* 19.03*   RBC 3.47* 3.25* 3.36*   HGB 10.4* 9.5* 9.9*   HCT 30.0* 28.4* 29.8*   MCV 86.5 87.4 88.7   MCH 30.0 29.2 29.5   MCHC 34.7 33.5 33.2   RDW 17.2* 17.1* 17.5*   * 737* 764*   MPV 10.0 9.6 10.0       Recent Labs   Lab 12/13/23  1001 12/14/23  1214 12/15/23  1047 12/16/23  0414 12/16/23  1830   * 131* 136 132* 131*   K 5.6* 3.4* 3.3* 3.1* 4.0   CO2 22* 21* 23 20* 19*   BUN 49.8* 68.6* 48.7* 62.8* 36.8*   CREATININE 4.60* 5.54* 4.56*  5.24* 3.57*   CALCIUM 8.7 8.8 8.1* 8.5 8.2*   MG 2.20 2.30 2.10  --   --    ALBUMIN 1.5* 1.5* 1.5* 1.5*  --    ALKPHOS 141 156* 184*  --   --    ALT 52 53 52  --   --    AST 68* 46* 42*  --   --    BILITOT 4.6* 4.3* 3.5*  --   --        Assessment/Plan:  Hypotension, improved with Midodrine  Persistent leukocytosis likely 2/2 Fungemia  Acute on chronic anemia, s/p 2 units prbcs transfusion 11/30  Hx of Acute Blood loss anemia with syncope and then fall 11/26-   Locally advanced gastric adenocarcinoma with intractable nausea and vomiting possible Gastric/duodenal outlet obstruction  -now status post J Tube placement  Fungemia possibly 2/2 Biliary Drain Infection  Obstructive uropathy with bilateral hydronephrosis- S/P bilateral ureteral stent placed on 11/27  POORNIMA-now on HD - 11/29  Metabolic acidosis- resolved  Suspected bile leak with biloma and biliary obstruction--> H/O Laparoscopic incomplete cholecystectomy 11/10/2023  New onset atrial fibrillation with RVR- fluctuating  History of hypertension and DDD/sciatica  Severe malnutrition  Leukocytosis likely 2/2 Fungemia  Thrombocytosis possibly reactive to Sepsis    Patient continues to be admitted  No new complaints reported  Underwent GAYLA on 12/14 with report negative for vegetations  On IV Micafungin  ID on-board; follow recs  Nephrology on-board for HD conduction  Surgical Oncology on-board; follow recs  Consulted General Surgery removed HD catheter & Mediport on 12/16  GI on-board; follow recs  Patient underwent biliary drain exchange on 12/13 per ID recs  Blood Cultures 12/10 positive for candida glabrata in the blood 1/2; Repeat BCX 12/11 positive for yeasts; BCX 12/12 negative; BCX repeated on 12/15 d/t fevers which are negative x 24 hrs  PICC line removed  Patient had MediPort and J-tube placed on 12/01  Surgical Oncology consulted for Cholecystectomy, not thought to be infectious cause; recommendation to consult Palliative Care  Palliative Care consulted  earlier; patient wishes to continue pursuing curative treatment for cancer  GI consulted for positive FOBT and drop in HB; monitoring H/H  Cystogram reviewed by Urology with patent stents and recommends outpatient follow-up with Urology  Appreciate Oncology input; plans for systemic therapy outpatient if renal function improves, patient is planning to pursue treatment   Continued on Hydrocortisone Supp, Metoprolol Tartrate BID, Midodrine TID, Protonix BID, Sodium Bicarbonate     Critical care note:  Critical care diagnosis: Sepsis 2/2 Fungemia req IV Micafungin  Critical care interventions: Hands-on evaluation, review of labs/radiographs/records and discussion with patient and family if present  Critical care time spent: 45 minutes    VTE prophylaxis:  FD Lovenox     Patient condition:  Stable     Anticipated discharge and Disposition:  Pending    All diagnosis and differential diagnosis have been reviewed; assessment and plan has been documented; I have personally reviewed the labs and test results that are presently available; I have reviewed the patients medication list; I have reviewed the consulting providers response and recommendations. I have reviewed or attempted to review medical records based upon their availability    All of the patient's questions have been  addressed and answered. Patient's is agreeable to the above stated plan. I will continue to monitor closely and make adjustments to medical management as needed.  _____________________________________________________________________    Nutrition Status:  Patient meets ASPEN criteria for severe malnutrition of acute illness or injury per RD assessment as evidenced by:  Energy Intake (Malnutrition):  (does not meet criteria)  Weight Loss (Malnutrition): greater than 7.5% in 3 months  Subcutaneous Fat (Malnutrition): moderate depletion  Muscle Mass (Malnutrition): moderate depletion           A minimum of two characteristics is recommended for  diagnosis of either severe or non-severe malnutrition.   Radiology:   I have personally reviewed the images and agree with radiologist report  Transesophageal echo (GAYLA)  Procedure: GAYLA     Final impression:    LV systolic function 55%.  No intracardiac thrombus is present.  No vegetation or signs of infection.     Recommendations:    Medical management    Indication: Fungemia    Informed consent: obtained, signed copy placed in the chart.    Description of the procedure: After sedation was achieved (please see   anesthesia report for details), the esophagus intubated without   difficulties. Multiple orthogonal views obtained. Patient tolerated   procedure without immediate complications noted.    Findings:  Left atrium (LA): Normal LA size.  Left atrial appendage (JER): No JER thrombus is present.  Interatrial septum:  NO ASD or PFO noted on 2D or Color Doppler images.   Right atrium (RA): Normal RA size.  Left ventricle (LV): LVEF 55-60%.   Normal LV size.  Normal LV wall   thickness. No Regional wall motion abnormalities noted.  Right ventricle (RV): Partially visualized in this study.  Aortic valve: Trileaflet.  No Aortic stenosis.   No aortic regurgitation.   No Vegetation is present.  Mitral valve: No mitral annular calcification is present.  No Mitral   stenosis.  No Mitral valve prolapse. Trace Mitral regurgitation. No   Vegetation is present.  Tricuspid valve: Partially visualized, No Tricuspid stenosis. Trace   Tricuspid regurgitation. No Vegetation is present.  Pulmonic valve: Not well visualized, no hemodynamically significant   pulmonic stenosis, no pulmonic regurgitation noted in this study. No   Vegetation is present.  Thoracic aorta: No Atherosclerotic changes of the descending thoracic   aorta.  Normal Caliber aortic root.   Normal Caliber Proximal portion of   the ascending aorta.   Pericardium: No significant pericardial effusion is present.           Mariano Bermudez MD  Department of [facility]  Medicine   [facility]   12/17/2023

## 2023-12-17 NOTE — PLAN OF CARE
Plan of care    58F presents s/p laparoscopic cholecystectomy on 11/10 in which the gallbladder could not be resected which prompted ED visit leading to CT and ERCP. On workup, pt was found to have L sided hydronephrosis with declining kidney fxn eventually requiring HD. She was also found to have intestinal adenocarcinoma after pathology from ERCP. On 12/6 pt required placement of L subclavian mediport and J tube by surgical oncology.     General surgery consulted today for clogged j-tube.    Plan:  -recommend co[place]a-cola through J-tube clamp for 10 min 3x  -Please call back if tube does not unclog    Joie Dumont PGY1  [employer] General Surgery  12/17/2023

## 2023-12-18 LAB
ALBUMIN SERPL-MCNC: 1.7 G/DL (ref 3.4–4.8)
ALBUMIN/GLOB SERPL: 0.3 RATIO (ref 1.1–2)
ALP SERPL-CCNC: 162 UNIT/L (ref 40–150)
ALT SERPL-CCNC: 51 UNIT/L (ref 0–55)
AST SERPL-CCNC: 42 UNIT/L (ref 5–34)
BACTERIA BLD CULT: ABNORMAL
BASOPHILS # BLD AUTO: 0.17 X10(3)/MCL
BASOPHILS NFR BLD AUTO: 0.8 %
BILIRUB SERPL-MCNC: 3.2 MG/DL
BUN SERPL-MCNC: 63.8 MG/DL (ref 9.8–20.1)
CALCIUM SERPL-MCNC: 8.8 MG/DL (ref 8.4–10.2)
CHLORIDE SERPL-SCNC: 101 MMOL/L (ref 98–107)
CO2 SERPL-SCNC: 19 MMOL/L (ref 23–31)
CREAT SERPL-MCNC: 5.2 MG/DL (ref 0.55–1.02)
EOSINOPHIL # BLD AUTO: 0.6 X10(3)/MCL (ref 0–0.9)
EOSINOPHIL NFR BLD AUTO: 2.9 %
ERYTHROCYTE [DISTWIDTH] IN BLOOD BY AUTOMATED COUNT: 17.2 % (ref 11.5–17)
GFR SERPLBLD CREATININE-BSD FMLA CKD-EPI: 9 MLS/MIN/1.73/M2
GLOBULIN SER-MCNC: 5.1 GM/DL (ref 2.4–3.5)
GLUCOSE SERPL-MCNC: 90 MG/DL (ref 82–115)
GRAM STN SPEC: ABNORMAL
HCT VFR BLD AUTO: 30.5 % (ref 37–47)
HEMATOLOGIST REVIEW: NORMAL
HGB BLD-MCNC: 10.4 G/DL (ref 12–16)
IMM GRANULOCYTES # BLD AUTO: 1.46 X10(3)/MCL (ref 0–0.04)
IMM GRANULOCYTES NFR BLD AUTO: 7.1 %
LYMPHOCYTES # BLD AUTO: 1.28 X10(3)/MCL (ref 0.6–4.6)
LYMPHOCYTES NFR BLD AUTO: 6.2 %
MAGNESIUM SERPL-MCNC: 2.3 MG/DL (ref 1.6–2.6)
MAYO GENERIC ORDERABLE RESULT: NORMAL
MCH RBC QN AUTO: 30.2 PG (ref 27–31)
MCHC RBC AUTO-ENTMCNC: 34.1 G/DL (ref 33–36)
MCV RBC AUTO: 88.7 FL (ref 80–94)
MONOCYTES # BLD AUTO: 1.26 X10(3)/MCL (ref 0.1–1.3)
MONOCYTES NFR BLD AUTO: 6.1 %
NEUTROPHILS # BLD AUTO: 15.89 X10(3)/MCL (ref 2.1–9.2)
NEUTROPHILS NFR BLD AUTO: 76.9 %
NRBC BLD AUTO-RTO: 0 %
PLATELET # BLD AUTO: 890 X10(3)/MCL (ref 130–400)
PLATELETS.RETICULATED NFR BLD AUTO: 2.1 % (ref 0.9–11.2)
PMV BLD AUTO: 10.2 FL (ref 7.4–10.4)
POTASSIUM SERPL-SCNC: 4.3 MMOL/L (ref 3.5–5.1)
PROT SERPL-MCNC: 6.8 GM/DL (ref 5.8–7.6)
PSYCHE PATHOLOGY RESULT: NORMAL
RBC # BLD AUTO: 3.44 X10(6)/MCL (ref 4.2–5.4)
SODIUM SERPL-SCNC: 133 MMOL/L (ref 136–145)
WBC # SPEC AUTO: 20.66 X10(3)/MCL (ref 4.5–11.5)

## 2023-12-18 PROCEDURE — A4216 STERILE WATER/SALINE, 10 ML: HCPCS | Performed by: INTERNAL MEDICINE

## 2023-12-18 PROCEDURE — 83735 ASSAY OF MAGNESIUM: CPT | Performed by: STUDENT IN AN ORGANIZED HEALTH CARE EDUCATION/TRAINING PROGRAM

## 2023-12-18 PROCEDURE — 25000003 PHARM REV CODE 250: Performed by: INTERNAL MEDICINE

## 2023-12-18 PROCEDURE — 25000003 PHARM REV CODE 250

## 2023-12-18 PROCEDURE — 63600175 PHARM REV CODE 636 W HCPCS: Performed by: STUDENT IN AN ORGANIZED HEALTH CARE EDUCATION/TRAINING PROGRAM

## 2023-12-18 PROCEDURE — 80053 COMPREHEN METABOLIC PANEL: CPT | Performed by: NURSE PRACTITIONER

## 2023-12-18 PROCEDURE — C9113 INJ PANTOPRAZOLE SODIUM, VIA: HCPCS | Performed by: INTERNAL MEDICINE

## 2023-12-18 PROCEDURE — 63600175 PHARM REV CODE 636 W HCPCS: Performed by: GENERAL PRACTICE

## 2023-12-18 PROCEDURE — 99233 SBSQ HOSP IP/OBS HIGH 50: CPT | Mod: ,,, | Performed by: GENERAL PRACTICE

## 2023-12-18 PROCEDURE — 63600175 PHARM REV CODE 636 W HCPCS: Performed by: INTERNAL MEDICINE

## 2023-12-18 PROCEDURE — 21400001 HC TELEMETRY ROOM

## 2023-12-18 PROCEDURE — 25000003 PHARM REV CODE 250: Performed by: STUDENT IN AN ORGANIZED HEALTH CARE EDUCATION/TRAINING PROGRAM

## 2023-12-18 PROCEDURE — 85025 COMPLETE CBC W/AUTO DIFF WBC: CPT | Performed by: NURSE PRACTITIONER

## 2023-12-18 PROCEDURE — 25500020 PHARM REV CODE 255: Performed by: STUDENT IN AN ORGANIZED HEALTH CARE EDUCATION/TRAINING PROGRAM

## 2023-12-18 RX ORDER — CIPROFLOXACIN 2 MG/ML
400 INJECTION, SOLUTION INTRAVENOUS
Status: DISCONTINUED | OUTPATIENT
Start: 2023-12-18 | End: 2023-12-22

## 2023-12-18 RX ORDER — METOPROLOL TARTRATE 1 MG/ML
10 INJECTION, SOLUTION INTRAVENOUS EVERY 6 HOURS
Status: DISCONTINUED | OUTPATIENT
Start: 2023-12-18 | End: 2023-12-18

## 2023-12-18 RX ORDER — METOPROLOL TARTRATE 1 MG/ML
5 INJECTION, SOLUTION INTRAVENOUS EVERY 6 HOURS
Status: DISCONTINUED | OUTPATIENT
Start: 2023-12-18 | End: 2023-12-20

## 2023-12-18 RX ADMIN — MICAFUNGIN SODIUM 100 MG: 100 INJECTION, POWDER, LYOPHILIZED, FOR SOLUTION INTRAVENOUS at 09:12

## 2023-12-18 RX ADMIN — MIDODRINE HYDROCHLORIDE 10 MG: 5 TABLET ORAL at 02:12

## 2023-12-18 RX ADMIN — Medication 10 ML: at 05:12

## 2023-12-18 RX ADMIN — METOPROLOL TARTRATE 5 MG: 1 INJECTION, SOLUTION INTRAVENOUS at 01:12

## 2023-12-18 RX ADMIN — METOPROLOL TARTRATE 5 MG: 1 INJECTION, SOLUTION INTRAVENOUS at 06:12

## 2023-12-18 RX ADMIN — HYDROCORTISONE ACETATE 25 MG: 25 SUPPOSITORY RECTAL at 12:12

## 2023-12-18 RX ADMIN — ENOXAPARIN SODIUM 100 MG: 100 INJECTION SUBCUTANEOUS at 12:12

## 2023-12-18 RX ADMIN — CIPROFLOXACIN 400 MG: 2 INJECTION, SOLUTION INTRAVENOUS at 06:12

## 2023-12-18 RX ADMIN — SODIUM CHLORIDE, PRESERVATIVE FREE 10 ML: 5 INJECTION INTRAVENOUS at 01:12

## 2023-12-18 RX ADMIN — SODIUM CHLORIDE, PRESERVATIVE FREE 10 ML: 5 INJECTION INTRAVENOUS at 12:12

## 2023-12-18 RX ADMIN — DIATRIZOATE MEGLUMINE AND DIATRIZOATE SODIUM 30 ML: 660; 100 LIQUID ORAL; RECTAL at 01:12

## 2023-12-18 RX ADMIN — MIDODRINE HYDROCHLORIDE 10 MG: 5 TABLET ORAL at 06:12

## 2023-12-18 RX ADMIN — METOPROLOL TARTRATE 5 MG: 1 INJECTION, SOLUTION INTRAVENOUS at 11:12

## 2023-12-18 RX ADMIN — Medication 6 MG: at 11:12

## 2023-12-18 RX ADMIN — SODIUM CHLORIDE, PRESERVATIVE FREE 10 ML: 5 INJECTION INTRAVENOUS at 06:12

## 2023-12-18 RX ADMIN — PANTOPRAZOLE SODIUM 40 MG: 40 INJECTION, POWDER, FOR SOLUTION INTRAVENOUS at 06:12

## 2023-12-18 RX ADMIN — SODIUM CHLORIDE, PRESERVATIVE FREE 10 ML: 5 INJECTION INTRAVENOUS at 11:12

## 2023-12-18 RX ADMIN — PANTOPRAZOLE SODIUM 40 MG: 40 INJECTION, POWDER, FOR SOLUTION INTRAVENOUS at 12:12

## 2023-12-18 RX ADMIN — ENOXAPARIN SODIUM 100 MG: 100 INJECTION SUBCUTANEOUS at 11:12

## 2023-12-18 NOTE — PROGRESS NOTES
[facility]  Infectious Disease Progress Note            ASSESSMENT & PLAN:   She is a 58-year-old female with a past medical history of hypertension who underwent an unsuccessful laparoscopic cholecystectomy on 11/10/2023 as gallbladder was unable to be completely resected.  Since procedure, she continued to have right flank and back pain.  She followed up with her PCP, and CT of the abdomen and pelvis on 11/17 revealed left-sided hydronephrosis with suspected distal obstruction.  Additionally noted was markedly thickened stomach wall.  Underwent ERCP on 11/18-subsequently found to have a malignant gastric tumor.  IR placed a biliary drain on 11/21.  On 11/24, she developed atrial fibrillation with RVR and was initiated on amiodarone drip.   She then had bilateral ureteral stents placed on 11/27 given persistent hydronephrosis.  She was developed an POORNIMA since admit secondary to obstructive uropathy, now requiring hemodialysis.  Temporary hemodialysis catheter was placed earlier today along with MediPort.  Patient was significant oozing from sites postoperatively.  She received 3 units of blood intraoperatively as well as around 1 L of IV fluids per report.  Patient reports developing cough after procedure today, nonproductive.  Denies chest pain and is oxygenating well on room air.  Patient has been receiving empiric Zosyn essentially since admit.  Leukocytosis had trended up around 11/22, however has since trended downward although with some worsening this morning.  Blood cultures from 11/18 are negative.  Urine culture from 11/23 is also negative.  She is been afebrile and hemodynamically stable.  Chest x-ray earlier today showed low lung volumes with mild retrocardiac atelectasis.  We have been consulted for input regarding worsening leukocytosis.      Reconsulted secondary to recent low-grade fevers, T-max of 100.4° on 12/08.  Patient had previously been off of Zosyn since 12/05.  She was resumed on antimicrobials  with vancomycin and Zosyn on 12/10.  She denies any associated symptoms.  She did have a recent drop in her H&H and positive stools.  GI service is considering colonoscopy.  CT of the chest, abdomen, and pelvis with IV contrast performed on 12/09 showed moderate volume ascites with areas of subcutaneous edema in the abdominal wall.  Patient currently without complaints.  Blood cultures drawn on 12/10 via PICC are positive for yeast, C. glabrata per BCID.  Peripheral set are negative thus far.          Candidemia - abdominal vs line source  Concern for lower GIB  Ascites per recent CT  Persistent leukocytosis, suspect multifactorial  POORNIMA, now ESRD on HD  Obstructive uropathy, s/p bilateral ureteral stents on 11/27  Persistent hyperbilirubinemia, s/p unsuccessful cholecystectomy on 11/10 and biliary drain on 11/21, exchanged 12/13  Gastric cancer, newly diagnosed  Mediport / tunneled HD catheter status      Plan:  Continue micafungin 100mg IV q24hr and cipro 400mg IV q24h.  BCx 12/17 remain NGTD.  New tunneled catheter to be placed today.   Discussed with patient and nursing.           SUBJECTIVE:    AF, VSS.  Somewhat emotional, otherwise no issues.     MEDICATIONS:   Reviewed in EMR    REVIEW OF SYSTEMS:   Except as documented, all other systems reviewed and negative     PHYSICAL EXAM:   T 99.8 °F (37.7 °C)   /69   P (!) 123   RR 20   O2 99 %  GENERAL: NAD; does not appear toxic  SKIN: no rash  HEENT: sclera non-icteric; PERRL   NECK: supple; no LAD  CHEST: CTA; nonlabored, equal expansion; no adventitious BS  CARDIOVASCULAR: RRR, S1S2; no murmur   ABDOMEN:  active bowel sounds; abdomen soft, rounded, nontender to palpation; biliary drain / J-tube noted  EXTREMITIES: no cyanosis or clubbing  NEURO: AAO x4; CN II-XII grossly intact  PSYCH: Mentation and affect appropriate     LABS AND IMAGING:     Recent Labs     12/17/23  0844 12/18/23  0440   WBC 20.09  20.09* 20.66*   RBC 3.20* 3.44*   HGB 9.5* 10.4*   HCT  "28.3* 30.5*   MCV 88.4 88.7   MCH 29.7 30.2   MCHC 33.6 34.1   RDW 17.2* 17.2*   * 890*       No results for input(s): "LACTIC" in the last 72 hours.  No results for input(s): "INR", "APTT", "D-DIMER" in the last 72 hours.  No results for input(s): "HGBA1C", "CHOL", "TRIG", "LDL", "VLDL", "HDL" in the last 72 hours.   Recent Labs     12/16/23  0414 12/16/23  1830 12/17/23  0844 12/18/23  0440   *   < > 133* 133*   K 3.1*   < > 3.4* 4.3   CHLORIDE 101   < > 101 101   CO2 20*   < > 19* 19*   BUN 62.8*   < > 51.4* 63.8*   CREATININE 5.24*   < > 4.26* 5.20*   GLUCOSE 131*   < > 142* 90   CALCIUM 8.5   < > 8.4 8.8   PHOS 3.3  --   --   --    ALBUMIN 1.5*  --  1.6* 1.7*   GLOBULIN  --   --  5.1* 5.1*   ALKPHOS  --   --  207* 162*   ALT  --   --  49 51   AST  --   --  37* 42*   BILITOT  --   --  2.8* 3.2*    < > = values in this interval not displayed.       No results for input(s): "BNP", "CPK", "TROPONINI" in the last 72 hours.         CT Abdomen Pelvis  Without Contrast  Narrative: EXAMINATION:  CT ABDOMEN PELVIS WITHOUT CONTRAST    CLINICAL HISTORY:  Abdominal abscess/infection suspected;Epigastric pain;    TECHNIQUE:  Multidetector non-contrast axial CT images of the abdomen and pelvis were obtained with coronal and sagittal reconstructions.    Automatic exposure control was utilized to reduce the patient's radiation dose.    DLP= 1411    COMPARISON:  12/09/2023    FINDINGS:  Bilateral hydronephrosis with double-J ureteral stents in place remain.  Internal external biliary drainage catheter remains.  There is scattered free fluid within the abdomen in layering in the pelvis.  Evaluation for loculated collection is limited secondary to lack of IV contrast.  The cecum is gas-filled and contains stool with transition point noted at the hepatic flexure (series 4, image 61).  Partial obstruction is not excluded.  Impression: Limited evaluation secondary to lack of IV contrast.  No loculated intra-abdominal " collection appreciated.  Scattered free fluid is noted.    Bilateral hydronephrosis remains with double-J ureteral stents in place.  No Jin catheter is present.    The cecum is gas-filled and contains stool with transition point noted at the hepatic flexure (series 4, image 61). Partial obstruction is not excluded.    Electronically signed by: Renny Batres  Date:    12/17/2023  Time:    12:03    MARIA A Viramontes  Infectious Disease  [facility]

## 2023-12-18 NOTE — PROGRESS NOTES
Nephrology consult follow up note    HPI:      Karen Briggs is a 58 y.o. female presented on  7 days post op from laparoscopic cholecystectomy on 11/10. Gallbladder was unable to be resected. Back and flank pain persisted post operatively prompting evaluation at [facility]. After workup her surgeon recommended ERCP for which she was sent to this facility. Left sided hydronephrosis noted on CT scans done on admission. At that time renal function was fairly normal and patient was given option for stenting or to defer as outpatient and she chose the latter.      During ERCP, malignant gastric mass noted and ERCP was unable to be completed due to duodenal scarring. 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 Patient ultimately had biliary drain placed per IR.      She developed A fib with RVR requiring amiodarone infusion. She then had significant blood loss post removal of long term IV and subsequent fall in her room.      Patient renal function was relatively stable until decline starting 11/25. She was ultimately initiated on HD 11/28 post bilateral ureteral stent placement with Dr Cardenas same day.      Patient developed dialysis dependent acute kidney injury, and was started on hemodialysis 11/29/2023.    Interval history:     No acute events overnight.  Infectious Disease is recommending removal of her MediPort and tunneled dialysis catheter due to persistent fungemia.  She does have lower extremity edema.  No chest pain, shortness of breath, nausea, vomiting.     Review of Systems:       Past medical, family, surgical, and social history reviewed and unchanged from initial consult note.     Objective:       VITAL SIGNS: 24 HR MIN & MAX LAST    Temp  Min: 98.3 °F (36.8 °C)  Max: 100 °F (37.8 °C)  98.8 °F (37.1 °C)        BP  Min: 97/67  Max: 107/76  102/68     Pulse  Min: 97  Max: 119  (!) 119     Resp  Min: 18  Max: 19  18    SpO2  Min: 98 %  Max: 99 %  99 %      GEN: Well appearing AAF  CV: RRR  +S1,S2 without murmur  PULM: CTAB, unlabored  ABD: Soft, NT/ND abdomen with NABS, right sided cholecystectomy drain , J tube   EXT: 2+ BLE edema  SKIN: Warm and dry  PSYCH: Awake, alert and appropriately conversant.   Dialysis access: Right IJ permcath removed             Component Value Date/Time     (L) 12/18/2023 0440     (L) 12/17/2023 0844     04/24/2018 0340    K 4.3 12/18/2023 0440    K 3.4 (L) 12/17/2023 0844    K 3.2 (L) 04/24/2018 0340    CHLORIDE 101 12/18/2023 0440    CHLORIDE 101 12/17/2023 0844    CO2 19 (L) 12/18/2023 0440    CO2 19 (L) 12/17/2023 0844    CO2 20 (L) 04/24/2018 0340    BUN 63.8 (H) 12/18/2023 0440    BUN 51.4 (H) 12/17/2023 0844    BUN 10 04/24/2018 0340    CREATININE 5.20 (H) 12/18/2023 0440    CREATININE 4.26 (H) 12/17/2023 0844    CREATININE 0.7 04/24/2018 0340    CALCIUM 8.8 12/18/2023 0440    CALCIUM 8.4 12/17/2023 0844    CALCIUM 9.2 04/24/2018 0340    PHOS 3.3 12/16/2023 0414            Component Value Date/Time    WBC 20.66 (H) 12/18/2023 0440    WBC 20.09 (H) 12/17/2023 0844    WBC 20.09 12/17/2023 0844    WBC 18.88 12/15/2023 1047    WBC 12.03 04/24/2018 0340    HGB 10.4 (L) 12/18/2023 0440    HGB 9.5 (L) 12/17/2023 0844    HGB 11.3 (L) 04/24/2018 0340    HCT 30.5 (L) 12/18/2023 0440    HCT 28.3 (L) 12/17/2023 0844    HCT 35.3 (L) 04/24/2018 0340     (H) 12/18/2023 0440     (H) 12/17/2023 0844     04/24/2018 0340         Imaging reviewed      Assessment / Plan:     POORNIMA multifactorial secondary to obstructive uropathy, acute blood loss, contrast exposure, hypotension and Afib with RVR  ---Nephrotic range proteinuria noted. 4.4 g  ---Dialysis initiated 11/28 post tunneled catheter placement 12/6. Tunneled catheter removed 12/16 due to fungemia   Newly diagnosed malignant gastric mass. Pathology consistent with adenocarcinoma   -s/p J tube and mediport placement 12/1  Acute blood loss anemia 2/2 bleeding post long term IV removal   --s/p  transfusion 11/30, 12/1, 12/10  Bilateral hydronephrosis noted 11/21 - stent placement initially deferred due to stable renal function   Afib with RVR resolved   S/p biliary drain placement and subsequent exchange due to fungemia   Fungemia - blood cultures positive 12/10, 12/11     Plan:  -Surgery team following for clogged J tube.   -No sign of renal recovery as of yet. Last dialysis 12/16 prior to removal of tunneled catheter. Timing of replacement pending on ID recommendations

## 2023-12-18 NOTE — PROGRESS NOTES
[facility]  Infectious Disease Progress Note            ASSESSMENT & PLAN:   She is a 58-year-old female with a past medical history of hypertension who underwent an unsuccessful laparoscopic cholecystectomy on 11/10/2023 as gallbladder was unable to be completely resected.  Since procedure, she continued to have right flank and back pain.  She followed up with her PCP, and CT of the abdomen and pelvis on 11/17 revealed left-sided hydronephrosis with suspected distal obstruction.  Additionally noted was markedly thickened stomach wall.  Underwent ERCP on 11/18-subsequently found to have a malignant gastric tumor.  IR placed a biliary drain on 11/21.  On 11/24, she developed atrial fibrillation with RVR and was initiated on amiodarone drip.   She then had bilateral ureteral stents placed on 11/27 given persistent hydronephrosis.  She was developed an POORNIMA since admit secondary to obstructive uropathy, now requiring hemodialysis.  Temporary hemodialysis catheter was placed earlier today along with MediPort.  Patient was significant oozing from sites postoperatively.  She received 3 units of blood intraoperatively as well as around 1 L of IV fluids per report.  Patient reports developing cough after procedure today, nonproductive.  Denies chest pain and is oxygenating well on room air.  Patient has been receiving empiric Zosyn essentially since admit.  Leukocytosis had trended up around 11/22, however has since trended downward although with some worsening this morning.  Blood cultures from 11/18 are negative.  Urine culture from 11/23 is also negative.  She is been afebrile and hemodynamically stable.  Chest x-ray earlier today showed low lung volumes with mild retrocardiac atelectasis.  We have been consulted for input regarding worsening leukocytosis.      Reconsulted secondary to recent low-grade fevers, T-max of 100.4° on 12/08.  Patient had previously been off of Zosyn since 12/05.  She was resumed on antimicrobials  with vancomycin and Zosyn on 12/10.  She denies any associated symptoms.  She did have a recent drop in her H&H and positive stools.  GI service is considering colonoscopy.  CT of the chest, abdomen, and pelvis with IV contrast performed on 12/09 showed moderate volume ascites with areas of subcutaneous edema in the abdominal wall.  Patient currently without complaints.  Blood cultures drawn on 12/10 via PICC are positive for yeast, C. glabrata per BCID.  Peripheral set are negative thus far.          Candidemia - abdominal vs line source likely both  PICC line removed 12/11  HD tunneled cath and Mediport removed 12/16  Concern for lower GIB  Ascites per recent CT  Persistent leukocytosis, suspect multifactorial  POORNIMA, now ESRD on HD  Obstructive uropathy, s/p bilateral ureteral stents on 11/27  Persistent hyperbilirubinemia, s/p unsuccessful cholecystectomy on 11/10 and biliary drain on 11/21, exchanged 12/13  Gastric cancer, newly diagnosed  Mediport / tunneled HD catheter status     -no further fevers although temperatures of 100 with persistent leucocytosis  -12/12 blood cultures without growth thus far, 12/15 without growth at 48 hours, 12/17 without growth at 24 hours  -GAYLA 12/14 without vegetations  -CT abdomen and pelvis 12/17 without abdominal abscess or other new concerning findings but with persistent hydronephrosis with retained JJ stents     Plan:  -Continue micafungin 100mg IV q24hr.   -Will add Ciprofloxacin 400mg IV q24h for now given retained (and necessary) biliary drain and JJ stents bilaterally as well as persistent leukocytosis and temperatures at 100 along with abdominal pain on exam   -Leukocytosis likely to be multifactorial   -Given multiple negative repeated blood cultures and patient with some increase in shortness of breath and corby lower extremity edema, reasonable to pursue line placement and HD   -Long discussion with patient and partner at bedside including the complicated nature of the  "current condition with underlying malignancy, and obstacles to potentially starting chemotherapy namely infectious in nature but also due to underlying renal failure  -unfortunately, patient's prognosis is guarded at this time    ID will continue following. Please call with any questions or concerns.       SUBJECTIVE:   Frustrated this morning, somewhat short of breath but overall appears comfortable. No fevers, J tube is clogged. Had Mediport and HD cath removed on 12/16    MEDICATIONS:   Reviewed in EMR    REVIEW OF SYSTEMS:   Except as documented, all other systems reviewed and negative     PHYSICAL EXAM:   T 99.8 °F (37.7 °C)   /69   P (!) 123   RR 20   O2 99 %  GENERAL: NAD; does not appear toxic, mild increase in work of breathing   SKIN: no rash  HEENT: sclera non-icteric; PERRL   NECK: supple; no LAD  CHEST: CTA; nonlabored, equal expansion; no adventitious BS  CARDIOVASCULAR: RRR, S1S2; no murmur   ABDOMEN:  active bowel sounds; abdomen soft, rounded, nontender to palpation; biliary drain / J-tube noted, some tenderness surrounding the J tube  EXTREMITIES: no cyanosis or clubbing  NEURO: AAO x4; CN II-XII grossly intact  PSYCH: Mentation and affect appropriate     LABS AND IMAGING:     Recent Labs     12/17/23  0844 12/18/23  0440   WBC 20.09  20.09* 20.66*   RBC 3.20* 3.44*   HGB 9.5* 10.4*   HCT 28.3* 30.5*   MCV 88.4 88.7   MCH 29.7 30.2   MCHC 33.6 34.1   RDW 17.2* 17.2*   * 890*     No results for input(s): "LACTIC" in the last 72 hours.  No results for input(s): "INR", "APTT", "D-DIMER" in the last 72 hours.  No results for input(s): "HGBA1C", "CHOL", "TRIG", "LDL", "VLDL", "HDL" in the last 72 hours.   Recent Labs     12/16/23  0414 12/16/23  1830 12/17/23  0844 12/18/23  0440   *   < > 133* 133*   K 3.1*   < > 3.4* 4.3   CHLORIDE 101   < > 101 101   CO2 20*   < > 19* 19*   BUN 62.8*   < > 51.4* 63.8*   CREATININE 5.24*   < > 4.26* 5.20*   GLUCOSE 131*   < > 142* 90   CALCIUM 8.5  " " < > 8.4 8.8   MG  --   --   --  2.30   PHOS 3.3  --   --   --    ALBUMIN 1.5*  --  1.6* 1.7*   GLOBULIN  --   --  5.1* 5.1*   ALKPHOS  --   --  207* 162*   ALT  --   --  49 51   AST  --   --  37* 42*   BILITOT  --   --  2.8* 3.2*    < > = values in this interval not displayed.     No results for input(s): "BNP", "CPK", "TROPONINI" in the last 72 hours.         CT Abdomen Pelvis  Without Contrast  Narrative: EXAMINATION:  CT ABDOMEN PELVIS WITHOUT CONTRAST    CLINICAL HISTORY:  Abdominal abscess/infection suspected;Epigastric pain;    TECHNIQUE:  Multidetector non-contrast axial CT images of the abdomen and pelvis were obtained with coronal and sagittal reconstructions.    Automatic exposure control was utilized to reduce the patient's radiation dose.    DLP= 1411    COMPARISON:  12/09/2023    FINDINGS:  Bilateral hydronephrosis with double-J ureteral stents in place remain.  Internal external biliary drainage catheter remains.  There is scattered free fluid within the abdomen in layering in the pelvis.  Evaluation for loculated collection is limited secondary to lack of IV contrast.  The cecum is gas-filled and contains stool with transition point noted at the hepatic flexure (series 4, image 61).  Partial obstruction is not excluded.  Impression: Limited evaluation secondary to lack of IV contrast.  No loculated intra-abdominal collection appreciated.  Scattered free fluid is noted.    Bilateral hydronephrosis remains with double-J ureteral stents in place.  No Jin catheter is present.    The cecum is gas-filled and contains stool with transition point noted at the hepatic flexure (series 4, image 61). Partial obstruction is not excluded.    Electronically signed by: Renyn Batres  Date:    12/17/2023  Time:    12:03    Nathan Carlisle MD  Infectious Disease  [facility]    "

## 2023-12-18 NOTE — PROGRESS NOTES
SURG ONC    CALLED TO SEE PT DUE TO NURSE REPORT JTUBE CLOGGED  UNABLE TO FLUSH  PT DENIES PAIN    JTUBE CHANGED OUT, 14 FR, NO RESISTANCE, BALLOON INFLATED 3CC NS, FLUSHES EASILY, SECURED IN PLACE    PT TOLERATED WELL    ALL DISCUSSED WITH DR SPRAGUE    PLAN  TUBE PLACEMENT STUDY  MAY RESTART FEEDS IF STUDY NORMAL  NURSES ARE CRUSHING MEDS AND GIVING DOWN TUBE; ASKED THEM TO NOT DO THAT UNLESS VITAL MEDICATION ONLY OFFERED ORALLY

## 2023-12-18 NOTE — PROGRESS NOTES
[facility]  Hospital Medicine Progress Note        Chief Complaint: Inpatient Follow-up for intractable nausea vomiting due to gastric adenocarcinoma     HPI:   58-year-old female with medical history of hypertension who recently underwent laparoscopic cholecystectomy on 11/10/2023, procedure was incomplete and was unable to completely resect the gallbladder.  Since surgery she continued to have right flank/back pain and followed up with her PCP and CT abdomen and pelvis on 11/17/2023 revealed left-sided hydronephrosis with suspected distal obstruction/no clear stone visualized, postoperative changes of cholecystectomy with fluid in the gallbladder fossa may reflect postoperative seroma but biloma can not be entirely excluded, also noted for marked thickening of the stomach wall diffusely may be related to mesenteric edema/reactive.  She presented to [facility] [place] the same day 11/17/2023 and her labs notable for WBC 9.0, hemoglobin 12.4, platelets 442, creatinine 0.86, total bilirubin 8.7 with direct fraction 6.7, alkaline phosphatase 491, , .  Patient's surgeon was consulted and recommended ERCP which was not available at [facility] for which she was transferred to [facility] and referred to hospital medicine service for further evaluation and management. 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  Severe inflammation and oozing of blood noted proximal gastric lumen.  Unable to advance scope into the duodenum. Surgical oncology consulted, IR consulted for external drain placement which was done November 21.  November 24th she developed new onset atrial fibrillation with RVR and Cardiology consulted. Started on amiodarone drip. Unfortunately patient had acute blood loss due to hemorrhage from the midline site that was removed. Patient was unaware of the bleed.  Became very weak, passed out.  Code was called but she came around.   Stat H&H done which was slightly lower but stable, MRI of the brain was negative for any acute ischemic changes. Pt is aware of gastric cancer diagnosis. GI following--> 11/18- EGD shows normal esophagus, malignant gastric tumor in the cardia.  Inflamed mucosa and ooze of blood throughout the proximal gastric lumen.  Gastric antrum scar would not allow advancement of scope into the duodenal ampulla area therefore biliary cannulation was not possible for bile leak evaluation. Pathology shows marked chronic gastritis with intestinal metaplasia, gastric cardia biopsy shows adenocarcinoma, moderately differentiated intestinal type. Bilateral hydronephrosis with left greater than right. MRI brain without contrast-negative for acute finding. PET scan reviewed with patient by Oncology reports of locally advanced gastric adenocarcinoma and plans for systemic therapy outpatient if functional, nutritional and renal function improves. MediPort placement by surgical Oncology on 12/1, oncology on board plans for systemic therapy outpatient if functional, nutritional and renal function improves. Obstructive uropathy with bilateral hydronephrosis status post bilateral ureteral stent placed on 11/27 by Urology- no improvement in renal function, patient opted to have hemodialysis and a right-sided hemodialysis catheter was placed by General surgery on 11/29, to continue hemodialysis per nephrology discretion. Patient with symptoms of nausea and vomiting can not keep anything down in her stomach complicated by severe malnutrition on IV Clinimix and supportive medications for nausea and vomiting. Palliative care consulted. Patient got a MediPort and J-tube placed on 12/01. Cystogram reviewed by Urology with patent stents and recommends outpatient follow-up with Urology. White cell count elevated at 20.4, Infectious Disease had started on IV Zosyn given concern for possible sepsis with low blood pressure on 12/02. [facility] evaluated patient  to begin low-dose metoprolol tartrate at 12.5 mg b.i.d. and resume Eliquis for stroke prevention. On TF via J tube which was placed by Surgical Oncology. ID started IV Micafungin for candidemia. Noted to have persistent fungemia on 1/2 BCX from 12/11.  Consulted CIS for GAYLA; report pending. General Surgery consulted to remove HD line & Mediport. Patient spiked a fever overnight on 12/15; BCX repeated by ID which are negative.  Afebrile since 12/14. General Surgery removed mediport & tunneled catheter after HD on 12/16.    Interval Hx:   Today, Mrs Briggs was complaining of some pain around J tube site. Nurse reported that J tube was clogged & she was unable to flush it. Surgery & Surgical Oncology informed; will await recommendations. Tachycardia persisting in the 120s; will give small IV LR bolus. Holding TF.    Objective/physical exam:  General: alert lady lying comfortably in bed, in no acute distress  HENT: oral and oropharyngeal mucosa moist, pink, with no erythema or exudates, no ear pain or discharge  Neck: normal neck movement, no lymph nodes or swellings, no JVD or Carotid bruit  Respiratory: clear breathing sounds bilaterally, no crackles, rales, ronchi or wheezes  Cardiovascular: clear S1 and S2, no murmurs, rubs or gallops  Peripheral Vascular: no lesions, ulcers or erosions, normal peripheral pulses; 2+ pitting pedal edema  Gastrointestinal: J tube in place; soft, non-tender, non-distended abdomen, no guarding, rigidity or rebound tenderness, normal bowel sounds  Integumentary: normal skin color, no rashes or lesions  Neuro: AAO x 3; motor strength 5/5 in B/L UEs & LEs; sensation intact to gross and fine touch B/L; CN II-XII grossly intact    VITAL SIGNS: 24 HRS MIN & MAX LAST   Temp  Min: 98.3 °F (36.8 °C)  Max: 100 °F (37.8 °C) 99.8 °F (37.7 °C)   BP  Min: 97/67  Max: 107/76 100/69   Pulse  Min: 97  Max: 123  (!) 123   Resp  Min: 18  Max: 20 20   SpO2  Min: 98 %  Max: 99 % 99 %     I reviewed the  labs below:  Recent Labs   Lab 12/16/23  0414 12/17/23  0844 12/18/23  0440   WBC 19.03* 20.09  20.09* 20.66*   RBC 3.36* 3.20* 3.44*   HGB 9.9* 9.5* 10.4*   HCT 29.8* 28.3* 30.5*   MCV 88.7 88.4 88.7   MCH 29.5 29.7 30.2   MCHC 33.2 33.6 34.1   RDW 17.5* 17.2* 17.2*   * 911* 890*   MPV 10.0 9.9 10.2       Recent Labs   Lab 12/13/23  1001 12/14/23  1214 12/15/23  1047 12/16/23  0414 12/16/23  1830 12/17/23  0844 12/18/23  0440   * 131* 136 132* 131* 133* 133*   K 5.6* 3.4* 3.3* 3.1* 4.0 3.4* 4.3   CO2 22* 21* 23 20* 19* 19* 19*   BUN 49.8* 68.6* 48.7* 62.8* 36.8* 51.4* 63.8*   CREATININE 4.60* 5.54* 4.56* 5.24* 3.57* 4.26* 5.20*   CALCIUM 8.7 8.8 8.1* 8.5 8.2* 8.4 8.8   MG 2.20 2.30 2.10  --   --   --   --    ALBUMIN 1.5* 1.5* 1.5* 1.5*  --  1.6* 1.7*   ALKPHOS 141 156* 184*  --   --  207* 162*   ALT 52 53 52  --   --  49 51   AST 68* 46* 42*  --   --  37* 42*   BILITOT 4.6* 4.3* 3.5*  --   --  2.8* 3.2*       Assessment/Plan:  Hypotension, improved with Midodrine  Persistent leukocytosis likely 2/2 Fungemia  Acute on chronic anemia, s/p 2 units prbcs transfusion 11/30  Hx of Acute Blood loss anemia with syncope and then fall 11/26-   Locally advanced gastric adenocarcinoma with intractable nausea and vomiting possible Gastric/duodenal outlet obstruction s/p J Tube placement  J Tube Obstruction  Fungemia possibly 2/2 Biliary Drain Infection  Obstructive uropathy with bilateral hydronephrosis- S/P bilateral ureteral stent placed on 11/27  POORNIMA-now on HD - 11/29  Suspected bile leak with biloma and biliary obstruction--> H/O Laparoscopic incomplete cholecystectomy 11/10/2023  New onset atrial fibrillation with RVR- fluctuating  History of hypertension and DDD/sciatica  Severe malnutrition  Leukocytosis likely 2/2 Fungemia  Tachycardia 2/2 IV Depletion  Thrombocytosis possibly reactive to Sepsis    Patient continues to be admitted  Complaining of pain around J tube site  J Tube remains clogged; awaiting  Surgical Oncology recommendations  Holding TF; will give IV LR bolus  Underwent GAYLA on 12/14 with report negative for vegetations  On IV Micafungin  ID on-board; follow recs  Nephrology on-board for HD conduction  Surgical Oncology on-board; follow recs  General Surgery removed HD catheter & Mediport on 12/16  Patient underwent biliary drain exchange on 12/13 per ID recs  Blood Cultures 12/10 positive for candida glabrata in the blood 1/2; Repeat BCX 12/11 positive for yeasts; BCX 12/12 negative; BCX repeated on 12/15 d/t fevers which are negative x 48 hrs; BCX from 12/17 negative x 24 hrs  PICC line removed  Patient had MediPort and J-tube placed on 12/01  Surgical Oncology consulted for Cholecystectomy, not thought to be infectious cause; recommendation to consult Palliative Care  Palliative Care consulted earlier; patient wishes to continue pursuing curative treatment for cancer  GI consulted for positive FOBT and drop in HB; monitoring H/H  Cystogram reviewed by Urology with patent stents and recommends outpatient follow-up with Urology  Appreciate Oncology input; plans for systemic therapy outpatient if renal function improves, patient is planning to pursue treatment   Continued on Hydrocortisone Supp, Metoprolol Tartrate BID, Midodrine TID, Protonix BID, Sodium Bicarbonate     VTE prophylaxis:  FD Lovenox     Patient condition:  Stable     Anticipated discharge and Disposition:  Pending    All diagnosis and differential diagnosis have been reviewed; assessment and plan has been documented; I have personally reviewed the labs and test results that are presently available; I have reviewed the patients medication list; I have reviewed the consulting providers response and recommendations. I have reviewed or attempted to review medical records based upon their availability    All of the patient's questions have been  addressed and answered. Patient's is agreeable to the above stated plan. I will continue to  monitor closely and make adjustments to medical management as needed.  _____________________________________________________________________    Nutrition Status:  Patient meets ASPEN criteria for severe malnutrition of acute illness or injury per RD assessment as evidenced by:  Energy Intake (Malnutrition):  (does not meet criteria)  Weight Loss (Malnutrition): greater than 7.5% in 3 months  Subcutaneous Fat (Malnutrition): moderate depletion  Muscle Mass (Malnutrition): moderate depletion           A minimum of two characteristics is recommended for diagnosis of either severe or non-severe malnutrition.   Radiology:   I have personally reviewed the images and agree with radiologist report  CT Abdomen Pelvis  Without Contrast  Narrative: EXAMINATION:  CT ABDOMEN PELVIS WITHOUT CONTRAST    CLINICAL HISTORY:  Abdominal abscess/infection suspected;Epigastric pain;    TECHNIQUE:  Multidetector non-contrast axial CT images of the abdomen and pelvis were obtained with coronal and sagittal reconstructions.    Automatic exposure control was utilized to reduce the patient's radiation dose.    DLP= 1411    COMPARISON:  12/09/2023    FINDINGS:  Bilateral hydronephrosis with double-J ureteral stents in place remain.  Internal external biliary drainage catheter remains.  There is scattered free fluid within the abdomen in layering in the pelvis.  Evaluation for loculated collection is limited secondary to lack of IV contrast.  The cecum is gas-filled and contains stool with transition point noted at the hepatic flexure (series 4, image 61).  Partial obstruction is not excluded.  Impression: Limited evaluation secondary to lack of IV contrast.  No loculated intra-abdominal collection appreciated.  Scattered free fluid is noted.    Bilateral hydronephrosis remains with double-J ureteral stents in place.  No Jin catheter is present.    The cecum is gas-filled and contains stool with transition point noted at the hepatic flexure  (series 4, image 61). Partial obstruction is not excluded.    Electronically signed by: Renny Batres  Date:    12/17/2023  Time:    12:03      Mariano Bermudez MD  Department of Hospital Medicine   [facility]   12/18/2023

## 2023-12-19 PROBLEM — L89.150 PRESSURE ULCER OF SACRAL REGION, UNSTAGEABLE: Status: ACTIVE | Noted: 2023-12-19

## 2023-12-19 LAB
ABS NEUT (OLG): 20.94 X10(3)/MCL (ref 2.1–9.2)
ALBUMIN SERPL-MCNC: 1.6 G/DL (ref 3.4–4.8)
ALBUMIN/GLOB SERPL: 0.3 RATIO (ref 1.1–2)
ALP SERPL-CCNC: 160 UNIT/L (ref 40–150)
ALT SERPL-CCNC: 47 UNIT/L (ref 0–55)
ANISOCYTOSIS BLD QL SMEAR: ABNORMAL
AST SERPL-CCNC: 37 UNIT/L (ref 5–34)
BILIRUB SERPL-MCNC: 2.8 MG/DL
BUN SERPL-MCNC: 82.4 MG/DL (ref 9.8–20.1)
CALCIUM SERPL-MCNC: 8.6 MG/DL (ref 8.4–10.2)
CHLORIDE SERPL-SCNC: 100 MMOL/L (ref 98–107)
CO2 SERPL-SCNC: 18 MMOL/L (ref 23–31)
CREAT SERPL-MCNC: 6.24 MG/DL (ref 0.55–1.02)
EOSINOPHIL NFR BLD MANUAL: 0.49 X10(3)/MCL (ref 0–0.9)
EOSINOPHIL NFR BLD MANUAL: 2 %
ERYTHROCYTE [DISTWIDTH] IN BLOOD BY AUTOMATED COUNT: 17.2 % (ref 11.5–17)
GFR SERPLBLD CREATININE-BSD FMLA CKD-EPI: 7 MLS/MIN/1.73/M2
GLOBULIN SER-MCNC: 5.1 GM/DL (ref 2.4–3.5)
GLUCOSE SERPL-MCNC: 107 MG/DL (ref 82–115)
HCT VFR BLD AUTO: 28.2 % (ref 37–47)
HGB BLD-MCNC: 9.3 G/DL (ref 12–16)
INSTRUMENT WBC (OLG): 24.35 X10(3)/MCL
LYMPHOCYTES NFR BLD MANUAL: 0.97 X10(3)/MCL
LYMPHOCYTES NFR BLD MANUAL: 4 %
MACROCYTES BLD QL SMEAR: ABNORMAL
MCH RBC QN AUTO: 29.9 PG (ref 27–31)
MCHC RBC AUTO-ENTMCNC: 33 G/DL (ref 33–36)
MCV RBC AUTO: 90.7 FL (ref 80–94)
METAMYELOCYTES NFR BLD MANUAL: 1 %
MONOCYTES NFR BLD MANUAL: 1.22 X10(3)/MCL (ref 0.1–1.3)
MONOCYTES NFR BLD MANUAL: 5 %
MYELOCYTES NFR BLD MANUAL: 2 %
NEUTROPHILS NFR BLD MANUAL: 86 %
NRBC BLD AUTO-RTO: 0 %
PLATELET # BLD AUTO: 1014 X10(3)/MCL (ref 130–400)
PLATELET # BLD EST: ABNORMAL 10*3/UL
PLATELETS.RETICULATED NFR BLD AUTO: 2.4 % (ref 0.9–11.2)
PMV BLD AUTO: 9.9 FL (ref 7.4–10.4)
POIKILOCYTOSIS BLD QL SMEAR: ABNORMAL
POTASSIUM SERPL-SCNC: 4.3 MMOL/L (ref 3.5–5.1)
PROT SERPL-MCNC: 6.7 GM/DL (ref 5.8–7.6)
RBC # BLD AUTO: 3.11 X10(6)/MCL (ref 4.2–5.4)
RBC MORPH BLD: ABNORMAL
SODIUM SERPL-SCNC: 133 MMOL/L (ref 136–145)
TARGETS BLD QL SMEAR: ABNORMAL
WBC # SPEC AUTO: 24.35 X10(3)/MCL (ref 4.5–11.5)

## 2023-12-19 PROCEDURE — 25000003 PHARM REV CODE 250: Performed by: STUDENT IN AN ORGANIZED HEALTH CARE EDUCATION/TRAINING PROGRAM

## 2023-12-19 PROCEDURE — 85027 COMPLETE CBC AUTOMATED: CPT | Performed by: STUDENT IN AN ORGANIZED HEALTH CARE EDUCATION/TRAINING PROGRAM

## 2023-12-19 PROCEDURE — 25000003 PHARM REV CODE 250

## 2023-12-19 PROCEDURE — 99233 SBSQ HOSP IP/OBS HIGH 50: CPT | Mod: FS,,, | Performed by: GENERAL PRACTICE

## 2023-12-19 PROCEDURE — 25000003 PHARM REV CODE 250: Performed by: INTERNAL MEDICINE

## 2023-12-19 PROCEDURE — 21400001 HC TELEMETRY ROOM

## 2023-12-19 PROCEDURE — 25000003 PHARM REV CODE 250: Performed by: NURSE PRACTITIONER

## 2023-12-19 PROCEDURE — 63600175 PHARM REV CODE 636 W HCPCS: Performed by: INTERNAL MEDICINE

## 2023-12-19 PROCEDURE — 80053 COMPREHEN METABOLIC PANEL: CPT | Performed by: STUDENT IN AN ORGANIZED HEALTH CARE EDUCATION/TRAINING PROGRAM

## 2023-12-19 PROCEDURE — 99232 SBSQ HOSP IP/OBS MODERATE 35: CPT | Mod: ,,, | Performed by: STUDENT IN AN ORGANIZED HEALTH CARE EDUCATION/TRAINING PROGRAM

## 2023-12-19 PROCEDURE — 63600175 PHARM REV CODE 636 W HCPCS: Performed by: GENERAL PRACTICE

## 2023-12-19 PROCEDURE — 25000003 PHARM REV CODE 250: Performed by: EMERGENCY MEDICINE

## 2023-12-19 PROCEDURE — A4216 STERILE WATER/SALINE, 10 ML: HCPCS | Performed by: INTERNAL MEDICINE

## 2023-12-19 PROCEDURE — 63600175 PHARM REV CODE 636 W HCPCS: Performed by: STUDENT IN AN ORGANIZED HEALTH CARE EDUCATION/TRAINING PROGRAM

## 2023-12-19 PROCEDURE — C9113 INJ PANTOPRAZOLE SODIUM, VIA: HCPCS | Performed by: INTERNAL MEDICINE

## 2023-12-19 PROCEDURE — 99231 SBSQ HOSP IP/OBS SF/LOW 25: CPT | Mod: ,,, | Performed by: INTERNAL MEDICINE

## 2023-12-19 RX ADMIN — METOPROLOL TARTRATE 5 MG: 1 INJECTION, SOLUTION INTRAVENOUS at 05:12

## 2023-12-19 RX ADMIN — HYDROCORTISONE ACETATE 25 MG: 25 SUPPOSITORY RECTAL at 09:12

## 2023-12-19 RX ADMIN — CIPROFLOXACIN 400 MG: 2 INJECTION, SOLUTION INTRAVENOUS at 03:12

## 2023-12-19 RX ADMIN — SODIUM CHLORIDE, PRESERVATIVE FREE 10 ML: 5 INJECTION INTRAVENOUS at 03:12

## 2023-12-19 RX ADMIN — SODIUM CHLORIDE, PRESERVATIVE FREE 10 ML: 5 INJECTION INTRAVENOUS at 05:12

## 2023-12-19 RX ADMIN — PANTOPRAZOLE SODIUM 40 MG: 40 INJECTION, POWDER, FOR SOLUTION INTRAVENOUS at 05:12

## 2023-12-19 RX ADMIN — MICAFUNGIN SODIUM 100 MG: 100 INJECTION, POWDER, LYOPHILIZED, FOR SOLUTION INTRAVENOUS at 09:12

## 2023-12-19 RX ADMIN — METOPROLOL TARTRATE 5 MG: 1 INJECTION, SOLUTION INTRAVENOUS at 03:12

## 2023-12-19 RX ADMIN — SODIUM BICARBONATE 650 MG TABLET 1300 MG: at 09:12

## 2023-12-19 RX ADMIN — METOPROLOL TARTRATE 25 MG: 25 TABLET, FILM COATED ORAL at 09:12

## 2023-12-19 RX ADMIN — MIDODRINE HYDROCHLORIDE 10 MG: 5 TABLET ORAL at 09:12

## 2023-12-19 RX ADMIN — COLLAGENASE SANTYL: 250 OINTMENT TOPICAL at 05:12

## 2023-12-19 RX ADMIN — PANTOPRAZOLE SODIUM 40 MG: 40 INJECTION, POWDER, FOR SOLUTION INTRAVENOUS at 09:12

## 2023-12-19 RX ADMIN — MIDODRINE HYDROCHLORIDE 10 MG: 5 TABLET ORAL at 03:12

## 2023-12-19 NOTE — PROGRESS NOTES
INTERVENTIONAL NEPHROLOGY PROGRESS NOTE       Patient Name: Karen Briggs  DIOMEDES 1965    Date: 2023  Time: 12:30 PM      Reason for consult: Need for repeat tunneled catheter insertion.       HPI: In brief, this is a 58-year-old female with HTN and newly discovered gastric cancer developed POORNIMA 2/2 obstructive uropathy now dialysis dependent all since being admitted on 23. The pt was found to have worsening kidney function on 23 and and was initiated on dialysis via a temporary RIJ HD catheter on 23. Pt underwent insertion of a tunneled catheter with me on 23, however developed fungemia thereafter. She underwent removal of her tunneled catheter on 23. So far, repeat blood cultures are negative. She continues to have worsening renal indices and will require restarting her dialysis treatments. Hence, interventional nephrology service as consulted for reinsertion of tunneled catheter.    Interval History: Pt seen and examined at bedside this PM. No family present. Pt feels short of breath. Risks and benefits of insertion of tunneled catheter was shared with pt. She will sign consent tomorrow.      Review of Systems:  General:  + fatigue  Skin: No rashes  HEENT: No vision changes  CVS: No CP  RS: + SOB  GIT: No abdominal pain  Extremities: + swelling  Neurological:  No focal weakness  Psych: No depression      Current Facility-Administered Medications:     0.9%  NaCl infusion (for blood administration), , Intravenous, Q24H PRN, Almaz Cheney, FNP    0.9%  NaCl infusion (for blood administration), , Intravenous, Q24H PRN, Maryanne Moise, AGNP    0.9%  NaCl infusion, , Intravenous, PRN, Hussein Merino MD, Last Rate: 5 mL/hr at 12/10/23 1928, Rate Verify at 12/10/23 1928    acetaminophen tablet 650 mg, 650 mg, Oral, Q4H PRN, Hussein Merino MD, 650 mg at 23 1208    aluminum-magnesium hydroxide-simethicone 200-200-20 mg/5 mL suspension 30 mL, 30 mL, Oral, QID PRN,  Hussein Merino MD    bisacodyL suppository 10 mg, 10 mg, Rectal, Daily PRN, Hussein Merino MD    chlorproMAZINE injection 25 mg, 25 mg, Intramuscular, QID PRN, Dalton Palacios MD, 25 mg at 12/05/23 1102    ciprofloxacin (CIPRO)400mg/200ml D5W IVPB 400 mg, 400 mg, Intravenous, Q24H, Nathan Carlisle MD, Stopped at 12/18/23 1900    dicyclomine injection 20 mg, 20 mg, Intramuscular, QID PRN, Dalton Palacios MD, 20 mg at 11/30/23 1754    enoxaparin injection 100 mg, 1 mg/kg, Subcutaneous, Q24H (treatment, non-standard time), Mukesh Cole MD, 100 mg at 12/18/23 2336    hydrALAZINE injection 10 mg, 10 mg, Intravenous, Q6H PRN, Kenney Steiner MD    hydrocortisone suppository 25 mg, 25 mg, Rectal, BID, Alexandra Fry PA, 25 mg at 12/19/23 0957    hyoscyamine ODT 0.125 mg, 0.125 mg, Sublingual, Q4H PRN, Sara Waldrop AGACNP-BC, 0.125 mg at 11/30/23 1735    melatonin tablet 6 mg, 6 mg, Oral, Nightly PRN, Hussein Merino MD, 6 mg at 12/18/23 2334    metoclopramide HCl injection 5 mg, 5 mg, Intravenous, Q6H PRN, Dalton Palacios MD, 5 mg at 12/13/23 0453    metoprolol injection 5 mg, 5 mg, Intravenous, Q4H PRN, Genaro Crawford FNP, 5 mg at 12/08/23 0329    metoprolol injection 5 mg, 5 mg, Intravenous, Q6H, Mariano Bermudez MD, 5 mg at 12/19/23 0555    metoprolol tartrate (LOPRESSOR) tablet 25 mg, 25 mg, Oral, BID, Mariano Bermudez MD, 25 mg at 12/19/23 0957    micafungin 100 mg in sodium chloride 0.9 % 100 mL IVPB (MB+), 100 mg, Intravenous, Q24H, Mukesh Cole MD, Stopped at 12/18/23 2251    midodrine tablet 10 mg, 10 mg, Oral, TID WM, Dalton Palacios MD, 10 mg at 12/19/23 0957    morphine injection 4 mg, 4 mg, Intravenous, Q4H PRN, Hussein Merino MD, 4 mg at 12/02/23 0217    ondansetron injection 4 mg, 4 mg, Intravenous, Q4H PRN, Dalton Palacios MD, 4 mg at 12/16/23 1912    oxyCODONE immediate release tablet 5 mg, 5 mg, Oral, Q6H PRN, Hussein Merino MD, 5 mg at 12/04/23 2023    pantoprazole injection 40 mg, 40 mg,  Intravenous, BID WM, Dalton Palacios MD, 40 mg at 12/19/23 0957    perflutren lipid microspheres injection 1.3 mL, 1.3 mL, Intravenous, Once, Dalton Palacios MD    polyethylene glycol packet 17 g, 17 g, Oral, BID PRN, Hussein Merino MD    prochlorperazine injection Soln 5 mg, 5 mg, Intravenous, Q6H PRN, Hussein Merino MD, 5 mg at 11/22/23 1347    promethazine injection 25 mg, 25 mg, Intramuscular, Q4H PRN, Lyssa Car AGACNP-BC, 25 mg at 11/23/23 1620    senna-docusate 8.6-50 mg per tablet 2 tablet, 2 tablet, Oral, BID PRN, Hussein Merino MD    sodium bicarbonate tablet 1,300 mg, 1,300 mg, Oral, Daily, Maryanne Moise S, AGNP, 1,300 mg at 12/19/23 0957    sodium chloride 0.9% bolus 250 mL 250 mL, 250 mL, Intravenous, PRN, Maryanne Moise S, AGNP    sodium chloride 0.9% flush 10 mL, 10 mL, Intravenous, PRN, Hussein Merino MD    Flushing PICC/Midline Protocol, , , Until Discontinued **AND** sodium chloride 0.9% flush 10 mL, 10 mL, Intravenous, Q6H, 10 mL at 12/19/23 0555 **AND** sodium chloride 0.9% flush 10 mL, 10 mL, Intravenous, PRN, Kenney Steiner MD, 10 mL at 12/18/23 0505    Vital Signs (24 h):  Temp:  [98 °F (36.7 °C)-100.3 °F (37.9 °C)] 98.1 °F (36.7 °C)  Pulse:  [] 98  Resp:  [16-24] 18  SpO2:  [98 %-100 %] 100 %  BP: (100-109)/(69-76) 103/72   I/O last 3 completed shifts:  In: 30 [NG/GT:30]  Out: 455 [Drains:455]  No intake/output data recorded.        Physical Exam:  General: ill-appearing  HEENT: NC O2  CVS: tachycardic  RS: tachypneic  Abdominal: Soft, NT/ND.  Extremities: 3+ edema b/l LE  Psych: Normal affect  Dialysis Access: None    Results:    Lab Results   Component Value Date     (L) 12/19/2023    K 4.3 12/19/2023     04/24/2018    CO2 18 (L) 12/19/2023    BUN 82.4 (H) 12/19/2023    CREATININE 6.24 (H) 12/19/2023     (H) 04/24/2018    CALCIUM 8.6 12/19/2023    PHOS 3.3 12/16/2023    WBC 24.35 (H) 12/19/2023    WBC 24.35 12/19/2023    HGB 9.3 (L) 12/19/2023    HCT 28.2 (L)  12/19/2023    PLT 1,014 () 12/19/2023       Assessment and Plan:      POORNIMA 2/2 obstructive uropathy requiring HD.  Leukocytosis, apparently non-infectious.  Hospital course complicated by fungemia.  Pt with POORNIMA 2/2 obstructive uropathy requiring HD initiation on 11/28/23. Pt is expected to require long-term dialysis per nephrology team. Pt with right IJ vein temporary HD catheter transitioned to tunneled catheter on 12/6/23. Pt developed fungemia followed by removal of her catheter. Pt will require continues dialysis and now has negative blood culture. Interventional nephrology was consulted for placement of a tunneled catheter.  - Plan for procedure in cath lab setting tomorrow AM if cultures remain negative.  - NPO after midnight.    Thank you for your consult. Please feel free to reach me with any questions.  Plan for follow-up tomorrow.    Satinder Luo DO  Interventional Nephrology  Cell: [phone]

## 2023-12-19 NOTE — PROGRESS NOTES
[facility]  Hospital Medicine Progress Note        Chief Complaint: Inpatient Follow-up for intractable nausea vomiting due to gastric adenocarcinoma     HPI:   58-year-old female with medical history of hypertension who recently underwent laparoscopic cholecystectomy on 11/10/2023, procedure was incomplete and was unable to completely resect the gallbladder.  Since surgery she continued to have right flank/back pain and followed up with her PCP and CT abdomen and pelvis on 11/17/2023 revealed left-sided hydronephrosis with suspected distal obstruction/no clear stone visualized, postoperative changes of cholecystectomy with fluid in the gallbladder fossa may reflect postoperative seroma but biloma can not be entirely excluded, also noted for marked thickening of the stomach wall diffusely may be related to mesenteric edema/reactive.  She presented to [facility] ED the same day 11/17/2023 and her labs notable for WBC 9.0, hemoglobin 12.4, platelets 442, creatinine 0.86, total bilirubin 8.7 with direct fraction 6.7, alkaline phosphatase 491, , .  Patient's surgeon was consulted and recommended ERCP which was not available at [facility] for which she was transferred to [facility] and referred to hospital medicine service for further evaluation and management. 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  Severe inflammation and oozing of blood noted proximal gastric lumen.  Unable to advance scope into the duodenum. Surgical oncology consulted, IR consulted for external drain placement which was done November 21.  November 24th she developed new onset atrial fibrillation with RVR and Cardiology consulted. Started on amiodarone drip. Unfortunately patient had acute blood loss due to hemorrhage from the midline site that was removed. Patient was unaware of the bleed.  Became very weak, passed out.  Code was called but she came around.   Stat H&H done which was slightly lower but stable, MRI of the brain was negative for any acute ischemic changes. Pt is aware of gastric cancer diagnosis. GI following--> 11/18- EGD shows normal esophagus, malignant gastric tumor in the cardia.  Inflamed mucosa and ooze of blood throughout the proximal gastric lumen.  Gastric antrum scar would not allow advancement of scope into the duodenal ampulla area therefore biliary cannulation was not possible for bile leak evaluation. Pathology shows marked chronic gastritis with intestinal metaplasia, gastric cardia biopsy shows adenocarcinoma, moderately differentiated intestinal type. Bilateral hydronephrosis with left greater than right. MRI brain without contrast-negative for acute finding. PET scan reviewed with patient by Oncology reports of locally advanced gastric adenocarcinoma and plans for systemic therapy outpatient if functional, nutritional and renal function improves. MediPort placement by surgical Oncology on 12/1, oncology on board plans for systemic therapy outpatient if functional, nutritional and renal function improves. Obstructive uropathy with bilateral hydronephrosis status post bilateral ureteral stent placed on 11/27 by Urology- no improvement in renal function, patient opted to have hemodialysis and a right-sided hemodialysis catheter was placed by General surgery on 11/29, to continue hemodialysis per nephrology discretion. Patient with symptoms of nausea and vomiting can not keep anything down in her stomach complicated by severe malnutrition on IV Clinimix and supportive medications for nausea and vomiting. Palliative care consulted. Patient got a MediPort and J-tube placed on 12/01. Cystogram reviewed by Urology with patent stents and recommends outpatient follow-up with Urology. White cell count elevated at 20.4, Infectious Disease had started on IV Zosyn given concern for possible sepsis with low blood pressure on 12/02. [facility]IS evaluated patient  to begin low-dose metoprolol tartrate at 12.5 mg b.i.d. and resume Eliquis for stroke prevention. On TF via J tube which was placed by Surgical Oncology. ID started IV Micafungin for candidemia. Noted to have persistent fungemia on 1/2 BCX from 12/11.  Consulted CIS for GAYLA; report pending. General Surgery consulted to remove HD line & Mediport. Patient spiked a fever overnight on 12/15; BCX repeated by ID which are negative.  Afebrile since 12/14. General Surgery removed mediport & tunneled catheter after HD on 12/16. J Tube replaced by Surgical Oncology on 12/18 after it became clogged.    Interval Hx:   Today, Mrs Briggs reported improvement in pain around J tube site since yesterday. Resumed TF after J tube was replaced yesterday. Patient to receive Tunneled Catheter today & to be initiated on HD again today. Worsening leukocytosis noted as well as worsening thrombocytosis. CXR ordered yesterday for SOB showed some hilar congestion likely from fluid overload; will wait for patient to be dialyzed. IV Ciprofloxacin added by ID. HR better controlled today.    Objective/physical exam:  General: alert lady lying comfortably in bed, in no acute distress  HENT: oral and oropharyngeal mucosa moist, pink, with no erythema or exudates, no ear pain or discharge  Neck: normal neck movement, no lymph nodes or swellings, no JVD or Carotid bruit  Respiratory: clear breathing sounds bilaterally, no crackles, rales, ronchi or wheezes  Cardiovascular: clear S1 and S2, no murmurs, rubs or gallops  Peripheral Vascular: no lesions, ulcers or erosions, normal peripheral pulses; 2+ pitting pedal edema  Gastrointestinal: J tube in place LUQ; soft, non-tender, non-distended abdomen, no guarding, rigidity or rebound tenderness, normal bowel sounds  Integumentary: normal skin color, no rashes or lesions  Neuro: AAO x 3; motor strength 5/5 in B/L UEs & LEs; sensation intact to gross and fine touch B/L; CN II-XII grossly intact    VITAL  SIGNS: 24 HRS MIN & MAX LAST   Temp  Min: 98 °F (36.7 °C)  Max: 100.3 °F (37.9 °C) 98.1 °F (36.7 °C)   BP  Min: 100/71  Max: 109/76 103/72   Pulse  Min: 98  Max: 120  98   Resp  Min: 16  Max: 24 18   SpO2  Min: 98 %  Max: 100 % 100 %     I reviewed the labs below:  Recent Labs   Lab 12/17/23  0844 12/18/23  0440 12/19/23  0444   WBC 20.09  20.09* 20.66* 24.35  24.35*   RBC 3.20* 3.44* 3.11*   HGB 9.5* 10.4* 9.3*   HCT 28.3* 30.5* 28.2*   MCV 88.4 88.7 90.7   MCH 29.7 30.2 29.9   MCHC 33.6 34.1 33.0   RDW 17.2* 17.2* 17.2*   * 890* 1,014*   MPV 9.9 10.2 9.9       Recent Labs   Lab 12/14/23  1214 12/15/23  1047 12/16/23  0414 12/17/23  0844 12/18/23  0440 12/19/23  0444   * 136   < > 133* 133* 133*   K 3.4* 3.3*   < > 3.4* 4.3 4.3   CO2 21* 23   < > 19* 19* 18*   BUN 68.6* 48.7*   < > 51.4* 63.8* 82.4*   CREATININE 5.54* 4.56*   < > 4.26* 5.20* 6.24*   CALCIUM 8.8 8.1*   < > 8.4 8.8 8.6   MG 2.30 2.10  --   --  2.30  --    ALBUMIN 1.5* 1.5*   < > 1.6* 1.7* 1.6*   ALKPHOS 156* 184*  --  207* 162* 160*   ALT 53 52  --  49 51 47   AST 46* 42*  --  37* 42* 37*   BILITOT 4.3* 3.5*  --  2.8* 3.2* 2.8*    < > = values in this interval not displayed.       Assessment/Plan:  Hypotension, improved with Midodrine  Persistent leukocytosis likely 2/2 Fungemia  Acute on chronic anemia, s/p 2 units prbcs transfusion 11/30  Hx of Acute Blood loss anemia with syncope and then fall 11/26-   Locally advanced gastric adenocarcinoma with intractable nausea and vomiting possible Gastric/duodenal outlet obstruction s/p J Tube placement  J Tube Obstruction  Fungemia possibly 2/2 Biliary Drain Infection  Obstructive uropathy with bilateral hydronephrosis- S/P bilateral ureteral stent placed on 11/27  POORNIMA-now on HD - 11/29  Suspected bile leak with biloma and biliary obstruction--> H/O Laparoscopic incomplete cholecystectomy 11/10/2023  New onset atrial fibrillation with RVR- fluctuating  History of hypertension and  DDD/sciatica  Severe malnutrition  Leukocytosis likely 2/2 Fungemia  Tachycardia 2/2 IV Depletion vs Sepsis  Thrombocytosis possibly reactive to Sepsis    Patient continues to be admitted  Reporting improvement in J tube site pain  J Tube replaced by Surg Oncology on 12/18 after it became clogged  Resumed TF  Consulted Hematology for worsening thrombocytosis; Plt 1014 today  Underwent GAYLA on 12/14 with report negative for vegetations  On IV Micafungin  ID on-board; follow recs  ID added IV Ciprofloxacin to cover for possible biliary drain related infectino/cholangitis given worsening leukocytosis  Nephrology on-board for HD conduction  Patient to get new tunneled catheter to resume HD  Surgical Oncology on-board; follow recs  PICC line removed  Patient had MediPort and J-tube placed on 12/01; General Surgery removed HD catheter & Mediport on 12/16  Patient underwent biliary drain exchange on 12/13 per ID recs  Blood Cultures 12/10 positive for candida glabrata in the blood 1/2; Repeat BCX 12/11 positive for yeasts; BCX 12/12 negative; BCX repeated on 12/15 d/t fevers which are negative x 72 hrs; BCX from 12/17 negative x 48 hrs  Palliative Care consulted earlier; patient wishes to continue pursuing curative treatment for cancer  Cystogram reviewed by Urology with patent stents and recommends outpatient follow-up with Urology  Appreciate Oncology input; plans for systemic therapy outpatient if renal function improves, patient is planning to pursue treatment   Continued on Hydrocortisone Supp, Metoprolol Tartrate BID, Midodrine TID, Protonix BID, Sodium Bicarbonate     VTE prophylaxis:  FD Lovenox     Patient condition:  Stable     Anticipated discharge and Disposition:  Pending    All diagnosis and differential diagnosis have been reviewed; assessment and plan has been documented; I have personally reviewed the labs and test results that are presently available; I have reviewed the patients medication list; I have  reviewed the consulting providers response and recommendations. I have reviewed or attempted to review medical records based upon their availability    All of the patient's questions have been  addressed and answered. Patient's is agreeable to the above stated plan. I will continue to monitor closely and make adjustments to medical management as needed.  _____________________________________________________________________    Nutrition Status:  Patient meets ASPEN criteria for severe malnutrition of acute illness or injury per RD assessment as evidenced by:  Energy Intake (Malnutrition):  (does not meet criteria)  Weight Loss (Malnutrition): greater than 7.5% in 3 months  Subcutaneous Fat (Malnutrition): moderate depletion  Muscle Mass (Malnutrition): moderate depletion           A minimum of two characteristics is recommended for diagnosis of either severe or non-severe malnutrition.   Radiology:   I have personally reviewed the images and agree with radiologist report  XR Gastric tube check, non-radiologist performed  Narrative: EXAMINATION:  XR GASTRIC TUBE CHECK, NON-RADIOLOGIST PERFORMED    CLINICAL HISTORY:  J tube study;    COMPARISON:  29 November 2023    FINDINGS:  Frontal image of the upper abdomen.  Jejunostomy catheter overlies the left abdomen.  Enteric contrast is seen within proximal small bowel loops.  There are bilateral ureteral stents.  There is a biliary catheter as well.  Impression: As above.    Electronically signed by: Wei Wright  Date:    12/18/2023  Time:    14:05  X-Ray Chest 1 View  Narrative: EXAMINATION:  XR CHEST 1 VIEW    CLINICAL HISTORY:  SOB;, .    FINDINGS:  No alveolar consolidation, effusion, or pneumothorax is seen.   The thoracic aorta is normal  cardiac silhouette, central pulmonary vessels and mediastinum are normal in size and are grossly unremarkable.   visualized osseous structures are grossly unremarkable.  Impression: No acute chest disease is  identified.    Electronically signed by: Elvin Bass  Date:    12/18/2023  Time:    13:35      Mariano Bermudez MD  Department of Hospital Medicine   [facility]   12/19/2023

## 2023-12-19 NOTE — PROGRESS NOTES
Patient Name: Karen Briggs   MRN: [id number]   Admission Date: 11/17/2023   Hospital Length of Stay: 32   Attending Provider: Mariano Bermudez MD   Consulting Provider: Viktor Landa M.D.  Reason for Consult: Goals of Care  Primary Care Physician: Marian White FNP-C     Principal Problem: <principal problem not specified>     Patient information was obtained from patient, spouse/SO, ER records, and primary team.      Final diagnoses:  [N13.30] Hydronephrosis     Assessment/Plan:     I reviewed the patient's current clinical status with the nurse. We reviewed clinical documentation, labs and imaging.     Symptom management review: The patient admits to intermittent pain and nausea but denied the need for any changes to her symptom meds. She also has not taken any in a number of days. She appears quite withdrawn (as also noted by her attending physician). We talked about how she has been through a lot in the last 3 weeks with fungemia, removal of tunneled catheter, GAYLA, removal and replacement of j-tube and now replacement of tunneled cathter. I asked if she felt depressed and anxious, she said yes. She also admits to trouble sleeping.     I offered to make some changes to her medication regimen, but I now note that the attending physician has consulted psychiatry, therefore I will defer selection of treatment to that service for now.    I spoke with the attending physician about our concern that the patient has been through so much, has become weak and despondent. If she becomes stable enough for discharge, she will likely need to attend a rehab before the option to begin neoadjuvant chemo and surgery (as is planned for potential cure of her gastric cancer). I worry that if her condition does not improve and with now ESRD, she may not be a candidate for this aggressive treatment. We will continue to follow her for support and symptom management review.    History of Present Illness:     57 y/o F h/o  recent Lap grace with incomplete removal, subsequent admission with acute biliary obstruction, transaminitis, s/p biliary drain, bilateral ureteral obstruction s/p bilateral ureteral stents and N&V with findings of a GOO, requiring TPN due to NPO status. She had EGD with biopsy and findings of adenocarcinoma of the stomach. She is pending PEG tomorrow for staging and subsequent further surgery and medical treatment options to be discussed. We were consulted to review goals of care.      Active Ambulatory Problems     Diagnosis Date Noted    No Active Ambulatory Problems     Resolved Ambulatory Problems     Diagnosis Date Noted    No Resolved Ambulatory Problems     Past Medical History:   Diagnosis Date    Hypertension     Sciatica         Past Surgical History:   Procedure Laterality Date    CARPAL TUNNEL RELEASE Left     CYSTOSCOPY W/ URETERAL STENT PLACEMENT Bilateral 11/27/2023    Procedure: CYSTOSCOPY, WITH URETERAL STENT INSERTION;  Surgeon: Huey Cardenas MD;  Location: [facility] OR;  Service: Urology;  Laterality: Bilateral;    EGD, WITH CLOSED BIOPSY  11/18/2023    Procedure: EGD, WITH CLOSED BIOPSY;  Surgeon: Alfred Goss MD;  Location: [facility] OR;  Service: Gastroenterology;;    ERCP N/A 11/18/2023    Procedure: ERCP (ENDOSCOPIC RETROGRADE CHOLANGIOPANCREATOGRAPHY);  Surgeon: Alfred Goss MD;  Location: [facility] OR;  Service: Gastroenterology;  Laterality: N/A;    HEMORRHOID SURGERY      INSERTION OF TUNNELED CENTRAL VENOUS CATHETER (CVC) WITH SUBCUTANEOUS PORT N/A 12/1/2023    Procedure: INSERTION, PORT-A-CATH;  Surgeon: William Morse MD;  Location: [facility] OR;  Service: General;  Laterality: N/A;    INSERTION OF TUNNELED CENTRAL VENOUS HEMODIALYSIS CATHETER N/A 12/6/2023    Procedure: Insertion, Catheter, Central Venous, Hemodialysis;  Surgeon: Satinder Luo DO;  Location: [facility] CATH LAB;  Service: Nephrology;  Laterality: N/A;    LAPAROSCOPIC INSERTION OF JEJUNOSTOMY TUBE N/A 12/1/2023     Procedure: INSERTION, JEJUNOSTOMY TUBE, LAPAROSCOPIC;  Surgeon: William Morse MD;  Location: [facility] OR;  Service: General;  Laterality: N/A;  PLUS MEDIPORT        Review of patient's allergies indicates:  No Known Allergies       Current Facility-Administered Medications:     0.9%  NaCl infusion (for blood administration), , Intravenous, Q24H PRN, Almaz Cheney, FNP    0.9%  NaCl infusion (for blood administration), , Intravenous, Q24H PRN, Maryanne Moise, AGNP    0.9%  NaCl infusion, , Intravenous, PRN, Hussein Merino MD, Last Rate: 5 mL/hr at 12/10/23 1928, Rate Verify at 12/10/23 1928    acetaminophen tablet 650 mg, 650 mg, Oral, Q4H PRN, Hussein Merino MD, 650 mg at 12/17/23 1208    aluminum-magnesium hydroxide-simethicone 200-200-20 mg/5 mL suspension 30 mL, 30 mL, Oral, QID PRN, Hussein Merino MD    bisacodyL suppository 10 mg, 10 mg, Rectal, Daily PRN, Hussein Merino MD    chlorproMAZINE injection 25 mg, 25 mg, Intramuscular, QID PRN, Dalton Palacios MD, 25 mg at 12/05/23 1102    ciprofloxacin (CIPRO)400mg/200ml D5W IVPB 400 mg, 400 mg, Intravenous, Q24H, Nathan Carlisle MD, Stopped at 12/18/23 1900    dicyclomine injection 20 mg, 20 mg, Intramuscular, QID PRN, Dalton Palacios MD, 20 mg at 11/30/23 1754    enoxaparin injection 100 mg, 1 mg/kg, Subcutaneous, Q24H (treatment, non-standard time), Mukesh Cole MD, 100 mg at 12/18/23 2336    hydrALAZINE injection 10 mg, 10 mg, Intravenous, Q6H PRN, Kenney Steiner MD    hydrocortisone suppository 25 mg, 25 mg, Rectal, BID, Alexandra Fry PA, 25 mg at 12/19/23 0957    hyoscyamine ODT 0.125 mg, 0.125 mg, Sublingual, Q4H PRN, Sara Waldrop, AGACNP-BC, 0.125 mg at 11/30/23 1735    melatonin tablet 6 mg, 6 mg, Oral, Nightly PRN, Hussein Merino MD, 6 mg at 12/18/23 2334    metoclopramide HCl injection 5 mg, 5 mg, Intravenous, Q6H PRN, Dalton Palacios MD, 5 mg at 12/13/23 0453    metoprolol injection 5 mg, 5 mg, Intravenous, Q4H PRN, Genaro Crawford FNJYOTI, 5 mg at  12/08/23 0329    metoprolol injection 5 mg, 5 mg, Intravenous, Q6H, Mariano Bermudez MD, 5 mg at 12/19/23 0555    metoprolol tartrate (LOPRESSOR) tablet 25 mg, 25 mg, Oral, BID, Mariano Bermudez MD, 25 mg at 12/19/23 0957    micafungin 100 mg in sodium chloride 0.9 % 100 mL IVPB (MB+), 100 mg, Intravenous, Q24H, Mukesh Cole MD, Stopped at 12/18/23 2251    midodrine tablet 10 mg, 10 mg, Oral, TID WM, Dalton Palacios MD, 10 mg at 12/19/23 0957    morphine injection 4 mg, 4 mg, Intravenous, Q4H PRN, Hussein Merino MD, 4 mg at 12/02/23 0217    ondansetron injection 4 mg, 4 mg, Intravenous, Q4H PRN, Dalton Palacios MD, 4 mg at 12/16/23 1912    oxyCODONE immediate release tablet 5 mg, 5 mg, Oral, Q6H PRN, Hussein Merino MD, 5 mg at 12/04/23 2023    pantoprazole injection 40 mg, 40 mg, Intravenous, BID WM, Dalton Palacios MD, 40 mg at 12/19/23 0957    perflutren lipid microspheres injection 1.3 mL, 1.3 mL, Intravenous, Once, Dalton Palacios MD    polyethylene glycol packet 17 g, 17 g, Oral, BID PRN, Hussein Merino MD    prochlorperazine injection Soln 5 mg, 5 mg, Intravenous, Q6H PRN, Hussein Merino MD, 5 mg at 11/22/23 1347    promethazine injection 25 mg, 25 mg, Intramuscular, Q4H PRN, Lyssa Car AGACNP-BC, 25 mg at 11/23/23 1620    senna-docusate 8.6-50 mg per tablet 2 tablet, 2 tablet, Oral, BID PRN, Hussein Merino MD    sodium bicarbonate tablet 1,300 mg, 1,300 mg, Oral, Daily, Moise, Maryanne S, AGNP, 1,300 mg at 12/19/23 0957    sodium chloride 0.9% bolus 250 mL 250 mL, 250 mL, Intravenous, PRN, Maryanne Moise S, AGNP    sodium chloride 0.9% flush 10 mL, 10 mL, Intravenous, PRN, Hussein Merino MD    Flushing PICC/Midline Protocol, , , Until Discontinued **AND** sodium chloride 0.9% flush 10 mL, 10 mL, Intravenous, Q6H, 10 mL at 12/19/23 0555 **AND** sodium chloride 0.9% flush 10 mL, 10 mL, Intravenous, PRN, Kenney Steiner MD, 10 mL at 12/18/23 0505     0.9%  NaCl infusion (for blood administration), 0.9%  NaCl  "infusion (for blood administration), sodium chloride 0.9%, acetaminophen, aluminum-magnesium hydroxide-simethicone, bisacodyL, chlorproMAZINE, dicyclomine, hydrALAZINE, hyoscyamine, melatonin, metoclopramide, metoprolol, morphine, ondansetron, oxyCODONE, polyethylene glycol, prochlorperazine, promethazine, senna-docusate 8.6-50 mg, sodium chloride 0.9%, sodium chloride 0.9%, Flushing PICC/Midline Protocol **AND** sodium chloride 0.9% **AND** sodium chloride 0.9%     Family History   Problem Relation Age of Onset    Breast cancer Mother     Cancer Maternal Aunt         colon cancer        Review of Systems   Constitutional:  Positive for activity change and fatigue.   HENT:  Negative for sore throat and trouble swallowing.    Respiratory:  Negative for shortness of breath.    Cardiovascular:  Negative for leg swelling.   Gastrointestinal:  Negative for abdominal pain, constipation and nausea.   Genitourinary:  Negative for difficulty urinating.   Musculoskeletal:  Negative for arthralgias and myalgias.   Psychiatric/Behavioral:  Positive for sleep disturbance. Negative for agitation and confusion. The patient is nervous/anxious.             Objective:   /72   Pulse 98   Temp 98.1 °F (36.7 °C) (Oral)   Resp 18   Ht 5' 5.75" (1.67 m)   Wt 96.2 kg (212 lb)   SpO2 100%   Breastfeeding No   BMI 34.48 kg/m²      Physical Exam  Vitals reviewed.   Constitutional:       General: She is not in acute distress.     Appearance: She is ill-appearing. She is not toxic-appearing.   HENT:      Head: Normocephalic.      Right Ear: External ear normal.      Left Ear: External ear normal.      Nose: Nose normal.      Mouth/Throat:      Mouth: Mucous membranes are moist.      Pharynx: Oropharynx is clear.   Eyes:      Extraocular Movements: Extraocular movements intact.      Conjunctiva/sclera: Conjunctivae normal.      Pupils: Pupils are equal, round, and reactive to light.   Cardiovascular:      Rate and Rhythm: Normal " rate and regular rhythm.      Pulses: Normal pulses.      Heart sounds: Normal heart sounds.   Pulmonary:      Effort: Pulmonary effort is normal.      Breath sounds: Normal breath sounds.   Abdominal:      General: There is no distension.      Tenderness: There is no abdominal tenderness. There is no guarding.   Musculoskeletal:      Right lower leg: Edema present.      Left lower leg: Edema present.   Skin:     General: Skin is warm.   Neurological:      General: No focal deficit present.      Mental Status: She is alert and oriented to person, place, and time.   Psychiatric:         Mood and Affect: Mood normal.         Behavior: Behavior normal.         Thought Content: Thought content normal.         Judgment: Judgment normal.            Review of Symptoms  Review of Symptoms      Symptom Assessment (ESAS 0-10 Scale)  Pain:  0  Dyspnea:  0  Anxiety:  0  Nausea:  0  Depression:  0  Anorexia:  0  Fatigue:  0  Insomnia:  0  Restlessness:  0  Agitation:  0     CAM / Delirium:  Negative  Constipation:  Negative  Diarrhea:  Negative    Anxiety:  Is nervous/anxious  Constipation:  No constipation    Bowel Management Plan (BMP):  Yes      Pain Assessment:  OME in 24 hours:  0  Location(s):      Modified [place] Scale:  0    Performance Status:  40    Living Arrangements:  Lives with spouse and Lives in home    Psychosocial/Cultural:   See Palliative Psychosocial Note: No  See assessement  **Primary  to Follow**  Palliative Care  Consult: No    Spiritual:  F - Sheryl and Belief:  Yarsani  A - Address in Care:  Pt elects to have  visit     Time-Based Charting:  Yes    Total Time Spent: 0 minutes      Advance Care Planning   Advance Directives:   Living Will: No    LaPOST: No    Do Not Resuscitate Status: No    Medical Power of : No    Agent's Name:  Mango Zepeda   Agent's Contact Number:  [phone]    Decision Making:  Patient answered questions and Family answered  questions  Goals of Care: What is most important right now is to focus on remaining as independent as possible, extending life as long as possible, even it it means sacrificing quality. Accordingly, we have decided that the best plan to meet the patient's goals includes continuing with treatment.            Caregiver burden formerly assessed: Yes        > 50% of 18 min of encounter was spent in chart review, face to face discussion of goals of care, symptom assessment, coordination of care and emotional support.     Viktor Landa M.D.  Palliative Medicine  [facility] - Observation Unit

## 2023-12-19 NOTE — PROGRESS NOTES
Nephrology consult follow up note    HPI:      Karen Briggs is a 58 y.o. female presented on  7 days post op from laparoscopic cholecystectomy on 11/10. Gallbladder was unable to be resected. Back and flank pain persisted post operatively prompting evaluation at [facility] ER. After workup her surgeon recommended ERCP for which she was sent to this facility. Left sided hydronephrosis noted on CT scans done on admission. At that time renal function was fairly normal and patient was given option for stenting or to defer as outpatient and she chose the latter.      During ERCP, malignant gastric mass noted and ERCP was unable to be completed due to duodenal scarring. 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 Patient ultimately had biliary drain placed per IR.      She developed A fib with RVR requiring amiodarone infusion. She then had significant blood loss post removal of long term IV and subsequent fall in her room.      Patient renal function was relatively stable until decline starting 11/25. She was ultimately initiated on HD 11/28 post bilateral ureteral stent placement with Dr Cardenas same day.      Patient developed dialysis dependent acute kidney injury, and was started on hemodialysis 11/29/2023.  Tunneled dialysis catheter was placed on 12/06/2023.  Dialysis catheter had to be removed on 12/16/2023 after dialysis due to persistent fungemia.    Interval history:     No acute events overnight.  Blood cultures have been negative.  Patient is still having significant lower extremity edema.  She remains anuric.  No chest pain, abdominal pain, nausea, vomiting.     Review of Systems:       Past medical, family, surgical, and social history reviewed and unchanged from initial consult note.     Objective:       VITAL SIGNS: 24 HR MIN & MAX LAST    Temp  Min: 98 °F (36.7 °C)  Max: 100.3 °F (37.9 °C)  98.5 °F (36.9 °C)        BP  Min: 100/71  Max: 109/76  109/76     Pulse  Min: 112  Max: 123  (!) 112      Resp  Min: 16  Max: 24  18    SpO2  Min: 98 %  Max: 100 %  99 %      GEN: Well appearing AAF  CV: RRR +S1,S2 without murmur  PULM: CTAB, unlabored  ABD: Soft, NT/ND abdomen with NABS  EXT: 3+ BLE edema  SKIN: Warm and dry  PSYCH: Awake, alert and appropriately conversant.   Dialysis access:  No dialysis access          Component Value Date/Time     (L) 12/19/2023 0444     (L) 12/18/2023 0440     04/24/2018 0340    K 4.3 12/19/2023 0444    K 4.3 12/18/2023 0440    K 3.2 (L) 04/24/2018 0340    CHLORIDE 100 12/19/2023 0444    CHLORIDE 101 12/18/2023 0440    CO2 18 (L) 12/19/2023 0444    CO2 19 (L) 12/18/2023 0440    CO2 20 (L) 04/24/2018 0340    BUN 82.4 (H) 12/19/2023 0444    BUN 63.8 (H) 12/18/2023 0440    BUN 10 04/24/2018 0340    CREATININE 6.24 (H) 12/19/2023 0444    CREATININE 5.20 (H) 12/18/2023 0440    CREATININE 0.7 04/24/2018 0340    CALCIUM 8.6 12/19/2023 0444    CALCIUM 8.8 12/18/2023 0440    CALCIUM 9.2 04/24/2018 0340    PHOS 3.3 12/16/2023 0414            Component Value Date/Time    WBC 24.35 (H) 12/19/2023 0444    WBC 24.35 12/19/2023 0444    WBC 20.66 (H) 12/18/2023 0440    WBC 20.09 12/17/2023 0844    WBC 12.03 04/24/2018 0340    HGB 9.3 (L) 12/19/2023 0444    HGB 10.4 (L) 12/18/2023 0440    HGB 11.3 (L) 04/24/2018 0340    HCT 28.2 (L) 12/19/2023 0444    HCT 30.5 (L) 12/18/2023 0440    HCT 35.3 (L) 04/24/2018 0340    PLT 1,014 (HH) 12/19/2023 0444     (H) 12/18/2023 0440     04/24/2018 0340         Imaging reviewed      Assessment / Plan:       There are no hospital problems to display for this patient.      POORNIMA multifactorial secondary to obstructive uropathy, acute blood loss, contrast exposure, hypotension and Afib with RVR  ---Nephrotic range proteinuria noted. 4.4 g  ---Dialysis initiated 11/28 post tunneled catheter placement 12/6  Newly diagnosed malignant gastric mass. Pathology consistent with adenocarcinoma   -s/p J tube and mediport placement 12/1  Acute  blood loss anemia 2/2 bleeding post long term IV removal   --s/p transfusion 11/30, 12/1, 12/10  Bilateral hydronephrosis noted 11/21 - stent placement initially deferred due to stable renal function   Afib with RVR resolved   S/p biliary drain placement and subsequent exchange due to fungemia   Fungemia - blood cultures positive 12/10, 12/11     Plan:  Blood cultures negative x5 days.  Infectious Disease has cleared patient for replacement of tunneled dialysis catheter for re-initiation of hemodialysis.  I spoke with Dr. Moreno he said that they can put a tunneled dialysis catheter in her tomorrow.  We will plan to restart dialysis tomorrow and likely dialyze Wednesday and Thursday for fluid removal and lower extremity edema.    Yahir Black DO  Nephrology  [facility]  Clinic number: [phone]

## 2023-12-19 NOTE — HPI
Wound medicine consult    This 58-year-old female with a past medical history of Hypertension underwent a laparoscopic cholecystectomy on 11/10/23, which was unsuccessful as the gallbladder was unable to be completely resected. After the procedure, she continued with right flank and back pain, and seven days post-procedure, she followed up with PCP for evaluation, and subsequently sent to this facility for ERCP. Since admission to this facility on 11/17/23, she was diagnosed with gastric cancer and developed POORNIMA secondary to obstructive uropathy; she is now in ESRD and HD-dependent.   Her course is further complicated by persistent leukocytosis and infected PICC line and mediport sites (fungemia); ID consulted and guiding antibiotic stewardship; anemia with FOBT+; GI following.  During this hospitalization, due to current complex health issues, she has become deconditioned and unable to even reposition herself in bed or to shift pressure off her sacrum. She has developed a three-part unstageable pressure ulcer to her sacrum. Wounds to the sacrum were noted with images on 12/12/23 when [facility] began treatment of these wounds; wounds have since worsened and wound medicine was consulted today 12/19/23. I assessed the patient in her room, noted and took photos of the unstageable pressure ulcer. Explained to patient about need for offloading, repositioning, getting out of bed with therapy etc.   Patient is awake and alert with multiple family members at bedside, she confirms that she is unable to reposition herself and she is not getting out of bed due to decline from her prior function. She states she is getting up with therapy at times.

## 2023-12-19 NOTE — PROGRESS NOTES
[facility] - [facility] Medical Telemetry  Wound Care    Patient Name:  Karen Briggs   MRN:  [id number]  Date: 12/19/2023  Diagnosis: <principal problem not specified>    History:     Past Medical History:   Diagnosis Date    Hypertension     Sciatica        Social History     Socioeconomic History    Marital status:    Tobacco Use    Smoking status: Never    Smokeless tobacco: Never   Substance and Sexual Activity    Alcohol use: No    Drug use: No    Sexual activity: Never     Birth control/protection: Post-menopausal       Precautions:     Allergies as of 11/17/2023    (No Known Allergies)       [facility] Assessment Details/Treatment        12/19/23 0930        Altered Skin Integrity 12/12/23 1500 Right lateral Buttocks Partial thickness tissue loss. Shallow open ulcer with a red or pink wound bed, without slough. Intact or Open/Ruptured Serum-filled blister.   Date First Assessed/Time First Assessed: 12/12/23 1500   Altered Skin Integrity Present on Admission - Did Patient arrive to the hospital with altered skin?: suspected hospital acquired  Side: Right  Orientation: lateral  Location: Buttocks  Is this i...   Wound Image    Description of Altered Skin Integrity Partial thickness tissue loss. Shallow open ulcer with a red or pink wound bed, without slough. Intact or Open/Ruptured Serum-filled blister.   Dressing Appearance Dry;Intact;Clean   Drainage Amount None   Appearance Red;Pink;Moist   Tissue loss description Partial thickness   Periwound Area Intact   Wound Edges Irregular   Wound Length (cm) 0.8 cm   Wound Width (cm) 1.9 cm   Wound Depth (cm) 0.1 cm   Wound Volume (cm^3) 0.152 cm^3   Wound Surface Area (cm^2) 1.52 cm^2   Care Cleansed with:;Antimicrobial agent   Dressing Applied;Calcium alginate;Silver        Altered Skin Integrity 12/12/23 1500 Right medial Buttocks Partial thickness tissue loss. Shallow open ulcer with a red or pink wound bed, without slough. Intact or Open/Ruptured  Serum-filled blister.   Date First Assessed/Time First Assessed: 12/12/23 1500   Altered Skin Integrity Present on Admission - Did Patient arrive to the hospital with altered skin?: suspected hospital acquired  Side: Right  Orientation: medial  Location: Buttocks  Is this in...   Wound Image    Description of Altered Skin Integrity Partial thickness tissue loss. Shallow open ulcer with a red or pink wound bed, without slough. Intact or Open/Ruptured Serum-filled blister.   Dressing Appearance Dry;Intact;Clean   Drainage Amount Small   Drainage Characteristics/Odor Serosanguineous   Appearance Pink;Red;Moist   Tissue loss description Partial thickness   Periwound Area Intact   Wound Edges Irregular   Wound Length (cm) 2.5 cm   Wound Width (cm) 3.7 cm   Wound Depth (cm) 0.5 cm   Wound Volume (cm^3) 4.625 cm^3   Wound Surface Area (cm^2) 9.25 cm^2   Care Cleansed with:;Antimicrobial agent   Dressing Applied;Silver;Calcium alginate;Absorptive Pad        Altered Skin Integrity 12/12/23 1500 Sacral spine Partial thickness tissue loss. Shallow open ulcer with a red or pink wound bed, without slough. Intact or Open/Ruptured Serum-filled blister.   Date First Assessed/Time First Assessed: 12/12/23 1500   Altered Skin Integrity Present on Admission - Did Patient arrive to the hospital with altered skin?: suspected hospital acquired  Location: Sacral spine  Description of Altered Skin Integrity: P...   Wound Image    Description of Altered Skin Integrity Full thickness tissue loss. Base is covered by slough and/or eschar in the wound bed   Dressing Appearance Dry;Intact;Clean   Drainage Amount None   Appearance Pink;Red;Yellow;Eschar;Slough   Tissue loss description Full thickness   Periwound Area Intact;Moist   Wound Edges Irregular   Wound Length (cm) 3.3 cm   Wound Width (cm) 2.1 cm   Wound Depth (cm) 0.3 cm   Wound Volume (cm^3) 2.079 cm^3   Wound Surface Area (cm^2) 6.93 cm^2   Care Cleansed with:;Antimicrobial agent    Dressing Applied;Other (comment)  (Mesalt soaked in vashe)     Follow up visit of for the affected areas of the buttocks. Appears to be a hospital acquired pressure injury. Assessed, cleansed with soap and water, dressed the sacral wound with Mesalt soaked in vashe and the other areas with silver alginate to absorb the moisture. Secured in place with ABD pads and medipore tape. NO BRIEFS. Ordered the patient an immersion mattress. Educated the patient of the importance of turning and offloading the sacrum. Spoke to the assigned for further recommendations. Continue pressure ulcer prevention measures and local wound care orders. Will follow up.       12/19/2023

## 2023-12-20 LAB
ABS NEUT (OLG): 20.12 X10(3)/MCL (ref 2.1–9.2)
ALBUMIN SERPL-MCNC: 1.6 G/DL (ref 3.4–4.8)
ALBUMIN/GLOB SERPL: 0.3 RATIO (ref 1.1–2)
ALP SERPL-CCNC: 170 UNIT/L (ref 40–150)
ALT SERPL-CCNC: 46 UNIT/L (ref 0–55)
ANISOCYTOSIS BLD QL SMEAR: ABNORMAL
AST SERPL-CCNC: 41 UNIT/L (ref 5–34)
BACTERIA BLD CULT: NORMAL
BACTERIA BLD CULT: NORMAL
BASOPHILS NFR BLD MANUAL: 0.46 X10(3)/MCL (ref 0–0.2)
BASOPHILS NFR BLD MANUAL: 2 %
BILIRUB SERPL-MCNC: 2.8 MG/DL
BUN SERPL-MCNC: 97.8 MG/DL (ref 9.8–20.1)
CALCIUM SERPL-MCNC: 8.6 MG/DL (ref 8.4–10.2)
CHLORIDE SERPL-SCNC: 100 MMOL/L (ref 98–107)
CO2 SERPL-SCNC: 19 MMOL/L (ref 23–31)
CREAT SERPL-MCNC: 6.96 MG/DL (ref 0.55–1.02)
EOSINOPHIL NFR BLD MANUAL: 0.69 X10(3)/MCL (ref 0–0.9)
EOSINOPHIL NFR BLD MANUAL: 3 %
ERYTHROCYTE [DISTWIDTH] IN BLOOD BY AUTOMATED COUNT: 17.2 % (ref 11.5–17)
GFR SERPLBLD CREATININE-BSD FMLA CKD-EPI: 6 MLS/MIN/1.73/M2
GLOBULIN SER-MCNC: 5.1 GM/DL (ref 2.4–3.5)
GLUCOSE SERPL-MCNC: 106 MG/DL (ref 82–115)
HCT VFR BLD AUTO: 28.2 % (ref 37–47)
HGB BLD-MCNC: 9.2 G/DL (ref 12–16)
INSTRUMENT WBC (OLG): 22.86 X10(3)/MCL
LMWH PPP CHRO-ACNC: <0.1 IU/ML (ref 0.6–1)
MACROCYTES BLD QL SMEAR: ABNORMAL
MCH RBC QN AUTO: 29.4 PG (ref 27–31)
MCHC RBC AUTO-ENTMCNC: 32.6 G/DL (ref 33–36)
MCV RBC AUTO: 90.1 FL (ref 80–94)
MONOCYTES NFR BLD MANUAL: 1.37 X10(3)/MCL (ref 0.1–1.3)
MONOCYTES NFR BLD MANUAL: 6 %
MYELOCYTES NFR BLD MANUAL: 1 %
NEUTROPHILS NFR BLD MANUAL: 88 %
NRBC BLD AUTO-RTO: 0 %
PLASMA CELLS BLD QL SMEAR: 1 %
PLATELET # BLD AUTO: 925 X10(3)/MCL (ref 130–400)
PLATELET # BLD EST: ABNORMAL 10*3/UL
PMV BLD AUTO: 9.9 FL (ref 7.4–10.4)
POIKILOCYTOSIS BLD QL SMEAR: ABNORMAL
POTASSIUM SERPL-SCNC: 4.1 MMOL/L (ref 3.5–5.1)
PROT SERPL-MCNC: 6.7 GM/DL (ref 5.8–7.6)
RBC # BLD AUTO: 3.13 X10(6)/MCL (ref 4.2–5.4)
RBC MORPH BLD: ABNORMAL
SODIUM SERPL-SCNC: 133 MMOL/L (ref 136–145)
TARGETS BLD QL SMEAR: ABNORMAL
WBC # SPEC AUTO: 22.86 X10(3)/MCL (ref 4.5–11.5)

## 2023-12-20 PROCEDURE — 63600175 PHARM REV CODE 636 W HCPCS: Performed by: INTERNAL MEDICINE

## 2023-12-20 PROCEDURE — 25000003 PHARM REV CODE 250: Performed by: STUDENT IN AN ORGANIZED HEALTH CARE EDUCATION/TRAINING PROGRAM

## 2023-12-20 PROCEDURE — 99232 SBSQ HOSP IP/OBS MODERATE 35: CPT | Mod: ,,, | Performed by: STUDENT IN AN ORGANIZED HEALTH CARE EDUCATION/TRAINING PROGRAM

## 2023-12-20 PROCEDURE — 25000003 PHARM REV CODE 250

## 2023-12-20 PROCEDURE — 99153 MOD SED SAME PHYS/QHP EA: CPT | Performed by: STUDENT IN AN ORGANIZED HEALTH CARE EDUCATION/TRAINING PROGRAM

## 2023-12-20 PROCEDURE — 25000003 PHARM REV CODE 250: Performed by: NURSE PRACTITIONER

## 2023-12-20 PROCEDURE — C1750 CATH, HEMODIALYSIS,LONG-TERM: HCPCS | Performed by: STUDENT IN AN ORGANIZED HEALTH CARE EDUCATION/TRAINING PROGRAM

## 2023-12-20 PROCEDURE — 99152 MOD SED SAME PHYS/QHP 5/>YRS: CPT | Performed by: STUDENT IN AN ORGANIZED HEALTH CARE EDUCATION/TRAINING PROGRAM

## 2023-12-20 PROCEDURE — 21400001 HC TELEMETRY ROOM

## 2023-12-20 PROCEDURE — 80053 COMPREHEN METABOLIC PANEL: CPT | Performed by: STUDENT IN AN ORGANIZED HEALTH CARE EDUCATION/TRAINING PROGRAM

## 2023-12-20 PROCEDURE — 25000003 PHARM REV CODE 250: Performed by: INTERNAL MEDICINE

## 2023-12-20 PROCEDURE — C9113 INJ PANTOPRAZOLE SODIUM, VIA: HCPCS | Performed by: INTERNAL MEDICINE

## 2023-12-20 PROCEDURE — P9047 ALBUMIN (HUMAN), 25%, 50ML: HCPCS | Mod: JZ,JG | Performed by: NURSE PRACTITIONER

## 2023-12-20 PROCEDURE — 90935 HEMODIALYSIS ONE EVALUATION: CPT

## 2023-12-20 PROCEDURE — 63600175 PHARM REV CODE 636 W HCPCS: Mod: JZ,JG | Performed by: NURSE PRACTITIONER

## 2023-12-20 PROCEDURE — 63600175 PHARM REV CODE 636 W HCPCS: Performed by: GENERAL PRACTICE

## 2023-12-20 PROCEDURE — 77001 FLUOROGUIDE FOR VEIN DEVICE: CPT | Mod: 26,,, | Performed by: STUDENT IN AN ORGANIZED HEALTH CARE EDUCATION/TRAINING PROGRAM

## 2023-12-20 PROCEDURE — 63600175 PHARM REV CODE 636 W HCPCS: Performed by: STUDENT IN AN ORGANIZED HEALTH CARE EDUCATION/TRAINING PROGRAM

## 2023-12-20 PROCEDURE — 0JH63XZ INSERTION OF TUNNELED VASCULAR ACCESS DEVICE INTO CHEST SUBCUTANEOUS TISSUE AND FASCIA, PERCUTANEOUS APPROACH: ICD-10-PCS | Performed by: STUDENT IN AN ORGANIZED HEALTH CARE EDUCATION/TRAINING PROGRAM

## 2023-12-20 PROCEDURE — 36558 INSERT TUNNELED CV CATH: CPT | Mod: RT,,, | Performed by: STUDENT IN AN ORGANIZED HEALTH CARE EDUCATION/TRAINING PROGRAM

## 2023-12-20 PROCEDURE — 85027 COMPLETE CBC AUTOMATED: CPT | Performed by: STUDENT IN AN ORGANIZED HEALTH CARE EDUCATION/TRAINING PROGRAM

## 2023-12-20 PROCEDURE — 99152 MOD SED SAME PHYS/QHP 5/>YRS: CPT | Mod: ,,, | Performed by: STUDENT IN AN ORGANIZED HEALTH CARE EDUCATION/TRAINING PROGRAM

## 2023-12-20 PROCEDURE — 77001 FLUOROGUIDE FOR VEIN DEVICE: CPT | Performed by: STUDENT IN AN ORGANIZED HEALTH CARE EDUCATION/TRAINING PROGRAM

## 2023-12-20 PROCEDURE — 02H633Z INSERTION OF INFUSION DEVICE INTO RIGHT ATRIUM, PERCUTANEOUS APPROACH: ICD-10-PCS | Performed by: STUDENT IN AN ORGANIZED HEALTH CARE EDUCATION/TRAINING PROGRAM

## 2023-12-20 PROCEDURE — A4216 STERILE WATER/SALINE, 10 ML: HCPCS | Performed by: INTERNAL MEDICINE

## 2023-12-20 PROCEDURE — 25500020 PHARM REV CODE 255: Performed by: STUDENT IN AN ORGANIZED HEALTH CARE EDUCATION/TRAINING PROGRAM

## 2023-12-20 PROCEDURE — 99233 SBSQ HOSP IP/OBS HIGH 50: CPT | Mod: FS,,, | Performed by: GENERAL PRACTICE

## 2023-12-20 PROCEDURE — 99223 1ST HOSP IP/OBS HIGH 75: CPT | Mod: ,,,

## 2023-12-20 PROCEDURE — 85520 HEPARIN ASSAY: CPT | Performed by: STUDENT IN AN ORGANIZED HEALTH CARE EDUCATION/TRAINING PROGRAM

## 2023-12-20 PROCEDURE — 97166 OT EVAL MOD COMPLEX 45 MIN: CPT

## 2023-12-20 PROCEDURE — 36558 INSERT TUNNELED CV CATH: CPT | Mod: RT | Performed by: STUDENT IN AN ORGANIZED HEALTH CARE EDUCATION/TRAINING PROGRAM

## 2023-12-20 RX ORDER — MIDAZOLAM HYDROCHLORIDE 1 MG/ML
INJECTION INTRAMUSCULAR; INTRAVENOUS
Status: DISCONTINUED | OUTPATIENT
Start: 2023-12-20 | End: 2023-12-20 | Stop reason: HOSPADM

## 2023-12-20 RX ORDER — FENTANYL CITRATE 50 UG/ML
INJECTION, SOLUTION INTRAMUSCULAR; INTRAVENOUS
Status: DISCONTINUED | OUTPATIENT
Start: 2023-12-20 | End: 2023-12-20 | Stop reason: HOSPADM

## 2023-12-20 RX ORDER — TALC
6 POWDER (GRAM) TOPICAL NIGHTLY
Status: DISCONTINUED | OUTPATIENT
Start: 2023-12-20 | End: 2023-12-25 | Stop reason: HOSPADM

## 2023-12-20 RX ORDER — ESCITALOPRAM OXALATE 10 MG/1
10 TABLET ORAL DAILY
Status: DISCONTINUED | OUTPATIENT
Start: 2023-12-20 | End: 2023-12-25 | Stop reason: HOSPADM

## 2023-12-20 RX ORDER — LIDOCAINE HYDROCHLORIDE 10 MG/ML
INJECTION INFILTRATION; PERINEURAL
Status: DISCONTINUED | OUTPATIENT
Start: 2023-12-20 | End: 2023-12-20 | Stop reason: HOSPADM

## 2023-12-20 RX ORDER — ALBUMIN HUMAN 250 G/1000ML
25 SOLUTION INTRAVENOUS ONCE
Status: COMPLETED | OUTPATIENT
Start: 2023-12-20 | End: 2023-12-20

## 2023-12-20 RX ADMIN — MIDODRINE HYDROCHLORIDE 10 MG: 5 TABLET ORAL at 09:12

## 2023-12-20 RX ADMIN — HYDROCORTISONE ACETATE 25 MG: 25 SUPPOSITORY RECTAL at 08:12

## 2023-12-20 RX ADMIN — ONDANSETRON 4 MG: 2 INJECTION INTRAMUSCULAR; INTRAVENOUS at 01:12

## 2023-12-20 RX ADMIN — ONDANSETRON 4 MG: 2 INJECTION INTRAMUSCULAR; INTRAVENOUS at 08:12

## 2023-12-20 RX ADMIN — SODIUM BICARBONATE 650 MG TABLET 1300 MG: at 09:12

## 2023-12-20 RX ADMIN — MICAFUNGIN SODIUM 100 MG: 100 INJECTION, POWDER, LYOPHILIZED, FOR SOLUTION INTRAVENOUS at 08:12

## 2023-12-20 RX ADMIN — ESCITALOPRAM OXALATE 10 MG: 10 TABLET ORAL at 05:12

## 2023-12-20 RX ADMIN — Medication 6 MG: at 08:12

## 2023-12-20 RX ADMIN — COLLAGENASE SANTYL: 250 OINTMENT TOPICAL at 09:12

## 2023-12-20 RX ADMIN — PANTOPRAZOLE SODIUM 40 MG: 40 INJECTION, POWDER, FOR SOLUTION INTRAVENOUS at 09:12

## 2023-12-20 RX ADMIN — HYDROCORTISONE ACETATE 25 MG: 25 SUPPOSITORY RECTAL at 09:12

## 2023-12-20 RX ADMIN — ALBUMIN (HUMAN) 25 G: 12.5 SOLUTION INTRAVENOUS at 12:12

## 2023-12-20 RX ADMIN — CIPROFLOXACIN 400 MG: 2 INJECTION, SOLUTION INTRAVENOUS at 05:12

## 2023-12-20 RX ADMIN — METOPROLOL TARTRATE 25 MG: 25 TABLET, FILM COATED ORAL at 09:12

## 2023-12-20 RX ADMIN — PANTOPRAZOLE SODIUM 40 MG: 40 INJECTION, POWDER, FOR SOLUTION INTRAVENOUS at 05:12

## 2023-12-20 RX ADMIN — SODIUM CHLORIDE, PRESERVATIVE FREE 10 ML: 5 INJECTION INTRAVENOUS at 12:12

## 2023-12-20 RX ADMIN — SODIUM CHLORIDE, PRESERVATIVE FREE 10 ML: 5 INJECTION INTRAVENOUS at 06:12

## 2023-12-20 RX ADMIN — MIDODRINE HYDROCHLORIDE 10 MG: 5 TABLET ORAL at 05:12

## 2023-12-20 NOTE — CONSULTS
"12/20/2023  Karen Briggs   1965   [id number]            Psychiatry Consult Note    Date of Admission: 11/17/2023 11:46 PM      Chief Complaint: Psychiatric Evaluation for "depression"    SUBJECTIVE:   History of Present Illness:   Karen Briggs is a 58 y.o. female with a history of hypertension and sciatica who recently had a laparoscopic grace with incomplete removal and had subsequent right flank/back pain. ERCP on 11/18 found malignant gastric tumor. Psychiatry has been consulted for evaluation of depression. She denies a history of psychiatric treatment or previous depression. Reports onset of depressed mood over hospital stay. Reports trouble falling asleep and difficulties staying asleep. Reports anxiety as well with worry revolving around her current medical condition. She denies feelings of hopelessness/helplessness and attention/concentration are intact. She denies a history of manic or psychotic symptoms as well as denying history of suicide attempts. At present she presents with a depressed affect, displays a goal directed thought process, and denies SI/HI/hallucinations. Currently ambivalent about starting psychotropic medications. Discussed ordering a medication with her having the option to refuse.        Past Psychiatric History:   Previous Psychiatric Hospitalizations: Denies   Previous Medication Trials: Denies  Previous Suicide Attempts: Denies   Outpatient psychiatrist: Denies    Past Medical/Surgical History:   Past Medical History:   Diagnosis Date    Hypertension     Sciatica      Past Surgical History:   Procedure Laterality Date    CARPAL TUNNEL RELEASE Left     CYSTOSCOPY W/ URETERAL STENT PLACEMENT Bilateral 11/27/2023    Procedure: CYSTOSCOPY, WITH URETERAL STENT INSERTION;  Surgeon: Huey Cardenas MD;  Location: [facility];  Service: Urology;  Laterality: Bilateral;    EGD, WITH CLOSED BIOPSY  11/18/2023    Procedure: EGD, WITH CLOSED BIOPSY;  Surgeon: Alfrde Goss MD;  " "Location: [facility] OR;  Service: Gastroenterology;;    ERCP N/A 11/18/2023    Procedure: ERCP (ENDOSCOPIC RETROGRADE CHOLANGIOPANCREATOGRAPHY);  Surgeon: Alfred Goss MD;  Location: [facility] OR;  Service: Gastroenterology;  Laterality: N/A;    HEMORRHOID SURGERY      INSERTION OF TUNNELED CENTRAL VENOUS CATHETER (CVC) WITH SUBCUTANEOUS PORT N/A 12/1/2023    Procedure: INSERTION, PORT-A-CATH;  Surgeon: William Morse MD;  Location: [facility] OR;  Service: General;  Laterality: N/A;    INSERTION OF TUNNELED CENTRAL VENOUS HEMODIALYSIS CATHETER N/A 12/6/2023    Procedure: Insertion, Catheter, Central Venous, Hemodialysis;  Surgeon: Satinder Luo DO;  Location: [facility] CATH LAB;  Service: Nephrology;  Laterality: N/A;    LAPAROSCOPIC INSERTION OF JEJUNOSTOMY TUBE N/A 12/1/2023    Procedure: INSERTION, JEJUNOSTOMY TUBE, LAPAROSCOPIC;  Surgeon: William Morse MD;  Location: [facility] OR;  Service: General;  Laterality: N/A;  PLUS MEDIPORT         Family Psychiatric History:   Daughter- "Bipolar"     Allergies:   Review of patient's allergies indicates:  No Known Allergies    Substance Abuse History:   Tobacco: Denies  Alcohol: "Occasionally"  Illicit Substances: Denies  Treatment: Denies      Current Medications:   Home Psychiatric Meds: None    Scheduled Meds:    ciprofloxacin  400 mg Intravenous Q24H    collagenase   Topical (Top) Daily    enoxparin  1 mg/kg Subcutaneous Q24H (treatment, non-standard time)    hydrocortisone  25 mg Rectal BID    metoprolol tartrate  25 mg Oral BID    micafungin (MYCAMINE) IVPB  100 mg Intravenous Q24H    midodrine  10 mg Oral TID WM    pantoprazole  40 mg Intravenous BID WM    perflutren lipid microspheres  1.3 mL Intravenous Once    sodium bicarbonate  1,300 mg Oral Daily    sodium chloride 0.9%  10 mL Intravenous Q6H      PRN Meds: 0.9%  NaCl infusion (for blood administration), 0.9%  NaCl infusion (for blood administration), sodium chloride 0.9%, acetaminophen, aluminum-magnesium " hydroxide-simethicone, bisacodyL, chlorproMAZINE, dicyclomine, hydrALAZINE, hyoscyamine, melatonin, metoclopramide, metoprolol, morphine, ondansetron, oxyCODONE, polyethylene glycol, prochlorperazine, promethazine, senna-docusate 8.6-50 mg, sodium chloride 0.9%, sodium chloride 0.9%, Flushing PICC/Midline Protocol **AND** sodium chloride 0.9% **AND** sodium chloride 0.9%   Psychotherapeutics (From admission, onward)      Start     Stop Route Frequency Ordered    12/04/23 1414  chlorproMAZINE injection 25 mg         -- IM 4 times daily PRN 12/04/23 1315              Social History:  Housing Status: Lives with significant other  Relationship Status/Sexual Orientation: Single/Heterosexual   Children: 2  Education: 12th grade   Employment Status/Info:  at a [employer]    history: Denies  History of physical/sexual abuse: Denies   Access to gun: Denies       Legal History:   Past Charges/Incarcerations: Denies   Pending charges: Denies      OBJECTIVE:       Vitals   Vitals:    12/20/23 0908   BP: (!) 89/63   Pulse: 110   Resp:    Temp:         Labs/Imaging/Studies:   Recent Results (from the past 48 hour(s))   Comprehensive Metabolic Panel    Collection Time: 12/19/23  4:44 AM   Result Value Ref Range    Sodium Level 133 (L) 136 - 145 mmol/L    Potassium Level 4.3 3.5 - 5.1 mmol/L    Chloride 100 98 - 107 mmol/L    Carbon Dioxide 18 (L) 23 - 31 mmol/L    Glucose Level 107 82 - 115 mg/dL    Blood Urea Nitrogen 82.4 (H) 9.8 - 20.1 mg/dL    Creatinine 6.24 (H) 0.55 - 1.02 mg/dL    Calcium Level Total 8.6 8.4 - 10.2 mg/dL    Protein Total 6.7 5.8 - 7.6 gm/dL    Albumin Level 1.6 (L) 3.4 - 4.8 g/dL    Globulin 5.1 (H) 2.4 - 3.5 gm/dL    Albumin/Globulin Ratio 0.3 (L) 1.1 - 2.0 ratio    Bilirubin Total 2.8 (H) <=1.5 mg/dL    Alkaline Phosphatase 160 (H) 40 - 150 unit/L    Alanine Aminotransferase 47 0 - 55 unit/L    Aspartate Aminotransferase 37 (H) 5 - 34 unit/L    eGFR 7 mls/min/1.73/m2   CBC with  Differential    Collection Time: 12/19/23  4:44 AM   Result Value Ref Range    WBC 24.35 (H) 4.50 - 11.50 x10(3)/mcL    RBC 3.11 (L) 4.20 - 5.40 x10(6)/mcL    Hgb 9.3 (L) 12.0 - 16.0 g/dL    Hct 28.2 (L) 37.0 - 47.0 %    MCV 90.7 80.0 - 94.0 fL    MCH 29.9 27.0 - 31.0 pg    MCHC 33.0 33.0 - 36.0 g/dL    RDW 17.2 (H) 11.5 - 17.0 %    Platelet 1,014 (HH) 130 - 400 x10(3)/mcL    MPV 9.9 7.4 - 10.4 fL    NRBC% 0.0 %    IPF 2.4 0.9 - 11.2 %   Manual Differential    Collection Time: 12/19/23  4:44 AM   Result Value Ref Range    WBC 24.35 x10(3)/mcL    Neutrophils % 86 %    Lymphs % 4 %    Monocytes % 5 %    Eosinophils % 2 %    Metamyelocytes % 1 (H) <=0 %    Myelocytes % 2 (H) <=0 %    Neutrophils Abs 20.941 (H) 2.1 - 9.2 x10(3)/mcL    Lymphs Abs 0.974 0.6 - 4.6 x10(3)/mcL    Monocytes Abs 1.2175 0.1 - 1.3 x10(3)/mcL    Eosinophils Abs 0.487 0 - 0.9 x10(3)/mcL    Platelets Increased (A) Normal, Adequate    RBC Morph Abnormal (A) Normal    Poikilocytosis 1+ (A) (none)    Anisocytosis 1+ (A) (none)    Macrocytosis 1+ (A) (none)    Target Cells 1+ (A) (none)   Comprehensive Metabolic Panel    Collection Time: 12/20/23  4:38 AM   Result Value Ref Range    Sodium Level 133 (L) 136 - 145 mmol/L    Potassium Level 4.1 3.5 - 5.1 mmol/L    Chloride 100 98 - 107 mmol/L    Carbon Dioxide 19 (L) 23 - 31 mmol/L    Glucose Level 106 82 - 115 mg/dL    Blood Urea Nitrogen 97.8 (H) 9.8 - 20.1 mg/dL    Creatinine 6.96 (H) 0.55 - 1.02 mg/dL    Calcium Level Total 8.6 8.4 - 10.2 mg/dL    Protein Total 6.7 5.8 - 7.6 gm/dL    Albumin Level 1.6 (L) 3.4 - 4.8 g/dL    Globulin 5.1 (H) 2.4 - 3.5 gm/dL    Albumin/Globulin Ratio 0.3 (L) 1.1 - 2.0 ratio    Bilirubin Total 2.8 (H) <=1.5 mg/dL    Alkaline Phosphatase 170 (H) 40 - 150 unit/L    Alanine Aminotransferase 46 0 - 55 unit/L    Aspartate Aminotransferase 41 (H) 5 - 34 unit/L    eGFR 6 mls/min/1.73/m2   Heparin Xa Low Mol Wgt    Collection Time: 12/20/23  4:38 AM   Result Value Ref Range     "Anti Xa  LMW <0.1 (L) 0.6 - 1.0 IU/mL   CBC with Differential    Collection Time: 12/20/23  4:38 AM   Result Value Ref Range    WBC 22.86 (H) 4.50 - 11.50 x10(3)/mcL    RBC 3.13 (L) 4.20 - 5.40 x10(6)/mcL    Hgb 9.2 (L) 12.0 - 16.0 g/dL    Hct 28.2 (L) 37.0 - 47.0 %    MCV 90.1 80.0 - 94.0 fL    MCH 29.4 27.0 - 31.0 pg    MCHC 32.6 (L) 33.0 - 36.0 g/dL    RDW 17.2 (H) 11.5 - 17.0 %    Platelet 925 (H) 130 - 400 x10(3)/mcL    MPV 9.9 7.4 - 10.4 fL    NRBC% 0.0 %   Manual Differential    Collection Time: 12/20/23  4:38 AM   Result Value Ref Range    WBC 22.86 x10(3)/mcL    Neutrophils % 88 %    Monocytes % 6 %    Eosinophils % 3 %    Basophils % 2 %    Myelocytes % 1 (H) <=0 %    Plasmacytes % 1 %    Neutrophils Abs 20.1168 (H) 2.1 - 9.2 x10(3)/mcL    Monocytes Abs 1.3716 (H) 0.1 - 1.3 x10(3)/mcL    Eosinophils Abs 0.6858 0 - 0.9 x10(3)/mcL    Basophils Abs 0.4572 (H) 0 - 0.2 x10(3)/mcL    Platelets Increased (A) Normal, Adequate    RBC Morph Abnormal (A) Normal    Poikilocytosis 1+ (A) (none)    Anisocytosis 1+ (A) (none)    Macrocytosis 1+ (A) (none)    Target Cells 1+ (A) (none)      No results found for: "PHENYTOIN", "PHENOBARB", "VALPROATE", "CBMZ"        Psychiatric Mental Status Exam:  General Appearance: appears stated age, adequately groomed, appropriately dressed, dressed in hospital garb, lying in bed, in no acute distress  Arousal: alert  Behavior: normal, cooperative, pleasant, appropriate eye-contact, under good behavioral control  Movements and Motor Activity: no abnormal involuntary movements noted  Orientation: intact; oriented fully to person, place, time and situation  Speech: intact; normal rate, rhythm, volume, tone and pitch; conversational, spontaneous, and coherent  Mood: Depressed  Affect: mood-congruent, dysthymic  Thought Process: intact, linear, goal-directed, organized, logical  Associations: intact, no loosening of associations  Thought Content and Perceptions: no suicidal or homicidal " ideation, no auditory or visual hallucinations, no paranoid ideation, no ideas of reference, no evidence of delusions or psychosis  Recent and Remote Memory: grossly intact, able to recall relevant and salient information from the recent and remote past, registers 3/3 objects, recalls 2/3 objects at 5 minutes; per interview/observation with patient  Attention and Concentration: grossly intact, attentive to the conversation and not readily distractible, able to spell WORLD forwards and backwards; per interview/observation with patient  Fund of Knowledge: grossly intact, used appropriate vocabulary and demonstrated an awareness of current events; based on history, vocabulary, fund of knowledge, syntax, grammar, and content  Insight: good; based on understanding of severity of illness and HPI  Judgment: good; based on patient's behavior and HPI        ASSESSMENT/PLAN:   Diagnoses:  ADJUSTMENT DISORDERS; Adjustment Disorder with Mixed Anxiety and Depressed Mood (F43.23)         Past Medical History:   Diagnosis Date    Hypertension     Sciatica         Problem lists and Management Plans:  Medication Management  Start Lexapro 10mg PO daily   Change melatonin to scheduled  Safety  Not currently an imminent threat to self, others, nor gravely disabled due to mental illness  Will continue to follow      Ming Pittman

## 2023-12-20 NOTE — PROGRESS NOTES
[facility]  Hospital Medicine Progress Note        Chief Complaint: Inpatient Follow-up for intractable nausea vomiting due to gastric adenocarcinoma     HPI:   58-year-old female with medical history of hypertension who recently underwent laparoscopic cholecystectomy on 11/10/2023, procedure was incomplete and was unable to completely resect the gallbladder.  Since surgery she continued to have right flank/back pain and followed up with her PCP and CT abdomen and pelvis on 11/17/2023 revealed left-sided hydronephrosis with suspected distal obstruction/no clear stone visualized, postoperative changes of cholecystectomy with fluid in the gallbladder fossa may reflect postoperative seroma but biloma can not be entirely excluded, also noted for marked thickening of the stomach wall diffusely may be related to mesenteric edema/reactive.  She presented to [facility] [place] the same day 11/17/2023 and her labs notable for WBC 9.0, hemoglobin 12.4, platelets 442, creatinine 0.86, total bilirubin 8.7 with direct fraction 6.7, alkaline phosphatase 491, , .  Patient's surgeon was consulted and recommended ERCP which was not available at [facility] for which she was transferred to [facility] and referred to hospital medicine service for further evaluation and management. 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  Severe inflammation and oozing of blood noted proximal gastric lumen.  Unable to advance scope into the duodenum. Surgical oncology consulted, IR consulted for external drain placement which was done November 21.  November 24th she developed new onset atrial fibrillation with RVR and Cardiology consulted. Started on amiodarone drip. Unfortunately patient had acute blood loss due to hemorrhage from the midline site that was removed. Patient was unaware of the bleed.  Became very weak, passed out.  Code was called but she came around.   Stat H&H done which was slightly lower but stable, MRI of the brain was negative for any acute ischemic changes. Pt is aware of gastric cancer diagnosis. GI following--> 11/18- EGD shows normal esophagus, malignant gastric tumor in the cardia.  Inflamed mucosa and ooze of blood throughout the proximal gastric lumen.  Gastric antrum scar would not allow advancement of scope into the duodenal ampulla area therefore biliary cannulation was not possible for bile leak evaluation. Pathology shows marked chronic gastritis with intestinal metaplasia, gastric cardia biopsy shows adenocarcinoma, moderately differentiated intestinal type. Bilateral hydronephrosis with left greater than right. MRI brain without contrast-negative for acute finding. PET scan reviewed with patient by Oncology reports of locally advanced gastric adenocarcinoma and plans for systemic therapy outpatient if functional, nutritional and renal function improves. MediPort placement by surgical Oncology on 12/1, oncology on board plans for systemic therapy outpatient if functional, nutritional and renal function improves. Obstructive uropathy with bilateral hydronephrosis status post bilateral ureteral stent placed on 11/27 by Urology- no improvement in renal function, patient opted to have hemodialysis and a right-sided hemodialysis catheter was placed by General surgery on 11/29, to continue hemodialysis per nephrology discretion. Patient with symptoms of nausea and vomiting can not keep anything down in her stomach complicated by severe malnutrition on IV Clinimix and supportive medications for nausea and vomiting. Palliative care consulted. Patient got a MediPort and J-tube placed on 12/01. Cystogram reviewed by Urology with patent stents and recommends outpatient follow-up with Urology. White cell count elevated at 20.4, Infectious Disease had started on IV Zosyn given concern for possible sepsis with low blood pressure on 12/02. [facility] evaluated patient  to begin low-dose metoprolol tartrate at 12.5 mg b.i.d. and resume Eliquis for stroke prevention. On TF via J tube which was placed by Surgical Oncology. ID started IV Micafungin for candidemia. Noted to have persistent fungemia on 1/2 BCX from 12/11.  Consulted [facility] for GAYLA; report pending. General Surgery consulted to remove HD line & Mediport. Patient spiked a fever overnight on 12/15; BCX repeated by ID which are negative.  Afebrile since 12/14. General Surgery removed mediport & tunneled catheter after HD on 12/16. J Tube replaced by Surgical Oncology on 12/18 after it became clogged.    Interval Hx:     Seen and examined the patient.  Receiving HD today.  Denying pain and discomfort.    Objective/physical exam:  General: alert lady lying comfortably in bed, in no acute distress  HENT: oral and oropharyngeal mucosa moist, pink, with no erythema or exudates, no ear pain or discharge  Neck: normal neck movement, no lymph nodes or swellings, no JVD or Carotid bruit  Respiratory: clear breathing sounds bilaterally, no crackles, rales, ronchi or wheezes  Cardiovascular: clear S1 and S2, no murmurs, rubs or gallops  Peripheral Vascular: no lesions, ulcers or erosions, normal peripheral pulses; 2+ pitting pedal edema  Gastrointestinal: J tube in place LUQ; soft, non-tender, non-distended abdomen, no guarding, rigidity or rebound tenderness, normal bowel sounds  Integumentary: normal skin color, no rashes or lesions  Neuro: AAO x 3; motor strength 5/5 in B/L UEs & LEs; sensation intact to gross and fine touch B/L; CN II-XII grossly intact    VITAL SIGNS: 24 HRS MIN & MAX LAST   Temp  Min: 97.7 °F (36.5 °C)  Max: 99.8 °F (37.7 °C) 97.9 °F (36.6 °C)   BP  Min: 87/57  Max: 111/79 (!) 89/63   Pulse  Min: 108  Max: 115  110   Resp  Min: 18  Max: 20 18   SpO2  Min: 99 %  Max: 100 % 100 %     I reviewed the labs below:  Recent Labs   Lab 12/18/23  0440 12/19/23  0444 12/20/23  0438   WBC 20.66* 24.35  24.35* 22.86  22.86*    RBC 3.44* 3.11* 3.13*   HGB 10.4* 9.3* 9.2*   HCT 30.5* 28.2* 28.2*   MCV 88.7 90.7 90.1   MCH 30.2 29.9 29.4   MCHC 34.1 33.0 32.6*   RDW 17.2* 17.2* 17.2*   * 1,014* 925*   MPV 10.2 9.9 9.9       Recent Labs   Lab 12/14/23  1214 12/15/23  1047 12/16/23  0414 12/18/23  0440 12/19/23  0444 12/20/23  0438   * 136   < > 133* 133* 133*   K 3.4* 3.3*   < > 4.3 4.3 4.1   CO2 21* 23   < > 19* 18* 19*   BUN 68.6* 48.7*   < > 63.8* 82.4* 97.8*   CREATININE 5.54* 4.56*   < > 5.20* 6.24* 6.96*   CALCIUM 8.8 8.1*   < > 8.8 8.6 8.6   MG 2.30 2.10  --  2.30  --   --    ALBUMIN 1.5* 1.5*   < > 1.7* 1.6* 1.6*   ALKPHOS 156* 184*   < > 162* 160* 170*   ALT 53 52   < > 51 47 46   AST 46* 42*   < > 42* 37* 41*   BILITOT 4.3* 3.5*   < > 3.2* 2.8* 2.8*    < > = values in this interval not displayed.       Assessment/Plan:  Hypotension, improved with Midodrine  Persistent leukocytosis likely 2/2 Fungemia  Acute on chronic anemia, s/p 2 units prbcs transfusion 11/30  Hx of Acute Blood loss anemia with syncope and then fall 11/26-   Locally advanced gastric adenocarcinoma with intractable nausea and vomiting possible Gastric/duodenal outlet obstruction s/p J Tube placement  J Tube Obstruction  Fungemia possibly 2/2 Biliary Drain Infection  Obstructive uropathy with bilateral hydronephrosis- S/P bilateral ureteral stent placed on 11/27  POORNIMA-now on HD - 11/29  Suspected bile leak with biloma and biliary obstruction--> H/O Laparoscopic incomplete cholecystectomy 11/10/2023  New onset atrial fibrillation with RVR- fluctuating  History of hypertension and DDD/sciatica  Severe malnutrition  Leukocytosis likely 2/2 Fungemia  Tachycardia 2/2 IV Depletion vs Sepsis  Thrombocytosis possibly reactive to Sepsis      - she is receiving HD, blood pressure is on the low side we will remove as much fluid as possible.  Reporting improvement in J tube site pain  J Tube replaced by Surg Oncology on 12/18 after it became clogged  Resumed  TF  Consulted Hematology for worsening thrombocytosis; Plt 1014 today  Underwent GAYLA on 12/14 with report negative for vegetations  On IV Micafungin  ID on-board; follow recs  ID added IV Ciprofloxacin to cover for possible biliary drain related infectino/cholangitis given worsening leukocytosis  Nephrology on-board for HD conduction  Patient to get new tunneled catheter to resume HD  Surgical Oncology on-board; follow recs  PICC line removed  Patient had MediPort and J-tube placed on 12/01; General Surgery removed HD catheter & Mediport on 12/16  Patient underwent biliary drain exchange on 12/13 per ID recs  Blood Cultures 12/10 positive for candida glabrata in the blood 1/2; Repeat BCX 12/11 positive for yeasts; BCX 12/12 negative; BCX repeated on 12/15 d/t fevers which are negative x 72 hrs; BCX from 12/17 negative x 48 hrs  Palliative Care consulted earlier; patient wishes to continue pursuing curative treatment for cancer  Cystogram reviewed by Urology with patent stents and recommends outpatient follow-up with Urology  Appreciate Oncology input; plans for systemic therapy outpatient if renal function improves, patient is planning to pursue treatment   Continued on Hydrocortisone Supp, Metoprolol Tartrate BID, Midodrine TID, Protonix BID, Sodium Bicarbonate     VTE prophylaxis:  FD Lovenox     Patient condition:  Stable     Anticipated discharge and Disposition:  Pending    All diagnosis and differential diagnosis have been reviewed; assessment and plan has been documented; I have personally reviewed the labs and test results that are presently available; I have reviewed the patients medication list; I have reviewed the consulting providers response and recommendations. I have reviewed or attempted to review medical records based upon their availability    All of the patient's questions have been  addressed and answered. Patient's is agreeable to the above stated plan. I will continue to monitor closely and  make adjustments to medical management as needed.  _____________________________________________________________________    Nutrition Status:  Patient meets ASPEN criteria for severe malnutrition of acute illness or injury per RD assessment as evidenced by:  Energy Intake (Malnutrition):  (does not meet criteria)  Weight Loss (Malnutrition): greater than 7.5% in 3 months  Subcutaneous Fat (Malnutrition): moderate depletion  Muscle Mass (Malnutrition): moderate depletion           A minimum of two characteristics is recommended for diagnosis of either severe or non-severe malnutrition.   Radiology:   I have personally reviewed the images and agree with radiologist report  Cardiac catheterization  Procedure performed in the Invasive Lab    - See Procedure Log link below for nursing documentation    - See OpNote on Surgeries Tab for physician findings    - See Imaging Tab for radiologist dictation      Mukesh Cole MD  Department of Hospital Medicine   [facility]   12/20/2023

## 2023-12-20 NOTE — PLAN OF CARE
Problem: Occupational Therapy  Goal: Occupational Therapy Goal  Description: LTG: Pt will perform basic ADLs and ADL transfers with Modified independence using LRAD by discharge.    STG: to be met by 1/20/24    Pt will complete grooming standing at sink with LRAD with SBA.  Pt will complete UB dressing with SBA.  Pt will complete LB dressing with SBA using LRAD and AE prn.  Pt will complete toileting with SBA using LRAD.  Pt will complete functional mobility to/from toilet and toilet transfer with SBA using LRAD.   Outcome: Ongoing, Progressing

## 2023-12-20 NOTE — NURSING
12/20/23 1646   Post-Hemodialysis Assessment   Blood Volume Processed (Liters) 81.6 L   Dialyzer Clearance Clear   Duration of Treatment 240 minutes   Total UF (mL) 1500 mL   Patient Response to Treatment Pt tolerated HD tx fairly well; slight nasuea treated with IVP Zofran. Inital UF goal of 3 liters, ultimately resulted in 1.5 liter UF goal despite receiving 25gm IVBP Albumin. Total tx length 4hrs; resulting in 1.5 liter UF.   Post-Hemodialysis Comments Pt deaccessed per P and P

## 2023-12-20 NOTE — PT/OT/SLP PROGRESS
Physical Therapy      Patient Name:  Karen Briggs   MRN:  [id number]    Patient not seen today secondary to off the floor x2 attempts for procedure and for HD  . Will follow-up as appropriate.

## 2023-12-20 NOTE — PROGRESS NOTES
Nephrology consult follow up note    HPI:      Karen Briggs is a 58 y.o. female presented on  7 days post op from laparoscopic cholecystectomy on 11/10. Gallbladder was unable to be resected. Back and flank pain persisted post operatively prompting [place] [facility]. After workup her surgeon recommended ERCP for which she was sent to this facility. Left sided hydronephrosis noted on CT scans done on admission. At that time renal function was fairly normal and patient was given option for stenting or to defer as outpatient and she chose the latter.      During ERCP, malignant gastric mass noted and ERCP was unable to be completed due to duodenal scarring. 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 Patient ultimately had biliary drain placed per IR.      She developed A fib with RVR requiring amiodarone infusion. She then had significant blood loss post removal of long term IV and subsequent fall in her room.      Patient renal function was relatively stable until decline starting 11/25. She was ultimately initiated on HD 11/28 post bilateral ureteral stent placement with Dr Cardenas same day.      Patient developed dialysis dependent acute kidney injury, and was started on hemodialysis 11/29/2023.  Tunneled dialysis catheter was placed on 12/06/2023.  Dialysis catheter had to be removed on 12/16/2023 after dialysis due to persistent fungemia.    Interval history:     She had tunneled catheter replaced this morning and pending dialysis today. Resting comfortably on room air.      Review of Systems:       Past medical, family, surgical, and social history reviewed and unchanged from initial consult note.     Objective:       VITAL SIGNS: 24 HR MIN & MAX LAST    Temp  Min: 97.7 °F (36.5 °C)  Max: 99.8 °F (37.7 °C)  97.9 °F (36.6 °C)        BP  Min: 87/57  Max: 111/79  (!) 89/63     Pulse  Min: 98  Max: 115  110     Resp  Min: 18  Max: 20  18    SpO2  Min: 99 %  Max: 100 %  100 %      GEN: Well  appearing AAF  CV: RRR +S1,S2 without murmur  PULM: CTAB, unlabored  ABD: Soft, NT/ND abdomen with NABS  EXT: 3+ BLE edema  SKIN: Warm and dry  PSYCH: Awake, alert and appropriately conversant.   Dialysis access:  No dialysis access          Component Value Date/Time     (L) 12/20/2023 0438     (L) 12/19/2023 0444     04/24/2018 0340    K 4.1 12/20/2023 0438    K 4.3 12/19/2023 0444    K 3.2 (L) 04/24/2018 0340    CHLORIDE 100 12/20/2023 0438    CHLORIDE 100 12/19/2023 0444    CO2 19 (L) 12/20/2023 0438    CO2 18 (L) 12/19/2023 0444    CO2 20 (L) 04/24/2018 0340    BUN 97.8 (H) 12/20/2023 0438    BUN 82.4 (H) 12/19/2023 0444    BUN 10 04/24/2018 0340    CREATININE 6.96 (H) 12/20/2023 0438    CREATININE 6.24 (H) 12/19/2023 0444    CREATININE 0.7 04/24/2018 0340    CALCIUM 8.6 12/20/2023 0438    CALCIUM 8.6 12/19/2023 0444    CALCIUM 9.2 04/24/2018 0340    PHOS 3.3 12/16/2023 0414            Component Value Date/Time    WBC 22.86 (H) 12/20/2023 0438    WBC 22.86 12/20/2023 0438    WBC 24.35 (H) 12/19/2023 0444    WBC 24.35 12/19/2023 0444    WBC 12.03 04/24/2018 0340    HGB 9.2 (L) 12/20/2023 0438    HGB 9.3 (L) 12/19/2023 0444    HGB 11.3 (L) 04/24/2018 0340    HCT 28.2 (L) 12/20/2023 0438    HCT 28.2 (L) 12/19/2023 0444    HCT 35.3 (L) 04/24/2018 0340     (H) 12/20/2023 0438    PLT 1,014 (HH) 12/19/2023 0444     04/24/2018 0340         Imaging reviewed      Assessment / Plan:     POORNIMA multifactorial secondary to obstructive uropathy, acute blood loss, contrast exposure, hypotension and Afib with RVR  ---Nephrotic range proteinuria noted. 4.4 g  ---Dialysis initiated 11/28 post tunneled catheter placement 12/6  --tunneled catheter removed 12/16 and replaced 12/20 due to persistent fungemia  Newly diagnosed malignant gastric mass. Pathology consistent with adenocarcinoma   -s/p J tube and mediport placement 12/1  Acute blood loss anemia 2/2 bleeding post long term IV removal   --s/p  transfusion 11/30, 12/1, 12/10  Bilateral hydronephrosis noted 11/21 - stent placement initially deferred due to stable renal function   Afib with RVR resolved   S/p biliary drain placement and subsequent exchange due to fungemia   Fungemia - blood cultures positive 12/10, 12/11   Hypotension on TID Midodrine     Plan:  Plan for dialysis today and then tomorrow to start TTS schedule. Will attempt UF today. In the recent past fluid removal has been limited by hypotension.   [facility].   Oncology has re evaluated and has recommended focusing on symptoms of malignancy and quality of life. Likely not to tolerate chemotherapy.     When cleared by primary ok to discharge from renal standpoint.

## 2023-12-20 NOTE — SUBJECTIVE & OBJECTIVE
Scheduled Meds:   ciprofloxacin  400 mg Intravenous Q24H    collagenase   Topical (Top) Daily    enoxparin  1 mg/kg Subcutaneous Q24H (treatment, non-standard time)    hydrocortisone  25 mg Rectal BID    metoprolol tartrate  25 mg Oral BID    micafungin (MYCAMINE) IVPB  100 mg Intravenous Q24H    midodrine  10 mg Oral TID WM    pantoprazole  40 mg Intravenous BID WM    perflutren lipid microspheres  1.3 mL Intravenous Once    sodium bicarbonate  1,300 mg Oral Daily    sodium chloride 0.9%  10 mL Intravenous Q6H     Continuous Infusions:  PRN Meds:0.9%  NaCl infusion (for blood administration), 0.9%  NaCl infusion (for blood administration), sodium chloride 0.9%, acetaminophen, aluminum-magnesium hydroxide-simethicone, bisacodyL, chlorproMAZINE, dicyclomine, hydrALAZINE, hyoscyamine, melatonin, metoclopramide, metoprolol, morphine, ondansetron, oxyCODONE, polyethylene glycol, prochlorperazine, promethazine, senna-docusate 8.6-50 mg, sodium chloride 0.9%, sodium chloride 0.9%, Flushing PICC/Midline Protocol **AND** sodium chloride 0.9% **AND** sodium chloride 0.9%    Review of patient's allergies indicates:  No Known Allergies     Past Medical History:   Diagnosis Date    Hypertension     Sciatica      Past Surgical History:   Procedure Laterality Date    CARPAL TUNNEL RELEASE Left     CYSTOSCOPY W/ URETERAL STENT PLACEMENT Bilateral 11/27/2023    Procedure: CYSTOSCOPY, WITH URETERAL STENT INSERTION;  Surgeon: Huey Cardenas MD;  Location: [facility] OR;  Service: Urology;  Laterality: Bilateral;    EGD, WITH CLOSED BIOPSY  11/18/2023    Procedure: EGD, WITH CLOSED BIOPSY;  Surgeon: Alfred Goss MD;  Location: [facility] OR;  Service: Gastroenterology;;    ERCP N/A 11/18/2023    Procedure: ERCP (ENDOSCOPIC RETROGRADE CHOLANGIOPANCREATOGRAPHY);  Surgeon: Alfred Goss MD;  Location: [facility] OR;  Service: Gastroenterology;  Laterality: N/A;    HEMORRHOID SURGERY      INSERTION OF TUNNELED CENTRAL VENOUS  CATHETER (CVC) WITH SUBCUTANEOUS PORT N/A 12/1/2023    Procedure: INSERTION, PORT-A-CATH;  Surgeon: William Morse MD;  Location: [facility] OR;  Service: General;  Laterality: N/A;    INSERTION OF TUNNELED CENTRAL VENOUS HEMODIALYSIS CATHETER N/A 12/6/2023    Procedure: Insertion, Catheter, Central Venous, Hemodialysis;  Surgeon: Satinder Luo DO;  Location: [facility] CATH LAB;  Service: Nephrology;  Laterality: N/A;    LAPAROSCOPIC INSERTION OF JEJUNOSTOMY TUBE N/A 12/1/2023    Procedure: INSERTION, JEJUNOSTOMY TUBE, LAPAROSCOPIC;  Surgeon: William Morse MD;  Location: [facility] OR;  Service: General;  Laterality: N/A;  PLUS MEDIPORT       Family History       Problem Relation (Age of Onset)    Breast cancer Mother    Cancer Maternal Aunt          Tobacco Use    Smoking status: Never    Smokeless tobacco: Never   Substance and Sexual Activity    Alcohol use: No    Drug use: No    Sexual activity: Never     Birth control/protection: Post-menopausal     Review of Systems   Constitutional:  Positive for activity change and appetite change.   Respiratory:  Positive for shortness of breath (reports dyspnea when HOB lowered).    Cardiovascular:  Positive for leg swelling.   Genitourinary:         Anuria   Skin:  Positive for wound.   Neurological:  Positive for weakness (Generalized).   Psychiatric/Behavioral:  Negative for behavioral problems.        Objective:     Vital Signs (Most Recent):  Temp: 97.9 °F (36.6 °C) (12/20/23 0754)  Pulse: 110 (12/20/23 0908)  Resp: 18 (12/20/23 0754)  BP: (!) 89/63 (12/20/23 0908)  SpO2: 100 % (12/20/23 0908) Vital Signs (24h Range):  Temp:  [97.7 °F (36.5 °C)-99.8 °F (37.7 °C)] 97.9 °F (36.6 °C)  Pulse:  [] 110  Resp:  [18-20] 18  SpO2:  [99 %-100 %] 100 %  BP: ()/(57-79) 89/63     Weight:  (pt unable to stand .)  Body mass index is 34.48 kg/m².     Physical Exam  Constitutional:       General: She is not in acute distress.     Appearance: She is ill-appearing. She is not  "toxic-appearing.   HENT:      Head: Normocephalic.   Pulmonary:      Effort: Pulmonary effort is normal.      Comments: Mild dyspnea noted with HOB lowered while performing wound care  O2@2LPM per NC in place3  Musculoskeletal:      Left lower leg: Edema (4+ pitting edema BLE) present.   Skin:     Capillary Refill: Capillary refill takes 2 to 3 seconds.      Findings: Lesion present.             Comments: Three part pressure ulcer to sacrum     Neurological:      Mental Status: She is alert and oriented to person, place, and time.      Motor: Weakness (generalized) present.   Psychiatric:         Mood and Affect: Mood normal.         Behavior: Behavior normal.        Three part unstageable sacral pressure ulcer: coccyx: 3x2cm; Right sacrum: 3.2x3.5cm; Left sacrum: 1.5x0.5cm      Laboratory:  A1C: No results for input(s): "HGBA1C" in the last 4320 hours.  Blood Cultures: No results for input(s): "LABBLOO" in the last 48 hours.  CBC:   Recent Labs   Lab 12/20/23  0438   WBC 22.86  22.86*   RBC 3.13*   HGB 9.2*   HCT 28.2*   *   MCV 90.1   MCH 29.4   MCHC 32.6*     CMP:   Recent Labs   Lab 12/20/23  0438   CALCIUM 8.6   ALBUMIN 1.6*   *   K 4.1   CO2 19*   BUN 97.8*   CREATININE 6.96*   ALKPHOS 170*   ALT 46   AST 41*   BILITOT 2.8*     Prealbumin: No results for input(s): "PREALBUMIN" in the last 48 hours.  Wound Cultures: No results for input(s): "LABAERO" in the last 4320 hours.  Microbiology Results (last 7 days)       Procedure Component Value Units Date/Time    Blood Culture [[id number]]  (Normal) Collected: 12/17/23 0844    Order Status: Completed Specimen: Blood Updated: 12/20/23 1101     CULTURE, BLOOD ([facility]) No Growth At 72 Hours    Blood Culture [[id number]]  (Normal) Collected: 12/15/23 1505    Order Status: Completed Specimen: Blood Updated: 12/19/23 1900     CULTURE, BLOOD ([facility]) No Growth At 96 Hours    Blood Culture [[id number]]  (Normal) Collected: 12/15/23 1505    Order Status: Completed " Specimen: Blood Updated: 12/19/23 1900     CULTURE, BLOOD ([facility]) No Growth At 96 Hours    Blood Culture [[id number]]  (Abnormal) Collected: 12/10/23 0605    Order Status: Completed Specimen: Blood from PICC Line Updated: 12/18/23 1104     CULTURE, BLOOD ([facility]) Candida glabrata     Comment: Susceptibility sent to reference lab. Results to follow on separate report.        GRAM STAIN Yeast      Seen in gram stain of broth only      1 of 2 Aerobic bottles positive    Narrative:      For sensitivity refer to [id number]    Blood Culture [[id number]]  (Normal) Collected: 12/12/23 1536    Order Status: Completed Specimen: Blood Updated: 12/17/23 1800     CULTURE, BLOOD ([facility]) No Growth at 5 days    Blood Culture [[id number]]  (Normal) Collected: 12/12/23 1536    Order Status: Completed Specimen: Blood Updated: 12/17/23 1800     CULTURE, BLOOD ([facility]) No Growth at 5 days    Fungal Culture Blood [[id number]] Collected: 12/17/23 0844    Order Status: Sent Specimen: Venous Blood Line Updated: 12/17/23 0904    Blood Culture [[id number]]  (Normal) Collected: 12/11/23 1611    Order Status: Completed Specimen: Blood Updated: 12/16/23 1701     CULTURE, BLOOD ([facility]) No Growth at 5 days    Blood Culture [[id number]]  (Normal) Collected: 12/10/23 0605    Order Status: Completed Specimen: Blood from Hand, Left Updated: 12/15/23 1001     CULTURE, BLOOD ([facility]) No Growth at 5 days    Blood Culture [[id number]]  (Abnormal) Collected: 12/11/23 1611    Order Status: Completed Specimen: Blood Updated: 12/14/23 0613     CULTURE, BLOOD ([facility]) Candida glabrata     Comment: Susceptibility sent to reference lab. Results to follow on separate report.        GRAM STAIN Yeast      1 of 2 Aerobic bottles positive      Seen in gram stain of broth only    Narrative:      For sensitivity refer to [id number]          Specimen (24h ago, onward)      None          All pertinent labs reviewed within the last 24 hours.    Diagnostic Results:  I have  reviewed all pertinent imaging results/findings within the past 24 hours.

## 2023-12-20 NOTE — PROGRESS NOTES
Inpatient Nutrition Assessment    Admit Date: 11/17/2023   Total duration of encounter: 33 days     Nutrition Recommendation/Prescription     -Continue tube feeds as tolerated: Novasource @ 45ml/hr, goal rate.   Free water flush: 20ml/hr (~400ml flush/d; 1050ml total fluids daily)    -Electrolytes and additional fluids per pharmacy/MD.     -Monitor need to add bowel regimen.     Communication of Recommendations: reviewed with nurse, reviewed with patient, and reviewed with family    Nutrition Assessment     Malnutrition Assessment/Nutrition-Focused Physical Exam    Malnutrition Context: acute illness or injury (11/18/23 1630)  Malnutrition Level: severe (11/18/23 1630)  Energy Intake (Malnutrition):  (does not meet criteria) (12/08/23 1238)  Weight Loss (Malnutrition): greater than 7.5% in 3 months (11/18/23 1630)  Subcutaneous Fat (Malnutrition): moderate depletion (11/18/23 1630)  Orbital Region (Subcutaneous Fat Loss): moderate depletion        Muscle Mass (Malnutrition): moderate depletion (11/18/23 1630)  [place] (Muscle Loss): moderate depletion     Clavicle and Acromion Bone Region (Muscle Loss): moderate depletion        Patellar Region (Muscle Loss): moderate depletion                 A minimum of two characteristics is recommended for diagnosis of either severe or non-severe malnutrition.    Chart Review    Reason Seen: malnutrition screening tool (MST), physician consult for wt loss, and follow-up    Malnutrition Screening Tool Results   Have you recently lost weight without trying?: Yes: 24-33 lbs  Have you been eating poorly because of a decreased appetite?: No   MST Score: 3     Diagnosis:  Hypotension, improved  Persistent leukocytosis- source unclear  Acute on chronic anemia, s/p 2 units prbcs transfusion 11/30  Hx of Acute Blood loss anemia with syncope and then fall 11/26-   Locally advanced gastric adenocarcinoma with intractable nausea and vomiting possible Gastric/duodenal outlet  obstruction  -now status post J Tube placement  Obstructive uropathy with bilateral hydronephrosis- S/P bilateral ureteral stent placed on 11/27  POORNIMA-now on HD - 11/29  Metabolic acidosis- resolved  Suspected bile leak with biloma and biliary obstruction--> H/O Laparoscopic incomplete cholecystectomy 11/10/2023  New onset atrial fibrillation with RVR- fluctuating  Hypokalemia- resolved with replacement  Hyponatremia  Severe malnutrition    Relevant Medical History:   Past Medical History:   Diagnosis Date    Hypertension     Sciatica      Past Surgical History:   Procedure Laterality Date    CARPAL TUNNEL RELEASE Left     CYSTOSCOPY W/ URETERAL STENT PLACEMENT Bilateral 11/27/2023    Procedure: CYSTOSCOPY, WITH URETERAL STENT INSERTION;  Surgeon: Huey Cardenas MD;  Location: [facility] OR;  Service: Urology;  Laterality: Bilateral;    EGD, WITH CLOSED BIOPSY  11/18/2023    Procedure: EGD, WITH CLOSED BIOPSY;  Surgeon: Alfred Goss MD;  Location: [facility] OR;  Service: Gastroenterology;;    ERCP N/A 11/18/2023    Procedure: ERCP (ENDOSCOPIC RETROGRADE CHOLANGIOPANCREATOGRAPHY);  Surgeon: Alfred Goss MD;  Location: [facility] OR;  Service: Gastroenterology;  Laterality: N/A;    HEMORRHOID SURGERY      INSERTION OF TUNNELED CENTRAL VENOUS CATHETER (CVC) WITH SUBCUTANEOUS PORT N/A 12/1/2023    Procedure: INSERTION, PORT-A-CATH;  Surgeon: William Morse MD;  Location: [facility] OR;  Service: General;  Laterality: N/A;    INSERTION OF TUNNELED CENTRAL VENOUS HEMODIALYSIS CATHETER N/A 12/6/2023    Procedure: Insertion, Catheter, Central Venous, Hemodialysis;  Surgeon: Satinder Luo DO;  Location: [facility] CATH LAB;  Service: Nephrology;  Laterality: N/A;    LAPAROSCOPIC INSERTION OF JEJUNOSTOMY TUBE N/A 12/1/2023    Procedure: INSERTION, JEJUNOSTOMY TUBE, LAPAROSCOPIC;  Surgeon: William Morse MD;  Location: [facility] OR;  Service: General;  Laterality: N/A;  PLUS MEDIPORT     Review of patient's allergies  "indicates:  No Known Allergies     Nutrition-Related Medications:    ciprofloxacin  400 mg Intravenous Q24H    collagenase   Topical (Top) Daily    enoxparin  1 mg/kg Subcutaneous Q24H (treatment, non-standard time)    EScitalopram oxalate  10 mg Oral Daily    hydrocortisone  25 mg Rectal BID    melatonin  6 mg Oral Nightly    metoprolol tartrate  25 mg Oral BID    micafungin (MYCAMINE) IVPB  100 mg Intravenous Q24H    midodrine  10 mg Oral TID WM    pantoprazole  40 mg Intravenous BID WM    perflutren lipid microspheres  1.3 mL Intravenous Once    sodium bicarbonate  1,300 mg Oral Daily    sodium chloride 0.9%  10 mL Intravenous Q6H        Calorie Containing IV Medications: no significant kcals from medications at this time    Nutrition-Related Labs:  No results found for: "HGBA1C"  12/16/2023: Phosphorus Level 3.3 mg/dL (Ref range: 2.3 - 4.7 mg/dL)  12/18/2023: Magnesium Level 2.30 mg/dL (Ref range: 1.60 - 2.60 mg/dL)  12/20/2023: Potassium Level 4.1 mmol/L (Ref range: 3.5 - 5.1 mmol/L); Sodium Level 133 mmol/L (L; Ref range: 136 - 145 mmol/L)   Recent Labs   Lab 12/18/23  0440 12/19/23  0444 12/20/23  0438   WBC 20.66* 24.35  24.35* 22.86  22.86*   RBC 3.44* 3.11* 3.13*   HGB 10.4* 9.3* 9.2*   HCT 30.5* 28.2* 28.2*   MCV 88.7 90.7 90.1   MCH 30.2 29.9 29.4   MCHC 34.1 33.0 32.6*       Recent Labs   Lab 12/14/23  1214 12/15/23  1047 12/16/23  0414 12/18/23  0440 12/19/23  0444 12/20/23  0438   * 136   < > 133* 133* 133*   K 3.4* 3.3*   < > 4.3 4.3 4.1   CO2 21* 23   < > 19* 18* 19*   BUN 68.6* 48.7*   < > 63.8* 82.4* 97.8*   CREATININE 5.54* 4.56*   < > 5.20* 6.24* 6.96*   GLUCOSE 97 117*   < > 90 107 106   CALCIUM 8.8 8.1*   < > 8.8 8.6 8.6   MG 2.30 2.10  --  2.30  --   --    ALBUMIN 1.5* 1.5*   < > 1.7* 1.6* 1.6*   ALKPHOS 156* 184*   < > 162* 160* 170*   ALT 53 52   < > 51 47 46   AST 46* 42*   < > 42* 37* 41*   BILITOT 4.3* 3.5*   < > 3.2* 2.8* 2.8*    < > = values in this interval not displayed. "         Diet/PN Order: Diet NPO  Oral Supplement Order: none  Tube Feeding Order:  Novasource @ 45ml/hr (see below for calculation)  Appetite/Oral Intake: NPO/NPO  Factors Affecting Nutritional Intake: altered gastrointestinal function and NPO  Food/Religion/Cultural Preferences: none reported  Food Allergies: none reported    Skin Integrity: drain/device(s)  Wound(s):      Altered Skin Integrity 12/12/23 1500 Right lateral Buttocks Partial thickness tissue loss. Shallow open ulcer with a red or pink wound bed, without slough. Intact or Open/Ruptured Serum-filled blister.-Tissue loss description: Partial thickness       Altered Skin Integrity 12/12/23 1500 Right medial Buttocks Partial thickness tissue loss. Shallow open ulcer with a red or pink wound bed, without slough. Intact or Open/Ruptured Serum-filled blister.-Tissue loss description: Partial thickness       Altered Skin Integrity 12/12/23 1500 Sacral spine Partial thickness tissue loss. Shallow open ulcer with a red or pink wound bed, without slough. Intact or Open/Ruptured Serum-filled blister.-Tissue loss description: Full thickness n/a    Comments    11/18/23: Pt reports poor appetite, nauseated, threw up after consuming ~10% of full liquid tray for lunch, appetite has been a struggle for 3 months over which time she lost about 30 lbs unintentionally, appetite has been worse since 11/10 incomplete cholecystectomy, chews/swallows well, agrees to vanilla ONS on diet advancement.     11/22/23: Pt with poor to fair intake of full liquids; states that she is still having some n/v occasionally; reports that she is drinking the Boost that is ordered.     11/24/23: Pt reports poor appetite, still w/ n/v, basically vomits up everything she consumes. Discussed w/ physician, we agree that she needs a J-tube. Pt reports that she is open to it.     11/26/23: breakfast: 0% tray; lunch- unknown, no tray receipt; dinner- 100% 2 orange sheberts and 1/2 bowl chicken  "broth. Consuming <25% energy needs.    11/27/23: breakfast: 50% cream of wheat and 100% orange juice; lunch: NPO for stent placements, but sx deferred until tomorrow so will allow liquids for dinner tonight.   Continues with emesis and nausea throughout day; receiving antiemetics. States Boost supplement also "comes back up". Will change to Boost Breeze and monitor tolerance.  Changing peripheral PN to total PN with lipids 2x/week to meet nutritional needs until able to place enteral access device for tube feeds or po intake improves. Discussed with MD and pharmacy.     11/30/23 TPN continues. Decreased rate to 50ml/hr until tomorrow and will order custom TPN for minimum volume until able to start on enteral nutrition. Started on dialysis s/t worsening renal indices and decreased urine output (<600mL x24 hr pr RN). Glucose lab of 547 this morning error, 159 on recheck.     12/1/23 Out of room for Jtube and mediport placement. Consult received for Jtube feeding recs. Continue in-house TPN at 50ml/hr, no need for custom at this time. Can wean TPN as tolerating tube feeds at 1/2 goal rate. Will use renal formula for tube feedings while pt continues on dilaysis.  Recommendations discussed with RN and pharmacy.     12/4/23 Consult received to start tube feedings. Order placed and discussed with RN. Start at 15ml/hr and advance per MD. Continue with renal formula and monitor electrolytes for ability to change to a standard formula. TPN infusing and NPO status continues.      12/5/23 Tolerating tube feeds at 25ml/hr. TPN decreased to 30ml/hr last night. Pt reports tolerating tube feeds fine. TPN bag should be complete in the next few hours. Can discontinue as long as tolerating tube feeds.    12/8/23: Tolerating tube feeds at goal last few days. Last BM 12/5. Reports gas but no abdominal pain.   No TPN/IVF infusing. Increase free water flush from 30ml q4hr to 20ml/hr.    12/12/23: Pt in dialysis at time of rounds. Spoke " "with RN, she reported that TF were at goal before pt went NPO at midnight. RN stated that plans were to resume feeds once the pt returns from dialysis.     12/15/23: TF on hold this morning but RN states got the ok to resume at goal rate. Pt has been tolerating. Continue NPO.     23: Spoke with RN. Pt continues to tolerate TF at goal.     Anthropometrics    Height: 5' 5.75" (167 cm) Height Method: Stated  Last Weight:  (pt unable to stand .) (23 0631) Weight Method: Standard Scale  BMI (Calculated): 34.5  BMI Classification: overweight (BMI 25-29.9)     Ideal Body Weight (IBW), Female: 128.75 lb     % Ideal Body Weight, Female (lb): 140.58 %                             Usual Weight Provided By: patient and EMR weight history  23: 211 lbs  Wt Readings from Last 5 Encounters:   12/10/23 96.2 kg (212 lb)   18 88 kg (194 lb)   18 90.4 kg (199 lb 4.7 oz)   17 90 kg (198 lb 6.6 oz)     Weight Change(s) Since Admission: +14 kg, unsure of accuracy   Admit Weight: 82.2 kg (181 lb 4 oz) (23 6435)    Estimated Needs    Weight Used For Calorie Calculations: 82.2 kg (181 lb 3.5 oz)  Energy Calorie Requirements (kcal): 1644 kcals (20 kcals/kg)  Energy Need Method: Kcal/kg  Weight Used For Protein Calculations: 82.2 kg (181 lb 3.5 oz)  Protein Requirements:  g (1.0-1.3 g/kg)  Fluid Requirements (mL): 1000mL + UOP (started on HD)  Temp (24hrs), Av.5 °F (36.9 °C), Min:97.7 °F (36.5 °C), Max:99.8 °F (37.7 °C)       Enteral Nutrition    Formula: Novasource Renal  Rate/Volume: 45ml/hr  Water Flushes: 20ml/hr (400ml free water)  Additives/Modulars: none at this time  Route: jejunostomy tube  Method: continuous  Total Nutrition Provided by Tube Feeding, Additives, and Flushes:  Calories Provided  1800 kcal/d, 109% needs   Protein Provided   81 g/d, 98% needs   Fluid Provided  1050 ml/d, -% needs   Continuous feeding calculations based on estimated 20 hr/d run time unless otherwise " stated.    Parenteral Nutrition    Patient not receiving parenteral nutrition at this time.     Evaluation of Received Nutrient Intake    Calories: meeting estimated needs  Protein: meeting estimated needs    Patient Education    Not applicable.    Nutrition Diagnosis     PES: Malnutrition related to suboptimal protein/energy intake as evidenced by less than or equal to 50% needs met for greater than or equal to 5 days, moderate fat depletion, moderate muscle depletion, and greater than 7.5% weight loss in 3 months. (active)     Interventions/Goals     Intervention(s): collaboration with other providers  Goal: Meet greater than 75% of nutritional needs by follow-up. (goal met)    Monitoring & Evaluation     Dietitian will monitor energy intake, weight, electrolyte/renal panel, and gastrointestinal profile.  Nutrition Risk/Follow-Up: moderate (follow-up in 3-5 days)   Please consult if re-assessment needed sooner.    Nusrat Carballo RD   12/20/2023

## 2023-12-20 NOTE — CONSULTS
[facility] - [facility] Medical Telemetry  Wound Care  Consult Note    Patient Name: Karen Briggs  MRN: [id number]  Admission Date: 11/17/2023  Hospital Length of Stay: 33 days  Attending Physician: Mariano Bermudez MD  Primary Care Provider: Marian White FNP-C       Subjective:     History of Present Illness:  Wound medicine consult    This 58-year-old female with a past medical history of Hypertension underwent a laparoscopic cholecystectomy on 11/10/23, which was unsuccessful as the gallbladder was unable to be completely resected. After the procedure, she continued with right flank and back pain, and seven days post-procedure, she followed up with PCP for evaluation, and subsequently sent to this facility for ERCP. Since admission to this facility on 11/17/23, she was diagnosed with gastric cancer and developed POORNIMA secondary to obstructive uropathy; she is now in ESRD and HD-dependent.   Her course is further complicated by persistent leukocytosis and infected PICC line and mediport sites (fungemia); ID consulted and guiding antibiotic stewardship; anemia with FOBT+; GI following.  During this hospitalization, due to current complex health issues, she has become deconditioned and unable to even reposition herself in bed or to shift pressure off her sacrum. She has developed a three-part unstageable pressure ulcer to her sacrum. Wounds to the sacrum were noted with images on 12/12/23 when [facility] began treatment of these wounds; wounds have since worsened and wound medicine was consulted today 12/19/23. I assessed the patient in her room, noted and took photos of the unstageable pressure ulcer. Explained to patient about need for offloading, repositioning, getting out of bed with therapy etc.   Patient is awake and alert with multiple family members at bedside, she confirms that she is unable to reposition herself and she is not getting out of bed due to decline from her prior function. She  states she is getting up with therapy at times.        Scheduled Meds:   ciprofloxacin  400 mg Intravenous Q24H    collagenase   Topical (Top) Daily    enoxparin  1 mg/kg Subcutaneous Q24H (treatment, non-standard time)    hydrocortisone  25 mg Rectal BID    metoprolol tartrate  25 mg Oral BID    micafungin (MYCAMINE) IVPB  100 mg Intravenous Q24H    midodrine  10 mg Oral TID WM    pantoprazole  40 mg Intravenous BID WM    perflutren lipid microspheres  1.3 mL Intravenous Once    sodium bicarbonate  1,300 mg Oral Daily    sodium chloride 0.9%  10 mL Intravenous Q6H     Continuous Infusions:  PRN Meds:0.9%  NaCl infusion (for blood administration), 0.9%  NaCl infusion (for blood administration), sodium chloride 0.9%, acetaminophen, aluminum-magnesium hydroxide-simethicone, bisacodyL, chlorproMAZINE, dicyclomine, hydrALAZINE, hyoscyamine, melatonin, metoclopramide, metoprolol, morphine, ondansetron, oxyCODONE, polyethylene glycol, prochlorperazine, promethazine, senna-docusate 8.6-50 mg, sodium chloride 0.9%, sodium chloride 0.9%, Flushing PICC/Midline Protocol **AND** sodium chloride 0.9% **AND** sodium chloride 0.9%    Review of patient's allergies indicates:  No Known Allergies     Past Medical History:   Diagnosis Date    Hypertension     Sciatica      Past Surgical History:   Procedure Laterality Date    CARPAL TUNNEL RELEASE Left     CYSTOSCOPY W/ URETERAL STENT PLACEMENT Bilateral 11/27/2023    Procedure: CYSTOSCOPY, WITH URETERAL STENT INSERTION;  Surgeon: Huey Cardenas MD;  Location: [facility] OR;  Service: Urology;  Laterality: Bilateral;    EGD, WITH CLOSED BIOPSY  11/18/2023    Procedure: EGD, WITH CLOSED BIOPSY;  Surgeon: Alfred Goss MD;  Location: [facility] OR;  Service: Gastroenterology;;    ERCP N/A 11/18/2023    Procedure: ERCP (ENDOSCOPIC RETROGRADE CHOLANGIOPANCREATOGRAPHY);  Surgeon: Alfred Goss MD;  Location: [facility] OR;  Service: Gastroenterology;  Laterality: N/A;    HEMORRHOID  SURGERY      INSERTION OF TUNNELED CENTRAL VENOUS CATHETER (CVC) WITH SUBCUTANEOUS PORT N/A 12/1/2023    Procedure: INSERTION, PORT-A-CATH;  Surgeon: William Morse MD;  Location: [facility] OR;  Service: General;  Laterality: N/A;    INSERTION OF TUNNELED CENTRAL VENOUS HEMODIALYSIS CATHETER N/A 12/6/2023    Procedure: Insertion, Catheter, Central Venous, Hemodialysis;  Surgeon: Satinder Luo DO;  Location: [facility] CATH LAB;  Service: Nephrology;  Laterality: N/A;    LAPAROSCOPIC INSERTION OF JEJUNOSTOMY TUBE N/A 12/1/2023    Procedure: INSERTION, JEJUNOSTOMY TUBE, LAPAROSCOPIC;  Surgeon: William Morse MD;  Location: [facility] OR;  Service: General;  Laterality: N/A;  PLUS MEDIPORT       Family History       Problem Relation (Age of Onset)    Breast cancer Mother    Cancer Maternal Aunt          Tobacco Use    Smoking status: Never    Smokeless tobacco: Never   Substance and Sexual Activity    Alcohol use: No    Drug use: No    Sexual activity: Never     Birth control/protection: Post-menopausal     Review of Systems   Constitutional:  Positive for activity change and appetite change.   Respiratory:  Positive for shortness of breath (reports dyspnea when HOB lowered).    Cardiovascular:  Positive for leg swelling.   Genitourinary:         Anuria   Skin:  Positive for wound.   Neurological:  Positive for weakness (Generalized).   Psychiatric/Behavioral:  Negative for behavioral problems.        Objective:     Vital Signs (Most Recent):  Temp: 97.9 °F (36.6 °C) (12/20/23 0754)  Pulse: 110 (12/20/23 0908)  Resp: 18 (12/20/23 0754)  BP: (!) 89/63 (12/20/23 0908)  SpO2: 100 % (12/20/23 0908) Vital Signs (24h Range):  Temp:  [97.7 °F (36.5 °C)-99.8 °F (37.7 °C)] 97.9 °F (36.6 °C)  Pulse:  [] 110  Resp:  [18-20] 18  SpO2:  [99 %-100 %] 100 %  BP: ()/(57-79) 89/63     Weight:  (pt unable to stand .)  Body mass index is 34.48 kg/m².     Physical Exam  Constitutional:       General: She is not in acute distress.      "Appearance: She is ill-appearing. She is not toxic-appearing.   HENT:      Head: Normocephalic.   Pulmonary:      Effort: Pulmonary effort is normal.      Comments: Mild dyspnea noted with HOB lowered while performing wound care  O2@2LPM per NC in place3  Musculoskeletal:      Left lower leg: Edema (4+ pitting edema BLE) present.   Skin:     Capillary Refill: Capillary refill takes 2 to 3 seconds.      Findings: Lesion present.             Comments: Three part pressure ulcer to sacrum     Neurological:      Mental Status: She is alert and oriented to person, place, and time.      Motor: Weakness (generalized) present.   Psychiatric:         Mood and Affect: Mood normal.         Behavior: Behavior normal.        Three part unstageable sacral pressure ulcer: coccyx: 3x2cm; Right sacrum: 3.2x3.5cm; Left sacrum: 1.5x0.5cm      Laboratory:  A1C: No results for input(s): "HGBA1C" in the last 4320 hours.  Blood Cultures: No results for input(s): "LABBLOO" in the last 48 hours.  CBC:   Recent Labs   Lab 12/20/23  0438   WBC 22.86  22.86*   RBC 3.13*   HGB 9.2*   HCT 28.2*   *   MCV 90.1   MCH 29.4   MCHC 32.6*     CMP:   Recent Labs   Lab 12/20/23  0438   CALCIUM 8.6   ALBUMIN 1.6*   *   K 4.1   CO2 19*   BUN 97.8*   CREATININE 6.96*   ALKPHOS 170*   ALT 46   AST 41*   BILITOT 2.8*     Prealbumin: No results for input(s): "PREALBUMIN" in the last 48 hours.  Wound Cultures: No results for input(s): "LABAERO" in the last 4320 hours.  Microbiology Results (last 7 days)       Procedure Component Value Units Date/Time    Blood Culture [[id number]]  (Normal) Collected: 12/17/23 0844    Order Status: Completed Specimen: Blood Updated: 12/20/23 1101     CULTURE, BLOOD ([facility]) No Growth At 72 Hours    Blood Culture [[id number]]  (Normal) Collected: 12/15/23 1505    Order Status: Completed Specimen: Blood Updated: 12/19/23 1900     CULTURE, BLOOD ([facility]) No Growth At 96 Hours    Blood Culture [[id number]]  (Normal) " Collected: 12/15/23 1505    Order Status: Completed Specimen: Blood Updated: 12/19/23 1900     CULTURE, BLOOD ([facility]) No Growth At 96 Hours    Blood Culture [[id number]]  (Abnormal) Collected: 12/10/23 0605    Order Status: Completed Specimen: Blood from PICC Line Updated: 12/18/23 1104     CULTURE, BLOOD ([facility]) Candida glabrata     Comment: Susceptibility sent to reference lab. Results to follow on separate report.        GRAM STAIN Yeast      Seen in gram stain of broth only      1 of 2 Aerobic bottles positive    Narrative:      For sensitivity refer to [id number]    Blood Culture [[id number]]  (Normal) Collected: 12/12/23 1536    Order Status: Completed Specimen: Blood Updated: 12/17/23 1800     CULTURE, BLOOD ([facility]) No Growth at 5 days    Blood Culture [[id number]]  (Normal) Collected: 12/12/23 1536    Order Status: Completed Specimen: Blood Updated: 12/17/23 1800     CULTURE, BLOOD ([facility]) No Growth at 5 days    Fungal Culture Blood [[id number]] Collected: 12/17/23 0844    Order Status: Sent Specimen: Venous Blood Line Updated: 12/17/23 0904    Blood Culture [[id number]]  (Normal) Collected: 12/11/23 1611    Order Status: Completed Specimen: Blood Updated: 12/16/23 1701     CULTURE, BLOOD ([facility]) No Growth at 5 days    Blood Culture [[id number]]  (Normal) Collected: 12/10/23 0605    Order Status: Completed Specimen: Blood from Hand, Left Updated: 12/15/23 1001     CULTURE, BLOOD ([facility]) No Growth at 5 days    Blood Culture [[id number]]  (Abnormal) Collected: 12/11/23 1611    Order Status: Completed Specimen: Blood Updated: 12/14/23 0613     CULTURE, BLOOD ([facility]) Candida glabrata     Comment: Susceptibility sent to reference lab. Results to follow on separate report.        GRAM STAIN Yeast      1 of 2 Aerobic bottles positive      Seen in gram stain of broth only    Narrative:      For sensitivity refer to [id number]          Specimen (24h ago, onward)      None          All pertinent labs reviewed within the  last 24 hours.    Diagnostic Results:  I have reviewed all pertinent imaging results/findings within the past 24 hours.      Specimen to Pathology  Order: [id number]  Status: Edited Result - FINAL       Visible to patient: No (inaccessible in Patient Portal)       Next appt: None       Dx: Hyperbilirubinemia; Biloma following ...    0 Result Notes      Component 1 mo ago   Pathology Result VC   Comment:          FINAL DIAGNOSIS     1. GASTRIC BODY, BIOPSY:     MARKED CHRONIC GASTRITIS WITH INTESTINAL METAPLASIA.     2. GASTRIC CARDIA/GE JUNCTION, BIOPSY:     ADENOCARCINOMA, MODERATELY DIFFERENTIATED, INTESTINAL TYPE.     CODE 9     Electronically Signed by:  Hamilton Gallegos M.D. PAlondraH.SISSY , Pathologist  (Case signed 11/21/2023 at 12:00am)     Comment  1.  SPECIAL STAINS:  [place] Stain:  No evidence of H. pylori.  Alcian Blue:  Intestinal metaplasia present focally.     2.  SPECIAL STAINS:  [place] Stain:  No evidence of H. pylori.  Alcian Blue:  Goblets cells are present within the tumor.        ADDENDUM REPORT  REPORT FROM [facility]     Case Number:  [id number]  Diagnosis:  Adenocarcinoma, NOS  Primary Tumor Site: Gastroesophageal junction  Specimen Site:  Cardia, NOS  Specimen ID:  [id number]     Please refer to the complete  imaged report performed by [facility].     PLEASE NOTE:  A copy of the performing laboratory's report has been scanned  into the  patient record at [facility] and a copy of the  original report can be printed at your request.     Source  1. GASTRIC BODY INFLAMMATION  2. CARDIA/GE JUNCTION     Clinical Information  HYDRONEPHROSIS                Assessment/Plan:     Pressure ulcer of sacral region, unstageable; first noted by [facility] staff on 12/12/23  ESRD requiring HD: Nephrology following; plan to receive new tunneled catheter and dialysis on 12/20/23  Anemia - Hematology and Oncology following  Gastric Cancer: Hematology and Oncology  following; follow RD recommendations; patient has J-tube for tube nutrition.   Positive blood cultures on 12/10/23; fungemia: ID following and guiding antibiotic stewardship  Thrombocytosis; Followed by oncology, suspect reactive process from malignancy and current clinical state; also followed by ID.   Malnutrition    PLAN:    Chart reviewed, patient examined and wounds assessed.  Opinion: Patient has hospital-acquired three part unstageable sacral/coccyx pressure ulcer. Patient has been critically ill with progressive weakness since admit. She is being followed by multiple disciplines and her prognosis is guarded at this time. There is a chance that this wound may deteriorate due to current condition and may require palliative treatment.   Wound care orders: coccyx: cleanse wound and apply santyl to wound bed and cover with vashe moistened mesalt and secondary dressing. Right and left sacral wounds: Cleanse wound and cover with secondary dressing.   Monitor for signs & symptoms of deterioration  Offloading of sacrum/buttocks/heels at all times: NAZANIN mattress, turning q 2 hrs; use of wedges and heel offloading devices to be used at all times while in bed; ( MORIS Martinez). This needs to be reinforced by every staff nurse caring for patient on every shift of every day as well as attendings rounding on the patient every day. May still use PT/OT services as long as use of geomat/ROHO on wheelchair seat and small pillow to lower back as well as ongoing heel offloading devices in place  The importance of offloading was explained to patient; explained that she needs to shift her weight and use wedges to take the pressure off of her sacrum so that the wound does not get worse.   Incontinence: control moisture/wound contamination: No briefs; use things such as purewick or cheney catheter; rectal tube  Nutrition: Follow RD recommendations  Will try to follow weekly while admitted, but every nurse assigned to patient on every  shift of every day needs to address daily wound care dressing changes and offloading modalities including using heel offloading devices, wedges, NAZANIN mattress etc  Discussed with patient as well as nurse caring for patient today            The time spent including preparing to see the patient, obtaining patient history and assessment, evaluation of the plan of care, patient/caregiver counseling and education, orders, documentation, coordination of care, and other professional medical management activities for today's encounter was 70 minutes             Thank you for your consult.     MARIA A White  Wound Care  [facility] - [facility] Medical Telemetry      This is Dr Brito. I examined pt with the NP and agree with documentation and plan.

## 2023-12-20 NOTE — PROCEDURES
INTERVENTIONAL NEPHROLOGY PROCEDURE NOTE:   TUNNELED DIALYSIS CATHETER (TDC) INSERTION         Performing Physician:   Dr. Luo    Access History: Pt is with POORNIMA requiring HD.    Pre-Op Diagnosis: N17.9 POORNIMA.  Post-Op Diagnosis: Same    Procedure: Tunneled dialysis catheter insertion.    Indication: ESRD requiring HD.    Anatomical Site: right internal jugular (RIJ)/right chest wall (RCW)    Informed Consent:  The patient was evaluated in the pre-operative area with assessment including the American Society of Anesthesia risk classification. The procedure is discussed in detail including risks, benefits alternatives and options and the patient agrees to proceed. Informed consent was obtained from the patient.     Maximum sterile barrier technique: The patient was prepped and draped using chlorhexidine prep and maximum sterile barrier technique.    Sedation Note:  Risks and benefits of sedation were reviewed with the patient or surrogate, including bleeding, infection, nausea, vomiting, dizziness, instability, damage to a nerve, damage to a blood vessel, cellulitis, reaction to medications, amnesia, loss of consciousness, respiratory arrest, cardiac arrest.     The patient received the following medications: Versed 1 mg IV and Fentanyl 25 mcg IV; patient did remain alert, responsive, and conversational throughout the procedure. I was personally responsible for supervising the administration of moderate sedation services during the procedure performed and I affirm all the guidelines and requirements described in the CPT 2023 section on moderate sedation were followed, including the use of an independent trained observer who had no other duties during the procedure. The total face-to-face time was 26 minutes.    Procedure Steps:   The patient was prepped and draped in sterile fashion. Procedure ultrasound revealed patent right internal jugular vein. Local anesthesia was administered by injecting 1% lidocaine at  "the intended site of cannulation. With use of live ultrasonographic visualization, the vessel was cannulated with a  21 g mini-stick needle. The 0.018" mini-stick guide wire was introduced being careful not to bend the tip. Then by using Seldinger technique, the needle was exchanged for the mini-stick sheath. The inner cannula and mini-stick wire were removed and a J wire was inserted through the sheath. The wire was guided with fluoroscopic guidance, with the tip parked in the inferior vena cava. At this time a cut down on the vessel was made extending the venotomy laterally to the expected tunnel.    The catheter exit site and tunnel was approximated and infiltrated with lidocaine. A stab incision was made at the exit site. The 19 cm (cuff-to-tip) tunneled dialysis catheter assembly with tunneler was then tunneled up through the exit site to the lateral aspect of the venotomy and the catheter pulled through the tunnel. The cuff was placed approximately 1.5 centimeters inside the tunnel. The 12 Fr dilator was passed freely over the wire; it was removed and replaced sequentially with 14 Fr and 16 Fr dilators. The permacath was then inserted via pull-away sheath method. The placement of the catheter tip (at the junction of the SVC and right atrium) and the top of the permacath was confirmed with fluoroscopy.     We did complete an angiogram as the pt had complaints of pain at her neck where the tunneled catheter was inserted. This did not reveal any concerning signs without notable extravasation.    Catheter appeared to function well with various tests utilizing a 10 cc syringe. The catheter limbs were then flushed with saline, followed by instillation of appropriate amounts of heparin as marked by the catheter hub. Venotomy site and tunnel exit site was closed with monoderm suture, being careful to not gonsalez the catheter. Catheter hub was fixed to the chest wall using silk suture.      ASSESSMENT/PLAN:  - " Successful placement of right internal jugular (RIJ)/right chest wall (RCW) 19 cm (cuff-to-tip) TDC (BD [facility]).    EBL: <10 cc    Complications: None    Orders to the dialysis unit: OK to use tunneled dialysis catheter for HD.    Thank you for allowing me the opportunity in taking care of this patient. Please reach me with any questions.    Satinder Luo DO  Interventional Nephrology  Cell: [phone]

## 2023-12-20 NOTE — PROGRESS NOTES
[facility]  Infectious Disease Progress Note            ASSESSMENT & PLAN:   She is a 58-year-old female with a past medical history of hypertension who underwent an unsuccessful laparoscopic cholecystectomy on 11/10/2023 as gallbladder was unable to be completely resected.  Since procedure, she continued to have right flank and back pain.  She followed up with her PCP, and CT of the abdomen and pelvis on 11/17 revealed left-sided hydronephrosis with suspected distal obstruction.  Additionally noted was markedly thickened stomach wall.  Underwent ERCP on 11/18-subsequently found to have a malignant gastric tumor.  IR placed a biliary drain on 11/21.  On 11/24, she developed atrial fibrillation with RVR and was initiated on amiodarone drip.   She then had bilateral ureteral stents placed on 11/27 given persistent hydronephrosis.  She was developed an POORNIMA since admit secondary to obstructive uropathy, now requiring hemodialysis.  Temporary hemodialysis catheter was placed earlier today along with MediPort.  Patient was significant oozing from sites postoperatively.  She received 3 units of blood intraoperatively as well as around 1 L of IV fluids per report.  Patient reports developing cough after procedure today, nonproductive.  Denies chest pain and is oxygenating well on room air.  Patient has been receiving empiric Zosyn essentially since admit.  Leukocytosis had trended up around 11/22, however has since trended downward although with some worsening this morning.  Blood cultures from 11/18 are negative.  Urine culture from 11/23 is also negative.  She is been afebrile and hemodynamically stable.  Chest x-ray earlier today showed low lung volumes with mild retrocardiac atelectasis.  We have been consulted for input regarding worsening leukocytosis.      Reconsulted secondary to recent low-grade fevers, T-max of 100.4° on 12/08.  Patient had previously been off of Zosyn since 12/05.  She was resumed on antimicrobials  with vancomycin and Zosyn on 12/10.  She denies any associated symptoms.  She did have a recent drop in her H&H and positive stools.  GI service is considering colonoscopy.  CT of the chest, abdomen, and pelvis with IV contrast performed on 12/09 showed moderate volume ascites with areas of subcutaneous edema in the abdominal wall.  Patient currently without complaints.  Blood cultures drawn on 12/10 via PICC are positive for yeast, C. glabrata per BCID.  Peripheral set are negative thus far.          Candidemia - abdominal vs line source  Concern for lower GIB  Ascites per recent CT  Persistent leukocytosis, suspect multifactorial  POORNIMA, now ESRD on HD  Obstructive uropathy, s/p bilateral ureteral stents on 11/27  Persistent hyperbilirubinemia, s/p unsuccessful cholecystectomy on 11/10 and biliary drain on 11/21, exchanged 12/13  Gastric cancer, newly diagnosed  Mediport / tunneled HD catheter status      Plan:  Continue micafungin 100mg IV q24hr and cipro 400mg IV q24h.  Oncology input noted - unlikely to tolerate chemotherapy, although will be following.   Discussed with patient and nursing.           SUBJECTIVE:    AF, VSS. No new issues or complaints.     MEDICATIONS:   Reviewed in EMR    REVIEW OF SYSTEMS:   Except as documented, all other systems reviewed and negative     PHYSICAL EXAM:   T 97.9 °F (36.6 °C)   BP (!) 89/63   P 110   RR 18   O2 100 %  GENERAL: NAD; does not appear toxic  SKIN: no rash  HEENT: sclera non-icteric; PERRL   NECK: supple; no LAD  CHEST: CTA; nonlabored, equal expansion; no adventitious BS  CARDIOVASCULAR: RRR, S1S2; no murmur   ABDOMEN:  active bowel sounds; abdomen soft, rounded, nontender to palpation; biliary drain / J-tube noted  EXTREMITIES: no cyanosis or clubbing  NEURO: AAO x4; CN II-XII grossly intact  PSYCH: Mentation and affect appropriate     LABS AND IMAGING:     Recent Labs     12/19/23  0444 12/20/23  0438   WBC 24.35  24.35* 22.86  22.86*   RBC 3.11* 3.13*   HGB  "9.3* 9.2*   HCT 28.2* 28.2*   MCV 90.7 90.1   MCH 29.9 29.4   MCHC 33.0 32.6*   RDW 17.2* 17.2*   PLT 1,014* 925*       No results for input(s): "LACTIC" in the last 72 hours.  No results for input(s): "INR", "APTT", "D-DIMER" in the last 72 hours.  No results for input(s): "HGBA1C", "CHOL", "TRIG", "LDL", "VLDL", "HDL" in the last 72 hours.   Recent Labs     12/18/23  0440 12/19/23  0444 12/20/23  0438   * 133* 133*   K 4.3 4.3 4.1   CHLORIDE 101 100 100   CO2 19* 18* 19*   BUN 63.8* 82.4* 97.8*   CREATININE 5.20* 6.24* 6.96*   GLUCOSE 90 107 106   CALCIUM 8.8 8.6 8.6   MG 2.30  --   --    ALBUMIN 1.7* 1.6* 1.6*   GLOBULIN 5.1* 5.1* 5.1*   ALKPHOS 162* 160* 170*   ALT 51 47 46   AST 42* 37* 41*   BILITOT 3.2* 2.8* 2.8*       No results for input(s): "BNP", "CPK", "TROPONINI" in the last 72 hours.         Cardiac catheterization  Procedure performed in the Invasive Lab    - See Procedure Log link below for nursing documentation    - See OpNote on Surgeries Tab for physician findings    - See Imaging Tab for radiologist dictation    MARIA A Viramontes  Infectious Disease  [facility]  "

## 2023-12-20 NOTE — PT/OT/SLP EVAL
Occupational Therapy  Evaluation    Name: Karen Briggs  MRN: [id number]  Admitting Diagnosis: t/f from [facility]   Recent Surgery: Procedure(s) (LRB):  Insertion, Catheter, Central Venous, Hemodialysis (N/A) Day of Surgery    Recommendations:     Discharge therapy intensity: Moderate Intensity Therapy   Discharge Equipment Recommendations:  other (see comments), walker, rolling (TBD)  Barriers to discharge:  Other (Comment) (ongoing medical needs)    Assessment:     Karen Briggs is a 58 y.o. female who t/f from [facility] following unsuccessful laparoscopic cholecystectomy on 11/10. Since then pt underwent unsuccessful ERCP on 11/18 and s/p biliary drain placement on 11/21. Pt developed afib RVR on 11/24 and acute blood loss anemia with syncope and then fall on 11/26. Pt with obstructive uropathy with bilateral hydronephrosis s/p bilateral ureteral stent placement on 11/27. Pt with a medical diagnosis of gastric adenocarcinoma and Mediport and J tube placed on 12/1. Hospital course complicated by development of fungemia, Mediport removed 12/16 and J tube replaced on 12/18. POORNIMA now on HD; tunneled dialysis catheter placed on 12/20. She tolerated OT eval fair; pt is pleasant and motivated for therapy. She presents with the following performance deficits affecting function: weakness, impaired endurance, impaired self care skills, impaired functional mobility, gait instability, impaired balance, impaired cardiopulmonary response to activity, decreased lower extremity function, decreased upper extremity function. She required max A x2 for bed mobility and sit<>stand. Pt limited by fatigue and SOB this date; vitals monitored and O2 sats remained 95%-97%. Will continue to assess mobility as pt progresses; recommend moderate intensity therapy upon d/c.     Rehab Prognosis: Fair; patient would benefit from acute skilled OT services to address these deficits and reach maximum level of function.       Plan:     Patient to be seen 3  x/week to address the above listed problems via self-care/home management, therapeutic activities, therapeutic exercises  Plan of Care Expires: 01/17/24  Plan of Care Reviewed with: patient    Subjective     Chief Complaint: dizzy and SOB  Patient/Family Comments/goals: To get stronger.     Occupational Profile:  Living Environment: Pt reports living with her significant other in a mobile home with x4 steps to enter and a L hand rail. Pt has a tub and walk-in shower with no shower chair.   Previous level of function: Pt reports being independent with ADLs prior.   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   Equipment Used at Home: none  Assistance upon Discharge: Pt reports her significant other can assist upon d/c.     Pain/Comfort:  Pain Rating 1: other (see comments) (dizzy)  Pain Addressed 1: Reposition, Nurse notified    Patients cultural, spiritual, Methodist conflicts given the current situation: no    Objective:     OT communicated with RN prior to session.      Patient was found right sidelying with PEG Tube, PICC line, telemetry, pulse ox (continuous), peripheral IV, blood pressure cuff upon OT entry to room.    General Precautions: Standard, fall  Orthopedic Precautions: N/A  Braces: N/A    Vital Signs: Blood Pressure: 110/84  Sp02: 95%-97%  Supplemental 02: Nasal Cannula, flow 2 L/min  With Activity: /87    Bed Mobility:    Patient completed Supine to Sit with maximal assistance and x2 persons  Patient completed Sit to Supine with maximal assistance and x2 persons    Functional Mobility/Transfers:  Patient completed Sit <> Stand Transfer with maximal assistance and of x2 persons  with  hand-held assist and rolling walker   Functional Mobility: Pt able to take lateral steps towards HOB with max A x2 using rolling walker and hand-held assist. Pt limited by fatigue and SOB; will continue to assess functional mobility as pt progresses.     Activities of Daily Living:  Lower Body Dressing:  total assistance to don socks while sitting EOB.     [facility] 6 Click ADL:  AMPA Total Score: 17    Functional Cognition:  Orientation: oriented to Person, Place, and Time    Visual Perceptual Skills:  Intact    Upper Extremity Function:  Range of Motion/Manual Muscle Test    RUE  ROM Limitations  5 4+ 4 4- 3+ 3 3- 2+ 2 2- 1 0   Shoulder Flexion WFL []   []   []   []   [x]  []  []  []  []  []  []  []    Shoulder Abduction WFL []   []   []   []   [x]  []  []  []  []  []  []  []    Shoulder Extension WFL []   []   []   []   [x]  []  []  []  []  []  []  []    Elbow Flexion WFL []   []   []   []   [x]  []  []  []  []  []  []  []    Elbow Extension WFL []   []   []   []   [x]  []  []  []  []  []  []  []      LUE  ROM Limitations 5 4+ 4 4- 3+ 3 3- 2+ 2 2- 1 0   Shoulder Flexion WFL []   []   []   []   [x]  []  []  []  []  []  []  []    Shoulder Abduction WFL []   []   []   []   [x]  []  []  []  []  []  []  []    Shoulder Extension WFL []   []   []   []   [x]  []  []  []  []  []  []  []    Elbow Flexion WFL []   []   []   []   [x]  []  []  []  []  []  []  []    Elbow Extension WFL []   []   []   []   [x]  []  []  []  []  []  []  []      Coordination  RUE:  Finger to thumb opposition:WFL  LUE:  Finger to thumb opposition:WFL      Balance:   Static sitting balance: CGA  Dynamic sitting balance:min-CGA  Static standing balance:Max A x2  Dynamic standing balance:Max A x2    Therapeutic Positioning  Risk for acquired pressure injuries is increased due to relative decrease in mobility d/t hospitalization .    OT interventions performed during the course of today's session:   Therapeutic positioning was provided at the conclusion of session to offload all bony prominences for the prevention and/or reduction of pressure injuries    Skin assessment: all bony prominences were assessed    Findings: known area of altered skin integrity at sacrum     OT recommendations for therapeutic positioning throughout hospitalization:   Follow  [facility] Pressure Injury Prevention Protocol      Patient Education:  Patient provided with verbal education education regarding OT role/goals/POC, fall prevention, safety awareness, Discharge/DME recommendations, and pressure ulcer prevention.  Understanding was verbalized, however additional teaching warranted.     Patient left left sidelying with all lines intact, call button in reach, and RN notified    GOALS:   Multidisciplinary Problems       Occupational Therapy Goals          Problem: Occupational Therapy    Goal Priority Disciplines Outcome Interventions   Occupational Therapy Goal     OT, PT/OT Ongoing, Progressing    Description: LTG: Pt will perform basic ADLs and ADL transfers with Modified independence using LRAD by discharge.    STG: to be met by 1/20/24    Pt will complete grooming standing at sink with LRAD with SBA.  Pt will complete UB dressing with SBA.  Pt will complete LB dressing with SBA using LRAD and AE prn.  Pt will complete toileting with SBA using LRAD.  Pt will complete functional mobility to/from toilet and toilet transfer with SBA using LRAD.                        History:     Past Medical History:   Diagnosis Date    Hypertension     Sciatica          Past Surgical History:   Procedure Laterality Date    CARPAL TUNNEL RELEASE Left     CYSTOSCOPY W/ URETERAL STENT PLACEMENT Bilateral 11/27/2023    Procedure: CYSTOSCOPY, WITH URETERAL STENT INSERTION;  Surgeon: Huey Cardenas MD;  [facility];  Service: Urology;  Laterality: Bilateral;    EGD, WITH CLOSED BIOPSY  11/18/2023    Procedure: EGD, WITH CLOSED BIOPSY;  Surgeon: Alfred Goss MD;  Location: [facility] OR;  Service: Gastroenterology;;    ERCP N/A 11/18/2023    Procedure: ERCP (ENDOSCOPIC RETROGRADE CHOLANGIOPANCREATOGRAPHY);  Surgeon: Alfred Goss MD;  Location: [facility] OR;  Service: Gastroenterology;  Laterality: N/A;    HEMORRHOID SURGERY      INSERTION OF TUNNELED CENTRAL VENOUS CATHETER (CVC) WITH  SUBCUTANEOUS PORT N/A 12/1/2023    Procedure: INSERTION, PORT-A-CATH;  Surgeon: William Morse MD;  Location: [facility] OR;  Service: General;  Laterality: N/A;    INSERTION OF TUNNELED CENTRAL VENOUS HEMODIALYSIS CATHETER N/A 12/6/2023    Procedure: Insertion, Catheter, Central Venous, Hemodialysis;  Surgeon: Satinder Luo DO;  Location: [facility] CATH LAB;  Service: Nephrology;  Laterality: N/A;    LAPAROSCOPIC INSERTION OF JEJUNOSTOMY TUBE N/A 12/1/2023    Procedure: INSERTION, JEJUNOSTOMY TUBE, LAPAROSCOPIC;  Surgeon: William Morse MD;  Location: [facility] OR;  Service: General;  Laterality: N/A;  PLUS MEDIPORT       Time Tracking:     OT Date of Treatment: 12/20/23  OT Start Time: 1147  OT Stop Time: 1209  OT Total Time (min): 22 min    Billable Minutes:Evaluation Moderate Complexity.     12/20/2023

## 2023-12-20 NOTE — PROGRESS NOTES
[facility] - Oncology Acute  Hematology/Oncology  Consult Note    Patient Name: Karen Briggs  MRN: [id number]  Admission Date: 11/17/2023  Hospital Length of Stay: 33 days  Attending Provider: Mariano Bermudez MD  Consulting Provider: Elizabeth K Lejeune, MD  Principal Problem:<principal problem not specified>    Consults  Subjective:     HPI:   57yo F w/ PMHx of HTN transferred from [facility] due to concern for complications from incomplete cholecystectomy. She initially underwent cholecystectomy on 11/20/23, it was incomplete and they were unable to resect gallbladder. She had persistent R flank and back pain and presented to her PCP on 11/17 with these complaints. CT A/P at that time revealed left-sided hydronephrosis with suspected distal obstruction/no clear stone visualized, postoperative changes of cholecystectomy with fluid in the gallbladder fossa may reflect postoperative seroma but biloma can not be entirely excluded, also noted for marked thickening of the stomach wall diffusely may be related to mesenteric edema/reactive. She then presented to [facility] ER where she was found to have bilirubin of 8.7, , , Alk phos 491. She was transferred to [facility] and underwent ERCP which was limited due to gastroduodenal stricturing, but an incidental gastric mass was found and biopsied. Pathology revealed gastric adenocarcinoma. There was concern for bile duct injury/transection s/p cholecystectomy so she underwent PTC and biliary drain placement with IR on 11/21/23. Surgical oncology has been evaluating patient during this admission, they suspect biliary obstruction secondary to pam hepatis lymphadenopathy. Medical oncology consult was placed on 11/21/23, however oncology was not notified of consult until today.     Patient this morning doing okay. She notes severe fatigue. Abdominal pain is improving. She notes 30-40lb unintentional weight loss since the summer '23. Her appetite has weaned  since that time as well. 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 She lives with her boyfriend in [place]. I offered further oncology care at [facility] locally, however she would like to pursue care with us here in [place].     I discussed her diagnosis and very general prognosis. I explained the role of a PET scan to confirm staging and thereby treatment recommendations. Regardless chemotherapy is appropriate upfront and this was discussed. Also discussed mediport placement while she is inpatient if possible.     PET scan on 11/29/23 with no evidence of metastatic disease. Under went J tube and mediport placement on 12/1/23 with Dr. Morse.     Hospital course has been complicated by the development of fungemia, source suspected to be GI tract/J tube. Lines have been replaced and mediport removed. ID following. Also she has required HD on a regular basis, with no signs of renal recovery over the last several weeks. Due to her overall continued functional decline over the course of her hospitalization as well as high infection risk as a result of her malignancy/anatomy there is a heavy concern she may never be a candidate for chemotherapy now.     Interval History  12/20/23 - Patient seen and examined this morning. Significant other at the bedside. She is feeling okay today, waiting to go to dialysis. I had a long conversation with her and her significant other this morning about her continued functional decline and numerous set backs over the course of her prolonged hospitalization. I explained her high recurrent infection risk due to her malignancy and resulting drains causing a compromised GI system, her overall poor functional status, as well as her continued HD treatments that all currently leave her to not be a chemotherapy candidate still. I also expressed concern that she may never be able to transition to chemotherapy as none of these issues have immediate or good resolutions. I advised  that we seriously consider the probably reality that at least from an oncology standpoint, chemotherapy would likely kill her and that the focus may always remain for symptom management as opposed to active treatment of her malignancy itself. This was obviously a lot to process but she and her significant other expressed understanding.     Oncology Treatment Plan:       Medications:  Continuous Infusions:      Scheduled Meds:   ciprofloxacin  400 mg Intravenous Q24H    collagenase   Topical (Top) Daily    enoxparin  1 mg/kg Subcutaneous Q24H (treatment, non-standard time)    hydrocortisone  25 mg Rectal BID    metoprolol  5 mg Intravenous Q6H    metoprolol tartrate  25 mg Oral BID    micafungin (MYCAMINE) IVPB  100 mg Intravenous Q24H    midodrine  10 mg Oral TID WM    pantoprazole  40 mg Intravenous BID WM    perflutren lipid microspheres  1.3 mL Intravenous Once    sodium bicarbonate  1,300 mg Oral Daily    sodium chloride 0.9%  10 mL Intravenous Q6H     PRN Meds:0.9%  NaCl infusion (for blood administration), 0.9%  NaCl infusion (for blood administration), sodium chloride 0.9%, acetaminophen, aluminum-magnesium hydroxide-simethicone, bisacodyL, chlorproMAZINE, dicyclomine, hydrALAZINE, hyoscyamine, melatonin, metoclopramide, metoprolol, morphine, ondansetron, oxyCODONE, polyethylene glycol, prochlorperazine, promethazine, senna-docusate 8.6-50 mg, sodium chloride 0.9%, sodium chloride 0.9%, Flushing PICC/Midline Protocol **AND** sodium chloride 0.9% **AND** sodium chloride 0.9%     Review of patient's allergies indicates:  No Known Allergies     Past Medical History:   Diagnosis Date    Hypertension     Sciatica      Past Surgical History:   Procedure Laterality Date    CARPAL TUNNEL RELEASE Left     CYSTOSCOPY W/ URETERAL STENT PLACEMENT Bilateral 11/27/2023    Procedure: CYSTOSCOPY, WITH URETERAL STENT INSERTION;  Surgeon: Huey Cardenas MD;  Location: [facility];  Service: Urology;  Laterality: Bilateral;     EGD, WITH CLOSED BIOPSY  11/18/2023    Procedure: EGD, WITH CLOSED BIOPSY;  Surgeon: Alfred Goss MD;  Location: [facility] OR;  Service: Gastroenterology;;    ERCP N/A 11/18/2023    Procedure: ERCP (ENDOSCOPIC RETROGRADE CHOLANGIOPANCREATOGRAPHY);  Surgeon: Alfred Goss MD;  Location: [facility] OR;  Service: Gastroenterology;  Laterality: N/A;    HEMORRHOID SURGERY      INSERTION OF TUNNELED CENTRAL VENOUS CATHETER (CVC) WITH SUBCUTANEOUS PORT N/A 12/1/2023    Procedure: INSERTION, PORT-A-CATH;  Surgeon: William Morse MD;  Location: [facility] OR;  Service: General;  Laterality: N/A;    INSERTION OF TUNNELED CENTRAL VENOUS HEMODIALYSIS CATHETER N/A 12/6/2023    Procedure: Insertion, Catheter, Central Venous, Hemodialysis;  Surgeon: Satinder Luo DO;  Location: [facility] CATH LAB;  Service: Nephrology;  Laterality: N/A;    LAPAROSCOPIC INSERTION OF JEJUNOSTOMY TUBE N/A 12/1/2023    Procedure: INSERTION, JEJUNOSTOMY TUBE, LAPAROSCOPIC;  Surgeon: William Morse MD;  Location: [facility] OR;  Service: General;  Laterality: N/A;  PLUS [facility]     Family History       Problem Relation (Age of Onset)    Breast cancer Mother    Cancer Maternal Aunt          Tobacco Use    Smoking status: Never    Smokeless tobacco: Never   Substance and Sexual Activity    Alcohol use: No    Drug use: No    Sexual activity: Never     Birth control/protection: Post-menopausal       Review of Systems   Constitutional:  Positive for appetite change, fatigue and unexpected weight change. Negative for activity change and fever.   HENT:  Negative for sore throat.    Eyes:  Negative for visual disturbance.   Respiratory:  Negative for cough and shortness of breath.    Cardiovascular:  Negative for chest pain.   Gastrointestinal:  Positive for abdominal distention and abdominal pain. Negative for diarrhea, nausea and vomiting.   Endocrine: Negative for polyuria.   Genitourinary:  Negative for dysuria.   Musculoskeletal:  Positive for back pain.    Neurological:  Negative for headaches.     Objective:     Vital Signs (Most Recent):  Temp: 97.9 °F (36.6 °C) (12/20/23 0754)  Pulse: (!) 115 (12/20/23 0754)  Resp: 18 (12/20/23 0754)  BP: 101/72 (12/20/23 0754)  SpO2: 99 % (12/20/23 0518) Vital Signs (24h Range):  Temp:  [97.7 °F (36.5 °C)-99.8 °F (37.7 °C)] 97.9 °F (36.6 °C)  Pulse:  [] 115  Resp:  [18-20] 18  SpO2:  [99 %-100 %] 99 %  BP: ()/(57-79) 101/72     Weight:  (pt unable to stand .)  Body mass index is 34.48 kg/m².  Body surface area is 2.11 meters squared.      Intake/Output Summary (Last 24 hours) at 12/20/2023 0816  Last data filed at 12/19/2023 1850  Gross per 24 hour   Intake 0 ml   Output 230 ml   Net -230 ml         Physical Exam  Constitutional:       General: She is not in acute distress.     Appearance: Normal appearance. She is obese.   HENT:      Head: Normocephalic and atraumatic.      Nose: Nose normal.      Mouth/Throat:      Mouth: Mucous membranes are moist.      Pharynx: Oropharynx is clear.   Eyes:      Extraocular Movements: Extraocular movements intact.      Conjunctiva/sclera: Conjunctivae normal.      Pupils: Pupils are equal, round, and reactive to light.   Cardiovascular:      Rate and Rhythm: Normal rate and regular rhythm.      Pulses: Normal pulses.      Heart sounds: Normal heart sounds. No murmur heard.  Pulmonary:      Effort: Pulmonary effort is normal.      Breath sounds: Normal breath sounds.   Abdominal:      General: There is distension.      Tenderness: There is abdominal tenderness.      Comments: R side biliary drain   Musculoskeletal:         General: Normal range of motion.      Cervical back: Normal range of motion and neck supple.      Right lower leg: No edema.      Left lower leg: No edema.   Skin:     General: Skin is warm and dry.      Coloration: Skin is not jaundiced.   Neurological:      General: No focal deficit present.      Mental Status: She is oriented to person, place, and time.          Significant Labs:   CBC:   Recent Labs   Lab 12/19/23  0444 12/20/23  0438   WBC 24.35  24.35* 22.86  22.86*   HGB 9.3* 9.2*   HCT 28.2* 28.2*   PLT 1,014* 925*      and CMP:   Recent Labs   Lab 12/19/23  0444 12/20/23  0438   * 133*   K 4.3 4.1   CO2 18* 19*   BUN 82.4* 97.8*   CREATININE 6.24* 6.96*   CALCIUM 8.6 8.6   ALBUMIN 1.6* 1.6*   BILITOT 2.8* 2.8*   ALKPHOS 160* 170*   AST 37* 41*   ALT 47 46         Diagnostic Results:  11/19/23 CT A/P Impression:  1. Gastritis and duodenitis.  2. Irregular wall thickening of the proximal stomach compatible with known tumor.  3. Similar heterogeneous appearance of the gallbladder fossa with small volume ascites.  Bile leak is possible.  Mild intrahepatic biliary ductal dilatation without significant dilatation of the common bile duct.  4. Similar moderate left hydronephrosis.    11/19/23 CT Chest Impression  Impression:  No convincing CT evidence of metastatic disease in the chest.    Assessment/Plan:     Locally advanced gastric adenocarcinoma - PET from 11/29 with no evidence of metastatic disease. Ideally would receive perioperative systemic chemotherapy. However, given numerous setbacks and prolonged hospitalizations as detailed above I am concerned she will likely never be able to receive chemotherapy. I recommend focusing on the symptoms of her malignancy and quality of life. She currently remains unable to tolerate chemotherapy and the chances of that changing while not impossible are extremely unlikely. I expressed all this to the patient and her family.  I discussed this with Dr. Carlisle with ID as well as hospital medicine MD.     Thrombocytosis - reactive process from her malignancy and current clinical state. Continue to monitor for now.     Thank you for the consult. We will continue to follow the patient. Please call with any questions.     Elizabeth K Lejeune, MD  Hematology/Oncology  [facility]  Oncology [facility]

## 2023-12-21 LAB
ABS NEUT (OLG): 19.8 X10(3)/MCL (ref 2.1–9.2)
ALBUMIN SERPL-MCNC: 2 G/DL (ref 3.4–4.8)
ALBUMIN/GLOB SERPL: 0.4 RATIO (ref 1.1–2)
ALP SERPL-CCNC: 169 UNIT/L (ref 40–150)
ALT SERPL-CCNC: 47 UNIT/L (ref 0–55)
ANISOCYTOSIS BLD QL SMEAR: ABNORMAL
AST SERPL-CCNC: 41 UNIT/L (ref 5–34)
BASOPHILS NFR BLD MANUAL: 0.22 X10(3)/MCL (ref 0–0.2)
BASOPHILS NFR BLD MANUAL: 1 %
BILIRUB SERPL-MCNC: 2.6 MG/DL
BUN SERPL-MCNC: 60 MG/DL (ref 9.8–20.1)
CALCIUM SERPL-MCNC: 8.8 MG/DL (ref 8.4–10.2)
CHLORIDE SERPL-SCNC: 101 MMOL/L (ref 98–107)
CO2 SERPL-SCNC: 17 MMOL/L (ref 23–31)
CREAT SERPL-MCNC: 4.8 MG/DL (ref 0.55–1.02)
ERYTHROCYTE [DISTWIDTH] IN BLOOD BY AUTOMATED COUNT: 17.1 % (ref 11.5–17)
GFR SERPLBLD CREATININE-BSD FMLA CKD-EPI: 10 MLS/MIN/1.73/M2
GLOBULIN SER-MCNC: 5.4 GM/DL (ref 2.4–3.5)
GLUCOSE SERPL-MCNC: 148 MG/DL (ref 82–115)
HCT VFR BLD AUTO: 30.8 % (ref 37–47)
HGB BLD-MCNC: 10 G/DL (ref 12–16)
INSTRUMENT WBC (OLG): 22 X10(3)/MCL
LYMPHOCYTES NFR BLD MANUAL: 0.22 X10(3)/MCL
LYMPHOCYTES NFR BLD MANUAL: 1 %
MACROCYTES BLD QL SMEAR: ABNORMAL
MCH RBC QN AUTO: 29.8 PG (ref 27–31)
MCHC RBC AUTO-ENTMCNC: 32.5 G/DL (ref 33–36)
MCV RBC AUTO: 91.7 FL (ref 80–94)
METAMYELOCYTES NFR BLD MANUAL: 4 %
MONOCYTES NFR BLD MANUAL: 0.66 X10(3)/MCL (ref 0.1–1.3)
MONOCYTES NFR BLD MANUAL: 3 %
MYELOCYTES NFR BLD MANUAL: 3 %
NEUTROPHILS NFR BLD MANUAL: 90 %
NRBC BLD AUTO-RTO: 0 %
PLATELET # BLD AUTO: 789 X10(3)/MCL (ref 130–400)
PLATELET # BLD EST: ABNORMAL 10*3/UL
PMV BLD AUTO: 10 FL (ref 7.4–10.4)
POTASSIUM SERPL-SCNC: 4.1 MMOL/L (ref 3.5–5.1)
PROT SERPL-MCNC: 7.4 GM/DL (ref 5.8–7.6)
RBC # BLD AUTO: 3.36 X10(6)/MCL (ref 4.2–5.4)
RBC MORPH BLD: ABNORMAL
SODIUM SERPL-SCNC: 130 MMOL/L (ref 136–145)
WBC # SPEC AUTO: 22.02 X10(3)/MCL (ref 4.5–11.5)

## 2023-12-21 PROCEDURE — 97535 SELF CARE MNGMENT TRAINING: CPT

## 2023-12-21 PROCEDURE — 25000003 PHARM REV CODE 250: Performed by: STUDENT IN AN ORGANIZED HEALTH CARE EDUCATION/TRAINING PROGRAM

## 2023-12-21 PROCEDURE — 21400001 HC TELEMETRY ROOM

## 2023-12-21 PROCEDURE — 85027 COMPLETE CBC AUTOMATED: CPT | Performed by: STUDENT IN AN ORGANIZED HEALTH CARE EDUCATION/TRAINING PROGRAM

## 2023-12-21 PROCEDURE — 80100016 HC MAINTENANCE HEMODIALYSIS

## 2023-12-21 PROCEDURE — 99233 SBSQ HOSP IP/OBS HIGH 50: CPT | Mod: FS,,, | Performed by: GENERAL PRACTICE

## 2023-12-21 PROCEDURE — 25000003 PHARM REV CODE 250

## 2023-12-21 PROCEDURE — C9113 INJ PANTOPRAZOLE SODIUM, VIA: HCPCS | Performed by: INTERNAL MEDICINE

## 2023-12-21 PROCEDURE — P9047 ALBUMIN (HUMAN), 25%, 50ML: HCPCS | Mod: JZ,JG | Performed by: NURSE PRACTITIONER

## 2023-12-21 PROCEDURE — 80053 COMPREHEN METABOLIC PANEL: CPT | Performed by: STUDENT IN AN ORGANIZED HEALTH CARE EDUCATION/TRAINING PROGRAM

## 2023-12-21 PROCEDURE — 63600175 PHARM REV CODE 636 W HCPCS: Performed by: GENERAL PRACTICE

## 2023-12-21 PROCEDURE — 63600175 PHARM REV CODE 636 W HCPCS: Performed by: STUDENT IN AN ORGANIZED HEALTH CARE EDUCATION/TRAINING PROGRAM

## 2023-12-21 PROCEDURE — 25000003 PHARM REV CODE 250: Performed by: NURSE PRACTITIONER

## 2023-12-21 PROCEDURE — 63600175 PHARM REV CODE 636 W HCPCS: Mod: JZ,JG | Performed by: NURSE PRACTITIONER

## 2023-12-21 PROCEDURE — 63600175 PHARM REV CODE 636 W HCPCS: Performed by: INTERNAL MEDICINE

## 2023-12-21 RX ORDER — ALBUMIN HUMAN 250 G/1000ML
25 SOLUTION INTRAVENOUS ONCE
Status: COMPLETED | OUTPATIENT
Start: 2023-12-21 | End: 2023-12-21

## 2023-12-21 RX ORDER — MIDODRINE HYDROCHLORIDE 5 MG/1
15 TABLET ORAL
Status: DISCONTINUED | OUTPATIENT
Start: 2023-12-21 | End: 2023-12-25 | Stop reason: HOSPADM

## 2023-12-21 RX ADMIN — METOPROLOL TARTRATE 25 MG: 25 TABLET, FILM COATED ORAL at 09:12

## 2023-12-21 RX ADMIN — COLLAGENASE SANTYL: 250 OINTMENT TOPICAL at 12:12

## 2023-12-21 RX ADMIN — PANTOPRAZOLE SODIUM 40 MG: 40 INJECTION, POWDER, FOR SOLUTION INTRAVENOUS at 06:12

## 2023-12-21 RX ADMIN — MICAFUNGIN SODIUM 100 MG: 100 INJECTION, POWDER, LYOPHILIZED, FOR SOLUTION INTRAVENOUS at 09:12

## 2023-12-21 RX ADMIN — HYDROCORTISONE ACETATE 25 MG: 25 SUPPOSITORY RECTAL at 12:12

## 2023-12-21 RX ADMIN — SODIUM BICARBONATE 650 MG TABLET 1300 MG: at 12:12

## 2023-12-21 RX ADMIN — MIDODRINE HYDROCHLORIDE 15 MG: 5 TABLET ORAL at 06:12

## 2023-12-21 RX ADMIN — HYDROCORTISONE ACETATE 25 MG: 25 SUPPOSITORY RECTAL at 09:12

## 2023-12-21 RX ADMIN — Medication 6 MG: at 09:12

## 2023-12-21 RX ADMIN — METOCLOPRAMIDE 5 MG: 5 INJECTION, SOLUTION INTRAMUSCULAR; INTRAVENOUS at 09:12

## 2023-12-21 RX ADMIN — MIDODRINE HYDROCHLORIDE 15 MG: 5 TABLET ORAL at 12:12

## 2023-12-21 RX ADMIN — METOCLOPRAMIDE 5 MG: 5 INJECTION, SOLUTION INTRAMUSCULAR; INTRAVENOUS at 01:12

## 2023-12-21 RX ADMIN — ENOXAPARIN SODIUM 100 MG: 100 INJECTION SUBCUTANEOUS at 12:12

## 2023-12-21 RX ADMIN — ESCITALOPRAM OXALATE 10 MG: 10 TABLET ORAL at 12:12

## 2023-12-21 RX ADMIN — METOPROLOL TARTRATE 25 MG: 25 TABLET, FILM COATED ORAL at 12:12

## 2023-12-21 RX ADMIN — ALBUMIN (HUMAN) 25 G: 12.5 SOLUTION INTRAVENOUS at 09:12

## 2023-12-21 RX ADMIN — CIPROFLOXACIN 400 MG: 2 INJECTION, SOLUTION INTRAVENOUS at 01:12

## 2023-12-21 NOTE — PROGRESS NOTES
"12/21/2023  Karen Briggs   1965   [id number]       Psychiatry Consult Progress Note     SUBJECTIVE:   Karen Briggs is a 58 y.o. female seen for depression. She is seen at the bedside today where she reports that her mood has been "up and down" and she displays a constricted and depressed affect. She reports no issues with sleep over night. She denies SI, HI, hallucinations, and does not verbalize delusional content. She reports tolerating escitalopram that was started yesterday without issues.       Current Medications:   Scheduled Meds:    ciprofloxacin  400 mg Intravenous Q24H    collagenase   Topical (Top) Daily    enoxparin  1 mg/kg Subcutaneous Q24H (treatment, non-standard time)    EScitalopram oxalate  10 mg Oral Daily    hydrocortisone  25 mg Rectal BID    melatonin  6 mg Oral Nightly    metoprolol tartrate  25 mg Oral BID    micafungin (MYCAMINE) IVPB  100 mg Intravenous Q24H    midodrine  15 mg Oral TID WM    pantoprazole  40 mg Intravenous BID WM    perflutren lipid microspheres  1.3 mL Intravenous Once    sodium bicarbonate  1,300 mg Oral Daily      PRN Meds: 0.9%  NaCl infusion (for blood administration), 0.9%  NaCl infusion (for blood administration), sodium chloride 0.9%, acetaminophen, aluminum-magnesium hydroxide-simethicone, bisacodyL, chlorproMAZINE, dicyclomine, hydrALAZINE, hyoscyamine, metoclopramide, metoprolol, morphine, ondansetron, oxyCODONE, polyethylene glycol, prochlorperazine, promethazine, senna-docusate 8.6-50 mg, sodium chloride 0.9%, sodium chloride 0.9%   Psychotherapeutics (From admission, onward)      Start     Stop Route Frequency Ordered    12/20/23 1230  EScitalopram oxalate tablet 10 mg         -- Oral Daily 12/20/23 1119    12/04/23 1414  chlorproMAZINE injection 25 mg         -- IM 4 times daily PRN 12/04/23 1315            Allergies:   Review of patient's allergies indicates:  No Known Allergies     OBJECTIVE:   Vitals   Vitals:    12/21/23 0700   BP: 100/70 "   Pulse: (!) 122   Resp: 20   Temp: 99 °F (37.2 °C)        Labs/Imaging/Studies:   Recent Results (from the past 36 hour(s))   Comprehensive Metabolic Panel    Collection Time: 12/20/23  4:38 AM   Result Value Ref Range    Sodium Level 133 (L) 136 - 145 mmol/L    Potassium Level 4.1 3.5 - 5.1 mmol/L    Chloride 100 98 - 107 mmol/L    Carbon Dioxide 19 (L) 23 - 31 mmol/L    Glucose Level 106 82 - 115 mg/dL    Blood Urea Nitrogen 97.8 (H) 9.8 - 20.1 mg/dL    Creatinine 6.96 (H) 0.55 - 1.02 mg/dL    Calcium Level Total 8.6 8.4 - 10.2 mg/dL    Protein Total 6.7 5.8 - 7.6 gm/dL    Albumin Level 1.6 (L) 3.4 - 4.8 g/dL    Globulin 5.1 (H) 2.4 - 3.5 gm/dL    Albumin/Globulin Ratio 0.3 (L) 1.1 - 2.0 ratio    Bilirubin Total 2.8 (H) <=1.5 mg/dL    Alkaline Phosphatase 170 (H) 40 - 150 unit/L    Alanine Aminotransferase 46 0 - 55 unit/L    Aspartate Aminotransferase 41 (H) 5 - 34 unit/L    eGFR 6 mls/min/1.73/m2   Heparin Xa Low Mol Wgt    Collection Time: 12/20/23  4:38 AM   Result Value Ref Range    Anti Xa  LMW <0.1 (L) 0.6 - 1.0 IU/mL   CBC with Differential    Collection Time: 12/20/23  4:38 AM   Result Value Ref Range    WBC 22.86 (H) 4.50 - 11.50 x10(3)/mcL    RBC 3.13 (L) 4.20 - 5.40 x10(6)/mcL    Hgb 9.2 (L) 12.0 - 16.0 g/dL    Hct 28.2 (L) 37.0 - 47.0 %    MCV 90.1 80.0 - 94.0 fL    MCH 29.4 27.0 - 31.0 pg    MCHC 32.6 (L) 33.0 - 36.0 g/dL    RDW 17.2 (H) 11.5 - 17.0 %    Platelet 925 (H) 130 - 400 x10(3)/mcL    MPV 9.9 7.4 - 10.4 fL    NRBC% 0.0 %   Manual Differential    Collection Time: 12/20/23  4:38 AM   Result Value Ref Range    WBC 22.86 x10(3)/mcL    Neutrophils % 88 %    Monocytes % 6 %    Eosinophils % 3 %    Basophils % 2 %    Myelocytes % 1 (H) <=0 %    Plasmacytes % 1 %    Neutrophils Abs 20.1168 (H) 2.1 - 9.2 x10(3)/mcL    Monocytes Abs 1.3716 (H) 0.1 - 1.3 x10(3)/mcL    Eosinophils Abs 0.6858 0 - 0.9 x10(3)/mcL    Basophils Abs 0.4572 (H) 0 - 0.2 x10(3)/mcL    Platelets Increased (A) Normal, Adequate     RBC Morph Abnormal (A) Normal    Poikilocytosis 1+ (A) (none)    Anisocytosis 1+ (A) (none)    Macrocytosis 1+ (A) (none)    Target Cells 1+ (A) (none)   Comprehensive Metabolic Panel    Collection Time: 12/21/23  4:31 AM   Result Value Ref Range    Sodium Level 130 (L) 136 - 145 mmol/L    Potassium Level 4.1 3.5 - 5.1 mmol/L    Chloride 101 98 - 107 mmol/L    Carbon Dioxide 17 (L) 23 - 31 mmol/L    Glucose Level 148 (H) 82 - 115 mg/dL    Blood Urea Nitrogen 60.0 (H) 9.8 - 20.1 mg/dL    Creatinine 4.80 (H) 0.55 - 1.02 mg/dL    Calcium Level Total 8.8 8.4 - 10.2 mg/dL    Protein Total 7.4 5.8 - 7.6 gm/dL    Albumin Level 2.0 (L) 3.4 - 4.8 g/dL    Globulin 5.4 (H) 2.4 - 3.5 gm/dL    Albumin/Globulin Ratio 0.4 (L) 1.1 - 2.0 ratio    Bilirubin Total 2.6 (H) <=1.5 mg/dL    Alkaline Phosphatase 169 (H) 40 - 150 unit/L    Alanine Aminotransferase 47 0 - 55 unit/L    Aspartate Aminotransferase 41 (H) 5 - 34 unit/L    eGFR 10 mls/min/1.73/m2   CBC with Differential    Collection Time: 12/21/23  4:31 AM   Result Value Ref Range    WBC 22.02 (H) 4.50 - 11.50 x10(3)/mcL    RBC 3.36 (L) 4.20 - 5.40 x10(6)/mcL    Hgb 10.0 (L) 12.0 - 16.0 g/dL    Hct 30.8 (L) 37.0 - 47.0 %    MCV 91.7 80.0 - 94.0 fL    MCH 29.8 27.0 - 31.0 pg    MCHC 32.5 (L) 33.0 - 36.0 g/dL    RDW 17.1 (H) 11.5 - 17.0 %    Platelet 789 (H) 130 - 400 x10(3)/mcL    MPV 10.0 7.4 - 10.4 fL    NRBC% 0.0 %   Manual Differential    Collection Time: 12/21/23  4:31 AM   Result Value Ref Range    WBC 22 x10(3)/mcL    Neutrophils % 90 %    Lymphs % 1 %    Monocytes % 3 %    Basophils % 1 %    Metamyelocytes % 4 (H) <=0 %    Myelocytes % 3 (H) <=0 %    Neutrophils Abs 19.8 (H) 2.1 - 9.2 x10(3)/mcL    Lymphs Abs 0.22 (L) 0.6 - 4.6 x10(3)/mcL    Monocytes Abs 0.66 0.1 - 1.3 x10(3)/mcL    Basophils Abs 0.22 (H) 0 - 0.2 x10(3)/mcL    Platelets Increased (A) Normal, Adequate    RBC Morph Abnormal (A) Normal    Anisocytosis 1+ (A) (none)    Macrocytosis 1+ (A) (none)             Psychiatric Mental Status Exam:  General Appearance: appears stated age, adequately groomed, appropriately dressed, dressed in hospital garb, lying in bed, in no acute distress  Arousal: alert  Behavior: normal, cooperative, pleasant, appropriate eye-contact, under good behavioral control  Movements and Motor Activity: no abnormal involuntary movements noted  Orientation: intact; oriented fully to person, place, time and situation  Speech: intact; normal rate, rhythm, volume, tone and pitch; conversational, spontaneous, and coherent  Mood: Depressed  Affect: mood-congruent, dysthymic  Thought Process: intact, linear, goal-directed, organized, logical  Associations: intact, no loosening of associations  Thought Content and Perceptions: no suicidal or homicidal ideation, no auditory or visual hallucinations, no paranoid ideation, no ideas of reference, no evidence of delusions or psychosis  Recent and Remote Memory: grossly intact, able to recall relevant and salient information from the recent and remote past, registers 3/3 objects, recalls 2/3 objects at 5 minutes; per interview/observation with patient  Attention and Concentration: grossly intact, attentive to the conversation and not readily distractible, able to spell WORLD forwards and backwards; per interview/observation with patient  Fund of Knowledge: grossly intact, used appropriate vocabulary and demonstrated an awareness of current events; based on history, vocabulary, fund of knowledge, syntax, grammar, and content  Insight: good; based on understanding of severity of illness and HPI  Judgment: good; based on patient's behavior and HPI    ASSESSMENT/PLAN:   Diagnosis:  Adjustment Disorder with Mixed Anxiety and Depressed Mood (F43.23)      Past Medical History:   Diagnosis Date    Hypertension     Sciatica         Recommendations:  Continue plan of care  Will sign-off;please reconsult as needed          Ming Pittman

## 2023-12-21 NOTE — NURSING
12/21/23 1102   Tunneled Central Line Insertion/Assessment - Double Lumen  12/20/23 0841 Atrial Right   Placement Date/Time: 12/20/23 0841   Inserted by: MD  Hand Hygiene: Performed  Barrier Precautions: Performed  Skin Antisepsis: ChloraPrep  Location: Atrial Right  : BARD Chávez 19 CM  Lot Number: [id number]  Pressure Injectable Catheter:...   Line Necessity Review CRRT/HD   Site Assessment No drainage;No redness;No swelling;No warmth   Line Securement Device Secured with sutures   Dressing Type CHG impregnated dressing/sponge;Central line dressing;Transparent (Tegaderm)   Dressing Status Clean;Dry;Intact   Dressing Intervention Integrity maintained   Date on Dressing 12/20/23   Dressing Due to be Changed 12/27/23   Distal Patency/Care intermittent infusion cap changed;normal saline locked   Proximal 1 Patency/Care intermittent infusion cap changed;normal saline locked   Post-Hemodialysis Assessment   Rinseback Volume (mL) 250 mL   Blood Volume Processed (Liters) 36 L   Dialyzer Clearance Clear   Duration of Treatment 125 minutes   Additional Fluid Intake (mL) 480 mL   Patient Response to Treatment Tolerated not well, BP low with albumin 25 gm, treatment terminated by NP dt low BP.   Post-Hemodialysis Comments Tx ended, Pt reinfused.

## 2023-12-21 NOTE — PT/OT/SLP PROGRESS
Physical Therapy      Patient Name:  Karen Briggs   MRN:  [id number]    Patient not seen today secondary to resting BP 80/57 this AM and off the floor for CT this PM . Will follow-up tomorrow as appropriate.

## 2023-12-21 NOTE — PROGRESS NOTES
[facility]  Hospital Medicine Progress Note        Chief Complaint: Inpatient Follow-up for intractable nausea vomiting due to gastric adenocarcinoma     HPI:   A 58-year-old female with medical history of hypertension who recently underwent laparoscopic cholecystectomy on 11/10/2023, procedure was incomplete and was unable to completely resect the gallbladder.  Since surgery she continued to have right flank/back pain and followed up with her PCP and CT abdomen and pelvis on 11/17/2023 revealed left-sided hydronephrosis with suspected distal obstruction/no clear stone visualized, postoperative changes of cholecystectomy with fluid in the gallbladder fossa may reflect postoperative seroma but biloma can not be entirely excluded, also noted for marked thickening of the stomach wall diffusely may be related to mesenteric edema/reactive.  She presented to [facility] ED the same day 11/17/2023 and her labs notable for WBC 9.0, hemoglobin 12.4, platelets 442, creatinine 0.86, total bilirubin 8.7 with direct fraction 6.7, alkaline phosphatase 491, , .  Patient's surgeon was consulted and recommended ERCP which was not available at [facility] for which she was transferred to [facility] and referred to hospital medicine service for further evaluation and management. 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  Severe inflammation and oozing of blood noted proximal gastric lumen.  Unable to advance scope into the duodenum. Surgical oncology consulted, IR consulted for external drain placement which was done November 21.  November 24th she developed new onset atrial fibrillation with RVR and Cardiology consulted. Started on amiodarone drip. Unfortunately patient had acute blood loss due to hemorrhage from the midline site that was removed. Patient was unaware of the bleed.  Became very weak, passed out.  Code was called but she came around.   Stat H&H done which was slightly lower but stable, MRI of the brain was negative for any acute ischemic changes. Pt is aware of gastric cancer diagnosis. GI following--> 11/18- EGD shows normal esophagus, malignant gastric tumor in the cardia.  Inflamed mucosa and ooze of blood throughout the proximal gastric lumen.  Gastric antrum scar would not allow advancement of scope into the duodenal ampulla area therefore biliary cannulation was not possible for bile leak evaluation. Pathology shows marked chronic gastritis with intestinal metaplasia, gastric cardia biopsy shows adenocarcinoma, moderately differentiated intestinal type. Bilateral hydronephrosis with left greater than right. MRI brain without contrast-negative for acute finding. PET scan reviewed with patient by Oncology reports of locally advanced gastric adenocarcinoma and plans for systemic therapy outpatient if functional, nutritional and renal function improves. MediPort placement by surgical Oncology on 12/1, oncology on board plans for systemic therapy outpatient if functional, nutritional and renal function improves. Obstructive uropathy with bilateral hydronephrosis status post bilateral ureteral stent placed on 11/27 by Urology- no improvement in renal function, patient opted to have hemodialysis and a right-sided hemodialysis catheter was placed by General surgery on 11/29, to continue hemodialysis per nephrology discretion. Patient with symptoms of nausea and vomiting can not keep anything down in her stomach complicated by severe malnutrition on IV Clinimix and supportive medications for nausea and vomiting. Palliative care consulted. Patient got a MediPort and J-tube placed on 12/01. Cystogram reviewed by Urology with patent stents and recommends outpatient follow-up with Urology. White cell count elevated at 20.4, Infectious Disease had started on IV Zosyn given concern for possible sepsis with low blood pressure on 12/02. [facility] evaluated patient  to begin low-dose metoprolol tartrate at 12.5 mg b.i.d. and resume Eliquis for stroke prevention. On TF via J tube which was placed by Surgical Oncology. ID started IV Micafungin for candidemia. Noted to have persistent fungemia on 1/2 BCX from 12/11.  Consulted CIS for GAYLA; report pending. General Surgery consulted to remove HD line & Mediport. Patient spiked a fever overnight on 12/15; BCX repeated by ID which are negative.  Afebrile since 12/14. General Surgery removed mediport & tunneled catheter after HD on 12/16. J Tube replaced by Surgical Oncology on 12/18 after it became clogged.    Interval Hx:     Seen and examined the patient.  Afebrile vitals stable and hemodynamically stable.  Abdominal pain    Objective/physical exam:  General: alert lady lying comfortably in bed, in no acute distress  HENT: oral and oropharyngeal mucosa moist, pink, with no erythema or exudates, no ear pain or discharge  Neck: normal neck movement, no lymph nodes or swellings, no JVD or Carotid bruit  Respiratory: clear breathing sounds bilaterally, no crackles, rales, ronchi or wheezes  Cardiovascular: clear S1 and S2, no murmurs, rubs or gallops  Peripheral Vascular: no lesions, ulcers or erosions, normal peripheral pulses; 2+ pitting pedal edema  Gastrointestinal: J tube in place LUQ; soft, non-tender, non-distended abdomen, no guarding, rigidity or rebound tenderness, normal bowel sounds  Integumentary: normal skin color, no rashes or lesions  Neuro: AAO x 3; motor strength 5/5 in B/L UEs & LEs; sensation intact to gross and fine touch B/L; CN II-XII grossly intact    VITAL SIGNS: 24 HRS MIN & MAX LAST   Temp  Min: 97.7 °F (36.5 °C)  Max: 99.8 °F (37.7 °C) 99 °F (37.2 °C)   BP  Min: 80/57  Max: 105/74 100/70   Pulse  Min: 89  Max: 128  (!) 122   Resp  Min: 20  Max: 20 20   SpO2  Min: 82 %  Max: 100 % 98 %     I reviewed the labs below:  Recent Labs   Lab 12/19/23  0444 12/20/23  0438 12/21/23  0431   WBC 24.35  24.35* 22.86   22.86* 22  22.02*   RBC 3.11* 3.13* 3.36*   HGB 9.3* 9.2* 10.0*   HCT 28.2* 28.2* 30.8*   MCV 90.7 90.1 91.7   MCH 29.9 29.4 29.8   MCHC 33.0 32.6* 32.5*   RDW 17.2* 17.2* 17.1*   PLT 1,014* 925* 789*   MPV 9.9 9.9 10.0       Recent Labs   Lab 12/14/23  1214 12/15/23  1047 12/16/23  0414 12/18/23  0440 12/19/23  0444 12/20/23  0438 12/21/23  0431   * 136   < > 133* 133* 133* 130*   K 3.4* 3.3*   < > 4.3 4.3 4.1 4.1   CO2 21* 23   < > 19* 18* 19* 17*   BUN 68.6* 48.7*   < > 63.8* 82.4* 97.8* 60.0*   CREATININE 5.54* 4.56*   < > 5.20* 6.24* 6.96* 4.80*   CALCIUM 8.8 8.1*   < > 8.8 8.6 8.6 8.8   MG 2.30 2.10  --  2.30  --   --   --    ALBUMIN 1.5* 1.5*   < > 1.7* 1.6* 1.6* 2.0*   ALKPHOS 156* 184*   < > 162* 160* 170* 169*   ALT 53 52   < > 51 47 46 47   AST 46* 42*   < > 42* 37* 41* 41*   BILITOT 4.3* 3.5*   < > 3.2* 2.8* 2.8* 2.6*    < > = values in this interval not displayed.       Assessment/Plan:  Hypotension, improved with Midodrine  Persistent leukocytosis likely 2/2 Fungemia  Acute on chronic anemia, s/p 2 units prbcs transfusion 11/30  Hx of Acute Blood loss anemia with syncope and then fall 11/26-   Locally advanced gastric adenocarcinoma with intractable nausea and vomiting possible Gastric/duodenal outlet obstruction s/p J Tube placement  J Tube Obstruction  Fungemia possibly 2/2 Biliary Drain Infection  Obstructive uropathy with bilateral hydronephrosis- S/P bilateral ureteral stent placed on 11/27  POORNIMA-now on HD - 11/29  Suspected bile leak with biloma and biliary obstruction--> H/O Laparoscopic incomplete cholecystectomy 11/10/2023  New onset atrial fibrillation with RVR- fluctuating  History of hypertension and DDD/sciatica  Severe malnutrition  Leukocytosis likely 2/2 Fungemia  Tachycardia 2/2 IV Depletion vs Sepsis  Thrombocytosis possibly reactive to Sepsis      - she is receiving HD, blood pressure is on the low side we will remove as much fluid as possible.  Reporting improvement in J tube  site pain  J Tube replaced by Surg Oncology on 12/18 after it became clogged  Resumed TF  Consulted Hematology for worsening thrombocytosis; Plt 1014 today  Underwent GAYLA on 12/14 with report negative for vegetations  On IV Micafungin  ID on-board; follow recs  ID added IV Ciprofloxacin to cover for possible biliary drain related infectino/cholangitis given worsening leukocytosis  Nephrology on-board for HD conduction  Patient to get new tunneled catheter to resume HD  Surgical Oncology on-board; follow recs  PICC line removed  Patient had MediPort and J-tube placed on 12/01; General Surgery removed HD catheter & Mediport on 12/16  Patient underwent biliary drain exchange on 12/13 per ID recs  Blood Cultures 12/10 positive for candida glabrata in the blood 1/2; Repeat BCX 12/11 positive for yeasts; BCX 12/12 negative; BCX repeated on 12/15 d/t fevers which are negative x 72 hrs; BCX from 12/17 negative x 48 hrs  Palliative Care consulted earlier; patient wishes to continue pursuing curative treatment for cancer  Cystogram reviewed by Urology with patent stents and recommends outpatient follow-up with Urology  Appreciate Oncology input; plans for systemic therapy outpatient if renal function improves, patient is planning to pursue treatment   Continued on Hydrocortisone Supp, Metoprolol Tartrate BID, Midodrine TID, Protonix BID, Sodium Bicarbonate     VTE prophylaxis:  FD Lovenox     Patient condition:  Stable     Anticipated discharge and Disposition:  Pending    All diagnosis and differential diagnosis have been reviewed; assessment and plan has been documented; I have personally reviewed the labs and test results that are presently available; I have reviewed the patients medication list; I have reviewed the consulting providers response and recommendations. I have reviewed or attempted to review medical records based upon their availability    All of the patient's questions have been  addressed and answered.  Patient's is agreeable to the above stated plan. I will continue to monitor closely and make adjustments to medical management as needed.  _____________________________________________________________________    Nutrition Status:  Patient meets ASPEN criteria for severe malnutrition of acute illness or injury per RD assessment as evidenced by:  Energy Intake (Malnutrition):  (does not meet criteria)  Weight Loss (Malnutrition): greater than 7.5% in 3 months  Subcutaneous Fat (Malnutrition): moderate depletion  Muscle Mass (Malnutrition): moderate depletion           A minimum of two characteristics is recommended for diagnosis of either severe or non-severe malnutrition.   Radiology:   I have personally reviewed the images and agree with radiologist report  Cardiac catheterization  Procedure performed in the Invasive Lab    - See Procedure Log link below for nursing documentation    - See OpNote on Surgeries Tab for physician findings    - See Imaging Tab for radiologist dictation      Mukesh Cole MD  Department of Hospital Medicine   [facility]   12/21/2023

## 2023-12-21 NOTE — PT/OT/SLP PROGRESS
Occupational Therapy   Treatment    Name: Karen Briggs  MRN: [id number]  Admitting Diagnosis:  <principal problem not specified>  1 Day Post-Op    Recommendations:     Recommended therapy intensity at discharge: Moderate Intensity Therapy   Discharge Equipment Recommendations:  other (see comments), walker, rolling (TBD)  Barriers to discharge:   impaired mobility    Assessment:     Karen Briggs is a 58 y.o. female with a medical diagnosis of <principal problem not specified>.  She presents with resting BP in supine 80/57. RN and patient in agreement for in-bed therapy. Performance deficits affecting function are weakness, impaired functional mobility, impaired self care skills.     Rehab Prognosis:  Fair; patient would benefit from acute skilled OT services to address these deficits and reach maximum level of function.       Plan:     Patient to be seen 3 x/week to address the above listed problems via self-care/home management, therapeutic activities, therapeutic exercises  Plan of Care Expires: 01/17/24  Plan of Care Reviewed with: patient    Subjective     Pain/Comfort:  Pain Rating 1: 0/10    Objective:     Communicated with: RN prior to session.  Patient found HOB elevated with PEG Tube, PICC line, telemetry, pulse ox (continuous), peripheral IV, blood pressure cuff upon OT entry to room.    General Precautions: Standard, fall    Orthopedic Precautions:N/A  Braces: N/A  Respiratory Status: Nasal cannula, flow 2 L/min  Vital Signs: Blood Pressure: 80/57     Activities of Daily Living:  Grooming: contact guard assistance to brush teeth, wash face with HOB elevated    Therapeutic Positioning    OT interventions performed during the course of today's session in an effort to prevent and/or reduce acquired pressure injuries:   Education was provided on benefits of and recommendations for therapeutic positioning  Therapeutic positioning was provided at the conclusion of session to offload all bony prominences for  the prevention and/or reduction of pressure injuries, for pain management, and for contracture prevention    Patient Education:  Patient provided with verbal education education regarding fall prevention, safety awareness, and pressure ulcer prevention.  Understanding was verbalized, however additional teaching warranted.      Patient left HOB elevated with all lines intact, call button in reach, RN notified, and family present    GOALS:   Multidisciplinary Problems       Occupational Therapy Goals          Problem: Occupational Therapy    Goal Priority Disciplines Outcome Interventions   Occupational Therapy Goal     OT, PT/OT Ongoing, Progressing    Description: LTG: Pt will perform basic ADLs and ADL transfers with Modified independence using LRAD by discharge.    STG: to be met by 1/20/24    Pt will complete grooming standing at sink with LRAD with SBA.  Pt will complete UB dressing with SBA.  Pt will complete LB dressing with SBA using LRAD and AE prn.  Pt will complete toileting with SBA using LRAD.  Pt will complete functional mobility to/from toilet and toilet transfer with SBA using LRAD.                        Time Tracking:     OT Date of Treatment: 12/21/23  OT Start Time: 1205  OT Stop Time: 1220  OT Total Time (min): 15 min    Billable Minutes:Self Care/Home Management 15    OT/JENNIFER: OT     Number of JENNIFER visits since last OT visit: 1    12/21/2023

## 2023-12-21 NOTE — PROGRESS NOTES
Nephrology consult follow up note    HPI:      Karen Briggs is a 58 y.o. female  presented on  7 days post op from laparoscopic cholecystectomy on 11/10. Gallbladder was unable to be resected. Back and flank pain persisted post operatively prompting evaluation at [facility]. After workup her surgeon recommended ERCP for which she was sent to this facility. Left sided hydronephrosis noted on CT scans done on admission. At that time renal function was fairly normal and patient was given option for stenting or to defer as outpatient and she chose the latter.      During ERCP, malignant gastric mass noted and ERCP was unable to be completed due to duodenal scarring. 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 Patient ultimately had biliary drain placed per IR.      She developed A fib with RVR requiring amiodarone infusion. She then had significant blood loss post removal of long term IV and subsequent fall in her room.      Patient renal function was relatively stable until decline starting 11/25. She was ultimately initiated on HD 11/28 post bilateral ureteral stent placement with Dr Cardenas same day.      Patient developed dialysis dependent acute kidney injury, and was started on hemodialysis 11/29/2023.  Tunneled dialysis catheter was placed on 12/06/2023.  Dialysis catheter had to be removed on 12/16/2023 after dialysis due to persistent fungemia.  Tunneled dialysis catheter was replaced on 12/20/2023.    Interval history:     No acute events overnight.  Patient dialyzed yesterday, however, she had persistent hypotension with dialysis which limited our ability to ultrafiltrate.  1.5 L UF with HD yesterday.  She still have significant lower extremity edema.     Review of Systems:       Past medical, family, surgical, and social history reviewed and unchanged from initial consult note.     Objective:       VITAL SIGNS: 24 HR MIN & MAX LAST    Temp  Min: 97.7 °F (36.5 °C)  Max: 99.8 °F (37.7 °C)  99 °F  (37.2 °C)        BP  Min: 80/57  Max: 105/74  100/70     Pulse  Min: 89  Max: 128  (!) 122     Resp  Min: 20  Max: 20  20    SpO2  Min: 82 %  Max: 100 %  98 %      GEN: Chronically ill appearing AAF in NAD  CV: RRR +S1,S2 without murmur  PULM: CTAB, unlabored  ABD: Soft, NT/ND abdomen with NABS  EXT:  3+ lower extremity edema  SKIN: Warm and dry  PSYCH: Awake, alert and appropriately conversant.   Dialysis access:  Right IJ PermCath            Component Value Date/Time     (L) 12/21/2023 0431     (L) 12/20/2023 0438     04/24/2018 0340    K 4.1 12/21/2023 0431    K 4.1 12/20/2023 0438    K 3.2 (L) 04/24/2018 0340    CHLORIDE 101 12/21/2023 0431    CHLORIDE 100 12/20/2023 0438    CO2 17 (L) 12/21/2023 0431    CO2 19 (L) 12/20/2023 0438    CO2 20 (L) 04/24/2018 0340    BUN 60.0 (H) 12/21/2023 0431    BUN 97.8 (H) 12/20/2023 0438    BUN 10 04/24/2018 0340    CREATININE 4.80 (H) 12/21/2023 0431    CREATININE 6.96 (H) 12/20/2023 0438    CREATININE 0.7 04/24/2018 0340    CALCIUM 8.8 12/21/2023 0431    CALCIUM 8.6 12/20/2023 0438    CALCIUM 9.2 04/24/2018 0340    PHOS 3.3 12/16/2023 0414            Component Value Date/Time    WBC 22.02 (H) 12/21/2023 0431    WBC 22 12/21/2023 0431    WBC 22.86 (H) 12/20/2023 0438    WBC 22.86 12/20/2023 0438    WBC 12.03 04/24/2018 0340    HGB 10.0 (L) 12/21/2023 0431    HGB 9.2 (L) 12/20/2023 0438    HGB 11.3 (L) 04/24/2018 0340    HCT 30.8 (L) 12/21/2023 0431    HCT 28.2 (L) 12/20/2023 0438    HCT 35.3 (L) 04/24/2018 0340     (H) 12/21/2023 0431     (H) 12/20/2023 0438     04/24/2018 0340         Imaging reviewed      Assessment / Plan:       Active Hospital Problems    Diagnosis  POA    Pressure ulcer of sacral region, unstageable [L89.150]  No      Resolved Hospital Problems   No resolved problems to display.       POORNIMA multifactorial secondary to obstructive uropathy, acute blood loss, contrast exposure, hypotension and Afib with  RVR  ---Nephrotic range proteinuria noted. 4.4 g  ---Dialysis initiated 11/28 post tunneled catheter placement 12/6  --tunneled catheter removed 12/16 and replaced 12/20 due to persistent fungemia  Newly diagnosed malignant gastric mass. Pathology consistent with adenocarcinoma   -s/p J tube and mediport placement 12/1  Acute blood loss anemia 2/2 bleeding post long term IV removal   --s/p transfusion 11/30, 12/1, 12/10  Bilateral hydronephrosis noted 11/21 - stent placement initially deferred due to stable renal function   Afib with RVR resolved   S/p biliary drain placement and subsequent exchange due to fungemia   Fungemia - blood cultures positive 12/10, 12/11   Hypotension on TID Midodrine     Plan:  Patient did not tolerated hemodialysis yesterday very well.  She had persistent hypotension which limited our ability to ultrafiltrate.  Blood pressure again low this morning.  Seen on hemodialysis at 8:50 a.m..  Giving albumin with dialysis.  Increase midodrine to 15 mg t.i.d..  Overall prognosis seems very poor, especially if she continues to not tolerate dialysis and ultrafiltration.    Yahir Black DO  Nephrology  [facility]  Clinic number: [phone]

## 2023-12-21 NOTE — PROGRESS NOTES
[facility]  Infectious Disease Progress Note            ASSESSMENT & PLAN:   She is a 58-year-old female with a past medical history of hypertension who underwent an unsuccessful laparoscopic cholecystectomy on 11/10/2023 as gallbladder was unable to be completely resected.  Since procedure, she continued to have right flank and back pain.  She followed up with her PCP, and CT of the abdomen and pelvis on 11/17 revealed left-sided hydronephrosis with suspected distal obstruction.  Additionally noted was markedly thickened stomach wall.  Underwent ERCP on 11/18-subsequently found to have a malignant gastric tumor.  IR placed a biliary drain on 11/21.  On 11/24, she developed atrial fibrillation with RVR and was initiated on amiodarone drip.   She then had bilateral ureteral stents placed on 11/27 given persistent hydronephrosis.  She was developed an POORNIMA since admit secondary to obstructive uropathy, now requiring hemodialysis.  Temporary hemodialysis catheter was placed earlier today along with MediPort.  Patient was significant oozing from sites postoperatively.  She received 3 units of blood intraoperatively as well as around 1 L of IV fluids per report.  Patient reports developing cough after procedure today, nonproductive.  Denies chest pain and is oxygenating well on room air.  Patient has been receiving empiric Zosyn essentially since admit.  Leukocytosis had trended up around 11/22, however has since trended downward although with some worsening this morning.  Blood cultures from 11/18 are negative.  Urine culture from 11/23 is also negative.  She is been afebrile and hemodynamically stable.  Chest x-ray earlier today showed low lung volumes with mild retrocardiac atelectasis.  We have been consulted for input regarding worsening leukocytosis.      Reconsulted secondary to recent low-grade fevers, T-max of 100.4° on 12/08.  Patient had previously been off of Zosyn since 12/05.  She was resumed on antimicrobials  with vancomycin and Zosyn on 12/10.  She denies any associated symptoms.  She did have a recent drop in her H&H and positive stools.  GI service is considering colonoscopy.  CT of the chest, abdomen, and pelvis with IV contrast performed on 12/09 showed moderate volume ascites with areas of subcutaneous edema in the abdominal wall.  Patient currently without complaints.  Blood cultures drawn on 12/10 via PICC are positive for yeast, C. glabrata per BCID.  Peripheral set are negative thus far.          Candidemia - abdominal vs line source  Concern for lower GIB  Ascites per recent CT  Persistent leukocytosis, suspect multifactorial  POORNIMA, now ESRD on HD  Obstructive uropathy, s/p bilateral ureteral stents on 11/27  Persistent hyperbilirubinemia, s/p unsuccessful cholecystectomy on 11/10 and biliary drain on 11/21, exchanged 12/13  Gastric cancer, newly diagnosed  Mediport / tunneled HD catheter status      Plan:  Increased abdominal pain today, otherwise stable.   CT A/P ordered per primary - f/u results.  May need to consider paracentesis.  Continue micafungin 100mg IV q24hr and cipro 400mg IV q24h.  Discussed with patient and nursing.           SUBJECTIVE:    AF, VSS. C/o some worse abdominal pain, no nausea or diarrhea.  Working with OT at present.     MEDICATIONS:   Reviewed in EMR    REVIEW OF SYSTEMS:   Except as documented, all other systems reviewed and negative     PHYSICAL EXAM:   T 99 °F (37.2 °C)   /70   P (!) 122   RR 20   O2 98 %  GENERAL: NAD; does not appear toxic  SKIN: no rash  HEENT: sclera non-icteric; PERRL   NECK: supple; no LAD  CHEST: CTA; nonlabored, equal expansion; no adventitious BS  CARDIOVASCULAR: RRR, S1S2; no murmur   ABDOMEN:  active bowel sounds; abdomen somewhat firm, rounded, somewhat TTP diffusely; biliary drain / J-tube noted  EXTREMITIES: no cyanosis or clubbing  NEURO: AAO x4; CN II-XII grossly intact  PSYCH: Mentation and affect appropriate     LABS AND IMAGING:  "    Recent Labs     12/20/23 0438 12/21/23 0431   WBC 22.86  22.86* 22  22.02*   RBC 3.13* 3.36*   HGB 9.2* 10.0*   HCT 28.2* 30.8*   MCV 90.1 91.7   MCH 29.4 29.8   MCHC 32.6* 32.5*   RDW 17.2* 17.1*   * 789*       No results for input(s): "LACTIC" in the last 72 hours.  No results for input(s): "INR", "APTT", "D-DIMER" in the last 72 hours.  No results for input(s): "HGBA1C", "CHOL", "TRIG", "LDL", "VLDL", "HDL" in the last 72 hours.   Recent Labs     12/20/23 0438 12/21/23 0431   * 130*   K 4.1 4.1   CHLORIDE 100 101   CO2 19* 17*   BUN 97.8* 60.0*   CREATININE 6.96* 4.80*   GLUCOSE 106 148*   CALCIUM 8.6 8.8   ALBUMIN 1.6* 2.0*   GLOBULIN 5.1* 5.4*   ALKPHOS 170* 169*   ALT 46 47   AST 41* 41*   BILITOT 2.8* 2.6*       No results for input(s): "BNP", "CPK", "TROPONINI" in the last 72 hours.         Cardiac catheterization  Procedure performed in the Invasive Lab    - See Procedure Log link below for nursing documentation    - See OpNote on Surgeries Tab for physician findings    - See Imaging Tab for radiologist dictation    MARIA A Viramontes  Infectious Disease  [facility]  "

## 2023-12-21 NOTE — PROGRESS NOTES
Inpatient Nutrition Assessment    Admit Date: 11/17/2023   Total duration of encounter: 34 days     Nutrition Recommendation/Prescription     -Resume tube feeds:  Novasource @ 45ml/hr, goal rate  Free water flush: 20ml/hr (~400ml flush/d; 1050ml total fluids daily)    -Additional fluids per MD/nephrology.     Communication of Recommendations: reviewed with nurse    Nutrition Assessment     Malnutrition Assessment/Nutrition-Focused Physical Exam    Malnutrition Context: acute illness or injury (11/18/23 1630)  Malnutrition Level: severe (11/18/23 1630)  Energy Intake (Malnutrition):  (does not meet criteria) (12/08/23 1238)  Weight Loss (Malnutrition): greater than 7.5% in 3 months (11/18/23 1630)  Subcutaneous Fat (Malnutrition): moderate depletion (11/18/23 1630)  Orbital Region (Subcutaneous Fat Loss): moderate depletion        Muscle Mass (Malnutrition): moderate depletion (11/18/23 1630)  Sabianist Region (Muscle Loss): moderate depletion     Clavicle and Acromion Bone Region (Muscle Loss): moderate depletion        Patellar Region (Muscle Loss): moderate depletion                 A minimum of two characteristics is recommended for diagnosis of either severe or non-severe malnutrition.    Chart Review    Reason Seen: malnutrition screening tool (MST), physician consult for wt loss, and follow-up    Malnutrition Screening Tool Results   Have you recently lost weight without trying?: Yes: 24-33 lbs  Have you been eating poorly because of a decreased appetite?: No   MST Score: 3     Diagnosis:  Hypotension, improved  Persistent leukocytosis- source unclear  Acute on chronic anemia, s/p 2 units prbcs transfusion 11/30  Hx of Acute Blood loss anemia with syncope and then fall 11/26-   Locally advanced gastric adenocarcinoma with intractable nausea and vomiting possible Gastric/duodenal outlet obstruction  -now status post J Tube placement  Obstructive uropathy with bilateral hydronephrosis- S/P bilateral ureteral stent  placed on 11/27  POORNIMA-now on HD - 11/29  Metabolic acidosis- resolved  Suspected bile leak with biloma and biliary obstruction--> H/O Laparoscopic incomplete cholecystectomy 11/10/2023  New onset atrial fibrillation with RVR- fluctuating  Hypokalemia- resolved with replacement  Hyponatremia  Severe malnutrition    Relevant Medical History:   Past Medical History:   Diagnosis Date    Hypertension     Sciatica      Past Surgical History:   Procedure Laterality Date    CARPAL TUNNEL RELEASE Left     CYSTOSCOPY W/ URETERAL STENT PLACEMENT Bilateral 11/27/2023    Procedure: CYSTOSCOPY, WITH URETERAL STENT INSERTION;  Surgeon: Huey Cardenas MD;  Location: [facility] OR;  Service: Urology;  Laterality: Bilateral;    EGD, WITH CLOSED BIOPSY  11/18/2023    Procedure: EGD, WITH CLOSED BIOPSY;  Surgeon: Alfred Goss MD;  Location: [facility] OR;  Service: Gastroenterology;;    ERCP N/A 11/18/2023    Procedure: ERCP (ENDOSCOPIC RETROGRADE CHOLANGIOPANCREATOGRAPHY);  Surgeon: Alfred Goss MD;  Location: [facility] OR;  Service: Gastroenterology;  Laterality: N/A;    HEMORRHOID SURGERY      INSERTION OF TUNNELED CENTRAL VENOUS CATHETER (CVC) WITH SUBCUTANEOUS PORT N/A 12/1/2023    Procedure: INSERTION, PORT-A-CATH;  Surgeon: William Morse MD;  Location: [facility] OR;  Service: General;  Laterality: N/A;    INSERTION OF TUNNELED CENTRAL VENOUS HEMODIALYSIS CATHETER N/A 12/6/2023    Procedure: Insertion, Catheter, Central Venous, Hemodialysis;  Surgeon: Satinder Luo DO;  Location: [facility] CATH LAB;  Service: Nephrology;  Laterality: N/A;    INSERTION OF TUNNELED CENTRAL VENOUS HEMODIALYSIS CATHETER N/A 12/20/2023    Procedure: Insertion, Catheter, Central Venous, Hemodialysis;  Surgeon: Satinder Luo DO;  Location: [facility] CATH LAB;  Service: Nephrology;  Laterality: N/A;    LAPAROSCOPIC INSERTION OF JEJUNOSTOMY TUBE N/A 12/1/2023    Procedure: INSERTION, JEJUNOSTOMY TUBE, LAPAROSCOPIC;  Surgeon: William Morse MD;  Location:  "[facility] OR;  Service: General;  Laterality: N/A;  PLUS MEDIPORT     Review of patient's allergies indicates:  No Known Allergies     Nutrition-Related Medications:    ciprofloxacin  400 mg Intravenous Q24H    collagenase   Topical (Top) Daily    enoxparin  1 mg/kg Subcutaneous Q24H (treatment, non-standard time)    EScitalopram oxalate  10 mg Oral Daily    hydrocortisone  25 mg Rectal BID    melatonin  6 mg Oral Nightly    metoprolol tartrate  25 mg Oral BID    micafungin (MYCAMINE) IVPB  100 mg Intravenous Q24H    midodrine  15 mg Oral TID WM    pantoprazole  40 mg Intravenous BID WM    perflutren lipid microspheres  1.3 mL Intravenous Once    sodium bicarbonate  1,300 mg Oral Daily        Calorie Containing IV Medications: no significant kcals from medications at this time    Nutrition-Related Labs:  No results found for: "HGBA1C"  12/16/2023: Phosphorus Level 3.3 mg/dL (Ref range: 2.3 - 4.7 mg/dL)  12/18/2023: Magnesium Level 2.30 mg/dL (Ref range: 1.60 - 2.60 mg/dL)  12/21/2023: Potassium Level 4.1 mmol/L (Ref range: 3.5 - 5.1 mmol/L); Sodium Level 130 mmol/L (L; Ref range: 136 - 145 mmol/L)   Recent Labs   Lab 12/19/23 0444 12/20/23 0438 12/21/23  0431   WBC 24.35  24.35* 22.86  22.86* 22  22.02*   RBC 3.11* 3.13* 3.36*   HGB 9.3* 9.2* 10.0*   HCT 28.2* 28.2* 30.8*   MCV 90.7 90.1 91.7   MCH 29.9 29.4 29.8   MCHC 33.0 32.6* 32.5*       Recent Labs   Lab 12/15/23  1047 12/16/23  0414 12/18/23  0440 12/19/23  0444 12/20/23  0438 12/21/23  0431      < > 133* 133* 133* 130*   K 3.3*   < > 4.3 4.3 4.1 4.1   CO2 23   < > 19* 18* 19* 17*   BUN 48.7*   < > 63.8* 82.4* 97.8* 60.0*   CREATININE 4.56*   < > 5.20* 6.24* 6.96* 4.80*   GLUCOSE 117*   < > 90 107 106 148*   CALCIUM 8.1*   < > 8.8 8.6 8.6 8.8   MG 2.10  --  2.30  --   --   --    ALBUMIN 1.5*   < > 1.7* 1.6* 1.6* 2.0*   ALKPHOS 184*   < > 162* 160* 170* 169*   ALT 52   < > 51 47 46 47   AST 42*   < > 42* 37* 41* 41*   BILITOT 3.5*   < > 3.2* 2.8* 2.8* " 2.6*    < > = values in this interval not displayed.         Diet/PN Order: No diet orders on file  Oral Supplement Order: none  Tube Feeding Order:  Novasource @ 45ml/hr (see below for calculation)  Appetite/Oral Intake: NPO/NPO  Factors Affecting Nutritional Intake: altered gastrointestinal function and NPO  Food/Tenriism/Cultural Preferences: none reported  Food Allergies: none reported    Skin Integrity: drain/device(s)  Wound(s):      Altered Skin Integrity 12/12/23 1500 Right lateral Buttocks Partial thickness tissue loss. Shallow open ulcer with a red or pink wound bed, without slough. Intact or Open/Ruptured Serum-filled blister.-Tissue loss description: Partial thickness       Altered Skin Integrity 12/12/23 1500 Right medial Buttocks Partial thickness tissue loss. Shallow open ulcer with a red or pink wound bed, without slough. Intact or Open/Ruptured Serum-filled blister.-Tissue loss description: Partial thickness       Altered Skin Integrity 12/12/23 1500 Sacral spine Partial thickness tissue loss. Shallow open ulcer with a red or pink wound bed, without slough. Intact or Open/Ruptured Serum-filled blister.-Tissue loss description: Full thickness n/a    Comments    11/18/23: Pt reports poor appetite, nauseated, threw up after consuming ~10% of full liquid tray for lunch, appetite has been a struggle for 3 months over which time she lost about 30 lbs unintentionally, appetite has been worse since 11/10 incomplete cholecystectomy, chews/swallows well, agrees to vanilla ONS on diet advancement.     11/22/23: Pt with poor to fair intake of full liquids; states that she is still having some n/v occasionally; reports that she is drinking the Boost that is ordered.     11/24/23: Pt reports poor appetite, still w/ n/v, basically vomits up everything she consumes. Discussed w/ physician, we agree that she needs a J-tube. Pt reports that she is open to it.     11/26/23: breakfast: 0% tray; lunch- unknown, no tray  "receipt; dinner- 100% 2 orange sheberts and 1/2 bowl chicken broth. Consuming <25% energy needs.    11/27/23: breakfast: 50% cream of wheat and 100% orange juice; lunch: NPO for stent placements, but sx deferred until tomorrow so will allow liquids for dinner tonight.   Continues with emesis and nausea throughout day; receiving antiemetics. States Boost supplement also "comes back up". Will change to Boost Breeze and monitor tolerance.  Changing peripheral PN to total PN with lipids 2x/week to meet nutritional needs until able to place enteral access device for tube feeds or po intake improves. Discussed with MD and pharmacy.     11/30/23 TPN continues. Decreased rate to 50ml/hr until tomorrow and will order custom TPN for minimum volume until able to start on enteral nutrition. Started on dialysis s/t worsening renal indices and decreased urine output (<600mL x24 hr pr RN). Glucose lab of 547 this morning error, 159 on recheck.     12/1/23 Out of room for Jtube and mediport placement. Consult received for Jtube feeding recs. Continue in-house TPN at 50ml/hr, no need for custom at this time. Can wean TPN as tolerating tube feeds at 1/2 goal rate. Will use renal formula for tube feedings while pt continues on dilaysis.  Recommendations discussed with RN and pharmacy.     12/4/23 Consult received to start tube feedings. Order placed and discussed with RN. Start at 15ml/hr and advance per MD. Continue with renal formula and monitor electrolytes for ability to change to a standard formula. TPN infusing and NPO status continues.      12/5/23 Tolerating tube feeds at 25ml/hr. TPN decreased to 30ml/hr last night. Pt reports tolerating tube feeds fine. TPN bag should be complete in the next few hours. Can discontinue as long as tolerating tube feeds.    12/8/23: Tolerating tube feeds at goal last few days. Last BM 12/5. Reports gas but no abdominal pain.   No TPN/IVF infusing. Increase free water flush from 30ml q4hr to " "20ml/hr.    23: Pt in dialysis at time of rounds. Spoke with RN, she reported that TF were at goal before pt went NPO at midnight. RN stated that plans were to resume feeds once the pt returns from dialysis.     12/15/23: TF on hold this morning but RN states got the ok to resume at goal rate. Pt has been tolerating. Continue NPO.     23: Spoke with RN. Pt continues to tolerate TF at goal.     23: TF on hold, pt in dialysis. RN states has been tolerating at goa and reports of some abdominal pain but not related to tube feedings.     Anthropometrics    Height: 5' 5.75" (167 cm) Height Method: Stated  Last Weight: 97.5 kg (215 lb) (23 0630) Weight Method: Standard Scale  BMI (Calculated): 35  BMI Classification: overweight (BMI 25-29.9)     Ideal Body Weight (IBW), Female: 128.75 lb     % Ideal Body Weight, Female (lb): 140.58 %                             Usual Weight Provided By: patient and EMR weight history  23: 211 lbs  Wt Readings from Last 5 Encounters:   23 97.5 kg (215 lb)   18 88 kg (194 lb)   18 90.4 kg (199 lb 4.7 oz)   17 90 kg (198 lb 6.6 oz)     Weight Change(s) Since Admission: 23 97.5kg, +7.5kg since admit, with +3 LE edema  Admit Weight: 82.2 kg (181 lb 4 oz) (23 2355)    Estimated Needs    Weight Used For Calorie Calculations: 82.2 kg (181 lb 3.5 oz)  Energy Calorie Requirements (kcal): 1644 kcals (20 kcals/kg)  Energy Need Method: Kcal/kg  Weight Used For Protein Calculations: 82.2 kg (181 lb 3.5 oz)  Protein Requirements:  g (1.0-1.3 g/kg)  Fluid Requirements (mL): 1000mL + UOP (started on HD)  Temp (24hrs), Av.8 °F (37.1 °C), Min:97.7 °F (36.5 °C), Max:99.8 °F (37.7 °C)       Enteral Nutrition    23 - on hold during rounds, pt in dialysis   Formula: Novasource Renal  Rate/Volume: 45ml/hr  Water Flushes: 20ml/hr (400ml free water)  Additives/Modulars: none at this time  Route: jejunostomy tube  Method: " continuous  Total Nutrition Provided by Tube Feeding, Additives, and Flushes:  Calories Provided  1800 kcal/d, 109% needs   Protein Provided   81 g/d, 98% needs   Fluid Provided  1050 ml/d, -% needs   Continuous feeding calculations based on estimated 20 hr/d run time unless otherwise stated.    Parenteral Nutrition    Patient not receiving parenteral nutrition at this time.     Evaluation of Received Nutrient Intake    Calories: meeting estimated needs  Protein: meeting estimated needs    Patient Education    Not applicable.    Nutrition Diagnosis     PES: Malnutrition related to suboptimal protein/energy intake as evidenced by less than or equal to 50% needs met for greater than or equal to 5 days, moderate fat depletion, moderate muscle depletion, and greater than 7.5% weight loss in 3 months. (active)     Interventions/Goals     Intervention(s): collaboration with other providers  Goal: Meet greater than 75% of nutritional needs by follow-up. (goal met)    Monitoring & Evaluation     Dietitian will monitor energy intake, weight, electrolyte/renal panel, and gastrointestinal profile.  Nutrition Risk/Follow-Up: moderate (follow-up in 3-5 days)   Please consult if re-assessment needed sooner.    Lisa Correa RD   12/21/2023

## 2023-12-21 NOTE — PROGRESS NOTES
Gastroenterology Progress Note    Subjective:  GI called back this am due to pt complaining of lower abdominal pain. 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 Gastric antral scarring prevented biliary cannulation.  Internal/external biliary drain placed by IR on 11/21 with improvement of bilirubin post drain placement and GI signed off. GI reconsulted 12/10 for anemia and FOBT positive which resolved with hydrocortisone suppositories. GI signed off.     Since then tunneled HD catheter and MediPort removed 12/16 due to persistent fungemia.  J-tube clogged and replaced 12/18 by surgical oncology. BC negative x 5 days and tunneled dialysis catheter replaced 12/20 and resumed HD that day but was unable to complete due to hypotension. GI call back for abdominal pain.    Patient states she has been having abdominal pain for a few days. She points to her J tube area when asked where. No n/v. Last BM yesterday and brown in color per nursing staff. Tolerating TFs at 45cc/hr but are held today due to patient's pain.     Repeat CT abd/pel w/o completed today: technically challenging exam given lack of IV contrast and presence of anasarca. Moderate free intraperitoneal fluid similar to previous exam. J-tube in place that terminates in jejunum. Several locules of air in abdomen in area of insertion point/jejunostomy balloon which are difficult to localize within the bowel. Leak or other nonspecific pneumoperitoneum is consideration. Foci of subcutaneous gas at abdominal wall near insertion of jejunostomy which may be related to J tube or medicinal injection. Unchanged bilateral hydronephrosis with a ureteral stents. Unchanged internal external biliary drain.    Objective:    ROS:    Review of Systems   Constitutional:  Negative for chills, fever and malaise/fatigue.   HENT:  Negative for sinus pain.    Eyes:  Negative for  "redness.   Respiratory:  Negative for shortness of breath and wheezing.    Cardiovascular:  Negative for chest pain.   Gastrointestinal:  Positive for abdominal pain (left side). Negative for blood in stool, constipation, diarrhea, heartburn, melena, nausea and vomiting.   Musculoskeletal:  Negative for neck pain.   Skin:  Negative for rash.   Neurological:  Negative for dizziness, weakness and headaches.   Psychiatric/Behavioral:  Negative for memory loss.          Vital Signs:  /70   Pulse (!) 122   Temp 99 °F (37.2 °C)   Resp 20   Ht 5' 5.75" (1.67 m)   Wt 97.5 kg (215 lb)   SpO2 98%   Breastfeeding No   BMI 34.97 kg/m²   Body mass index is 34.97 kg/m².    Physical Exam:    Physical Exam  Constitutional:       General: She is not in acute distress.     Appearance: She is ill-appearing (chronically). She is not toxic-appearing.   HENT:      Head: Normocephalic.      Nose: Nose normal.   Eyes:      General: No scleral icterus.     Extraocular Movements: Extraocular movements intact.      Conjunctiva/sclera: Conjunctivae normal.      Pupils: Pupils are equal, round, and reactive to light.   Cardiovascular:      Rate and Rhythm: Normal rate.      Pulses: Normal pulses.   Pulmonary:      Effort: Pulmonary effort is normal. No respiratory distress.      Breath sounds: Normal breath sounds. No wheezing.      Comments: 2L O2 NC in place  Abdominal:      General: There is distension.      Palpations: Abdomen is soft. There is no mass.      Tenderness: There is abdominal tenderness (tender to moderate palpation in LLQ and around J tube. Less tender when distracted.). There is no guarding or rebound.      Comments: Firm to palpation. Hypoactive bowel sounds. Jtube in place in LUQ with TFs held, c/d/i.   Musculoskeletal:         General: Normal range of motion.      Cervical back: Normal range of motion.      Right lower leg: Edema (2+) present.      Left lower leg: Edema (2+) present.   Skin:     Coloration: " Skin is not jaundiced or pale.   Neurological:      General: No focal deficit present.      Mental Status: She is alert. Mental status is at baseline.   Psychiatric:         Mood and Affect: Affect is flat.         Labs:  Recent Results (from the past 24 hour(s))   Comprehensive Metabolic Panel    Collection Time: 12/21/23  4:31 AM   Result Value Ref Range    Sodium Level 130 (L) 136 - 145 mmol/L    Potassium Level 4.1 3.5 - 5.1 mmol/L    Chloride 101 98 - 107 mmol/L    Carbon Dioxide 17 (L) 23 - 31 mmol/L    Glucose Level 148 (H) 82 - 115 mg/dL    Blood Urea Nitrogen 60.0 (H) 9.8 - 20.1 mg/dL    Creatinine 4.80 (H) 0.55 - 1.02 mg/dL    Calcium Level Total 8.8 8.4 - 10.2 mg/dL    Protein Total 7.4 5.8 - 7.6 gm/dL    Albumin Level 2.0 (L) 3.4 - 4.8 g/dL    Globulin 5.4 (H) 2.4 - 3.5 gm/dL    Albumin/Globulin Ratio 0.4 (L) 1.1 - 2.0 ratio    Bilirubin Total 2.6 (H) <=1.5 mg/dL    Alkaline Phosphatase 169 (H) 40 - 150 unit/L    Alanine Aminotransferase 47 0 - 55 unit/L    Aspartate Aminotransferase 41 (H) 5 - 34 unit/L    eGFR 10 mls/min/1.73/m2   CBC with Differential    Collection Time: 12/21/23  4:31 AM   Result Value Ref Range    WBC 22.02 (H) 4.50 - 11.50 x10(3)/mcL    RBC 3.36 (L) 4.20 - 5.40 x10(6)/mcL    Hgb 10.0 (L) 12.0 - 16.0 g/dL    Hct 30.8 (L) 37.0 - 47.0 %    MCV 91.7 80.0 - 94.0 fL    MCH 29.8 27.0 - 31.0 pg    MCHC 32.5 (L) 33.0 - 36.0 g/dL    RDW 17.1 (H) 11.5 - 17.0 %    Platelet 789 (H) 130 - 400 x10(3)/mcL    MPV 10.0 7.4 - 10.4 fL    NRBC% 0.0 %   Manual Differential    Collection Time: 12/21/23  4:31 AM   Result Value Ref Range    WBC 22 x10(3)/mcL    Neutrophils % 90 %    Lymphs % 1 %    Monocytes % 3 %    Basophils % 1 %    Metamyelocytes % 4 (H) <=0 %    Myelocytes % 3 (H) <=0 %    Neutrophils Abs 19.8 (H) 2.1 - 9.2 x10(3)/mcL    Lymphs Abs 0.22 (L) 0.6 - 4.6 x10(3)/mcL    Monocytes Abs 0.66 0.1 - 1.3 x10(3)/mcL    Basophils Abs 0.22 (H) 0 - 0.2 x10(3)/mcL    Platelets Increased (A) Normal,  Adequate    RBC Morph Abnormal (A) Normal    Anisocytosis 1+ (A) (none)    Macrocytosis 1+ (A) (none)       Assessment/Plan:  58-year-old female unknown to our group with a past medical history of HTN. Transferred from [facility] to [facility] for concerns of biloma vs bile leak vs obstruction s/p incomplete laparoscopic cholecystectomy 11/10/23 by Dr. Sicard. 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 Internal external biliary drain placed by IR 11/21. GI called back for anemia and FOBT+ that resolved. GI called back 12/21 for abdominal pain.     Abdominal pain--LLQ and around J tube. Worsening distention.  2. Anemia, likely multifactorial w/ component 2/2 GI blood loss - stable  Hgb stable at 10.0  3. Blood in stool - resolved  4. Gastric adenocarcinoma  - s/p j-tube placement surgically 12/01/23 and replacement 12/18    - Imaging without obstruction; concerning for leak or other nonspecific pneumoperitoneum. Consider recommendations from surgery as pt is s/p J tube placed by surgery.  - monitor and transfuse as needed to keep Hgb >7  - monitor stool for signs of blood   - PPI BID  - continue hydrocortisone suppositories BID  - will continue to monitor closely    Aurea Phillips PA-C  Gastroenterology  [facility]

## 2023-12-22 LAB — BACTERIA BLD CULT: NORMAL

## 2023-12-22 PROCEDURE — 63600175 PHARM REV CODE 636 W HCPCS: Performed by: STUDENT IN AN ORGANIZED HEALTH CARE EDUCATION/TRAINING PROGRAM

## 2023-12-22 PROCEDURE — 25000003 PHARM REV CODE 250: Performed by: STUDENT IN AN ORGANIZED HEALTH CARE EDUCATION/TRAINING PROGRAM

## 2023-12-22 PROCEDURE — 25000003 PHARM REV CODE 250

## 2023-12-22 PROCEDURE — 21400001 HC TELEMETRY ROOM

## 2023-12-22 PROCEDURE — 97530 THERAPEUTIC ACTIVITIES: CPT

## 2023-12-22 PROCEDURE — 25000003 PHARM REV CODE 250: Performed by: NURSE PRACTITIONER

## 2023-12-22 PROCEDURE — C9113 INJ PANTOPRAZOLE SODIUM, VIA: HCPCS | Performed by: INTERNAL MEDICINE

## 2023-12-22 PROCEDURE — 97110 THERAPEUTIC EXERCISES: CPT

## 2023-12-22 PROCEDURE — 99233 SBSQ HOSP IP/OBS HIGH 50: CPT | Mod: ,,, | Performed by: INTERNAL MEDICINE

## 2023-12-22 PROCEDURE — 87040 BLOOD CULTURE FOR BACTERIA: CPT | Performed by: STUDENT IN AN ORGANIZED HEALTH CARE EDUCATION/TRAINING PROGRAM

## 2023-12-22 PROCEDURE — 99233 SBSQ HOSP IP/OBS HIGH 50: CPT | Mod: ,,, | Performed by: HOSPITALIST

## 2023-12-22 PROCEDURE — 63600175 PHARM REV CODE 636 W HCPCS: Performed by: INTERNAL MEDICINE

## 2023-12-22 RX ADMIN — COLLAGENASE SANTYL: 250 OINTMENT TOPICAL at 09:12

## 2023-12-22 RX ADMIN — PANTOPRAZOLE SODIUM 40 MG: 40 INJECTION, POWDER, FOR SOLUTION INTRAVENOUS at 09:12

## 2023-12-22 RX ADMIN — ESCITALOPRAM OXALATE 10 MG: 10 TABLET ORAL at 09:12

## 2023-12-22 RX ADMIN — MIDODRINE HYDROCHLORIDE 15 MG: 5 TABLET ORAL at 09:12

## 2023-12-22 RX ADMIN — MIDODRINE HYDROCHLORIDE 15 MG: 5 TABLET ORAL at 01:12

## 2023-12-22 RX ADMIN — SODIUM BICARBONATE 650 MG TABLET 1300 MG: at 09:12

## 2023-12-22 RX ADMIN — PANTOPRAZOLE SODIUM 40 MG: 40 INJECTION, POWDER, FOR SOLUTION INTRAVENOUS at 05:12

## 2023-12-22 RX ADMIN — METOPROLOL TARTRATE 25 MG: 25 TABLET, FILM COATED ORAL at 08:12

## 2023-12-22 RX ADMIN — METOPROLOL TARTRATE 25 MG: 25 TABLET, FILM COATED ORAL at 09:12

## 2023-12-22 RX ADMIN — Medication 6 MG: at 08:12

## 2023-12-22 RX ADMIN — HYDROCORTISONE ACETATE 25 MG: 25 SUPPOSITORY RECTAL at 08:12

## 2023-12-22 RX ADMIN — PIPERACILLIN AND TAZOBACTAM 4.5 G: 4; .5 INJECTION, POWDER, LYOPHILIZED, FOR SOLUTION INTRAVENOUS; PARENTERAL at 05:12

## 2023-12-22 RX ADMIN — MIDODRINE HYDROCHLORIDE 15 MG: 5 TABLET ORAL at 05:12

## 2023-12-22 RX ADMIN — ONDANSETRON 4 MG: 2 INJECTION INTRAMUSCULAR; INTRAVENOUS at 11:12

## 2023-12-22 RX ADMIN — HYDROCORTISONE ACETATE 25 MG: 25 SUPPOSITORY RECTAL at 09:12

## 2023-12-22 RX ADMIN — MICAFUNGIN SODIUM 100 MG: 100 INJECTION, POWDER, LYOPHILIZED, FOR SOLUTION INTRAVENOUS at 09:12

## 2023-12-22 NOTE — PROGRESS NOTES
Patient Name: Karen Briggs   MRN: [id number]   Admission Date: 11/17/2023   Hospital Length of Stay: 35   Attending Provider: Mariano Bermudez MD   Consulting Provider: Viktor Landa M.D.  Reason for Consult: Goals of Care  Primary Care Physician: Marian White FNP-C     Principal Problem: <principal problem not specified>     Patient information was obtained from patient, spouse/SO, ER records, and primary team.      Final diagnoses:  [N13.30] Hydronephrosis     Assessment/Plan:     I reviewed the patient's current clinical status with the nurse. We reviewed clinical documentation, labs and imaging.     Symptom management review:   Nausea and vomiting today. She has received both zofran 4 mg and reglan in the last few days. She also received dicyclomine IM. She denies any request for anything for nausea at present. She just had zofran, which has been beneficial.    Pain. She has intermittent pain in her legs due to fluid retention, but declines pain meds at present.     Dyspnea. She complains of dyspnea at rest and with exertion. She has a pressure on her chest at times. I reviewed the use of low dosed narcotics to assist with the feeling of dyspnea, especially if she elects to discharge home with hospice care.    I have spoken with the various specialties on her case. I reviewed their words with the patient and her sister and S.O. at bedside. We reviewed the fact that she has remained hypotensive, without the ability to provide her with adequate food and drink. This has interrupted her ability to dialyze and ultrafiltrate. Dr. Black intends to try again tomorrow, but we have little expectation that things will be different due to an inability to treat the underlying problem as she has moderate free intraperitoneal fluid in the abdomen and a possible leak. Feedings are held as above.    I reviewed the futility of continuing aggressive treatment, especially if she remains intolerant or fails to  benefit. We talked about a change in focus upon symptom management and comfort care, especially if she remains unable to dialyze tomorrow. I explained that should she and family elect home hospice care over the weekend, they may let any staff know, especially if this will allow discharge home with family before the holidays.    We talked about code status in her current situation. I explained the high risk of death from a cardiopulmonary arrest and the high risk of a worsening clinical status should she survive. I explained that we would like to prepare her for a comfortable death in her home if this is her choice. We reviewed the fact that death in an ICU is not usually a comfortable experience and can be quite traumatic. Her family appreciated the honesty and seemed to agree. The patient, however, continues to state that she wishes to remain a full code at this time.    History of Present Illness:     57 y/o F h/o recent Lap grace with incomplete removal, subsequent admission with acute biliary obstruction, transaminitis, s/p biliary drain, bilateral ureteral obstruction s/p bilateral ureteral stents and N&V with findings of a GOO, requiring TPN due to NPO status. She had EGD with biopsy and findings of adenocarcinoma of the stomach. She is pending PEG tomorrow for staging and subsequent further surgery and medical treatment options to be discussed. We were consulted to review goals of care.      Active Ambulatory Problems     Diagnosis Date Noted    No Active Ambulatory Problems     Resolved Ambulatory Problems     Diagnosis Date Noted    No Resolved Ambulatory Problems     Past Medical History:   Diagnosis Date    Hypertension     Sciatica         Past Surgical History:   Procedure Laterality Date    CARPAL TUNNEL RELEASE Left     CYSTOSCOPY W/ URETERAL STENT PLACEMENT Bilateral 11/27/2023    Procedure: CYSTOSCOPY, WITH URETERAL STENT INSERTION;  Surgeon: Huey Cardenas MD;  Location: [facility];  Service:  Urology;  Laterality: Bilateral;    EGD, WITH CLOSED BIOPSY  11/18/2023    Procedure: EGD, WITH CLOSED BIOPSY;  Surgeon: Alfred Goss MD;  Location: [facility] OR;  Service: Gastroenterology;;    ERCP N/A 11/18/2023    Procedure: ERCP (ENDOSCOPIC RETROGRADE CHOLANGIOPANCREATOGRAPHY);  Surgeon: Alfred Goss MD;  Location: [facility] OR;  Service: Gastroenterology;  Laterality: N/A;    HEMORRHOID SURGERY      INSERTION OF TUNNELED CENTRAL VENOUS CATHETER (CVC) WITH SUBCUTANEOUS PORT N/A 12/1/2023    Procedure: INSERTION, PORT-A-CATH;  Surgeon: William Morse MD;  Location: [facility] OR;  Service: General;  Laterality: N/A;    INSERTION OF TUNNELED CENTRAL VENOUS HEMODIALYSIS CATHETER N/A 12/6/2023    Procedure: Insertion, Catheter, Central Venous, Hemodialysis;  Surgeon: Satinder Luo DO;  Location: [facility] CATH LAB;  Service: Nephrology;  Laterality: N/A;    INSERTION OF TUNNELED CENTRAL VENOUS HEMODIALYSIS CATHETER N/A 12/20/2023    Procedure: Insertion, Catheter, Central Venous, Hemodialysis;  Surgeon: Satinder Luo DO;  Location: [facility] CATH LAB;  Service: Nephrology;  Laterality: N/A;    LAPAROSCOPIC INSERTION OF JEJUNOSTOMY TUBE N/A 12/1/2023    Procedure: INSERTION, JEJUNOSTOMY TUBE, LAPAROSCOPIC;  Surgeon: William Morse MD;  Location: [facility] OR;  Service: General;  Laterality: N/A;  PLUS [facility]        Review of patient's allergies indicates:  No Known Allergies       Current Facility-Administered Medications:     0.9%  NaCl infusion (for blood administration), , Intravenous, Q24H PRN, Almaz Cheney, FNP    0.9%  NaCl infusion (for blood administration), , Intravenous, Q24H PRN, Maryanne Moise, AGNP    0.9%  NaCl infusion, , Intravenous, PRN, Hussein Merino MD, Last Rate: 5 mL/hr at 12/10/23 1928, Rate Verify at 12/10/23 1928    acetaminophen tablet 650 mg, 650 mg, Oral, Q4H PRN, Hussein Merino MD, 650 mg at 12/17/23 1208    aluminum-magnesium hydroxide-simethicone 200-200-20 mg/5 mL suspension 30 mL,  30 mL, Oral, QID PRN, Hussein Merino MD    bisacodyL suppository 10 mg, 10 mg, Rectal, Daily PRN, Hussein Merino MD    chlorproMAZINE injection 25 mg, 25 mg, Intramuscular, QID PRN, Dalton Palacios MD, 25 mg at 12/05/23 1102    ciprofloxacin (CIPRO)400mg/200ml D5W IVPB 400 mg, 400 mg, Intravenous, Q24H, Nathan Carlisle MD, Stopped at 12/21/23 1421    collagenase ointment, , Topical (Top), Daily, Mandy Brito MD, Given at 12/22/23 0931    dicyclomine injection 20 mg, 20 mg, Intramuscular, QID PRN, Dalton Palacios MD, 20 mg at 11/30/23 1754    enoxaparin injection 100 mg, 1 mg/kg, Subcutaneous, Q24H (treatment, non-standard time), Mukesh Cole MD, 100 mg at 12/21/23 0021    EScitalopram oxalate tablet 10 mg, 10 mg, Oral, Daily, Ming Pittman NP, 10 mg at 12/22/23 0931    hydrALAZINE injection 10 mg, 10 mg, Intravenous, Q6H PRN, Kenney Steiner MD    hydrocortisone suppository 25 mg, 25 mg, Rectal, BID, Alexandra Fry, PA, 25 mg at 12/22/23 0931    hyoscyamine ODT 0.125 mg, 0.125 mg, Sublingual, Q4H PRN, Sara Waldrop AGAGASTONP-BC, 0.125 mg at 11/30/23 1735    melatonin tablet 6 mg, 6 mg, Oral, Nightly, Ming Pittman NP, 6 mg at 12/21/23 2144    metoclopramide HCl injection 5 mg, 5 mg, Intravenous, Q6H PRN, Dalton Palacios MD, 5 mg at 12/21/23 2144    metoprolol injection 5 mg, 5 mg, Intravenous, Q4H PRN, Gnearo Crawford FNP, 5 mg at 12/08/23 0329    metoprolol tartrate (LOPRESSOR) tablet 25 mg, 25 mg, Oral, BID, Mariano Bermudez MD, 25 mg at 12/22/23 0931    micafungin 100 mg in sodium chloride 0.9 % 100 mL IVPB (MB+), 100 mg, Intravenous, Q24H, Mukesh Cole MD, Stopped at 12/21/23 2244    midodrine tablet 15 mg, 15 mg, Oral, TID WM, Yahir Black, DO, 15 mg at 12/22/23 0930    morphine injection 4 mg, 4 mg, Intravenous, Q4H PRN, Hussein Merino MD, 4 mg at 12/02/23 0217    ondansetron injection 4 mg, 4 mg, Intravenous, Q4H PRN, Dalton Palacios MD, 4 mg at 12/20/23 2007    oxyCODONE immediate  release tablet 5 mg, 5 mg, Oral, Q6H PRN, Hussein Merino MD, 5 mg at 12/04/23 2023    pantoprazole injection 40 mg, 40 mg, Intravenous, BID WM, Dalton Palacios MD, 40 mg at 12/22/23 0931    perflutren lipid microspheres injection 1.3 mL, 1.3 mL, Intravenous, Once, Dalton Palacios MD    polyethylene glycol packet 17 g, 17 g, Oral, BID PRN, Hussein Merino MD    prochlorperazine injection Soln 5 mg, 5 mg, Intravenous, Q6H PRN, Hussein Merino MD, 5 mg at 11/22/23 1347    promethazine injection 25 mg, 25 mg, Intramuscular, Q4H PRN, Lyssa Car, AGACNP-BC, 25 mg at 11/23/23 1620    senna-docusate 8.6-50 mg per tablet 2 tablet, 2 tablet, Oral, BID PRN, Hussein Merino MD    sodium bicarbonate tablet 1,300 mg, 1,300 mg, Oral, Daily, Maryanne Moise S, AGNP, 1,300 mg at 12/22/23 0931    sodium chloride 0.9% bolus 250 mL 250 mL, 250 mL, Intravenous, PRN, Maryanne Moise S, AGNP    sodium chloride 0.9% flush 10 mL, 10 mL, Intravenous, PRN, Hussein Merino MD     0.9%  NaCl infusion (for blood administration), 0.9%  NaCl infusion (for blood administration), sodium chloride 0.9%, acetaminophen, aluminum-magnesium hydroxide-simethicone, bisacodyL, chlorproMAZINE, dicyclomine, hydrALAZINE, hyoscyamine, metoclopramide, metoprolol, morphine, ondansetron, oxyCODONE, polyethylene glycol, prochlorperazine, promethazine, senna-docusate 8.6-50 mg, sodium chloride 0.9%, sodium chloride 0.9%     Family History   Problem Relation Age of Onset    Breast cancer Mother     Cancer Maternal Aunt         colon cancer        Review of Systems   Constitutional:  Positive for activity change and fatigue.   HENT:  Negative for sore throat and trouble swallowing.    Respiratory:  Positive for shortness of breath.    Cardiovascular:  Negative for leg swelling.   Gastrointestinal:  Positive for abdominal distention, nausea and vomiting. Negative for abdominal pain and constipation.   Genitourinary:  Negative for difficulty urinating.   Musculoskeletal:   "Negative for arthralgias and myalgias.   Psychiatric/Behavioral:  Positive for sleep disturbance. Negative for agitation and confusion. The patient is nervous/anxious.             Objective:   /72   Pulse (!) 111   Temp 99.1 °F (37.3 °C) (Oral)   Resp 20   Ht 5' 5.75" (1.67 m)   Wt 95.7 kg (211 lb)   SpO2 99%   Breastfeeding No   BMI 34.32 kg/m²      Physical Exam  Vitals reviewed.   Constitutional:       General: She is not in acute distress.     Appearance: She is ill-appearing. She is not toxic-appearing.   HENT:      Head: Normocephalic.      Right Ear: External ear normal.      Left Ear: External ear normal.      Nose: Nose normal.      Mouth/Throat:      Mouth: Mucous membranes are moist.      Pharynx: Oropharynx is clear.   Eyes:      Extraocular Movements: Extraocular movements intact.      Conjunctiva/sclera: Conjunctivae normal.      Pupils: Pupils are equal, round, and reactive to light.   Cardiovascular:      Rate and Rhythm: Normal rate and regular rhythm.      Pulses: Normal pulses.      Heart sounds: Normal heart sounds.   Pulmonary:      Effort: Pulmonary effort is normal.      Breath sounds: Normal breath sounds.   Abdominal:      General: There is distension.      Tenderness: There is no abdominal tenderness. There is no guarding.   Musculoskeletal:      Right lower leg: Edema present.      Left lower leg: Edema present.   Skin:     General: Skin is warm.   Neurological:      General: No focal deficit present.      Mental Status: She is alert and oriented to person, place, and time.   Psychiatric:         Judgment: Judgment normal.      Comments: She appears mostly despondent              Review of Symptoms  Review of Symptoms      Symptom Assessment (ESAS 0-10 Scale)  Pain:  0  Dyspnea:  0  Anxiety:  0  Nausea:  0  Depression:  0  Anorexia:  0  Fatigue:  0  Insomnia:  0  Restlessness:  0  Agitation:  0     CAM / Delirium:  Negative  Constipation:  Negative  Diarrhea:  " Negative    Anxiety:  Is nervous/anxious  Constipation:  No constipation    Bowel Management Plan (BMP):  Yes      Pain Assessment:  OME in 24 hours:  0  Location(s):      Modified [place] Scale:  0    Performance Status:  40    Living Arrangements:  Lives with spouse and Lives in home    Psychosocial/Cultural:   See Palliative Psychosocial Note: No  See assessement  **Primary  to Follow**  Palliative Care  Consult: No    Spiritual:  F - Sheryl and Belief:  Advent  A - Address in Care:  Pt elects to have  visit     Time-Based Charting:  Yes    Total Time Spent: 0 minutes      Advance Care Planning   Advance Directives:   Living Will: No    [place]: No    Do Not Resuscitate Status: No    Medical Power of : No    Agent's Name:  Mango Zepeda   Agent's Contact Number:  [phone]    Decision Making:  Patient answered questions and Family answered questions  Goals of Care: What is most important right now is to focus on remaining as independent as possible, extending life as long as possible, even it it means sacrificing quality. Accordingly, we have decided that the best plan to meet the patient's goals includes continuing with treatment.            Caregiver burden formerly assessed: Yes        > 50% of 36 min of encounter was spent in chart review, face to face discussion of goals of care, symptom assessment, coordination of care and emotional support.     Viktor Landa M.D.  Palliative Medicine  [facility]

## 2023-12-22 NOTE — PROGRESS NOTES
"Gastroenterology Progress Note    Subjective:  Patient notes pain is a little better compared to yesterday. TFs on hold. No n/v. BM yesterday brown in color.    Awaiting recs from surgical oncology who placed j tube. Tachy at 111 otherwise afebrile and VSS.    Objective:    ROS:    Review of Systems   Constitutional:  Negative for chills, fever and malaise/fatigue.   HENT:  Negative for sinus pain.    Eyes:  Negative for redness.   Respiratory:  Negative for shortness of breath and wheezing.    Cardiovascular:  Negative for chest pain.   Gastrointestinal:  Positive for abdominal pain. Negative for blood in stool, constipation, diarrhea, heartburn, melena, nausea and vomiting.   Musculoskeletal:  Negative for neck pain.   Skin:  Negative for rash.   Neurological:  Negative for dizziness, weakness and headaches.   Psychiatric/Behavioral:  Negative for memory loss.          Vital Signs:  /72   Pulse (!) 111   Temp 99.1 °F (37.3 °C) (Oral)   Resp 20   Ht 5' 5.75" (1.67 m)   Wt 95.7 kg (211 lb)   SpO2 99%   Breastfeeding No   BMI 34.32 kg/m²   Body mass index is 34.32 kg/m².    Physical Exam  Constitutional:       General: She is not in acute distress.     Appearance: She is ill-appearing (chronically). She is not toxic-appearing.   HENT:      Head: Normocephalic.      Nose: Nose normal.   Eyes:      General: No scleral icterus.     Extraocular Movements: Extraocular movements intact.      Conjunctiva/sclera: Conjunctivae normal.      Pupils: Pupils are equal, round, and reactive to light.   Cardiovascular:      Rate and Rhythm: Normal rate.      Pulses: Normal pulses.   Pulmonary:      Effort: Pulmonary effort is normal. No respiratory distress.      Breath sounds: Normal breath sounds. No wheezing.      Comments: 2L O2 NC in place  Abdominal:      General: There is distension.      Palpations: Abdomen is soft. There is no mass.      Tenderness: There is abdominal tenderness (more tender today around J " tube site.). There is no guarding or rebound.      Comments: Firm to palpation. Hypoactive bowel sounds. Jtube in place in LUQ with TFs held, c/d/i.   Musculoskeletal:         General: Normal range of motion.      Cervical back: Normal range of motion.      Right lower leg: Edema (2+) present.      Left lower leg: Edema (2+) present.   Skin:     Coloration: Skin is not jaundiced or pale.   Neurological:      General: No focal deficit present.      Mental Status: She is alert. Mental status is at baseline.   Psychiatric:         Mood and Affect: Affect is flat.     Labs:  No results found for this or any previous visit (from the past 24 hour(s)).    Imaging:      Assessment/Plan:  58-year-old female unknown to our group with a past medical history of HTN. Transferred from [facility] [place] to [facility] for concerns of biloma vs bile leak vs obstruction s/p incomplete laparoscopic cholecystectomy 11/10/23 by Dr. Sicard. 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 Internal external biliary drain placed by IR 11/21. GI called back for anemia and FOBT+ that resolved. GI called back 12/21 for abdominal pain.     Abdominal pain--LLQ and around J tube. Worsening distention.  2. Anemia, likely multifactorial w/ component 2/2 GI blood loss - stable  Hgb stable at 10.0  3. Blood in stool - resolved  4. Gastric adenocarcinoma  - s/p j-tube placement surgically 12/01/23 and replacement 12/18     - Imaging without obstruction; concerning for leak or other nonspecific pneumoperitoneum. Awaiting recs from surgical oncology who placed j-tube. Can consider tube check with gastrograffin to assess for leak but will defer to surgery.  - monitor and transfuse as needed to keep Hgb >7  - monitor stool for signs of blood   - PPI BID  - continue hydrocortisone suppositories BID  - will continue to monitor closely     No further GI recs. GI will sign off. Please call us  back as needed.    Aurea Phillips PA-C  Gastroenterology  [facility]

## 2023-12-22 NOTE — PROGRESS NOTES
SURG ONC    NURSE REPORTS PT HAD SOME ABD PAIN AND DISTENTION THAT BEGAN YESTERDAY  ATTENDING HELD TUBE FEEDS SINCE  PT TELLS ME SHE FEELS LIKE IT IS BETTER SOMEWHAT TODAY  NO REPORTS OF FEVER OR CHILLS  NO NAUSEA OR VOMITING  SHE APPEARS WEAK, FRAIL (NO WORSE THAN PRIOR)  FAMILY AT BEDSIDE    VSS  LABS PENDING    ABD SOFT, MODERATE DISTENTION NOTED  HER INCISIONS ARE HEALING WELL  JTUBE WELL SECURED, FEEDS ON HOLD    CT A/P ORDERED YESTERDAY BY PRIMARY; REPORT REVIEWED; IMAGING CHALLENGING DUE TO LACK OF CONTRAST  REPORT STATES MOD FREE FLUID SIMILAR TO PREVIOUS IMAGING 5 DAYS PRIOR; JTUBE TERMINATES IN LUMEN OF JEJUNUM; ADDITIONAL REPORT FINDINGS SEEN    WILL DISCUSS WITH MD ON CALL    PLAN  HOLD FEEDS FOR NOW

## 2023-12-22 NOTE — PROGRESS NOTES
Nephrology consult follow up note    HPI:      Karen Briggs is a 58 y.o. female  presented on  7 days post op from laparoscopic cholecystectomy on 11/10. Gallbladder was unable to be resected. Back and flank pain persisted post operatively prompting evaluation at [facility] [place] ER. After workup her surgeon recommended ERCP for which she was sent to this facility. Left sided hydronephrosis noted on CT scans done on admission. At that time renal function was fairly normal and patient was given option for stenting or to defer as outpatient and she chose the latter.      During ERCP, malignant gastric mass noted and ERCP was unable to be completed due to duodenal scarring. 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 Patient ultimately had biliary drain placed per IR.      She developed A fib with RVR requiring amiodarone infusion. She then had significant blood loss post removal of long term IV and subsequent fall in her room.      Patient renal function was relatively stable until decline starting 11/25. She was ultimately initiated on HD 11/28 post bilateral ureteral stent placement with Dr Cardenas same day.      Patient developed dialysis dependent acute kidney injury, and was started on hemodialysis 11/29/2023.  Tunneled dialysis catheter was placed on 12/06/2023.  Dialysis catheter had to be removed on 12/16/2023 after dialysis due to persistent fungemia.  Tunneled dialysis catheter was replaced on 12/20/2023.    Interval history:     No acute events overnight.  Patient was dialyzed yesterday, however, she did not tolerate treatment very well.  She was only able to dialyze for 2 hours due to persistent hypotension.  We will also give albumin during her dialysis.  We are only able to remove 400 mL with dialysis.  She continues to have significant lower extremity edema.  Occasionally has shortness of breath.  Endorsing abdominal distention, and nausea.  No chest pain, or vomiting.     Review of Systems:        Past medical, family, surgical, and social history reviewed and unchanged from initial consult note.     Objective:       VITAL SIGNS: 24 HR MIN & MAX LAST    Temp  Min: 98.2 °F (36.8 °C)  Max: 99.1 °F (37.3 °C)  99.1 °F (37.3 °C)        BP  Min: 93/64  Max: 106/65  105/72     Pulse  Min: 104  Max: 118  (!) 111     Resp  Min: 18  Max: 20  20    SpO2  Min: 98 %  Max: 100 %  99 %      GEN: Chronically ill appearing AAF in NAD  CV: RRR +S1,S2 without murmur  PULM: CTAB, unlabored  ABD: Soft, NT/ND abdomen with NABS  EXT:  3+ lower extremity edema  SKIN: Warm and dry  PSYCH: Awake, alert and appropriately conversant.   Dialysis access:  Right IJ PermCath            Component Value Date/Time     (L) 12/21/2023 0431     (L) 12/20/2023 0438     04/24/2018 0340    K 4.1 12/21/2023 0431    K 4.1 12/20/2023 0438    K 3.2 (L) 04/24/2018 0340    CHLORIDE 101 12/21/2023 0431    CHLORIDE 100 12/20/2023 0438    CO2 17 (L) 12/21/2023 0431    CO2 19 (L) 12/20/2023 0438    CO2 20 (L) 04/24/2018 0340    BUN 60.0 (H) 12/21/2023 0431    BUN 97.8 (H) 12/20/2023 0438    BUN 10 04/24/2018 0340    CREATININE 4.80 (H) 12/21/2023 0431    CREATININE 6.96 (H) 12/20/2023 0438    CREATININE 0.7 04/24/2018 0340    CALCIUM 8.8 12/21/2023 0431    CALCIUM 8.6 12/20/2023 0438    CALCIUM 9.2 04/24/2018 0340    PHOS 3.3 12/16/2023 0414            Component Value Date/Time    WBC 22.02 (H) 12/21/2023 0431    WBC 22 12/21/2023 0431    WBC 22.86 (H) 12/20/2023 0438    WBC 22.86 12/20/2023 0438    WBC 12.03 04/24/2018 0340    HGB 10.0 (L) 12/21/2023 0431    HGB 9.2 (L) 12/20/2023 0438    HGB 11.3 (L) 04/24/2018 0340    HCT 30.8 (L) 12/21/2023 0431    HCT 28.2 (L) 12/20/2023 0438    HCT 35.3 (L) 04/24/2018 0340     (H) 12/21/2023 0431     (H) 12/20/2023 0438     04/24/2018 0340         Imaging reviewed      Assessment / Plan:       Active Hospital Problems    Diagnosis  POA    Pressure ulcer of sacral region,  unstageable [L89.150]  No      Resolved Hospital Problems   No resolved problems to display.       POORNIMA multifactorial secondary to obstructive uropathy, acute blood loss, contrast exposure, hypotension and Afib with RVR  ---Nephrotic range proteinuria noted. 4.4 g  ---Dialysis initiated 11/28 post tunneled catheter placement 12/6  --tunneled catheter removed 12/16 and replaced 12/20 due to persistent fungemia  Newly diagnosed malignant gastric mass. Pathology consistent with adenocarcinoma   -s/p J tube and mediport placement 12/1  Acute blood loss anemia 2/2 bleeding post long term IV removal   --s/p transfusion 11/30, 12/1, 12/10  Bilateral hydronephrosis noted 11/21 - stent placement initially deferred due to stable renal function   Afib with RVR resolved   S/p biliary drain placement and subsequent exchange due to fungemia   Fungemia - blood cultures positive 12/10, 12/11   Hypotension on TID Midodrine     Plan:  Patient not tolerating hemodialysis and ultrafiltration very well.  Continues to have persistent hypotension.  Midodrine increased yesterday to 15 mg t.i.d..  I had a long discussion with patient and her family member in the room about if she does not tolerate dialysis we may need to consider palliative care.  We will plan for next session hemodialysis tomorrow on TTS schedule.    Yahir Black DO  Nephrology  [facility]  Clinic number: [phone]

## 2023-12-22 NOTE — PT/OT/SLP PROGRESS
Physical Therapy Treatment    Patient Name:  Karen Briggs   MRN:  [id number]    Recommendations:     Discharge therapy intensity: Moderate Intensity Therapy   Discharge Equipment Recommendations: walker, rolling  Barriers to discharge: Impaired mobility and Ongoing medical needs    Assessment:     Karen Briggs is a 58 y.o. female admitted with a medical diagnosis of unsuccessful laparoscopic cholecystectomy on 11/10. Since then pt underwent unsuccessful ERCP on 11/18 and s/p biliary drain placement on 11/21. Pt developed afib RVR on 11/24 and acute blood loss anemia with syncope and then fall on 11/26. Pt with obstructive uropathy with bilateral hydronephrosis s/p bilateral ureteral stent placement on 11/27. Pt with a medical diagnosis of gastric adenocarcinoma and Mediport and J tube placed on 12/1. Hospital course complicated by development of fungemia, Mediport removed 12/16 and J tube replaced on 12/18. POORNIMA now on HD; tunneled dialysis catheter placed on 12/20.  She presents with the following impairments/functional limitations: weakness, impaired endurance, impaired functional mobility, impaired cardiopulmonary response to activity . First time PT able to see this patient in 7 days 2/2 ongoing medical procedures or unstable vitals. Activity limited to bed level exercises 2/2 hypotension and nausea/vomiting. Decreasing frequency to 3x/week 2/2 pt's current level of activity tolerance. Will continue to follow during this hospitalization.    Rehab Prognosis: Good; patient would benefit from acute skilled PT services to address these deficits and reach maximum level of function.    Recent Surgery: Procedure(s) (LRB):  Insertion, Catheter, Central Venous, Hemodialysis (N/A) 2 Days Post-Op    Plan:     During this hospitalization, patient to be seen 3 x/week to address the identified rehab impairments via gait training, therapeutic activities, therapeutic exercises, neuromuscular re-education and progress toward  the following goals:    Plan of Care Expires:  01/12/24    Subjective     Chief Complaint: nausea, dry-heaving at start of session  Patient/Family Comments/goals: wants to participate  Pain/Comfort:  Pain Rating 1:  (grimacing with passive/active hip flexion; did not give rating)      Objective:     Communicated with RN prior to session.  Patient found HOB elevated with PEG Tube, PICC line, telemetry, pulse ox (continuous), peripheral IV, blood pressure cuff upon PT entry to room.     General Precautions: Standard, fall  Orthopedic Precautions: N/A  Braces: N/A  Respiratory Status: Room air  Blood Pressure: 86/63  Skin Integrity:  edema BLE      Functional Mobility:  Bed Mobility:     Rolling Right: stand by assistance pulling with BUE on rail    Therapeutic Activities/Exercises:  2x10 ankle pumps, ankle circles CW/CCW, hip ABD/ADD, quad sets, glute sets  Attempted AAROM  heel slides but caused discomfort in abdomen   Positioned with wedge underneath L side    Education:  Patient and family were provided with verbal education education regarding PT role/goals/POC and pressure ulcer prevention.  Understanding was verbalized.     Patient left right sidelying with all lines intact, call button in reach, RN notified, and family present..    GOALS:   Multidisciplinary Problems       Physical Therapy Goals          Problem: Physical Therapy    Goal Priority Disciplines Outcome Goal Variances Interventions   Physical Therapy Goal     PT, PT/OT Ongoing, Progressing     Description: Pt will improve functional independence by performing:    Bed mobility: SBA  Sit to stand: SBA with rolling walker  Bed to chair t/f: Min A with Stand Step  with rolling walker  Ambulation x 15 ft with Min A  with rolling walker                       Time Tracking:     PT Received On: 12/22/23  PT Start Time: 1050     PT Stop Time: 1120  PT Total Time (min): 30 min     Billable Minutes: Therapeutic Activity 8 and Therapeutic Exercise  22    Treatment Type: Treatment  PT/PTA: PT     Number of PTA visits since last PT visit: 3     12/22/2023

## 2023-12-23 LAB
ABS NEUT (OLG): 25.92 X10(3)/MCL (ref 2.1–9.2)
ALBUMIN SERPL-MCNC: 1.9 G/DL (ref 3.4–4.8)
BUN SERPL-MCNC: 68.2 MG/DL (ref 9.8–20.1)
BURR CELLS (OLG): ABNORMAL
CALCIUM SERPL-MCNC: 9.2 MG/DL (ref 8.4–10.2)
CHLORIDE SERPL-SCNC: 102 MMOL/L (ref 98–107)
CO2 SERPL-SCNC: 17 MMOL/L (ref 23–31)
CREAT SERPL-MCNC: 5.62 MG/DL (ref 0.55–1.02)
EOSINOPHIL NFR BLD MANUAL: 0.63 X10(3)/MCL (ref 0–0.9)
EOSINOPHIL NFR BLD MANUAL: 2 %
ERYTHROCYTE [DISTWIDTH] IN BLOOD BY AUTOMATED COUNT: 17.2 % (ref 11.5–17)
GFR SERPLBLD CREATININE-BSD FMLA CKD-EPI: 8 MLS/MIN/1.73/M2
GLUCOSE SERPL-MCNC: 97 MG/DL (ref 82–115)
HCT VFR BLD AUTO: 32 % (ref 37–47)
HGB BLD-MCNC: 10.3 G/DL (ref 12–16)
INSTRUMENT WBC (OLG): 31.61 X10(3)/MCL
LYMPHOCYTES NFR BLD MANUAL: 1.9 X10(3)/MCL
LYMPHOCYTES NFR BLD MANUAL: 6 %
MCH RBC QN AUTO: 29.9 PG (ref 27–31)
MCHC RBC AUTO-ENTMCNC: 32.2 G/DL (ref 33–36)
MCV RBC AUTO: 92.8 FL (ref 80–94)
MONOCYTES NFR BLD MANUAL: 2.53 X10(3)/MCL (ref 0.1–1.3)
MONOCYTES NFR BLD MANUAL: 8 %
MYELOCYTES NFR BLD MANUAL: 3 %
NEUTROPHILS NFR BLD MANUAL: 82 %
NRBC BLD AUTO-RTO: 0 %
PHOSPHATE SERPL-MCNC: 6.1 MG/DL (ref 2.3–4.7)
PLATELET # BLD AUTO: 655 X10(3)/MCL (ref 130–400)
PLATELET # BLD EST: ABNORMAL 10*3/UL
PMV BLD AUTO: 9.8 FL (ref 7.4–10.4)
POIKILOCYTOSIS BLD QL SMEAR: ABNORMAL
POTASSIUM SERPL-SCNC: 4.3 MMOL/L (ref 3.5–5.1)
RBC # BLD AUTO: 3.45 X10(6)/MCL (ref 4.2–5.4)
RBC MORPH BLD: ABNORMAL
SODIUM SERPL-SCNC: 134 MMOL/L (ref 136–145)
WBC # SPEC AUTO: 31.61 X10(3)/MCL (ref 4.5–11.5)

## 2023-12-23 PROCEDURE — 25000003 PHARM REV CODE 250: Performed by: STUDENT IN AN ORGANIZED HEALTH CARE EDUCATION/TRAINING PROGRAM

## 2023-12-23 PROCEDURE — 25000003 PHARM REV CODE 250: Performed by: NURSE PRACTITIONER

## 2023-12-23 PROCEDURE — 80069 RENAL FUNCTION PANEL: CPT | Performed by: STUDENT IN AN ORGANIZED HEALTH CARE EDUCATION/TRAINING PROGRAM

## 2023-12-23 PROCEDURE — 85027 COMPLETE CBC AUTOMATED: CPT | Performed by: STUDENT IN AN ORGANIZED HEALTH CARE EDUCATION/TRAINING PROGRAM

## 2023-12-23 PROCEDURE — 80100016 HC MAINTENANCE HEMODIALYSIS

## 2023-12-23 PROCEDURE — 21400001 HC TELEMETRY ROOM

## 2023-12-23 PROCEDURE — 63600175 PHARM REV CODE 636 W HCPCS: Performed by: STUDENT IN AN ORGANIZED HEALTH CARE EDUCATION/TRAINING PROGRAM

## 2023-12-23 PROCEDURE — C9113 INJ PANTOPRAZOLE SODIUM, VIA: HCPCS | Performed by: INTERNAL MEDICINE

## 2023-12-23 PROCEDURE — 63600175 PHARM REV CODE 636 W HCPCS: Performed by: INTERNAL MEDICINE

## 2023-12-23 PROCEDURE — 99233 SBSQ HOSP IP/OBS HIGH 50: CPT | Mod: ,,, | Performed by: HOSPITALIST

## 2023-12-23 PROCEDURE — 25000003 PHARM REV CODE 250: Performed by: INTERNAL MEDICINE

## 2023-12-23 PROCEDURE — 25000003 PHARM REV CODE 250

## 2023-12-23 RX ORDER — ALBUMIN HUMAN 250 G/1000ML
25 SOLUTION INTRAVENOUS ONCE
Status: COMPLETED | OUTPATIENT
Start: 2023-12-23 | End: 2023-12-23

## 2023-12-23 RX ORDER — ALBUMIN HUMAN 250 G/1000ML
25 SOLUTION INTRAVENOUS ONCE
Status: DISCONTINUED | OUTPATIENT
Start: 2023-12-23 | End: 2023-12-23

## 2023-12-23 RX ADMIN — MIDODRINE HYDROCHLORIDE 15 MG: 5 TABLET ORAL at 08:12

## 2023-12-23 RX ADMIN — MIDODRINE HYDROCHLORIDE 15 MG: 5 TABLET ORAL at 04:12

## 2023-12-23 RX ADMIN — PIPERACILLIN AND TAZOBACTAM 4.5 G: 4; .5 INJECTION, POWDER, LYOPHILIZED, FOR SOLUTION INTRAVENOUS; PARENTERAL at 03:12

## 2023-12-23 RX ADMIN — METOPROLOL TARTRATE 25 MG: 25 TABLET, FILM COATED ORAL at 11:12

## 2023-12-23 RX ADMIN — ESCITALOPRAM OXALATE 10 MG: 10 TABLET ORAL at 11:12

## 2023-12-23 RX ADMIN — PIPERACILLIN AND TAZOBACTAM 4.5 G: 4; .5 INJECTION, POWDER, LYOPHILIZED, FOR SOLUTION INTRAVENOUS; PARENTERAL at 02:12

## 2023-12-23 RX ADMIN — PANTOPRAZOLE SODIUM 40 MG: 40 INJECTION, POWDER, FOR SOLUTION INTRAVENOUS at 03:12

## 2023-12-23 RX ADMIN — ALBUMIN HUMAN 25 G: 250 SOLUTION INTRAVENOUS at 10:12

## 2023-12-23 RX ADMIN — Medication 6 MG: at 09:12

## 2023-12-23 RX ADMIN — MICAFUNGIN SODIUM 100 MG: 100 INJECTION, POWDER, LYOPHILIZED, FOR SOLUTION INTRAVENOUS at 09:12

## 2023-12-23 RX ADMIN — MIDODRINE HYDROCHLORIDE 15 MG: 5 TABLET ORAL at 11:12

## 2023-12-23 RX ADMIN — ACETAMINOPHEN 650 MG: 325 TABLET, FILM COATED ORAL at 04:12

## 2023-12-23 RX ADMIN — ENOXAPARIN SODIUM 100 MG: 100 INJECTION SUBCUTANEOUS at 12:12

## 2023-12-23 RX ADMIN — METOPROLOL TARTRATE 25 MG: 25 TABLET, FILM COATED ORAL at 09:12

## 2023-12-23 RX ADMIN — SODIUM BICARBONATE 650 MG TABLET 1300 MG: at 11:12

## 2023-12-23 RX ADMIN — HYDROCORTISONE ACETATE 25 MG: 25 SUPPOSITORY RECTAL at 09:12

## 2023-12-23 NOTE — PROGRESS NOTES
[facility]  Hospital Medicine Progress Note        Chief Complaint: Inpatient Follow-up for intractable nausea vomiting due to gastric adenocarcinoma     HPI:   A 58-year-old female with medical history of hypertension who recently underwent laparoscopic cholecystectomy on 11/10/2023, procedure was incomplete and was unable to completely resect the gallbladder.  Since surgery she continued to have right flank/back pain and followed up with her PCP and CT abdomen and pelvis on 11/17/2023 revealed left-sided hydronephrosis with suspected distal obstruction/no clear stone visualized, postoperative changes of cholecystectomy with fluid in the gallbladder fossa may reflect postoperative seroma but biloma can not be entirely excluded, also noted for marked thickening of the stomach wall diffusely may be related to mesenteric edema/reactive.  She presented to [facility] [place] [facility] ED the same day 11/17/2023 and her labs notable for WBC 9.0, hemoglobin 12.4, platelets 442, creatinine 0.86, total bilirubin 8.7 with direct fraction 6.7, alkaline phosphatase 491, , .  Patient's surgeon was consulted and recommended ERCP which was not available at [facility] [place] [facility] for which she was transferred to [facility] and referred to hospital medicine service for further evaluation and management. 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  Severe inflammation and oozing of blood noted proximal gastric lumen.  Unable to advance scope into the duodenum. Surgical oncology consulted, IR consulted for external drain placement which was done November 21.  November 24th she developed new onset atrial fibrillation with RVR and Cardiology consulted. Started on amiodarone drip. Unfortunately patient had acute blood loss due to hemorrhage from the midline site that was removed. Patient was unaware of the bleed.  Became very weak, passed out.  Code was called but she came around.   Stat H&H done which was slightly lower but stable, MRI of the brain was negative for any acute ischemic changes. Pt is aware of gastric cancer diagnosis. GI following--> 11/18- EGD shows normal esophagus, malignant gastric tumor in the cardia.  Inflamed mucosa and ooze of blood throughout the proximal gastric lumen.  Gastric antrum scar would not allow advancement of scope into the duodenal ampulla area therefore biliary cannulation was not possible for bile leak evaluation. Pathology shows marked chronic gastritis with intestinal metaplasia, gastric cardia biopsy shows adenocarcinoma, moderately differentiated intestinal type. Bilateral hydronephrosis with left greater than right. MRI brain without contrast-negative for acute finding. PET scan reviewed with patient by Oncology reports of locally advanced gastric adenocarcinoma and plans for systemic therapy outpatient if functional, nutritional and renal function improves. MediPort placement by surgical Oncology on 12/1, oncology on board plans for systemic therapy outpatient if functional, nutritional and renal function improves. Obstructive uropathy with bilateral hydronephrosis status post bilateral ureteral stent placed on 11/27 by Urology- no improvement in renal function, patient opted to have hemodialysis and a right-sided hemodialysis catheter was placed by General surgery on 11/29, to continue hemodialysis per nephrology discretion. Patient with symptoms of nausea and vomiting can not keep anything down in her stomach complicated by severe malnutrition on IV Clinimix and supportive medications for nausea and vomiting. Palliative care consulted. Patient got a MediPort and J-tube placed on 12/01. Cystogram reviewed by Urology with patent stents and recommends outpatient follow-up with Urology. White cell count elevated at 20.4, Infectious Disease had started on IV Zosyn given concern for possible sepsis with low blood pressure on 12/02. CIS evaluated patient  to begin low-dose metoprolol tartrate at 12.5 mg b.i.d. and resume Eliquis for stroke prevention. On TF via J tube which was placed by Surgical Oncology. ID started IV Micafungin for candidemia. Noted to have persistent fungemia on 1/2 BCX from 12/11.  Consulted CIS for GALYA; report pending. General Surgery consulted to remove HD line & Mediport. Patient spiked a fever overnight on 12/15; BCX repeated by ID which are negative.  Afebrile since 12/14. General Surgery removed mediport & tunneled catheter after HD on 12/16. J Tube replaced by Surgical Oncology on 12/18 after it became clogged.    Interval Hx:     Seen and examined the patient.  Afebrile vitals stable and hemodynamically stable.  Abdominal pain    Objective/physical exam:  General: alert lady lying comfortably in bed, in no acute distress  HENT: oral and oropharyngeal mucosa moist, pink, with no erythema or exudates, no ear pain or discharge  Neck: normal neck movement, no lymph nodes or swellings, no JVD or Carotid bruit  Respiratory: clear breathing sounds bilaterally, no crackles, rales, ronchi or wheezes  Cardiovascular: clear S1 and S2, no murmurs, rubs or gallops  Peripheral Vascular: no lesions, ulcers or erosions, normal peripheral pulses; 2+ pitting pedal edema  Gastrointestinal: J tube in place LUQ; soft, non-tender, non-distended abdomen, no guarding, rigidity or rebound tenderness, normal bowel sounds  Integumentary: normal skin color, no rashes or lesions  Neuro: AAO x 3; motor strength 5/5 in B/L UEs & LEs; sensation intact to gross and fine touch B/L; CN II-XII grossly intact    VITAL SIGNS: 24 HRS MIN & MAX LAST   Temp  Min: 97.5 °F (36.4 °C)  Max: 99 °F (37.2 °C) 97.5 °F (36.4 °C)   BP  Min: 80/52  Max: 100/67 (!) 96/53   Pulse  Min: 89  Max: 106  102   Resp  Min: 18  Max: 22 18   SpO2  Min: 98 %  Max: 100 % 100 %     I reviewed the labs below:  Recent Labs   Lab 12/20/23  0438 12/21/23  0431 12/23/23  0418   WBC 22.86  22.86* 22   22.02* 31.61  31.61*   RBC 3.13* 3.36* 3.45*   HGB 9.2* 10.0* 10.3*   HCT 28.2* 30.8* 32.0*   MCV 90.1 91.7 92.8   MCH 29.4 29.8 29.9   MCHC 32.6* 32.5* 32.2*   RDW 17.2* 17.1* 17.2*   * 789* 655*   MPV 9.9 10.0 9.8       Recent Labs   Lab 12/18/23  0440 12/19/23  0444 12/20/23  0438 12/21/23  0431 12/23/23  0537   * 133* 133* 130* 134*   K 4.3 4.3 4.1 4.1 4.3   CO2 19* 18* 19* 17* 17*   BUN 63.8* 82.4* 97.8* 60.0* 68.2*   CREATININE 5.20* 6.24* 6.96* 4.80* 5.62*   CALCIUM 8.8 8.6 8.6 8.8 9.2   MG 2.30  --   --   --   --    ALBUMIN 1.7* 1.6* 1.6* 2.0* 1.9*   ALKPHOS 162* 160* 170* 169*  --    ALT 51 47 46 47  --    AST 42* 37* 41* 41*  --    BILITOT 3.2* 2.8* 2.8* 2.6*  --        Assessment/Plan:  Hypotension, improved with Midodrine  Persistent leukocytosis likely 2/2 Fungemia  Acute on chronic anemia, s/p 2 units prbcs transfusion 11/30  Hx of Acute Blood loss anemia with syncope and then fall 11/26-   Locally advanced gastric adenocarcinoma with intractable nausea and vomiting possible Gastric/duodenal outlet obstruction s/p J Tube placement  J Tube Obstruction  Fungemia possibly 2/2 Biliary Drain Infection  Obstructive uropathy with bilateral hydronephrosis- S/P bilateral ureteral stent placed on 11/27  POORNIMA-now on HD - 11/29  Suspected bile leak with biloma and biliary obstruction--> H/O Laparoscopic incomplete cholecystectomy 11/10/2023  New onset atrial fibrillation with RVR- fluctuating  History of hypertension and DDD/sciatica  Severe malnutrition  Leukocytosis likely 2/2 Fungemia  Tachycardia 2/2 IV Depletion vs Sepsis  Thrombocytosis possibly reactive to Sepsis    - CT abdomen pelvis without contrast revealed abdominal pocket of fluid especially around the G-tube area.    Surgical team was notified.  - persistent leukocytosis and new findings in the CT, we will extend antibiotics to Zosyn.  Continue micafungin also.    -repeat blood cultures x2.  - Patient is consistently hypotensive with a  hemodialysis requiring albumin and midodrine.  Overall poor prognosis due to severe debility, intolerance of HD.  Being a poor candidate for any type of procedures, poor candidate of chemotherapy.  - had a discussion with the patient and family at bedside , nephrology is also at the bedside.  Nephrologist Dr. Black stated that if she continues to have hypotension during dialysis, intolerance to dialysis, she would be more appropriate for hospice.  We will try 1 more episode of dialysis tomorrow and see if we can pull off any fluids.  If that has not the case she would likely need to be hospice.  Family and the patient understands.    Reporting improvement in J tube site pain  J Tube replaced by Surg Oncology on 12/18 after it became clogged  Resumed TF  Consulted Hematology for worsening thrombocytosis; Plt 1014 today  Underwent GAYLA on 12/14 with report negative for vegetations  On IV Micafungin  ID on-board; follow recs  ID added IV Ciprofloxacin to cover for possible biliary drain related infectino/cholangitis given worsening leukocytosis  Nephrology on-board for HD conduction  Patient to get new tunneled catheter to resume HD  Surgical Oncology on-board; follow recs  PICC line removed  Patient had MediPort and J-tube placed on 12/01; General Surgery removed HD catheter & Mediport on 12/16  Patient underwent biliary drain exchange on 12/13 per ID recs  Blood Cultures 12/10 positive for candida glabrata in the blood 1/2; Repeat BCX 12/11 positive for yeasts; BCX 12/12 negative; BCX repeated on 12/15 d/t fevers which are negative x 72 hrs; BCX from 12/17 negative x 48 hrs  Palliative Care consulted earlier; patient wishes to continue pursuing curative treatment for cancer  Cystogram reviewed by Urology with patent stents and recommends outpatient follow-up with Urology  Appreciate Oncology input; plans for systemic therapy outpatient if renal function improves, patient is planning to pursue treatment   Continued on  Hydrocortisone Supp, Metoprolol Tartrate BID, Midodrine TID, Protonix BID, Sodium Bicarbonate     VTE prophylaxis:  FD Lovenox     Patient condition:  Stable     Anticipated discharge and Disposition:  Pending    All diagnosis and differential diagnosis have been reviewed; assessment and plan has been documented; I have personally reviewed the labs and test results that are presently available; I have reviewed the patients medication list; I have reviewed the consulting providers response and recommendations. I have reviewed or attempted to review medical records based upon their availability    All of the patient's questions have been  addressed and answered. Patient's is agreeable to the above stated plan. I will continue to monitor closely and make adjustments to medical management as needed.  _____________________________________________________________________    Nutrition Status:  Patient meets ASPEN criteria for severe malnutrition of acute illness or injury per RD assessment as evidenced by:  Energy Intake (Malnutrition):  (does not meet criteria)  Weight Loss (Malnutrition): greater than 7.5% in 3 months  Subcutaneous Fat (Malnutrition): moderate depletion  Muscle Mass (Malnutrition): moderate depletion           A minimum of two characteristics is recommended for diagnosis of either severe or non-severe malnutrition.   Radiology:   I have personally reviewed the images and agree with radiologist report  CT Abdomen Pelvis  Without Contrast  Narrative: EXAMINATION:  CT ABDOMEN PELVIS WITHOUT CONTRAST    CLINICAL HISTORY:  Abdominal distension (Ped 0-18y);    TECHNIQUE:  Helically acquired axial images, sagittal and coronal reformations were obtained from the lung bases to the pubic symphysis without the IV administration of contrast.    Automated tube current modulation, weight-based exposure dosing, and/or iterative reconstruction technique utilized to reach lowest reasonably achievable exposure  rate.    DLP: 1657 mGy*cm    COMPARISON:  CT abdomen pelvis 12/17/2023    FINDINGS:  HEART: Normal in size. No pericardial effusion.    LUNG BASES: Unchanged cardiophrenic lymph nodes.    LIVER: No appreciable focal hepatic lesion by noncontrast evaluation.    BILIARY: There is an internal external biliary drain in place with internal aspect coiled at the 3rd portion of the duodenum.    PANCREAS: Limited noncontrast assessment.  There is generalized anasarca 0 which precludes evaluation for inflammatory change.    SPLEEN: Normal in size    ADRENALS: Unchanged.    KIDNEYS/URETERS: There are bilateral ureteral stents.  No appreciable calcifications along the stents.  Moderate to severe bilateral hydronephrosis, similar to prior.  Similar asymmetric hyperdensity of the contents of the left renal collecting system possibly related to retained contrast from a previous exam.    GI TRACT/MESENTERY: Evaluation of the bowel is limited without contrast. No evidence of bowel obstruction.  There is a percutaneous jejunostomy tube in place.    PERITONEUM: There is moderate free intraperitoneal fluid.Locules of intraperitoneal gas adjacent to the insertion of the  jejunostomy in the anterior left hemiabdomen are difficult to definitively localize within a bowel loop on this noncontrast CT.    LYMPH NODES: Similar to prior.  Suboptimally evaluated without contrast.    VASCULATURE: No significant atherosclerosis or aneurysm.    BLADDER: Normal appearance given degree of distention.    REPRODUCTIVE ORGANS: Normal as visualized.    ABDOMINAL WALL: Body wall edema.  Foci of subcutaneous air with stranding most compatible with medicinal injection.  There are foci of soft tissue gas adjacent to the jejunostomy tube.    BONES: No acute osseous abnormality.  Impression: 1. Technically challenging exam given lack of intravenous contrast and presence of anasarca.  Anasarca decreases intrinsic soft tissue contrast.  2. There is moderate free  intraperitoneal fluid, similar to previous exam.  There is a jejunostomy tube in place.  Tube terminates the lumen of the jejunum.  There are several locules of air in the abdomen in the region of the insertion point/jejunostomy balloon which are difficult to confidently localized within the bowel.  Leak or other nonspecific pneumoperitoneum is consideration.  There are also foci of subcutaneous gas at the abdominal wall near the insertion of the jejunostomy which may be related to the jejunostomy tube or medicinal injection.  3. Unchanged bilateral hydronephrosis with ureteral stents in place  4. Unchanged internal external biliary drain    Electronically signed by: Sulema Solorzano  Date:    12/21/2023  Time:    16:00  Cardiac catheterization  Procedure performed in the Invasive Lab    - See Procedure Log link below for nursing documentation    - See OpNote on Surgeries Tab for physician findings    - See Imaging Tab for radiologist dictation      Mukesh Cole MD  Department of Hospital Medicine   [facility]   12/23/2023

## 2023-12-23 NOTE — PLAN OF CARE
Problem: Pain Acute  Goal: Acceptable Pain Control and Functional Ability  Outcome: Ongoing, Progressing     Problem: Fall Injury Risk  Goal: Absence of Fall and Fall-Related Injury  Outcome: Ongoing, Progressing     Problem: Skin Injury Risk Increased  Goal: Skin Health and Integrity  Outcome: Ongoing, Progressing     Problem: Coping Ineffective  Goal: Effective Coping  Outcome: Ongoing, Progressing     Problem: Impaired Wound Healing  Goal: Optimal Wound Healing  Outcome: Ongoing, Progressing

## 2023-12-23 NOTE — PROGRESS NOTES
Nephrology consult follow up note    HPI:      Karen Briggs is a 58 y.o. female  presented on  7 days post op from laparoscopic cholecystectomy on 11/10. Gallbladder was unable to be resected. Back and flank pain persisted post operatively prompting [place] [facility] After workup her surgeon recommended ERCP for which she was sent to this facility. Left sided hydronephrosis noted on CT scans done on admission. At that time renal function was fairly normal and patient was given option for stenting or to defer as outpatient and she chose the latter.      During ERCP, malignant gastric mass noted and ERCP was unable to be completed due to duodenal scarring. 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 Patient ultimately had biliary drain placed per IR.      She developed A fib with RVR requiring amiodarone infusion. She then had significant blood loss post removal of long term IV and subsequent fall in her room.      Patient renal function was relatively stable until decline starting 11/25. She was ultimately initiated on HD 11/28 post bilateral ureteral stent placement with Dr Cardenas same day.      Patient developed dialysis dependent acute kidney injury, and was started on hemodialysis 11/29/2023.  Tunneled dialysis catheter was placed on 12/06/2023.  Dialysis catheter had to be removed on 12/16/2023 after dialysis due to persistent fungemia.  Tunneled dialysis catheter was replaced on 12/20/2023.    Interval history:     Patient examined while on hemodialysis.  She is hypotensive, thus, ultrafiltration goal was decreased.  She is alert, but reports feeling fatigued.      Review of Systems:   Cardiovascular:  Negative for chest pain   Respiratory: Positive for shortness of breath    Past medical, family, surgical, and social history reviewed and unchanged from initial consult note.     Objective:       VITAL SIGNS: 24 HR MIN & MAX LAST    Temp  Min: 97.5 °F (36.4 °C)  Max: 99 °F (37.2 °C)  97.5  °F (36.4 °C)        BP  Min: 80/52  Max: 100/67  100/67     Pulse  Min: 89  Max: 106  106     Resp  Min: 18  Max: 22  18    SpO2  Min: 98 %  Max: 100 %  100 %      GEN: Chronically ill appearing AAF in NAD  CV: RRR +S1,S2 without murmur  PULM: CTAB, unlabored  ABD: Soft, NT/ND abdomen with NABS.  Drain noted  EXT:  3+ lower extremity edema  SKIN: Warm and dry  PSYCH: Awake, alert and appropriately conversant.   Dialysis access:  Right IJ PermCath            Component Value Date/Time     (L) 12/23/2023 0537     (L) 12/21/2023 0431     04/24/2018 0340    K 4.3 12/23/2023 0537    K 4.1 12/21/2023 0431    K 3.2 (L) 04/24/2018 0340    CHLORIDE 102 12/23/2023 0537    CHLORIDE 101 12/21/2023 0431    CO2 17 (L) 12/23/2023 0537    CO2 17 (L) 12/21/2023 0431    CO2 20 (L) 04/24/2018 0340    BUN 68.2 (H) 12/23/2023 0537    BUN 60.0 (H) 12/21/2023 0431    BUN 10 04/24/2018 0340    CREATININE 5.62 (H) 12/23/2023 0537    CREATININE 4.80 (H) 12/21/2023 0431    CREATININE 0.7 04/24/2018 0340    CALCIUM 9.2 12/23/2023 0537    CALCIUM 8.8 12/21/2023 0431    CALCIUM 9.2 04/24/2018 0340    PHOS 6.1 (H) 12/23/2023 0537            Component Value Date/Time    WBC 31.61 (H) 12/23/2023 0418    WBC 31.61 12/23/2023 0418    WBC 22.02 (H) 12/21/2023 0431    WBC 22 12/21/2023 0431    WBC 12.03 04/24/2018 0340    HGB 10.3 (L) 12/23/2023 0418    HGB 10.0 (L) 12/21/2023 0431    HGB 11.3 (L) 04/24/2018 0340    HCT 32.0 (L) 12/23/2023 0418    HCT 30.8 (L) 12/21/2023 0431    HCT 35.3 (L) 04/24/2018 0340     (H) 12/23/2023 0418     (H) 12/21/2023 0431     04/24/2018 0340         Imaging reviewed      Assessment / Plan:     POORNIMA multifactorial secondary to obstructive uropathy, acute blood loss, contrast exposure, hypotension and Afib with RVR  ---Nephrotic range proteinuria noted. 4.4 g  ---Dialysis initiated 11/28 post tunneled catheter placement 12/6  --tunneled catheter removed 12/16 and replaced 12/20 due to  persistent fungemia  Newly diagnosed malignant gastric mass. Pathology consistent with adenocarcinoma   -s/p J tube and mediport placement 12/1  Acute blood loss anemia 2/2 bleeding post long term IV removal   --s/p transfusion 11/30, 12/1, 12/10  Bilateral hydronephrosis noted 11/21 - stent placement initially deferred due to stable renal function   Afib with RVR resolved   S/p biliary drain placement and subsequent exchange due to fungemia   Fungemia - blood cultures positive 12/10, 12/11   Hypotension on TID Midodrine     Plan:  Patient is currently underground hemodialysis. UF goal was decreased due to hypotension. Will give 25 gm of albumin,  if hypotension persists, will stop HD treatment.  Consider goals of care discussion.

## 2023-12-23 NOTE — PROGRESS NOTES
[facility]  Infectious Disease Progress Note          Interval history:   12/22 - afebrile. CT shows more free fluid. Blood cx remain negative from 12/15. Changed to pip/tazo.  ASSESSMENT & PLAN:   She is a 58-year-old female with a past medical history of hypertension who underwent an unsuccessful laparoscopic cholecystectomy on 11/10/2023 as gallbladder was unable to be completely resected.  Since procedure, she continued to have right flank and back pain.  She followed up with her PCP, and CT of the abdomen and pelvis on 11/17 revealed left-sided hydronephrosis with suspected distal obstruction.  Additionally noted was markedly thickened stomach wall.  Underwent ERCP on 11/18-subsequently found to have a malignant gastric tumor.  IR placed a biliary drain on 11/21.  On 11/24, she developed atrial fibrillation with RVR and was initiated on amiodarone drip.   She then had bilateral ureteral stents placed on 11/27 given persistent hydronephrosis.  She was developed an POORNIMA since admit secondary to obstructive uropathy, now requiring hemodialysis.  Temporary hemodialysis catheter was placed earlier today along with MediPort.  Patient was significant oozing from sites postoperatively.  She received 3 units of blood intraoperatively as well as around 1 L of IV fluids per report.  Patient reports developing cough after procedure today, nonproductive.  Denies chest pain and is oxygenating well on room air.  Patient has been receiving empiric Zosyn essentially since admit.  Leukocytosis had trended up around 11/22, however has since trended downward although with some worsening this morning.  Blood cultures from 11/18 are negative.  Urine culture from 11/23 is also negative.  She is been afebrile and hemodynamically stable.  Chest x-ray earlier today showed low lung volumes with mild retrocardiac atelectasis.  We have been consulted for input regarding worsening leukocytosis.      Reconsulted secondary to recent low-grade  fevers, T-max of 100.4° on 12/08.  Patient had previously been off of Zosyn since 12/05.  She was resumed on antimicrobials with vancomycin and Zosyn on 12/10.  She denies any associated symptoms.  She did have a recent drop in her H&H and positive stools.  GI service is considering colonoscopy.  CT of the chest, abdomen, and pelvis with IV contrast performed on 12/09 showed moderate volume ascites with areas of subcutaneous edema in the abdominal wall.  Patient currently without complaints.  Blood cultures drawn on 12/10 via PICC are positive for yeast, C. glabrata per BCID.  Peripheral set are negative thus far.          Candidemia - abdominal vs line source  Concern for lower GIB  Ascites per recent CT  Persistent leukocytosis, suspect multifactorial  POORNIMA, now ESRD on HD  Obstructive uropathy, s/p bilateral ureteral stents on 11/27  Persistent hyperbilirubinemia, s/p unsuccessful cholecystectomy on 11/10 and biliary drain on 11/21, exchanged 12/13  Gastric cancer, newly diagnosed  Mediport / tunneled HD catheter status      Plan:  Increased abdominal pain today, otherwise stable.   CT A/P with free fluid  - blood cx negative from 12/15  - Continue micafungin 100mg IV q24hr, 12/15 to present   - currently on pip/korey      Discussed with patient and family at bedside  Discussed and seen with RN   SUBJECTIVE:    AF, VSS. C/o some worse abdominal pain, no nausea or diarrhea.  Working with OT at present.     MEDICATIONS:   Reviewed in EMR    REVIEW OF SYSTEMS:   Except as documented, all other systems reviewed and negative     PHYSICAL EXAM:   T 98.6 °F (37 °C)   BP (!) 89/60   P 97   RR (!) 22   O2 99 %  GENERAL: NAD; does not appear toxic  SKIN: no rash  HEENT: sclera non-icteric; PERRL   NECK: supple; no LAD  CHEST: CTA; nonlabored, equal expansion; no adventitious BS, + TACHYPNEA  CARDIOVASCULAR: RRR, S1S2; no murmur   ABDOMEN:  active bowel sounds; abdomen somewhat firm, rounded, somewhat TTP diffusely; biliary  "drain / J-tube noted  EXTREMITIES: no cyanosis or clubbing  NEURO: ALERT CN II-XII grossly intact  PSYCH: Mentation and affect appropriate     LABS AND IMAGING:     Recent Labs     12/20/23 0438 12/21/23 0431   WBC 22.86  22.86* 22  22.02*   RBC 3.13* 3.36*   HGB 9.2* 10.0*   HCT 28.2* 30.8*   MCV 90.1 91.7   MCH 29.4 29.8   MCHC 32.6* 32.5*   RDW 17.2* 17.1*   * 789*       No results for input(s): "LACTIC" in the last 72 hours.  No results for input(s): "INR", "APTT", "D-DIMER" in the last 72 hours.  No results for input(s): "HGBA1C", "CHOL", "TRIG", "LDL", "VLDL", "HDL" in the last 72 hours.   Recent Labs     12/20/23 0438 12/21/23 0431   * 130*   K 4.1 4.1   CHLORIDE 100 101   CO2 19* 17*   BUN 97.8* 60.0*   CREATININE 6.96* 4.80*   GLUCOSE 106 148*   CALCIUM 8.6 8.8   ALBUMIN 1.6* 2.0*   GLOBULIN 5.1* 5.4*   ALKPHOS 170* 169*   ALT 46 47   AST 41* 41*   BILITOT 2.8* 2.6*       No results for input(s): "BNP", "CPK", "TROPONINI" in the last 72 hours.         CT Abdomen Pelvis  Without Contrast  Narrative: EXAMINATION:  CT ABDOMEN PELVIS WITHOUT CONTRAST    CLINICAL HISTORY:  Abdominal distension (Ped 0-18y);    TECHNIQUE:  Helically acquired axial images, sagittal and coronal reformations were obtained from the lung bases to the pubic symphysis without the IV administration of contrast.    Automated tube current modulation, weight-based exposure dosing, and/or iterative reconstruction technique utilized to reach lowest reasonably achievable exposure rate.    DLP: 1657 mGy*cm    COMPARISON:  CT abdomen pelvis 12/17/2023    FINDINGS:  HEART: Normal in size. No pericardial effusion.    LUNG BASES: Unchanged cardiophrenic lymph nodes.    LIVER: No appreciable focal hepatic lesion by noncontrast evaluation.    BILIARY: There is an internal external biliary drain in place with internal aspect coiled at the 3rd portion of the duodenum.    PANCREAS: Limited noncontrast assessment.  There is generalized " anasarca 0 which precludes evaluation for inflammatory change.    SPLEEN: Normal in size    ADRENALS: Unchanged.    KIDNEYS/URETERS: There are bilateral ureteral stents.  No appreciable calcifications along the stents.  Moderate to severe bilateral hydronephrosis, similar to prior.  Similar asymmetric hyperdensity of the contents of the left renal collecting system possibly related to retained contrast from a previous exam.    GI TRACT/MESENTERY: Evaluation of the bowel is limited without contrast. No evidence of bowel obstruction.  There is a percutaneous jejunostomy tube in place.    PERITONEUM: There is moderate free intraperitoneal fluid.Locules of intraperitoneal gas adjacent to the insertion of the  jejunostomy in the anterior left hemiabdomen are difficult to definitively localize within a bowel loop on this noncontrast CT.    LYMPH NODES: Similar to prior.  Suboptimally evaluated without contrast.    VASCULATURE: No significant atherosclerosis or aneurysm.    BLADDER: Normal appearance given degree of distention.    REPRODUCTIVE ORGANS: Normal as visualized.    ABDOMINAL WALL: Body wall edema.  Foci of subcutaneous air with stranding most compatible with medicinal injection.  There are foci of soft tissue gas adjacent to the jejunostomy tube.    BONES: No acute osseous abnormality.  Impression: 1. Technically challenging exam given lack of intravenous contrast and presence of anasarca.  Anasarca decreases intrinsic soft tissue contrast.  2. There is moderate free intraperitoneal fluid, similar to previous exam.  There is a jejunostomy tube in place.  Tube terminates the lumen of the jejunum.  There are several locules of air in the abdomen in the region of the insertion point/jejunostomy balloon which are difficult to confidently localized within the bowel.  Leak or other nonspecific pneumoperitoneum is consideration.  There are also foci of subcutaneous gas at the abdominal wall near the insertion of the  jejunostomy which may be related to the jejunostomy tube or medicinal injection.  3. Unchanged bilateral hydronephrosis with ureteral stents in place  4. Unchanged internal external biliary drain    Electronically signed by: Sulema Solorzano  Date:    12/21/2023  Time:    16:00  Cardiac catheterization  Procedure performed in the Invasive Lab    - See Procedure Log link below for nursing documentation    - See OpNote on Surgeries Tab for physician findings    - See Imaging Tab for radiologist dictation    MARIA A Viramontes  Infectious Disease  [facility]

## 2023-12-23 NOTE — PROGRESS NOTES
[facility]  Hospital Medicine Progress Note        Chief Complaint: Inpatient Follow-up for intractable nausea vomiting due to gastric adenocarcinoma     HPI:   A 58-year-old female with medical history of hypertension who recently underwent laparoscopic cholecystectomy on 11/10/2023, procedure was incomplete and was unable to completely resect the gallbladder.  Since surgery she continued to have right flank/back pain and followed up with her PCP and CT abdomen and pelvis on 11/17/2023 revealed left-sided hydronephrosis with suspected distal obstruction/no clear stone visualized, postoperative changes of cholecystectomy with fluid in the gallbladder fossa may reflect postoperative seroma but biloma can not be entirely excluded, also noted for marked thickening of the stomach wall diffusely may be related to mesenteric edema/reactive.  She presented to [facility] ED the same day 11/17/2023 and her labs notable for WBC 9.0, hemoglobin 12.4, platelets 442, creatinine 0.86, total bilirubin 8.7 with direct fraction 6.7, alkaline phosphatase 491, , .  Patient's surgeon was consulted and recommended ERCP which was not available at [facility] for which she was transferred to [facility] and referred to hospital medicine service for further evaluation and management. 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  Severe inflammation and oozing of blood noted proximal gastric lumen.  Unable to advance scope into the duodenum. Surgical oncology consulted, IR consulted for external drain placement which was done November 21.  November 24th she developed new onset atrial fibrillation with RVR and Cardiology consulted. Started on amiodarone drip. Unfortunately patient had acute blood loss due to hemorrhage from the midline site that was removed. Patient was unaware of the bleed.  Became very weak, passed out.  Code was called but she came around.   Stat H&H done which was slightly lower but stable, MRI of the brain was negative for any acute ischemic changes. Pt is aware of gastric cancer diagnosis. GI following--> 11/18- EGD shows normal esophagus, malignant gastric tumor in the cardia.  Inflamed mucosa and ooze of blood throughout the proximal gastric lumen.  Gastric antrum scar would not allow advancement of scope into the duodenal ampulla area therefore biliary cannulation was not possible for bile leak evaluation. Pathology shows marked chronic gastritis with intestinal metaplasia, gastric cardia biopsy shows adenocarcinoma, moderately differentiated intestinal type. Bilateral hydronephrosis with left greater than right. MRI brain without contrast-negative for acute finding. PET scan reviewed with patient by Oncology reports of locally advanced gastric adenocarcinoma and plans for systemic therapy outpatient if functional, nutritional and renal function improves. MediPort placement by surgical Oncology on 12/1, oncology on board plans for systemic therapy outpatient if functional, nutritional and renal function improves. Obstructive uropathy with bilateral hydronephrosis status post bilateral ureteral stent placed on 11/27 by Urology- no improvement in renal function, patient opted to have hemodialysis and a right-sided hemodialysis catheter was placed by General surgery on 11/29, to continue hemodialysis per nephrology discretion. Patient with symptoms of nausea and vomiting can not keep anything down in her stomach complicated by severe malnutrition on IV Clinimix and supportive medications for nausea and vomiting. Palliative care consulted. Patient got a MediPort and J-tube placed on 12/01. Cystogram reviewed by Urology with patent stents and recommends outpatient follow-up with Urology. White cell count elevated at 20.4, Infectious Disease had started on IV Zosyn given concern for possible sepsis with low blood pressure on 12/02. [facility] evaluated patient  to begin low-dose metoprolol tartrate at 12.5 mg b.i.d. and resume Eliquis for stroke prevention. On TF via J tube which was placed by Surgical Oncology. ID started IV Micafungin for candidemia. Noted to have persistent fungemia on 1/2 BCX from 12/11.  Consulted [facility] for GAYLA; report pending. General Surgery consulted to remove HD line & Mediport. Patient spiked a fever overnight on 12/15; BCX repeated by ID which are negative.  Afebrile since 12/14. General Surgery removed mediport & tunneled catheter after HD on 12/16. J Tube replaced by Surgical Oncology on 12/18 after it became clogged.    Interval Hx:     Seen and examined the patient.  Afebrile vitals stable and hemodynamically stable.  Abdominal pain    Objective/physical exam:  General: alert lady lying comfortably in bed, in no acute distress  HENT: oral and oropharyngeal mucosa moist, pink, with no erythema or exudates, no ear pain or discharge  Neck: normal neck movement, no lymph nodes or swellings, no JVD or Carotid bruit  Respiratory: clear breathing sounds bilaterally, no crackles, rales, ronchi or wheezes  Cardiovascular: clear S1 and S2, no murmurs, rubs or gallops  Peripheral Vascular: no lesions, ulcers or erosions, normal peripheral pulses; 2+ pitting pedal edema  Gastrointestinal: J tube in place LUQ; soft, non-tender, non-distended abdomen, no guarding, rigidity or rebound tenderness, normal bowel sounds  Integumentary: normal skin color, no rashes or lesions  Neuro: AAO x 3; motor strength 5/5 in B/L UEs & LEs; sensation intact to gross and fine touch B/L; CN II-XII grossly intact    VITAL SIGNS: 24 HRS MIN & MAX LAST   Temp  Min: 97.5 °F (36.4 °C)  Max: 99 °F (37.2 °C) 97.5 °F (36.4 °C)   BP  Min: 80/52  Max: 100/67 (!) 96/53   Pulse  Min: 89  Max: 106  102   Resp  Min: 18  Max: 22 18   SpO2  Min: 98 %  Max: 100 % 100 %     I reviewed the labs below:  Recent Labs   Lab 12/20/23  0438 12/21/23  0431 12/23/23  0418   WBC 22.86  22.86* 22   22.02* 31.61  31.61*   RBC 3.13* 3.36* 3.45*   HGB 9.2* 10.0* 10.3*   HCT 28.2* 30.8* 32.0*   MCV 90.1 91.7 92.8   MCH 29.4 29.8 29.9   MCHC 32.6* 32.5* 32.2*   RDW 17.2* 17.1* 17.2*   * 789* 655*   MPV 9.9 10.0 9.8       Recent Labs   Lab 12/18/23  0440 12/19/23  0444 12/20/23  0438 12/21/23  0431 12/23/23  0537   * 133* 133* 130* 134*   K 4.3 4.3 4.1 4.1 4.3   CO2 19* 18* 19* 17* 17*   BUN 63.8* 82.4* 97.8* 60.0* 68.2*   CREATININE 5.20* 6.24* 6.96* 4.80* 5.62*   CALCIUM 8.8 8.6 8.6 8.8 9.2   MG 2.30  --   --   --   --    ALBUMIN 1.7* 1.6* 1.6* 2.0* 1.9*   ALKPHOS 162* 160* 170* 169*  --    ALT 51 47 46 47  --    AST 42* 37* 41* 41*  --    BILITOT 3.2* 2.8* 2.8* 2.6*  --        Assessment/Plan:  Hypotension, improved with Midodrine  Persistent leukocytosis likely 2/2 Fungemia  Acute on chronic anemia, s/p 2 units prbcs transfusion 11/30  Hx of Acute Blood loss anemia with syncope and then fall 11/26-   Locally advanced gastric adenocarcinoma with intractable nausea and vomiting possible Gastric/duodenal outlet obstruction s/p J Tube placement  J Tube Obstruction  Fungemia possibly 2/2 Biliary Drain Infection  Obstructive uropathy with bilateral hydronephrosis- S/P bilateral ureteral stent placed on 11/27  POORNIMA-now on HD - 11/29  Suspected bile leak with biloma and biliary obstruction--> H/O Laparoscopic incomplete cholecystectomy 11/10/2023  New onset atrial fibrillation with RVR- fluctuating  History of hypertension and DDD/sciatica  Severe malnutrition  Leukocytosis likely 2/2 Fungemia  Tachycardia 2/2 IV Depletion vs Sepsis  Thrombocytosis possibly reactive to Sepsis      - patient was not able to tolerate the dialysis received albumins and blood pressure is still 80/50.  -nephrology recommends hospice.  We will discuss with the patient and the family.  - CT abdomen pelvis without contrast revealed abdominal pocket of fluid especially around the G-tube area.    Surgical team was notified.  -  Patient is consistently hypotensive with a hemodialysis requiring albumin and midodrine.  Overall poor prognosis due to severe debility, intolerance of HD.  Being a poor candidate for any type of procedures, poor candidate of chemotherapy.  - had a discussion with the patient and family at bedside , nephrology is also at the bedside.  Nephrologist Dr. Black stated that if she continues to have hypotension during dialysis, intolerance to dialysis, she would be more appropriate for hospice.  We will try 1 more episode of dialysis tomorrow and see if we can pull off any fluids.  If that has not the case she would likely need to be hospice.  Family and the patient understands.    Reporting improvement in J tube site pain  J Tube replaced by Surg Oncology on 12/18 after it became clogged  Resumed TF  Consulted Hematology for worsening thrombocytosis; Plt 1014 today  Underwent GAYLA on 12/14 with report negative for vegetations  On IV Micafungin  ID on-board; follow recs  ID added IV Ciprofloxacin to cover for possible biliary drain related infectino/cholangitis given worsening leukocytosis  Nephrology on-board for HD conduction  Patient to get new tunneled catheter to resume HD  Surgical Oncology on-board; follow recs  PICC line removed  Patient had MediPort and J-tube placed on 12/01; General Surgery removed HD catheter & Mediport on 12/16  Patient underwent biliary drain exchange on 12/13 per ID recs  Blood Cultures 12/10 positive for candida glabrata in the blood 1/2; Repeat BCX 12/11 positive for yeasts; BCX 12/12 negative; BCX repeated on 12/15 d/t fevers which are negative x 72 hrs; BCX from 12/17 negative x 48 hrs  Palliative Care consulted earlier; patient wishes to continue pursuing curative treatment for cancer  Cystogram reviewed by Urology with patent stents and recommends outpatient follow-up with Urology  Appreciate Oncology input; plans for systemic therapy outpatient if renal function improves, patient is  planning to pursue treatment   Continued on Hydrocortisone Supp, Metoprolol Tartrate BID, Midodrine TID, Protonix BID, Sodium Bicarbonate     VTE prophylaxis:  FD Lovenox     Patient condition:  Stable     Anticipated discharge and Disposition:  Pending    All diagnosis and differential diagnosis have been reviewed; assessment and plan has been documented; I have personally reviewed the labs and test results that are presently available; I have reviewed the patients medication list; I have reviewed the consulting providers response and recommendations. I have reviewed or attempted to review medical records based upon their availability    All of the patient's questions have been  addressed and answered. Patient's is agreeable to the above stated plan. I will continue to monitor closely and make adjustments to medical management as needed.  _____________________________________________________________________    Nutrition Status:  Patient meets ASPEN criteria for severe malnutrition of acute illness or injury per RD assessment as evidenced by:  Energy Intake (Malnutrition):  (does not meet criteria)  Weight Loss (Malnutrition): greater than 7.5% in 3 months  Subcutaneous Fat (Malnutrition): moderate depletion  Muscle Mass (Malnutrition): moderate depletion           A minimum of two characteristics is recommended for diagnosis of either severe or non-severe malnutrition.   Radiology:   I have personally reviewed the images and agree with radiologist report  CT Abdomen Pelvis  Without Contrast  Narrative: EXAMINATION:  CT ABDOMEN PELVIS WITHOUT CONTRAST    CLINICAL HISTORY:  Abdominal distension (Ped 0-18y);    TECHNIQUE:  Helically acquired axial images, sagittal and coronal reformations were obtained from the lung bases to the pubic symphysis without the IV administration of contrast.    Automated tube current modulation, weight-based exposure dosing, and/or iterative reconstruction technique utilized to reach  lowest reasonably achievable exposure rate.    DLP: 1657 mGy*cm    COMPARISON:  CT abdomen pelvis 12/17/2023    FINDINGS:  HEART: Normal in size. No pericardial effusion.    LUNG BASES: Unchanged cardiophrenic lymph nodes.    LIVER: No appreciable focal hepatic lesion by noncontrast evaluation.    BILIARY: There is an internal external biliary drain in place with internal aspect coiled at the 3rd portion of the duodenum.    PANCREAS: Limited noncontrast assessment.  There is generalized anasarca 0 which precludes evaluation for inflammatory change.    SPLEEN: Normal in size    ADRENALS: Unchanged.    KIDNEYS/URETERS: There are bilateral ureteral stents.  No appreciable calcifications along the stents.  Moderate to severe bilateral hydronephrosis, similar to prior.  Similar asymmetric hyperdensity of the contents of the left renal collecting system possibly related to retained contrast from a previous exam.    GI TRACT/MESENTERY: Evaluation of the bowel is limited without contrast. No evidence of bowel obstruction.  There is a percutaneous jejunostomy tube in place.    PERITONEUM: There is moderate free intraperitoneal fluid.Locules of intraperitoneal gas adjacent to the insertion of the  jejunostomy in the anterior left hemiabdomen are difficult to definitively localize within a bowel loop on this noncontrast CT.    LYMPH NODES: Similar to prior.  Suboptimally evaluated without contrast.    VASCULATURE: No significant atherosclerosis or aneurysm.    BLADDER: Normal appearance given degree of distention.    REPRODUCTIVE ORGANS: Normal as visualized.    ABDOMINAL WALL: Body wall edema.  Foci of subcutaneous air with stranding most compatible with medicinal injection.  There are foci of soft tissue gas adjacent to the jejunostomy tube.    BONES: No acute osseous abnormality.  Impression: 1. Technically challenging exam given lack of intravenous contrast and presence of anasarca.  Anasarca decreases intrinsic soft  tissue contrast.  2. There is moderate free intraperitoneal fluid, similar to previous exam.  There is a jejunostomy tube in place.  Tube terminates the lumen of the jejunum.  There are several locules of air in the abdomen in the region of the insertion point/jejunostomy balloon which are difficult to confidently localized within the bowel.  Leak or other nonspecific pneumoperitoneum is consideration.  There are also foci of subcutaneous gas at the abdominal wall near the insertion of the jejunostomy which may be related to the jejunostomy tube or medicinal injection.  3. Unchanged bilateral hydronephrosis with ureteral stents in place  4. Unchanged internal external biliary drain    Electronically signed by: Sulema Solorzano  Date:    12/21/2023  Time:    16:00  Cardiac catheterization  Procedure performed in the Invasive Lab    - See Procedure Log link below for nursing documentation    - See OpNote on Surgeries Tab for physician findings    - See Imaging Tab for radiologist dictation      Mukesh Cole MD  Department of Hospital Medicine   [facility]   12/23/2023

## 2023-12-23 NOTE — PROGRESS NOTES
[facility]  Infectious Disease Progress Note          Interval history:   12/22 - afebrile. CT shows more free fluid. Blood cx remain negative from 12/15. Changed to pip/tazo.  12/23 - afebrile. Feeling better,off oxygen. Continue regimen   ASSESSMENT & PLAN:   She is a 58-year-old female with a past medical history of hypertension who underwent an unsuccessful laparoscopic cholecystectomy on 11/10/2023 as gallbladder was unable to be completely resected.  Since procedure, she continued to have right flank and back pain.  She followed up with her PCP, and CT of the abdomen and pelvis on 11/17 revealed left-sided hydronephrosis with suspected distal obstruction.  Additionally noted was markedly thickened stomach wall.  Underwent ERCP on 11/18-subsequently found to have a malignant gastric tumor.  IR placed a biliary drain on 11/21.  On 11/24, she developed atrial fibrillation with RVR and was initiated on amiodarone drip.   She then had bilateral ureteral stents placed on 11/27 given persistent hydronephrosis.  She was developed an POORNIMA since admit secondary to obstructive uropathy, now requiring hemodialysis.  Temporary hemodialysis catheter was placed earlier today along with MediPort.  Patient was significant oozing from sites postoperatively.  She received 3 units of blood intraoperatively as well as around 1 L of IV fluids per report.  Patient reports developing cough after procedure today, nonproductive.  Denies chest pain and is oxygenating well on room air.  Patient has been receiving empiric Zosyn essentially since admit.  Leukocytosis had trended up around 11/22, however has since trended downward although with some worsening this morning.  Blood cultures from 11/18 are negative.  Urine culture from 11/23 is also negative.  She is been afebrile and hemodynamically stable.  Chest x-ray earlier today showed low lung volumes with mild retrocardiac atelectasis.  We have been consulted for input regarding worsening  leukocytosis.      Reconsulted secondary to recent low-grade fevers, T-max of 100.4° on 12/08.  Patient had previously been off of Zosyn since 12/05.  She was resumed on antimicrobials with vancomycin and Zosyn on 12/10.  She denies any associated symptoms.  She did have a recent drop in her H&H and positive stools.  GI service is considering colonoscopy.  CT of the chest, abdomen, and pelvis with IV contrast performed on 12/09 showed moderate volume ascites with areas of subcutaneous edema in the abdominal wall.  Patient currently without complaints.  Blood cultures drawn on 12/10 via PICC are positive for yeast, C. glabrata per BCID.  Peripheral set are negative thus far.          Candidemia - abdominal vs line source  Concern for lower GIB  Ascites per recent CT  Persistent leukocytosis, suspect multifactorial  POORNIMA, now ESRD on HD  Obstructive uropathy, s/p bilateral ureteral stents on 11/27  Persistent hyperbilirubinemia, s/p unsuccessful cholecystectomy on 11/10 and biliary drain on 11/21, exchanged 12/13  Gastric cancer, newly diagnosed  Mediport / tunneled HD catheter status      Plan:  Increased abdominal pain today, otherwise stable.   CT A/P with free fluid  - blood cx negative from 12/15  - Continue micafungin 100mg IV q24hr, 12/15 to present   - currently on pip/korey      Discussed with patient and family at bedside  Discussed and seen with RN   SUBJECTIVE:    AF, VSS. C/o some worse abdominal pain, no nausea or diarrhea.  Working with OT at present.     MEDICATIONS:   Reviewed in EMR    REVIEW OF SYSTEMS:   Except as documented, all other systems reviewed and negative     PHYSICAL EXAM:   T 97.5 °F (36.4 °C)   /67   P 106   RR 18   O2 100 %  GENERAL: NAD; does not appear toxic  SKIN: no rash  HEENT: sclera non-icteric; PERRL   NECK: supple; no LAD  CHEST: CTA; nonlabored, equal expansion; no adventitious BS, + TACHYPNEA  CARDIOVASCULAR: RRR, S1S2; no murmur   ABDOMEN:  active bowel sounds; abdomen  "somewhat firm, rounded, somewhat TTP diffusely; biliary drain / J-tube noted  EXTREMITIES: no cyanosis or clubbing  NEURO: ALERT CN II-XII grossly intact  PSYCH: Mentation and affect appropriate     LABS AND IMAGING:     Recent Labs     12/21/23  0431 12/23/23  0418   WBC 22  22.02* 31.61  31.61*   RBC 3.36* 3.45*   HGB 10.0* 10.3*   HCT 30.8* 32.0*   MCV 91.7 92.8   MCH 29.8 29.9   MCHC 32.5* 32.2*   RDW 17.1* 17.2*   * 655*       No results for input(s): "LACTIC" in the last 72 hours.  No results for input(s): "INR", "APTT", "D-DIMER" in the last 72 hours.  No results for input(s): "HGBA1C", "CHOL", "TRIG", "LDL", "VLDL", "HDL" in the last 72 hours.   Recent Labs     12/21/23 0431 12/23/23  0537   * 134*   K 4.1 4.3   CHLORIDE 101 102   CO2 17* 17*   BUN 60.0* 68.2*   CREATININE 4.80* 5.62*   GLUCOSE 148* 97   CALCIUM 8.8 9.2   PHOS  --  6.1*   ALBUMIN 2.0* 1.9*   GLOBULIN 5.4*  --    ALKPHOS 169*  --    ALT 47  --    AST 41*  --    BILITOT 2.6*  --        No results for input(s): "BNP", "CPK", "TROPONINI" in the last 72 hours.         CT Abdomen Pelvis  Without Contrast  Narrative: EXAMINATION:  CT ABDOMEN PELVIS WITHOUT CONTRAST    CLINICAL HISTORY:  Abdominal distension (Ped 0-18y);    TECHNIQUE:  Helically acquired axial images, sagittal and coronal reformations were obtained from the lung bases to the pubic symphysis without the IV administration of contrast.    Automated tube current modulation, weight-based exposure dosing, and/or iterative reconstruction technique utilized to reach lowest reasonably achievable exposure rate.    DLP: 1657 mGy*cm    COMPARISON:  CT abdomen pelvis 12/17/2023    FINDINGS:  HEART: Normal in size. No pericardial effusion.    LUNG BASES: Unchanged cardiophrenic lymph nodes.    LIVER: No appreciable focal hepatic lesion by noncontrast evaluation.    BILIARY: There is an internal external biliary drain in place with internal aspect coiled at the 3rd portion of the " duodenum.    PANCREAS: Limited noncontrast assessment.  There is generalized anasarca 0 which precludes evaluation for inflammatory change.    SPLEEN: Normal in size    ADRENALS: Unchanged.    KIDNEYS/URETERS: There are bilateral ureteral stents.  No appreciable calcifications along the stents.  Moderate to severe bilateral hydronephrosis, similar to prior.  Similar asymmetric hyperdensity of the contents of the left renal collecting system possibly related to retained contrast from a previous exam.    GI TRACT/MESENTERY: Evaluation of the bowel is limited without contrast. No evidence of bowel obstruction.  There is a percutaneous jejunostomy tube in place.    PERITONEUM: There is moderate free intraperitoneal fluid.Locules of intraperitoneal gas adjacent to the insertion of the  jejunostomy in the anterior left hemiabdomen are difficult to definitively localize within a bowel loop on this noncontrast CT.    LYMPH NODES: Similar to prior.  Suboptimally evaluated without contrast.    VASCULATURE: No significant atherosclerosis or aneurysm.    BLADDER: Normal appearance given degree of distention.    REPRODUCTIVE ORGANS: Normal as visualized.    ABDOMINAL WALL: Body wall edema.  Foci of subcutaneous air with stranding most compatible with medicinal injection.  There are foci of soft tissue gas adjacent to the jejunostomy tube.    BONES: No acute osseous abnormality.  Impression: 1. Technically challenging exam given lack of intravenous contrast and presence of anasarca.  Anasarca decreases intrinsic soft tissue contrast.  2. There is moderate free intraperitoneal fluid, similar to previous exam.  There is a jejunostomy tube in place.  Tube terminates the lumen of the jejunum.  There are several locules of air in the abdomen in the region of the insertion point/jejunostomy balloon which are difficult to confidently localized within the bowel.  Leak or other nonspecific pneumoperitoneum is consideration.  There are  also foci of subcutaneous gas at the abdominal wall near the insertion of the jejunostomy which may be related to the jejunostomy tube or medicinal injection.  3. Unchanged bilateral hydronephrosis with ureteral stents in place  4. Unchanged internal external biliary drain    Electronically signed by: Sulema Solorzano  Date:    12/21/2023  Time:    16:00  Cardiac catheterization  Procedure performed in the Invasive Lab    - See Procedure Log link below for nursing documentation    - See OpNote on Surgeries Tab for physician findings    - See Imaging Tab for radiologist dictation    MARIA A Viramontes  Infectious Disease  [facility]

## 2023-12-24 LAB
ABS NEUT (OLG): 22.84 X10(3)/MCL (ref 2.1–9.2)
ALBUMIN SERPL-MCNC: 2.1 G/DL (ref 3.4–4.8)
ALBUMIN/GLOB SERPL: 0.5 RATIO (ref 1.1–2)
ALP SERPL-CCNC: 117 UNIT/L (ref 40–150)
ALT SERPL-CCNC: 50 UNIT/L (ref 0–55)
ANISOCYTOSIS BLD QL SMEAR: ABNORMAL
AST SERPL-CCNC: 80 UNIT/L (ref 5–34)
BILIRUB SERPL-MCNC: 4.2 MG/DL
BUN SERPL-MCNC: 47 MG/DL (ref 9.8–20.1)
CALCIUM SERPL-MCNC: 8.5 MG/DL (ref 8.4–10.2)
CHLORIDE SERPL-SCNC: 100 MMOL/L (ref 98–107)
CO2 SERPL-SCNC: 18 MMOL/L (ref 23–31)
CREAT SERPL-MCNC: 4.36 MG/DL (ref 0.55–1.02)
ERYTHROCYTE [DISTWIDTH] IN BLOOD BY AUTOMATED COUNT: 17.2 % (ref 11.5–17)
GFR SERPLBLD CREATININE-BSD FMLA CKD-EPI: 11 MLS/MIN/1.73/M2
GLOBULIN SER-MCNC: 4.4 GM/DL (ref 2.4–3.5)
GLUCOSE SERPL-MCNC: 92 MG/DL (ref 82–115)
HCT VFR BLD AUTO: 26.8 % (ref 37–47)
HGB BLD-MCNC: 9 G/DL (ref 12–16)
INSTRUMENT WBC (OLG): 24.56 X10(3)/MCL
LYMPHOCYTES NFR BLD MANUAL: 1.23 X10(3)/MCL
LYMPHOCYTES NFR BLD MANUAL: 5 %
MACROCYTES BLD QL SMEAR: ABNORMAL
MCH RBC QN AUTO: 29.9 PG (ref 27–31)
MCHC RBC AUTO-ENTMCNC: 33.6 G/DL (ref 33–36)
MCV RBC AUTO: 89 FL (ref 80–94)
MONOCYTES NFR BLD MANUAL: 0.49 X10(3)/MCL (ref 0.1–1.3)
MONOCYTES NFR BLD MANUAL: 2 %
NEUTROPHILS NFR BLD MANUAL: 93 %
NRBC BLD AUTO-RTO: 0 %
PHOSPHATE SERPL-MCNC: 4.8 MG/DL (ref 2.3–4.7)
PLATELET # BLD AUTO: 751 X10(3)/MCL (ref 130–400)
PLATELET # BLD EST: ABNORMAL 10*3/UL
PMV BLD AUTO: 10.1 FL (ref 7.4–10.4)
POIKILOCYTOSIS BLD QL SMEAR: ABNORMAL
POTASSIUM SERPL-SCNC: 4.6 MMOL/L (ref 3.5–5.1)
PROT SERPL-MCNC: 6.5 GM/DL (ref 5.8–7.6)
RBC # BLD AUTO: 3.01 X10(6)/MCL (ref 4.2–5.4)
RBC MORPH BLD: ABNORMAL
SODIUM SERPL-SCNC: 135 MMOL/L (ref 136–145)
WBC # SPEC AUTO: 24.56 X10(3)/MCL (ref 4.5–11.5)

## 2023-12-24 PROCEDURE — 63600175 PHARM REV CODE 636 W HCPCS: Performed by: STUDENT IN AN ORGANIZED HEALTH CARE EDUCATION/TRAINING PROGRAM

## 2023-12-24 PROCEDURE — 80053 COMPREHEN METABOLIC PANEL: CPT | Performed by: NURSE PRACTITIONER

## 2023-12-24 PROCEDURE — 25000003 PHARM REV CODE 250

## 2023-12-24 PROCEDURE — 25000003 PHARM REV CODE 250: Performed by: STUDENT IN AN ORGANIZED HEALTH CARE EDUCATION/TRAINING PROGRAM

## 2023-12-24 PROCEDURE — 63600175 PHARM REV CODE 636 W HCPCS: Performed by: INTERNAL MEDICINE

## 2023-12-24 PROCEDURE — 99233 SBSQ HOSP IP/OBS HIGH 50: CPT | Mod: ,,, | Performed by: HOSPITALIST

## 2023-12-24 PROCEDURE — 84100 ASSAY OF PHOSPHORUS: CPT | Performed by: NURSE PRACTITIONER

## 2023-12-24 PROCEDURE — 25000003 PHARM REV CODE 250: Performed by: INTERNAL MEDICINE

## 2023-12-24 PROCEDURE — C9113 INJ PANTOPRAZOLE SODIUM, VIA: HCPCS | Performed by: INTERNAL MEDICINE

## 2023-12-24 PROCEDURE — 21400001 HC TELEMETRY ROOM

## 2023-12-24 PROCEDURE — 25000003 PHARM REV CODE 250: Performed by: NURSE PRACTITIONER

## 2023-12-24 PROCEDURE — 85027 COMPLETE CBC AUTOMATED: CPT | Performed by: NURSE PRACTITIONER

## 2023-12-24 RX ORDER — HYDROMORPHONE HYDROCHLORIDE 2 MG/ML
0.2 INJECTION, SOLUTION INTRAMUSCULAR; INTRAVENOUS; SUBCUTANEOUS EVERY 4 HOURS PRN
Status: DISCONTINUED | OUTPATIENT
Start: 2023-12-24 | End: 2023-12-25 | Stop reason: HOSPADM

## 2023-12-24 RX ORDER — SODIUM BICARBONATE 650 MG/1
1300 TABLET ORAL 2 TIMES DAILY
Status: DISCONTINUED | OUTPATIENT
Start: 2023-12-24 | End: 2023-12-25 | Stop reason: HOSPADM

## 2023-12-24 RX ADMIN — PIPERACILLIN AND TAZOBACTAM 4.5 G: 4; .5 INJECTION, POWDER, LYOPHILIZED, FOR SOLUTION INTRAVENOUS; PARENTERAL at 02:12

## 2023-12-24 RX ADMIN — HYDROCORTISONE ACETATE 25 MG: 25 SUPPOSITORY RECTAL at 08:12

## 2023-12-24 RX ADMIN — MIDODRINE HYDROCHLORIDE 15 MG: 5 TABLET ORAL at 08:12

## 2023-12-24 RX ADMIN — METOPROLOL TARTRATE 25 MG: 25 TABLET, FILM COATED ORAL at 08:12

## 2023-12-24 RX ADMIN — SODIUM CHLORIDE: 9 INJECTION, SOLUTION INTRAVENOUS at 02:12

## 2023-12-24 RX ADMIN — SODIUM BICARBONATE 650 MG TABLET 1300 MG: at 08:12

## 2023-12-24 RX ADMIN — COLLAGENASE SANTYL: 250 OINTMENT TOPICAL at 08:12

## 2023-12-24 RX ADMIN — PANTOPRAZOLE SODIUM 40 MG: 40 INJECTION, POWDER, FOR SOLUTION INTRAVENOUS at 08:12

## 2023-12-24 RX ADMIN — MIDODRINE HYDROCHLORIDE 15 MG: 5 TABLET ORAL at 05:12

## 2023-12-24 RX ADMIN — MIDODRINE HYDROCHLORIDE 15 MG: 5 TABLET ORAL at 01:12

## 2023-12-24 RX ADMIN — PANTOPRAZOLE SODIUM 40 MG: 40 INJECTION, POWDER, FOR SOLUTION INTRAVENOUS at 05:12

## 2023-12-24 RX ADMIN — ENOXAPARIN SODIUM 100 MG: 100 INJECTION SUBCUTANEOUS at 12:12

## 2023-12-24 RX ADMIN — ONDANSETRON 4 MG: 2 INJECTION INTRAMUSCULAR; INTRAVENOUS at 09:12

## 2023-12-24 RX ADMIN — ESCITALOPRAM OXALATE 10 MG: 10 TABLET ORAL at 08:12

## 2023-12-24 NOTE — PLAN OF CARE
Rec a consult for inpatient Hospice from pt's nurse.  I called and spoke to pt's sister, Naomi in detail [phone].  We discussed  2 fACILITIES THAT PROVIDE THESE SERVICES IN [place].  I RECEIVED FOC FROM NAOMI TO CONTACT BOTH FACILITIES.  I SENT REFERRAL TO [facility] AND [facility] WITH INSTRUCTIONS TO CONTACT NAOMI AT HER CELL NUMBER.  ABOVE INFO GIVEN TO PT'S NURSE AS WELL

## 2023-12-24 NOTE — PLAN OF CARE
Corinne WITH [facility] VS PT. HOWEVER PT WILL NOT MEET INPATIENT CRITERIA SECONDARY TO FAMILY NOT HAVING A DISCH. PLAN.  THEY DO NOT WANT TO TAKE PT. HOME WHEN HER DAYS ARE UP AT INPATIENT.  FAMILY WILL MEET WITH Eng HOU[facility] THIS AFTERNOON, HOPEFULLY THEY HAVE MORE OPTIONS

## 2023-12-24 NOTE — PROGRESS NOTES
Infectious Disease        Patient ID: Karen Briggs  58 y.o. female.    Chief Complaint: No chief complaint on file.      Interval HPI:   12/24 - afebrile. Remains deconditioned. Poor candidate for ongoing HD and chemotherapy., can discontinue pip/tazo. ID will sign of,call back if needed.  Assessment and Plan:   1) Candidemia  - in setting of multiple lines  - BCx 1/10 - Candida glabrata 1/4  - BCx 12/11 Candida glabrata 1/4  - BCx 12/12 - ngtd  - BCx 12/17 - ngtd  - BCx 12/22  -ngtd  - TTE 12/11 - negative for vegetations  - GAYLA 12/14 - negative for vegetations  - no seeding of liver or spleen seen on abd imaging  - currently on micafungin 12/11 to present, plan for #14 days from clearance and line removal 12/16 to 12/30    2) Intraperitoneal infection  - abdominal free fluid   - unsuccessful lap grace on 11/10  - required biliary drain on 11/21, exchanged 12/13  - incidental finding of gastric cancer on ERCP  - J-tube placed 12/01, replaced 12/18  - CT A & P 12/12 -  Technically challenging exam given lack of intravenous contrast and presence of anasarca.  Anasarca decreases intrinsic soft tissue contrast.. There is moderate free intraperitoneal fluid, similar to previous exam.  There is a jejunostomy tube in place.  Tube terminates the lumen of the jejunum.  There are several locules of air in the abdomen in the region of the insertion point/jejunostomy balloon which are difficult to confidently localized within the bowel.  Leak or other nonspecific pneumoperitoneum is consideration.  There are also foci of subcutaneous gas at the abdominal wall near the insertion of the jejunostomy which may be related to the jejunostomy tube or medicinal injection.. Unchanged bilateral hydronephrosis with ureteral stents in place. Unchanged internal external biliary drain  - currently on pip/tazo, can discontinue    3) Renal failure  - bilateral hydro  - s/p stents on 11/27  - renal function did not improve and  required HD for ARF secondary to urinary obstruction   - HD cath placed 11/29/23. Removed 12/16       4) Gastric cancer  - incidental finding of gastric cancer on ERCP  - adenocarcinoma, moderately differentiated intestinal type   - Mediport placed 12/1, removed 12/16  - patient planning to pursue treatment after discharge     Discussed with patient  Discussed with RN  Discussed with Hospitalist, Dr Douglas Montague MD, MPH  [facility]    Thank you for this consultation. I will follow up with the patient. Please contact via Epic secure chat with any questions.       HPI:   Patient is Karen peoples 58 y.o. female admitted on 11/17 with complaint of    Patient is s/p an unsuccessful laparoscopic cholecystectomy on 11/10/2023 as gallbladder was unable to be completely resected.  Post procedure, she continued to have right flank and back pain.  She followed up with her PCP, and CT of the abdomen and pelvis on 11/17 revealed left-sided hydronephrosis with suspected distal obstruction and markedly thickened stomach wall.  An ERCP on 11/18 found a malignant gastric tumor.  IR placed a biliary drain on 11/21.  On 11/24, she developed atrial fibrillation with RVR and required amiodarone drip.    Bilateral ureteral stents were placed on 11/27 for persistent hydronephrosis, however, she  developed acute renal failure  requiring hemodialysis.   A temporary hemodialysis catheter was placed  12/01along with MediPort.  Patient was significant oozing from sites postoperatively requiring PRBC transfusion. In interim she developed leukocytosis and fevers on 12/08.  Patient was off antimicrobials since 12/05 and resumed on bvroad spectrum 12/10.  CT of the chest, abdomen, and pelvis with IV contrast performed on 12/09 showed moderate volume ascites with areas of subcutaneous edema in the abdominal wall. Blood cultures drawn on 12/10 via PICC are positive for yeast, C. glabrata per BCID.    Patient has known hypertension and sciatic. Infectious diseases consulted for evaluation and management.     Past Medical History:   Diagnosis Date    Hypertension     Sciatica      Past Surgical History:   Procedure Laterality Date    CARPAL TUNNEL RELEASE Left     CYSTOSCOPY W/ URETERAL STENT PLACEMENT Bilateral 11/27/2023    Procedure: CYSTOSCOPY, WITH URETERAL STENT INSERTION;  Surgeon: Huey Cardenas MD;  Location: [facility];  Service: Urology;  Laterality: Bilateral;    EGD, WITH CLOSED BIOPSY  11/18/2023    Procedure: EGD, WITH CLOSED BIOPSY;  Surgeon: Alfred Goss MD;  Location: [facility] OR;  Service: Gastroenterology;;    ERCP N/A 11/18/2023    Procedure: ERCP (ENDOSCOPIC RETROGRADE CHOLANGIOPANCREATOGRAPHY);  Surgeon: Alfred Goss MD;  Location: [facility] OR;  Service: Gastroenterology;  Laterality: N/A;    HEMORRHOID SURGERY      INSERTION OF TUNNELED CENTRAL VENOUS CATHETER (CVC) WITH SUBCUTANEOUS PORT N/A 12/1/2023    Procedure: INSERTION, PORT-A-CATH;  Surgeon: William Morse MD;  Location: [facility] OR;  Service: General;  Laterality: N/A;    INSERTION OF TUNNELED CENTRAL VENOUS HEMODIALYSIS CATHETER N/A 12/6/2023    Procedure: Insertion, Catheter, Central Venous, Hemodialysis;  Surgeon: Satinder Luo DO;  Location: [facility] CATH LAB;  Service: Nephrology;  Laterality: N/A;    INSERTION OF TUNNELED CENTRAL VENOUS HEMODIALYSIS CATHETER N/A 12/20/2023    Procedure: Insertion, Catheter, Central Venous, Hemodialysis;  Surgeon: Satinder Luo DO;  Location: [facility] CATH LAB;  Service: Nephrology;  Laterality: N/A;    LAPAROSCOPIC INSERTION OF JEJUNOSTOMY TUBE N/A 12/1/2023    Procedure: INSERTION, JEJUNOSTOMY TUBE, LAPAROSCOPIC;  Surgeon: William Morse MD;  Location: [facility] OR;  Service: General;  Laterality: N/A;  [facility]     Review of patient's allergies indicates:  No Known Allergies  Current Outpatient Medications   Medication Instructions    olmesartan (BENICAR) 20 mg, Oral, Daily        Current Facility-Administered Medications:     0.9%  NaCl infusion (for blood administration), , Intravenous, Q24H PRN, Almaz Cheney, FNP    0.9%  NaCl infusion (for blood administration), , Intravenous, Q24H PRN, Maryanne Moise, AGNP    0.9%  NaCl infusion, , Intravenous, PRN, Hussein Merino MD, Last Rate: 5 mL/hr at 12/24/23 1447, New Bag at 12/24/23 1447    acetaminophen tablet 650 mg, 650 mg, Oral, Q4H PRN, Hussein Merino MD, 650 mg at 12/23/23 1608    aluminum-magnesium hydroxide-simethicone 200-200-20 mg/5 mL suspension 30 mL, 30 mL, Oral, QID PRN, Hussein Merino MD    bisacodyL suppository 10 mg, 10 mg, Rectal, Daily PRN, Hussein Merino MD    chlorproMAZINE injection 25 mg, 25 mg, Intramuscular, QID PRN, Dalton Palacios MD, 25 mg at 12/05/23 1102    collagenase ointment, , Topical (Top), Daily, Mandy Brito MD, Given at 12/24/23 0829    dicyclomine injection 20 mg, 20 mg, Intramuscular, QID PRN, Dalton Palacios MD, 20 mg at 11/30/23 1754    enoxaparin injection 100 mg, 1 mg/kg, Subcutaneous, Q24H (treatment, non-standard time), Mukesh Cole MD, 100 mg at 12/24/23 0012    EScitalopram oxalate tablet 10 mg, 10 mg, Oral, Daily, Ming Pittman NP, 10 mg at 12/24/23 0829    hydrALAZINE injection 10 mg, 10 mg, Intravenous, Q6H PRN, Kenney Steiner MD    hydrocortisone suppository 25 mg, 25 mg, Rectal, BID, Alexandra Fry PA, 25 mg at 12/24/23 0828    hyoscyamine ODT 0.125 mg, 0.125 mg, Sublingual, Q4H PRN, Sara Waldrop AGACNP-BC, 0.125 mg at 11/30/23 1735    melatonin tablet 6 mg, 6 mg, Oral, Nightly, Ming Pittman NP, 6 mg at 12/23/23 2143    metoclopramide HCl injection 5 mg, 5 mg, Intravenous, Q6H PRN, Dalton Palacios MD, 5 mg at 12/21/23 2144    metoprolol injection 5 mg, 5 mg, Intravenous, Q4H PRN, Genaro Crawford, FNJYOTI, 5 mg at 12/08/23 0329    metoprolol tartrate (LOPRESSOR) tablet 25 mg, 25 mg, Oral, BID, Mariano Bermudez MD, 25 mg at 12/24/23 0829    micafungin 100 mg in  sodium chloride 0.9 % 100 mL IVPB (MB+), 100 mg, Intravenous, Q24H, Mukesh Cole MD, Stopped at 12/23/23 2253    midodrine tablet 15 mg, 15 mg, Oral, TID WM, Yahir Black, DO, 15 mg at 12/24/23 1304    morphine injection 4 mg, 4 mg, Intravenous, Q4H PRN, Hussein Merino MD, 4 mg at 12/02/23 0217    ondansetron injection 4 mg, 4 mg, Intravenous, Q4H PRN, Dalton Palacios MD, 4 mg at 12/24/23 0907    oxyCODONE immediate release tablet 5 mg, 5 mg, Oral, Q6H PRN, Hussein Merino MD, 5 mg at 12/04/23 2023    pantoprazole injection 40 mg, 40 mg, Intravenous, BID WM, Dalton Palacios MD, 40 mg at 12/24/23 0828    perflutren lipid microspheres injection 1.3 mL, 1.3 mL, Intravenous, Once, Dalton Palacios MD    piperacillin-tazobactam (ZOSYN) 4.5 g in dextrose 5 % in water (D5W) 100 mL IVPB (MB+), 4.5 g, Intravenous, Q12H, Mukesh Cole MD, Last Rate: 25 mL/hr at 12/24/23 1448, 4.5 g at 12/24/23 1448    polyethylene glycol packet 17 g, 17 g, Oral, BID PRN, Hussein Merino MD    prochlorperazine injection Soln 5 mg, 5 mg, Intravenous, Q6H PRN, Hussein Merino MD, 5 mg at 11/22/23 1347    promethazine injection 25 mg, 25 mg, Intramuscular, Q4H PRN, Lyssa Car AGACNP-BC, 25 mg at 11/23/23 1620    senna-docusate 8.6-50 mg per tablet 2 tablet, 2 tablet, Oral, BID PRN, Hussein Merino MD    sodium bicarbonate tablet 1,300 mg, 1,300 mg, Oral, BID, Anabella Sotelo, NP    sodium chloride 0.9% bolus 250 mL 250 mL, 250 mL, Intravenous, PRN, Maryanne Moise S, AGNP    sodium chloride 0.9% flush 10 mL, 10 mL, Intravenous, PRN, Hussein Merino MD  Review of Systems   Unable to perform ROS: Acuity of condition       Objective:   Temp:  [97.5 °F (36.4 °C)-98.8 °F (37.1 °C)] 98.4 °F (36.9 °C)  Pulse:  [] 116  Resp:  [17-18] 18  SpO2:  [95 %-100 %] 99 %  BP: ()/(54-72) 86/62     Physical Exam  Constitutional:       Appearance: Normal appearance. She is well-developed.   HENT:      Head: Normocephalic.      Nose: Nose normal.       Mouth/Throat:      Pharynx: No oropharyngeal exudate.   Eyes:      General: Lids are normal. No scleral icterus.        Right eye: No discharge.      Conjunctiva/sclera: Conjunctivae normal.      Pupils: Pupils are equal, round, and reactive to light.   Neck:      Thyroid: No thyromegaly.      Vascular: No JVD.      Trachea: Trachea normal.   Cardiovascular:      Rate and Rhythm: Normal rate and regular rhythm.      Pulses: Normal pulses.      Heart sounds: Normal heart sounds. No murmur heard.     No friction rub.   Pulmonary:      Effort: Pulmonary effort is normal. No respiratory distress.      Breath sounds: Normal breath sounds. No wheezing.   Chest:      Chest wall: No tenderness.   Abdominal:      General: Bowel sounds are normal. There is no distension.      Palpations: Abdomen is soft.      Tenderness: There is no abdominal tenderness. There is no guarding or rebound.   Musculoskeletal:         General: No tenderness. Normal range of motion.      Cervical back: Full passive range of motion without pain, normal range of motion and neck supple.   Lymphadenopathy:      Cervical: No cervical adenopathy.   Skin:     General: Skin is warm and dry.      Findings: No rash.   Neurological:      Mental Status: She is disoriented.      Cranial Nerves: No cranial nerve deficit.      Sensory: No sensory deficit.   Psychiatric:         Speech: Speech normal.         Estimated Creatinine Clearance: 15.9 mL/min (A) (based on SCr of 4.36 mg/dL (H)).  Recent Labs   Lab 12/23/23  0418 12/24/23  0621   WBC 31.61  31.61* 24.56  24.56*   * 751*     Microbiology Results (last 7 days)       Procedure Component Value Units Date/Time    Blood Culture [[id number]]  (Normal) Collected: 12/22/23 1550    Order Status: Completed Specimen: Blood Updated: 12/23/23 1900     CULTURE, BLOOD ([facility]) No Growth At 24 Hours    Blood Culture [[id number]]  (Normal) Collected: 12/22/23 1550    Order Status: Completed Specimen: Blood Updated:  12/23/23 1900     CULTURE, BLOOD ([facility]) No Growth At 24 Hours    Blood Culture [[id number]]  (Normal) Collected: 12/17/23 0844    Order Status: Completed Specimen: Blood Updated: 12/22/23 1101     CULTURE, BLOOD ([facility]) No Growth at 5 days    Blood Culture [[id number]]  (Normal) Collected: 12/15/23 1505    Order Status: Completed Specimen: Blood Updated: 12/20/23 1900     CULTURE, BLOOD ([facility]) No Growth at 5 days    Blood Culture [[id number]]  (Normal) Collected: 12/15/23 1505    Order Status: Completed Specimen: Blood Updated: 12/20/23 1900     CULTURE, BLOOD ([facility]) No Growth at 5 days    Blood Culture [[id number]]  (Abnormal) Collected: 12/10/23 0605    Order Status: Completed Specimen: Blood from PICC Line Updated: 12/18/23 1104     CULTURE, BLOOD ([facility]) Candida glabrata     Comment: Susceptibility sent to reference lab. Results to follow on separate report.        GRAM STAIN Yeast      Seen in gram stain of broth only      1 of 2 Aerobic bottles positive    Narrative:      For sensitivity refer to [id number]    Blood Culture [[id number]]  (Normal) Collected: 12/12/23 1536    Order Status: Completed Specimen: Blood Updated: 12/17/23 1800     CULTURE, BLOOD ([facility]) No Growth at 5 days    Blood Culture [[id number]]  (Normal) Collected: 12/12/23 1536    Order Status: Completed Specimen: Blood Updated: 12/17/23 1800     CULTURE, BLOOD ([facility]) No Growth at 5 days            Significant Labs: All pertinent labs within the past 24 hours have been reviewed.    Significant Imaging: I have reviewed all relevant and available imaging results/findings within the past 24 hours.      Plan -- see top of note

## 2023-12-24 NOTE — PROGRESS NOTES
[facility]  Hospital Medicine Progress Note        Chief Complaint: Inpatient Follow-up for intractable nausea vomiting due to gastric adenocarcinoma     HPI:   A 58-year-old female with medical history of hypertension who recently underwent laparoscopic cholecystectomy on 11/10/2023, procedure was incomplete and was unable to completely resect the gallbladder.  Since surgery she continued to have right flank/back pain and followed up with her PCP and CT abdomen and pelvis on 11/17/2023 revealed left-sided hydronephrosis with suspected distal obstruction/no clear stone visualized, postoperative changes of cholecystectomy with fluid in the gallbladder fossa may reflect postoperative seroma but biloma can not be entirely excluded, also noted for marked thickening of the stomach wall diffusely may be related to mesenteric edema/reactive.  She presented to [facility] [place] the same day 11/17/2023 and her labs notable for WBC 9.0, hemoglobin 12.4, platelets 442, creatinine 0.86, total bilirubin 8.7 with direct fraction 6.7, alkaline phosphatase 491, , .  Patient's surgeon was consulted and recommended ERCP which was not available at [facility] for which she was transferred to [facility] and referred to hospital medicine service for further evaluation and management. 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  Severe inflammation and oozing of blood noted proximal gastric lumen.  Unable to advance scope into the duodenum. Surgical oncology consulted, IR consulted for external drain placement which was done November 21.  November 24th she developed new onset atrial fibrillation with RVR and Cardiology consulted. Started on amiodarone drip. Unfortunately patient had acute blood loss due to hemorrhage from the midline site that was removed. Patient was unaware of the bleed.  Became very weak, passed out.  Code was called but she came around.   Stat H&H done which was slightly lower but stable, MRI of the brain was negative for any acute ischemic changes. Pt is aware of gastric cancer diagnosis. GI following--> 11/18- EGD shows normal esophagus, malignant gastric tumor in the cardia.  Inflamed mucosa and ooze of blood throughout the proximal gastric lumen.  Gastric antrum scar would not allow advancement of scope into the duodenal ampulla area therefore biliary cannulation was not possible for bile leak evaluation. Pathology shows marked chronic gastritis with intestinal metaplasia, gastric cardia biopsy shows adenocarcinoma, moderately differentiated intestinal type. Bilateral hydronephrosis with left greater than right. MRI brain without contrast-negative for acute finding. PET scan reviewed with patient by Oncology reports of locally advanced gastric adenocarcinoma and plans for systemic therapy outpatient if functional, nutritional and renal function improves. MediPort placement by surgical Oncology on 12/1, oncology on board plans for systemic therapy outpatient if functional, nutritional and renal function improves. Obstructive uropathy with bilateral hydronephrosis status post bilateral ureteral stent placed on 11/27 by Urology- no improvement in renal function, patient opted to have hemodialysis and a right-sided hemodialysis catheter was placed by General surgery on 11/29, to continue hemodialysis per nephrology discretion. Patient with symptoms of nausea and vomiting can not keep anything down in her stomach complicated by severe malnutrition on IV Clinimix and supportive medications for nausea and vomiting. Palliative care consulted. Patient got a MediPort and J-tube placed on 12/01. Cystogram reviewed by Urology with patent stents and recommends outpatient follow-up with Urology. White cell count elevated at 20.4, Infectious Disease had started on IV Zosyn given concern for possible sepsis with low blood pressure on 12/02. CIS evaluated patient  to begin low-dose metoprolol tartrate at 12.5 mg b.i.d. and resume Eliquis for stroke prevention. On TF via J tube which was placed by Surgical Oncology. ID started IV Micafungin for candidemia. Noted to have persistent fungemia on 1/2 BCX from 12/11.  Consulted CIS for GAYLA; report pending. General Surgery consulted to remove HD line & Mediport. Patient spiked a fever overnight on 12/15; BCX repeated by ID which are negative.  Afebrile since 12/14. General Surgery removed mediport & tunneled catheter after HD on 12/16. J Tube replaced by Surgical Oncology on 12/18 after it became clogged.    Interval Hx:     Seen and examined the patient.  Afebrile vitals stable and hemodynamically stable.  Abdominal pain    Objective/physical exam:  General: alert lady lying comfortably in bed, in no acute distress  HENT: oral and oropharyngeal mucosa moist, pink, with no erythema or exudates, no ear pain or discharge  Neck: normal neck movement, no lymph nodes or swellings, no JVD or Carotid bruit  Respiratory: clear breathing sounds bilaterally, no crackles, rales, ronchi or wheezes  Cardiovascular: clear S1 and S2, no murmurs, rubs or gallops  Peripheral Vascular: no lesions, ulcers or erosions, normal peripheral pulses; 2+ pitting pedal edema  Gastrointestinal: J tube in place LUQ; soft, non-tender, non-distended abdomen, no guarding, rigidity or rebound tenderness, normal bowel sounds  Integumentary: normal skin color, no rashes or lesions  Neuro: AAO x 3; motor strength 5/5 in B/L UEs & LEs; sensation intact to gross and fine touch B/L; CN II-XII grossly intact    VITAL SIGNS: 24 HRS MIN & MAX LAST   Temp  Min: 97.5 °F (36.4 °C)  Max: 98.8 °F (37.1 °C) 98.4 °F (36.9 °C)   BP  Min: 82/54  Max: 101/72 (!) 86/62   Pulse  Min: 99  Max: 116  (!) 116   Resp  Min: 17  Max: 18 18   SpO2  Min: 95 %  Max: 100 % 99 %     I reviewed the labs below:  Recent Labs   Lab 12/21/23  0431 12/23/23  0418 12/24/23  0621   WBC 22  22.02* 31.61   31.61* 24.56  24.56*   RBC 3.36* 3.45* 3.01*   HGB 10.0* 10.3* 9.0*   HCT 30.8* 32.0* 26.8*   MCV 91.7 92.8 89.0   MCH 29.8 29.9 29.9   MCHC 32.5* 32.2* 33.6   RDW 17.1* 17.2* 17.2*   * 655* 751*   MPV 10.0 9.8 10.1       Recent Labs   Lab 12/18/23  0440 12/19/23  0444 12/20/23  0438 12/21/23  0431 12/23/23  0537 12/24/23  0621   *   < > 133* 130* 134* 135*   K 4.3   < > 4.1 4.1 4.3 4.6   CO2 19*   < > 19* 17* 17* 18*   BUN 63.8*   < > 97.8* 60.0* 68.2* 47.0*   CREATININE 5.20*   < > 6.96* 4.80* 5.62* 4.36*   CALCIUM 8.8   < > 8.6 8.8 9.2 8.5   MG 2.30  --   --   --   --   --    ALBUMIN 1.7*   < > 1.6* 2.0* 1.9* 2.1*   ALKPHOS 162*   < > 170* 169*  --  117   ALT 51   < > 46 47  --  50   AST 42*   < > 41* 41*  --  80*   BILITOT 3.2*   < > 2.8* 2.6*  --  4.2*    < > = values in this interval not displayed.       Assessment/Plan:  Hypotension, improved with Midodrine  Persistent leukocytosis likely 2/2 Fungemia  Acute on chronic anemia, s/p 2 units prbcs transfusion 11/30  Hx of Acute Blood loss anemia with syncope and then fall 11/26-   Locally advanced gastric adenocarcinoma with intractable nausea and vomiting possible Gastric/duodenal outlet obstruction s/p J Tube placement  J Tube Obstruction  Fungemia possibly 2/2 Biliary Drain Infection  Obstructive uropathy with bilateral hydronephrosis- S/P bilateral ureteral stent placed on 11/27  POORNIMA-now on HD - 11/29  Suspected bile leak with biloma and biliary obstruction--> H/O Laparoscopic incomplete cholecystectomy 11/10/2023  New onset atrial fibrillation with RVR- fluctuating  History of hypertension and DDD/sciatica  Severe malnutrition  Leukocytosis likely 2/2 Fungemia  Tachycardia 2/2 IV Depletion vs Sepsis  Thrombocytosis possibly reactive to Sepsis        Seen and examined the patient today.  She has not tolerating hemodialysis.  Multiple evaluations she has not going to be also a candidate for chemotherapy at this point.  Due to severe debility and  malnutrition and now with current comorbidities her prognosis is poor.  Nephrology recommended palliative care/hospice.   I had had a discussion with the patient nurse Pinky at the bedside.   She is oriented x3.  She understands her condition states that she has renal failure and also stomach cancer.  She understands that her prognosis is poor.  She stated that she will like to become hospice and be comfortable.    I asked the regarding the DNR status.  She stated that if her heart stops and she stops breathing she will like to be in peace and she does not want to have chest compressions or endotracheal intubation with mechanical ventilation.  Her boyfriend is at the bedside I also have spoken to multiple members of her family with her brother her sister's.  They are all in agreement with the plan.  We will continue to treat patient in the hospital.  Continue antibiotics for now.  Let the case management know for possibility of transferring patient to a hospice facility.      - patient was not able to tolerate the dialysis received albumins and blood pressure is still 80/50.  -nephrology recommends hospice.  We will discuss with the patient and the family.  - CT abdomen pelvis without contrast revealed abdominal pocket of fluid especially around the G-tube area.    Surgical team was notified.  - Patient is consistently hypotensive with a hemodialysis requiring albumin and midodrine.  Overall poor prognosis due to severe debility, intolerance of HD.  Being a poor candidate for any type of procedures, poor candidate of chemotherapy.  - had a discussion with the patient and family at bedside , nephrology is also at the bedside.  Nephrologist Dr. Black stated that if she continues to have hypotension during dialysis, intolerance to dialysis, she would be more appropriate for hospice.  We will try 1 more episode of dialysis tomorrow and see if we can pull off any fluids.  If that has not the case she would likely need  to be hospice.  Family and the patient understands.    Reporting improvement in J tube site pain  J Tube replaced by Surg Oncology on 12/18 after it became clogged  Resumed TF  Consulted Hematology for worsening thrombocytosis; Plt 1014 today  Underwent GAYLA on 12/14 with report negative for vegetations  On IV Micafungin  ID on-board; follow recs  ID added IV Ciprofloxacin to cover for possible biliary drain related infectino/cholangitis given worsening leukocytosis  Nephrology on-board for HD conduction  Patient to get new tunneled catheter to resume HD  Surgical Oncology on-board; follow recs  PICC line removed  Patient had MediPort and J-tube placed on 12/01; General Surgery removed HD catheter & Mediport on 12/16  Patient underwent biliary drain exchange on 12/13 per ID recs  Blood Cultures 12/10 positive for candida glabrata in the blood 1/2; Repeat BCX 12/11 positive for yeasts; BCX 12/12 negative; BCX repeated on 12/15 d/t fevers which are negative x 72 hrs; BCX from 12/17 negative x 48 hrs  Palliative Care consulted earlier; patient wishes to continue pursuing curative treatment for cancer  Cystogram reviewed by Urology with patent stents and recommends outpatient follow-up with Urology  Appreciate Oncology input; plans for systemic therapy outpatient if renal function improves, patient is planning to pursue treatment   Continued on Hydrocortisone Supp, Metoprolol Tartrate BID, Midodrine TID, Protonix BID, Sodium Bicarbonate     VTE prophylaxis:  FD Lovenox     Patient condition:  Stable     Anticipated discharge and Disposition:  Pending    All diagnosis and differential diagnosis have been reviewed; assessment and plan has been documented; I have personally reviewed the labs and test results that are presently available; I have reviewed the patients medication list; I have reviewed the consulting providers response and recommendations. I have reviewed or attempted to review medical records based upon their  availability  All of the patient's questions have been  addressed and answered. Patient's is agreeable to the above stated plan. I will continue to monitor closely and make adjustments to medical management as needed.  _____________________________________________________________________    Nutrition Status:  Patient meets ASPEN criteria for severe malnutrition of acute illness or injury per RD assessment as evidenced by:  Energy Intake (Malnutrition):  (does not meet criteria)  Weight Loss (Malnutrition): greater than 7.5% in 3 months  Subcutaneous Fat (Malnutrition): moderate depletion  Muscle Mass (Malnutrition): moderate depletion           A minimum of two characteristics is recommended for diagnosis of either severe or non-severe malnutrition.   Radiology:   I have personally reviewed the images and agree with radiologist report  CT Abdomen Pelvis  Without Contrast  Narrative: EXAMINATION:  CT ABDOMEN PELVIS WITHOUT CONTRAST    CLINICAL HISTORY:  Abdominal distension (Ped 0-18y);    TECHNIQUE:  Helically acquired axial images, sagittal and coronal reformations were obtained from the lung bases to the pubic symphysis without the IV administration of contrast.    Automated tube current modulation, weight-based exposure dosing, and/or iterative reconstruction technique utilized to reach lowest reasonably achievable exposure rate.    DLP: 1657 mGy*cm    COMPARISON:  CT abdomen pelvis 12/17/2023    FINDINGS:  HEART: Normal in size. No pericardial effusion.    LUNG BASES: Unchanged cardiophrenic lymph nodes.    LIVER: No appreciable focal hepatic lesion by noncontrast evaluation.    BILIARY: There is an internal external biliary drain in place with internal aspect coiled at the 3rd portion of the duodenum.    PANCREAS: Limited noncontrast assessment.  There is generalized anasarca 0 which precludes evaluation for inflammatory change.    SPLEEN: Normal in size    ADRENALS: Unchanged.    KIDNEYS/URETERS: There are  bilateral ureteral stents.  No appreciable calcifications along the stents.  Moderate to severe bilateral hydronephrosis, similar to prior.  Similar asymmetric hyperdensity of the contents of the left renal collecting system possibly related to retained contrast from a previous exam.    GI TRACT/MESENTERY: Evaluation of the bowel is limited without contrast. No evidence of bowel obstruction.  There is a percutaneous jejunostomy tube in place.    PERITONEUM: There is moderate free intraperitoneal fluid.Locules of intraperitoneal gas adjacent to the insertion of the  jejunostomy in the anterior left hemiabdomen are difficult to definitively localize within a bowel loop on this noncontrast CT.    LYMPH NODES: Similar to prior.  Suboptimally evaluated without contrast.    VASCULATURE: No significant atherosclerosis or aneurysm.    BLADDER: Normal appearance given degree of distention.    REPRODUCTIVE ORGANS: Normal as visualized.    ABDOMINAL WALL: Body wall edema.  Foci of subcutaneous air with stranding most compatible with medicinal injection.  There are foci of soft tissue gas adjacent to the jejunostomy tube.    BONES: No acute osseous abnormality.  Impression: 1. Technically challenging exam given lack of intravenous contrast and presence of anasarca.  Anasarca decreases intrinsic soft tissue contrast.  2. There is moderate free intraperitoneal fluid, similar to previous exam.  There is a jejunostomy tube in place.  Tube terminates the lumen of the jejunum.  There are several locules of air in the abdomen in the region of the insertion point/jejunostomy balloon which are difficult to confidently localized within the bowel.  Leak or other nonspecific pneumoperitoneum is consideration.  There are also foci of subcutaneous gas at the abdominal wall near the insertion of the jejunostomy which may be related to the jejunostomy tube or medicinal injection.  3. Unchanged bilateral hydronephrosis with ureteral stents in  place  4. Unchanged internal external biliary drain    Electronically signed by: Sulema Solorzano  Date:    12/21/2023  Time:    16:00  Cardiac catheterization  Procedure performed in the Invasive Lab    - See Procedure Log link below for nursing documentation    - See OpNote on Surgeries Tab for physician findings    - See Imaging Tab for radiologist dictation      Mukesh Cole MD  Department of Hospital Medicine   [facility]   12/24/2023

## 2023-12-24 NOTE — PROGRESS NOTES
Nephrology consult follow up note    HPI:      Karen Briggs is a 58 y.o. female  presented on  7 days post op from laparoscopic cholecystectomy on 11/10. Gallbladder was unable to be resected. Back and flank pain persisted post operatively prompting evaluation at [facility] ER. After workup her surgeon recommended ERCP for which she was sent to this facility. Left sided hydronephrosis noted on CT scans done on admission. At that time renal function was fairly normal and patient was given option for stenting or to defer as outpatient and she chose the latter.      During ERCP, malignant gastric mass noted and ERCP was unable to be completed due to duodenal scarring. 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 Patient ultimately had biliary drain placed per IR.      She developed A fib with RVR requiring amiodarone infusion. She then had significant blood loss post removal of long term IV and subsequent fall in her room.      Patient renal function was relatively stable until decline starting 11/25. She was ultimately initiated on HD 11/28 post bilateral ureteral stent placement with Dr Cardenas same day.      Patient developed dialysis dependent acute kidney injury, and was started on hemodialysis 11/29/2023.  Tunneled dialysis catheter was placed on 12/06/2023.  Dialysis catheter had to be removed on 12/16/2023 after dialysis due to persistent fungemia.  Tunneled dialysis catheter was replaced on 12/20/2023.    Interval history:     Patient did not tolerate HD again yesterday with hypotension despite decreased UF goal. Today she is resting comfortably in bed on RA. Denies CP, N/V. Endorses some SOB.    Review of Systems:   Cardiovascular:  Negative for chest pain   Respiratory: Positive for shortness of breath    Past medical, family, surgical, and social history reviewed and unchanged from initial consult note.     Objective:       VITAL SIGNS: 24 HR MIN & MAX LAST    Temp  Min: 97.5 °F (36.4 °C)  Max:  98.8 °F (37.1 °C)  98.4 °F (36.9 °C)        BP  Min: 82/54  Max: 101/72  (!) 86/62     Pulse  Min: 99  Max: 116  (!) 116     Resp  Min: 17  Max: 18  18    SpO2  Min: 95 %  Max: 100 %  99 %      GEN: Chronically ill appearing AAF in NAD  CV: RRR +S1,S2 without murmur  PULM: CTAB, unlabored on RA  ABD: Soft, NT/ND abdomen.  Drain noted  EXT:  3+ lower extremity edema  SKIN: Warm and dry  PSYCH: Awake, alert and appropriately conversant.   Dialysis access:  Right IJ PermCath          Component Value Date/Time     (L) 12/24/2023 0621     (L) 12/23/2023 0537     04/24/2018 0340    K 4.6 12/24/2023 0621    K 4.3 12/23/2023 0537    K 3.2 (L) 04/24/2018 0340    CHLORIDE 100 12/24/2023 0621    CHLORIDE 102 12/23/2023 0537    CO2 18 (L) 12/24/2023 0621    CO2 17 (L) 12/23/2023 0537    CO2 20 (L) 04/24/2018 0340    BUN 47.0 (H) 12/24/2023 0621    BUN 68.2 (H) 12/23/2023 0537    BUN 10 04/24/2018 0340    CREATININE 4.36 (H) 12/24/2023 0621    CREATININE 5.62 (H) 12/23/2023 0537    CREATININE 0.7 04/24/2018 0340    CALCIUM 8.5 12/24/2023 0621    CALCIUM 9.2 12/23/2023 0537    CALCIUM 9.2 04/24/2018 0340    PHOS 4.8 (H) 12/24/2023 0621            Component Value Date/Time    WBC 24.56 (H) 12/24/2023 0621    WBC 24.56 12/24/2023 0621    WBC 31.61 (H) 12/23/2023 0418    WBC 31.61 12/23/2023 0418    WBC 12.03 04/24/2018 0340    HGB 9.0 (L) 12/24/2023 0621    HGB 10.3 (L) 12/23/2023 0418    HGB 11.3 (L) 04/24/2018 0340    HCT 26.8 (L) 12/24/2023 0621    HCT 32.0 (L) 12/23/2023 0418    HCT 35.3 (L) 04/24/2018 0340     (H) 12/24/2023 0621     (H) 12/23/2023 0418     04/24/2018 0340         Imaging reviewed      Assessment / Plan:     POORNIMA multifactorial secondary to obstructive uropathy, acute blood loss, contrast exposure, hypotension and Afib with RVR  ---Nephrotic range proteinuria noted. 4.4 g  ---Dialysis initiated 11/28 post tunneled catheter placement 12/6  --tunneled catheter removed 12/16  and replaced 12/20 due to persistent fungemia  Newly diagnosed malignant gastric mass. Pathology consistent with adenocarcinoma   -s/p J tube and mediport placement 12/1  Acute blood loss anemia 2/2 bleeding post long term IV removal   --s/p transfusion 11/30, 12/1, 12/10  Bilateral hydronephrosis noted 11/21 - stent placement initially deferred due to stable renal function   Afib with RVR resolved   S/p biliary drain placement and subsequent exchange due to fungemia   Fungemia - blood cultures positive 12/10, 12/11   Hypotension on TID Midodrine     Plan:  Patient again did not tolerate HD yesterday with hypotension despite decreased UF goal and midodrine. No acute indication for hemodialysis today. Patient and sister confirm she would like to stop dialysis, as she is not tolerating it well. Considering inpatient hospice.  Increase PO bicarb to BID  CMP in AM

## 2023-12-25 VITALS
SYSTOLIC BLOOD PRESSURE: 77 MMHG | DIASTOLIC BLOOD PRESSURE: 53 MMHG | HEART RATE: 122 BPM | BODY MASS INDEX: 33.91 KG/M2 | TEMPERATURE: 99 F | OXYGEN SATURATION: 97 % | RESPIRATION RATE: 18 BRPM | HEIGHT: 66 IN | WEIGHT: 211 LBS

## 2023-12-25 PROCEDURE — 25000003 PHARM REV CODE 250: Performed by: STUDENT IN AN ORGANIZED HEALTH CARE EDUCATION/TRAINING PROGRAM

## 2023-12-25 PROCEDURE — 63600175 PHARM REV CODE 636 W HCPCS: Performed by: STUDENT IN AN ORGANIZED HEALTH CARE EDUCATION/TRAINING PROGRAM

## 2023-12-25 PROCEDURE — 25000003 PHARM REV CODE 250: Performed by: INTERNAL MEDICINE

## 2023-12-25 PROCEDURE — 25000003 PHARM REV CODE 250

## 2023-12-25 RX ADMIN — HYDROMORPHONE HYDROCHLORIDE 0.2 MG: 2 INJECTION INTRAMUSCULAR; INTRAVENOUS; SUBCUTANEOUS at 04:12

## 2023-12-25 RX ADMIN — ACETAMINOPHEN 650 MG: 325 TABLET, FILM COATED ORAL at 03:12

## 2023-12-25 RX ADMIN — SODIUM BICARBONATE 650 MG TABLET 1300 MG: at 09:12

## 2023-12-25 RX ADMIN — OXYCODONE HYDROCHLORIDE 5 MG: 5 TABLET ORAL at 09:12

## 2023-12-25 RX ADMIN — MIDODRINE HYDROCHLORIDE 15 MG: 5 TABLET ORAL at 09:12

## 2023-12-25 RX ADMIN — ENOXAPARIN SODIUM 100 MG: 100 INJECTION SUBCUTANEOUS at 01:12

## 2023-12-25 RX ADMIN — ESCITALOPRAM OXALATE 10 MG: 10 TABLET ORAL at 09:12

## 2023-12-25 RX ADMIN — COLLAGENASE SANTYL: 250 OINTMENT TOPICAL at 09:12

## 2023-12-25 NOTE — PROGRESS NOTES
Nephrology consult follow up note    HPI:      Karen Briggs is a 58 y.o. female  presented on  7 days post op from laparoscopic cholecystectomy on 11/10. Gallbladder was unable to be resected. Back and flank pain persisted post operatively prompting evaluation at [facility]. After workup her surgeon recommended ERCP for which she was sent to this facility. Left sided hydronephrosis noted on CT scans done on admission. At that time renal function was fairly normal and patient was given option for stenting or to defer as outpatient and she chose the latter.      During ERCP, malignant gastric mass noted and ERCP was unable to be completed due to duodenal scarring. 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 Patient ultimately had biliary drain placed per IR.      She developed A fib with RVR requiring amiodarone infusion. She then had significant blood loss post removal of long term IV and subsequent fall in her room.      Patient renal function was relatively stable until decline starting 11/25. She was ultimately initiated on HD 11/28 post bilateral ureteral stent placement with Dr Cardenas same day.      Patient developed dialysis dependent acute kidney injury, and was started on hemodialysis 11/29/2023.  Tunneled dialysis catheter was placed on 12/06/2023.  Dialysis catheter had to be removed on 12/16/2023 after dialysis due to persistent fungemia.  Tunneled dialysis catheter was replaced on 12/20/2023.    Interval history:     Patient has become intolerant of dialysis. She is anuric. Decision has been made to make patient a DNR and stop dialysis. At present she is comfortable. She is resting with her eyes close. Nods her head yes to say she is comfortable. Boyfriend at bedside. All questions answered. Moving her mouth as if she's mouthing words with no sound.     Objective:       VITAL SIGNS: 24 HR MIN & MAX LAST    Temp  Min: 98 °F (36.7 °C)  Max: 99.6 °F (37.6 °C)  98.1 °F (36.7 °C)        BP  Min:  80/55  Max: 92/64  92/64     Pulse  Min: 102  Max: 116  106     Resp  Min: 18  Max: 20  20    SpO2  Min: 92 %  Max: 100 %  (!) 92 %      GEN: Chronically ill appearing AAF in NAD  CV: RRR +S1,S2 without murmur  PULM: CTAB, unlabored on RA  ABD: Soft, NT/ND abdomen.  Drain noted to right side  EXT:  3+ lower extremity edema  SKIN: Warm and dry  PSYCH: nods appropriately  Dialysis access:  Right IJ PermCath          Component Value Date/Time     (L) 12/24/2023 0621     (L) 12/23/2023 0537     04/24/2018 0340    K 4.6 12/24/2023 0621    K 4.3 12/23/2023 0537    K 3.2 (L) 04/24/2018 0340    CHLORIDE 100 12/24/2023 0621    CHLORIDE 102 12/23/2023 0537    CO2 18 (L) 12/24/2023 0621    CO2 17 (L) 12/23/2023 0537    CO2 20 (L) 04/24/2018 0340    BUN 47.0 (H) 12/24/2023 0621    BUN 68.2 (H) 12/23/2023 0537    BUN 10 04/24/2018 0340    CREATININE 4.36 (H) 12/24/2023 0621    CREATININE 5.62 (H) 12/23/2023 0537    CREATININE 0.7 04/24/2018 0340    CALCIUM 8.5 12/24/2023 0621    CALCIUM 9.2 12/23/2023 0537    CALCIUM 9.2 04/24/2018 0340    PHOS 4.8 (H) 12/24/2023 0621            Component Value Date/Time    WBC 24.56 (H) 12/24/2023 0621    WBC 24.56 12/24/2023 0621    WBC 31.61 (H) 12/23/2023 0418    WBC 31.61 12/23/2023 0418    WBC 12.03 04/24/2018 0340    HGB 9.0 (L) 12/24/2023 0621    HGB 10.3 (L) 12/23/2023 0418    HGB 11.3 (L) 04/24/2018 0340    HCT 26.8 (L) 12/24/2023 0621    HCT 32.0 (L) 12/23/2023 0418    HCT 35.3 (L) 04/24/2018 0340     (H) 12/24/2023 0621     (H) 12/23/2023 0418     04/24/2018 0340       Imaging reviewed      Assessment / Plan:     POORNIMA multifactorial secondary to obstructive uropathy, acute blood loss, contrast exposure, hypotension and Afib with RVR  ---Nephrotic range proteinuria noted. 4.4 g  ---Dialysis initiated 11/28 post tunneled catheter placement 12/6  --tunneled catheter removed 12/16 and replaced 12/20 due to persistent fungemia  Newly diagnosed  malignant gastric mass. Pathology consistent with adenocarcinoma   -s/p J tube and mediport placement 12/1  Acute blood loss anemia 2/2 bleeding post long term IV removal   --s/p transfusion 11/30, 12/1, 12/10  Bilateral hydronephrosis noted 11/21 - stent placement initially deferred due to stable renal function   Afib with RVR resolved   S/p biliary drain placement and subsequent exchange due to fungemia   Fungemia - blood cultures positive 12/10, 12/11   Hypotension on TID Midodrine     Plan:  We will stop dialysis. Discussed with boyfriend at bedside.   She is a DNR and will be transitioned to hospice facility likely tomorrow it seems.   OK to use tunneled catheter for IV access.     Thank you for allowing us to care for this patient with you.

## 2023-12-25 NOTE — PROGRESS NOTES
[facility]  Hospital Medicine Progress Note        Chief Complaint: Inpatient Follow-up for intractable nausea vomiting due to gastric adenocarcinoma     HPI:   A 58-year-old female with medical history of hypertension who recently underwent laparoscopic cholecystectomy on 11/10/2023, procedure was incomplete and was unable to completely resect the gallbladder.  Since surgery she continued to have right flank/back pain and followed up with her PCP and CT abdomen and pelvis on 11/17/2023 revealed left-sided hydronephrosis with suspected distal obstruction/no clear stone visualized, postoperative changes of cholecystectomy with fluid in the gallbladder fossa may reflect postoperative seroma but biloma can not be entirely excluded, also noted for marked thickening of the stomach wall diffusely may be related to mesenteric edema/reactive.  She presented to [facility] ED the same day 11/17/2023 and her labs notable for WBC 9.0, hemoglobin 12.4, platelets 442, creatinine 0.86, total bilirubin 8.7 with direct fraction 6.7, alkaline phosphatase 491, , .  Patient's surgeon was consulted and recommended ERCP which was not available at [facility] for which she was transferred to [facility] and referred to hospital medicine service for further evaluation and management. 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  Severe inflammation and oozing of blood noted proximal gastric lumen.  Unable to advance scope into the duodenum. Surgical oncology consulted, IR consulted for external drain placement which was done November 21.  November 24th she developed new onset atrial fibrillation with RVR and Cardiology consulted. Started on amiodarone drip. Unfortunately patient had acute blood loss due to hemorrhage from the midline site that was removed. Patient was unaware of the bleed.  Became very weak, passed out.  Code was called but she came around.   Stat H&H done which was slightly lower but stable, MRI of the brain was negative for any acute ischemic changes. Pt is aware of gastric cancer diagnosis. GI following--> 11/18- EGD shows normal esophagus, malignant gastric tumor in the cardia.  Inflamed mucosa and ooze of blood throughout the proximal gastric lumen.  Gastric antrum scar would not allow advancement of scope into the duodenal ampulla area therefore biliary cannulation was not possible for bile leak evaluation. Pathology shows marked chronic gastritis with intestinal metaplasia, gastric cardia biopsy shows adenocarcinoma, moderately differentiated intestinal type. Bilateral hydronephrosis with left greater than right. MRI brain without contrast-negative for acute finding. PET scan reviewed with patient by Oncology reports of locally advanced gastric adenocarcinoma and plans for systemic therapy outpatient if functional, nutritional and renal function improves. MediPort placement by surgical Oncology on 12/1, oncology on board plans for systemic therapy outpatient if functional, nutritional and renal function improves. Obstructive uropathy with bilateral hydronephrosis status post bilateral ureteral stent placed on 11/27 by Urology- no improvement in renal function, patient opted to have hemodialysis and a right-sided hemodialysis catheter was placed by General surgery on 11/29, to continue hemodialysis per nephrology discretion. Patient with symptoms of nausea and vomiting can not keep anything down in her stomach complicated by severe malnutrition on IV Clinimix and supportive medications for nausea and vomiting. Palliative care consulted. Patient got a MediPort and J-tube placed on 12/01. Cystogram reviewed by Urology with patent stents and recommends outpatient follow-up with Urology. White cell count elevated at 20.4, Infectious Disease had started on IV Zosyn given concern for possible sepsis with low blood pressure on 12/02. [facility] evaluated patient  to begin low-dose metoprolol tartrate at 12.5 mg b.i.d. and resume Eliquis for stroke prevention. On TF via J tube which was placed by Surgical Oncology. ID started IV Micafungin for candidemia. Noted to have persistent fungemia on 1/2 BCX from 12/11.  Consulted [facility] for GAYLA; report pending. General Surgery consulted to remove HD line & Mediport. Patient spiked a fever overnight on 12/15; BCX repeated by ID which are negative.  Afebrile since 12/14. General Surgery removed mediport & tunneled catheter after HD on 12/16. J Tube replaced by Surgical Oncology on 12/18 after it became clogged.    Interval Hx:     Seen and examined the patient.  Afebrile vitals stable and hemodynamically stable.  Abdominal pain    Objective/physical exam:  General: alert lady lying comfortably in bed, in no acute distress  HENT: oral and oropharyngeal mucosa moist, pink, with no erythema or exudates, no ear pain or discharge  Neck: normal neck movement, no lymph nodes or swellings, no JVD or Carotid bruit  Respiratory: clear breathing sounds bilaterally, no crackles, rales, ronchi or wheezes  Cardiovascular: clear S1 and S2, no murmurs, rubs or gallops  Peripheral Vascular: no lesions, ulcers or erosions, normal peripheral pulses; 2+ pitting pedal edema  Gastrointestinal: J tube in place LUQ; soft, non-tender, non-distended abdomen, no guarding, rigidity or rebound tenderness, normal bowel sounds  Integumentary: normal skin color, no rashes or lesions  Neuro: AAO x 3; motor strength 5/5 in B/L UEs & LEs; sensation intact to gross and fine touch B/L; CN II-XII grossly intact    VITAL SIGNS: 24 HRS MIN & MAX LAST   Temp  Min: 98 °F (36.7 °C)  Max: 99.6 °F (37.6 °C) 98.9 °F (37.2 °C)   BP  Min: 80/55  Max: 95/61 95/61   Pulse  Min: 102  Max: 116  (!) 116   Resp  Min: 18  Max: 20 20   SpO2  Min: 92 %  Max: 100 % 97 %     I reviewed the labs below:  Recent Labs   Lab 12/21/23  0431 12/23/23  0418 12/24/23  0621   WBC 22  22.02* 31.61  31.61*  24.56  24.56*   RBC 3.36* 3.45* 3.01*   HGB 10.0* 10.3* 9.0*   HCT 30.8* 32.0* 26.8*   MCV 91.7 92.8 89.0   MCH 29.8 29.9 29.9   MCHC 32.5* 32.2* 33.6   RDW 17.1* 17.2* 17.2*   * 655* 751*   MPV 10.0 9.8 10.1       Recent Labs   Lab 12/20/23  0438 12/21/23  0431 12/23/23  0537 12/24/23  0621   * 130* 134* 135*   K 4.1 4.1 4.3 4.6   CO2 19* 17* 17* 18*   BUN 97.8* 60.0* 68.2* 47.0*   CREATININE 6.96* 4.80* 5.62* 4.36*   CALCIUM 8.6 8.8 9.2 8.5   ALBUMIN 1.6* 2.0* 1.9* 2.1*   ALKPHOS 170* 169*  --  117   ALT 46 47  --  50   AST 41* 41*  --  80*   BILITOT 2.8* 2.6*  --  4.2*       Assessment/Plan:  Hypotension, improved with Midodrine  Persistent leukocytosis likely 2/2 Fungemia  Acute on chronic anemia, s/p 2 units prbcs transfusion 11/30  Hx of Acute Blood loss anemia with syncope and then fall 11/26-   Locally advanced gastric adenocarcinoma with intractable nausea and vomiting possible Gastric/duodenal outlet obstruction s/p J Tube placement  J Tube Obstruction  Fungemia possibly 2/2 Biliary Drain Infection  Obstructive uropathy with bilateral hydronephrosis- S/P bilateral ureteral stent placed on 11/27  POORNIMA-now on HD - 11/29  Suspected bile leak with biloma and biliary obstruction--> H/O Laparoscopic incomplete cholecystectomy 11/10/2023  New onset atrial fibrillation with RVR- fluctuating  History of hypertension and DDD/sciatica  Severe malnutrition  Leukocytosis likely 2/2 Fungemia  Tachycardia 2/2 IV Depletion vs Sepsis  Thrombocytosis possibly reactive to Sepsis    12/25: Patient's seem comfortable today.  Was able to respond to me however went back to sleep right after.  We will continue comfort measures.  DC when able to.    Seen and examined the patient today.  She has not tolerating hemodialysis.  Multiple evaluations she has not going to be also a candidate for chemotherapy at this point.  Due to severe debility and malnutrition and now with current comorbidities her prognosis is poor.   Nephrology recommended palliative care/hospice.   I had had a discussion with the patient nurse Pinky at the bedside.   She is oriented x3.  She understands her condition states that she has renal failure and also stomach cancer.  She understands that her prognosis is poor.  She stated that she will like to become hospice and be comfortable.    I asked the regarding the DNR status.  She stated that if her heart stops and she stops breathing she will like to be in peace and she does not want to have chest compressions or endotracheal intubation with mechanical ventilation.  Her boyfriend is at the bedside I also have spoken to multiple members of her family with her brother her sister's.  They are all in agreement with the plan.  We will continue to treat patient in the hospital.  Continue antibiotics for now.  Let the case management know for possibility of transferring patient to a hospice facility.        - patient was not able to tolerate the dialysis received albumins and blood pressure is still 80/50.  -nephrology recommends hospice.  We will discuss with the patient and the family.  - CT abdomen pelvis without contrast revealed abdominal pocket of fluid especially around the G-tube area.    Surgical team was notified.  - Patient is consistently hypotensive with a hemodialysis requiring albumin and midodrine.  Overall poor prognosis due to severe debility, intolerance of HD.  Being a poor candidate for any type of procedures, poor candidate of chemotherapy.  - had a discussion with the patient and family at bedside , nephrology is also at the bedside.  Nephrologist Dr. Black stated that if she continues to have hypotension during dialysis, intolerance to dialysis, she would be more appropriate for hospice.  We will try 1 more episode of dialysis tomorrow and see if we can pull off any fluids.  If that has not the case she would likely need to be hospice.  Family and the patient understands.    Reporting  improvement in J tube site pain  J Tube replaced by Surg Oncology on 12/18 after it became clogged  Resumed TF  Consulted Hematology for worsening thrombocytosis; Plt 1014 today  Underwent GAYLA on 12/14 with report negative for vegetations  On IV Micafungin  ID on-board; follow recs  ID added IV Ciprofloxacin to cover for possible biliary drain related infectino/cholangitis given worsening leukocytosis  Nephrology on-board for HD conduction  Patient to get new tunneled catheter to resume HD  Surgical Oncology on-board; follow recs  PICC line removed  Patient had MediPort and J-tube placed on 12/01; General Surgery removed HD catheter & Mediport on 12/16  Patient underwent biliary drain exchange on 12/13 per ID recs  Blood Cultures 12/10 positive for candida glabrata in the blood 1/2; Repeat BCX 12/11 positive for yeasts; BCX 12/12 negative; BCX repeated on 12/15 d/t fevers which are negative x 72 hrs; BCX from 12/17 negative x 48 hrs  Palliative Care consulted earlier; patient wishes to continue pursuing curative treatment for cancer  Cystogram reviewed by Urology with patent stents and recommends outpatient follow-up with Urology  Appreciate Oncology input; plans for systemic therapy outpatient if renal function improves, patient is planning to pursue treatment   Continued on Hydrocortisone Supp, Metoprolol Tartrate BID, Midodrine TID, Protonix BID, Sodium Bicarbonate     VTE prophylaxis:  FD Lovenox     Patient condition:  Stable     Anticipated discharge and Disposition:  Pending    All diagnosis and differential diagnosis have been reviewed; assessment and plan has been documented; I have personally reviewed the labs and test results that are presently available; I have reviewed the patients medication list; I have reviewed the consulting providers response and recommendations. I have reviewed or attempted to review medical records based upon their availability  All of the patient's questions have been  addressed  and answered. Patient's is agreeable to the above stated plan. I will continue to monitor closely and make adjustments to medical management as needed.  _____________________________________________________________________    Nutrition Status:  Patient meets ASPEN criteria for severe malnutrition of acute illness or injury per RD assessment as evidenced by:  Energy Intake (Malnutrition):  (does not meet criteria)  Weight Loss (Malnutrition): greater than 7.5% in 3 months  Subcutaneous Fat (Malnutrition): moderate depletion  Muscle Mass (Malnutrition): moderate depletion           A minimum of two characteristics is recommended for diagnosis of either severe or non-severe malnutrition.   Radiology:   I have personally reviewed the images and agree with radiologist report  CT Abdomen Pelvis  Without Contrast  Narrative: EXAMINATION:  CT ABDOMEN PELVIS WITHOUT CONTRAST    CLINICAL HISTORY:  Abdominal distension (Ped 0-18y);    TECHNIQUE:  Helically acquired axial images, sagittal and coronal reformations were obtained from the lung bases to the pubic symphysis without the IV administration of contrast.    Automated tube current modulation, weight-based exposure dosing, and/or iterative reconstruction technique utilized to reach lowest reasonably achievable exposure rate.    DLP: 1657 mGy*cm    COMPARISON:  CT abdomen pelvis 12/17/2023    FINDINGS:  HEART: Normal in size. No pericardial effusion.    LUNG BASES: Unchanged cardiophrenic lymph nodes.    LIVER: No appreciable focal hepatic lesion by noncontrast evaluation.    BILIARY: There is an internal external biliary drain in place with internal aspect coiled at the 3rd portion of the duodenum.    PANCREAS: Limited noncontrast assessment.  There is generalized anasarca 0 which precludes evaluation for inflammatory change.    SPLEEN: Normal in size    ADRENALS: Unchanged.    KIDNEYS/URETERS: There are bilateral ureteral stents.  No appreciable calcifications along the  stents.  Moderate to severe bilateral hydronephrosis, similar to prior.  Similar asymmetric hyperdensity of the contents of the left renal collecting system possibly related to retained contrast from a previous exam.    GI TRACT/MESENTERY: Evaluation of the bowel is limited without contrast. No evidence of bowel obstruction.  There is a percutaneous jejunostomy tube in place.    PERITONEUM: There is moderate free intraperitoneal fluid.Locules of intraperitoneal gas adjacent to the insertion of the  jejunostomy in the anterior left hemiabdomen are difficult to definitively localize within a bowel loop on this noncontrast CT.    LYMPH NODES: Similar to prior.  Suboptimally evaluated without contrast.    VASCULATURE: No significant atherosclerosis or aneurysm.    BLADDER: Normal appearance given degree of distention.    REPRODUCTIVE ORGANS: Normal as visualized.    ABDOMINAL WALL: Body wall edema.  Foci of subcutaneous air with stranding most compatible with medicinal injection.  There are foci of soft tissue gas adjacent to the jejunostomy tube.    BONES: No acute osseous abnormality.  Impression: 1. Technically challenging exam given lack of intravenous contrast and presence of anasarca.  Anasarca decreases intrinsic soft tissue contrast.  2. There is moderate free intraperitoneal fluid, similar to previous exam.  There is a jejunostomy tube in place.  Tube terminates the lumen of the jejunum.  There are several locules of air in the abdomen in the region of the insertion point/jejunostomy balloon which are difficult to confidently localized within the bowel.  Leak or other nonspecific pneumoperitoneum is consideration.  There are also foci of subcutaneous gas at the abdominal wall near the insertion of the jejunostomy which may be related to the jejunostomy tube or medicinal injection.  3. Unchanged bilateral hydronephrosis with ureteral stents in place  4. Unchanged internal external biliary drain    Electronically  signed by: Sulema Solorzano  Date:    12/21/2023  Time:    16:00  Cardiac catheterization  Procedure performed in the Invasive Lab    - See Procedure Log link below for nursing documentation    - See OpNote on Surgeries Tab for physician findings    - See Imaging Tab for radiologist dictation      Mukesh Cole MD  Department of Hospital Medicine   [facility]   12/25/2023

## 2023-12-25 NOTE — PLAN OF CARE
12/25/23 1400   Final Note   Assessment Type Final Discharge Note   Anticipated Discharge Disposition [facility]   Post-Acute Status   Post-Acute Authorization Placement   Post-Acute Placement Status Set-up Complete/Auth obtained  ([facility])   Discharge Delays (!) Ambulance Transport/Facility Transport     Patient placed in will call, nurse will take patient out once report has been called.

## 2023-12-25 NOTE — PLAN OF CARE
Spoke to Albina with [facility], will be reaching out to floor nurse. Updates sent via [employer].

## 2023-12-25 NOTE — PROGRESS NOTES
Patient transferring to [facility] for hospice care today. Report called to Veronika nurse with [facility]. Patient's family at bedside and agreeable with transfer. Patient's heart monitor removed. Patient awaiting [facility] for transport.

## 2023-12-25 NOTE — PLAN OF CARE
Problem: Adult Inpatient Plan of Care  Goal: Plan of Care Review  Outcome: Ongoing, Not Progressing  Goal: Patient-Specific Goal (Individualized)  Outcome: Ongoing, Not Progressing  Goal: Absence of Hospital-Acquired Illness or Injury  Outcome: Ongoing, Not Progressing  Goal: Optimal Comfort and Wellbeing  Outcome: Ongoing, Not Progressing  Goal: Readiness for Transition of Care  Outcome: Ongoing, Not Progressing     Problem: Pain Acute  Goal: Acceptable Pain Control and Functional Ability  Outcome: Ongoing, Not Progressing     Problem: Fall Injury Risk  Goal: Absence of Fall and Fall-Related Injury  Outcome: Ongoing, Not Progressing     Problem: Infection  Goal: Absence of Infection Signs and Symptoms  Outcome: Ongoing, Not Progressing     Problem: Coping Ineffective  Goal: Effective Coping  Outcome: Ongoing, Not Progressing     Problem: Skin Injury Risk Increased  Goal: Skin Health and Integrity  Outcome: Ongoing, Not Progressing     Problem: Device-Related Complication Risk (Hemodialysis)  Goal: Safe, Effective Therapy Delivery  Outcome: Ongoing, Not Progressing     Problem: Hemodynamic Instability (Hemodialysis)  Goal: Effective Tissue Perfusion  Outcome: Ongoing, Not Progressing     Problem: Infection (Hemodialysis)  Goal: Absence of Infection Signs and Symptoms  Outcome: Ongoing, Not Progressing     Problem: Impaired Wound Healing  Goal: Optimal Wound Healing  Outcome: Ongoing, Not Progressing

## 2023-12-27 LAB
BACTERIA BLD CULT: NORMAL
BACTERIA BLD CULT: NORMAL

## 2024-01-15 NOTE — DISCHARGE SUMMARY
[facility] Hospital Medicine Discharge Summary    Admit Date: 11/17/2023  Discharge Date and Time: 12/25/2023  Discharging Physician: Mukesh Cole MD.  Consults: Cardiology, Gastroenterology, General Surgery, Hematology/Oncology, Infectious Disease, and Nephrology    Discharge Diagnoses:  Hypotension, improved with Midodrine  Persistent leukocytosis likely 2/2 Fungemia  Acute on chronic anemia, s/p 2 units prbcs transfusion 11/30  Hx of Acute Blood loss anemia with syncope and then fall 11/26-   Locally advanced gastric adenocarcinoma with intractable nausea and vomiting possible Gastric/duodenal outlet obstruction s/p J Tube placement  J Tube Obstruction  Fungemia possibly 2/2 Biliary Drain Infection  Obstructive uropathy with bilateral hydronephrosis- S/P bilateral ureteral stent placed on 11/27  POORNIMA-now on HD - 11/29  Suspected bile leak with biloma and biliary obstruction--> H/O Laparoscopic incomplete cholecystectomy 11/10/2023  New onset atrial fibrillation with RVR- fluctuating  History of hypertension and DDD/sciatica  Severe malnutrition  Leukocytosis likely 2/2 Fungemia  Tachycardia 2/2 IV Depletion vs Sepsis  Thrombocytosis possibly reactive to Sepsis      Hospital Course:   A 58-year-old female with medical history of hypertension who recently underwent laparoscopic cholecystectomy on 11/10/2023, procedure was incomplete and was unable to completely resect the gallbladder.  Since surgery she continued to have right flank/back pain and followed up with her PCP and CT abdomen and pelvis on 11/17/2023 revealed left-sided hydronephrosis with suspected distal obstruction/no clear stone visualized, postoperative changes of cholecystectomy with fluid in the gallbladder fossa may reflect postoperative seroma but biloma can not be entirely excluded, also noted for marked thickening of the stomach wall diffusely may be related to mesenteric edema/reactive.  [place] [facility] [place] and her labs notable for WBC 9.0, hemoglobin 12.4, platelets 442, creatinine 0.86, total bilirubin 8.7 with direct fraction 6.7, alkaline phosphatase 491, , .  Patient's surgeon was consulted and recommended ERCP which was not available at [facility] for which she was transferred to [facility] and referred to hospital medicine service for further evaluation and management. 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  Severe inflammation and oozing of blood noted proximal gastric lumen.  Unable to advance scope into the duodenum. Surgical oncology consulted, IR consulted for external drain placement which was done November 21.  November 24th she developed new onset atrial fibrillation with RVR and Cardiology consulted. Started on amiodarone drip. Unfortunately patient had acute blood loss due to hemorrhage from the midline site that was removed. Patient was unaware of the bleed.  Became very weak, passed out.  Code was called but she came around.  Stat H&H done which was slightly lower but stable, MRI of the brain was negative for any acute ischemic changes. Pt is aware of gastric cancer diagnosis. GI following--> 11/18- EGD shows normal esophagus, malignant gastric tumor in the cardia.  Inflamed mucosa and ooze of blood throughout the proximal gastric lumen.  Gastric antrum scar would not allow advancement of scope into the duodenal ampulla area therefore biliary cannulation was not possible for bile leak evaluation. Pathology shows marked chronic gastritis with intestinal metaplasia, gastric cardia biopsy shows adenocarcinoma, moderately differentiated intestinal type. Bilateral hydronephrosis with left greater than right. MRI brain without contrast-negative for acute finding. PET scan reviewed with patient by Oncology reports of locally advanced gastric adenocarcinoma and plans for systemic therapy outpatient if functional,  nutritional and renal function improves. MediPort placement by surgical Oncology on 12/1, oncology on board plans for systemic therapy outpatient if functional, nutritional and renal function improves. Obstructive uropathy with bilateral hydronephrosis status post bilateral ureteral stent placed on 11/27 by Urology- no improvement in renal function, patient opted to have hemodialysis and a right-sided hemodialysis catheter was placed by General surgery on 11/29, to continue hemodialysis per nephrology discretion. Patient with symptoms of nausea and vomiting can not keep anything down in her stomach complicated by severe malnutrition on IV Clinimix and supportive medications for nausea and vomiting. Palliative care consulted. Patient got a MediPort and J-tube placed on 12/01. Cystogram reviewed by Urology with patent stents and recommends outpatient follow-up with Urology. White cell count elevated at 20.4, Infectious Disease had started on IV Zosyn given concern for possible sepsis with low blood pressure on 12/02. [facility] evaluated patient to begin low-dose metoprolol tartrate at 12.5 mg b.i.d. and resume Eliquis for stroke prevention. On TF via J tube which was placed by Surgical Oncology. ID started IV Micafungin for candidemia. Noted to have persistent fungemia on 1/2 BCX from 12/11.  Consulted [facility] for GAYLA; report pending. General Surgery consulted to remove HD line & Mediport. Patient spiked a fever overnight on 12/15; BCX repeated by ID which are negative.  Afebrile since 12/14. General Surgery removed mediport & tunneled catheter after HD on 12/16. J Tube replaced by Surgical Oncology on 12/18 after it became clogged.     Patient was discharged to a hospice facility.  12/25: Patient's seem comfortable today.  Was able to respond to me however went back to sleep right after.  We will continue comfort measures.  DC when able to.     She has not tolerating hemodialysis.  Multiple evaluations she has not going to be  also a candidate for chemotherapy at this point.  Due to severe debility and malnutrition and now with current comorbidities her prognosis is poor.  Nephrology recommended palliative care/hospice.   I had had a discussion with the patient nurse Pinky at the bedside.   She is oriented x3.  She understands her condition states that she has renal failure and also stomach cancer.  She understands that her prognosis is poor.  She stated that she will like to become hospice and be comfortable.    I asked the regarding the DNR status.  She stated that if her heart stops and she stops breathing she will like to be in peace and she does not want to have chest compressions or endotracheal intubation with mechanical ventilation.  Her boyfriend is at the bedside I also have spoken to multiple members of her family with her brother her sister's.  They are all in agreement with the plan.  We will continue to treat patient in the hospital.  Continue antibiotics for now.  Let the case management know for possibility of transferring patient to a hospice facility.     - Patient was not able to tolerate the dialysis received albumins and blood pressure is still 80/50.  -nephrology recommends hospice.  We will discuss with the patient and the family.  - CT abdomen pelvis without contrast revealed abdominal pocket of fluid especially around the G-tube area.    Surgical team was notified.  - Patient is consistently hypotensive with a hemodialysis requiring albumin and midodrine.  Overall poor prognosis due to severe debility, intolerance of HD.  Being a poor candidate for any type of procedures, poor candidate of chemotherapy.  - had a discussion with the patient and family at bedside , nephrology is also at the bedside.  Nephrologist Dr. Black stated that if she continues to have hypotension during dialysis, intolerance to dialysis, she would be more appropriate for hospice.  We will try 1 more episode of dialysis tomorrow and see if  we can pull off any fluids.  If that has not the case she would likely need to be hospice.  Family and the patient understands.     Reporting improvement in J tube site pain  J Tube replaced by Surg Oncology on 12/18 after it became clogged  Resumed TF  Consulted Hematology for worsening thrombocytosis; Plt 1014 today  Underwent GAYLA on 12/14 with report negative for vegetations  On IV Micafungin  ID on-board; follow recs  ID added IV Ciprofloxacin to cover for possible biliary drain related infectino/cholangitis given worsening leukocytosis  Nephrology on-board for HD conduction  Patient to get new tunneled catheter to resume HD  Surgical Oncology on-board; follow recs  PICC line removed  Patient had MediPort and J-tube placed on 12/01; General Surgery removed HD catheter & Mediport on 12/16  Patient underwent biliary drain exchange on 12/13 per ID recs  Blood Cultures 12/10 positive for candida glabrata in the blood 1/2; Repeat BCX 12/11 positive for yeasts; BCX 12/12 negative; BCX repeated on 12/15 d/t fevers which are negative x 72 hrs; BCX from 12/17 negative x 48 hrs  Palliative Care consulted earlier; patient wishes to continue pursuing curative treatment for cancer  Cystogram reviewed by Urology with patent stents and recommends outpatient follow-up with Urology  Appreciate Oncology input; plans for systemic therapy outpatient if renal function improves, patient is planning to pursue treatment   Continued on Hydrocortisone Supp, Metoprolol Tartrate BID, Midodrine TID, Protonix BID, Sodium Bicarbonate      Objective/physical exam:  General: alert lady lying comfortably in bed, in no acute distress  HENT: oral and oropharyngeal mucosa moist, pink, with no erythema or exudates, no ear pain or discharge  Neck: normal neck movement, no lymph nodes or swellings, no JVD or Carotid bruit  Respiratory: clear breathing sounds bilaterally, no crackles, rales, ronchi or wheezes  Cardiovascular: clear S1 and S2, no  "murmurs, rubs or gallops  Peripheral Vascular: no lesions, ulcers or erosions, normal peripheral pulses; 2+ pitting pedal edema  Gastrointestinal: J tube in place LUQ; soft, non-tender, non-distended abdomen, no guarding, rigidity or rebound tenderness, normal bowel sounds  Integumentary: normal skin color, no rashes or lesions  Neuro: AAO x 3; motor strength 5/5 in B/L UEs & LEs; sensation intact to gross and fine touch B/L; CN II-XII grossly intact       Vitals:  VITAL SIGNS: 24 HRS MIN & MAX LAST   No data recorded 98.5 °F (36.9 °C)   No data recorded (!) 77/53   No data recorded  (!) 122   No data recorded 18   No data recorded 97 %         Procedures Performed: No admission procedures for hospital encounter.     Significant Diagnostic Studies: See Full reports for all details    No results for input(s): "WBC", "RBC", "HGB", "HCT", "MCV", "MCH", "MCHC", "RDW", "PLT", "MPV", "GRAN", "LYMPH", "MONO", "BASO", "NRBC" in the last 168 hours.    No results for input(s): "NA", "K", "CL", "CO2", "ANIONGAP", "BUN", "CREATININE", "GLU", "CALCIUM", "PH", "MG", "ALBUMIN", "PROT", "ALKPHOS", "ALT", "AST", "BILITOT" in the last 168 hours.     Microbiology Results (last 7 days)       Procedure Component Value Units Date/Time    Blood Culture [[id number]]  (Normal) Collected: 12/17/23 0844    Order Status: Completed Specimen: Blood Updated: 12/22/23 1101     CULTURE, BLOOD ([facility]) No Growth at 5 days    Blood Culture [[id number]]  (Normal) Collected: 12/15/23 1505    Order Status: Completed Specimen: Blood Updated: 12/20/23 1900     CULTURE, BLOOD ([facility]) No Growth at 5 days    Blood Culture [[id number]]  (Normal) Collected: 12/15/23 1505    Order Status: Completed Specimen: Blood Updated: 12/20/23 1900     CULTURE, BLOOD ([facility]) No Growth at 5 days             CT Abdomen Pelvis  Without Contrast  Narrative: EXAMINATION:  CT ABDOMEN PELVIS WITHOUT CONTRAST    CLINICAL HISTORY:  Abdominal distension (Ped " 0-18y);    TECHNIQUE:  Helically acquired axial images, sagittal and coronal reformations were obtained from the lung bases to the pubic symphysis without the IV administration of contrast.    Automated tube current modulation, weight-based exposure dosing, and/or iterative reconstruction technique utilized to reach lowest reasonably achievable exposure rate.    DLP: 1657 mGy*cm    COMPARISON:  CT abdomen pelvis 12/17/2023    FINDINGS:  HEART: Normal in size. No pericardial effusion.    LUNG BASES: Unchanged cardiophrenic lymph nodes.    LIVER: No appreciable focal hepatic lesion by noncontrast evaluation.    BILIARY: There is an internal external biliary drain in place with internal aspect coiled at the 3rd portion of the duodenum.    PANCREAS: Limited noncontrast assessment.  There is generalized anasarca 0 which precludes evaluation for inflammatory change.    SPLEEN: Normal in size    ADRENALS: Unchanged.    KIDNEYS/URETERS: There are bilateral ureteral stents.  No appreciable calcifications along the stents.  Moderate to severe bilateral hydronephrosis, similar to prior.  Similar asymmetric hyperdensity of the contents of the left renal collecting system possibly related to retained contrast from a previous exam.    GI TRACT/MESENTERY: Evaluation of the bowel is limited without contrast. No evidence of bowel obstruction.  There is a percutaneous jejunostomy tube in place.    PERITONEUM: There is moderate free intraperitoneal fluid.Locules of intraperitoneal gas adjacent to the insertion of the  jejunostomy in the anterior left hemiabdomen are difficult to definitively localize within a bowel loop on this noncontrast CT.    LYMPH NODES: Similar to prior.  Suboptimally evaluated without contrast.    VASCULATURE: No significant atherosclerosis or aneurysm.    BLADDER: Normal appearance given degree of distention.    REPRODUCTIVE ORGANS: Normal as visualized.    ABDOMINAL WALL: Body wall edema.  Foci of  subcutaneous air with stranding most compatible with medicinal injection.  There are foci of soft tissue gas adjacent to the jejunostomy tube.    BONES: No acute osseous abnormality.  Impression: 1. Technically challenging exam given lack of intravenous contrast and presence of anasarca.  Anasarca decreases intrinsic soft tissue contrast.  2. There is moderate free intraperitoneal fluid, similar to previous exam.  There is a jejunostomy tube in place.  Tube terminates the lumen of the jejunum.  There are several locules of air in the abdomen in the region of the insertion point/jejunostomy balloon which are difficult to confidently localized within the bowel.  Leak or other nonspecific pneumoperitoneum is consideration.  There are also foci of subcutaneous gas at the abdominal wall near the insertion of the jejunostomy which may be related to the jejunostomy tube or medicinal injection.  3. Unchanged bilateral hydronephrosis with ureteral stents in place  4. Unchanged internal external biliary drain    Electronically signed by: Sulema Solorzano  Date:    12/21/2023  Time:    16:00  Cardiac catheterization  Procedure performed in the Invasive Lab    - See Procedure Log link below for nursing documentation    - See OpNote on Surgeries Tab for physician findings    - See Imaging Tab for radiologist dictation         Medication List        CONTINUE taking these medications      olmesartan 20 MG tablet  Commonly known as: BENICAR               Explained in detail to the patient about the discharge plan, medications, and follow-up visits. Pt understands and agrees with the treatment plan  Discharge Disposition: Hospice/Medical Facility   Discharged Condition: stable  Diet-    Medications Per DC med rec  Activities as tolerated   Follow-up Information       Agustín Larry MD Follow up.    Specialty: Cardiology  Why: 1-2 weeks  Contact information:  [address]  [address]  [place]  [address]  [phone]               [facility] Follow up.    Specialties: Home Health Services, Home Therapy Services, Home Living Aide Services  Why: This is your home health provider. Please notify at [place].  Contact information:  [address]  [facility] [address]  [phone]             [facility] Follow up.    Specialties: Home Health Services, Hospice and Palliative Medicine, Hospice Services  Why: This is the provider for Palliative Medicine. Please notify at [place].  Contact information:  [address]  [facility] [address]  [phone]               [facility] Follow up.    Why: This will be the provider for tube feeding. Please notify at [place].  Contact information:  [address] [facility] [place]  [address]             Luis Mullins MD Follow up in 1 month(s).    Specialty: Urology  Why: for stent management  Contact information:  [address]  [facility] [address]  [phone]                           For further questions contact hospitalist office    Discharge time 33 minutes    For worsening symptoms, chest pain, shortness of breath, increased abdominal pain, high grade fever, stroke or stroke like symptoms, immediately go to the nearest Emergency Room or call 911 as soon as possible.      Mukesh Rosario M.D, on 1/14/2024. at 10:19 PM.

## 2024-01-16 LAB — FUNGUS BLD CULT: NORMAL

## 2024-02-06 LAB — PATH REV: NORMAL

## (undated) DEVICE — CATH POLLACK OPEN-END FLEXI-TI

## (undated) DEVICE — SOL IRR NACL .9% 3000ML

## (undated) DEVICE — TRAY CENT PREM SUT REM PK SGL

## (undated) DEVICE — Device

## (undated) DEVICE — GUIDEWIRE STR TIP HIWIRE 150CM

## (undated) DEVICE — TRAY SKIN SCRUB WET PREMIUM

## (undated) DEVICE — DRAPE SLUSH WARMER 66X44IN

## (undated) DEVICE — COLLECTION SPECIMEN NEPTUNE

## (undated) DEVICE — DEVICE LOCKING RX - OLYMPUS

## (undated) DEVICE — SYR 30CC LUER LOCK

## (undated) DEVICE — APPLICATOR STRL COT 2INNR 6IN

## (undated) DEVICE — KIT MINI STK MAX COAX 5FR 10CM

## (undated) DEVICE — CANNULA LAP SEAL Z THRD 5X100

## (undated) DEVICE — FORCEP BX LG CAP 2.8MMX240CM

## (undated) DEVICE — SUT SILK 0 SH 30IN BLK BR

## (undated) DEVICE — STENT SET URETERAL 6X24CM: Type: IMPLANTABLE DEVICE | Site: URETER | Status: NON-FUNCTIONAL

## (undated) DEVICE — SUT MCRYL PLUS 4-0 PS2 27IN

## (undated) DEVICE — COVER C-ARM STRAP BAND 44X80IN

## (undated) DEVICE — BAG LABGUARD BIOHAZARD 6X9IN

## (undated) DEVICE — ELECTRODE REM PLYHSV RETURN 9

## (undated) DEVICE — KIT SURGICAL COLON .25 1.1OZ

## (undated) DEVICE — SCISSOR CURVED ENDOPATH 5MM

## (undated) DEVICE — ADAPTER STOPCOCK FEMALE LL

## (undated) DEVICE — KIT SURGICAL TURNOVER

## (undated) DEVICE — GLOVE PROTEXIS LTX MICRO 8

## (undated) DEVICE — BLOCK BLOX BITE DENT RIM 54FR

## (undated) DEVICE — BAG DRAIN ANTI REFLUX 2000ML

## (undated) DEVICE — SYR 10CC LUER LOCK

## (undated) DEVICE — MARKER WRITESITE SKIN CHLRAPRP

## (undated) DEVICE — SYR ONLY LUER LOCK 20CC

## (undated) DEVICE — TIP SUCTION YANKAUER

## (undated) DEVICE — SPHINCTEROTOME AUTOTOME RX 44

## (undated) DEVICE — HOLDER STRIP-T SELF ADH 2X10IN

## (undated) DEVICE — NDL 20GX1-1/2IN IB

## (undated) DEVICE — BLADE SURG STAINLESS STEEL #11

## (undated) DEVICE — POSITIONER HEAD AD 9X8X4.5IN

## (undated) DEVICE — BOWL STERILE LARGE 32OZ

## (undated) DEVICE — KIT GEN LAPAROSCOPY LAFAYETTE

## (undated) DEVICE — ELECTRODE PATIENT RETURN DISP

## (undated) DEVICE — NDL HYPO 22GX1 1/2 SYR 10ML LL

## (undated) DEVICE — TROCAR ENDO Z THREAD KII 5X100

## (undated) DEVICE — ADHESIVE DERMABOND ADVANCED

## (undated) DEVICE — BLADE SURG CARBON STEEL SZ11

## (undated) DEVICE — BAG DRAIN UROLOGY AND HOSE

## (undated) DEVICE — FLOWSWITCH HP 1-W W/O LL

## (undated) DEVICE — SUT 2/0 30IN SILK BLK BRAI

## (undated) DEVICE — CHLORAPREP W TINT 26ML APPL

## (undated) DEVICE — CANNULA NASAL ADLT EAR 25FT

## (undated) DEVICE — DRESSING TELFA N ADH 3X8IN

## (undated) DEVICE — SUT VICRYL 3-0 27 SH

## (undated) DEVICE — SOL NORMAL USPCA 0.9%

## (undated) DEVICE — CUP PROFEX GLASS GRADUATE 1OZ

## (undated) DEVICE — DRESSING TEGADERM CHG 3.5X4.5

## (undated) DEVICE — TRAY CATH FOL SIL URIMTR 16FR

## (undated) DEVICE — GLOVE PROTEXIS BLUE LATEX 7

## (undated) DEVICE — COVER PROBE US 5.5X58L NON LTX

## (undated) DEVICE — GLOVE PROTEXIS HYDROGEL SZ6.5

## (undated) DEVICE — CONTAINER SPECIMEN SCREW 4OZ

## (undated) DEVICE — NDL HYPO REG 25G X 1 1/2

## (undated) DEVICE — GLOVE PROTEXIS HYDROGEL SZ7

## (undated) DEVICE — SUT SILK 0 BLK BR CT-1 30IN

## (undated) DEVICE — SPONGE EXCILON DRN 4X4IN 6PLY

## (undated) DEVICE — SOL IRRI STRL WATER 1000ML

## (undated) DEVICE — CORD LAP 10 DISP

## (undated) DEVICE — SUT MONOCRYL 3-0 PS-2 UND

## (undated) DEVICE — BAG MEDI-PLAST DECANTER C-FLOW

## (undated) DEVICE — TUBE FEEDING JEJUNAL 14FR

## (undated) DEVICE — SUPPORT ULNA NERVE PROTECTOR

## (undated) DEVICE — TOWEL OR BLUE STRL 16X26 4/PK

## (undated) DEVICE — NDL PERC ENTRY BSDN 18-7.0

## (undated) DEVICE — GUIDEWIRE REVOLUTION ANG 260CM

## (undated) DEVICE — DRESSING TRANS 4X4 TEGADERM